# Patient Record
Sex: MALE | Race: WHITE | NOT HISPANIC OR LATINO | Employment: FULL TIME | URBAN - METROPOLITAN AREA
[De-identification: names, ages, dates, MRNs, and addresses within clinical notes are randomized per-mention and may not be internally consistent; named-entity substitution may affect disease eponyms.]

---

## 2017-05-05 ENCOUNTER — OFFICE VISIT (OUTPATIENT)
Dept: URGENT CARE | Facility: PHYSICIAN GROUP | Age: 57
End: 2017-05-05
Payer: COMMERCIAL

## 2017-05-05 VITALS
OXYGEN SATURATION: 97 % | DIASTOLIC BLOOD PRESSURE: 96 MMHG | BODY MASS INDEX: 25.05 KG/M2 | HEART RATE: 90 BPM | HEIGHT: 73 IN | WEIGHT: 189 LBS | SYSTOLIC BLOOD PRESSURE: 148 MMHG | TEMPERATURE: 98 F

## 2017-05-05 DIAGNOSIS — R21 RASH: ICD-10-CM

## 2017-05-05 PROCEDURE — 99204 OFFICE O/P NEW MOD 45 MIN: CPT | Performed by: FAMILY MEDICINE

## 2017-05-05 RX ORDER — TRIAMCINOLONE ACETONIDE 40 MG/ML
60 INJECTION, SUSPENSION INTRA-ARTICULAR; INTRAMUSCULAR ONCE
Status: COMPLETED | OUTPATIENT
Start: 2017-05-05 | End: 2017-05-05

## 2017-05-05 RX ORDER — PREDNISONE 20 MG/1
TABLET ORAL
Qty: 7 TAB | Refills: 1 | Status: SHIPPED | OUTPATIENT
Start: 2017-05-05 | End: 2017-06-01

## 2017-05-05 RX ORDER — PREDNISONE 20 MG/1
TABLET ORAL
Qty: 7 TAB | Refills: 0 | Status: SHIPPED | OUTPATIENT
Start: 2017-05-05 | End: 2017-05-05 | Stop reason: SDUPTHER

## 2017-05-05 RX ADMIN — TRIAMCINOLONE ACETONIDE 60 MG: 40 INJECTION, SUSPENSION INTRA-ARTICULAR; INTRAMUSCULAR at 10:54

## 2017-05-05 NOTE — MR AVS SNAPSHOT
"        Jose A Ponce   2017 10:10 AM   Office Visit   MRN: [id number]    Department:  [facility]   Dept Phone:  [phone]    Description:  Male : 1960   Provider:  Elvin Cohn M.D.           Reason for Visit     Rash Rash on back/itching/blotchy x6 weeks pt tried OTC nothing helps      Allergies as of 2017     No Known Allergies      You were diagnosed with     Rash   [[id number]]         Vital Signs     Blood Pressure Pulse Temperature Height Weight Body Mass Index    148/96 mmHg 90 36.7 °C (98 °F) 1.854 m (6' 1\") 85.73 kg (189 lb) 24.94 kg/m2    Oxygen Saturation                   97%           Basic Information     Date Of Birth Sex Race Ethnicity Preferred Language    1960 Male White Non- English      Health Maintenance     Patient has no pending health maintenance at this time      Current Immunizations     No immunizations on file.      Below and/or attached are the medications your provider expects you to take. Review all of your home medications and newly ordered medications with your provider and/or pharmacist. Follow medication instructions as directed by your provider and/or pharmacist. Please keep your medication list with you and share with your provider. Update the information when medications are discontinued, doses are changed, or new medications (including over-the-counter products) are added; and carry medication information at all times in the event of emergency situations     Allergies:  No Known Allergies          Medications  Valid as of: May 05, 2017 - 11:25 AM    Generic Name Brand Name Tablet Size Instructions for use    Fluocinonide (Cream) LIDEX 0.05 % APPLY THIN FILM TO RASH UP TO 3 TIMES A DAY ONLY IF NEEDED.        PredniSONE (Tab) DELTASONE 20 MG 1 TAB ONCE A DAY X 7 DAYS. TAKE WITH FOOD. PLEASE INCLUDE 1 REFILL.        .                 Medicines prescribed today were sent to:     [id number] - [place], [place] - [address]    [address] " [place] [id number]    Phone: [phone] Fax: [phone]    Open 24 Hours?: No      Medication refill instructions:       If your prescription bottle indicates you have medication refills left, it is not necessary to call your provider’s office. Please contact your pharmacy and they will refill your medication.    If your prescription bottle indicates you do not have any refills left, you may request refills at any time through one of the following ways: The online YouEye system (except Urgent Care), by calling your provider’s office, or by asking your pharmacy to contact your provider’s office with a refill request. Medication refills are processed only during regular business hours and may not be available until the next business day. Your provider may request additional information or to have a follow-up visit with you prior to refilling your medication.   *Please Note: Medication refills are assigned a new Rx number when refilled electronically. Your pharmacy may indicate that no refills were authorized even though a new prescription for the same medication is available at the pharmacy. Please request the medicine by name with the pharmacy before contacting your provider for a refill.           YouEye Access Code: [email or web address]  Expires: 6/4/2017 11:25 AM    Your email address is not on file at [facility].  Email Addresses are required for you to sign up for YouEye, please contact [phone] to verify your personal information and to provide your email address prior to attempting to register for YouEye.    Jose A Ponce  [address]  [place] [address]    YouEye  A secure, online tool to manage your health information     [facility]’s YouEye® is a secure, online tool that connects you to your personalized health information from the privacy of your home -- day or night - making it very easy for you to manage your healthcare. Once the activation process is completed, you can even access your  medical information using the SocialSign.in elyssa, which is available for free in the Apple Elyssa store or Google Play store.     To learn more about SocialSign.in, visit [email or web address]    There are two levels of access available (as shown below):   My Chart Features  [facility] Doctor [facility]  Specialists [facility]  Urgent  Care Non-[facility] Primary [facility] Doctor   Email your healthcare team securely and privately 24/7 X X X    Manage appointments: schedule your next appointment; view details of past/upcoming appointments X      Request prescription refills. X      View recent personal medical records, including lab and immunizations X X X X   View health record, including health history, allergies, medications X X X X   Read reports about your outpatient visits, procedures, consult and ER notes X X X X   See your discharge summary, which is a recap of your hospital and/or ER visit that includes your diagnosis, lab results, and care plan X X  X     How to register for [email or web address]:  Once your e-mail address has been verified, follow the following steps to sign up for [email or web address].     1. Go to  [email or web address]  2. Click on the Sign Up Now box, which takes you to the New Member Sign Up page. You will need to provide the following information:  a. Enter your SocialSign.in Access Code exactly as it appears at the top of this page. (You will not need to use this code after you’ve completed the sign-up process. If you do not sign up before the expiration date, you must request a new code.)   b. Enter your date of birth.   c. Enter your home email address.   d. Click Submit, and follow the next screen’s instructions.  3. Create a SocialSign.in ID. This will be your SocialSign.in login ID and cannot be changed, so think of one that is secure and easy to remember.  4. Create a SocialSign.in password. You can change your password at any time.  5. Enter your Password Reset Question and Answer. This can be used at a later time if you forget your password.    6. Enter your e-mail address. This allows you to receive e-mail notifications when new information is available in Element ID.  7. Click Sign Up. You can now view your health information.    For assistance activating your Element ID account, call [phone]

## 2017-05-05 NOTE — PROGRESS NOTES
Chief Complaint:    Chief Complaint   Patient presents with   • Rash     Rash on back/itching/blotchy x6 weeks pt tried OTC nothing helps       History of Present Illness:    This is a new problem. For 6 weeks, has itchy rash affecting back and few on legs. At least moderate severity. Tried topical cortisone with mild help. Tried topical antifungal and antibiotic without help. No similar prior episodes. Not getting better.      Review of Systems:    Constitutional: Negative for fever, chills, and diaphoresis.   Eyes: Negative for change in vision, photophobia, pain, redness, and discharge.  ENT: Negative for ear pain, ear discharge, hearing loss, tinnitus, nasal congestion, nosebleeds, and sore throat.    Respiratory: Negative for cough, hemoptysis, sputum production, shortness of breath, wheezing, and stridor.    Cardiovascular: Negative for chest pain, palpitations, orthopnea, claudication, leg swelling, and PND.   Gastrointestinal: Negative for abdominal pain, nausea, vomiting, diarrhea, constipation, blood in stool, and melena.   Genitourinary: Negative for dysuria, urinary urgency, urinary frequency, hematuria, and flank pain.   Musculoskeletal: Negative for myalgias, joint pain, neck pain, and back pain.   Skin: See HPI.   Neurological: Negative for dizziness, tingling, tremors, sensory change, speech change, focal weakness, seizures, loss of consciousness, and headaches.   Endo: Negative for polydipsia.   Heme: Does not bruise/bleed easily.   Psychiatric/Behavioral: Negative for depression, suicidal ideas, hallucinations, memory loss and substance abuse. The patient is not nervous/anxious and does not have insomnia.      Past Medical History:    History reviewed. No pertinent past medical history.    Past Surgical History:    History reviewed. No pertinent past surgical history.    Social History:    Social History     Social History   • Marital Status: Single     Spouse Name: N/A   • Number of Children: N/A  "  • Years of Education: N/A     Occupational History   • Not on file.     Social History Main Topics   • Smoking status: Not on file   • Smokeless tobacco: Not on file   • Alcohol Use: Not on file   • Drug Use: Not on file   • Sexual Activity: Not on file     Other Topics Concern   • Not on file     Social History Narrative   • No narrative on file       Family History:    History reviewed. No pertinent family history.    Medications:    No current outpatient prescriptions on file prior to visit.     No current facility-administered medications on file prior to visit.       Allergies:    No Known Allergies      Vitals:    Filed Vitals:    05/05/17 1030   BP: 148/96   Pulse: 90   Temp: 36.7 °C (98 °F)   Height: 1.854 m (6' 1\")   Weight: 85.73 kg (189 lb)   SpO2: 97%       Physical Exam:    Constitutional: Vital signs reviewed. Appears well-developed and well-nourished. No acute distress.   Eyes: Sclera white, conjunctivae clear.  ENT: External ears normal. Hearing normal.  Cardiovascular: Peripheral pulses 2+.   Pulmonary/Chest: Respirations non-labored.  Musculoskeletal: Normal gait. Normal range of motion. No tenderness to palpation. No muscular atrophy or weakness.  Neurological: Alert and oriented to person, place, and time. Muscle tone normal. Coordination normal. Light touch and sensation normal.   Skin: Moderate-large amount of erythematous patches on mid-upper back bilaterally, some are confluent. Few focal erythematous patches on shoulders and legs.  Psychiatric: Normal mood and affect. Behavior is normal. Judgment and thought content normal.       Assessment / Plan:    1. Rash  - triamcinolone acetonide (KENALOG-40) injection 60 mg; 1.5 mL by Intramuscular route Once.  - fluocinonide (LIDEX) 0.05 % Cream; APPLY THIN FILM TO RASH UP TO 3 TIMES A DAY ONLY IF NEEDED.  Dispense: 30 g; Refill: 3  - predniSONE (DELTASONE) 20 MG Tab; 1 TAB ONCE A DAY X 7 DAYS. TAKE WITH FOOD. PLEASE INCLUDE 1 REFILL.  Dispense: 7 " Tab; Refill: 1      Discussed with him DDX and management options.    This looks like some type of atopy or eczema.    Desires aggressive treatment due to duration and severity of symptoms.    Agreeable to medications given and prescribed.    Advised to see Dermatologist (has PPO, does not need referral) if above not helpful.    May return to urgent care if needed.

## 2017-06-01 ENCOUNTER — RESOLUTE PROFESSIONAL BILLING HOSPITAL PROF FEE (OUTPATIENT)
Dept: HOSPITALIST | Facility: MEDICAL CENTER | Age: 57
End: 2017-06-01
Payer: COMMERCIAL

## 2017-06-01 ENCOUNTER — HOSPITAL ENCOUNTER (OUTPATIENT)
Facility: MEDICAL CENTER | Age: 57
End: 2017-06-02
Attending: EMERGENCY MEDICINE | Admitting: INTERNAL MEDICINE
Payer: COMMERCIAL

## 2017-06-01 ENCOUNTER — APPOINTMENT (OUTPATIENT)
Dept: RADIOLOGY | Facility: MEDICAL CENTER | Age: 57
End: 2017-06-01
Attending: EMERGENCY MEDICINE
Payer: COMMERCIAL

## 2017-06-01 DIAGNOSIS — R07.9 CHEST PAIN, UNSPECIFIED TYPE: ICD-10-CM

## 2017-06-01 DIAGNOSIS — R06.09 DYSPNEA ON EXERTION: ICD-10-CM

## 2017-06-01 LAB
ALBUMIN SERPL BCP-MCNC: 4.6 G/DL (ref 3.2–4.9)
ALBUMIN/GLOB SERPL: 1.3 G/DL
ALP SERPL-CCNC: 62 U/L (ref 30–99)
ALT SERPL-CCNC: 19 U/L (ref 2–50)
ANION GAP SERPL CALC-SCNC: 10 MMOL/L (ref 0–11.9)
AST SERPL-CCNC: 22 U/L (ref 12–45)
BASOPHILS # BLD AUTO: 0.9 % (ref 0–1.8)
BASOPHILS # BLD: 0.06 K/UL (ref 0–0.12)
BILIRUB SERPL-MCNC: 1.1 MG/DL (ref 0.1–1.5)
BNP SERPL-MCNC: 24 PG/ML (ref 0–100)
BUN SERPL-MCNC: 20 MG/DL (ref 8–22)
CALCIUM SERPL-MCNC: 9.2 MG/DL (ref 8.4–10.2)
CHLORIDE SERPL-SCNC: 106 MMOL/L (ref 96–112)
CHOLEST SERPL-MCNC: 242 MG/DL (ref 100–199)
CO2 SERPL-SCNC: 23 MMOL/L (ref 20–33)
CREAT SERPL-MCNC: 0.93 MG/DL (ref 0.5–1.4)
EKG IMPRESSION: NORMAL
EKG IMPRESSION: NORMAL
EOSINOPHIL # BLD AUTO: 0.02 K/UL (ref 0–0.51)
EOSINOPHIL NFR BLD: 0.3 % (ref 0–6.9)
ERYTHROCYTE [DISTWIDTH] IN BLOOD BY AUTOMATED COUNT: 41.4 FL (ref 35.9–50)
GFR SERPL CREATININE-BSD FRML MDRD: >60 ML/MIN/1.73 M 2
GLOBULIN SER CALC-MCNC: 3.5 G/DL (ref 1.9–3.5)
GLUCOSE SERPL-MCNC: 119 MG/DL (ref 65–99)
HCT VFR BLD AUTO: 47 % (ref 42–52)
HDLC SERPL-MCNC: 54 MG/DL
HGB BLD-MCNC: 16.7 G/DL (ref 14–18)
IMM GRANULOCYTES # BLD AUTO: 0.03 K/UL (ref 0–0.11)
IMM GRANULOCYTES NFR BLD AUTO: 0.5 % (ref 0–0.9)
INR PPP: 0.94 (ref 0.87–1.13)
LDLC SERPL CALC-MCNC: 162 MG/DL
LIPASE SERPL-CCNC: 46 U/L (ref 7–58)
LYMPHOCYTES # BLD AUTO: 1.43 K/UL (ref 1–4.8)
LYMPHOCYTES NFR BLD: 21.8 % (ref 22–41)
MAGNESIUM SERPL-MCNC: 2.1 MG/DL (ref 1.5–2.5)
MCH RBC QN AUTO: 34.3 PG (ref 27–33)
MCHC RBC AUTO-ENTMCNC: 35.5 G/DL (ref 33.7–35.3)
MCV RBC AUTO: 96.5 FL (ref 81.4–97.8)
MONOCYTES # BLD AUTO: 0.58 K/UL (ref 0–0.85)
MONOCYTES NFR BLD AUTO: 8.8 % (ref 0–13.4)
NEUTROPHILS # BLD AUTO: 4.44 K/UL (ref 1.82–7.42)
NEUTROPHILS NFR BLD: 67.7 % (ref 44–72)
NRBC # BLD AUTO: 0 K/UL
NRBC BLD AUTO-RTO: 0 /100 WBC
PHOSPHATE SERPL-MCNC: 2.1 MG/DL (ref 2.5–4.5)
PLATELET # BLD AUTO: 197 K/UL (ref 164–446)
PMV BLD AUTO: 10.1 FL (ref 9–12.9)
POTASSIUM SERPL-SCNC: 3.7 MMOL/L (ref 3.6–5.5)
PROT SERPL-MCNC: 8.1 G/DL (ref 6–8.2)
PROTHROMBIN TIME: 12.4 SEC (ref 12–14.6)
RBC # BLD AUTO: 4.87 M/UL (ref 4.7–6.1)
SODIUM SERPL-SCNC: 139 MMOL/L (ref 135–145)
TRIGL SERPL-MCNC: 128 MG/DL (ref 0–149)
TROPONIN I SERPL-MCNC: <0.02 NG/ML (ref 0–0.04)
TROPONIN I SERPL-MCNC: <0.02 NG/ML (ref 0–0.04)
TSH SERPL DL<=0.005 MIU/L-ACNC: 1.32 UIU/ML (ref 0.35–5.5)
WBC # BLD AUTO: 6.6 K/UL (ref 4.8–10.8)

## 2017-06-01 PROCEDURE — 84484 ASSAY OF TROPONIN QUANT: CPT

## 2017-06-01 PROCEDURE — 700102 HCHG RX REV CODE 250 W/ 637 OVERRIDE(OP): Performed by: EMERGENCY MEDICINE

## 2017-06-01 PROCEDURE — 36415 COLL VENOUS BLD VENIPUNCTURE: CPT

## 2017-06-01 PROCEDURE — 84443 ASSAY THYROID STIM HORMONE: CPT

## 2017-06-01 PROCEDURE — 93005 ELECTROCARDIOGRAM TRACING: CPT | Performed by: EMERGENCY MEDICINE

## 2017-06-01 PROCEDURE — 99285 EMERGENCY DEPT VISIT HI MDM: CPT

## 2017-06-01 PROCEDURE — 99220 PR INITIAL OBSERVATION CARE,LEVL III: CPT | Performed by: INTERNAL MEDICINE

## 2017-06-01 PROCEDURE — A9270 NON-COVERED ITEM OR SERVICE: HCPCS | Performed by: EMERGENCY MEDICINE

## 2017-06-01 PROCEDURE — 83690 ASSAY OF LIPASE: CPT

## 2017-06-01 PROCEDURE — 85610 PROTHROMBIN TIME: CPT

## 2017-06-01 PROCEDURE — 700101 HCHG RX REV CODE 250: Performed by: EMERGENCY MEDICINE

## 2017-06-01 PROCEDURE — A9270 NON-COVERED ITEM OR SERVICE: HCPCS | Performed by: INTERNAL MEDICINE

## 2017-06-01 PROCEDURE — 85025 COMPLETE CBC W/AUTO DIFF WBC: CPT

## 2017-06-01 PROCEDURE — 84100 ASSAY OF PHOSPHORUS: CPT

## 2017-06-01 PROCEDURE — 71010 DX-CHEST-PORTABLE (1 VIEW): CPT

## 2017-06-01 PROCEDURE — 94760 N-INVAS EAR/PLS OXIMETRY 1: CPT

## 2017-06-01 PROCEDURE — G0378 HOSPITAL OBSERVATION PER HR: HCPCS

## 2017-06-01 PROCEDURE — 83735 ASSAY OF MAGNESIUM: CPT

## 2017-06-01 PROCEDURE — 83880 ASSAY OF NATRIURETIC PEPTIDE: CPT

## 2017-06-01 PROCEDURE — 700102 HCHG RX REV CODE 250 W/ 637 OVERRIDE(OP): Performed by: INTERNAL MEDICINE

## 2017-06-01 PROCEDURE — 96374 THER/PROPH/DIAG INJ IV PUSH: CPT

## 2017-06-01 PROCEDURE — 80061 LIPID PANEL: CPT

## 2017-06-01 PROCEDURE — 80053 COMPREHEN METABOLIC PANEL: CPT

## 2017-06-01 RX ORDER — MELATONIN 10 MG
1 CAPSULE ORAL
COMMUNITY
End: 2020-03-02

## 2017-06-01 RX ORDER — MELATONIN 10 MG
1 CAPSULE ORAL
Status: DISCONTINUED | OUTPATIENT
Start: 2017-06-01 | End: 2017-06-01

## 2017-06-01 RX ORDER — LABETALOL HYDROCHLORIDE 5 MG/ML
10 INJECTION, SOLUTION INTRAVENOUS EVERY 4 HOURS PRN
Status: DISCONTINUED | OUTPATIENT
Start: 2017-06-01 | End: 2017-06-02 | Stop reason: HOSPADM

## 2017-06-01 RX ORDER — ASPIRIN 600 MG/1
300 SUPPOSITORY RECTAL ONCE
Status: COMPLETED | OUTPATIENT
Start: 2017-06-01 | End: 2017-06-01

## 2017-06-01 RX ORDER — ASPIRIN 81 MG/1
324 TABLET, CHEWABLE ORAL ONCE
Status: COMPLETED | OUTPATIENT
Start: 2017-06-01 | End: 2017-06-01

## 2017-06-01 RX ORDER — BISACODYL 10 MG
10 SUPPOSITORY, RECTAL RECTAL
Status: DISCONTINUED | OUTPATIENT
Start: 2017-06-01 | End: 2017-06-02 | Stop reason: HOSPADM

## 2017-06-01 RX ORDER — NAPROXEN 250 MG/1
250 TABLET ORAL 2 TIMES DAILY WITH MEALS
Status: DISCONTINUED | OUTPATIENT
Start: 2017-06-01 | End: 2017-06-02 | Stop reason: HOSPADM

## 2017-06-01 RX ORDER — ATORVASTATIN CALCIUM 10 MG/1
10 TABLET, FILM COATED ORAL
Status: DISCONTINUED | OUTPATIENT
Start: 2017-06-01 | End: 2017-06-02 | Stop reason: HOSPADM

## 2017-06-01 RX ORDER — AMOXICILLIN 250 MG
2 CAPSULE ORAL 2 TIMES DAILY
Status: DISCONTINUED | OUTPATIENT
Start: 2017-06-01 | End: 2017-06-02 | Stop reason: HOSPADM

## 2017-06-01 RX ORDER — NAPROXEN SODIUM 220 MG
220 TABLET ORAL 2 TIMES DAILY WITH MEALS
Status: ON HOLD | COMMUNITY
End: 2017-06-19

## 2017-06-01 RX ORDER — ASPIRIN 325 MG
325 TABLET ORAL DAILY
Status: DISCONTINUED | OUTPATIENT
Start: 2017-06-02 | End: 2017-06-02 | Stop reason: HOSPADM

## 2017-06-01 RX ORDER — POLYETHYLENE GLYCOL 3350 17 G/17G
1 POWDER, FOR SOLUTION ORAL
Status: DISCONTINUED | OUTPATIENT
Start: 2017-06-01 | End: 2017-06-02 | Stop reason: HOSPADM

## 2017-06-01 RX ADMIN — METOPROLOL TARTRATE 25 MG: 25 TABLET ORAL at 14:31

## 2017-06-01 RX ADMIN — METOPROLOL TARTRATE 5 MG: 5 INJECTION INTRAVENOUS at 11:08

## 2017-06-01 RX ADMIN — ASPIRIN 81 MG 324 MG: 81 TABLET ORAL at 11:08

## 2017-06-01 RX ADMIN — ATORVASTATIN CALCIUM 10 MG: 10 TABLET, FILM COATED ORAL at 20:06

## 2017-06-01 ASSESSMENT — LIFESTYLE VARIABLES
HOW MANY TIMES IN THE PAST YEAR HAVE YOU HAD 5 OR MORE DRINKS IN A DAY: 0
EVER HAD A DRINK FIRST THING IN THE MORNING TO STEADY YOUR NERVES TO GET RID OF A HANGOVER: NO
HAVE PEOPLE ANNOYED YOU BY CRITICIZING YOUR DRINKING: NO
TOTAL SCORE: 0
TOTAL SCORE: 0
HAVE YOU EVER FELT YOU SHOULD CUT DOWN ON YOUR DRINKING: NO
EVER FELT BAD OR GUILTY ABOUT YOUR DRINKING: NO
AVERAGE NUMBER OF DAYS PER WEEK YOU HAVE A DRINK CONTAINING ALCOHOL: 7
ON A TYPICAL DAY WHEN YOU DRINK ALCOHOL HOW MANY DRINKS DO YOU HAVE: 3
TOTAL SCORE: 0
EVER_SMOKED: YES
CONSUMPTION TOTAL: POSITIVE
ALCOHOL_USE: YES

## 2017-06-01 ASSESSMENT — PAIN SCALES - GENERAL
PAINLEVEL_OUTOF10: 3
PAINLEVEL_OUTOF10: 0
PAINLEVEL_OUTOF10: 0

## 2017-06-01 NOTE — ED NOTES
Med Rec completed per patient  Allergies reviewed  No ORAL antibiotics in last 30 days    Patient was given a Prednisone taper and completed about 2 weeks ago

## 2017-06-01 NOTE — CARE PLAN
Problem: Safety  Goal: Will remain free from falls  Intervention: Implement fall precautions    06/01/17 1437   OTHER   Environmental Precautions Treaded Slipper Socks on Patient;Personal Belongings, Wastebasket, Call Bell etc. in Easy Reach;Transferred to Stronger Side;Report Given to Other Health Care Providers Regarding Fall Risk;Bed in Low Position;Communication Sign for Patients & Families;Mobility Assessed & Appropriate Sign Placed   Bedrails Bedrails Closest to Bathroom Down   Chair/Bed Strip Alarm Patient Educated Regarding Fall Risk and Need for Bed Alarm, Understands and Continues to Refuse     Pt up self, declines bed alarm. Pt aaox4, steady gait. Fall protocol in place.       Problem: Knowledge Deficit  Goal: Knowledge of disease process/condition, treatment plan, diagnostic tests, and medications will improve  Outcome: PROGRESSING AS EXPECTED  Pt educated on stress test. Verbalized understanding. Print out provided.

## 2017-06-01 NOTE — FLOWSHEET NOTE
06/01/17 1409   Events/Summary/Plan   Events/Summary/Plan O2 protovol completed   Chest Exam   Work Of Breathing / Effort Mild   Respiration 18   Pulse 86   Heart Rate (Monitored) 79   Breath Sounds   RUL Breath Sounds Clear   RML Breath Sounds Clear   RLL Breath Sounds Diminished   LIANNA Breath Sounds Clear   LLL Breath Sounds Diminished   Oximetry   #Pulse Oximetry (Single Determination) Yes   Oxygen   Home O2 Use Prior To Admission? No   Pulse Oximetry 97 %   O2 (LPM) 0   O2 (FiO2) 21   O2 Daily Delivery Respiratory  Room Air with O2 Available

## 2017-06-01 NOTE — ED PROVIDER NOTES
"ED Provider Note    CHIEF COMPLAINT  Chief Complaint   Patient presents with   • Blood Pressure Problem     reports high blood pressure at home, no HX   • Chest Pain     started three days ago, \"feels like a dull ache, like I strained a muscle\"        HPI  Jose A Ponce is a 56 y.o. male who presents complaining of chest pain.    Patient states he has had constant, dull, left-sided chest pain for the last 3 days. He suspected he may have strained a muscle while lifting boxes at work. He has also had shortness of breath on exertion and today admits to nausea and fatigue. He denies vomiting, diaphoresis, PND, leg swelling, calf pain, orthopnea, cough, fever, chills, abdominal pain.    Patient states he has felt significantly fatigued and depressed since his significant other past 9 days ago.    Patient does not have a history of hypertension, dyslipidemia, diabetes, or family history of coronary artery disease.    Patient does not take aspirin on a daily basis.      ALLERGIES  No Known Allergies    CURRENT MEDICATIONS  Denies    PAST MEDICAL HISTORY   has a past medical history of hernia repair.    SURGICAL HISTORY   has past surgical history that includes other orthopedic surgery.    SOCIAL HISTORY  Social History     Social History Main Topics   • Smoking status: Current Every Day Smoker   • Smokeless tobacco: Not on file   • Alcohol Use: Yes   • Drug Use: No   • Sexual Activity: Not on file       Family Hx:  No family history of coronary artery disease in first-degree relatives    REVIEW OF SYSTEMS  See HPI for further details.  All other systems are negative except as above in HPI.      PHYSICAL EXAM  VITAL SIGNS: /111 mmHg  Pulse 111  Temp(Src) 37.1 °C (98.8 °F)  Resp 20  Ht 1.854 m (6' 1\")  Wt 84.4 kg (186 lb 1.1 oz)  BMI 24.55 kg/m2  SpO2 98%    General:  WDWN, nontoxic appearing in NAD; A+Ox3; V/S as above; tachycardic, hypertensive  Skin: warm and dry; good color; no rash  HEENT: NCAT; EOMs " intact; PERRL; no scleral icterus   Neck: FROM; no meningismus, no LAD, no JVD  Cardiovascular: Regular heart rate and rhythm.  No murmurs, rubs, or gallops; pulses 2+ bilaterally radially and DP areas; no chest wall tenderness   Lungs: Clear to auscultation with good air movement bilaterally.  No wheezes, rhonchi, or rales.   Abdomen: BS present; soft; NTND; no rebound, guarding, or rigidity.  No organomegaly or pulsatile mass  Extremities: SUAREZ x 4; no e/o trauma; no pedal edema; negative Homans  Neurologic: CNs III-XII grossly intact; speech clear; distal sensation intact; strength 5/5 UE/LEs  Psychiatric: Appropriate affect, normal mood    LABS  Results for orders placed or performed during the hospital encounter of 06/01/17   CBC w/ Differential   Result Value Ref Range    WBC 6.6 4.8 - 10.8 K/uL    RBC 4.87 4.70 - 6.10 M/uL    Hemoglobin 16.7 14.0 - 18.0 g/dL    Hematocrit 47.0 42.0 - 52.0 %    MCV 96.5 81.4 - 97.8 fL    MCH 34.3 (H) 27.0 - 33.0 pg    MCHC 35.5 (H) 33.7 - 35.3 g/dL    RDW 41.4 35.9 - 50.0 fL    Platelet Count 197 164 - 446 K/uL    MPV 10.1 9.0 - 12.9 fL    Neutrophils-Polys 67.70 44.00 - 72.00 %    Lymphocytes 21.80 (L) 22.00 - 41.00 %    Monocytes 8.80 0.00 - 13.40 %    Eosinophils 0.30 0.00 - 6.90 %    Basophils 0.90 0.00 - 1.80 %    Immature Granulocytes 0.50 0.00 - 0.90 %    Nucleated RBC 0.00 /100 WBC    Neutrophils (Absolute) 4.44 1.82 - 7.42 K/uL    Lymphs (Absolute) 1.43 1.00 - 4.80 K/uL    Monos (Absolute) 0.58 0.00 - 0.85 K/uL    Eos (Absolute) 0.02 0.00 - 0.51 K/uL    Baso (Absolute) 0.06 0.00 - 0.12 K/uL    Immature Granulocytes (abs) 0.03 0.00 - 0.11 K/uL    NRBC (Absolute) 0.00 K/uL   Complete Metabolic Panel (CMP)   Result Value Ref Range    Sodium 139 135 - 145 mmol/L    Potassium 3.7 3.6 - 5.5 mmol/L    Chloride 106 96 - 112 mmol/L    Co2 23 20 - 33 mmol/L    Anion Gap 10.0 0.0 - 11.9    Glucose 119 (H) 65 - 99 mg/dL    Bun 20 8 - 22 mg/dL    Creatinine 0.93 0.50 - 1.40 mg/dL     Calcium 9.2 8.4 - 10.2 mg/dL    AST(SGOT) 22 12 - 45 U/L    ALT(SGPT) 19 2 - 50 U/L    Alkaline Phosphatase 62 30 - 99 U/L    Total Bilirubin 1.1 0.1 - 1.5 mg/dL    Albumin 4.6 3.2 - 4.9 g/dL    Total Protein 8.1 6.0 - 8.2 g/dL    Globulin 3.5 1.9 - 3.5 g/dL    A-G Ratio 1.3 g/dL   Btype Natriuretic Peptide   Result Value Ref Range    B Natriuretic Peptide 24 0 - 100 pg/mL   Troponin STAT   Result Value Ref Range    Troponin I <0.02 0.00 - 0.04 ng/mL   Lipase   Result Value Ref Range    Lipase 46 7 - 58 U/L   Magnesium   Result Value Ref Range    Magnesium 2.1 1.5 - 2.5 mg/dL   Phosphorus   Result Value Ref Range    Phosphorus 2.1 (L) 2.5 - 4.5 mg/dL   PT/INR   Result Value Ref Range    PT 12.4 12.0 - 14.6 sec    INR 0.94 0.87 - 1.13   TSH   Result Value Ref Range    TSH 1.320 0.350 - 5.500 uIU/mL   ESTIMATED GFR   Result Value Ref Range    GFR If African American >60 >60 mL/min/1.73 m 2    GFR If Non African American >60 >60 mL/min/1.73 m 2   EKG (ER)   Result Value Ref Range    Report       [facility]    Test Date:  2017  Pt Name:    CESAR MASTERS                Department: [facility]  MRN:        [id number]                      [facility]  Gender:     M                            Technician: jhon  :        1960                   Requested By:CARMEN HOPKINS  Order #:    [id number]                    Reading MD:    Measurements  Intervals                                Axis  Rate:       108                          P:          34  VT:         177                          QRS:        -9  QRSD:       93                           T:          9  QT:         334  QTc:        448    Interpretive Statements  Sinus tachycardia  Probable left atrial enlargement  Minimal ST depression, anterolateral leads  No previous ECG available for comparison           EKG  12 Lead EKG obtained and interpreted by me to show:  Rhythm: Sinus rhythm   Rate: 108  Intervals:   VT:  177   QRS: 93   QTC: 448   All intervals are within normal limits  No ectopy  Normal axis  Minimal ST depression in leads II, III, aVF and V5/V^  Clinical Impression: Sinus tachycardia with minimal ST depression in the inferolateral leads  No prior EKG  EKG has been confirmed in [facility]     Repeat EK Lead EKG obtained at 10:52 AM and interpreted by me to show:  Rhythm: Sinus rhythm   Rate: 99  Intervals:   HI: 152  QRS: 93  QTC: 444  All intervals are within normal limits  No ectopy  Slightly leftward axis  T wave inversions in lead III  1 mm of ST depression in aVF  Clinical Impression: Sinus rhythm with nonspecific T-wave changes and minimal ST depression in 1-lead inferiorly; no STEMI  Compared to previous EKG dated earlier today which shows no change        IMAGING  DX-CHEST-PORTABLE (1 VIEW)   Final Result      No evidence of acute cardiopulmonary process.          MEDICAL RECORD  I have reviewed patient's medical record and pertinent results are listed below.      COURSE & MEDICAL DECISION MAKING  I have reviewed any medical record information, laboratory studies and radiographic results as noted.    Jose A Ponce is a 56 y.o. male who presents complaining of chest pressure, nausea, fatigue, and dyspnea on exertion.  Patient is tachycardic and hypertensive but not hypoxic. Patient has no risk factors for PE. I do not suspect thoracic aortic dissection. ACS is possible as is Takotsubo's .    Triage EKG demonstrates sinus tachycardia with ST depression inferolaterally. No STEMI.    Repeat EKG demonstrated no significant change. No STEMI.    Patient was given aspirin and metoprolol 5mg IV    Patient's glucose is slightly elevated at 119. BNP and troponin are negative. Electrolytes are within normal limits except for phosphorus which is 2.1. TSH is normal. Chest x-ray demonstrates no widening mediastinum, pulmonary edema, focal consolidation, or significant cardiomegaly.    12:02 PM  Paging hospitalist for  admission    12:12 PM  I discussed the case with Dr. Russell from the hospital service who agrees to admit the patient.        FINAL IMPRESSION  1. Chest pain, unspecified type    2. Dyspnea on exertion      Electronically signed by: Rose Mary Mooney, 6/1/2017 10:34 AM

## 2017-06-01 NOTE — PROGRESS NOTES
Received pt via [facility] from ED with transport and RN. Pt walked self to bathroom and bed. Pt up self with steady gait. Denies pain and this time.

## 2017-06-01 NOTE — PROGRESS NOTES
Admit profile complete, medication discussed. Pt refused medication at this time. Assessment complete.

## 2017-06-01 NOTE — H&P
DATE OF ADMISSION:  06/01/2017.    REASON FOR ADMISSION:  Chest pain.    HISTORY OF PRESENT ILLNESS:  The patient is a 56-year-old healthy gentleman   who recently lost his significant other several weeks ago.  Since then,   especially in the last several days, he notes a certain degree of substernal   and left-sided chest pain.  Initially, he thought that he had strained a   muscle due to lifting boxes at work.  However, he is not able to touch the   area of soreness, which is deep to the chest.  He has been tired and weak and   feels as though it is more work to do exercise and he becomes more short of   breath.  Prior to the last week, he has been able to do strenuous physical   activity without any chest pain or shortness of breath.    There has been no fluid retention.  He denies any orthopnea.  There has been   no cough or upper respiratory infection-type symptoms.  The sensation of chest   heaviness can last for hours, but it is not associated with shortness of   breath, acid reflux or radiation towards the shoulder or neck.    This morning, out of concern, as he felt weak, he checked his blood pressure   at home.  This is quite elevated in the 170 systolic range.  Given the chest   heaviness and elevated blood pressure, he presents to the emergency room.    Here in the emergency room, blood pressure is indeed elevated at 185/106.    He denies nausea, vomiting, diarrhea, constipation, dysuria, black or bloody   stools, focal neurologic deficit, or skin rash.  Remainder of the review of   systems per CMS guidelines is negative.    PAST MEDICAL HISTORY:  Query COPD.  The patient is a heavy and ongoing smoker.    PAST SURGICAL HISTORY:  Herniorrhaphy.    SOCIAL HISTORY:  He is an ongoing smoker.  He does drink alcohol, but not to   excess.    FAMILY HISTORY:  There is no early heart disease.    CURRENT MEDICATIONS:  He is using over-the-counter Naprosyn, melatonin, and   multivitamin.    ALLERGIES:   None.    REVIEW OF SYSTEMS:  As in the HPI.    PHYSICAL EXAMINATION:  VITAL SIGNS:  Blood pressure 185/106, pulse 105.  He is afebrile, respiratory   rate 20.  GENERAL:  He is alert, oriented.  He is a good historian.  He does appear   somewhat older than stated age.  HEENT:  Pupils are equal, round and reactive.  Extraocular movements are   intact.  Mucous membranes are moist.  NECK:  Supple without jugular venous distention, lymphadenopathy or bruit.  CARDIOVASCULAR:  He is regular without murmur, rub or gallop.  LUNGS:  Clear to auscultation bilaterally.  Tones are somewhat distant.  ABDOMEN:  Soft, nontender, nondistended.  Bowel sounds are present.  There is   no palpable organomegaly.  BACK:  No CVA tenderness.  EXTREMITIES:  Warm.  There is no edema.  NEUROLOGIC:  Nonfocal.  SKIN:  No obvious rash.    LABORATORY DATA:  Sodium 139, potassium 3.7, chloride 106, bicarbonate 23,   glucose 119, BUN 20, creatinine 0.9, calcium 9.2, AST 22, ALT 19, alkaline   phosphatase 60, total bilirubin 1.1, albumin 4.6, total protein is 8.1,   phosphorus 2.1, magnesium 2.1, lipase 46.  Troponin is negative.  BNP 24, INR   0.9.  TSH is 1.3.  White count 7, hemoglobin 17, hematocrit 47, mean cell   volume is 97, platelets 197, neutrophils 68%, lymphocytes 22%, monocytes 9%.    STUDIES:  1. Chest x-ray is negative.  2. Electrocardiogram shows sinus rhythm.    IMPRESSION:  1. Chest pain, rule out ischemia.  2. Rule out broken heart syndrome.  3. Probable chronic obstructive pulmonary disease with heavy and ongoing   smoking history.  4. Borderline polycythemia, likely related to ongoing smoking.  5. Malignant hypertension.    PLAN:  1. Chest pain.  He will be admitted to a monitored bed.  We will follow serial   cardiac markers and electrocardiograms.  We will provide aspirin and statin   as well as beta blockade.  There is no evidence of injury.  We will do a   perfusion study tomorrow.  2. Malignant hypertension.  We will start  metoprolol and provide PRNs.  3. Prophylaxis.  Lovenox, laxatives.       ____________________________________     MD ARABELLA STEINER / ARIAS    DD:  06/01/2017 14:11:29  DT:  06/01/2017 16:53:53    D#:  [id number]  Job#:  [id number]

## 2017-06-01 NOTE — ED NOTES
Floor called and notified of pt transfer to floor. Kriss RN to call with any questions. Pt leaves this ER with no acute changes.

## 2017-06-01 NOTE — ED NOTES
"Chief Complaint   Patient presents with   • Blood Pressure Problem     reports high blood pressure at home, no HX   • Chest Pain     started three days ago, \"feels like a dull ache, like I strained a muscle\"      /111 mmHg  Pulse 111  Temp(Src) 37.1 °C (98.8 °F)  Resp 20  Ht 1.854 m (6' 1\")  Wt 84.4 kg (186 lb 1.1 oz)  BMI 24.55 kg/m2  SpO2 98%    "

## 2017-06-02 ENCOUNTER — APPOINTMENT (OUTPATIENT)
Dept: RADIOLOGY | Facility: MEDICAL CENTER | Age: 57
End: 2017-06-02
Attending: INTERNAL MEDICINE
Payer: COMMERCIAL

## 2017-06-02 ENCOUNTER — HOSPITAL ENCOUNTER (OUTPATIENT)
Facility: MEDICAL CENTER | Age: 57
End: 2017-06-04
Attending: INTERNAL MEDICINE | Admitting: INTERNAL MEDICINE
Payer: COMMERCIAL

## 2017-06-02 ENCOUNTER — RESOLUTE PROFESSIONAL BILLING HOSPITAL PROF FEE (OUTPATIENT)
Dept: HOSPITALIST | Facility: MEDICAL CENTER | Age: 57
End: 2017-06-02
Payer: COMMERCIAL

## 2017-06-02 VITALS
WEIGHT: 186.07 LBS | OXYGEN SATURATION: 94 % | HEART RATE: 77 BPM | DIASTOLIC BLOOD PRESSURE: 80 MMHG | BODY MASS INDEX: 24.66 KG/M2 | TEMPERATURE: 97.6 F | SYSTOLIC BLOOD PRESSURE: 125 MMHG | RESPIRATION RATE: 18 BRPM | HEIGHT: 73 IN

## 2017-06-02 PROBLEM — R94.39 ABNORMAL CARDIOVASCULAR STRESS TEST: Status: ACTIVE | Noted: 2017-06-02

## 2017-06-02 LAB
TROPONIN I SERPL-MCNC: <0.02 NG/ML (ref 0–0.04)
TROPONIN I SERPL-MCNC: <0.02 NG/ML (ref 0–0.04)

## 2017-06-02 PROCEDURE — A9270 NON-COVERED ITEM OR SERVICE: HCPCS | Performed by: INTERNAL MEDICINE

## 2017-06-02 PROCEDURE — A9502 TC99M TETROFOSMIN: HCPCS

## 2017-06-02 PROCEDURE — 99406 BEHAV CHNG SMOKING 3-10 MIN: CPT

## 2017-06-02 PROCEDURE — G0378 HOSPITAL OBSERVATION PER HR: HCPCS

## 2017-06-02 PROCEDURE — 700102 HCHG RX REV CODE 250 W/ 637 OVERRIDE(OP): Performed by: INTERNAL MEDICINE

## 2017-06-02 PROCEDURE — 700111 HCHG RX REV CODE 636 W/ 250 OVERRIDE (IP)

## 2017-06-02 PROCEDURE — 99217 PR OBSERVATION CARE DISCHARGE: CPT | Performed by: INTERNAL MEDICINE

## 2017-06-02 PROCEDURE — 84484 ASSAY OF TROPONIN QUANT: CPT | Mod: 91

## 2017-06-02 RX ORDER — AMOXICILLIN 250 MG
2 CAPSULE ORAL 2 TIMES DAILY
Status: CANCELLED | OUTPATIENT
Start: 2017-06-02

## 2017-06-02 RX ORDER — REGADENOSON 0.08 MG/ML
INJECTION, SOLUTION INTRAVENOUS
Status: COMPLETED
Start: 2017-06-02 | End: 2017-06-02

## 2017-06-02 RX ORDER — NAPROXEN 500 MG/1
250 TABLET ORAL 2 TIMES DAILY WITH MEALS
Status: DISCONTINUED | OUTPATIENT
Start: 2017-06-02 | End: 2017-06-04 | Stop reason: HOSPADM

## 2017-06-02 RX ORDER — POLYETHYLENE GLYCOL 3350 17 G/17G
1 POWDER, FOR SOLUTION ORAL
Status: CANCELLED | OUTPATIENT
Start: 2017-06-02

## 2017-06-02 RX ORDER — LABETALOL HYDROCHLORIDE 5 MG/ML
10 INJECTION, SOLUTION INTRAVENOUS EVERY 4 HOURS PRN
Status: DISCONTINUED | OUTPATIENT
Start: 2017-06-02 | End: 2017-06-04 | Stop reason: HOSPADM

## 2017-06-02 RX ORDER — ASPIRIN 325 MG
325 TABLET ORAL DAILY
Status: CANCELLED | OUTPATIENT
Start: 2017-06-03

## 2017-06-02 RX ORDER — BISACODYL 10 MG
10 SUPPOSITORY, RECTAL RECTAL
Status: DISCONTINUED | OUTPATIENT
Start: 2017-06-02 | End: 2017-06-04 | Stop reason: HOSPADM

## 2017-06-02 RX ORDER — LABETALOL HYDROCHLORIDE 5 MG/ML
10 INJECTION, SOLUTION INTRAVENOUS EVERY 4 HOURS PRN
Status: CANCELLED | OUTPATIENT
Start: 2017-06-02

## 2017-06-02 RX ORDER — ASPIRIN 325 MG
325 TABLET ORAL DAILY
Status: DISCONTINUED | OUTPATIENT
Start: 2017-06-03 | End: 2017-06-04 | Stop reason: HOSPADM

## 2017-06-02 RX ORDER — AMOXICILLIN 250 MG
2 CAPSULE ORAL 2 TIMES DAILY
Status: DISCONTINUED | OUTPATIENT
Start: 2017-06-02 | End: 2017-06-04 | Stop reason: HOSPADM

## 2017-06-02 RX ORDER — POLYETHYLENE GLYCOL 3350 17 G/17G
1 POWDER, FOR SOLUTION ORAL
Status: DISCONTINUED | OUTPATIENT
Start: 2017-06-02 | End: 2017-06-04 | Stop reason: HOSPADM

## 2017-06-02 RX ORDER — ATORVASTATIN CALCIUM 10 MG/1
10 TABLET, FILM COATED ORAL
Status: DISCONTINUED | OUTPATIENT
Start: 2017-06-02 | End: 2017-06-03

## 2017-06-02 RX ORDER — ATORVASTATIN CALCIUM 10 MG/1
10 TABLET, FILM COATED ORAL
Status: CANCELLED | OUTPATIENT
Start: 2017-06-02

## 2017-06-02 RX ORDER — BISACODYL 10 MG
10 SUPPOSITORY, RECTAL RECTAL
Status: CANCELLED | OUTPATIENT
Start: 2017-06-02

## 2017-06-02 RX ORDER — NAPROXEN 250 MG/1
250 TABLET ORAL 2 TIMES DAILY WITH MEALS
Status: CANCELLED | OUTPATIENT
Start: 2017-06-02

## 2017-06-02 RX ADMIN — METOPROLOL TARTRATE 25 MG: 25 TABLET, FILM COATED ORAL at 20:18

## 2017-06-02 RX ADMIN — ATORVASTATIN CALCIUM 10 MG: 10 TABLET, FILM COATED ORAL at 20:18

## 2017-06-02 RX ADMIN — REGADENOSON 0.4 MG: 0.08 INJECTION, SOLUTION INTRAVENOUS at 08:49

## 2017-06-02 ASSESSMENT — LIFESTYLE VARIABLES
AVERAGE NUMBER OF DAYS PER WEEK YOU HAVE A DRINK CONTAINING ALCOHOL: 7
ALCOHOL_USE: YES
HOW MANY TIMES IN THE PAST YEAR HAVE YOU HAD 5 OR MORE DRINKS IN A DAY: 0
HAVE PEOPLE ANNOYED YOU BY CRITICIZING YOUR DRINKING: NO
CONSUMPTION TOTAL: POSITIVE
TOTAL SCORE: 0
TOTAL SCORE: 0
EVER FELT BAD OR GUILTY ABOUT YOUR DRINKING: NO
ON A TYPICAL DAY WHEN YOU DRINK ALCOHOL HOW MANY DRINKS DO YOU HAVE: 3
HAVE YOU EVER FELT YOU SHOULD CUT DOWN ON YOUR DRINKING: NO
TOTAL SCORE: 0
EVER_SMOKED: YES
EVER HAD A DRINK FIRST THING IN THE MORNING TO STEADY YOUR NERVES TO GET RID OF A HANGOVER: NO

## 2017-06-02 ASSESSMENT — PAIN SCALES - GENERAL
PAINLEVEL_OUTOF10: 0

## 2017-06-02 NOTE — CONSULTS
Cardiology consultation dictated. Mr. Ponce is a 56-year-old gentleman being seen at the request of Dr. Cash because of history of new onset chest discomfort and a significantly abnormal myocardial perfusion scan. He is also under significant situational stress.  He does not wish to stay in the hospital but is willing to consider angiography if it can be done today.  I reviewed with him the procedure of cardiac catheterization and angiography and conscious sedation as well as the indications, alternatives, relative risks and complications and consent was obtained.  Thank you for the opportunity to see him and participate in his care.

## 2017-06-02 NOTE — PROGRESS NOTES
0700: Received report from MITCH Donis.  Pt is in bed resting, O2 @ room air, IVF saline locked.  Bed in lowest position, Bed alarm not set, call light within reach.  Patient is anxious and wants to leave, I discussed with him that while he can leave AMA, that I think he should stay at least for the stress test.  He agreed to stay to take the test.    0800:  Patient taken to stress test.    1100:  Just received a phone call from cardiology who read the stress test for patient, he states positive for ischemia, MITCH [place] for Dr. Cash notified.      1230: Dr. Cash in room to discuss transfer with patient.      1430: Anabel called and reports that the patient will be transferred at 1630.    1630: Called report to [facility].    Tele Note:  ariadna Mix, reports the patient's telemetry strip was interpreted to resemble sinus rhythm with a heart rate of 73.  Pt reportedly having rare PVC ectopy.      WV: .16  QRS: .08  QT: .36    Will continue to monitor telemetry and communicate with monitor techMaria Del Rosario.

## 2017-06-02 NOTE — DISCHARGE PLANNING
ONEL Little received a call from transfer center with [facility] and nurse's station for report is [phone].  ONEL prepared COBRA and left on chart.  Awaiting a  time from [facility].  ONEL will let bs MITCH Morales know.

## 2017-06-02 NOTE — RESPIRATORY CARE
COPD EDUCATION by COPD CLINICAL EDUCATOR  6/2/2017 at 10:30 AM by Joelle Santana     Patient reviewed by COPD education team. Patient does not qualify for COPD program. Patient denies COPD.    Patient states he quit smoking 1 1/2 years ago. Declined smoking cessation packet.

## 2017-06-02 NOTE — IP AVS SNAPSHOT
" <p align=\"LEFT\"><IMG SRC=\"//[email or web address]$/[email or web address]\" alt=\"Image\" WIDTH=\"50%\" HEIGHT=\"200\" BORDER=\"\"></p>                   Name:Jose A Ponce  Medical Record Number:[id number]  CSN: [id number]    YOB: 1960   Age: 56 y.o.  Sex: male  HT:1.854 m (6' 1\") WT: 80.4 kg (177 lb 4 oz)          Admit Date: 6/2/2017     Discharge Date:   Today's Date: 6/4/2017  Attending Doctor:  Sherry Mejia M.D.                  Allergies:  Review of patient's allergies indicates no known allergies.          Follow-up Information     1. Follow up with Gray Barboza M.D. In 2 days.    Specialty:  Cardiac Surgery    Contact information    [address]  [address]  [place] [address]  [phone]          2. Follow up with Pcp Pt States None.    Specialty:  Family Medicine        3. Follow up with Uri Camacho M.D. In 2 weeks.    Specialty:  Cardiology    Contact information    [address]  [address]  [place] [id number] [phone]           Medication List      Take these Medications        Instructions    ALEVE 220 MG tablet   Generic drug:  naproxen    Take 220 mg by mouth 2 times a day, with meals.   Dose:  220 mg       aspirin EC 81 MG Tbec   Commonly known as:  ECOTRIN    Take 1 Tab by mouth every day.   Dose:  81 mg       atorvastatin 80 MG tablet   Commonly known as:  LIPITOR    Take 1 Tab by mouth every bedtime.   Dose:  80 mg       Melatonin 10 MG Caps    Take 1 Cap by mouth every bedtime.   Dose:  1 Cap       metoprolol 25 MG Tabs   Commonly known as:  LOPRESSOR    Take 1 Tab by mouth 2 Times a Day.   Dose:  25 mg       multivitamin Tabs    Take 1 Tab by mouth every day.   Dose:  1 Tab       nitroglycerin 0.4 MG Subl   Commonly known as:  NITROSTAT    Place 1 Tab under tongue as needed for Chest Pain.   Dose:  0.4 mg         "

## 2017-06-02 NOTE — PROGRESS NOTES
Nursing care plan includes knowledge deficit, potential for discomfort, potential for compromised cardiac output.  POC includes teaching, comfort measures and reassurance, and access to code cart, cardiology stand by and availability of rapid response team.  Pt verbalizes good understanding of benefits and risks of pharmacological cardiac stress test.  Informed consent obtained.  Lexiscan given, pt developed the following signs/symptoms:sob.    VS stable, symptoms resolved.  To waiting room, fluids and/or snack given, awaiting second scan.  Nursing goals met.

## 2017-06-02 NOTE — PROGRESS NOTES
Report received. Pt sitting up in bed watching TV, no signs of distress. Pt denies any pain, SOB, nausea or dizziness. Plan of care discussed and medications administered. BP trending down, metoprolol held. Pt denies further needs at this time.

## 2017-06-02 NOTE — DISCHARGE SUMMARY
DATE OF ADMISSION:  06/01/2017    DATE OF TRANSFER:  06/02/2017    ATTENDING:  [facility] hospitalist.    CONSULTATION:  Cardiology, Dr. Daniels.    PRIMARY CARE PROVIDER:  None.    CODE STATUS:  Full code.    CHIEF COMPLAINT:  Chest pain.    TRANSFER DIAGNOSES:  Reversible ischemia noted on stress test causing his   chest pain, hypertension, and anxiety.    IMAGING:  There was a chest x-ray that showed no acute cardiopulmonary   process.  There was a stress test that was positive, significant reversible   ischemia.    PROCEDURES:  None.    REASON FOR HOSPITALIZATION AND HISTORY OF PRESENT ILLNESS:  This is a   56-year-old male who presented to [facility] due to chest pain.  Patient   has been under high stress of late, recently lost his significant other a   couple weeks ago, just not been in normal mental state of health since then.    Patient states he was moving some boxes a couple days ago had left-sided chest   pain to his left arm, had reoccurrence of this, so he presented to the   emergency department.  Upon arrival, troponins were trended, which remained   negative.  EKG showed no sign of ischemia; however, stress test showed   significant ischemia.  I did ask Dr. Daniels to see the patient.    Initially, he did not want to have anything done; however, now he is   agreeable, so we will transfer for cardiac catheterization today.  Dr. Daniels stated it was in LAD distribution, the patient is aware of the   significant risks involved, but again at this point in time, he has now agreed   to be transferred.    PRIMARY CARE PHYSICIAN:  None.    PAST SURGICAL HISTORY:  Herniorrhaphy.    HOME MEDICATIONS:  None.    HOSPITAL MEDICATIONS:  1.  Aspirin 325 mg daily.  2.  Lipitor 10 mg daily.  3.  Lovenox 40 mg daily.  4.  Metoprolol 25 mg b.i.d.  5.  Naproxen 250 mg b.i.d. with meals.  6.  Senna/docusate 2 tabs b.i.d.  7.  MiraLax p.r.n.  8.  Milk of magnesia p.r.n.  9.  Dulcolax p.r.n.  10.  Labetalol 10  mg q. 4 hours p.r.n.    ALLERGIES:  No known drug allergies.    SOCIAL HISTORY:  He is currently smoking, denies alcohol or illicit drug use.    FAMILY HISTORY:  Denies any knowledge of coronary artery disease in the   family.    REVIEW OF SYSTEMS:  A complete review of systems obtained with positives noted   in the HPI above, all others negative.    PHYSICAL EXAMINATION:  VITAL SIGNS:  Temperature 36.7, pulse 77, respirations 18, blood pressure   135/82, satting 94% on room air.  GENERAL:  No acute distress, alert and oriented x3.  HEENT:  Normocephalic, atraumatic.  Moist mucous membranes.  NECK:  No JVD, bruit, thyromegaly, cervical or supraclavicular adenopathy   noted.  HEART:  Regular rate and rhythm.  No murmurs, rubs, or gallops.  LUNGS:  Clear to auscultation bilaterally.  No crackles, rhonchi, or wheezes.  ABDOMEN:  Bowel sounds x4, soft, nontender, nondistended, no   hepatosplenomegaly.  EXTREMITIES:  No clubbing, cyanosis, or edema.  SKIN:  No rash or erythema.  NEUROLOGIC:  Cranial nerves II-XII intact.  Extraocular muscles intact.    LABORATORY DATA:  White count 6.6, hemoglobin 16.7, hematocrit 47, platelets   197.  Sodium 139, potassium 3.7, chloride 106, CO2 23, BUN 20, creatinine   0.93, glucose 119.  Troponin remained negative; however, there was a positive   stress test as mentioned above.    DISPOSITION:  Transfer to [facility].    CONDITION:  Stable for transfer.    ASSESSMENT AND PLAN:  1.  Chest pain -- with significantly positive stress test in the LAD   distribution.  I had discussed the case with Dr. Daniels, patient will be   transferred to [facility] with cardiac catheterization today.  Patient states   he does have to work on Sunday and was concerned about being unable to make   it to work.  At this point in time, troponins remained negative and he is   hemodynamically stable; however, he does need a cardiac catheterization and   likely stenting.  2.  Hypertension -- was started on  metoprolol and is now controlled.  Will   likely benefit from lisinopril that he started prior to discharge.    INSTRUCTIONS:  Patient is to establish and follow up with primary care   provider upon discharge.  He is also to follow up with cardiology as   scheduled.  The patient was told if emergency arises post-discharge, he is to   call 911 or visit the emergency department.  Patient agreed and understood.    All questions were answered and patient expressed the desire to leave the   hospital.    Time spent on what will be H and P at [facility] and a transfer summary from   [facility], 45 minutes.       ____________________________________     DO JO-ANN CARSON / ARIAS    DD:  06/02/2017 12:39:53  DT:  06/02/2017 13:05:56    D#:  [id number]  Job#:  [id number]

## 2017-06-02 NOTE — IP AVS SNAPSHOT
" Home Care Instructions                                                                                                                  Name:Jose A Ponce  Medical Record Number:[id number]  CSN: [id number]    YOB: 1960   Age: 56 y.o.  Sex: male  HT:1.854 m (6' 1\") WT: 80.4 kg (177 lb 4 oz)          Admit Date: 6/2/2017     Discharge Date:   Today's Date: 6/4/2017  Attending Doctor:  Sherry Mejia M.D.                  Allergies:  Review of patient's allergies indicates no known allergies.            Discharge Instructions       Discharge Instructions    Discharged to home by car with self. Discharged via walking, hospital escort: Yes.  Special equipment needed: Not Applicable    Be sure to schedule a follow-up appointment with your primary care doctor or any specialists as instructed.     Discharge Plan:   Smoking Cessation Offered: Patient Refused  Influenza Vaccine Indication: Not indicated: Previously immunized this influenza season and > 8 years of age    I understand that a diet low in cholesterol, fat, and sodium is recommended for good health. Unless I have been given specific instructions below for another diet, I accept this instruction as my diet prescription.   Other diet:     Special Instructions: None    · Is patient discharged on Warfarin / Coumadin?   No     · Is patient Post Blood Transfusion?  No    Depression / Suicide Risk    As you are discharged from this [facility] facility, it is important to learn how to keep safe from harming yourself.    Recognize the warning signs:  · Abrupt changes in personality, positive or negative- including increase in energy   · Giving away possessions  · Change in eating patterns- significant weight changes-  positive or negative  · Change in sleeping patterns- unable to sleep or sleeping all the time   · Unwillingness or inability to communicate  · Depression  · Unusual sadness, discouragement and loneliness  · Talk of wanting to die  · Neglect of " personal appearance   · Rebelliousness- reckless behavior  · Withdrawal from people/activities they love  · Confusion- inability to concentrate     If you or a loved one observes any of these behaviors or has concerns about self-harm, here's what you can do:  · Talk about it- your feelings and reasons for harming yourself  · Remove any means that you might use to hurt yourself (examples: pills, rope, extension cords, firearm)  · Get professional help from the community (Mental Health, Substance Abuse, psychological counseling)  · Do not be alone:Call your Safe Contact- someone whom you trust who will be there for you.  · Call your local CRISIS HOTLINE 140-4270 or 439-140-7669  · Call your local Children's Mobile Crisis Response Team [place] (950) 935-7406 or www.[employer]  · Call the toll free National Suicide Prevention Hotlines   · National Suicide Prevention Lifeline 224-397-KIEZ (6369)  · [place] Line Network 800-SUICIDE (266-9196)        Follow-up Information     1. Follow up with Gray Barboza M.D. In 2 days.    Specialty:  Cardiac Surgery    Contact information    [address]  [address]  [place] [address] [phone]          2. Follow up with Pcp Pt States None.    Specialty:  Family Medicine        3. Follow up with Uri Camacho M.D. In 2 weeks.    Specialty:  Cardiology    Contact information    [address]  [address]  [place] [id number] [phone]           Discharge Medication Instructions:    Below are the medications your physician expects you to take upon discharge:    Review all your home medications and newly ordered medications with your doctor and/or pharmacist. Follow medication instructions as directed by your doctor and/or pharmacist.    Please keep your medication list with you and share with your physician.               Medication List      START taking these medications        Instructions    Morning Afternoon Evening Bedtime    aspirin EC 81 MG Tbec      Commonly known as:  ECOTRIN        Take 1 Tab by mouth every day.   Dose:  81 mg                        atorvastatin 80 MG tablet   Last time this was given:  80 mg on 6/3/2017  9:14 PM   Commonly known as:  LIPITOR        Take 1 Tab by mouth every bedtime.   Dose:  80 mg                        metoprolol 25 MG Tabs   Last time this was given:  25 mg on 6/4/2017  8:15 AM   Commonly known as:  LOPRESSOR        Take 1 Tab by mouth 2 Times a Day.   Dose:  25 mg                        nitroglycerin 0.4 MG Subl   Commonly known as:  NITROSTAT        Place 1 Tab under tongue as needed for Chest Pain.   Dose:  0.4 mg                          CONTINUE taking these medications        Instructions    Morning Afternoon Evening Bedtime    ALEVE 220 MG tablet   Generic drug:  naproxen        Take 220 mg by mouth 2 times a day, with meals.   Dose:  220 mg                        Melatonin 10 MG Caps        Take 1 Cap by mouth every bedtime.   Dose:  1 Cap                        multivitamin Tabs        Take 1 Tab by mouth every day.   Dose:  1 Tab                             Where to Get Your Medications      These medications were sent to [facility] [id number] - [place] - [address]  [address] [place] [address]     Phone:  [phone]    - aspirin EC 81 MG Tbec  - atorvastatin 80 MG tablet  - metoprolol 25 MG Tabs  - nitroglycerin 0.4 MG Subl            Instructions           Diet / Nutrition:    Follow any diet instructions given to you by your doctor or the dietician, including how much salt (sodium) you are allowed each day.    If you are overweight, talk to your doctor about a weight reduction plan.    Activity:    Remain physically active following your doctor's instructions about exercise and activity.    Rest often.     Any time you become even a little tired or short of breath, SIT DOWN and rest.    Worsening Symptoms:    Report any of the following signs and symptoms to the doctor's office immediately:    *Pain of  jaw, arm, or neck  *Chest pain not relieved by medication                               *Dizziness or loss of consciousness  *Difficulty breathing even when at rest   *More tired than usual                                       *Bleeding drainage or swelling of surgical site  *Swelling of feet, ankles, legs or stomach                 *Fever (>100ºF)  *Pink or blood tinged sputum  *Weight gain (3lbs/day or 5lbs /week)           *Shock from internal defibrillator (if applicable)  *Palpitations or irregular heartbeats                *Cool and/or numb extremities    Stroke Awareness    Common Risk Factors for Stroke include:    Age  Atrial Fibrillation  Carotid Artery Stenosis  Diabetes Mellitus  Excessive alcohol consumption  High blood pressure  Overweight   Physical inactivity  Smoking    Warning signs and symptoms of a stroke include:    *Sudden numbness or weakness of the face, arm or leg (especially on one side of the body).  *Sudden confusion, trouble speaking or understanding.  *Sudden trouble seeing in one or both eyes.  *Sudden trouble walking, dizziness, loss of balance or coordination.Sudden severe headache with no known cause.    It is very important to get treatment quickly when a stroke occurs. If you experience any of the above warning signs, call 911 immediately.                   Disclaimer         Quit Smoking / Tobacco Use:    I understand the use of any tobacco products increases my chance of suffering from future heart disease or stroke and could cause other illnesses which may shorten my life. Quitting the use of tobacco products is the single most important thing I can do to improve my health. For further information on smoking / tobacco cessation call a Toll Free Quit Line at 1-900.107.8210 (*National [employer] [place]) or 1-626.721.5790 (American Lung Association) or you can access the web based program at www.lungusa.org.    [place] Tobacco Users Help Line:  (889) 211-6666       Toll Free:  3-252-277-8752  Quit Tobacco Program [facility] ([phone]    Crisis Hotline:    National Crisis Hotline:  3-997-BZKNBQY or 1-646.970.4090    [place] Crisis Hotline:    1-331.257.8739 or 747-480-0365    Discharge Survey:   Thank you for choosing [facility]. We hope we did everything we could to make your hospital stay a pleasant one. You may be receiving a phone survey and we would appreciate your time and participation in answering the questions. Your input is very valuable to us in our efforts to improve our service to our patients and their families.        My signature on this form indicates that:    1. I have reviewed and understand the above information.  2. My questions regarding this information have been answered to my satisfaction.  3. I have formulated a plan with my discharge nurse to obtain my prescribed medications for home.                  Disclaimer         __________________________________                     __________       ________                       Patient Signature                                                 Date                    Time

## 2017-06-02 NOTE — DISCHARGE PLANNING
CCS received a PCS form to to arrange transportation to transfer the patient to [facility]. CCS faxed the PCS form and Face sheet to [facility]. CCS called and spoke to Terrence at [facility]. Transportation has been arranged via [facility] at 1630. SW on floor Anabel has been notified via phone.

## 2017-06-02 NOTE — DISCHARGE PLANNING
ONEL Little received a call from [facility] that BRIGITTE will  pt at 1630.  ONEL informed jaime Morales.

## 2017-06-02 NOTE — IP AVS SNAPSHOT
6/4/2017    Jose A Ponce  [address]  [place] [address]    Dear Jose A:    [facility] wants to ensure your discharge home is safe and you or your loved ones have had all of your questions answered regarding your care after you leave the hospital.    Below is a list of resources and contact information should you have any questions regarding your hospital stay, follow-up instructions, or active medical symptoms.    Questions or Concerns Regarding… Contact   Medical Questions Related to Your Discharge  (7 days a week, 8am-5pm) Contact a Nurse Care Coordinator   [phone]   Medical Questions Not Related to Your Discharge  (24 hours a day / 7 days a week)  Contact the Nurse Health Line   [phone]    Medications or Discharge Instructions Refer to your discharge packet   or contact your [facility] Primary Care Provider   [phone]   Follow-up Appointment(s) Schedule your appointment via [employer]   or contact Scheduling [phone]   Billing Review your statement via [employer]  or contact Billing [phone]   Medical Records Review your records via [employer]   or contact Medical Records [phone]     You may receive a telephone call within two days of discharge. This call is to make certain you understand your discharge instructions and have the opportunity to have any questions answered. You can also easily access your medical information, test results and upcoming appointments via the [employer] free online health management tool. You can learn more and sign up at [employer]. For assistance setting up your [employer] account, please call [phone].    Once again, we want to ensure your discharge home is safe and that you have a clear understanding of any next steps in your care. If you have any questions or concerns, please do not hesitate to contact us, we are here for you. Thank you for choosing [facility] for your healthcare needs.    Sincerely,    Your [facility] Healthcare Team

## 2017-06-02 NOTE — CARE PLAN
Problem: Knowledge Deficit  Goal: Knowledge of disease process/condition, treatment plan, diagnostic tests, and medications will improve  Outcome: PROGRESSING AS EXPECTED  Pt verbalizes understanding that they will undergo a CST to evaluate cardiac functioning. They are to abstain from caffeine and will be NPO status after midnight as preparation. Pt understands that labs will be drawn to monitor for an increase in troponin.

## 2017-06-02 NOTE — PROGRESS NOTES
Direct admit from [facility], Dr. Cash for Chest pain.  Needs cardiac cath.  Discharge and readmit orders signed and held, need to be released upon pt arrival.  Pt coming by ground.

## 2017-06-02 NOTE — IP AVS SNAPSHOT
Lentigen Access Code: [email or web address]  Expires: 7/4/2017 11:34 AM    Lentigen  A secure, online tool to manage your health information     [employer]’s Lentigen® is a secure, online tool that connects you to your personalized health information from the privacy of your home -- day or night - making it very easy for you to manage your healthcare. Once the activation process is completed, you can even access your medical information using the Lentigen elyssa, which is available for free in the Apple Elyssa store or Google Play store.     Lentigen provides the following levels of access (as shown below):   My Chart Features   [facility] Primary Care Doctor [facility]  Specialists [facility]  Urgent  Care Non-[facility]  Primary Care  Doctor   Email your healthcare team securely and privately 24/7 X X X X   Manage appointments: schedule your next appointment; view details of past/upcoming appointments X      Request prescription refills. X      View recent personal medical records, including lab and immunizations X X X X   View health record, including health history, allergies, medications X X X X   Read reports about your outpatient visits, procedures, consult and ER notes X X X X   See your discharge summary, which is a recap of your hospital and/or ER visit that includes your diagnosis, lab results, and care plan. X X       How to register for Lentigen:  1. Go to  [email or web address].  2. Click on the Sign Up Now box, which takes you to the New Member Sign Up page. You will need to provide the following information:  a. Enter your Lentigen Access Code exactly as it appears at the top of this page. (You will not need to use this code after you’ve completed the sign-up process. If you do not sign up before the expiration date, you must request a new code.)   b. Enter your date of birth.   c. Enter your home email address.   d. Click Submit, and follow the next screen’s instructions.  3. Create a Lentigen ID. This will be your Lentigen  login ID and cannot be changed, so think of one that is secure and easy to remember.  4. Create a [employer] password. You can change your password at any time.  5. Enter your Password Reset Question and Answer. This can be used at a later time if you forget your password.   6. Enter your e-mail address. This allows you to receive e-mail notifications when new information is available in [employer].  7. Click Sign Up. You can now view your health information.    For assistance activating your [employer] account, call [phone]

## 2017-06-03 PROBLEM — E78.00 HYPERCHOLESTEROLEMIA: Status: ACTIVE | Noted: 2017-06-03

## 2017-06-03 PROBLEM — R07.9 CHEST PAIN AT REST: Status: ACTIVE | Noted: 2017-06-03

## 2017-06-03 PROBLEM — R94.30 ABNORMAL CARDIAC FUNCTION TEST: Status: ACTIVE | Noted: 2017-06-03

## 2017-06-03 PROBLEM — I25.10 3-VESSEL CORONARY ARTERY DISEASE: Status: ACTIVE | Noted: 2017-06-03

## 2017-06-03 PROBLEM — F43.21 GRIEF REACTION: Status: ACTIVE | Noted: 2017-06-03

## 2017-06-03 PROBLEM — F17.200 SMOKER: Status: ACTIVE | Noted: 2017-06-03

## 2017-06-03 PROBLEM — Z72.0 TOBACCO USE: Status: ACTIVE | Noted: 2017-06-03

## 2017-06-03 PROBLEM — F43.20 GRIEF REACTION: Status: ACTIVE | Noted: 2017-06-03

## 2017-06-03 LAB
EKG IMPRESSION: NORMAL
LV EJECT FRACT  99904: 55
LV EJECT FRACT MOD 2C 99903: 49.66
LV EJECT FRACT MOD 4C 99902: 50.77
LV EJECT FRACT MOD BP 99901: 49.34

## 2017-06-03 PROCEDURE — 93005 ELECTROCARDIOGRAM TRACING: CPT | Performed by: INTERNAL MEDICINE

## 2017-06-03 PROCEDURE — 307093 HCHG TR BAND RADIAL

## 2017-06-03 PROCEDURE — 700105 HCHG RX REV CODE 258

## 2017-06-03 PROCEDURE — 700102 HCHG RX REV CODE 250 W/ 637 OVERRIDE(OP): Performed by: HOSPITALIST

## 2017-06-03 PROCEDURE — 93010 ELECTROCARDIOGRAM REPORT: CPT | Performed by: INTERNAL MEDICINE

## 2017-06-03 PROCEDURE — 700117 HCHG RX CONTRAST REV CODE 255: Performed by: INTERNAL MEDICINE

## 2017-06-03 PROCEDURE — 360979 HCHG DIAGNOSTIC CATH

## 2017-06-03 PROCEDURE — C1894 INTRO/SHEATH, NON-LASER: HCPCS

## 2017-06-03 PROCEDURE — C1769 GUIDE WIRE: HCPCS

## 2017-06-03 PROCEDURE — 93306 TTE W/DOPPLER COMPLETE: CPT | Mod: 26 | Performed by: INTERNAL MEDICINE

## 2017-06-03 PROCEDURE — G0378 HOSPITAL OBSERVATION PER HR: HCPCS

## 2017-06-03 PROCEDURE — A9270 NON-COVERED ITEM OR SERVICE: HCPCS | Performed by: INTERNAL MEDICINE

## 2017-06-03 PROCEDURE — A9270 NON-COVERED ITEM OR SERVICE: HCPCS | Performed by: HOSPITALIST

## 2017-06-03 PROCEDURE — 700111 HCHG RX REV CODE 636 W/ 250 OVERRIDE (IP)

## 2017-06-03 PROCEDURE — 93458 L HRT ARTERY/VENTRICLE ANGIO: CPT

## 2017-06-03 PROCEDURE — 93306 TTE W/DOPPLER COMPLETE: CPT

## 2017-06-03 PROCEDURE — C1887 CATHETER, GUIDING: HCPCS

## 2017-06-03 PROCEDURE — 93880 EXTRACRANIAL BILAT STUDY: CPT | Mod: 26 | Performed by: SURGERY

## 2017-06-03 PROCEDURE — 93571 IV DOP VEL&/PRESS C FLO 1ST: CPT

## 2017-06-03 PROCEDURE — 99152 MOD SED SAME PHYS/QHP 5/>YRS: CPT

## 2017-06-03 PROCEDURE — 700101 HCHG RX REV CODE 250

## 2017-06-03 PROCEDURE — 700102 HCHG RX REV CODE 250 W/ 637 OVERRIDE(OP): Performed by: INTERNAL MEDICINE

## 2017-06-03 PROCEDURE — 93880 EXTRACRANIAL BILAT STUDY: CPT

## 2017-06-03 PROCEDURE — 99153 MOD SED SAME PHYS/QHP EA: CPT

## 2017-06-03 RX ORDER — ALPRAZOLAM 0.5 MG/1
0.5 TABLET ORAL 4 TIMES DAILY PRN
Status: DISCONTINUED | OUTPATIENT
Start: 2017-06-03 | End: 2017-06-04 | Stop reason: HOSPADM

## 2017-06-03 RX ORDER — SODIUM CHLORIDE 9 MG/ML
INJECTION, SOLUTION INTRAVENOUS
Status: COMPLETED
Start: 2017-06-03 | End: 2017-06-03

## 2017-06-03 RX ORDER — NITROGLYCERIN 0.4 MG/1
0.4 TABLET SUBLINGUAL
Status: DISCONTINUED | OUTPATIENT
Start: 2017-06-03 | End: 2017-06-04 | Stop reason: HOSPADM

## 2017-06-03 RX ORDER — HEPARIN SODIUM,PORCINE 1000/ML
VIAL (ML) INJECTION
Status: COMPLETED
Start: 2017-06-03 | End: 2017-06-03

## 2017-06-03 RX ORDER — MIDAZOLAM HYDROCHLORIDE 1 MG/ML
INJECTION INTRAMUSCULAR; INTRAVENOUS
Status: COMPLETED
Start: 2017-06-03 | End: 2017-06-03

## 2017-06-03 RX ORDER — LIDOCAINE HYDROCHLORIDE 20 MG/ML
INJECTION, SOLUTION INFILTRATION; PERINEURAL
Status: COMPLETED
Start: 2017-06-03 | End: 2017-06-03

## 2017-06-03 RX ORDER — ATORVASTATIN CALCIUM 80 MG/1
80 TABLET, FILM COATED ORAL
Status: DISCONTINUED | OUTPATIENT
Start: 2017-06-03 | End: 2017-06-04 | Stop reason: HOSPADM

## 2017-06-03 RX ORDER — VERAPAMIL HYDROCHLORIDE 2.5 MG/ML
INJECTION, SOLUTION INTRAVENOUS
Status: COMPLETED
Start: 2017-06-03 | End: 2017-06-03

## 2017-06-03 RX ADMIN — ASPIRIN 325 MG: 325 TABLET, COATED ORAL at 08:34

## 2017-06-03 RX ADMIN — MIDAZOLAM 2 MG: 1 INJECTION INTRAMUSCULAR; INTRAVENOUS at 11:54

## 2017-06-03 RX ADMIN — SODIUM CHLORIDE: 9 INJECTION, SOLUTION INTRAVENOUS at 10:30

## 2017-06-03 RX ADMIN — LIDOCAINE HYDROCHLORIDE: 20 INJECTION, SOLUTION INFILTRATION; PERINEURAL at 11:54

## 2017-06-03 RX ADMIN — HEPARIN SODIUM: 1000 INJECTION, SOLUTION INTRAVENOUS; SUBCUTANEOUS at 11:54

## 2017-06-03 RX ADMIN — FENTANYL CITRATE 100 MCG: 50 INJECTION, SOLUTION INTRAMUSCULAR; INTRAVENOUS at 11:54

## 2017-06-03 RX ADMIN — NITROGLYCERIN 10 ML: 20 INJECTION INTRAVENOUS at 11:54

## 2017-06-03 RX ADMIN — METOPROLOL TARTRATE 25 MG: 25 TABLET, FILM COATED ORAL at 21:14

## 2017-06-03 RX ADMIN — ATORVASTATIN CALCIUM 80 MG: 80 TABLET, FILM COATED ORAL at 21:14

## 2017-06-03 RX ADMIN — HEPARIN SODIUM 2000 UNITS: 200 INJECTION, SOLUTION INTRAVENOUS at 11:54

## 2017-06-03 RX ADMIN — METOPROLOL TARTRATE 25 MG: 25 TABLET, FILM COATED ORAL at 08:35

## 2017-06-03 RX ADMIN — IOHEXOL 120 ML: 350 INJECTION, SOLUTION INTRAVENOUS at 12:05

## 2017-06-03 RX ADMIN — VERAPAMIL HYDROCHLORIDE 2.5 MG: 2.5 INJECTION, SOLUTION INTRAVENOUS at 11:54

## 2017-06-03 ASSESSMENT — ENCOUNTER SYMPTOMS
FOCAL WEAKNESS: 0
NECK PAIN: 0
BACK PAIN: 0
DIARRHEA: 0
SORE THROAT: 0
DIZZINESS: 0
NERVOUS/ANXIOUS: 0
FEVER: 0
MEMORY LOSS: 0
CONSTIPATION: 0
ABDOMINAL PAIN: 0
HEMOPTYSIS: 0
BRUISES/BLEEDS EASILY: 0
WHEEZING: 0
PALPITATIONS: 0
WEIGHT LOSS: 0
HEADACHES: 0
EYE PAIN: 0
EYE DISCHARGE: 0
COUGH: 0
VOMITING: 0
SPEECH CHANGE: 0
WEAKNESS: 0
DEPRESSION: 1
SHORTNESS OF BREATH: 0
CLAUDICATION: 0
CHILLS: 0
DIAPHORESIS: 0
INSOMNIA: 0
NAUSEA: 0
DEPRESSION: 0
SPUTUM PRODUCTION: 0
EYES NEGATIVE: 1
MUSCULOSKELETAL NEGATIVE: 1
SENSORY CHANGE: 0
LOSS OF CONSCIOUSNESS: 0
HALLUCINATIONS: 0
MYALGIAS: 0
HEARTBURN: 0

## 2017-06-03 ASSESSMENT — LIFESTYLE VARIABLES: SUBSTANCE_ABUSE: 0

## 2017-06-03 ASSESSMENT — PAIN SCALES - GENERAL
PAINLEVEL_OUTOF10: 0

## 2017-06-03 NOTE — CARE PLAN
Problem: Communication  Goal: The ability to communicate needs accurately and effectively will improve  Outcome: PROGRESSING AS EXPECTED  Intervention: [facility] patient and significant other/support system to call light to alert staff of needs  Patient oriented to surroundings, unit routine, and policies.  Educated and understands the use of the call button for assistance with needs or mobility.      Problem: Infection  Goal: Will remain free from infection  Outcome: PROGRESSING AS EXPECTED  Patient VS and labs reviewed.  No signs/symptoms of infection at this time.

## 2017-06-03 NOTE — PROGRESS NOTES
2 RN's discuss fall risk and fall risk precautions with pt, pt is A+O, he verbalizes understanding, no questions. Continues to decline bed alarms. Bed alarms off at this time per pt request.

## 2017-06-03 NOTE — PROGRESS NOTES
Pt returns from cath lab, report received from cath lab RN.  in use, VS being monitored post - procedure. Pt fatigued and upset concerning diagnosis, emotional support given. Pt does not want to discuss any c/o chest pressure, or any other c/o. Pt does want to converse. Updated Hospitalist concerning pt's care. Discussed pt's care with Susana ROGERS for cardiology.

## 2017-06-03 NOTE — PROGRESS NOTES
Patient seen at [facility]  Discussed with bedside RN  All plans as per Dr. Tejeda's H&P

## 2017-06-03 NOTE — CATH LAB
Immediate Post-Operative Note      PreOp Diagnosis: Unstable angina. Abnormal MPI with anterior ischemia.    PostOp Diagnosis: See below.    Procedure(s) :  Coronary Angiography, Left Heart Catheterization, Left Ventriculography, FFR proximal RCA.    Surgeon(s):  Uri Camacho M.D.    Type of Anesthesia: Moderate Sedation    Specimen: None    Findings:  EF 65%. Normal wall motion.  LM: Patent.  LAD: Proximal 100%  at side branch with right to left collateral and partial left to left collateral circulation to the mid and distal LAD.  Circumflex: Mild diffuse noncritical disease.  RCA: Large vessel. Proximal estimated 60% stenosis; FFR index resting 0.96.    Complications: None.    Plan:  1.  Optimal medical therapy for CAD.  2. Post sedation discussion with patient regarding treatment options including  PCI attempt to the proximal LAD versus CABG versus medical therapy depending on patient preference, lifestyle however probable optimal long-term cardioprotection would be CABG.    Uri Camacho M.D.  6/3/2017 12:09 PM

## 2017-06-03 NOTE — CONSULTS
Author: Ross Daniels M.D. Service: (none) Author Type: Physician     Filed: 2017  5:55 PM Note Time: 2017  1:14 PM Status: Unsigned Transcription     : Ross Daniels M.D. (Physician) Trans ID: [id number]     Trans Status: Unavailable Dictation Time: 2017  1:14 PM Trans Time: 2017  5:54 PM     Trans Doc Type: Consult         Expand All Collapse All    DATE OF SERVICE:  2017     HISTORY OF PRESENT ILLNESS:  The patient is a 56-year-old gentleman being seen   in cardiology consultation at the request of Dr. Uri Cash because of a    history of chest discomfort and abnormal myocardial perfusion scan.  While    lifting boxes, he had an episode of left precordial chest and arm pain, which    resolved spontaneously.  This is well described in the history and physical    yesterday by Dr. Russell.  It has occurred intermittently subsequently and he    has also had significant hypertension recently.     He has been under a severe situational stress because of the illness of his    longtime girlfriend who  9 days ago.     Myocardial perfusion imaging was done here, which demonstrated abnormal stress   EKG and a large segment of ischemia in the left anterior descending    distribution as well as a possible small fixed inferior defect.     PAST SURGICAL HISTORY:  Remarkable for bilateral inguinal herniorrhaphy and    right leg distal open reduction and internal fixation.     PAST MEDICAL HISTORY:  Otherwise unremarkable.     FAMILY HISTORY:  Remarkable for heart disease in his grandfather and cancer in   both parents.     SOCIAL HISTORY:  He has just quit smoking and has no history of alcohol or    caffeinated beverage use.     REVIEW OF SYSTEMS:  Otherwise, all negative.     PHYSICAL EXAMINATION:  GENERAL:  He is alert, oriented and presently in no acute distress.  VITAL SIGNS:  Heart rate is 72 and regular, blood pressure 130/70,    respirations 16, temp 36.7 orally.  HEENT:   Conjunctivae clear.  Oropharynx clear.  NECK:  JVP less than 5.  Carotid amplitude and contour are normal.  Thyroid    not palpable.  CHEST:  Clear to P/A.  HEART:  Normal first and second sound without gallop or murmur audible.  ABDOMEN:  Soft without palpable organs, masses or abnormal pulses.  Bowel    sounds are normal and no bruits are heard.  EXTREMITIES:  Demonstrate intact pedal pulses with femoral pulse preceding    radial pulse and no cyanosis, clubbing or edema.  NEUROLOGIC:  Shows no motor or sensory deficit.     IMPRESSION:  1.  Chest discomfort compatible with myocardial ischemia.  2.  Abnormal stress EKG and stress perfusion scan as described above.     RECOMMENDATION:  My initial plan would be to obtain an echocardiogram to fully   evaluate cardiac valvular status and left ventricular function and continue    medical therapy with angiography following that, but unfortunately, it is    Friday afternoon and he is not willing to stay in the hospital more than one    more day unless a convincing reason to do so can be obtained and therefore    wants to proceed with angiography now rather than delay this at all, which is    reasonable since it is going to be required.  I have reviewed the procedure    with him as well as the indications, alternatives, relative risks and    complications of the procedure and conscious sedation and consents obtained.     His problem is that he has been tremendously stressed by circumstances over    the past month and has also had to miss a great deal of work and is very    worried about his occupational security.  This has significant social stress    to both this workup and its proper sequence and to his general health.  We    will get the angiogram done as soon as possible and then we will perform    further workup as appropriate.     Thank you very much for the opportunity to see him and to participate in his    care.        ____________________________________     Ross  MD LACHELLE Mclean / ARIAS     DD:  06/02/2017 13:14:51  DT:  06/02/2017 17:54:41     D#:  [id number]  Job#:  [id number]     cc: JUNG CASH DO                   Consults by Ross Daniels M.D. at 6/2/2017  1:15 PM      Author: Ross Daniels M.D. Service: Cardiology Author Type: Physician     Filed: 6/2/2017  1:17 PM Note Time: 6/2/2017  1:15 PM Status: Addendum     : Ross Daniels M.D. (Physician)       Expand All Collapse All    Cardiology consultation dictated. Mr. Ponce is a 56-year-old gentleman being seen at the request of Dr. Cash because of history of new onset chest discomfort and a significantly abnormal myocardial perfusion scan. He is also under significant situational stress.  He does not wish to stay in the hospital but is willing to consider angiography if it can be done today.  I reviewed with him the procedure of cardiac catheterization and angiography and conscious sedation as well as the indications, alternatives, relative risks and complications and consent was obtained.  Thank you for the opportunity to see him and participate in his care.            Revision History          Date/Time User Provider Type Action     > 6/2/2017  1:17 PM Ross Daniels M.D. Physician Addend      6/2/2017 1:16 PM Ross Daniels M.D. Physician Sign

## 2017-06-03 NOTE — PROGRESS NOTES
Tele: SR 79.    Pt w/o c/o. Discussed pt's care with Dr. aGndhi this evening. Will pass care to NOC RN @ [facility].

## 2017-06-03 NOTE — PROGRESS NOTES
"CTSP    Struggling with the news of need for intervention, will call CTS   Beta blocker  Asa  Statin    Recent death of girl friend left him very strapped financially  Has little health coverage and very worried \"may not be able to afford surgery\"      "

## 2017-06-03 NOTE — PROGRESS NOTES
Bedside report completed.  Assumed pt care. Patient sitting up in bed, watching TV, in no apparent distress.  Safety precautions in place. Call light & personal belongings within reach.  Plan of care discussed.  Patient aware of NPO at midnight for possible cath lab tomorrow.  No reports of pain at this time.  IV is saline locked, patient is on room air.  No needs expressed.  Will continue to monitor.

## 2017-06-03 NOTE — PROGRESS NOTES
Received care of pt from NOC RN this am. Pt is A+O. Denies c/o. Discussed pt's care w/ Cardiology today. Pt NPO, awaiting cath. Up self with no difficulties this am.

## 2017-06-03 NOTE — CARE PLAN
Problem: Bowel/Gastric:  Goal: Normal bowel function is maintained or improved  Outcome: PROGRESSING AS EXPECTED  Pt NPO in am for hearth cath. Pt states BM this am.     Problem: Knowledge Deficit  Goal: Knowledge of disease process/condition, treatment plan, diagnostic tests, and medications will improve  Outcome: PROGRESSING AS EXPECTED  Discussed plan of care for today w/ pt this am, he is A+O, he verbalizes understanding of his plan of care, no questions. Emotional support given. Will monitor for educational needs.

## 2017-06-03 NOTE — PROGRESS NOTES
"Patient is awake at this time for vitals.  He is upset with the lack of sleep and/or quiet.  Had to change neighbors bedding and assist him several times.  Patient states that \"as soon as my procedure is over tomorrow, I am checking out\".  Offered quiet pack (ear plugs, mask, etc.) to patient but he declined.  Apologized for the noise.  No reports of pain or other needs expressed.  Will continue to monitor.  "

## 2017-06-03 NOTE — DISCHARGE PLANNING
Medical Social Work    Referral: Pt has questions regarding hospital bill     Intervention: This writer emailed PFA to please see pt as requested.    Plan: Wait for finanical representative to meet with pt as requested.

## 2017-06-03 NOTE — PROGRESS NOTES
Cardiology Progress Note               Author: Augusta MATHIAS Alvin Date & Time created: 6/3/2017  8:46 AM     Interval History:    -transferred here last evening from [facility] after cp with high risk MPI  -no cp or sxs    Review of Systems   Constitutional: Positive for malaise/fatigue. Negative for weight loss.   Eyes: Negative.    Respiratory: Negative for cough and shortness of breath.    Cardiovascular: Negative for chest pain, palpitations and leg swelling.   Gastrointestinal: Negative for heartburn and nausea.   Musculoskeletal: Negative.    Neurological: Negative for dizziness.   Endo/Heme/Allergies: Does not bruise/bleed easily.   Psychiatric/Behavioral: Positive for depression. Negative for hallucinations and memory loss. The patient is not nervous/anxious and does not have insomnia.    All other systems reviewed and are negative.      Physical Exam   Constitutional: He is oriented to person, place, and time. He appears well-developed. No distress.   Thin, healthy - tattooed, good dentition   HENT:   Mouth/Throat: Mucous membranes are normal.   Eyes: Conjunctivae and EOM are normal.   Neck: No JVD present. No tracheal deviation present. No thyroid mass and no thyromegaly present.   Cardiovascular: Normal rate, regular rhythm and intact distal pulses.    No murmur heard.  Normal r radial and FA pulse.  Large ganglion cyst on L wrist   Pulmonary/Chest: Effort normal and breath sounds normal. No respiratory distress. He exhibits no tenderness.   Abdominal: Soft. There is no tenderness.   Musculoskeletal: Normal range of motion. He exhibits no edema.   Neurological: He is alert and oriented to person, place, and time. He has normal strength. He displays no tremor. He exhibits normal muscle tone. Coordination normal.   Skin: Skin is warm and dry. No rash noted. He is not diaphoretic.   Psychiatric: He has a normal mood and affect. His behavior is normal.   Vitals reviewed.      Hemodynamics:  Temp (24hrs),  Av °C (98.6 °F), Min:36.7 °C (98.1 °F), Max:37.2 °C (99 °F)  Temperature: 36.7 °C (98.1 °F)  Pulse  Av.6  Min: 76  Max: 87   Blood Pressure: 120/81 mmHg     Respiratory:    Respiration: 16, Pulse Oximetry: 96 %        RUL Breath Sounds: Clear, RML Breath Sounds: Diminished, RLL Breath Sounds: Diminished, LIANNA Breath Sounds: Clear, LLL Breath Sounds: Diminished  Fluids:     Weight: 80.9 kg (178 lb 5.6 oz)  GI/Nutrition:  Orders Placed This Encounter   Procedures   • DIET NPO     Standing Status: Standing      Number of Occurrences: 1      Standing Expiration Date:      Order Specific Question:  Restrict to:     Answer:  Sips with Medications [3]     Lab Results:  Recent Labs      17   1042   WBC  6.6   RBC  4.87   HEMOGLOBIN  16.7   HEMATOCRIT  47.0   MCV  96.5   MCH  34.3*   MCHC  35.5*   RDW  41.4   PLATELETCT  197   MPV  10.1     Recent Labs      17   1042   SODIUM  139   POTASSIUM  3.7   CHLORIDE  106   CO2  23   GLUCOSE  119*   BUN  20   CREATININE  0.93   CALCIUM  9.2     Recent Labs      17   1042   INR  0.94     Recent Labs      17   1042   BNPBTYPENAT  24     Recent Labs      17   1042  17   1729 17   0522   TROPONINI  <0.02  <0.02  <0.02  <0.02   BNPBTYPENAT  24   --    --    --      Recent Labs      17   1729   TRIGLYCERIDE  128   HDL  54   LDL  162*         Medical Decision Making, by Problem:    Cp, normal troponins  Smoker  Grief rxn (recent death of gfriend)  High risk nuclear study  - large anterior ischemia      Plan:    Nuclear reviewed - defect noted, to me does not appear to be large, but story still very convincing for ACS  Less likely, stress-induced CP  Asa  Statin  Discussed invasive approach which is planned, agrees and voices understanding    Core Measures

## 2017-06-03 NOTE — PROGRESS NOTES
Pt leaves floor via bed with cath lab RN. Discussed pt's care with Hospitalist this am. Pt c/o chest tightness to cath lab RN.

## 2017-06-03 NOTE — H&P
Filed: 6/1/2017  4:54 PM Note Time: 6/1/2017  2:11 PM Status: Unsigned Transcription     : Luis F Russell M.D. (Physician) Trans ID: [id number]     Trans Status: Unavailable Dictation Time: 6/1/2017  2:11 PM Trans Time: 6/1/2017  4:53 PM     Trans Doc Type: H&P         Expand All Collapse All    DATE OF ADMISSION:  06/01/2017.     REASON FOR ADMISSION:  Chest pain.     HISTORY OF PRESENT ILLNESS:  The patient is a 56-year-old healthy gentleman    who recently lost his significant other several weeks ago.  Since then,    especially in the last several days, he notes a certain degree of substernal    and left-sided chest pain.  Initially, he thought that he had strained a    muscle due to lifting boxes at work.  However, he is not able to touch the    area of soreness, which is deep to the chest.  He has been tired and weak and    feels as though it is more work to do exercise and he becomes more short of    breath.  Prior to the last week, he has been able to do strenuous physical    activity without any chest pain or shortness of breath.     There has been no fluid retention.  He denies any orthopnea.  There has been    no cough or upper respiratory infection-type symptoms.  The sensation of chest   heaviness can last for hours, but it is not associated with shortness of    breath, acid reflux or radiation towards the shoulder or neck.     This morning, out of concern, as he felt weak, he checked his blood pressure    at home.  This is quite elevated in the 170 systolic range.  Given the chest    heaviness and elevated blood pressure, he presents to the emergency room.     Here in the emergency room, blood pressure is indeed elevated at 185/106.     He denies nausea, vomiting, diarrhea, constipation, dysuria, black or bloody    stools, focal neurologic deficit, or skin rash.  Remainder of the review of    systems per CMS guidelines is negative.     PAST MEDICAL HISTORY:  Query COPD.  The patient is a heavy  and ongoing smoker.     PAST SURGICAL HISTORY:  Herniorrhaphy.     SOCIAL HISTORY:  He is an ongoing smoker.  He does drink alcohol, but not to    excess.     FAMILY HISTORY:  There is no early heart disease.     CURRENT MEDICATIONS:  He is using over-the-counter Naprosyn, melatonin, and    multivitamin.     ALLERGIES:  None.     REVIEW OF SYSTEMS:  As in the HPI.     PHYSICAL EXAMINATION:  VITAL SIGNS:  Blood pressure 185/106, pulse 105.  He is afebrile, respiratory    rate 20.  GENERAL:  He is alert, oriented.  He is a good historian.  He does appear    somewhat older than stated age.  HEENT:  Pupils are equal, round and reactive.  Extraocular movements are    intact.  Mucous membranes are moist.  NECK:  Supple without jugular venous distention, lymphadenopathy or bruit.  CARDIOVASCULAR:  He is regular without murmur, rub or gallop.  LUNGS:  Clear to auscultation bilaterally.  Tones are somewhat distant.  ABDOMEN:  Soft, nontender, nondistended.  Bowel sounds are present.  There is    no palpable organomegaly.  BACK:  No CVA tenderness.  EXTREMITIES:  Warm.  There is no edema.  NEUROLOGIC:  Nonfocal.  SKIN:  No obvious rash.     LABORATORY DATA:  Sodium 139, potassium 3.7, chloride 106, bicarbonate 23,    glucose 119, BUN 20, creatinine 0.9, calcium 9.2, AST 22, ALT 19, alkaline    phosphatase 60, total bilirubin 1.1, albumin 4.6, total protein is 8.1,    phosphorus 2.1, magnesium 2.1, lipase 46.  Troponin is negative.  BNP 24, INR    0.9.  TSH is 1.3.  White count 7, hemoglobin 17, hematocrit 47, mean cell    volume is 97, platelets 197, neutrophils 68%, lymphocytes 22%, monocytes 9%.     STUDIES:  1. Chest x-ray is negative.  2. Electrocardiogram shows sinus rhythm.     IMPRESSION:  1. Chest pain, rule out ischemia.  2. Rule out broken heart syndrome.  3. Probable chronic obstructive pulmonary disease with heavy and ongoing    smoking history.  4. Borderline polycythemia, likely related to ongoing smoking.  5.  Malignant hypertension.     PLAN:  1. Chest pain.  He will be admitted to a monitored bed.  We will follow serial   cardiac markers and electrocardiograms.  We will provide aspirin and statin    as well as beta blockade.  There is no evidence of injury.  We will do a    perfusion study tomorrow.  2. Malignant hypertension.  We will start metoprolol and provide PRNs.  3. Prophylaxis.  Lovenox, laxatives.        ____________________________________     MD ARABELLA STEINER / ARIAS     DD:  06/01/2017 14:11:29  DT:  06/01/2017 16:53:53     D#:  [id number]  Job#:  [id number]

## 2017-06-03 NOTE — PROCEDURES
DATE OF PROCEDURE:  06/03/2017    PROCEDURE:  Cardiac catheterization.  A.  Left heart catheterization.  B.  Left ventriculography.  C.  Selective coronary angiography.  D.  Fractional flow reserve assessment of the proximal right coronary artery.  E.  Right radial artery approach.    PREPROCEDURE DIAGNOSES:  1.  Unstable angina.  2.  Abnormal myocardial perfusion scan with reversible anterior wall perfusion   defect with a left ventricular ejection fraction of 61%.  3.  Uncontrolled hypertension.  4.  Hyperlipidemia, untreated.    POSTPROCEDURE DIAGNOSES:  1.  Proximal left anterior descending artery chronic total occlusion with partial incomplete  collateral circulation filling the distal LAD from the circumflex artery and right coronary artery  2.  Left ventricular ejection fraction 73%.  3.  Right coronary artery proximal fractional flow index at rest-0.96.    PHYSICIAN:  Uri Camacho MD    REFERRING PHYSICIAN:  Ross Daniels MD    COMPLICATIONS:  None.    MEDICATIONS:  1.  Versed 2 mg IV.  2.  Fentanyl 100 mcg IV.  3.  Lidocaine 2% subcutaneous.  4.  Heparin 2000 units IA.  5.  Nitroglycerin 100 mcg IA.  6.  Verapamil 2.5 mg IA.    INDICATIONS:  A 56-year-old male with no prior medical history who was  transferred from [facility] to [facility] for a cardiac catheterization after presenting with a chest pain   syndrome, uncontrolled hypertension and a subsequent abnormal myocardial   perfusion scan on 06/02/2017 demonstrating a large-sized moderate ischemia in   the anterior distribution with normal wall motion and an ejection fraction of 61%.    The patient was referred for diagnostic cardiac catheterization with   consideration of therapeutic coronary intervention.    DESCRIPTION OF PROCEDURE:  After informed consent was obtained, the patient   was brought to the cardiac catheterization laboratory.    The patient was prepped, draped and  anesthetized in the usual manner.    After having established adequate collateral circulation to the right hand   with a pressure and oximetry-guided Mane's test, the volar surface of the   right wrist was prepped, draped and anesthetized in the usual manner.    Using a modified Seldinger technique, a 6-C[place] x 10 cm introducer sheath was   inserted in the right radial artery.  Heparin, verapamil and nitroglycerin   were given via the side port.    Next, a 6-[place] JL 3.5 left coronary catheter was inserted in the ostium of   the left coronary artery and angiograms were obtained in various projections.    Next, a 6-[place] JR 4.0 right coronary catheter was inserted in the ostium of   the right coronary artery and right coronary angiograms were obtained in   various projections.    Next, a 4-[place] pigtail catheter was inserted in the left ventricle under   fluoroscopic guidance.  A single plane ESTEBAN left ventricular angiogram was   performed.  Pre and post angiogram LVEDP, LV and aortic pressures were   obtained.    Review of the initial coronary angiograms demonstrated an estimated proximal   right coronary artery 60% stenosis and a chronic total occlusion of the   proximal left anterior descending artery with left to left and right to left   collateral circulation.    It was elected to proceed with a resting fractional flow reserve index of the   proximal right coronary artery.    Next, a 6-[place] JR 3.5 guiding catheter was inserted into the ostium of the   right coronary artery.    Next, a 0.014 Doppler flow wire was inserted, calibrated, zeroed.  The guide  catheter was flushed.  The Doppler flow wire was advanced into the distal   right coronary artery. The guide catheter was withdrawn from the ostium.    The resting FFR was 0.96.  Pullback pressure demonstrated no drift returning  to an index of 1.00.    At the end of the procedure, catheters were removed.  Hemostasis was achieved   with a wrist band device.   The patient tolerated the procedure well.    FINDINGS:  HEMODYNAMICS:  LEFT HEART PRESSURES:    1.  LVEDP of 20 mmHg.    2.  Left ventricular systolic pressure 124 mmHg.  3.  Central aortic pressure systolic 115, diastolic 71 mmHg.    2.  LEFT VENTRICULOGRAPHY:  Left ventricular chamber size, wall motion and   systolic function are normal.  Calculated ejection fraction 73%.    CORONARY ARTERIOGRAPHY:  1.  LEFT MAIN ARTERY:  Left main vessel is angiographically widely patent and   bifurcates into a left anterior descending artery and circumflex artery.  2.  LEFT ANTERIOR DESCENDING ARTERY:  The proximal LAD is 100% occluded   immediately adjacent to a small caliber diagonal branch.  There is faint retrograde   collateral circulation partially filling the distal left anterior descending artery. The distal  LAD vessel does not appear to have any additional significant stenotic lesions  3.  CIRCUMFLEX ARTERY:  The circumflex artery consists of a single bifurcating   marginal branch.  Mid circumflex has diffuse smooth vessel narrowing with an  estimated 50-60% stenosis.  4.  RIGHT CORONARY ARTERY:  The RCA is a large caliber vessel and gives rise   to a large posterior descending artery and a large posterolateral branch.  Proximal   circumflex has an estimated 60% stenosis.  There is a substantial collateral   circulation from the distal right coronary artery to the distal left anterior   descending artery.      FRACTIONAL FLOW RESERVE INDEX resting measurement of the proximal right   coronary artery is 0.96.    PLAN:  Once the patient recovers from his postprocedure sedation, would   discuss treatment options with regards to his coronary artery disease, which   would include initiation of optimal medical therapy and clinical monitoring,   attempt at a coronary intervention of the proximal left anterior descending   artery chronic total occlusion or coronary bypass grafting with a LIMA to the   left anterior descending  artery.  I believe that the most optimal longterm   management option and outcome would result from revascularization with   coronary artery bypass surgery.       ____________________________________     MD MATT EVANS / ARIAS    DD:  06/03/2017 13:55:54  DT:  06/03/2017 14:41:19    D#:  [id number]  Job#:  [id number]

## 2017-06-03 NOTE — CONSULTS
DATE OF SERVICE:  2017    HISTORY OF PRESENT ILLNESS:  The patient is a 56-year-old gentleman being seen   in cardiology consultation at the request of Dr. Uri Cash because of a   history of chest discomfort and abnormal myocardial perfusion scan.  While   lifting boxes, he had an episode of left precordial chest and arm pain, which   resolved spontaneously.  This is well described in the history and physical   yesterday by Dr. Russell.  It has occurred intermittently subsequently and he   has also had significant hypertension recently.    He has been under a severe situational stress because of the illness of his   longtime girlfriend who  9 days ago.    Myocardial perfusion imaging was done here, which demonstrated abnormal stress   EKG and a large segment of ischemia in the left anterior descending   distribution as well as a possible small fixed inferior defect.    PAST SURGICAL HISTORY:  Remarkable for bilateral inguinal herniorrhaphy and   right leg distal open reduction and internal fixation.    PAST MEDICAL HISTORY:  Otherwise unremarkable.    FAMILY HISTORY:  Remarkable for heart disease in his grandfather and cancer in   both parents.    SOCIAL HISTORY:  He has just quit smoking and has no history of alcohol or   caffeinated beverage use.    REVIEW OF SYSTEMS:  Otherwise, all negative.    PHYSICAL EXAMINATION:  GENERAL:  He is alert, oriented and presently in no acute distress.  VITAL SIGNS:  Heart rate is 72 and regular, blood pressure 130/70,   respirations 16, temp 36.7 orally.  HEENT:  Conjunctivae clear.  Oropharynx clear.  NECK:  JVP less than 5.  Carotid amplitude and contour are normal.  Thyroid   not palpable.  CHEST:  Clear to P/A.  HEART:  Normal first and second sound without gallop or murmur audible.  ABDOMEN:  Soft without palpable organs, masses or abnormal pulses.  Bowel   sounds are normal and no bruits are heard.  EXTREMITIES:  Demonstrate intact pedal pulses with femoral  pulse preceding   radial pulse and no cyanosis, clubbing or edema.  NEUROLOGIC:  Shows no motor or sensory deficit.    IMPRESSION:  1.  Chest discomfort compatible with myocardial ischemia.  2.  Abnormal stress EKG and stress perfusion scan as described above.    RECOMMENDATION:  My initial plan would be to obtain an echocardiogram to fully   evaluate cardiac valvular status and left ventricular function and continue   medical therapy with angiography following that, but unfortunately, it is   Friday afternoon and he is not willing to stay in the hospital more than one   more day unless a convincing reason to do so can be obtained and therefore   wants to proceed with angiography now rather than delay this at all, which is   reasonable since it is going to be required.  I have reviewed the procedure   with him as well as the indications, alternatives, relative risks and   complications of the procedure and conscious sedation and consents obtained.    His problem is that he has been tremendously stressed by circumstances over   the past month and has also had to miss a great deal of work and is very   worried about his occupational security.  This has significant social stress   to both this workup and its proper sequence and to his general health.  We   will get the angiogram done as soon as possible and then we will perform   further workup as appropriate.    Thank you very much for the opportunity to see him and to participate in his   care.       ____________________________________     MD LACHELLE Vick / ARIAS    DD:  06/02/2017 13:14:51  DT:  06/02/2017 17:54:41    D#:  [id number]  Job#:  [id number]    cc: JUNG DUNCAN DO

## 2017-06-04 VITALS
BODY MASS INDEX: 23.49 KG/M2 | TEMPERATURE: 99.3 F | WEIGHT: 177.25 LBS | OXYGEN SATURATION: 95 % | HEART RATE: 81 BPM | RESPIRATION RATE: 17 BRPM | DIASTOLIC BLOOD PRESSURE: 81 MMHG | HEIGHT: 73 IN | SYSTOLIC BLOOD PRESSURE: 122 MMHG

## 2017-06-04 LAB — EKG IMPRESSION: NORMAL

## 2017-06-04 PROCEDURE — 93005 ELECTROCARDIOGRAM TRACING: CPT | Performed by: INTERNAL MEDICINE

## 2017-06-04 PROCEDURE — A9270 NON-COVERED ITEM OR SERVICE: HCPCS | Performed by: INTERNAL MEDICINE

## 2017-06-04 PROCEDURE — G0378 HOSPITAL OBSERVATION PER HR: HCPCS

## 2017-06-04 PROCEDURE — 99217 PR OBSERVATION CARE DISCHARGE: CPT | Performed by: HOSPITALIST

## 2017-06-04 PROCEDURE — 700111 HCHG RX REV CODE 636 W/ 250 OVERRIDE (IP): Performed by: INTERNAL MEDICINE

## 2017-06-04 PROCEDURE — 93010 ELECTROCARDIOGRAM REPORT: CPT | Performed by: INTERNAL MEDICINE

## 2017-06-04 PROCEDURE — 700102 HCHG RX REV CODE 250 W/ 637 OVERRIDE(OP): Performed by: INTERNAL MEDICINE

## 2017-06-04 RX ORDER — ATORVASTATIN CALCIUM 80 MG/1
80 TABLET, FILM COATED ORAL
Qty: 30 TAB | Refills: 11 | Status: SHIPPED | OUTPATIENT
Start: 2017-06-04 | End: 2018-07-09 | Stop reason: SDUPTHER

## 2017-06-04 RX ORDER — NITROGLYCERIN 0.4 MG/1
0.4 TABLET SUBLINGUAL PRN
Qty: 25 TAB | Refills: 0 | Status: SHIPPED | OUTPATIENT
Start: 2017-06-04 | End: 2018-09-07 | Stop reason: SDUPTHER

## 2017-06-04 RX ADMIN — NAPROXEN 250 MG: 500 TABLET ORAL at 08:15

## 2017-06-04 RX ADMIN — ASPIRIN 325 MG: 325 TABLET, COATED ORAL at 08:14

## 2017-06-04 RX ADMIN — METOPROLOL TARTRATE 25 MG: 25 TABLET, FILM COATED ORAL at 08:15

## 2017-06-04 RX ADMIN — ENOXAPARIN SODIUM 40 MG: 100 INJECTION SUBCUTANEOUS at 08:18

## 2017-06-04 ASSESSMENT — ENCOUNTER SYMPTOMS
ORTHOPNEA: 0
COUGH: 0
PND: 0
PALPITATIONS: 0
CLAUDICATION: 0
SHORTNESS OF BREATH: 0

## 2017-06-04 ASSESSMENT — PAIN SCALES - GENERAL: PAINLEVEL_OUTOF10: 0

## 2017-06-04 NOTE — CARE PLAN
Problem: Nutritional:  Goal: Patient to verbalize or demonstrate understanding of diet  Outcome: NOT MET  Teaching refused - Pt would accept handout only.

## 2017-06-04 NOTE — PROGRESS NOTES
Right wrist catherization sight cdi no bleeding bruising or ecchymosis noted-+2 pulse noted hand warm

## 2017-06-04 NOTE — CONSULTS
DATE OF SERVICE:  06/04/2017    CHIEF COMPLAINT:  Mild chest pain.    HISTORY OF PRESENT ILLNESS:  A very pleasant gentleman who came in with mild   chest pain.  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  He   had negative troponins.  An angiogram shows that he has a chronically   obstructed LAD and with reconstitution of flow from right collaterals.  His   right has a 50%-60% lesion.  His circumflex has a 50%-60% lesion.  His EF is   65%.  Mitral and aortic valves function normal.    Smoked cigarettes up to 1-2 packs a day up until a year ago, which he stopped,   but he does have a 50-pack-year history. He does continue to use vapor nicotine.   He denies alcohol abuse.  He does   have a strong hypertension.  He has been hypertensive here in the hospital, on   admission.  He has a family history of coronary artery disease.  He has   hypercholesterolemia and dyslipidemia.    He denies TIA, stroke, claudication.    He denies congestive heart symptomatology.    He is not in an active exercise program.    FAMILY HISTORY:  Two sisters, no one is present.  Two sisters live in [facility].  Distant coronary artery disease in the family.    SOCIAL HISTORY:  Former smoker, nondrinker.    ALLERGIES:  None.    MEDICATIONS:  Per medication list.    REVIEW OF SYSTEMS:  A 14-point review of systems negative except for that   mentioned above.    PHYSICAL EXAMINATION:  VITAL SIGNS:  Heart rate 90, blood pressure 145/90, and respirations 12.  HEENT:  PERRLA.  EOM normal.  NEUROLOGIC:  Cranial nerves II-XII intact.  Four extremities motor function   equal, reflex normal.  Motor equal.  CARDIAC:  S1, S2.  No S3, no S4.  No murmur, heave or thrill.  PULMONARY:  Clear.  No rales or rhonchi.  Moves air well.  ABDOMEN:  Flat, scaphoid.  GENITOURINARY AND RECTAL:  Deferred.  EXTREMITIES:  Normal range of motion.  Saphenous vein good  for harvesting   bilaterally.  SKIN:  Clear.  PSYCHIATRIC:  Affect appropriate.  STERNUM:  Clear.  VASCULAR:  Femoral, popliteal, dorsalis pedis and posterior tibial pulses,   1-2+ both sides.    IMPRESSION:  1.  Angina.  2.  Coronary artery disease.  3.  Hypertension.  4.  Former cigarette smoker.  5.  Girlfriend recently passed away 1 week ago.  6.  Dyslipidemia.  7.  Hypercholesterolemia.  8.  A stressful life.    Patient is completely medically stable and angina is stable at this time.  He   has a chronically occluded artery.  I have discussed this case with Dr. Camacho.  He feels that placing a left FRANKIE, possibly a vein graft to the   LAD system, would be the best long-term solution for this 56-year-old man.  He   does not think we should bypass the right or the circumflex.  These in the   future could be treated by stent therapy should this be needed.  His FFR on   the right does not show this to be significant.    I explained the risks of surgery to the patient.  His risk of mortality of 1%,   infection, bleeding, heart attack, stroke, blood transfusion risk, liver,   lung, kidney failure, diaphragmatic paralysis, paresis, ulnar neuropathy,   chest wall paresthesia, leg paresthesia, reoperation for bleeding, reoperation   for infection, tracheostomy, ventilation, AIDS, hepatitis, missed counts of   surgical items.  I have explained the alternatives, intent, and expectation of   procedure to the patient.  He understands and accept above, understands and   accept the risks and desires to proceed with surgery.  Planned surgery on June 13th, preop June 12th, [facility] at 8:00 a.m.    Bulgarian Classification III, New York Heart Classification II, STS mortality   1%, STS mortality and morbidity 8%.  I have spent 2 hours reviewing these   films, discussing case with Dr. Augusta Esquivel and Dr. Camacho, history   and physical and review of patient's films.    Planned surgery as scheduled,  orders were written, patient informed.  Office   will call patient on Monday for a followup and scheduling is being done.       ____________________________________     MD KALPANA WADDELL / ARIAS    DD:  06/04/2017 08:40:51  DT:  06/04/2017 12:03:44    D#:  [id number]  Job#:  [id number]

## 2017-06-04 NOTE — DISCHARGE INSTRUCTIONS
Discharge Instructions    Discharged to home by car with self. Discharged via walking, hospital escort: Yes.  Special equipment needed: Not Applicable    Be sure to schedule a follow-up appointment with your primary care doctor or any specialists as instructed.     Discharge Plan:   Smoking Cessation Offered: Patient Refused  Influenza Vaccine Indication: Not indicated: Previously immunized this influenza season and > 8 years of age    I understand that a diet low in cholesterol, fat, and sodium is recommended for good health. Unless I have been given specific instructions below for another diet, I accept this instruction as my diet prescription.   Other diet:     Special Instructions: None    · Is patient discharged on Warfarin / Coumadin?   No     · Is patient Post Blood Transfusion?  No    Depression / Suicide Risk    As you are discharged from this [facility] facility, it is important to learn how to keep safe from harming yourself.    Recognize the warning signs:  · Abrupt changes in personality, positive or negative- including increase in energy   · Giving away possessions  · Change in eating patterns- significant weight changes-  positive or negative  · Change in sleeping patterns- unable to sleep or sleeping all the time   · Unwillingness or inability to communicate  · Depression  · Unusual sadness, discouragement and loneliness  · Talk of wanting to die  · Neglect of personal appearance   · Rebelliousness- reckless behavior  · Withdrawal from people/activities they love  · Confusion- inability to concentrate     If you or a loved one observes any of these behaviors or has concerns about self-harm, here's what you can do:  · Talk about it- your feelings and reasons for harming yourself  · Remove any means that you might use to hurt yourself (examples: pills, rope, extension cords, firearm)  · Get professional help from the community (Mental Health, Substance Abuse, psychological counseling)  · Do not be  alone:Call your Safe Contact- someone whom you trust who will be there for you.  · Call your local CRISIS HOTLINE 559-3071 or 359-844-2409  · Call your local Children's Mobile Crisis Response Team [place] (239) 142-3646 or www.[employer]  · Call the toll free National Suicide Prevention Hotlines   · National Suicide Prevention Lifeline 833-966-GWLJ (3575)  · National [employer] Line Network 800-SUICIDE (014-8492)

## 2017-06-04 NOTE — DISCHARGE SUMMARY
CHIEF COMPLAINT ON ADMISSION  No chief complaint on file.      CODE STATUS  Full Code    HPI & HOSPITAL COURSE  This is a 56 y.o. male here with chest pain, otherwise healthy except for smoking history, using vaporizer nicotine for past 2 years and uncontrolled hypertension, +family history paternal grandfather had 6 MIs.  He was admitted overnight at [facility], had a positive NM stress test and was transferred to [facility] for cardiac catheterization.  Dr. Morejon found a chronically 100% occluded LAD with collateral circulation intact.  No further chest pain, troponins negative, no EKG changes significant for acute ischemia.  Dr. Barboza reviewed case and set patient up with surgery planned for 6/13/17. His BP was controlled on metoprolol, his cholesterol was elevated at 242 with .  A work note was given to be off until 6/18/17 from work.  His carotid u/s with b/l plaques <50%.  He was instructed to take nitroglycerin prn chest discomfort, stop all nicotine and return to ER for any onset of chest pain.  Of note, he recently lost his long-time girlfriend and he has been grieving extensively.  He is discharged home with his son.    Therefore, he is discharged in good and stable condition with close outpatient follow-up.    SPECIFIC OUTPATIENT FOLLOW-UP  Follow up with Dr. Barboza' office next week in preparation for open heart surgery scheduled for 6/13/17.    Follow up with Dr. Morejon in next 1 week for any chest pain or problems prior to CABG surgery.    DISCHARGE PROBLEM LIST  Active Problems:    Tobacco use POA: Yes    Grief reaction POA: Yes      Overview: Death of gfriend 2017    Abnormal cardiac function test POA: Yes      Overview: Large ischemia on MPI/anterior 2017    Chest pain at rest POA: Yes    3-vessel coronary artery disease POA: Yes    Hypercholesterolemia POA: Yes  Resolved Problems:    * No resolved hospital problems. *      FOLLOW UP  No future appointments.  Gray SHIRLEY  MARY Barboza  [address]  [address]  [place] [address]  [phone]    In 2 days      Pcp Pt States None          Uri Camacho M.D.  [address]  [address]  [place] [address] [phone]    In 2 weeks        MEDICATIONS ON DISCHARGE   Jose A Ponce   Home Medication Instructions [id number]    Printed on:06/04/17 1135   Medication Information                      aspirin EC (ECOTRIN) 81 MG Tablet Delayed Response  Take 1 Tab by mouth every day.             atorvastatin (LIPITOR) 80 MG tablet  Take 1 Tab by mouth every bedtime.             Melatonin 10 MG Cap  Take 1 Cap by mouth every bedtime.             metoprolol (LOPRESSOR) 25 MG Tab  Take 1 Tab by mouth 2 Times a Day.             multivitamin (THERAGRAN) Tab  Take 1 Tab by mouth every day.             naproxen (ALEVE) 220 MG tablet  Take 220 mg by mouth 2 times a day, with meals.             nitroglycerin (NITROSTAT) 0.4 MG SL Tab  Place 1 Tab under tongue as needed for Chest Pain.                 DIET  Orders Placed This Encounter   Procedures   • Diet Order     Standing Status: Standing      Number of Occurrences: 1      Standing Expiration Date:      Order Specific Question:  Diet:     Answer:  Cardiac [6]       ACTIVITY  As tolerated.  Weight bearing as tolerated      CONSULTATIONS  Dr. Daniels-cardiology  Dr. Barboza-cardiothoracic surgery    PROCEDURES  6/3/17-left heart catheterization findings of LAD chronic total occlusion with need for CABG surgery.    LABORATORY  Lab Results   Component Value Date/Time    SODIUM 139 06/01/2017 10:42 AM    POTASSIUM 3.7 06/01/2017 10:42 AM    CHLORIDE 106 06/01/2017 10:42 AM    CO2 23 06/01/2017 10:42 AM    GLUCOSE 119* 06/01/2017 10:42 AM    BUN 20 06/01/2017 10:42 AM    CREATININE 0.93 06/01/2017 10:42 AM        Lab Results   Component Value Date/Time    WBC 6.6 06/01/2017 10:42 AM    HEMOGLOBIN 16.7 06/01/2017 10:42 AM    HEMATOCRIT 47.0 06/01/2017 10:42 AM    PLATELET COUNT 197 06/01/2017 10:42 AM         Total time of the discharge process exceeds 38 minutes

## 2017-06-04 NOTE — ASSESSMENT & PLAN NOTE
NM stress test with large reversible area of ischemia.  Cardiology consulted  6/3 cath lab with 3 v disease, CTS consult

## 2017-06-04 NOTE — PROGRESS NOTES
Patient seen at bedside by CTS. Plan for surgery on Tuesday 6/13 as outpatient.  Our office will arrange.  Continue care per Cardiology at this time.    See consult note from Dr. Barboza for details.

## 2017-06-04 NOTE — PROGRESS NOTES
[facility] Hospitalist Progress Note    Date of Service: 6/3/2017    Chief Complaint  56 y.o. male admitted 2017 with chest pain substernal and left sided for several days.  He does strenuous box lifting at work and thought it was muscular in nature.  He was admitted overnight at [facility], had stress test + for large area of reversible ischemia and transferred to [facility] for cardiac catheterization.    Interval Problem Update  6/3:  Alerted cardiology of need for cath lab, but not on schedule.  Dr. Morejon took patient to cath lab finding 3 vessel extensive disease and need for CTS consultation.  Patient remains on Heparin drip, medical treatment of ischemic heart disease.    Consultants/Specialty  Dr. Daniels-cardiology    Disposition  Home, no needs.        Review of Systems   Constitutional: Negative for fever, chills, malaise/fatigue and diaphoresis.   HENT: Negative for congestion and sore throat.    Eyes: Negative for pain and discharge.   Respiratory: Negative for cough, hemoptysis, sputum production, shortness of breath and wheezing.    Cardiovascular: Positive for chest pain (no longer since admission, on heparin drip). Negative for palpitations, claudication and leg swelling.   Gastrointestinal: Negative for nausea, vomiting, abdominal pain, diarrhea, constipation and melena.   Genitourinary: Negative for dysuria, urgency and frequency.   Musculoskeletal: Negative for myalgias, back pain, joint pain and neck pain.   Skin: Negative for itching and rash.   Neurological: Negative for dizziness, sensory change, speech change, focal weakness, loss of consciousness, weakness and headaches.   Endo/Heme/Allergies: Does not bruise/bleed easily.   Psychiatric/Behavioral: Negative for depression, suicidal ideas and substance abuse.      Physical Exam  Laboratory/Imaging   Hemodynamics  Temp (24hrs), Av °C (98.6 °F), Min:36.7 °C (98.1 °F), Max:37.2 °C (99 °F)   Temperature: 37.2  °C (98.9 °F)  Pulse  Av.1  Min: 65  Max: 87    Blood Pressure: 134/84 mmHg      Respiratory      Respiration: 16, Pulse Oximetry: 94 %        RUL Breath Sounds: Clear, RML Breath Sounds: Diminished, RLL Breath Sounds: Diminished, LIANNA Breath Sounds: Clear, LLL Breath Sounds: Diminished    Fluids    Intake/Output Summary (Last 24 hours) at 17 1743  Last data filed at 17 1700   Gross per 24 hour   Intake    620 ml   Output    300 ml   Net    320 ml       Nutrition  Orders Placed This Encounter   Procedures   • Diet Order     Standing Status: Standing      Number of Occurrences: 1      Standing Expiration Date:      Order Specific Question:  Diet:     Answer:  Cardiac [6]     Physical Exam   Constitutional: He is oriented to person, place, and time. He appears well-developed and well-nourished. No distress.   HENT:   Head: Normocephalic and atraumatic.   Mouth/Throat: Oropharynx is clear and moist. No oropharyngeal exudate.   Eyes: Conjunctivae and EOM are normal. Pupils are equal, round, and reactive to light. Right eye exhibits no discharge. Left eye exhibits no discharge. No scleral icterus.   Neck: Normal range of motion. Neck supple. No JVD present. No tracheal deviation present. No thyromegaly present.   Cardiovascular: Normal rate, regular rhythm and normal heart sounds.  Exam reveals no gallop and no friction rub.    No murmur heard.  Pulmonary/Chest: Effort normal and breath sounds normal. No respiratory distress. He has no wheezes. He has no rales. He exhibits no tenderness.   Abdominal: Soft. Bowel sounds are normal. He exhibits no distension and no mass. There is no tenderness. There is no rebound and no guarding.   Musculoskeletal: Normal range of motion. He exhibits no edema or tenderness.   Lymphadenopathy:     He has no cervical adenopathy.   Neurological: He is alert and oriented to person, place, and time. No cranial nerve deficit. He exhibits normal muscle tone.   Skin: Skin is warm  and dry. No rash noted. He is not diaphoretic. No erythema.   Psychiatric: He has a normal mood and affect. His behavior is normal. Judgment and thought content normal.   Nursing note and vitals reviewed.      Recent Labs      06/01/17   1042   WBC  6.6   RBC  4.87   HEMOGLOBIN  16.7   HEMATOCRIT  47.0   MCV  96.5   MCH  34.3*   MCHC  35.5*   RDW  41.4   PLATELETCT  197   MPV  10.1     Recent Labs      06/01/17   1042   SODIUM  139   POTASSIUM  3.7   CHLORIDE  106   CO2  23   GLUCOSE  119*   BUN  20   CREATININE  0.93   CALCIUM  9.2     Recent Labs      06/01/17   1042   INR  0.94     Recent Labs      06/01/17   1042   BNPBTYPENAT  24     Recent Labs      06/01/17   1729   TRIGLYCERIDE  128   HDL  54   LDL  162*          Assessment/Plan     Tobacco use (present on admission)  Assessment & Plan  States quit 2 years ago, now uses vaporizer.    Grief reaction (present on admission)  Assessment & Plan  Recently lost his girlfriend 1 month ago, unclear cause of death    Abnormal cardiac function test (present on admission)  Assessment & Plan  NM stress test with large reversible area of ischemia.  Cardiology consulted  6/3 cath lab with 3 v disease, CTS consult    Chest pain at rest (present on admission)  Assessment & Plan  Unstable angina    3-vessel coronary artery disease  Assessment & Plan  lipitor  ASA  Heparin drip  Nitro prn  BB  Echo EF 65%    Hypercholesterolemia  Assessment & Plan  Increased lipitor from 10 to 80 qhs.  t chol and LDL elevated, but triglycerides normal.        Cason catheter: No Cason      DVT Prophylaxis: Heparin    Ulcer prophylaxis: Not indicated    Assessed for rehab: Patient returned to prior level of function, rehabilitation not indicated at this time

## 2017-06-04 NOTE — PROGRESS NOTES
Patient was seen by . See note. Up in room and hallway without complication. Await possible discharge later today.

## 2017-06-04 NOTE — PROGRESS NOTES
Pt observed, no change from original assessment, vss, no c/o pain at this time  Pt AAOx4, no other signs or symptoms of distress, fall precautions in place, call light within reach, all questions answered, will continue to monitor. In a sinus rhythm.

## 2017-06-04 NOTE — PROGRESS NOTES
Tele: SR 74 to 94   .18 / .08 / .40    Pt w/o c/o. Hemoband removed today. Some oozing after removal. Hemoband replaced and taken down again per protocol. Removed this evening. R radial pulse 2+, no complications noted. Will pass care to NOC RN @ [facility].

## 2017-06-04 NOTE — PROGRESS NOTES
"Cardiology Progress Note               Author: Susana Pickering Date & Time created: 2017  8:38 AM     Interval History:  Consultation for unstable angina, abnormal MPI with anterior ischemia    Admitted with chest discomfort, situational stress (square friend  90s\")    History of uncontrolled hypertension, untreated hyperlipidemia      Left heart catheter on 6/3/17, EF 65, left main patent, LAD proximal 100%  with right-to-left collaterals, circumflex, mild diffuse noncritical disease, RCA large vessel, proximal 60% stenosis, FFR 0.96    Blood pressure = 122/81  Heart rate = 81NSR      Labs reviewed  Troponin negative    Total cholesterol 242  Sodium, potassium, creatinine stable      Echocardiogram 6/3/17, LVEF 55%, normal regional wall motion  Review of Systems   Respiratory: Negative for cough and shortness of breath.    Cardiovascular: Negative for chest pain, palpitations, orthopnea, claudication, leg swelling and PND.       Physical Exam   Constitutional: He is oriented to person, place, and time. He appears well-developed.   Eyes: Conjunctivae are normal.   Neck: No JVD present. No thyromegaly present.   Cardiovascular: Normal rate and regular rhythm.    Pulses:       Carotid pulses are 2+ on the right side, and 2+ on the left side.       Radial pulses are 2+ on the right side.        Dorsalis pedis pulses are 2+ on the left side.        Posterior tibial pulses are 2+ on the right side, and 2+ on the left side.   Pulmonary/Chest: He has no wheezes.   Abdominal: Soft.   Musculoskeletal: He exhibits no edema.   Neurological: He is alert and oriented to person, place, and time.   Skin: Skin is warm and dry.   Right radial catheter site clean/dry/intact       Hemodynamics:  Temp (24hrs), Av.1 °C (98.8 °F), Min:36.7 °C (98.1 °F), Max:37.4 °C (99.3 °F)  Temperature: 37.4 °C (99.3 °F)  Pulse  Av.2  Min: 65  Max: 87   Blood Pressure: 122/81 mmHg     Respiratory:    Respiration: 17, Pulse " Oximetry: 95 %        RUL Breath Sounds: Clear, RML Breath Sounds: Clear, RLL Breath Sounds: Clear, LIANNA Breath Sounds: Clear, LLL Breath Sounds: Clear  Fluids:     Weight: 80.4 kg (177 lb 4 oz)  GI/Nutrition:  Orders Placed This Encounter   Procedures   • Diet Order     Standing Status: Standing      Number of Occurrences: 1      Standing Expiration Date:      Order Specific Question:  Diet:     Answer:  Cardiac [6]     Lab Results:  Recent Labs      06/01/17   1042   WBC  6.6   RBC  4.87   HEMOGLOBIN  16.7   HEMATOCRIT  47.0   MCV  96.5   MCH  34.3*   MCHC  35.5*   RDW  41.4   PLATELETCT  197   MPV  10.1     Recent Labs      06/01/17   1042   SODIUM  139   POTASSIUM  3.7   CHLORIDE  106   CO2  23   GLUCOSE  119*   BUN  20   CREATININE  0.93   CALCIUM  9.2     Recent Labs      06/01/17   1042   INR  0.94     Recent Labs      06/01/17   1042   BNPBTYPENAT  24     Recent Labs      06/01/17   1042  06/01/17   1729 06/02/17 06/02/17   0522   TROPONINI  <0.02  <0.02  <0.02  <0.02   BNPBTYPENAT  24   --    --    --      Recent Labs      06/01/17   1729   TRIGLYCERIDE  128   HDL  54   LDL  162*         Medical Decision Making, by Problem:  Active Hospital Problems    Diagnosis   • Tobacco use [Z72.0]   • Grief reaction [F43.20]   • Abnormal cardiac function test [R94.30]   • Chest pain at rest [R07.9]   • 3-vessel coronary artery disease [I25.10]   • Hypercholesterolemia [E78.00]       Plan:  Plan for CABG 6/13/17  No rhythm issues overnight, normal sinus rhythm  Continue with aspirin 81, Lipitor 80, metoprolol 25 BID  Tobacco abuse, quit 2 years ago, uses vaporizer  Okay for discharge from cardiac stand  Medications reviewed and Labs reviewed

## 2017-06-04 NOTE — CARE PLAN
Problem: Safety  Goal: Will remain free from injury  Outcome: PROGRESSING AS EXPECTED  Pt aware of need to use call light at night to prevent falls with monitor    Problem: Respiratory:  Goal: Respiratory status will improve  Outcome: PROGRESSING AS EXPECTED  Patient on room air no issues at this time, denies sob

## 2017-06-12 ENCOUNTER — HOSPITAL ENCOUNTER (OUTPATIENT)
Dept: RADIOLOGY | Facility: MEDICAL CENTER | Age: 57
End: 2017-06-12
Attending: THORACIC SURGERY (CARDIOTHORACIC VASCULAR SURGERY)
Payer: COMMERCIAL

## 2017-06-12 ENCOUNTER — HOSPITAL ENCOUNTER (OUTPATIENT)
Dept: CARDIOLOGY | Facility: MEDICAL CENTER | Age: 57
End: 2017-06-12
Attending: THORACIC SURGERY (CARDIOTHORACIC VASCULAR SURGERY)
Payer: COMMERCIAL

## 2017-06-12 DIAGNOSIS — I25.119 ATHEROSCLEROSIS OF NATIVE CORONARY ARTERY OF NATIVE HEART WITH ANGINA PECTORIS (HCC): ICD-10-CM

## 2017-06-12 LAB
ABO GROUP BLD: NORMAL
ALBUMIN SERPL BCP-MCNC: 3.7 G/DL (ref 3.2–4.9)
ALBUMIN/GLOB SERPL: 1.1 G/DL
ALP SERPL-CCNC: 89 U/L (ref 30–99)
ALT SERPL-CCNC: 16 U/L (ref 2–50)
ANION GAP SERPL CALC-SCNC: 12 MMOL/L (ref 0–11.9)
APPEARANCE UR: CLEAR
APTT PPP: 30.9 SEC (ref 24.7–36)
AST SERPL-CCNC: 15 U/L (ref 12–45)
BASOPHILS # BLD AUTO: 0.8 % (ref 0–1.8)
BASOPHILS # BLD: 0.08 K/UL (ref 0–0.12)
BILIRUB SERPL-MCNC: 1.7 MG/DL (ref 0.1–1.5)
BILIRUB UR QL STRIP.AUTO: NEGATIVE
BLD GP AB SCN SERPL QL: NORMAL
BUN SERPL-MCNC: 13 MG/DL (ref 8–22)
CALCIUM SERPL-MCNC: 9.3 MG/DL (ref 8.5–10.5)
CHLORIDE SERPL-SCNC: 101 MMOL/L (ref 96–112)
CO2 SERPL-SCNC: 25 MMOL/L (ref 20–33)
COLOR UR: YELLOW
CREAT SERPL-MCNC: 0.71 MG/DL (ref 0.5–1.4)
EKG IMPRESSION: NORMAL
EOSINOPHIL # BLD AUTO: 0 K/UL (ref 0–0.51)
EOSINOPHIL NFR BLD: 0 % (ref 0–6.9)
ERYTHROCYTE [DISTWIDTH] IN BLOOD BY AUTOMATED COUNT: 41.8 FL (ref 35.9–50)
EST. AVERAGE GLUCOSE BLD GHB EST-MCNC: 117 MG/DL
GFR SERPL CREATININE-BSD FRML MDRD: >60 ML/MIN/1.73 M 2
GLOBULIN SER CALC-MCNC: 3.3 G/DL (ref 1.9–3.5)
GLUCOSE SERPL-MCNC: 118 MG/DL (ref 65–99)
GLUCOSE UR STRIP.AUTO-MCNC: NEGATIVE MG/DL
HBA1C MFR BLD: 5.7 % (ref 0–5.6)
HCT VFR BLD AUTO: 41.3 % (ref 42–52)
HGB BLD-MCNC: 14.5 G/DL (ref 14–18)
IMM GRANULOCYTES # BLD AUTO: 0.08 K/UL (ref 0–0.11)
IMM GRANULOCYTES NFR BLD AUTO: 0.8 % (ref 0–0.9)
INR PPP: 0.99 (ref 0.87–1.13)
KETONES UR STRIP.AUTO-MCNC: NEGATIVE MG/DL
LEUKOCYTE ESTERASE UR QL STRIP.AUTO: NEGATIVE
LYMPHOCYTES # BLD AUTO: 1.89 K/UL (ref 1–4.8)
LYMPHOCYTES NFR BLD: 17.9 % (ref 22–41)
MCH RBC QN AUTO: 34 PG (ref 27–33)
MCHC RBC AUTO-ENTMCNC: 35.1 G/DL (ref 33.7–35.3)
MCV RBC AUTO: 96.9 FL (ref 81.4–97.8)
MICRO URNS: NORMAL
MONOCYTES # BLD AUTO: 0.79 K/UL (ref 0–0.85)
MONOCYTES NFR BLD AUTO: 7.5 % (ref 0–13.4)
NEUTROPHILS # BLD AUTO: 7.69 K/UL (ref 1.82–7.42)
NEUTROPHILS NFR BLD: 73 % (ref 44–72)
NITRITE UR QL STRIP.AUTO: NEGATIVE
NRBC # BLD AUTO: 0 K/UL
NRBC BLD AUTO-RTO: 0 /100 WBC
PH UR STRIP.AUTO: 6.5 [PH]
PLATELET # BLD AUTO: 246 K/UL (ref 164–446)
PMV BLD AUTO: 10.7 FL (ref 9–12.9)
POTASSIUM SERPL-SCNC: 3.7 MMOL/L (ref 3.6–5.5)
PROT SERPL-MCNC: 7 G/DL (ref 6–8.2)
PROT UR QL STRIP: NEGATIVE MG/DL
PROTHROMBIN TIME: 13.4 SEC (ref 12–14.6)
RBC # BLD AUTO: 4.26 M/UL (ref 4.7–6.1)
RBC UR QL AUTO: NEGATIVE
RH BLD: NORMAL
SODIUM SERPL-SCNC: 138 MMOL/L (ref 135–145)
SP GR UR STRIP.AUTO: 1.01
WBC # BLD AUTO: 10.5 K/UL (ref 4.8–10.8)

## 2017-06-12 PROCEDURE — 93005 ELECTROCARDIOGRAM TRACING: CPT | Performed by: THORACIC SURGERY (CARDIOTHORACIC VASCULAR SURGERY)

## 2017-06-12 PROCEDURE — 85610 PROTHROMBIN TIME: CPT

## 2017-06-12 PROCEDURE — 86901 BLOOD TYPING SEROLOGIC RH(D): CPT

## 2017-06-12 PROCEDURE — 700111 HCHG RX REV CODE 636 W/ 250 OVERRIDE (IP)

## 2017-06-12 PROCEDURE — 81003 URINALYSIS AUTO W/O SCOPE: CPT

## 2017-06-12 PROCEDURE — 83036 HEMOGLOBIN GLYCOSYLATED A1C: CPT

## 2017-06-12 PROCEDURE — P9047 ALBUMIN (HUMAN), 25%, 50ML: HCPCS

## 2017-06-12 PROCEDURE — 93970 EXTREMITY STUDY: CPT | Mod: 26 | Performed by: SURGERY

## 2017-06-12 PROCEDURE — 93010 ELECTROCARDIOGRAM REPORT: CPT | Performed by: INTERNAL MEDICINE

## 2017-06-12 PROCEDURE — 85025 COMPLETE CBC W/AUTO DIFF WBC: CPT

## 2017-06-12 PROCEDURE — 93970 EXTREMITY STUDY: CPT

## 2017-06-12 PROCEDURE — 80053 COMPREHEN METABOLIC PANEL: CPT

## 2017-06-12 PROCEDURE — 85730 THROMBOPLASTIN TIME PARTIAL: CPT

## 2017-06-12 PROCEDURE — 86850 RBC ANTIBODY SCREEN: CPT

## 2017-06-12 PROCEDURE — 71020 DX-CHEST-2 VIEWS: CPT

## 2017-06-12 PROCEDURE — 700101 HCHG RX REV CODE 250

## 2017-06-12 PROCEDURE — 86900 BLOOD TYPING SEROLOGIC ABO: CPT

## 2017-06-12 RX ORDER — NICOTINE 21 MG/24HR
21 PATCH, TRANSDERMAL 24 HOURS TRANSDERMAL
Status: DISCONTINUED | OUTPATIENT
Start: 2017-06-12 | End: 2017-06-14

## 2017-06-12 NOTE — CARE PLAN
Problem: Pre Op  Goal: Optimal preparation for CABG/Heart Valve surgery  Intervention: Pre Op education to patient/significant other. Provide patient [facility] Patient Guideline for Cardiac Surgery (See Pt. Ed.)  Discussed anatomy and physiology of cardiac surgery with patient and family to include pre-op regimen. Reviewed post-op expectations to include  the use of incentive spirometry with return demonstration, ventilator management, cardiac monitoring, tubes and drains, early ambulation, and expected length of stay. Also provided information on Cardiac Rehab and how to schedule an appointment. Patient and family state full understanding of all information given.  Intervention: Baseline assessment documented to include IS volume, weight, bilateral BP and peripheral pulses.  4000 mL  Intervention: NPO at midnight except cardiac medications. (No ASA, coumadin or Plavix)  Instructed patient nothing to eat or drink after midnight the night prior to scheduled surgery date.  Intervention: Shower with chlorhexidine x 2  Instructed patient to wash entire body with chlorhexedine wipes prior to bedtime the night before surgery.

## 2017-06-13 ENCOUNTER — HOSPITAL ENCOUNTER (INPATIENT)
Facility: MEDICAL CENTER | Age: 57
LOS: 6 days | DRG: 236 | End: 2017-06-19
Attending: THORACIC SURGERY (CARDIOTHORACIC VASCULAR SURGERY) | Admitting: THORACIC SURGERY (CARDIOTHORACIC VASCULAR SURGERY)
Payer: COMMERCIAL

## 2017-06-13 ENCOUNTER — RESOLUTE PROFESSIONAL BILLING HOSPITAL PROF FEE (OUTPATIENT)
Dept: OTHER | Facility: MEDICAL CENTER | Age: 57
End: 2017-06-13
Payer: COMMERCIAL

## 2017-06-13 ENCOUNTER — APPOINTMENT (OUTPATIENT)
Dept: RADIOLOGY | Facility: MEDICAL CENTER | Age: 57
DRG: 236 | End: 2017-06-13
Attending: THORACIC SURGERY (CARDIOTHORACIC VASCULAR SURGERY)
Payer: COMMERCIAL

## 2017-06-13 DIAGNOSIS — I25.118 CORONARY ARTERY DISEASE OF NATIVE ARTERY OF NATIVE HEART WITH STABLE ANGINA PECTORIS (HCC): ICD-10-CM

## 2017-06-13 PROBLEM — I25.10 CAD (CORONARY ARTERY DISEASE): Status: ACTIVE | Noted: 2017-06-13

## 2017-06-13 LAB
ABO GROUP BLD: NORMAL
ACT BLD: 131 SEC (ref 74–137)
ACT BLD: 147 SEC (ref 74–137)
ACT BLD: 494 SEC (ref 74–137)
ACT BLD: 521 SEC (ref 74–137)
APTT PPP: 32.4 SEC (ref 24.7–36)
BASE EXCESS BLDA CALC-SCNC: -1 MMOL/L (ref -4–3)
BASE EXCESS BLDA CALC-SCNC: -2 MMOL/L (ref -4–3)
BASE EXCESS BLDA CALC-SCNC: -3 MMOL/L (ref -4–3)
BASE EXCESS BLDA CALC-SCNC: -4 MMOL/L (ref -4–3)
BASE EXCESS BLDA CALC-SCNC: 1 MMOL/L (ref -4–3)
BASE EXCESS BLDA CALC-SCNC: 4 MMOL/L (ref -4–3)
BASE EXCESS BLDV CALC-SCNC: -1 MMOL/L (ref -4–3)
BODY TEMPERATURE: ABNORMAL DEGREES
CA-I BLD ISE-SCNC: 0.98 MMOL/L (ref 1.1–1.3)
CA-I BLD ISE-SCNC: 1.01 MMOL/L (ref 1.1–1.3)
CA-I BLD ISE-SCNC: 1.03 MMOL/L (ref 1.1–1.3)
CA-I BLD ISE-SCNC: 1.04 MMOL/L (ref 1.1–1.3)
CA-I BLD ISE-SCNC: 1.15 MMOL/L (ref 1.1–1.3)
CO2 BLDA-SCNC: 22 MMOL/L (ref 20–33)
CO2 BLDA-SCNC: 23 MMOL/L (ref 20–33)
CO2 BLDA-SCNC: 24 MMOL/L (ref 20–33)
CO2 BLDA-SCNC: 24 MMOL/L (ref 20–33)
CO2 BLDA-SCNC: 27 MMOL/L (ref 20–33)
CO2 BLDA-SCNC: 28 MMOL/L (ref 20–33)
CO2 BLDV-SCNC: 25 MMOL/L (ref 20–33)
EKG IMPRESSION: NORMAL
GLUCOSE BLD-MCNC: 108 MG/DL (ref 65–99)
GLUCOSE BLD-MCNC: 111 MG/DL (ref 65–99)
GLUCOSE BLD-MCNC: 128 MG/DL (ref 65–99)
GLUCOSE BLD-MCNC: 130 MG/DL (ref 65–99)
GLUCOSE BLD-MCNC: 141 MG/DL (ref 65–99)
GLUCOSE BLD-MCNC: 144 MG/DL (ref 65–99)
GLUCOSE BLD-MCNC: 154 MG/DL (ref 65–99)
GLUCOSE BLD-MCNC: 157 MG/DL (ref 65–99)
GLUCOSE BLD-MCNC: 66 MG/DL (ref 65–99)
GLUCOSE BLD-MCNC: 77 MG/DL (ref 65–99)
GLUCOSE BLD-MCNC: 90 MG/DL (ref 65–99)
GLUCOSE BLD-MCNC: 99 MG/DL (ref 65–99)
HCO3 BLDA-SCNC: 20.5 MMOL/L (ref 17–25)
HCO3 BLDA-SCNC: 21.6 MMOL/L (ref 17–25)
HCO3 BLDA-SCNC: 22.5 MMOL/L (ref 17–25)
HCO3 BLDA-SCNC: 22.7 MMOL/L (ref 17–25)
HCO3 BLDA-SCNC: 25.8 MMOL/L (ref 17–25)
HCO3 BLDA-SCNC: 27 MMOL/L (ref 17–25)
HCO3 BLDV-SCNC: 23.6 MMOL/L (ref 24–28)
HCT VFR BLD CALC: 24 % (ref 42–52)
HCT VFR BLD CALC: 25 % (ref 42–52)
HCT VFR BLD CALC: 29 % (ref 42–52)
HCT VFR BLD CALC: 33 % (ref 42–52)
HCT VFR BLD CALC: 37 % (ref 42–52)
HGB BLD-MCNC: 11.2 G/DL (ref 14–18)
HGB BLD-MCNC: 12.6 G/DL (ref 14–18)
HGB BLD-MCNC: 8.2 G/DL (ref 14–18)
HGB BLD-MCNC: 8.5 G/DL (ref 14–18)
HGB BLD-MCNC: 9.9 G/DL (ref 14–18)
INR PPP: 1.36 (ref 0.87–1.13)
LV EJECT FRACT  99904: 70
MAGNESIUM SERPL-MCNC: 2.2 MG/DL (ref 1.5–2.5)
O2/TOTAL GAS SETTING VFR VENT: 100 %
O2/TOTAL GAS SETTING VFR VENT: 40 %
PCO2 BLDA: 34.2 MMHG (ref 26–37)
PCO2 BLDA: 35.6 MMHG (ref 26–37)
PCO2 BLDA: 36.1 MMHG (ref 26–37)
PCO2 BLDA: 37 MMHG (ref 26–37)
PCO2 BLDA: 37.4 MMHG (ref 26–37)
PCO2 BLDA: 40.3 MMHG (ref 26–37)
PCO2 BLDV: 39.3 MMHG (ref 41–51)
PCO2 TEMP ADJ BLDA: 34.7 MMHG (ref 26–37)
PCO2 TEMP ADJ BLDA: 36.2 MMHG (ref 26–37)
PCO2 TEMP ADJ BLDA: 36.4 MMHG (ref 26–37)
PH BLDA: 7.36 [PH] (ref 7.4–7.5)
PH BLDA: 7.37 [PH] (ref 7.4–7.5)
PH BLDA: 7.39 [PH] (ref 7.4–7.5)
PH BLDA: 7.42 [PH] (ref 7.4–7.5)
PH BLDA: 7.43 [PH] (ref 7.4–7.5)
PH BLDA: 7.49 [PH] (ref 7.4–7.5)
PH BLDV: 7.39 [PH] (ref 7.31–7.45)
PH TEMP ADJ BLDA: 7.38 [PH] (ref 7.4–7.5)
PH TEMP ADJ BLDA: 7.38 [PH] (ref 7.4–7.5)
PH TEMP ADJ BLDA: 7.4 [PH] (ref 7.4–7.5)
PLATELET # BLD AUTO: 150 K/UL (ref 164–446)
PLATELET # BLD AUTO: 157 K/UL (ref 164–446)
PO2 BLDA: 134 MMHG (ref 64–87)
PO2 BLDA: 334 MMHG (ref 64–87)
PO2 BLDA: 458 MMHG (ref 64–87)
PO2 BLDA: 66 MMHG (ref 64–87)
PO2 BLDA: 82 MMHG (ref 64–87)
PO2 BLDA: 97 MMHG (ref 64–87)
PO2 BLDV: 52 MMHG (ref 25–40)
PO2 TEMP ADJ BLDA: 64 MMHG (ref 64–87)
PO2 TEMP ADJ BLDA: 77 MMHG (ref 64–87)
PO2 TEMP ADJ BLDA: 94 MMHG (ref 64–87)
POTASSIUM BLD-SCNC: 3.7 MMOL/L (ref 3.6–5.5)
POTASSIUM BLD-SCNC: 3.8 MMOL/L (ref 3.6–5.5)
POTASSIUM BLD-SCNC: 3.9 MMOL/L (ref 3.6–5.5)
POTASSIUM BLD-SCNC: 4.4 MMOL/L (ref 3.6–5.5)
POTASSIUM BLD-SCNC: 4.8 MMOL/L (ref 3.6–5.5)
POTASSIUM SERPL-SCNC: 3.8 MMOL/L (ref 3.6–5.5)
POTASSIUM SERPL-SCNC: 4 MMOL/L (ref 3.6–5.5)
POTASSIUM SERPL-SCNC: 4.5 MMOL/L (ref 3.6–5.5)
PROTHROMBIN TIME: 17.2 SEC (ref 12–14.6)
SAO2 % BLDA: 100 % (ref 93–99)
SAO2 % BLDA: 100 % (ref 93–99)
SAO2 % BLDA: 92 % (ref 93–99)
SAO2 % BLDA: 96 % (ref 93–99)
SAO2 % BLDA: 98 % (ref 93–99)
SAO2 % BLDA: 99 % (ref 93–99)
SAO2 % BLDV: 86 %
SODIUM BLD-SCNC: 135 MMOL/L (ref 135–145)
SODIUM BLD-SCNC: 137 MMOL/L (ref 135–145)
SODIUM BLD-SCNC: 137 MMOL/L (ref 135–145)
SPECIMEN DRAWN FROM PATIENT: ABNORMAL

## 2017-06-13 PROCEDURE — 94002 VENT MGMT INPAT INIT DAY: CPT

## 2017-06-13 PROCEDURE — 83735 ASSAY OF MAGNESIUM: CPT

## 2017-06-13 PROCEDURE — 85014 HEMATOCRIT: CPT | Mod: 91

## 2017-06-13 PROCEDURE — 501183 HCHG PUNCH, AORTIC #4: Performed by: THORACIC SURGERY (CARDIOTHORACIC VASCULAR SURGERY)

## 2017-06-13 PROCEDURE — 5A1221Z PERFORMANCE OF CARDIAC OUTPUT, CONTINUOUS: ICD-10-PCS | Performed by: THORACIC SURGERY (CARDIOTHORACIC VASCULAR SURGERY)

## 2017-06-13 PROCEDURE — 3E043XZ INTRODUCTION OF VASOPRESSOR INTO CENTRAL VEIN, PERCUTANEOUS APPROACH: ICD-10-PCS | Performed by: THORACIC SURGERY (CARDIOTHORACIC VASCULAR SURGERY)

## 2017-06-13 PROCEDURE — 160009 HCHG ANES TIME/MIN: Performed by: THORACIC SURGERY (CARDIOTHORACIC VASCULAR SURGERY)

## 2017-06-13 PROCEDURE — 99291 CRITICAL CARE FIRST HOUR: CPT | Performed by: INTERNAL MEDICINE

## 2017-06-13 PROCEDURE — 84295 ASSAY OF SERUM SODIUM: CPT | Mod: 91

## 2017-06-13 PROCEDURE — 71010 DX-CHEST-PORTABLE (1 VIEW): CPT

## 2017-06-13 PROCEDURE — 700105 HCHG RX REV CODE 258: Performed by: NURSE PRACTITIONER

## 2017-06-13 PROCEDURE — 501445 HCHG STAPLER, SKIN DISP: Performed by: THORACIC SURGERY (CARDIOTHORACIC VASCULAR SURGERY)

## 2017-06-13 PROCEDURE — 500359 HCHG DILATORS, PARSONNET (OHS): Performed by: THORACIC SURGERY (CARDIOTHORACIC VASCULAR SURGERY)

## 2017-06-13 PROCEDURE — 700101 HCHG RX REV CODE 250

## 2017-06-13 PROCEDURE — C1729 CATH, DRAINAGE: HCPCS | Performed by: THORACIC SURGERY (CARDIOTHORACIC VASCULAR SURGERY)

## 2017-06-13 PROCEDURE — 82330 ASSAY OF CALCIUM: CPT | Mod: 91

## 2017-06-13 PROCEDURE — 700102 HCHG RX REV CODE 250 W/ 637 OVERRIDE(OP): Performed by: THORACIC SURGERY (CARDIOTHORACIC VASCULAR SURGERY)

## 2017-06-13 PROCEDURE — 700111 HCHG RX REV CODE 636 W/ 250 OVERRIDE (IP)

## 2017-06-13 PROCEDURE — 06BP4ZZ EXCISION OF RIGHT SAPHENOUS VEIN, PERCUTANEOUS ENDOSCOPIC APPROACH: ICD-10-PCS | Performed by: THORACIC SURGERY (CARDIOTHORACIC VASCULAR SURGERY)

## 2017-06-13 PROCEDURE — 85610 PROTHROMBIN TIME: CPT

## 2017-06-13 PROCEDURE — 500896 HCHG PACK, VASCULAR (FOR ROOM 10): Performed by: THORACIC SURGERY (CARDIOTHORACIC VASCULAR SURGERY)

## 2017-06-13 PROCEDURE — 500074 HCHG BLADE, BEAVER (OHS): Performed by: THORACIC SURGERY (CARDIOTHORACIC VASCULAR SURGERY)

## 2017-06-13 PROCEDURE — A9270 NON-COVERED ITEM OR SERVICE: HCPCS

## 2017-06-13 PROCEDURE — C1898 LEAD, PMKR, OTHER THAN TRANS: HCPCS | Performed by: THORACIC SURGERY (CARDIOTHORACIC VASCULAR SURGERY)

## 2017-06-13 PROCEDURE — 500815 HCHG LOCATORS, AC VEIN GRAFT: Performed by: THORACIC SURGERY (CARDIOTHORACIC VASCULAR SURGERY)

## 2017-06-13 PROCEDURE — 160048 HCHG OR STATISTICAL LEVEL 1-5: Performed by: THORACIC SURGERY (CARDIOTHORACIC VASCULAR SURGERY)

## 2017-06-13 PROCEDURE — 700111 HCHG RX REV CODE 636 W/ 250 OVERRIDE (IP): Performed by: ANESTHESIOLOGY

## 2017-06-13 PROCEDURE — 85049 AUTOMATED PLATELET COUNT: CPT | Mod: 91

## 2017-06-13 PROCEDURE — 82962 GLUCOSE BLOOD TEST: CPT | Mod: 91

## 2017-06-13 PROCEDURE — C1751 CATH, INF, PER/CENT/MIDLINE: HCPCS | Performed by: THORACIC SURGERY (CARDIOTHORACIC VASCULAR SURGERY)

## 2017-06-13 PROCEDURE — A9270 NON-COVERED ITEM OR SERVICE: HCPCS | Performed by: NURSE PRACTITIONER

## 2017-06-13 PROCEDURE — 500294 HCHG CLAMP, BULLDOG 1/2 FORCE BLUE: Performed by: THORACIC SURGERY (CARDIOTHORACIC VASCULAR SURGERY)

## 2017-06-13 PROCEDURE — 82947 ASSAY GLUCOSE BLOOD QUANT: CPT

## 2017-06-13 PROCEDURE — 502240 HCHG MISC OR SUPPLY RC 0272: Performed by: THORACIC SURGERY (CARDIOTHORACIC VASCULAR SURGERY)

## 2017-06-13 PROCEDURE — 700102 HCHG RX REV CODE 250 W/ 637 OVERRIDE(OP)

## 2017-06-13 PROCEDURE — 02HQ32Z INSERTION OF MONITORING DEVICE INTO RIGHT PULMONARY ARTERY, PERCUTANEOUS APPROACH: ICD-10-PCS | Performed by: ANESTHESIOLOGY

## 2017-06-13 PROCEDURE — 503001 HCHG PERFUSION: Performed by: THORACIC SURGERY (CARDIOTHORACIC VASCULAR SURGERY)

## 2017-06-13 PROCEDURE — B24BZZ4 ULTRASONOGRAPHY OF HEART WITH AORTA, TRANSESOPHAGEAL: ICD-10-PCS | Performed by: THORACIC SURGERY (CARDIOTHORACIC VASCULAR SURGERY)

## 2017-06-13 PROCEDURE — 500043 HCHG BAG-A-JET: Performed by: THORACIC SURGERY (CARDIOTHORACIC VASCULAR SURGERY)

## 2017-06-13 PROCEDURE — A6402 STERILE GAUZE <= 16 SQ IN: HCPCS | Performed by: THORACIC SURGERY (CARDIOTHORACIC VASCULAR SURGERY)

## 2017-06-13 PROCEDURE — 02HV33Z INSERTION OF INFUSION DEVICE INTO SUPERIOR VENA CAVA, PERCUTANEOUS APPROACH: ICD-10-PCS | Performed by: ANESTHESIOLOGY

## 2017-06-13 PROCEDURE — 93010 ELECTROCARDIOGRAM REPORT: CPT | Performed by: INTERNAL MEDICINE

## 2017-06-13 PROCEDURE — C9248 INJ, CLEVIDIPINE BUTYRATE: HCPCS

## 2017-06-13 PROCEDURE — 700101 HCHG RX REV CODE 250: Performed by: THORACIC SURGERY (CARDIOTHORACIC VASCULAR SURGERY)

## 2017-06-13 PROCEDURE — 502654 HCHG INSERT, OFF-PUMP: Performed by: THORACIC SURGERY (CARDIOTHORACIC VASCULAR SURGERY)

## 2017-06-13 PROCEDURE — 84132 ASSAY OF SERUM POTASSIUM: CPT | Mod: 91

## 2017-06-13 PROCEDURE — 85730 THROMBOPLASTIN TIME PARTIAL: CPT

## 2017-06-13 PROCEDURE — 500890 HCHG PACK, OPEN HEART: Performed by: THORACIC SURGERY (CARDIOTHORACIC VASCULAR SURGERY)

## 2017-06-13 PROCEDURE — 160031 HCHG SURGERY MINUTES - 1ST 30 MINS LEVEL 5: Performed by: THORACIC SURGERY (CARDIOTHORACIC VASCULAR SURGERY)

## 2017-06-13 PROCEDURE — 93005 ELECTROCARDIOGRAM TRACING: CPT | Performed by: NURSE PRACTITIONER

## 2017-06-13 PROCEDURE — 700105 HCHG RX REV CODE 258: Performed by: ANESTHESIOLOGY

## 2017-06-13 PROCEDURE — 700111 HCHG RX REV CODE 636 W/ 250 OVERRIDE (IP): Performed by: NURSE PRACTITIONER

## 2017-06-13 PROCEDURE — 501745 HCHG WIRE, SURGICAL STEEL: Performed by: THORACIC SURGERY (CARDIOTHORACIC VASCULAR SURGERY)

## 2017-06-13 PROCEDURE — 93312 ECHO TRANSESOPHAGEAL: CPT

## 2017-06-13 PROCEDURE — 700111 HCHG RX REV CODE 636 W/ 250 OVERRIDE (IP): Performed by: THORACIC SURGERY (CARDIOTHORACIC VASCULAR SURGERY)

## 2017-06-13 PROCEDURE — 021009W BYPASS CORONARY ARTERY, ONE ARTERY FROM AORTA WITH AUTOLOGOUS VENOUS TISSUE, OPEN APPROACH: ICD-10-PCS | Performed by: THORACIC SURGERY (CARDIOTHORACIC VASCULAR SURGERY)

## 2017-06-13 PROCEDURE — 02100Z9 BYPASS CORONARY ARTERY, ONE ARTERY FROM LEFT INTERNAL MAMMARY, OPEN APPROACH: ICD-10-PCS | Performed by: THORACIC SURGERY (CARDIOTHORACIC VASCULAR SURGERY)

## 2017-06-13 PROCEDURE — 700105 HCHG RX REV CODE 258

## 2017-06-13 PROCEDURE — 700102 HCHG RX REV CODE 250 W/ 637 OVERRIDE(OP): Performed by: NURSE PRACTITIONER

## 2017-06-13 PROCEDURE — C1725 CATH, TRANSLUMIN NON-LASER: HCPCS | Performed by: THORACIC SURGERY (CARDIOTHORACIC VASCULAR SURGERY)

## 2017-06-13 PROCEDURE — 700101 HCHG RX REV CODE 250: Performed by: NURSE PRACTITIONER

## 2017-06-13 PROCEDURE — C1894 INTRO/SHEATH, NON-LASER: HCPCS | Performed by: THORACIC SURGERY (CARDIOTHORACIC VASCULAR SURGERY)

## 2017-06-13 PROCEDURE — 500385 HCHG DRAIN, PLEUROVAC ADUL: Performed by: THORACIC SURGERY (CARDIOTHORACIC VASCULAR SURGERY)

## 2017-06-13 PROCEDURE — 500053 HCHG BANDAGE, ELASTIC 4: Performed by: THORACIC SURGERY (CARDIOTHORACIC VASCULAR SURGERY)

## 2017-06-13 PROCEDURE — 503000 HCHG SUTURE, OHS: Performed by: THORACIC SURGERY (CARDIOTHORACIC VASCULAR SURGERY)

## 2017-06-13 PROCEDURE — 700105 HCHG RX REV CODE 258: Performed by: THORACIC SURGERY (CARDIOTHORACIC VASCULAR SURGERY)

## 2017-06-13 PROCEDURE — 4A133B1 MONITORING OF ARTERIAL PRESSURE, PERIPHERAL, PERCUTANEOUS APPROACH: ICD-10-PCS | Performed by: ANESTHESIOLOGY

## 2017-06-13 PROCEDURE — 85347 COAGULATION TIME ACTIVATED: CPT

## 2017-06-13 PROCEDURE — 500824 HCHG MISTER BLOWER, CTS: Performed by: THORACIC SURGERY (CARDIOTHORACIC VASCULAR SURGERY)

## 2017-06-13 PROCEDURE — 500767 HCHG KIT, ENDOSCOPIC VEIN HARVEST: Performed by: THORACIC SURGERY (CARDIOTHORACIC VASCULAR SURGERY)

## 2017-06-13 PROCEDURE — 93325 DOPPLER ECHO COLOR FLOW MAPG: CPT

## 2017-06-13 PROCEDURE — 502627 HCHG HEMOSTAT, SURGICEL 4X4: Performed by: THORACIC SURGERY (CARDIOTHORACIC VASCULAR SURGERY)

## 2017-06-13 PROCEDURE — 501519 HCHG SUTURE, E PACK: Performed by: THORACIC SURGERY (CARDIOTHORACIC VASCULAR SURGERY)

## 2017-06-13 PROCEDURE — 82803 BLOOD GASES ANY COMBINATION: CPT | Mod: 91

## 2017-06-13 PROCEDURE — 94150 VITAL CAPACITY TEST: CPT

## 2017-06-13 PROCEDURE — 500734 HCHG INSERT, STEALTH: Performed by: THORACIC SURGERY (CARDIOTHORACIC VASCULAR SURGERY)

## 2017-06-13 PROCEDURE — 160042 HCHG SURGERY MINUTES - EA ADDL 1 MIN LEVEL 5: Performed by: THORACIC SURGERY (CARDIOTHORACIC VASCULAR SURGERY)

## 2017-06-13 PROCEDURE — 770022 HCHG ROOM/CARE - ICU (200)

## 2017-06-13 DEVICE — LOCATORS AC VEIN GRAFT (OHS) - 10/BX  SCANLAN #250-1001-83: Type: IMPLANTABLE DEVICE | Status: FUNCTIONAL

## 2017-06-13 RX ORDER — PAPAVERINE HYDROCHLORIDE 30 MG/ML
INJECTION INTRAMUSCULAR; INTRAVENOUS
Status: DISCONTINUED | OUTPATIENT
Start: 2017-06-13 | End: 2017-06-13 | Stop reason: HOSPADM

## 2017-06-13 RX ORDER — ALUMINA, MAGNESIA, AND SIMETHICONE 2400; 2400; 240 MG/30ML; MG/30ML; MG/30ML
30 SUSPENSION ORAL EVERY 4 HOURS PRN
Status: DISCONTINUED | OUTPATIENT
Start: 2017-06-13 | End: 2017-06-19 | Stop reason: HOSPADM

## 2017-06-13 RX ORDER — DIPHENHYDRAMINE HCL 25 MG
25 TABLET ORAL
Status: DISCONTINUED | OUTPATIENT
Start: 2017-06-13 | End: 2017-06-19 | Stop reason: HOSPADM

## 2017-06-13 RX ORDER — MORPHINE SULFATE 4 MG/ML
0-4 INJECTION, SOLUTION INTRAMUSCULAR; INTRAVENOUS
Status: DISCONTINUED | OUTPATIENT
Start: 2017-06-13 | End: 2017-06-14

## 2017-06-13 RX ORDER — PROMETHAZINE HYDROCHLORIDE 25 MG/1
25 SUPPOSITORY RECTAL EVERY 6 HOURS PRN
Status: DISCONTINUED | OUTPATIENT
Start: 2017-06-13 | End: 2017-06-19 | Stop reason: HOSPADM

## 2017-06-13 RX ORDER — DEXTROSE MONOHYDRATE 25 G/50ML
25 INJECTION, SOLUTION INTRAVENOUS PRN
Status: DISCONTINUED | OUTPATIENT
Start: 2017-06-13 | End: 2017-06-13

## 2017-06-13 RX ORDER — MORPHINE SULFATE 4 MG/ML
4 INJECTION, SOLUTION INTRAMUSCULAR; INTRAVENOUS
Status: DISCONTINUED | OUTPATIENT
Start: 2017-06-13 | End: 2017-06-13

## 2017-06-13 RX ORDER — OXYCODONE HYDROCHLORIDE 10 MG/1
5 TABLET ORAL
Status: DISCONTINUED | OUTPATIENT
Start: 2017-06-13 | End: 2017-06-19 | Stop reason: HOSPADM

## 2017-06-13 RX ORDER — HYDROCODONE BITARTRATE AND ACETAMINOPHEN 5; 325 MG/1; MG/1
1-2 TABLET ORAL EVERY 6 HOURS PRN
Status: DISCONTINUED | OUTPATIENT
Start: 2017-06-13 | End: 2017-06-19 | Stop reason: HOSPADM

## 2017-06-13 RX ORDER — LIDOCAINE AND PRILOCAINE 25; 25 MG/G; MG/G
1 CREAM TOPICAL
Status: DISCONTINUED | OUTPATIENT
Start: 2017-06-13 | End: 2017-06-19 | Stop reason: HOSPADM

## 2017-06-13 RX ORDER — NITROGLYCERIN 20 MG/100ML
0-100 INJECTION INTRAVENOUS CONTINUOUS
Status: DISCONTINUED | OUTPATIENT
Start: 2017-06-13 | End: 2017-06-14

## 2017-06-13 RX ORDER — ATORVASTATIN CALCIUM 20 MG/1
80 TABLET, FILM COATED ORAL
Status: DISCONTINUED | OUTPATIENT
Start: 2017-06-13 | End: 2017-06-19 | Stop reason: HOSPADM

## 2017-06-13 RX ORDER — POTASSIUM CHLORIDE 7.45 MG/ML
10 INJECTION INTRAVENOUS ONCE
Status: COMPLETED | OUTPATIENT
Start: 2017-06-14 | End: 2017-06-14

## 2017-06-13 RX ORDER — TRAMADOL HYDROCHLORIDE 50 MG/1
50 TABLET ORAL EVERY 4 HOURS PRN
Status: DISCONTINUED | OUTPATIENT
Start: 2017-06-13 | End: 2017-06-19 | Stop reason: HOSPADM

## 2017-06-13 RX ORDER — ENEMA 19; 7 G/133ML; G/133ML
1 ENEMA RECTAL
Status: DISCONTINUED | OUTPATIENT
Start: 2017-06-13 | End: 2017-06-19 | Stop reason: HOSPADM

## 2017-06-13 RX ORDER — ONDANSETRON 2 MG/ML
4 INJECTION INTRAMUSCULAR; INTRAVENOUS EVERY 6 HOURS PRN
Status: DISCONTINUED | OUTPATIENT
Start: 2017-06-13 | End: 2017-06-19 | Stop reason: HOSPADM

## 2017-06-13 RX ORDER — SODIUM CHLORIDE, SODIUM GLUCONATE, SODIUM ACETATE, POTASSIUM CHLORIDE AND MAGNESIUM CHLORIDE 526; 502; 368; 37; 30 MG/100ML; MG/100ML; MG/100ML; MG/100ML; MG/100ML
INJECTION, SOLUTION INTRAVENOUS PRN
Status: DISCONTINUED | OUTPATIENT
Start: 2017-06-13 | End: 2017-06-17

## 2017-06-13 RX ORDER — AMOXICILLIN 250 MG
1 CAPSULE ORAL NIGHTLY
Status: DISCONTINUED | OUTPATIENT
Start: 2017-06-14 | End: 2017-06-19 | Stop reason: HOSPADM

## 2017-06-13 RX ORDER — DOCUSATE SODIUM 100 MG/1
100 CAPSULE, LIQUID FILLED ORAL EVERY MORNING
Status: DISCONTINUED | OUTPATIENT
Start: 2017-06-14 | End: 2017-06-19 | Stop reason: HOSPADM

## 2017-06-13 RX ORDER — ACETAMINOPHEN 325 MG/1
650 TABLET ORAL EVERY 4 HOURS PRN
Status: DISCONTINUED | OUTPATIENT
Start: 2017-06-13 | End: 2017-06-19 | Stop reason: HOSPADM

## 2017-06-13 RX ORDER — LIDOCAINE HYDROCHLORIDE 10 MG/ML
0.5 INJECTION, SOLUTION INFILTRATION; PERINEURAL
Status: DISCONTINUED | OUTPATIENT
Start: 2017-06-13 | End: 2017-06-19 | Stop reason: HOSPADM

## 2017-06-13 RX ORDER — ACETAMINOPHEN 650 MG/1
650 SUPPOSITORY RECTAL EVERY 4 HOURS PRN
Status: DISCONTINUED | OUTPATIENT
Start: 2017-06-13 | End: 2017-06-19 | Stop reason: HOSPADM

## 2017-06-13 RX ORDER — MIDAZOLAM HYDROCHLORIDE 1 MG/ML
.5-2 INJECTION INTRAMUSCULAR; INTRAVENOUS
Status: DISCONTINUED | OUTPATIENT
Start: 2017-06-13 | End: 2017-06-14

## 2017-06-13 RX ORDER — BISACODYL 10 MG
10 SUPPOSITORY, RECTAL RECTAL
Status: DISCONTINUED | OUTPATIENT
Start: 2017-06-13 | End: 2017-06-19 | Stop reason: HOSPADM

## 2017-06-13 RX ORDER — SODIUM CHLORIDE 9 MG/ML
INJECTION, SOLUTION INTRAVENOUS
Status: COMPLETED
Start: 2017-06-13 | End: 2017-06-13

## 2017-06-13 RX ORDER — LIDOCAINE HYDROCHLORIDE 10 MG/ML
INJECTION, SOLUTION INFILTRATION; PERINEURAL
Status: COMPLETED
Start: 2017-06-13 | End: 2017-06-13

## 2017-06-13 RX ORDER — SODIUM CHLORIDE 9 MG/ML
INJECTION, SOLUTION INTRAVENOUS CONTINUOUS
Status: DISCONTINUED | OUTPATIENT
Start: 2017-06-13 | End: 2017-06-16

## 2017-06-13 RX ORDER — LACTULOSE 20 G/30ML
30 SOLUTION ORAL
Status: DISCONTINUED | OUTPATIENT
Start: 2017-06-13 | End: 2017-06-19 | Stop reason: HOSPADM

## 2017-06-13 RX ORDER — OXYCODONE HYDROCHLORIDE 10 MG/1
10 TABLET ORAL
Status: DISCONTINUED | OUTPATIENT
Start: 2017-06-13 | End: 2017-06-19 | Stop reason: HOSPADM

## 2017-06-13 RX ORDER — CLOPIDOGREL BISULFATE 75 MG/1
75 TABLET ORAL DAILY
Status: DISCONTINUED | OUTPATIENT
Start: 2017-06-14 | End: 2017-06-19 | Stop reason: HOSPADM

## 2017-06-13 RX ORDER — POTASSIUM CHLORIDE 7.45 MG/ML
10 INJECTION INTRAVENOUS ONCE
Status: COMPLETED | OUTPATIENT
Start: 2017-06-13 | End: 2017-06-13

## 2017-06-13 RX ORDER — DEXTROSE MONOHYDRATE 25 G/50ML
25 INJECTION, SOLUTION INTRAVENOUS PRN
Status: DISCONTINUED | OUTPATIENT
Start: 2017-06-13 | End: 2017-06-15

## 2017-06-13 RX ORDER — AMOXICILLIN 250 MG
1 CAPSULE ORAL
Status: DISCONTINUED | OUTPATIENT
Start: 2017-06-13 | End: 2017-06-19 | Stop reason: HOSPADM

## 2017-06-13 RX ADMIN — DIPHENHYDRAMINE HCL 25 MG: 25 TABLET ORAL at 22:47

## 2017-06-13 RX ADMIN — INSULIN HUMAN 2 UNITS: 100 INJECTION, SOLUTION PARENTERAL at 12:33

## 2017-06-13 RX ADMIN — VANCOMYCIN HYDROCHLORIDE 1500 MG: 100 INJECTION, POWDER, LYOPHILIZED, FOR SOLUTION INTRAVENOUS at 20:26

## 2017-06-13 RX ADMIN — MORPHINE SULFATE 4 MG: 4 INJECTION INTRAVENOUS at 12:12

## 2017-06-13 RX ADMIN — MUPIROCIN 1 APPLICATION: 20 OINTMENT TOPICAL at 19:12

## 2017-06-13 RX ADMIN — DEXMEDETOMIDINE HYDROCHLORIDE 0.5 MCG/KG/HR: 100 INJECTION, SOLUTION INTRAVENOUS at 14:12

## 2017-06-13 RX ADMIN — POTASSIUM CHLORIDE 10 MEQ: 7.46 INJECTION, SOLUTION INTRAVENOUS at 23:54

## 2017-06-13 RX ADMIN — DEXMEDETOMIDINE HYDROCHLORIDE 0.3 MCG/KG/HR: 100 INJECTION, SOLUTION INTRAVENOUS at 21:20

## 2017-06-13 RX ADMIN — HYDROCODONE BITARTRATE AND ACETAMINOPHEN 2 TABLET: 5; 325 TABLET ORAL at 16:42

## 2017-06-13 RX ADMIN — MORPHINE SULFATE 4 MG: 4 INJECTION INTRAVENOUS at 20:19

## 2017-06-13 RX ADMIN — OXYCODONE HYDROCHLORIDE 10 MG: 10 TABLET ORAL at 22:47

## 2017-06-13 RX ADMIN — MAGNESIUM SULFATE HEPTAHYDRATE 1 G: 1 INJECTION, SOLUTION INTRAVENOUS at 12:39

## 2017-06-13 RX ADMIN — DIPHENHYDRAMINE HCL 25 MG: 25 TABLET ORAL at 21:46

## 2017-06-13 RX ADMIN — TRAMADOL HYDROCHLORIDE 50 MG: 50 TABLET, COATED ORAL at 21:46

## 2017-06-13 RX ADMIN — METOPROLOL TARTRATE 12.5 MG: 25 TABLET, FILM COATED ORAL at 05:47

## 2017-06-13 RX ADMIN — ATORVASTATIN CALCIUM 80 MG: 20 TABLET, FILM COATED ORAL at 19:10

## 2017-06-13 RX ADMIN — LIDOCAINE HYDROCHLORIDE: 10 INJECTION, SOLUTION INFILTRATION; PERINEURAL at 05:55

## 2017-06-13 RX ADMIN — POTASSIUM CHLORIDE 10 MEQ: 7.46 INJECTION, SOLUTION INTRAVENOUS at 18:35

## 2017-06-13 RX ADMIN — OXYCODONE HYDROCHLORIDE 10 MG: 10 TABLET ORAL at 19:10

## 2017-06-13 RX ADMIN — OXYCODONE HYDROCHLORIDE 10 MG: 10 TABLET ORAL at 14:34

## 2017-06-13 RX ADMIN — SODIUM CHLORIDE, SODIUM GLUCONATE, SODIUM ACETATE, POTASSIUM CHLORIDE AND MAGNESIUM CHLORIDE 1000 ML: 526; 502; 368; 37; 30 INJECTION, SOLUTION INTRAVENOUS at 14:32

## 2017-06-13 RX ADMIN — MUPIROCIN 1 APPLICATION: 20 OINTMENT TOPICAL at 13:31

## 2017-06-13 ASSESSMENT — LIFESTYLE VARIABLES
ALCOHOL_USE: NO
EVER_SMOKED: YES
PACK_YEARS: 50
EVER_SMOKED: YES

## 2017-06-13 ASSESSMENT — PAIN SCALES - GENERAL
PAINLEVEL_OUTOF10: ASSUMED PAIN PRESENT
PAINLEVEL_OUTOF10: 4
PAINLEVEL_OUTOF10: 0
PAINLEVEL_OUTOF10: 9
PAINLEVEL_OUTOF10: 8
PAINLEVEL_OUTOF10: 7
PAINLEVEL_OUTOF10: 6
PAINLEVEL_OUTOF10: 7
PAINLEVEL_OUTOF10: 9
PAINLEVEL_OUTOF10: 7
PAINLEVEL_OUTOF10: 9

## 2017-06-13 ASSESSMENT — COPD QUESTIONNAIRES
COPD SCREENING SCORE: 5
DO YOU EVER COUGH UP ANY MUCUS OR PHLEGM?: NO/ONLY WITH OCCASIONAL COLDS OR INFECTIONS
HAVE YOU SMOKED AT LEAST 100 CIGARETTES IN YOUR ENTIRE LIFE: YES
DURING THE PAST 4 WEEKS HOW MUCH DID YOU FEEL SHORT OF BREATH: SOME OF THE TIME

## 2017-06-13 ASSESSMENT — PULMONARY FUNCTION TESTS: FVC: .96

## 2017-06-13 NOTE — PROCEDURES
ARTERIAL LINE  Start time 0804  End time 0807      Site:    Radial    Laterality:   left    Complications: None    Additional notes:  Arrow 20g.         Gray Cochran M.D.  6/13/2017  11:24 AM

## 2017-06-13 NOTE — OP REPORT
DATE OF SERVICE:  06/13/2017    PREOPERATIVE DIAGNOSES:  Angina, coronary artery disease, hypertension,   dyslipidemia, hypercholesterolemia.     POSTOPERATIVE DIAGNOSES:  Angina, coronary artery disease, hypertension,   dyslipidemia, hypercholesterolemia.    OPERATION:  Coronary artery bypass grafting x2, left FRANKIE to the distal LAD,   reverse saphenous vein graft to the distal diagonal, endoscopic vein harvest.    ESTIMATED BLOOD LOSS:  50 mL Cell Saver protocol.    SURGEON:  Gray Barboza MD    ASSISTANT:  KEN Sweeney    COMPLICATIONS:  None.    ANESTHESIA:  General anesthetic.    ANESTHESIOLOGIST:  Gray Cochran MD    DESCRIPTION OF PROCEDURE:  The patient was brought to the operating room,   placed in a supine position upon the table.  After adequate general   anesthesia, patient was prepped and draped in the usual sterile manner.    Elbows were protected to avoid ulnar neuropathy, chest wall expansion to avoid   ulnar neuropathy, phrenic nerve protectors used to protect phrenic nerve,   removed at the end of case.    Abril Leong  harvested the vein from the leg using endoscopic vein   technique, side branch secured with clips.  Leg was closed with multilayer   Vicryl and Dexon technique.    Abril Leong was the first assistant in the case from start to finish.     Midline sternal skin incision was carried down to sternum, divided with saw,   left FRANKIE was harvested, prepared for anastomosis, heparin given, ACT checked.    Patient cannulated with a 6.5 arterial cannula, 2-stage venous return   catheter, antegrade cardioplegia, began cardiopulmonary bypass, cooled 34   degrees, cross clamp applied, cardioplegia delivered.  Heart topically cooled   with cold saline, ice flush.  Phrenic nerves were protected.  Good vessels   noted.    It should be noted that phrenic nerve was protected.  The anatomy was normal.    It was normal at the end of case.  It was not damaged.    Reverse saphenous vein graft  placed to the diagonal using 7-0 running Prolene   technique.  Cardioplegia run was delivered.    The left FRANKIE sewn to the distal LAD using 7-0 running Prolene technique.    Cross-clamp was removed, side clamp was placed.  Proximal anastomosis was   performed using 6-0 running Prolene technique.  De-airing procedure was   carried out.  Side clamp bulldogs were removed.  Patient regained normal sinus   rhythm, brought to normothermia, good ventilation, weaned from   cardiopulmonary bypass.  Arterial venous lines were removed and were doubly   secured.  Protamine was delivered, meticulous hemostasis was present.  AC   locators and chest tubes were placed.    Methodical wound examination was done for foreign objects, none were found.    Counts were correct.    Methodical wound examination was done for hemostasis.  Hemostasis was   inspected x5 rotations, good hemostasis was appreciated.    ST segments are normal.  Good cardiac function noted.  Good hemostasis was   noted.    Closure begun.    Sternum was closed with wire followed by linea alba and pectus fascia closure   with running 0 Vicryl sutures in double-layer technique.  Skin closed with   subcuticular 4-0 Dexon suture technique followed by Prevena dressing.    It should be noted that chest tubes and AC locators were placed prior to   closure.    At dictation, I am sitting in the operating room as Prevena dressing is being   placed.    Family conference will be held.  The patient will be transferred to CCU.       ____________________________________     MD KALPANA WADDELL / ARIAS    DD:  06/13/2017 11:22:08  DT:  06/13/2017 15:26:48    D#:  [id number]  Job#:  [id number]

## 2017-06-13 NOTE — IP AVS SNAPSHOT
" Home Care Instructions                                                                                                                  Name:Jose A Ponce  Medical Record Number:[id number]  CSN: [id number]    YOB: 1960   Age: 56 y.o.  Sex: male  HT:1.88 m (6' 2.02\") WT: 80.8 kg (178 lb 2.1 oz)          Admit Date: 6/13/2017     Discharge Date: 6/19/2017  Today's Date: 6/19/2017  Attending Doctor:  No att. providers found                  Allergies:  Review of patient's allergies indicates no known allergies.            Discharge Instructions       Discharge Instructions    Discharged to home by taxi with self. Discharged via wheelchair, hospital escort: Yes.  Special equipment needed: Not Applicable    Be sure to schedule a follow-up appointment with your primary care doctor or any specialists as instructed.     Discharge Plan:   Smoking Cessation Offered: Patient Counseled  Influenza Vaccine Indication: Not indicated: Previously immunized this influenza season and > 8 years of age    I understand that a diet low in cholesterol, fat, and sodium is recommended for good health. Unless I have been given specific instructions below for another diet, I accept this instruction as my diet prescription.   Other diet:none    Special Instructions:   [facility] Cardiac Surgery Discharge Instructions    Activity:  1. NO driving for 4 weeks after surgery. You may ride as a passenger.  2. NO lifting of any item over 10 lbs (e.g. gallon of milk) for 6 weeks after surgery.  3. Walk as much as possible! Walk a minimum of once a day. Depending on your fatigue and comfort level, you may walk as much as you wish. There is no maximum.  4. Other physical activities (sex, housework, gardening, etc.) are OK after 4 weeks or after 8 weeks if you want to golf (start by putting, then advance to chipping, then to driving).  5. Continue using incentive spirometer for 2 weeks, especially if going home on oxygen.  6. Weigh " daily and write it down starting  the next morning after you arrive home. Call your doctor for a weight gain of 2-4 lbs in 1-2 days.    Incision Care:  1. SHOWER ONLY - no baths. Clean incision(s) daily with plain soap or any other dye or perfume free soap. Then pat incision(s) dry with clean towel. Avoid creams or lotions on the incision(s).  2. If there is any increase in redness or swelling, or separation of the incision line, or thick drainage* from any of the incisions, call [facility] ([phone]). * Clear, thin drainage is not abnormal especially from the leg incision and/or chest tube sites.                    3. Continue to wear your KEILY Stockings for 4 weeks on the leg(s) with the incision(s) or swelling. You may take off the stocking(s) when in bed or when the leg(s) are elevated.    Prescription Refills/Questions: Call your usual Pharmacy for ALL refills   (Remember that refills may require additional physician approval and will need at least 24 hours' notice. Please call for refills on Mondays through Fridays from 9 am to 4 pm).    1. Pain medication questions only: Call [facility] ([phone]).  2. For all other medications (except pain medication): Call your Cardiology Group or Primary Care Doctor (not [facility]) for refills or questions.    General Instructions:  1. If you are on the blood thinner Coumadin (warfarin), you will need your coumadin levels checked periodically. For any questions about scheduling, ask your Cardiology Group or your Home Health Nurse whether this has been arranged for you.     2. Cardiac Rehab is highly recommended. If you do not have an appointment at the time of discharge call [facility] @ [phone] to schedule your initial interview.      3. Your Primary Care Doctor typically handles Home Oxygen. The Home Oxygen service that drops off your tanks should be checking your oxygen saturation levels. Oxygen may be stopped when you are > 90 %  saturated on room air. Check with your Primary Care Doctor if you are unsure.    [facility] IS ALWAYS AVAILABLE TO ANSWER YOUR QUESTIONS. DO NOT HESITATE TO CALL!      · Is patient discharged on Warfarin / Coumadin?   No     · Is patient Post Blood Transfusion?  No    Depression / Suicide Risk    As you are discharged from this [facility] facility, it is important to learn how to keep safe from harming yourself.    Recognize the warning signs:  · Abrupt changes in personality, positive or negative- including increase in energy   · Giving away possessions  · Change in eating patterns- significant weight changes-  positive or negative  · Change in sleeping patterns- unable to sleep or sleeping all the time   · Unwillingness or inability to communicate  · Depression  · Unusual sadness, discouragement and loneliness  · Talk of wanting to die  · Neglect of personal appearance   · Rebelliousness- reckless behavior  · Withdrawal from people/activities they love  · Confusion- inability to concentrate     If you or a loved one observes any of these behaviors or has concerns about self-harm, here's what you can do:  · Talk about it- your feelings and reasons for harming yourself  · Remove any means that you might use to hurt yourself (examples: pills, rope, extension cords, firearm)  · Get professional help from the community (Mental Health, Substance Abuse, psychological counseling)  · Do not be alone:Call your Safe Contact- someone whom you trust who will be there for you.  · Call your local CRISIS HOTLINE 098-9211 or 941-606-8956  · Call your local Children's Mobile Crisis Response Team [place] (176) 712-7345 or www.Wunderdata  · Call the toll free National Suicide Prevention Hotlines   · National Suicide Prevention Lifeline 979-026-IXKG (8115)  · National Hope Line Network 800-SUICIDE (384-3717)                Your appointments     Jun 27, 2017  7:40 AM   HOSPITAL FOLLOW UP with Cici Cruz,  KARAN   [facility] (--)    [address]  [address]  [place] [id number] [phone]              Follow-up Information     1. Follow up with Gray Barboza M.D. On 7/24/2017.    Specialty:  Cardiac Surgery    Why:  1:30 pm    Contact information    [id number]  [place] [address]  [phone]          2. Follow up with KARAN Haro On 6/27/2017.    Specialty:  Cardiology    Why:  At 7:40 AM    Contact information    [address]  [place] [id number] [phone]           Discharge Medication Instructions:    Below are the medications your physician expects you to take upon discharge:    Review all your home medications and newly ordered medications with your doctor and/or pharmacist. Follow medication instructions as directed by your doctor and/or pharmacist.    Please keep your medication list with you and share with your physician.               Medication List      START taking these medications        Instructions    Morning Afternoon Evening Bedtime    clopidogrel 75 MG Tabs   Last time this was given:  75 mg on 6/19/2017  8:03 AM   Commonly known as:  PLAVIX        Take 1 Tab by mouth every day.   Dose:  75 mg                         MG Caps   Last time this was given:  100 mg on 6/17/2017  8:24 AM        Take 100 mg by mouth every morning.   Dose:  100 mg                        furosemide 20 MG Tabs   Commonly known as:  LASIX        Take 1 Tab by mouth every day.   Dose:  20 mg                        hydrocodone-acetaminophen 5-325 MG Tabs per tablet   Last time this was given:  2 Tabs on 6/19/2017  5:09 AM   Commonly known as:  NORCO        Take 1-2 Tabs by mouth every 6 hours as needed.   Dose:  1-2 Tab                        morphine ER 15 MG Tbcr tablet   Last time this was given:  15 mg on 6/19/2017  8:03 AM   Commonly known as:  MS CONTIN        Take 1 Tab by mouth every 12 hours.   Dose:  15 mg                           potassium chloride SA 10 MEQ Tbcr   Last time this was given:  20 mEq on 6/19/2017  8:03 AM   Commonly known as:  K-DUR        Take 1 Tab by mouth every day.   Dose:  10 mEq                          CONTINUE taking these medications        Instructions    Morning Afternoon Evening Bedtime    aspirin EC 81 MG Tbec   Last time this was given:  81 mg on 6/19/2017  8:03 AM   Commonly known as:  ECOTRIN        Take 1 Tab by mouth every day.   Dose:  81 mg                        atorvastatin 80 MG tablet   Last time this was given:  80 mg on 6/18/2017  8:33 PM   Commonly known as:  LIPITOR        Take 1 Tab by mouth every bedtime.   Dose:  80 mg                        Melatonin 10 MG Caps        Take 1 Cap by mouth every bedtime.   Dose:  1 Cap                        metoprolol 25 MG Tabs   Last time this was given:  25 mg on 6/19/2017  8:03 AM   Commonly known as:  LOPRESSOR        Take 1 Tab by mouth 2 Times a Day.   Dose:  25 mg                        multivitamin Tabs        Take 1 Tab by mouth every day.   Dose:  1 Tab                        nitroglycerin 0.4 MG Subl   Commonly known as:  NITROSTAT        Place 1 Tab under tongue as needed for Chest Pain.   Dose:  0.4 mg                          STOP taking these medications     ALEVE 220 MG tablet   Generic drug:  naproxen                    Where to Get Your Medications      Information about where to get these medications is not yet available     ! Ask your nurse or doctor about these medications    - clopidogrel 75 MG Tabs  -  MG Caps  - furosemide 20 MG Tabs  - hydrocodone-acetaminophen 5-325 MG Tabs per tablet  - morphine ER 15 MG Tbcr tablet  - potassium chloride SA 10 MEQ Tbcr            Orders for after discharge     REFERRAL TO INTENSIVE CARDIAC REHAB/CARDIAC REHAB    Complete by:  As directed    Qualifying Diagnosis for Cardiac Rehab:    -Myocardial Infarction  -Old Myocardial Infarction  -Coronary Artery Bypass  Surgery  -Stable Angina Pectoris  -PTCA Status with or without Stents  -Heart Valve Replaced by other means  -Heart Transplant   -Aneurysm Repair    Provider Exercise Prescription for Treatment Plan:  -Outpatient Cardiac Rehab (CR)/Intensive Cardiac Rehab (ICR), duration based on patient progress 1-3 times per week up to a total of 36/72 sessions over a period of up to 18/36 weeks    -Progressive interval exercise training for 20-45 minutes, 1-3 times per week in Cardiac Rehab utilizing Treadmill, Elliptical, Stationary Cycling, Arm Ergometer, other conditioning activities and appropriate home program supplement    -Light resistance training 2-4 weeks post PTCA, 2-4 weeks post MI, or 4-6 weeks post CABG, 4-6 weeks post aneurysm repair (20 # max)    -% TARGET HEART RATE OR MET’s:        RPE of 11-16 on a scale of 6-20       GXT Data:  40% - 80% exercise capacity using %HR max       HR below ischemic threshold (if determined, HR restriction)    -Education to promote an active healthy lifestyle and reduction of personal health risk factors    -Follow [facility] Policies and Procedures for Phase II CR/ICR       REFERRAL TO PHYSIATRY (PMR)    Complete by:  As directed        REFERRAL TO SKILLED NURSING FACILITY    Complete by:  As directed              Instructions           Diet / Nutrition:    Follow any diet instructions given to you by your doctor or the dietician, including how much salt (sodium) you are allowed each day.    If you are overweight, talk to your doctor about a weight reduction plan.    Activity:    Remain physically active following your doctor's instructions about exercise and activity.    Rest often.     Any time you become even a little tired or short of breath, SIT DOWN and rest.    Worsening Symptoms:    Report any of the following signs and symptoms to the doctor's office immediately:    *Pain of jaw, arm, or neck  *Chest pain not relieved by medication                               *Dizziness or  loss of consciousness  *Difficulty breathing even when at rest   *More tired than usual                                       *Bleeding drainage or swelling of surgical site  *Swelling of feet, ankles, legs or stomach                 *Fever (>100ºF)  *Pink or blood tinged sputum  *Weight gain (3lbs/day or 5lbs /week)           *Shock from internal defibrillator (if applicable)  *Palpitations or irregular heartbeats                *Cool and/or numb extremities    Stroke Awareness    Common Risk Factors for Stroke include:    Age  Atrial Fibrillation  Carotid Artery Stenosis  Diabetes Mellitus  Excessive alcohol consumption  High blood pressure  Overweight   Physical inactivity  Smoking    Warning signs and symptoms of a stroke include:    *Sudden numbness or weakness of the face, arm or leg (especially on one side of the body).  *Sudden confusion, trouble speaking or understanding.  *Sudden trouble seeing in one or both eyes.  *Sudden trouble walking, dizziness, loss of balance or coordination.Sudden severe headache with no known cause.    It is very important to get treatment quickly when a stroke occurs. If you experience any of the above warning signs, call 911 immediately.                   Disclaimer         Quit Smoking / Tobacco Use:    I understand the use of any tobacco products increases my chance of suffering from future heart disease or stroke and could cause other illnesses which may shorten my life. Quitting the use of tobacco products is the single most important thing I can do to improve my health. For further information on smoking / tobacco cessation call a Toll Free Quit Line at 1-385.412.9420 (*National Cancer [place]) or 1-964.322.1852 (American Lung Association) or you can access the web based program at www.lungusa.org.    [place] Tobacco Users Help Line:  (632) 319-1972       Toll Free: 1-314.151.9216  Quit Tobacco Program [facility] (625)116-5150    Crisis  Hotline:    National Crisis Hotline:  3-415-FZGRLZI or 1-791.898.1323    [place] Crisis Hotline:    1-400.773.8555 or 863-871-0605    Discharge Survey:   Thank you for choosing [facility]. We hope we did everything we could to make your hospital stay a pleasant one. You may be receiving a phone survey and we would appreciate your time and participation in answering the questions. Your input is very valuable to us in our efforts to improve our service to our patients and their families.        My signature on this form indicates that:    1. I have reviewed and understand the above information.  2. My questions regarding this information have been answered to my satisfaction.  3. I have formulated a plan with my discharge nurse to obtain my prescribed medications for home.                  Disclaimer         __________________________________                     __________       ________                       Patient Signature                                                 Date                    Time

## 2017-06-13 NOTE — CARE PLAN
Problem: Day of surgery post CABG/Heart valve replacement  Goal: Stabilization in immediate post op period  Intervention: VS q 15 min x 4 hours, then q 1 hour. Include temperature immediately upon arrival. Check CO/CI q 2-4 hours and PRN  VS [id number] hours completed at 1600.  Q1h vs started at 1600.  CO/CI completed per protocol.    Intervention: If radial artery used, elevate arm, no BP checks or needle sticks from affected arm, monitor ulnar pulse and capillary refill  N/A  Intervention: First post op hour labs and diagnostics per MD order  Completed - please see EPIC for results  Intervention: Serum K q 6 hours x 24 hours. ABG and CBC prn.  Pt on Kscale.  Q6 hour K & CBC done at 1200 and due at 1800  Intervention: For FSBS greater than 130, start Post Cardiac Surgery Insulin Drip Protocol  Patient on insulin drip - see EPIC for results

## 2017-06-13 NOTE — PROGRESS NOTES
Pt received from OR staff accompanied by Dr. Cochran.  Bedside report received.  Pt in NSR in 80's.  VS stable on .01 Epi, .5 Dex.  [place] wedged and verified with Dr. Cochran.  No pacing wires. ABG obtained.  Chest tubes both patent and 20cm suction applied.  Cason draining by gravity.  [place] numbers obtained - please see epic.

## 2017-06-13 NOTE — PROCEDURES
CENTRAL VENOUS/PA CATHETER   Start time 0815  End time 0825    Site:    Internal Jugular    Laterality:   right    Catheter:   Cordis w/ PA catheter    Blood return all ports, all ports flushed:  Yes    All elements of maximal sterile barrier techniques met: Yes     Occlusive dressing, antimicrobial disc:  Yes     Ultrasound Guidance & Interpretation:  No     CXR for placement & absence of complications: Pending    Complications:  None    Additional Notes:         Gray Cochran M.D.  6/13/2017  11:25 AM

## 2017-06-13 NOTE — PROCEDURES
CENTRAL VENOUS/PA CATHETER   Start time 0825  End time 0835    Site:    Subclavian    Laterality:   right    Catheter:   Triple Lumen    Blood return all ports, all ports flushed:  Yes    All elements of maximal sterile barrier techniques met: Yes     Occlusive dressing, antimicrobial disc:  Yes     Ultrasound Guidance & Interpretation:  No     CXR for placement & absence of complications: Pending    Complications:  None    Additional Notes:         Gray Cochran M.D.  6/13/2017  11:26 AM

## 2017-06-13 NOTE — RESPIRATORY CARE
Extubation    Cuff leak noted: yes  Stridor present: not at this time     Patient toleration: well    Events/Summary/Plan: pt met all requirements for extubation, RN and RT in agreement with extubation, cuff leak noted and no stridor at this time, pt placed on 4LNC and tolerating well at this time (06/13/17 1406)

## 2017-06-13 NOTE — IP AVS SNAPSHOT
6/19/2017    Jose A Ponce  [address]  [place] [address]    Dear Jose A:    [facility] wants to ensure your discharge home is safe and you or your loved ones have had all of your questions answered regarding your care after you leave the hospital.    Below is a list of resources and contact information should you have any questions regarding your hospital stay, follow-up instructions, or active medical symptoms.    Questions or Concerns Regarding… Contact   Medical Questions Related to Your Discharge  (7 days a week, 8am-5pm) Contact a Nurse Care Coordinator   [phone]   Medical Questions Not Related to Your Discharge  (24 hours a day / 7 days a week)  Contact the Nurse Health Line   [phone]    Medications or Discharge Instructions Refer to your discharge packet   or contact your [facility] Primary Care Provider   [phone]   Follow-up Appointment(s) Schedule your appointment via Telogis   or contact Scheduling [phone]   Billing Review your statement via Telogis  or contact Billing [phone]   Medical Records Review your records via Telogis   or contact Medical Records [phone]     You may receive a telephone call within two days of discharge. This call is to make certain you understand your discharge instructions and have the opportunity to have any questions answered. You can also easily access your medical information, test results and upcoming appointments via the Telogis free online health management tool. You can learn more and sign up at [facility] [employer]/Telogis. For assistance setting up your Telogis account, please call [phone].    Once again, we want to ensure your discharge home is safe and that you have a clear understanding of any next steps in your care. If you have any questions or concerns, please do not hesitate to contact us, we are here for you. Thank you for choosing [facility] for your healthcare needs.    Sincerely,    Your [facility] Healthcare Team

## 2017-06-13 NOTE — IP AVS SNAPSHOT
Advanced [employer] Access Code: Activation code not generated  Current Advanced Sports Logic Status: Active    [email or web address]  A secure, online tool to manage your health information     [employer] Advanced [employer] Logic® is a secure, online tool that connects you to your personalized health information from the privacy of your home -- day or night - making it very easy for you to manage your healthcare. Once the activation process is completed, you can even access your medical information using the Advanced Sports Logic elyssa, which is available for free in the Apple Elyssa store or Google Play store.     Advanced [employer] Logic provides the following levels of access (as shown below):   My Chart Features   [facility] Primary Care Doctor [facility]  Specialists [facility]  Urgent  Care Non-[facility]  Primary Care  Doctor   Email your healthcare team securely and privately 24/7 X X X X   Manage appointments: schedule your next appointment; view details of past/upcoming appointments X      Request prescription refills. X      View recent personal medical records, including lab and immunizations X X X X   View health record, including health history, allergies, medications X X X X   Read reports about your outpatient visits, procedures, consult and ER notes X X X X   See your discharge summary, which is a recap of your hospital and/or ER visit that includes your diagnosis, lab results, and care plan. X X       How to register for Advanced Sports Logic:  1. Go to  [email or web address].  2. Click on the Sign Up Now box, which takes you to the New Member Sign Up page. You will need to provide the following information:  a. Enter your Advanced [employer] Access Code exactly as it appears at the top of this page. (You will not need to use this code after you’ve completed the sign-up process. If you do not sign up before the expiration date, you must request a new code.)   b. Enter your date of birth.   c. Enter your home email address.   d. Click Submit, and follow the next screen’s instructions.  3. Create a Advanced [employer] Logic ID. This will  be your Compendium login ID and cannot be changed, so think of one that is secure and easy to remember.  4. Create a Compendium password. You can change your password at any time.  5. Enter your Password Reset Question and Answer. This can be used at a later time if you forget your password.   6. Enter your e-mail address. This allows you to receive e-mail notifications when new information is available in Compendium.  7. Click Sign Up. You can now view your health information.    For assistance activating your Compendium account, call [phone]

## 2017-06-13 NOTE — IP AVS SNAPSHOT
" <p align=\"LEFT\"><IMG SRC=\"//[email or web address]$/[email or web address]\" alt=\"Image\" WIDTH=\"50%\" HEIGHT=\"200\" BORDER=\"\"></p>                   Name:Jose A Ponce  Medical Record Number:[id number]  CSN: [id number]    YOB: 1960   Age: 56 y.o.  Sex: male  HT:1.88 m (6' 2.02\") WT: 80.8 kg (178 lb 2.1 oz)          Admit Date: 6/13/2017     Discharge Date: 6/19/2017  Today's Date: 6/19/2017  Attending Doctor:  No att. providers found                  Allergies:  Review of patient's allergies indicates no known allergies.          Your appointments     Jun 27, 2017  7:40 AM   HOSPITAL FOLLOW UP with KARAN Haro   [facility] ([facility]    [address]  [address]  [facility] [id number] [phone]              Follow-up Information     1. Follow up with Gray Barboza M.D. On 7/24/2017.    Specialty:  Cardiac Surgery    Why:  1:30 pm    Contact information    [id number]  [facility] [address]  [phone]          2. Follow up with KARAN Haro On 6/27/2017.    Specialty:  Cardiology    Why:  At 7:40 AM    Contact information    [address]  [facility] [id number] [phone]           Medication List      Take these Medications        Instructions    aspirin EC 81 MG Tbec   Commonly known as:  ECOTRIN    Take 1 Tab by mouth every day.   Dose:  81 mg       atorvastatin 80 MG tablet   Commonly known as:  LIPITOR    Take 1 Tab by mouth every bedtime.   Dose:  80 mg       clopidogrel 75 MG Tabs   Commonly known as:  PLAVIX    Take 1 Tab by mouth every day.   Dose:  75 mg        MG Caps    Take 100 mg by mouth every morning.   Dose:  100 mg       furosemide 20 MG Tabs   Commonly known as:  LASIX    Take 1 Tab by mouth every day.   Dose:  20 mg       hydrocodone-acetaminophen 5-325 MG Tabs per tablet   Commonly known as:  NORCO    Take 1-2 Tabs by mouth every 6 hours as needed.   Dose:  1-2 Tab       Melatonin 10 MG Caps    Take 1 " Cap by mouth every bedtime.   Dose:  1 Cap       metoprolol 25 MG Tabs   Commonly known as:  LOPRESSOR    Take 1 Tab by mouth 2 Times a Day.   Dose:  25 mg       morphine ER 15 MG Tbcr tablet   Commonly known as:  MS CONTIN    Take 1 Tab by mouth every 12 hours.   Dose:  15 mg       multivitamin Tabs    Take 1 Tab by mouth every day.   Dose:  1 Tab       nitroglycerin 0.4 MG Subl   Commonly known as:  NITROSTAT    Place 1 Tab under tongue as needed for Chest Pain.   Dose:  0.4 mg       potassium chloride SA 10 MEQ Tbcr   Commonly known as:  K-DUR    Take 1 Tab by mouth every day.   Dose:  10 mEq

## 2017-06-14 ENCOUNTER — APPOINTMENT (OUTPATIENT)
Dept: RADIOLOGY | Facility: MEDICAL CENTER | Age: 57
DRG: 236 | End: 2017-06-14
Attending: NURSE PRACTITIONER
Payer: COMMERCIAL

## 2017-06-14 LAB
ANION GAP SERPL CALC-SCNC: 8 MMOL/L (ref 0–11.9)
BUN SERPL-MCNC: 14 MG/DL (ref 8–22)
CALCIUM SERPL-MCNC: 7.7 MG/DL (ref 8.5–10.5)
CHLORIDE SERPL-SCNC: 103 MMOL/L (ref 96–112)
CO2 SERPL-SCNC: 20 MMOL/L (ref 20–33)
CREAT SERPL-MCNC: 0.49 MG/DL (ref 0.5–1.4)
EKG IMPRESSION: NORMAL
ERYTHROCYTE [DISTWIDTH] IN BLOOD BY AUTOMATED COUNT: 41.2 FL (ref 35.9–50)
GFR SERPL CREATININE-BSD FRML MDRD: >60 ML/MIN/1.73 M 2
GLUCOSE BLD-MCNC: 100 MG/DL (ref 65–99)
GLUCOSE BLD-MCNC: 101 MG/DL (ref 65–99)
GLUCOSE BLD-MCNC: 104 MG/DL (ref 65–99)
GLUCOSE BLD-MCNC: 117 MG/DL (ref 65–99)
GLUCOSE BLD-MCNC: 118 MG/DL (ref 65–99)
GLUCOSE BLD-MCNC: 132 MG/DL (ref 65–99)
GLUCOSE BLD-MCNC: 183 MG/DL (ref 65–99)
GLUCOSE BLD-MCNC: 190 MG/DL (ref 65–99)
GLUCOSE BLD-MCNC: 86 MG/DL (ref 65–99)
GLUCOSE BLD-MCNC: 90 MG/DL (ref 65–99)
GLUCOSE BLD-MCNC: 94 MG/DL (ref 65–99)
GLUCOSE BLD-MCNC: 96 MG/DL (ref 65–99)
GLUCOSE BLD-MCNC: 97 MG/DL (ref 65–99)
GLUCOSE SERPL-MCNC: 101 MG/DL (ref 65–99)
HCT VFR BLD AUTO: 30.7 % (ref 42–52)
HGB BLD-MCNC: 10.8 G/DL (ref 14–18)
MCH RBC QN AUTO: 34.5 PG (ref 27–33)
MCHC RBC AUTO-ENTMCNC: 35.2 G/DL (ref 33.7–35.3)
MCV RBC AUTO: 98.1 FL (ref 81.4–97.8)
PLATELET # BLD AUTO: 167 K/UL (ref 164–446)
PMV BLD AUTO: 10.6 FL (ref 9–12.9)
POTASSIUM SERPL-SCNC: 4 MMOL/L (ref 3.6–5.5)
RBC # BLD AUTO: 3.13 M/UL (ref 4.7–6.1)
SODIUM SERPL-SCNC: 131 MMOL/L (ref 135–145)
WBC # BLD AUTO: 12.7 K/UL (ref 4.8–10.8)

## 2017-06-14 PROCEDURE — 770020 HCHG ROOM/CARE - TELE (206)

## 2017-06-14 PROCEDURE — 99291 CRITICAL CARE FIRST HOUR: CPT | Performed by: INTERNAL MEDICINE

## 2017-06-14 PROCEDURE — 700105 HCHG RX REV CODE 258: Performed by: NURSE PRACTITIONER

## 2017-06-14 PROCEDURE — A9270 NON-COVERED ITEM OR SERVICE: HCPCS | Performed by: NURSE PRACTITIONER

## 2017-06-14 PROCEDURE — 80048 BASIC METABOLIC PNL TOTAL CA: CPT

## 2017-06-14 PROCEDURE — 700105 HCHG RX REV CODE 258

## 2017-06-14 PROCEDURE — 93010 ELECTROCARDIOGRAM REPORT: CPT | Performed by: INTERNAL MEDICINE

## 2017-06-14 PROCEDURE — 93005 ELECTROCARDIOGRAM TRACING: CPT | Performed by: NURSE PRACTITIONER

## 2017-06-14 PROCEDURE — 85027 COMPLETE CBC AUTOMATED: CPT

## 2017-06-14 PROCEDURE — 700112 HCHG RX REV CODE 229: Performed by: NURSE PRACTITIONER

## 2017-06-14 PROCEDURE — 71010 DX-CHEST-PORTABLE (1 VIEW): CPT

## 2017-06-14 PROCEDURE — 700101 HCHG RX REV CODE 250: Performed by: NURSE PRACTITIONER

## 2017-06-14 PROCEDURE — 700102 HCHG RX REV CODE 250 W/ 637 OVERRIDE(OP)

## 2017-06-14 PROCEDURE — 700102 HCHG RX REV CODE 250 W/ 637 OVERRIDE(OP): Performed by: NURSE PRACTITIONER

## 2017-06-14 PROCEDURE — 82962 GLUCOSE BLOOD TEST: CPT | Mod: 91

## 2017-06-14 PROCEDURE — 700105 HCHG RX REV CODE 258: Performed by: CLINICAL NURSE SPECIALIST

## 2017-06-14 PROCEDURE — 700111 HCHG RX REV CODE 636 W/ 250 OVERRIDE (IP): Performed by: NURSE PRACTITIONER

## 2017-06-14 RX ORDER — POTASSIUM CHLORIDE 7.45 MG/ML
10 INJECTION INTRAVENOUS ONCE
Status: COMPLETED | OUTPATIENT
Start: 2017-06-14 | End: 2017-06-14

## 2017-06-14 RX ORDER — HYDROCODONE BITARTRATE AND ACETAMINOPHEN 5; 325 MG/1; MG/1
TABLET ORAL
Status: COMPLETED
Start: 2017-06-14 | End: 2017-06-14

## 2017-06-14 RX ORDER — SODIUM CHLORIDE, SODIUM GLUCONATE, SODIUM ACETATE, POTASSIUM CHLORIDE AND MAGNESIUM CHLORIDE 526; 502; 368; 37; 30 MG/100ML; MG/100ML; MG/100ML; MG/100ML; MG/100ML
500 INJECTION, SOLUTION INTRAVENOUS CONTINUOUS
Status: DISPENSED | OUTPATIENT
Start: 2017-06-14 | End: 2017-06-14

## 2017-06-14 RX ORDER — MORPHINE SULFATE 15 MG/1
15 TABLET, FILM COATED, EXTENDED RELEASE ORAL EVERY 12 HOURS
Status: DISCONTINUED | OUTPATIENT
Start: 2017-06-14 | End: 2017-06-19 | Stop reason: HOSPADM

## 2017-06-14 RX ADMIN — SODIUM CHLORIDE 3.75 UNITS/HR: 9 INJECTION, SOLUTION INTRAVENOUS at 10:20

## 2017-06-14 RX ADMIN — ENOXAPARIN SODIUM 40 MG: 100 INJECTION SUBCUTANEOUS at 20:02

## 2017-06-14 RX ADMIN — SODIUM CHLORIDE, SODIUM GLUCONATE, SODIUM ACETATE, POTASSIUM CHLORIDE AND MAGNESIUM CHLORIDE 500 ML: 526; 502; 368; 37; 30 INJECTION, SOLUTION INTRAVENOUS at 14:00

## 2017-06-14 RX ADMIN — INSULIN HUMAN 9 UNITS: 100 INJECTION, SUSPENSION SUBCUTANEOUS at 10:21

## 2017-06-14 RX ADMIN — OXYCODONE HYDROCHLORIDE 10 MG: 10 TABLET ORAL at 16:56

## 2017-06-14 RX ADMIN — METOPROLOL TARTRATE 12.5 MG: 25 TABLET, FILM COATED ORAL at 19:59

## 2017-06-14 RX ADMIN — MORPHINE SULFATE 2 MG: 4 INJECTION INTRAVENOUS at 15:13

## 2017-06-14 RX ADMIN — HYDROCODONE BITARTRATE AND ACETAMINOPHEN 2 TABLET: 5; 325 TABLET ORAL at 07:46

## 2017-06-14 RX ADMIN — MORPHINE SULFATE 15 MG: 15 TABLET, EXTENDED RELEASE ORAL at 17:40

## 2017-06-14 RX ADMIN — MUPIROCIN 1 APPLICATION: 20 OINTMENT TOPICAL at 07:48

## 2017-06-14 RX ADMIN — OXYCODONE HYDROCHLORIDE 10 MG: 10 TABLET ORAL at 22:38

## 2017-06-14 RX ADMIN — INSULIN HUMAN 9 UNITS: 100 INJECTION, SUSPENSION SUBCUTANEOUS at 20:09

## 2017-06-14 RX ADMIN — HYDROCODONE BITARTRATE AND ACETAMINOPHEN 2 TABLET: 5; 325 TABLET ORAL at 20:02

## 2017-06-14 RX ADMIN — DIPHENHYDRAMINE HCL 25 MG: 25 TABLET ORAL at 20:11

## 2017-06-14 RX ADMIN — MUPIROCIN 1 APPLICATION: 20 OINTMENT TOPICAL at 20:01

## 2017-06-14 RX ADMIN — MAGNESIUM SULFATE HEPTAHYDRATE 1 G: 1 INJECTION, SOLUTION INTRAVENOUS at 12:05

## 2017-06-14 RX ADMIN — DOCUSATE SODIUM 100 MG: 100 CAPSULE ORAL at 07:48

## 2017-06-14 RX ADMIN — TRAMADOL HYDROCHLORIDE 50 MG: 50 TABLET, COATED ORAL at 09:39

## 2017-06-14 RX ADMIN — TRAMADOL HYDROCHLORIDE 50 MG: 50 TABLET, COATED ORAL at 05:51

## 2017-06-14 RX ADMIN — MORPHINE SULFATE 2 MG: 4 INJECTION INTRAVENOUS at 15:47

## 2017-06-14 RX ADMIN — HYDROCODONE BITARTRATE AND ACETAMINOPHEN 2 TABLET: 5; 325 TABLET ORAL at 14:02

## 2017-06-14 RX ADMIN — HYDROCODONE BITARTRATE AND ACETAMINOPHEN 2 TABLET: 5; 325 TABLET ORAL at 01:26

## 2017-06-14 RX ADMIN — DEXMEDETOMIDINE HYDROCHLORIDE 0.4 MCG/KG/HR: 100 INJECTION, SOLUTION INTRAVENOUS at 03:00

## 2017-06-14 RX ADMIN — CLOPIDOGREL BISULFATE 75 MG: 75 TABLET, FILM COATED ORAL at 07:48

## 2017-06-14 RX ADMIN — ATORVASTATIN CALCIUM 80 MG: 20 TABLET, FILM COATED ORAL at 19:59

## 2017-06-14 RX ADMIN — OXYCODONE HYDROCHLORIDE 10 MG: 10 TABLET ORAL at 12:05

## 2017-06-14 RX ADMIN — ASPIRIN 81 MG: 81 TABLET ORAL at 07:48

## 2017-06-14 RX ADMIN — POTASSIUM CHLORIDE 10 MEQ: 7.46 INJECTION, SOLUTION INTRAVENOUS at 06:28

## 2017-06-14 ASSESSMENT — ENCOUNTER SYMPTOMS
NEUROLOGICAL NEGATIVE: 1
PSYCHIATRIC NEGATIVE: 1
CARDIOVASCULAR NEGATIVE: 1
GASTROINTESTINAL NEGATIVE: 1
RESPIRATORY NEGATIVE: 1
MUSCULOSKELETAL NEGATIVE: 1
CONSTITUTIONAL NEGATIVE: 1
EYES NEGATIVE: 1

## 2017-06-14 ASSESSMENT — PAIN SCALES - GENERAL
PAINLEVEL_OUTOF10: 6
PAINLEVEL_OUTOF10: 6
PAINLEVEL_OUTOF10: 8
PAINLEVEL_OUTOF10: 3
PAINLEVEL_OUTOF10: 5
PAINLEVEL_OUTOF10: 7
PAINLEVEL_OUTOF10: 7
PAINLEVEL_OUTOF10: 5
PAINLEVEL_OUTOF10: 7
PAINLEVEL_OUTOF10: 7
PAINLEVEL_OUTOF10: 8
PAINLEVEL_OUTOF10: 4
PAINLEVEL_OUTOF10: 6
PAINLEVEL_OUTOF10: 3
PAINLEVEL_OUTOF10: 8
PAINLEVEL_OUTOF10: 6
PAINLEVEL_OUTOF10: 4
PAINLEVEL_OUTOF10: 7
PAINLEVEL_OUTOF10: 5
PAINLEVEL_OUTOF10: 5
PAINLEVEL_OUTOF10: 4

## 2017-06-14 NOTE — CARE PLAN
Problem: Day of surgery post CABG/Heart valve replacement  Goal: Stabilization in immediate post op period  Intervention: Serum K q 6 hours x 24 hours. ABG and CBC prn.  Potassium replaced every 6 hours as needed per K scale  Intervention: FSBS frequency as per Cardiac Surgery Insulin Drip Protocol  Blood sugar checked hourly, insulin gtt titrated as needed per protocol 979  Intervention: For patients on Beta Blockers: verify dose given prior to surgery or within 6 hours after arrival to the unit  Taken pre-op  Intervention: Chest tube to 20 cm suction, record CT drainage with VS  Chest tubes maintained to 20 cm suction, drainage recorded hourly  Intervention: Titrate and wean off vasoactive drips per patient’s condition and per MD order while maintaining SBP  mmHg per MD order  Cleviprex used briefly for SBP greater than 120  Intervention: IS q 1 hour while awake post extubation  Pt has weak effort and reaches only 750-800 ml with encouragement  Intervention: Maintain all original surgical dressings for 24 hours  Prevena intact to sternotomy  Intervention: Clear liquids post extubation, advance as tolerated  Tolerating clears well  Intervention: A-Fib and DVT prophylaxis per MD order or contraindications documented (refer to DVT/VTE problem on Care Plan)  Bilateral SCD's in use

## 2017-06-14 NOTE — PROGRESS NOTES
MANDY Dominique updated regarding patient's low urine output ~15cc/hr.  Orders received for 500mL plasmalyte bolus.  Orders placed.

## 2017-06-14 NOTE — CONSULTS
DATE OF SERVICE:  06/13/2017    REQUESTING PHYSICIAN:  Gray Barboza MD    CONSULTING PHYSICIAN:  Luis F Alexander MD    TYPE OF CONSULTATION:  Pulmonary medicine and critical care medicine.    REASON FOR CONSULTATION:  Postoperative ventilator management.    HISTORY OF PRESENT ILLNESS:  The entire history is obtained from healthcare   providers and medical record as this gentleman cannot give me any history.  I   was kindly asked by Dr. Gray Barboza to see and evaluate this gentleman   regarding the above problems.  This is a 56-year-old gentleman who was   admitted today to [facility].  He was recently   hospitalized with chest pain.  He underwent cardiac catheterization and was   found to have significant coronary artery disease.  Today, Dr. Barboza took him   to the operating theater and performed 2-vessel coronary artery bypass   grafting.  He has a left internal mammary graft to the LAD and a reverse   saphenous vein graft to a diagonal coronary artery.  He is now in the cardiac   surgery unit.  He is intubated on full mechanical ventilatory support.  He is   on dexmedetomidine as well as epinephrine.    CURRENT MEDICATIONS:  He is on dexmedetomidine, epinephrine, aminocaproic acid   and insulin.  At home, he was taking aspirin 81 mg a day, atorvastatin 80 mg   a day, melatonin 10 mg at bedtime, metoprolol 25 mg twice a day, multivitamin   daily, Naprosyn as needed, nitroglycerin as needed.    ALLERGIES:  No known drug allergies.    PAST SURGICAL HISTORY:  He has had a prior hernia repair.    ILLNESSES:  Coronary artery disease, chronic diastolic heart failure with   grade I diastolic dysfunction, systemic arterial hypertension, dyslipidemia.    SOCIAL HISTORY:  He apparently quit smoking a year ago.  Prior to that, he   smoked 1-2 packs of cigarettes a day.  He has a 50-pack-year history of   smoking.  He has no history of alcohol or drug abuse.    FAMILY HISTORY:   Noncontributory.    REVIEW OF SYSTEMS:  Not obtainable.    PHYSICAL EXAMINATION:  VITAL SIGNS:  His temperature is 99.9, his blood pressure is 118/58, heart   rate 91, respiratory rate is 22.  GENERAL:  He is a sedated gentleman, on the ventilator.  HEENT:  Normocephalic, atraumatic.  Sinuses are nontender.  Nares patent.    Oropharynx with moist mucous membranes.  NECK:  Trachea midline, supple.  CHEST:  Symmetrical.  Dressings are in place.  Chest tubes are in place.  HEART:  Regular rhythm.  LUNGS:  Clear to auscultation bilaterally.  ABDOMEN:  Soft, nondistended, nontender.  EXTREMITIES:  No clubbing or cyanosis.  There is no edema.  NEUROLOGIC:  He is sedated, but he is beginning to awaken from the effects of   anesthesia.    DIAGNOSTIC DATA:  His white blood cell count is 10,500, his hemoglobin is 9.9,   hematocrit 29, platelet count 150,000.  His sodium is 138, potassium 4.5,   chloride 101, CO2 of 25, BUN 13, creatinine 0.71, glucose is 118, magnesium is   2.2.  Arterial blood gas reveals a pH of 7.40, pCO2 of 36, pO2 of 94.    DIAGNOSTIC DATA:  Chest x-ray shows subsegmental atelectasis in both lower   lobes, left greater than right.    IMPRESSION:  1.  Postoperative ventilator management.  2.  Status post 2-vessel coronary artery bypass grafting.  3.  Postoperative atelectasis.  4.  Chronic diastolic heart failure with grade I diastolic dysfunction.  5.  History of systemic arterial hypertension.  6.  Dyslipidemia.  7.  History of tobacco abuse.  There is no underlying history of lung disease.    PLAN AND MEDICAL DECISION MAKING:  This gentleman is critically ill.  He is   now in the cardiac surgery unit.  We will wean his epinephrine as tolerated.    We will wean his sedation as tolerated and wean him from the ventilator as   tolerated.  His blood pressure will be controlled.  His blood sugars will also   be strictly controlled.  At the current time, his prognosis is quite guarded   and he is critically  ill.    I have spent 40 minutes providing direct critical care services at the   bedside.  There has been no time overlap.  The time spent excludes the time   spent performing procedures (18580).    The case has been reviewed with Dr. Mcdonald, nursing and respiratory therapy.    Thank you Dr. Mcdonald for allowing us to participate in the care of this   gentleman.  We will continue to follow him with great interest.       ____________________________________     MD ANEL CHURCH / NTS    DD:  06/13/2017 14:50:19  DT:  06/13/2017 18:00:14    D#:  [id number]  Job#:  [id number]    cc: GISEL MCDONALD MD

## 2017-06-14 NOTE — DIETARY
Nutrition Services: Consult cardiac rehab diet education    Pt is a 56 y.o. Male with Dx: CABG x2    PMH: CAD, HTN, HF, dyslipidemia, hypercholesterolemia, former smoker  BMI= 25.1  Labs: HA1c 5.7%, cholesterol 242,     S/p CABG x2 6/13. Pt on a cardiac, consistent carbohydrate diet. Supplement order placed per RN, who noted pt is very hungry. Attempted to see pt this afternoon for diet education and meal/snack needs, but RN had just gone into pt's room and curtain was closed.     RD will F/u.   Nutrition Representative seeing pt daily for menu selections; snacks available.

## 2017-06-14 NOTE — PROGRESS NOTES
Shift summary:  Pt remained stable without pressors all night.  Upon completing morning care and getting OOB to chair, SBP 80's.  Epinephrine gtt restarted.

## 2017-06-14 NOTE — PROGRESS NOTES
Pt sleeping very short intervals - wakes about every 5 - 10 minutes.  Benadryl dose repeated, Precedex gtt titrated up.   Pt becoming very discouraged with inability to sleep.

## 2017-06-14 NOTE — PROGRESS NOTES
Pulmonary Critical Care Progress Note        Date of service:  6/14/17    Interval Events:  24 hour interval history reviewed  Reason for visit:  Atelectasis, hypotension      Complains of incisional chest pain  No angina, palp, syncope or increased edema  No increased SOB.  Mild cough.  No sputum production  No N/V/P/D  No hematuria or dysuria   Epi restarted due to BP 60-70. Now in 90's.   SR  On 3L NC, incentive spirometry 500 mL due to pain.   CXR with increased left pleural effusion.    The complete AMA/CMS system review is performed and there are no other additional positive findings.        PFSH:  No change.    Respiratory:     Pulse Oximetry: 95 %  CXR with increased LLL opacification and left effusion  Few crackles at the bases  No wheezing  3 L NC    Recent Labs      06/13/17   1202  06/13/17   1251  06/13/17   1345   ISTATAPH  7.387*  7.363*  7.374*   ISTATAPCO2  37.4*  36.1  37.0   ISTATAPO2  97*  82  66   ISTATATCO2  24  22  23   VADTQUL7BDZ  98  96  92*   ISTATARTHCO3  22.5  20.5  21.6   ISTATARTBE  -2  -4  -3   ISTATTEMP  97.5 F  36.1 C  36.5 C   ISTATFIO2  100  100  40   ISTATSPEC  Arterial  Arterial  Arterial   ISTATAPHTC  7.396*  7.376*  7.381*   NTHXRNID1YM  94*  77  64       HemoDynamics:  Pulse: 68, Heart Rate (Monitored): 68  Arterial BP: 105/52 mmHg, NIBP: (!) 94/55 mmHg  CVP (mm Hg): (!) 10 MM HG      SR  Epi gtt    Neuro:  Awake and alert  No focal weakness  Fully oriented    Fluids:  Intake/Output       06/12/17 0700 - 06/13/17 0659 (Not Admitted) 06/13/17 0700 - 06/14/17 0659 06/14/17 0700 - 06/15/17 0659      9649-9025 6403-3764 Total 2470-5366 6446-3126 Total 9862-7603 1645-5547 Total       Intake    P.O.  --  -- --  480  840 1320  --  -- --    P.O. -- -- --  -- -- --    I.V.  --  -- --  3606.9  620.5 4227.4  --  -- --    Clevidipine Volume -- -- -- -- 1.1 1.1 -- -- --    Precedex Volume -- -- -- 62.6 83.7 146.3 -- -- --    Aminocaproic Acid Volume -- -- -- 250 -- 250 -- --  --    Insulin Volume -- -- -- 47.5 65.7 113.1 -- -- --    Epinephrine Volume -- -- -- 6.9 -- 6.9 -- -- --    IV Volume (Bolus) -- -- -- 600 -- 600 -- -- --    IV Volume (NS TKO) -- -- -- 140 60 200 -- -- --    Surgery Volume (Surgery Intake) -- -- -- 2300 -- 2300 -- -- --    IV Piggyback Volume (IV Piggyback) -- -- -- 200 410 610 -- -- --    Total Intake -- -- -- 4086.9 1460.5 5547.4 -- -- --       Output    Urine  --  -- --  1664  570 2234  --  -- --    Indwelling Cathether -- -- -- 5182 481 0958 -- -- --    Drains  --  -- --  120  80 200  --  -- --    Mediastinal Chest Tube 1 -- -- -- 120 80 200 -- -- --    Stool  --  -- --  --  -- --  --  -- --    Number of Times Stooled -- -- -- 0 x -- 0 x -- -- --    Total Output -- -- -- 1779 586 8691 -- -- --       Net I/O     -- -- -- 2302.9 810.5 3113.4 -- -- --           Recent Labs      17   1405  17   1200  17   1740  17   2300   SODIUM  138   --    --    --    POTASSIUM  3.7  4.5  3.8  4.0   CHLORIDE  101   --    --    --    CO2  25   --    --    --    BUN  13   --    --    --    CREATININE  0.71   --    --    --    MAGNESIUM   --   2.2   --    --    CALCIUM  9.3   --    --    --        GI/Nutrition:  Abd soft ND/NT    Liver Function  Recent Labs      17   140   ALTSGPT  16   ASTSGOT  15   ALKPHOSPHAT  89   TBILIRUBIN  1.7*   GLUCOSE  118*       Heme:  Recent Labs      17   1405  17   1022  06/13/17   1200   RBC  4.26*   --    --    HEMOGLOBIN  14.5   --    --    HEMATOCRIT  41.3*   --    --    PLATELETCT  246  157*  150*   PROTHROMBTM  13.4   --   17.2*   APTT  30.9   --   32.4   INR  0.99   --   1.36*       Infectious Disease:  Temp  Av.9 °C (98.4 °F)  Min: 36.8 °C (98.2 °F)  Max: 37 °C (98.6 °F)  Monitored Temp  Av.4 °C (97.5 °F)  Min: 36 °C (96.8 °F)  Max: 36.6 °C (97.9 °F)    Recent Labs      17   1405   WBC  10.5   NEUTSPOLYS  73.00*   LYMPHOCYTES  17.90*   MONOCYTES  7.50   EOSINOPHILS  0.00   BASOPHILS   0.80   ASTSGOT  15   ALTSGPT  16   ALKPHOSPHAT  89   TBILIRUBIN  1.7*     Current Facility-Administered Medications   Medication Dose Frequency Provider Last Rate Last Dose   • lidocaine-prilocaine (EMLA) 2.5-2.5 % cream 1 Application  1 Application Once PRN Gray Barboza M.D.        Or   • lidocaine (XYLOCAINE) 1%  injection  0.5 mL Once PRN Gray Barboza M.D.       • atorvastatin (LIPITOR) tablet 80 mg  80 mg QHS Abril Jimenezt, A.P.N.   80 mg at 06/13/17 1910   • Respiratory Care per Protocol   Continuous RT Abril Jimenezt, A.P.N.       • NS infusion   Continuous Abril Jimenezt, A.P.N. 10 mL/hr at 06/13/17 1200     • K+ Scale: Goal of 4.5  1 Each Q6HRS Abril Jimenezt, A.P.N.   1 Each at 06/13/17 2346   • Pharmacy Consult Request ...Pain Management Review 1 Each  1 Each PRN Abril Jimenezt, A.P.N.       • docusate sodium (COLACE) capsule 100 mg  100 mg QAM Abril Motaakat, A.P.N.        And   • senna-docusate (PERICOLACE or SENOKOT S) 8.6-50 MG per tablet 1 Tab  1 Tab Nightly Abril Motaakat, A.P.N.        And   • senna-docusate (PERICOLACE or SENOKOT S) 8.6-50 MG per tablet 1 Tab  1 Tab Q24HRS PRN Abril Jimenezt, A.P.N.        And   • lactulose 20 GM/30ML solution 30 mL  30 mL Q24HRS PRN Abril Motaakat, A.P.N.        And   • bisacodyl (DULCOLAX) suppository 10 mg  10 mg Q24HRS PRN Abril Jimenezt, A.P.N.        And   • fleet enema 133 mL  1 Each Once PRN Abril Motaakat, A.P.N.       • enoxaparin (LOVENOX) inj 40 mg  40 mg DAILY Abril Jimenezt, A.P.N.       • mupirocin (BACTROBAN) 2 % ointment 1 Application  1 Application BID Abril Jimenezt, A.P.N.   1 Application at 06/13/17 1912   • magnesium sulfate ivpb premix 1 g  1 g QDAY Abril Jimenezt, A.P.N.   Stopped at 06/13/17 1339   • metoprolol (LOPRESSOR) tablet 12.5 mg  12.5 mg BID Abril Jimenezt, A.P.N.        Followed by   • [START ON 6/15/2017] metoprolol (LOPRESSOR) tablet 25 mg  25 mg BID Abril Jimenezt, A.P.N.       • aspirin EC (ECOTRIN)  tablet 81 mg  81 mg DAILY Abril L Dang, A.P.N.       • clopidogrel (PLAVIX) tablet 75 mg  75 mg DAILY Abril L Dang, A.P.N.       • electrolyte-A (PLASMALYTE-A) infusion   PRN Abril L Dang, A.P.N.   1,000 mL at 06/13/17 1432   • clevidipine (CLEVIPREX) IV emulsion  0-10 mg/hr Continuous Abril L Dang, A.P.N.   Stopped at 06/13/17 2010   • nitroglycerin 50 mg in D5W 250 ml infusion  0-100 mcg/min Continuous Abril L Dang, A.P.N.   Stopped at 06/13/17 1200   • oxycodone immediate release (ROXICODONE) tablet 5 mg  5 mg Q3HRS PRN Abril L Dang, A.P.N.       • oxycodone immediate release (ROXICODONE) tablet 10 mg  10 mg Q3HRS PRN Abril L Dang, A.P.N.   10 mg at 06/13/17 2247   • tramadol (ULTRAM) 50 MG tablet 50 mg  50 mg Q4HRS PRN Abril L Dang, A.P.N.   50 mg at 06/13/17 2146   • midazolam (VERSED) 2 MG/2ML injection 0.5-2 mg  0.5-2 mg Q HOUR PRN Abril L Dang, A.P.N.       • dexmedetomidine (PRECEDEX) 200 mcg in NS 50 mL infusion  0-1.5 mcg/kg/hr Continuous Abril L Dang, A.P.N.   Stopped at 06/14/17 0433   • sodium bicarbonate 8.4 % injection 50 mEq  50 mEq Q HOUR PRN Abril L Dang, A.P.N.       • ondansetron (ZOFRAN) syringe/vial injection 4 mg  4 mg Q6HRS PRN Abril L Dang, A.P.N.        Or   • prochlorperazine (COMPAZINE) injection 10 mg  10 mg Q6HRS PRN Abril L Dang, A.P.N.        Or   • promethazine (PHENERGAN) suppository 25 mg  25 mg Q6HRS PRN Abril Leong, A.P.N.       • acetaminophen (TYLENOL) tablet 650 mg  650 mg Q4HRS PRN Abril Leong, A.P.N.        Or   • acetaminophen (TYLENOL) suppository 650 mg  650 mg Q4HRS PRN Abril Leong, A.P.N.       • mag hydrox-al hydrox-simeth (MAALOX PLUS ES or MYLANTA DS) suspension 30 mL  30 mL Q4HRS PRN Abril Leong, A.P.N.       • diphenhydrAMINE (BENADRYL) tablet/capsule 25 mg  25 mg HS PRN - MR X 1 Abril Leong, A.P.N.   25 mg at 06/13/17 6702   • hydrocodone-acetaminophen (NORCO) 5-325 MG per tablet 1-2 Tab  1-2 Tab Q6HRS  PRN Abril Leong A.P.N.   2 Tab at 06/14/17 0126   • desmopressin (DDAVP) 25.472 mcg in NS 50 mL ivpb  0.3 mcg/kg Once Gray Cochran M.D.   25.472 mcg at 06/13/17 1130   • insulin regular (HUMULIN R) injection 0-14 Units  0-14 Units Once Abril Leong, A.P.N.   0 Units at 06/13/17 1200    And   • insulin regular human (HUMULIN/NOVOLIN R) 62.5 Units in  mL infusion per protocol  1-6 Units/hr Continuous Christi Perkins, PHARMD 7 mL/hr at 06/13/17 2004 1.75 Units/hr at 06/13/17 2004    And   • insulin regular (HUMULIN R) injection 0-10 Units  0-10 Units PRN Christi Perkins, PHARMD   1 Units at 06/13/17 1905    And   • dextrose 50% (D50W) injection 25 mL  25 mL PRN Christi Perkins, PHARMD       • insulin lispro (HUMALOG) injection 0-20 Units  0-20 Units PRN Abril Leong, A.P.N.       • morphine (pf) 4 mg/ml injection 0-4 mg  0-4 mg Q3HRS PRN Abril Leong A.P.N.   4 mg at 06/13/17 2019   • epinephrine 1 mg/mL(1:1000) 4 mg in  mL Infusion  0-0.2 mcg/kg/min Continuous Gray Barboza M.D.   Stopped at 06/13/17 1417   • nicotine (NICODERM) 21 MG/24HR 21 mg  21 mg Daily-0600 Gray Braboza M.D.   Stopped at 06/12/17 0800    And   • nicotine polacrilex (NICORETTE) 2 MG piece 2 mg  2 mg Q HOUR PRN Gray Barboza M.D.         Last reviewed on 6/13/2017  5:42 AM by Helen Rodriguez R.N.    Quality  Measures:  Labs reviewed, Radiology images reviewed and Medications reviewed  Cason catheter: Critically Ill - Requiring Accurate Measurement of Urinary Output  Central line in place: Need for access    DVT Prophylaxis: Contraindicated - High bleeding risk  DVT prophylaxis - mechanical: SCDs  Ulcer prophylaxis: Yes            Assessment and Plan:    Atelectasis   - cont IS, PEP   - pain control  Hypotension   - fluid bolus   - epi gtt - wean off as tolerated  Status post 2-vessel coronary artery bypass grafting   - ASA, statin, Plavix, BB  Chronic diastolic heart failure   - grade I diastolic  dysfunction   - compensated  History of systemic arterial hypertension  Dyslipidemia - statin  History of tobacco abuse   - no underlying history of lung disease    Active titration of epinephrine for hypotension.  Increase analgesia for incisional chest pain - needs aggressive pulmonary toilet for increased atelectasis.    Critical Care Time:  35 minutes  [id number]  No time overlap  Time excludes procedures  Discussed with RN, RT, Team    IBailee (Scribe), am scribing for, and in the presence of, Luis F Alexander M.D.    Electronically signed by: Bailee Millan (Deisi), 6/14/2017    ILuis F M.D. personally performed the services described in this documentation, as scribed by Bailee Millan in my presence, and it is both accurate and complete.

## 2017-06-14 NOTE — PROGRESS NOTES
KEN Sweeney updated on pt status:  extubation time, UOP, CT output and hemodynamic status.  PRN IV morphine order received for 4mg q3h prn.

## 2017-06-14 NOTE — PROGRESS NOTES
Cardiovascular Surgery Progress Note    Name: Jose A Ponce  MRN: [id number]  : 1960  Admit Date: 2017  5:08 AM  Procedure:  Procedure(s) and Anesthesia Type:     * MULTIPLE CORONARY ARTERY BYPASS x2 RIGHT LEG ENDO VEIN HARVEST - General     * HUBERT - INTRAOP - General  2 Day Post-Op    Vitals:  Patient Vitals for the past 8 hrs:   Temp SpO2 O2 Delivery O2 (LPM) Pulse Heart Rate (Monitored) Resp NIBP Weight   06/15/17 0737 - 94 % - 3 87 88 (!) 22 - -   06/15/17 0600 - 89 % Silicone Nasal Cannula 4 89 89 20 - -   06/15/17 0500 - 92 % - - 83 83 20 - -   06/15/17 0400 37.3 °C (99.1 °F) 94 % Silicone Nasal Cannula 3 89 88 18 125/78 mmHg 92.1 kg (203 lb 0.7 oz)   06/15/17 0300 - 92 % - - 86 86 15 - -   06/15/17 0200 - 94 % Silicone Nasal Cannula 3 89 89 20 - -   06/15/17 0100 - 93 % - - 86 85 13 - -     Temp (24hrs), Av.1 °C (98.8 °F), Min:36.7 °C (98 °F), Max:37.3 °C (99.1 °F)      Respiratory:    Respiration: (!) 22, Pulse Oximetry: 94 %, O2 Daily Delivery Respiratory : Silicone Nasal Cannula     Chest Tube Drains:     Mediastinal Chest Tube 1:  (CT Removed)    Fluids:    Intake/Output Summary (Last 24 hours) at 06/15/17 0808  Last data filed at 06/15/17 0600   Gross per 24 hour   Intake 1712.38 ml   Output   1955 ml   Net -242.62 ml     Admit weight: Weight: 80.4 kg (177 lb 4 oz)  Current weight: Weight: 92.1 kg (203 lb 0.7 oz) (06/15/17 0400)    Labs:  Recent Labs      17   1405   17   1200  17   0500  06/15/17   0521   WBC  10.5   --    --   12.7*  12.4*   RBC  4.26*   --    --   3.13*  3.25*   HEMOGLOBIN  14.5   --    --   10.8*  11.1*   HEMATOCRIT  41.3*   --    --   30.7*  31.7*   MCV  96.9   --    --   98.1*  97.5   MCH  34.0*   --    --   34.5*  34.2*   MCHC  35.1   --    --   35.2  35.0   RDW  41.8   --    --   41.2  42.5   PLATELETCT  246   < >  150*  167  183   MPV  10.7   --    --   10.6  10.0    < > = values in this interval not displayed.     Recent Labs      17   6928    NEUTSPOLYS  73.00*   LYMPHOCYTES  17.90*   MONOCYTES  7.50   EOSINOPHILS  0.00   BASOPHILS  0.80     Recent Labs      06/12/17   1405   06/13/17   2300  06/14/17   0500  06/15/17   0521   SODIUM  138   --    --   131*  124*   POTASSIUM  3.7   < >  4.0  4.0  4.0   CHLORIDE  101   --    --   103  94*   CO2  25   --    --   20  24   GLUCOSE  118*   --    --   101*  108*   BUN  13   --    --   14  12   CREATININE  0.71   --    --   0.49*  0.51   CALCIUM  9.3   --    --   7.7*  8.1*    < > = values in this interval not displayed.     Recent Labs      06/12/17   1405  06/13/17   1200   APTT  30.9  32.4   INR  0.99  1.36*       Medications:  • insulin NPH  9 Units     • insulin lispro  6 Units     • insulin lispro  0-15 Units     • morphine ER  15 mg     • atorvastatin  80 mg     • docusate sodium  100 mg      And   • senna-docusate  1 Tab     • enoxaparin  40 mg     • mupirocin  1 Application     • magnesium sulfate  1 g Stopped (06/14/17 1305)   • metoprolol  12.5 mg      Followed by   • metoprolol  25 mg     • aspirin EC  81 mg     • clopidogrel  75 mg         Exam:   Review of Systems   Constitutional: Negative.    HENT: Negative.    Eyes: Negative.    Respiratory: Negative.    Cardiovascular: Negative.    Gastrointestinal: Negative.    Genitourinary: Negative.    Musculoskeletal: Negative.    Skin: Negative.    Neurological: Negative.    Psychiatric/Behavioral: Negative.        Physical Exam   Constitutional: He is oriented to person, place, and time. He appears well-developed and well-nourished.   HENT:   Head: Normocephalic and atraumatic.   Eyes: Pupils are equal, round, and reactive to light.   Neck: Normal range of motion. Neck supple.   Cardiovascular: Normal rate and regular rhythm.    Pulmonary/Chest: Effort normal.   Decreased at bases.    Abdominal: Soft. Bowel sounds are normal.   Musculoskeletal: Normal range of motion.   Neurological: He is alert and oriented to person, place, and time.   Skin: Skin is  warm and dry.   Wounds CDI   Psychiatric: He has a normal mood and affect.       Quality Measures:   EKG reviewed, Medications reviewed, Radiology images reviewed and Labs reviewed  Shipman catheter: No Shipman  Central line in place: Need for access    DVT Prophylaxis: Enoxaparin (Lovenox)  DVT prophylaxis - mechanical: SCDs  Ulcer prophylaxis: Yes    Assessed for rehab: Patient returned to prior level of function, rehabilitation not indicated at this time      Assessment/Plan:  POD 1 Extubated, HDS, no drips, chest tube output minimal and negative for air leak.  Neuro grossly intact.  Plan:  DC chest tubes and shipman.  Transition to tele status.   POD 3 HDS, SR, neuro intact, wounds CDI, prevena not working.  Will try resealing it.  IS/Amb.  CPM.     Active Hospital Problems    Diagnosis   • CAD (coronary artery disease) [I25.10]

## 2017-06-14 NOTE — PROGRESS NOTES
Received report from Kaye MEYER; assumed patient care.  Assessment complete, A & O x 4, MD ROBINSON ordered pain medication given per request; repostioning provided for patient comfort.  Updated on POC, questions answered, verbalizes understanding.  Pt up to chair, call light within reach, frequent rounding in effect.

## 2017-06-15 LAB
ANION GAP SERPL CALC-SCNC: 6 MMOL/L (ref 0–11.9)
BUN SERPL-MCNC: 12 MG/DL (ref 8–22)
CALCIUM SERPL-MCNC: 8.1 MG/DL (ref 8.5–10.5)
CHLORIDE SERPL-SCNC: 94 MMOL/L (ref 96–112)
CO2 SERPL-SCNC: 24 MMOL/L (ref 20–33)
CREAT SERPL-MCNC: 0.51 MG/DL (ref 0.5–1.4)
ERYTHROCYTE [DISTWIDTH] IN BLOOD BY AUTOMATED COUNT: 42.5 FL (ref 35.9–50)
GFR SERPL CREATININE-BSD FRML MDRD: >60 ML/MIN/1.73 M 2
GLUCOSE BLD-MCNC: 112 MG/DL (ref 65–99)
GLUCOSE BLD-MCNC: 125 MG/DL (ref 65–99)
GLUCOSE BLD-MCNC: 135 MG/DL (ref 65–99)
GLUCOSE SERPL-MCNC: 108 MG/DL (ref 65–99)
HCT VFR BLD AUTO: 31.7 % (ref 42–52)
HGB BLD-MCNC: 11.1 G/DL (ref 14–18)
MCH RBC QN AUTO: 34.2 PG (ref 27–33)
MCHC RBC AUTO-ENTMCNC: 35 G/DL (ref 33.7–35.3)
MCV RBC AUTO: 97.5 FL (ref 81.4–97.8)
PLATELET # BLD AUTO: 183 K/UL (ref 164–446)
PMV BLD AUTO: 10 FL (ref 9–12.9)
POTASSIUM SERPL-SCNC: 4 MMOL/L (ref 3.6–5.5)
RBC # BLD AUTO: 3.25 M/UL (ref 4.7–6.1)
SODIUM SERPL-SCNC: 124 MMOL/L (ref 135–145)
WBC # BLD AUTO: 12.4 K/UL (ref 4.8–10.8)

## 2017-06-15 PROCEDURE — G8987 SELF CARE CURRENT STATUS: HCPCS | Mod: CK

## 2017-06-15 PROCEDURE — 82962 GLUCOSE BLOOD TEST: CPT

## 2017-06-15 PROCEDURE — G8978 MOBILITY CURRENT STATUS: HCPCS | Mod: CK

## 2017-06-15 PROCEDURE — A9270 NON-COVERED ITEM OR SERVICE: HCPCS | Performed by: NURSE PRACTITIONER

## 2017-06-15 PROCEDURE — 97165 OT EVAL LOW COMPLEX 30 MIN: CPT

## 2017-06-15 PROCEDURE — 700112 HCHG RX REV CODE 229: Performed by: NURSE PRACTITIONER

## 2017-06-15 PROCEDURE — 770020 HCHG ROOM/CARE - TELE (206)

## 2017-06-15 PROCEDURE — 80048 BASIC METABOLIC PNL TOTAL CA: CPT

## 2017-06-15 PROCEDURE — G8988 SELF CARE GOAL STATUS: HCPCS | Mod: CI

## 2017-06-15 PROCEDURE — 97162 PT EVAL MOD COMPLEX 30 MIN: CPT

## 2017-06-15 PROCEDURE — 99233 SBSQ HOSP IP/OBS HIGH 50: CPT | Performed by: INTERNAL MEDICINE

## 2017-06-15 PROCEDURE — G8979 MOBILITY GOAL STATUS: HCPCS | Mod: CI

## 2017-06-15 PROCEDURE — 85027 COMPLETE CBC AUTOMATED: CPT

## 2017-06-15 PROCEDURE — 700111 HCHG RX REV CODE 636 W/ 250 OVERRIDE (IP): Performed by: NURSE PRACTITIONER

## 2017-06-15 PROCEDURE — 700102 HCHG RX REV CODE 250 W/ 637 OVERRIDE(OP): Performed by: NURSE PRACTITIONER

## 2017-06-15 RX ADMIN — DOCUSATE SODIUM 100 MG: 100 CAPSULE ORAL at 08:02

## 2017-06-15 RX ADMIN — MAGNESIUM SULFATE HEPTAHYDRATE 1 G: 1 INJECTION, SOLUTION INTRAVENOUS at 12:31

## 2017-06-15 RX ADMIN — MUPIROCIN 1 APPLICATION: 20 OINTMENT TOPICAL at 08:05

## 2017-06-15 RX ADMIN — HYDROCODONE BITARTRATE AND ACETAMINOPHEN 1 TABLET: 5; 325 TABLET ORAL at 23:59

## 2017-06-15 RX ADMIN — HYDROCODONE BITARTRATE AND ACETAMINOPHEN 2 TABLET: 5; 325 TABLET ORAL at 11:16

## 2017-06-15 RX ADMIN — OXYCODONE HYDROCHLORIDE 10 MG: 10 TABLET ORAL at 05:12

## 2017-06-15 RX ADMIN — MUPIROCIN 1 APPLICATION: 20 OINTMENT TOPICAL at 20:01

## 2017-06-15 RX ADMIN — METOPROLOL TARTRATE 25 MG: 25 TABLET, FILM COATED ORAL at 08:03

## 2017-06-15 RX ADMIN — OXYCODONE HYDROCHLORIDE 10 MG: 10 TABLET ORAL at 15:12

## 2017-06-15 RX ADMIN — HYDROCODONE BITARTRATE AND ACETAMINOPHEN 2 TABLET: 5; 325 TABLET ORAL at 02:02

## 2017-06-15 RX ADMIN — HYDROCODONE BITARTRATE AND ACETAMINOPHEN 2 TABLET: 5; 325 TABLET ORAL at 17:13

## 2017-06-15 RX ADMIN — METOPROLOL TARTRATE 25 MG: 25 TABLET, FILM COATED ORAL at 20:00

## 2017-06-15 RX ADMIN — ATORVASTATIN CALCIUM 80 MG: 20 TABLET, FILM COATED ORAL at 19:59

## 2017-06-15 RX ADMIN — ASPIRIN 81 MG: 81 TABLET ORAL at 08:01

## 2017-06-15 RX ADMIN — INSULIN HUMAN 9 UNITS: 100 INJECTION, SUSPENSION SUBCUTANEOUS at 05:32

## 2017-06-15 RX ADMIN — MORPHINE SULFATE 15 MG: 15 TABLET, EXTENDED RELEASE ORAL at 08:02

## 2017-06-15 RX ADMIN — MORPHINE SULFATE 15 MG: 15 TABLET, EXTENDED RELEASE ORAL at 20:00

## 2017-06-15 RX ADMIN — CLOPIDOGREL BISULFATE 75 MG: 75 TABLET, FILM COATED ORAL at 08:02

## 2017-06-15 RX ADMIN — ENOXAPARIN SODIUM 40 MG: 100 INJECTION SUBCUTANEOUS at 08:01

## 2017-06-15 ASSESSMENT — COGNITIVE AND FUNCTIONAL STATUS - GENERAL
CLIMB 3 TO 5 STEPS WITH RAILING: A LITTLE
TOILETING: A LITTLE
MOVING FROM LYING ON BACK TO SITTING ON SIDE OF FLAT BED: A LITTLE
DAILY ACTIVITIY SCORE: 19
MOBILITY SCORE: 18
DRESSING REGULAR LOWER BODY CLOTHING: A LITTLE
STANDING UP FROM CHAIR USING ARMS: A LITTLE
DRESSING REGULAR UPPER BODY CLOTHING: A LITTLE
SUGGESTED CMS G CODE MODIFIER DAILY ACTIVITY: CK
WALKING IN HOSPITAL ROOM: A LITTLE
HELP NEEDED FOR BATHING: A LITTLE
TURNING FROM BACK TO SIDE WHILE IN FLAT BAD: A LITTLE
PERSONAL GROOMING: A LITTLE
SUGGESTED CMS G CODE MODIFIER MOBILITY: CK
MOVING TO AND FROM BED TO CHAIR: A LITTLE

## 2017-06-15 ASSESSMENT — PAIN SCALES - GENERAL
PAINLEVEL_OUTOF10: 4
PAINLEVEL_OUTOF10: 5
PAINLEVEL_OUTOF10: 4
PAINLEVEL_OUTOF10: 6
PAINLEVEL_OUTOF10: 4
PAINLEVEL_OUTOF10: 5
PAINLEVEL_OUTOF10: 0
PAINLEVEL_OUTOF10: 3
PAINLEVEL_OUTOF10: 5
PAINLEVEL_OUTOF10: 2
PAINLEVEL_OUTOF10: 6
PAINLEVEL_OUTOF10: 5
PAINLEVEL_OUTOF10: 2
PAINLEVEL_OUTOF10: 7
PAINLEVEL_OUTOF10: 2
PAINLEVEL_OUTOF10: 3
PAINLEVEL_OUTOF10: 5

## 2017-06-15 ASSESSMENT — GAIT ASSESSMENTS
DEVIATION: BRADYKINETIC;SHUFFLED GAIT
GAIT LEVEL OF ASSIST: STAND BY ASSIST
ASSISTIVE DEVICE: WHEELCHAIR PUSH
DISTANCE (FEET): 300

## 2017-06-15 ASSESSMENT — ACTIVITIES OF DAILY LIVING (ADL): TOILETING: INDEPENDENT

## 2017-06-15 ASSESSMENT — LIFESTYLE VARIABLES: DO YOU DRINK ALCOHOL: NO

## 2017-06-15 NOTE — PROGRESS NOTES
MANDY Leong updated regarding patient's continued pain despite administration of all available medication aides.  Order received for MS Contin; order placed.

## 2017-06-15 NOTE — PROGRESS NOTES
Pulmonary Critical Care Progress Note        Date of service:  6/15/2017    Interval Events:  24 hour interval history reviewed  Reason for visit:  Atelectasis      Alert and oriented x4  Improved incisional chest pain  Improved IS - 1000  Alternating 3hr Norco 10 PRN and Oxy 10 PRN for pain relief.   SR  Good urine output   3L NC  No angina, palp, syncope or increased edema  No increased SOB.  Mild cough.  No sputum production  No N/V/P/D  No hematuria or dysuria   Off Epi    The complete AMA/CMS system review is performed and there are no other additional positive findings.        PFSH:  No change.    Respiratory:     Pulse Oximetry: 92 %  CXR with increased LLL opacification and left effusion  Few crackles at the bases - improved  No wheezing  3 L NC    Recent Labs      06/13/17   1202  06/13/17   1251  06/13/17   1345   ISTATAPH  7.387*  7.363*  7.374*   ISTATAPCO2  37.4*  36.1  37.0   ISTATAPO2  97*  82  66   ISTATATCO2  24  22  23   [id number]  98  96  92*   ISTATARTHCO3  22.5  20.5  21.6   ISTATARTBE  -2  -4  -3   ISTATTEMP  97.5 F  36.1 C  36.5 C   ISTATFIO2  100  100  40   ISTATSPEC  Arterial  Arterial  Arterial   ISTATAPHTC  7.396*  7.376*  7.381*   [id number]  94*  77  64       HemoDynamics:  Pulse: 83, Heart Rate (Monitored): 83  NIBP: 125/78 mmHg        SR  Epi off  No increased edema    Neuro:  Awake and alert  No focal weakness  Fully oriented    Fluids:  Intake/Output       06/13/17 0700 - 06/14/17 0659 06/14/17 0700 - 06/15/17 0659 06/15/17 0700 - 06/16/17 0659      7401-5100 2770-7482 Total 0289-5459 5745-3751 Total 9646-6066 7109-6742 Total       Intake    P.O.  480  960 1440  640  300 940  --  -- --    P.O.  640 300 940 -- -- --    I.V.  3606.9  757.3 4364.2  786.7  -- 786.7  --  -- --    Clevidipine Volume -- 1.1 1.1 -- -- -- -- -- --    Precedex Volume 62.6 86.6 149.2 -- -- -- -- -- --    Aminocaproic Acid Volume 250 -- 250 -- -- -- -- -- --    Insulin Volume 47.5 79.7 127.1 64.3  -- 64.3 -- -- --    Epinephrine Volume 6.9 -- 6.9 32.4 -- 32.4 -- -- --    IV Volume (Bolus) 600 -- 600 500 -- 500 -- -- --    IV Volume (NS TKO) 140 70 210 90 -- 90 -- -- --    Surgery Volume (Surgery Intake) 2300 -- 2300 -- -- -- -- -- --    IV Piggyback Volume (IV Piggyback) 200 520 720 100 -- 100 -- -- --    Enteral  --  -- --  --  30 30  --  -- --    Free Water / Tube Flush -- -- -- -- 30 30 -- -- --    Total Intake 4086.9 1717.3 5804.2 1426.7 330 1756.7 -- -- --       Output    Urine  1664  685 2349  290  1650 1940  --  -- --    Number of Times Voided -- -- -- -- 4 x 4 x -- -- --    Indwelling Cathether 6206 440 7517 290 -- 290 -- -- --    Void (ml) -- -- -- 0 1650 1650 -- -- --    Drains  120  150 270  70  -- 70  --  -- --    Mediastinal Chest Tube 1 120 150 270 70 -- 70 -- -- --    Stool  --  -- --  --  -- --  --  -- --    Number of Times Stooled 0 x -- 0 x -- -- -- -- -- --    Total Output 9830 692 6868 360 1650 2010 -- -- --       Net I/O     2302.9 882.3 3185.2 1066.7 -1320 -253.3 -- -- --        Weight: 88.7 kg (195 lb 8.8 oz)  Recent Labs      06/12/17   1405  06/13/17   1200  06/13/17   1740  06/13/17   2300  06/14/17   0500   SODIUM  138   --    --    --   131*   POTASSIUM  3.7  4.5  3.8  4.0  4.0   CHLORIDE  101   --    --    --   103   CO2  25   --    --    --   20   BUN  13   --    --    --   14   CREATININE  0.71   --    --    --   0.49*   MAGNESIUM   --   2.2   --    --    --    CALCIUM  9.3   --    --    --   7.7*       GI/Nutrition:  Abd soft ND/NT    Liver Function  Recent Labs      06/12/17   1405  06/14/17   0500   ALTSGPT  16   --    ASTSGOT  15   --    ALKPHOSPHAT  89   --    TBILIRUBIN  1.7*   --    GLUCOSE  118*  101*       Heme:  Recent Labs      06/12/17   1405   06/13/17   1200  06/14/17   0500  06/15/17   0521   RBC  4.26*   --    --   3.13*  3.25*   HEMOGLOBIN  14.5   --    --   10.8*  11.1*   HEMATOCRIT  41.3*   --    --   30.7*  31.7*   PLATELETCT  246   < >  150*  167  183    PROTHROMBTM  13.4   --   17.2*   --    --    APTT  30.9   --   32.4   --    --    INR  0.99   --   1.36*   --    --     < > = values in this interval not displayed.       Infectious Disease:  Temp  Av.1 °C (98.7 °F)  Min: 36.7 °C (98 °F)  Max: 37.3 °C (99.1 °F)    Recent Labs      17   1405  17   0500  06/15/17   0521   WBC  10.5  12.7*  12.4*   NEUTSPOLYS  73.00*   --    --    LYMPHOCYTES  17.90*   --    --    MONOCYTES  7.50   --    --    EOSINOPHILS  0.00   --    --    BASOPHILS  0.80   --    --    ASTSGOT  15   --    --    ALTSGPT  16   --    --    ALKPHOSPHAT  89   --    --    TBILIRUBIN  1.7*   --    --      Current Facility-Administered Medications   Medication Dose Frequency Provider Last Rate Last Dose   • insulin NPH (HUMULIN,NOVOLIN) injection 9 Units  9 Units Q8HRS Abril Leong, A.P.N.   9 Units at 06/15/17 0532   • insulin lispro (HUMALOG) injection 6 Units  6 Units TID AC Abril Jimenezt, A.P.N.   Stopped at 17 1700   • insulin lispro (HUMALOG) injection 0-15 Units  0-15 Units ACHS & 0200 Abril Jimenezt, A.P.N.   2 Units at 06/15/17 0531   • morphine ER (MS CONTIN) tablet 15 mg  15 mg Q12HRS Abril Leong, A.P.N.   15 mg at 17 1740   • lidocaine-prilocaine (EMLA) 2.5-2.5 % cream 1 Application  1 Application Once PRN Gray Barboza M.D.        Or   • lidocaine (XYLOCAINE) 1%  injection  0.5 mL Once PRN Gray Barboza M.D.       • atorvastatin (LIPITOR) tablet 80 mg  80 mg QHS Abril Leong, A.P.N.   80 mg at 17 1959   • Respiratory Care per Protocol   Continuous RT Abril Leong, A.P.N.       • NS infusion   Continuous Abril Leong A.P.N.   Stopped at 17 1600   • Pharmacy Consult Request ...Pain Management Review 1 Each  1 Each PRN CASPER Kamara.P.N.       • docusate sodium (COLACE) capsule 100 mg  100 mg QAM Abril Leong A.P.N.   100 mg at 17 0748    And   • senna-docusate (PERICOLACE or SENOKOT S) 8.6-50 MG per tablet 1 Tab  1  Tab Nightly Abril Leong, A.P.N.   1 Tab at 06/14/17 2100    And   • senna-docusate (PERICOLACE or SENOKOT S) 8.6-50 MG per tablet 1 Tab  1 Tab Q24HRS PRN Abril Leong, A.P.N.        And   • lactulose 20 GM/30ML solution 30 mL  30 mL Q24HRS PRN Abril Leong, A.P.N.        And   • bisacodyl (DULCOLAX) suppository 10 mg  10 mg Q24HRS PRN Abril Leong, A.P.N.        And   • fleet enema 133 mL  1 Each Once PRN Abril Leong, A.P.N.       • enoxaparin (LOVENOX) inj 40 mg  40 mg DAILY Abril Leong, A.P.N.   40 mg at 06/14/17 2002   • mupirocin (BACTROBAN) 2 % ointment 1 Application  1 Application BID Abril Leong, A.P.N.   1 Application at 06/14/17 2001   • magnesium sulfate ivpb premix 1 g  1 g QDAY Abril Leong, A.P.N.   Stopped at 06/14/17 1305   • metoprolol (LOPRESSOR) tablet 12.5 mg  12.5 mg BID Abril Leong, A.P.N.   12.5 mg at 06/14/17 1959    Followed by   • metoprolol (LOPRESSOR) tablet 25 mg  25 mg BID Abril Leong, A.P.N.       • aspirin EC (ECOTRIN) tablet 81 mg  81 mg DAILY Abril Leong, A.P.N.   81 mg at 06/14/17 0748   • clopidogrel (PLAVIX) tablet 75 mg  75 mg DAILY Abril Leong, A.P.N.   75 mg at 06/14/17 0748   • electrolyte-A (PLASMALYTE-A) infusion   PRN Abril Leong, A.P.N.   1,000 mL at 06/13/17 1432   • oxycodone immediate release (ROXICODONE) tablet 5 mg  5 mg Q3HRS PRN Abril Leong, A.P.N.       • oxycodone immediate release (ROXICODONE) tablet 10 mg  10 mg Q3HRS PRN Abril L Dang, A.P.N.   10 mg at 06/15/17 0512   • tramadol (ULTRAM) 50 MG tablet 50 mg  50 mg Q4HRS PRN Abril L Dang, A.P.N.   50 mg at 06/14/17 0939   • ondansetron (ZOFRAN) syringe/vial injection 4 mg  4 mg Q6HRS PRN Abril L Dang, A.P.N.        Or   • prochlorperazine (COMPAZINE) injection 10 mg  10 mg Q6HRS PRN Abril L Dang, A.P.N.        Or   • promethazine (PHENERGAN) suppository 25 mg  25 mg Q6HRS PRN Abril L Dang, A.P.N.       • acetaminophen (TYLENOL) tablet 650 mg  650  mg Q4HRS PRN Abril L Dang, A.P.N.        Or   • acetaminophen (TYLENOL) suppository 650 mg  650 mg Q4HRS PRN Abril L Dang, A.P.N.       • mag hydrox-al hydrox-simeth (MAALOX PLUS ES or MYLANTA DS) suspension 30 mL  30 mL Q4HRS PRN Abril L Dang, A.P.N.       • diphenhydrAMINE (BENADRYL) tablet/capsule 25 mg  25 mg HS PRN - MR X 1 Abril L Dang, A.P.N.   25 mg at 06/14/17 2011   • hydrocodone-acetaminophen (NORCO) 5-325 MG per tablet 1-2 Tab  1-2 Tab Q6HRS PRN Abril L Dang, A.P.N.   2 Tab at 06/15/17 0202   • dextrose 50% (D50W) injection 25 mL  25 mL PRN Christi Perkins, PHARMD         Last reviewed on 6/13/2017  5:42 AM by Helen Rodirguez R.N.    Quality  Measures:  Labs reviewed, Radiology images reviewed and Medications reviewed  Cason catheter: Critically Ill - Requiring Accurate Measurement of Urinary Output  Central line in place: Need for access    DVT Prophylaxis: Contraindicated - High bleeding risk  DVT prophylaxis - mechanical: SCDs  Ulcer prophylaxis: Yes            Assessment and Plan:    Atelectasis   - cont IS, PEP   - pain control  Hypotension   - resolved  Status post 2-vessel coronary artery bypass grafting   - ASA, statin, Plavix, BB  Chronic diastolic heart failure   - grade I diastolic dysfunction   - compensated  History of systemic arterial hypertension  Dyslipidemia - statin  History of tobacco abuse   - no underlying history of lung disease    Improved pain control and respiratory status.  Increase activity.    Discussed with RN, RT, Team    I, Bailee Millan (Scribe), am scribing for, and in the presence of, Luis F Alexander M.D.    Electronically signed by: Bailee Millan (Deisi), 6/15/2017    ILuis F M.D. personally performed the services described in this documentation, as scribed by Bailee Millan in my presence, and it is both accurate and complete.

## 2017-06-15 NOTE — CARE PLAN
Problem: Hyperinflation:  Goal: Prevent or improve atelectasis  Outcome: PROGRESSING AS EXPECTED  Pt is achieving 900-1000 mL on IS. 60% predicted is 2070 mL. PEP is ordered QID.

## 2017-06-15 NOTE — THERAPY
"Occupational Therapy Evaluation completed.   Functional Status:  Pt seen today for OT evaluation. Pt currently at SBA level for functional mobility with no AD for short distances within the room. Pt able to void and groom in stance with SBA. Supv to don/doff socks in long sitting, however pt SOB after this activity and needed rest break. Educated pt on sternal precautions, compensatory strategies, appropriate AE/DME, and energy conservation techs. Pt with no social support as his girlfriend passed away 3 weeks ago, looking to go to rehab for a short term stay so he can be in best shape to be independent at home. Pt limited by fatigue which impacts independence in ADLs and functional mobility.   Plan of Care: Will benefit from Occupational Therapy 3 times per week  Discharge Recommendations:  Equipment: Will Continue to Assess for Equipment Needs. Post-acute therapy Discharge to a transitional care facility for continued skilled therapy services.    See \"Rehab Therapy-Acute\" Patient Summary Report for complete documentation.    "

## 2017-06-15 NOTE — CARE PLAN
Problem: Nutritional:  Goal: Patient to verbalize or demonstrate understanding of diet  Outcome: MET Date Met:  06/15/17  Pt declined cardiac diet education and handout. Pt states he has had it before. Pt had no questions regarding diet

## 2017-06-15 NOTE — DISCHARGE PLANNING
"57 y/o single male.   Resides alone.   GF passed 3 weeks ago.  Cancer.   Discussed grief support.  He will consider.  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  Many family members passed away from cancer.  \"It's a horrible disease.\"  Has 2 sisters [facility].  No close friends or neighbors.   Ground floor resident.  IPTA.       No DME at home.      [facility]      Will need PCP established prior to DC from Rehab or skilled.       "

## 2017-06-15 NOTE — PROGRESS NOTES
Monitor Summary:    Sinus rhythm at rates of 70-88    0.12 / 0.08 / 0.38      12h chart check and report given to oncoming RN.    Shift Summary:  Pt continues to c/o intolerable pain, additional pain medication orders received from APRN.

## 2017-06-15 NOTE — CARE PLAN
Problem: Post Op Day 1 CABG/Heart Valve Replacement  Goal: Optimal care of the post op CABG/heart valve replacement Post Op Day 1  Intervention: EKG and CXR completed  Previously completed      Intervention: Antibiotics are discontinued within 24 hours of anesthesia end time unless indication documented for continuation beyond 24 hours  Antibiotics d/c'd   Intervention: Daily Weights  Pt weighed via stand-up scale- 92.1kg   Intervention: Up in chair for all meals  Pt in chair for breakfast   Intervention: Ambulate in am if stable. First ambulation is 25 feet. Repeat x 3 as tolerated. Ambulate again before bed.  Pt ambulated 100ft wheelchair push, tolerated well   Intervention: Discontinue shipman catheter unless documented reason for continuation  Shipman previously d/c'd   Intervention: Remove original surgical dressing after 24 hrs, leave open to air unless otherwise specified by physician  Midline prevena in place, R EVH site open to air   Intervention: Consider chest tube removal by MD  CT previously removed   Intervention: IS q 1 hour while awake and record best IS volume  IS encouraged- best value 1000   Intervention: Graduated elastic stockings  Pietro Hose on pt   Intervention: Saline lock IV  IV SL   Intervention: Transfer to tele status, begin VS q 4 hours  Pt tele status   Intervention: After 24th hour post-anesthesia end time, transition patient to Cardiac Surgery SQ Insulin Protocol  Pt transitioned to subQ Insulin protocol, highest FSBS 135 throughout night   Intervention: If patient is CABG or on home beta-blocker, start Beta-Blocker on POD 1 or POD 2 or contraindication documented  Metoprolol administered per order

## 2017-06-15 NOTE — THERAPY
"Physical Therapy Evaluation completed.   Bed Mobility:  Supine to Sit:  (declined to practice at this time)  Transfers: Sit to Stand: Stand by Assist (w/o UEs)  Gait: Level Of Assist: Stand by Assist with Wheelchair       Plan of Care: Will benefit from Physical Therapy 3 times per week  Discharge Recommendations: Equipment: Front-Wheel Walker. Post-acute therapy Discharge to a transitional care facility for continued skilled therapy services.    See \"Rehab Therapy-Acute\" Patient Summary Report for complete documentation.   Pt presents w/ decreased activity to[place], decreased knowledge of precatuions and pain limiting functional mobility. pt will benefit from skilled acute PT services. pt provided and educated on [facility] packet. Discussed RPE, talk test, HR taking, cardiac rehab, ,obiltiy while in hosptial, mobility at home. pt feels he would like to go to facility prior to d/c home since he has no socail support in the community and is dealing with some depression from girlfriend passesing away recently. pt left in chair after session. Rn notified of sessino and mobility.   "

## 2017-06-16 ENCOUNTER — APPOINTMENT (OUTPATIENT)
Dept: RADIOLOGY | Facility: MEDICAL CENTER | Age: 57
DRG: 236 | End: 2017-06-16
Attending: NURSE PRACTITIONER
Payer: COMMERCIAL

## 2017-06-16 LAB
ANION GAP SERPL CALC-SCNC: 7 MMOL/L (ref 0–11.9)
BUN SERPL-MCNC: 11 MG/DL (ref 8–22)
CALCIUM SERPL-MCNC: 8.4 MG/DL (ref 8.5–10.5)
CHLORIDE SERPL-SCNC: 105 MMOL/L (ref 96–112)
CO2 SERPL-SCNC: 24 MMOL/L (ref 20–33)
CREAT SERPL-MCNC: 0.57 MG/DL (ref 0.5–1.4)
ERYTHROCYTE [DISTWIDTH] IN BLOOD BY AUTOMATED COUNT: 44.4 FL (ref 35.9–50)
GFR SERPL CREATININE-BSD FRML MDRD: >60 ML/MIN/1.73 M 2
GLUCOSE SERPL-MCNC: 127 MG/DL (ref 65–99)
HCT VFR BLD AUTO: 29.5 % (ref 42–52)
HGB BLD-MCNC: 10.1 G/DL (ref 14–18)
MCH RBC QN AUTO: 34.2 PG (ref 27–33)
MCHC RBC AUTO-ENTMCNC: 34.2 G/DL (ref 33.7–35.3)
MCV RBC AUTO: 100 FL (ref 81.4–97.8)
PLATELET # BLD AUTO: 151 K/UL (ref 164–446)
PMV BLD AUTO: 10.3 FL (ref 9–12.9)
POTASSIUM SERPL-SCNC: 4.2 MMOL/L (ref 3.6–5.5)
RBC # BLD AUTO: 2.95 M/UL (ref 4.7–6.1)
SODIUM SERPL-SCNC: 136 MMOL/L (ref 135–145)
WBC # BLD AUTO: 10.2 K/UL (ref 4.8–10.8)

## 2017-06-16 PROCEDURE — 700112 HCHG RX REV CODE 229: Performed by: NURSE PRACTITIONER

## 2017-06-16 PROCEDURE — 99233 SBSQ HOSP IP/OBS HIGH 50: CPT | Performed by: INTERNAL MEDICINE

## 2017-06-16 PROCEDURE — 700102 HCHG RX REV CODE 250 W/ 637 OVERRIDE(OP): Performed by: NURSE PRACTITIONER

## 2017-06-16 PROCEDURE — 80048 BASIC METABOLIC PNL TOTAL CA: CPT

## 2017-06-16 PROCEDURE — 700111 HCHG RX REV CODE 636 W/ 250 OVERRIDE (IP): Performed by: NURSE PRACTITIONER

## 2017-06-16 PROCEDURE — 71020 DX-CHEST-2 VIEWS: CPT

## 2017-06-16 PROCEDURE — 770020 HCHG ROOM/CARE - TELE (206)

## 2017-06-16 PROCEDURE — A9270 NON-COVERED ITEM OR SERVICE: HCPCS | Performed by: NURSE PRACTITIONER

## 2017-06-16 PROCEDURE — 85027 COMPLETE CBC AUTOMATED: CPT

## 2017-06-16 PROCEDURE — 94668 MNPJ CHEST WALL SBSQ: CPT

## 2017-06-16 RX ORDER — FUROSEMIDE 10 MG/ML
20 INJECTION INTRAMUSCULAR; INTRAVENOUS
Status: DISCONTINUED | OUTPATIENT
Start: 2017-06-16 | End: 2017-06-19 | Stop reason: HOSPADM

## 2017-06-16 RX ORDER — POTASSIUM CHLORIDE 750 MG/1
10 TABLET, FILM COATED, EXTENDED RELEASE ORAL 2 TIMES DAILY
Status: DISCONTINUED | OUTPATIENT
Start: 2017-06-16 | End: 2017-06-17

## 2017-06-16 RX ADMIN — MUPIROCIN 1 APPLICATION: 20 OINTMENT TOPICAL at 21:04

## 2017-06-16 RX ADMIN — DOCUSATE SODIUM 100 MG: 100 CAPSULE ORAL at 08:33

## 2017-06-16 RX ADMIN — METOPROLOL TARTRATE 25 MG: 25 TABLET, FILM COATED ORAL at 08:33

## 2017-06-16 RX ADMIN — HYDROCODONE BITARTRATE AND ACETAMINOPHEN 2 TABLET: 5; 325 TABLET ORAL at 20:04

## 2017-06-16 RX ADMIN — OXYCODONE HYDROCHLORIDE 10 MG: 10 TABLET ORAL at 10:58

## 2017-06-16 RX ADMIN — OXYCODONE HYDROCHLORIDE 10 MG: 10 TABLET ORAL at 05:55

## 2017-06-16 RX ADMIN — METOPROLOL TARTRATE 25 MG: 25 TABLET, FILM COATED ORAL at 21:04

## 2017-06-16 RX ADMIN — HYDROCODONE BITARTRATE AND ACETAMINOPHEN 2 TABLET: 5; 325 TABLET ORAL at 13:58

## 2017-06-16 RX ADMIN — MORPHINE SULFATE 15 MG: 15 TABLET, EXTENDED RELEASE ORAL at 08:33

## 2017-06-16 RX ADMIN — ENOXAPARIN SODIUM 40 MG: 100 INJECTION SUBCUTANEOUS at 08:33

## 2017-06-16 RX ADMIN — MORPHINE SULFATE 15 MG: 15 TABLET, EXTENDED RELEASE ORAL at 21:03

## 2017-06-16 RX ADMIN — FUROSEMIDE 20 MG: 10 INJECTION, SOLUTION INTRAVENOUS at 13:59

## 2017-06-16 RX ADMIN — MUPIROCIN 1 APPLICATION: 20 OINTMENT TOPICAL at 08:33

## 2017-06-16 RX ADMIN — ATORVASTATIN CALCIUM 80 MG: 20 TABLET, FILM COATED ORAL at 21:03

## 2017-06-16 RX ADMIN — POTASSIUM CHLORIDE 10 MEQ: 750 TABLET, FILM COATED, EXTENDED RELEASE ORAL at 13:59

## 2017-06-16 RX ADMIN — POTASSIUM CHLORIDE 10 MEQ: 750 TABLET, FILM COATED, EXTENDED RELEASE ORAL at 21:03

## 2017-06-16 RX ADMIN — HYDROCODONE BITARTRATE AND ACETAMINOPHEN 2 TABLET: 5; 325 TABLET ORAL at 07:27

## 2017-06-16 RX ADMIN — ASPIRIN 81 MG: 81 TABLET ORAL at 08:33

## 2017-06-16 RX ADMIN — CLOPIDOGREL BISULFATE 75 MG: 75 TABLET, FILM COATED ORAL at 08:33

## 2017-06-16 ASSESSMENT — ENCOUNTER SYMPTOMS
GASTROINTESTINAL NEGATIVE: 1
NEUROLOGICAL NEGATIVE: 1
CONSTITUTIONAL NEGATIVE: 1
RESPIRATORY NEGATIVE: 1
EYES NEGATIVE: 1
CARDIOVASCULAR NEGATIVE: 1
MUSCULOSKELETAL NEGATIVE: 1
PSYCHIATRIC NEGATIVE: 1

## 2017-06-16 ASSESSMENT — PAIN SCALES - GENERAL
PAINLEVEL_OUTOF10: 6
PAINLEVEL_OUTOF10: 6
PAINLEVEL_OUTOF10: 2
PAINLEVEL_OUTOF10: 4
PAINLEVEL_OUTOF10: 6
PAINLEVEL_OUTOF10: 3
PAINLEVEL_OUTOF10: 4
PAINLEVEL_OUTOF10: 6
PAINLEVEL_OUTOF10: 4
PAINLEVEL_OUTOF10: 2
PAINLEVEL_OUTOF10: 4
PAINLEVEL_OUTOF10: 6
PAINLEVEL_OUTOF10: 4

## 2017-06-16 NOTE — DISCHARGE PLANNING
PMR referral from Beto [facility]      Cardiac debility s/p Coronary artery bypass grafting x2, DOS 06/13/2017. Therapy notes reviewed SBA assist with mobility and ADL's , limited endurance.This indicates limited therapy need. Anticipate [facility] will not authorize an IRF level of care to facilitate a discharge home alone. Current documentation does not support CMS criteria for IRF level of care. Recommend skilled nursing if unable to return home with outpatient support. No physiatry consult forwarded per protocol. ONEL Don aware.   Thank you for the opportunioy to participate in this patients care as he prepares to transition to post acute services.

## 2017-06-16 NOTE — PROGRESS NOTES
Cardiovascular Surgery Progress Note    Name: Jose A Ponce  MRN: [id number]  : 1960  Admit Date: 2017  5:08 AM  Procedure:  Procedure(s) and Anesthesia Type:     * MULTIPLE CORONARY ARTERY BYPASS x2 RIGHT LEG ENDO VEIN HARVEST - General     * HUBERT - INTRAOP - General  3 Day Post-Op    Vitals:  Patient Vitals for the past 8 hrs:   Temp SpO2 O2 Delivery O2 (LPM) Pulse Heart Rate (Monitored) Resp NIBP Weight   17 0700 - 93 % - 3 - 99 (!) 21 - -   17 0600 - - - - (!) 101 (!) 106 (!) 23 126/70 mmHg -   17 0555 - 93 % - 3 - - - - -   17 0500 - 91 % - - 95 95 16 - -   17 0400 38 °C (100.4 °F) 93 % - - 92 93 16 116/73 mmHg 86.3 kg (190 lb 4.1 oz)   17 0300 - 93 % - - 91 91 14 - -   17 0200 - 91 % Silicone Nasal Cannula 3 93 93 17 107/69 mmHg -   17 0100 - 95 % - - 87 87 14 121/73 mmHg -     Temp (24hrs), Av.1 °C (100.5 °F), Min:37.6 °C (99.7 °F), Max:38.5 °C (101.3 °F)      Respiratory:    Respiration: (!) 21, Pulse Oximetry: 93 %, O2 Daily Delivery Respiratory : Silicone Nasal Cannula     Chest Tube Drains:          Fluids:    Intake/Output Summary (Last 24 hours) at 17 0824  Last data filed at 17 0400   Gross per 24 hour   Intake    905 ml   Output   3825 ml   Net  -2920 ml     Admit weight: Weight: 80.4 kg (177 lb 4 oz)  Current weight: Weight: 86.3 kg (190 lb 4.1 oz) (17 0400)    Labs:  Recent Labs      17   0500  06/15/17   0521  17   0415   WBC  12.7*  12.4*  10.2   RBC  3.13*  3.25*  2.95*   HEMOGLOBIN  10.8*  11.1*  10.1*   HEMATOCRIT  30.7*  31.7*  29.5*   MCV  98.1*  97.5  100.0*   MCH  34.5*  34.2*  34.2*   MCHC  35.2  35.0  34.2   RDW  41.2  42.5  44.4   PLATELETCT  167  183  151*   MPV  10.6  10.0  10.3         Recent Labs      17   0500  06/15/17   0521  17   0415   SODIUM  131*  124*  136   POTASSIUM  4.0  4.0  4.2   CHLORIDE  103  94*  105   CO2  20  24  24   GLUCOSE  101*  108*  127*   BUN  14  12  11    CREATININE  0.49*  0.51  0.57   CALCIUM  7.7*  8.1*  8.4*     Recent Labs      06/13/17   1200   APTT  32.4   INR  1.36*       Medications:  • morphine ER  15 mg     • atorvastatin  80 mg     • docusate sodium  100 mg      And   • senna-docusate  1 Tab     • enoxaparin  40 mg     • mupirocin  1 Application     • metoprolol  25 mg     • aspirin EC  81 mg     • clopidogrel  75 mg         Exam:   Review of Systems   Constitutional: Negative.    HENT: Negative.    Eyes: Negative.    Respiratory: Negative.    Cardiovascular: Negative.    Gastrointestinal: Negative.    Genitourinary: Negative.    Musculoskeletal: Negative.    Skin: Negative.    Neurological: Negative.    Psychiatric/Behavioral: Negative.        Physical Exam   Constitutional: He is oriented to person, place, and time. He appears well-developed and well-nourished.   HENT:   Head: Normocephalic and atraumatic.   Eyes: Pupils are equal, round, and reactive to light.   Neck: Normal range of motion. Neck supple.   Cardiovascular: Normal rate and regular rhythm.    Pulmonary/Chest: Effort normal.   Decreased at bases.    Abdominal: Soft. Bowel sounds are normal.   Musculoskeletal: Normal range of motion.   Neurological: He is alert and oriented to person, place, and time.   Skin: Skin is warm and dry.   Wounds CDI   Psychiatric: He has a normal mood and affect.       Quality Measures:   EKG reviewed, Medications reviewed, Radiology images reviewed and Labs reviewed  Shipman catheter: No Shipman  Central line in place: Need for access    DVT Prophylaxis: Enoxaparin (Lovenox)  DVT prophylaxis - mechanical: SCDs  Ulcer prophylaxis: Yes    Assessed for rehab: Patient returned to prior level of function, rehabilitation not indicated at this time      Assessment/Plan:  POD 1 Extubated, HDS, no drips, chest tube output minimal and negative for air leak.  Neuro grossly intact.  Plan:  DC chest tubes and shipman.  Transition to tele status.   POD 2 HDS, SR, neuro intact,  wounds CDI, prevena not working.  Will try resealing it.  IS/Amb.  CPM.  POD 3 HDS, NSR/ST.  Pain better controlled.  Prevena dressing with leak--DC, wash incisions soap/water, leave open to air.  3 L NC. WTs up, start diuresis. 2 view CXR today.  Plan for Rehab in AM.  CPM.      Active Hospital Problems    Diagnosis   • CAD (coronary artery disease) [I25.10]

## 2017-06-16 NOTE — DISCHARGE PLANNING
Rehab order received.  Will folow.  Therapy on board.  [place].  Live alone.  Recently .  3 weeks ago.  GF of 7 years.

## 2017-06-16 NOTE — PROGRESS NOTES
Received report from Tanvi MEYER; assumed patient care.  Assessment complete, A & O x 4, VSS, c/o sternal incision pain, MD ordered pain medication provided.  Updated on POC, verbalizes understanding.      Pt up self with reminders to avoid using arms for support in standing; demonstrates effective self standing without arm use.  Up to bedside chair, call light within reach, frequent rounding in effect.

## 2017-06-16 NOTE — CARE PLAN
Problem: Post op day 2 CABG/Heart Valve Replacement  Goal: Optimal care of the post op CABG/heart valve replacement post op day 2  Intervention: FSBS: when 2 consecutive BS < 130 after post op day 2, discontinue FSBS unless patient is insulin dependent diabetic  done  Intervention: Daily Weights  done  Intervention: Up in chair for all meals  Done- up for breakfast  Intervention: Ambulate, increasing the distance each time x 3 and before bed  Done- ambulates steady  Intervention: Stand at sink and wash up with assistance. Clean incisions twice daily with soap and water.  prevena in place  Intervention: IS q 1 hour while awake and record best IS volume  Done 1000  Intervention: Consider pacer wire removal by MD  done  Intervention: Consider removal of shipman and chest tube if not already done  done

## 2017-06-16 NOTE — CARE PLAN
Problem: Post Op Day 3 CABG/Heart Valve replacement  Goal: Optimal care of the post op CABG/Heart Valve replacement post op day 3  Outcome: PROGRESSING AS EXPECTED  Intervention: Daily Weights  Done by NOC RN at stand-up scale  Intervention: Shower daily and clean incisions twice daily with soap and water  Pt up self to shower, shaving materials also provided.  Prevena dressing removed per APRN due to leak; incision cleaned with soap and water  Intervention: Up in chair for all meals  Up to chair for all meals thus far.  Intervention: Ambulate, increasing the distance each time x 3 and before bed  Pt up self; ambulating in hallway around unit.  Intervention: IS q 1 hour while awake and record best IS volume  Frequent IS use encouraged; max IS 1250  Intervention: Consider removal of shipman, chest tube and pacer wire if not already done  Previously removed  Intervention: Case management and  for discharge needs  CM/SS working on possible SNF placement for discharge support needs  Intervention: Discharge planning (See discharge barriers/planning problem on care plan)  Possible d/c tomorrow if SNF placement can be obtained.

## 2017-06-16 NOTE — CARE PLAN
Problem: Post op day 2 CABG/Heart Valve Replacement  Goal: Optimal care of the post op CABG/heart valve replacement post op day 2  Intervention: FSBS: when 2 consecutive BS < 130 after post op day 2, discontinue FSBS unless patient is insulin dependent diabetic  Done  Intervention: Daily Weights  Done at night  Intervention: Up in chair for all meals  Done  Intervention: Ambulate, increasing the distance each time x 3 and before bed  Done  Intervention: Stand at sink and wash up with assistance. Clean incisions twice daily with soap and water.  Done      Intervention: IS q 1 hour while awake and record best IS volume  Best IS 1000      Intervention: Consider pacer wire removal by MD  Already done  Intervention: Consider removal of shipman and chest tube if not already done  Both of these d/c'd

## 2017-06-16 NOTE — PROGRESS NOTES
Pulmonary Critical Care Progress Note        Date of service:  6/16/2017    Interval Events:  24 hour interval history reviewed  Reason for visit:  Atelectasis, small right apical PTX      Alert and oriented x4  Incisional pain inproved  SR/ST, -130 systolic  Good urine output  Mobilized to chair and tolerated well.  On 2 L NC, incentive spirometry 1000 mL   No angina, palp, syncope or increased edema  No increased SOB.  Mild cough.  No sputum production  No N/V/P/D  No hematuria or dysuria     The complete AMA/CMS system review is performed and there are no other additional positive findings.        PFSH:  No change.    Respiratory:     Pulse Oximetry: 93 %  CXR with improved LLL opacification, tiny right apical PTX and bibasilar SSA  Few crackles at the bases - improved  No wheezing  2 L NC    Recent Labs      06/13/17   1202  06/13/17   1251  06/13/17   1345   ISTATAPH  7.387*  7.363*  7.374*   ISTATAPCO2  37.4*  36.1  37.0   ISTATAPO2  97*  82  66   ISTATATCO2  24  22  23   SYCTGYJ1ZAY  98  96  92*   ISTATARTHCO3  22.5  20.5  21.6   ISTATARTBE  -2  -4  -3   ISTATTEMP  97.5 F  36.1 C  36.5 C   ISTATFIO2  100  100  40   ISTATSPEC  Arterial  Arterial  Arterial   ISTATAPHTC  7.396*  7.376*  7.381*   [id number]  94*  77  64       HemoDynamics:  Pulse: 92, Heart Rate (Monitored): 93  NIBP: 116/73 mmHg        SR  No increased edema    Neuro:  Awake and alert  No focal weakness  Fully oriented    Fluids:  Intake/Output       06/14/17 0700 - 06/15/17 0659 06/15/17 0700 - 06/16/17 0659 06/16/17 0700 - 06/17/17 0659      7538-6680 1269-9805 Total 5706-0660 9973-8645 Total 7690-8225 3696-9804 Total       Intake    P.O.  640  400 1040  1045  60 1105  --  -- --    P.O.  1045 60 1105 -- -- --    I.V.  786.7  -- 786.7  --  -- --  --  -- --    Insulin Volume 64.3 -- 64.3 -- -- -- -- -- --    Epinephrine Volume 32.4 -- 32.4 -- -- -- -- -- --    IV Volume (Bolus) 500 -- 500 -- -- -- -- -- --    IV Volume (NS  TKO) 90 -- 90 -- -- -- -- -- --    IV Piggyback Volume (IV Piggyback) 100 -- 100 -- -- -- -- -- --    Enteral  --  30 30  --  -- --  --  -- --    Free Water / Tube Flush -- 30 30 -- -- -- -- -- --    Total Intake 1426.7 430 1856.7 1045 60 1105 -- -- --       Output    Urine  290  1650 1940  187  1300 3175  --  -- --    Number of Times Voided -- 4 x 4 x 6 x 1 x 7 x -- -- --    Indwelling Cathether 290 -- 290 -- -- -- -- -- --    Void (ml) 0 1650 1650 1875 1300 3175 -- -- --    Drains  70  -- 70  --  -- --  --  -- --    Mediastinal Chest Tube 1 70 -- 70 -- -- -- -- -- --    Stool  --  -- --  --  -- --  --  -- --    Number of Times Stooled -- -- -- 2 x -- 2 x -- -- --    Total Output 360 1650  1875 1300 3175 -- -- --       Net I/O     1066.7 -1220 -153.3 -830 -1240 -2070 -- -- --           Recent Labs      17   0500  06/15/17   0517   0415   SODIUM   --    --   131*  124*  136   POTASSIUM  4.5   < >  4.0  4.0  4.2   CHLORIDE   --    --   103  94*  105   CO2   --    --   20  24  24   BUN   --    --   14  12  11   CREATININE   --    --   0.49*  0.51  0.57   MAGNESIUM  2.2   --    --    --    --    CALCIUM   --    --   7.7*  8.1*  8.4*    < > = values in this interval not displayed.       GI/Nutrition:  Abd soft ND/NT    Liver Function  Recent Labs      17   0500  06/15/17   0521  17   0415   GLUCOSE  101*  108*  127*       Heme:  Recent Labs      17   1200  17   0500  06/15/17   0521  06/16/17   0415   RBC   --   3.13*  3.25*  2.95*   HEMOGLOBIN   --   10.8*  11.1*  10.1*   HEMATOCRIT   --   30.7*  31.7*  29.5*   PLATELETCT  150*  167  183  151*   PROTHROMBTM  17.2*   --    --    --    APTT  32.4   --    --    --    INR  1.36*   --    --    --        Infectious Disease:  Temp  Av.9 °C (100.2 °F)  Min: 37.2 °C (99 °F)  Max: 38.5 °C (101.3 °F)    Recent Labs      17   0500  06/15/17   0521  06/16/17   0415   WBC  12.7*  12.4*  10.2     Current  Facility-Administered Medications   Medication Dose Frequency Provider Last Rate Last Dose   • morphine ER (MS CONTIN) tablet 15 mg  15 mg Q12HRS Abril Jimenezt, A.P.N.   15 mg at 06/15/17 2000   • lidocaine-prilocaine (EMLA) 2.5-2.5 % cream 1 Application  1 Application Once PRN Gray Barboza M.D.        Or   • lidocaine (XYLOCAINE) 1%  injection  0.5 mL Once PRN Gray Barboza M.D.       • atorvastatin (LIPITOR) tablet 80 mg  80 mg QHS Abril Jimenezt, A.P.N.   80 mg at 06/15/17 1959   • Respiratory Care per Protocol   Continuous RT Abril Leong, A.P.N.       • NS infusion   Continuous Abril Jimenezt, A.P.N.   Stopped at 06/14/17 1600   • Pharmacy Consult Request ...Pain Management Review 1 Each  1 Each PRN Abril Jimenezt, A.P.N.       • docusate sodium (COLACE) capsule 100 mg  100 mg QAM Abril Leong, A.P.N.   100 mg at 06/15/17 0802    And   • senna-docusate (PERICOLACE or SENOKOT S) 8.6-50 MG per tablet 1 Tab  1 Tab Nightly Abril Jimenezt, A.P.N.   Stopped at 06/15/17 2100    And   • senna-docusate (PERICOLACE or SENOKOT S) 8.6-50 MG per tablet 1 Tab  1 Tab Q24HRS PRN Abril Jimenezt, A.P.N.        And   • lactulose 20 GM/30ML solution 30 mL  30 mL Q24HRS PRN Abril Jimenezt, A.P.N.        And   • bisacodyl (DULCOLAX) suppository 10 mg  10 mg Q24HRS PRN Abril Jimenezt, A.P.N.        And   • fleet enema 133 mL  1 Each Once PRN Abril Jimenezt, A.P.N.       • enoxaparin (LOVENOX) inj 40 mg  40 mg DAILY Abril Leong, A.P.N.   40 mg at 06/15/17 0801   • mupirocin (BACTROBAN) 2 % ointment 1 Application  1 Application BID Abril Jimenezt, A.P.N.   1 Application at 06/15/17 2001   • metoprolol (LOPRESSOR) tablet 25 mg  25 mg BID Abril Jimenezt, A.P.N.   25 mg at 06/15/17 2000   • aspirin EC (ECOTRIN) tablet 81 mg  81 mg DAILY Abril Leong, A.P.N.   81 mg at 06/15/17 0801   • clopidogrel (PLAVIX) tablet 75 mg  75 mg DAILY Abril Jimenezt, A.P.N.   75 mg at 06/15/17 0802   • electrolyte-A  (PLASMALYTE-A) infusion   PRN Abril L Dang, A.P.N.   1,000 mL at 06/13/17 1432   • oxycodone immediate release (ROXICODONE) tablet 5 mg  5 mg Q3HRS PRN Abril L Dang, A.P.N.       • oxycodone immediate release (ROXICODONE) tablet 10 mg  10 mg Q3HRS PRN Abril L Dang, A.P.N.   10 mg at 06/15/17 1512   • tramadol (ULTRAM) 50 MG tablet 50 mg  50 mg Q4HRS PRN Abril L Dang, A.P.N.   50 mg at 06/14/17 0939   • ondansetron (ZOFRAN) syringe/vial injection 4 mg  4 mg Q6HRS PRN Abril L Dang, A.P.N.        Or   • prochlorperazine (COMPAZINE) injection 10 mg  10 mg Q6HRS PRN Abril L Dang, A.P.N.        Or   • promethazine (PHENERGAN) suppository 25 mg  25 mg Q6HRS PRN Abril L Dang, A.P.N.       • acetaminophen (TYLENOL) tablet 650 mg  650 mg Q4HRS PRN Abril L Dang, A.P.N.        Or   • acetaminophen (TYLENOL) suppository 650 mg  650 mg Q4HRS PRN Abril L Dang, A.P.N.       • mag hydrox-al hydrox-simeth (MAALOX PLUS ES or MYLANTA DS) suspension 30 mL  30 mL Q4HRS PRN Abril L Dang, A.P.N.       • diphenhydrAMINE (BENADRYL) tablet/capsule 25 mg  25 mg HS PRN - MR X 1 Abril L Dang, A.P.N.   25 mg at 06/14/17 2011   • hydrocodone-acetaminophen (NORCO) 5-325 MG per tablet 1-2 Tab  1-2 Tab Q6HRS PRN Abril L Dang, A.P.N.   1 Tab at 06/15/17 1295     Last reviewed on 6/13/2017  5:42 AM by Helen E Easton, R.N.    Quality  Measures:  Labs reviewed, Radiology images reviewed and Medications reviewed  Cason catheter: Critically Ill - Requiring Accurate Measurement of Urinary Output  Central line in place: Need for access    DVT Prophylaxis: Contraindicated - High bleeding risk  DVT prophylaxis - mechanical: SCDs  Ulcer prophylaxis: Yes            Assessment and Plan:    Atelectasis   - cont IS, PEP   - pain control  Tiny right apical PTX - follow  Status post 2-vessel coronary artery bypass grafting   - ASA, statin, Plavix, BB  Chronic diastolic heart failure   - grade I diastolic dysfunction   - force  diuresis as tolerated  History of systemic arterial hypertension   - on metoprolol  Dyslipidemia - statin  History of tobacco abuse   - no underlying history of lung disease    Discussed with RN, RT, Team    I, Bailee Millan (Scribe), am scribing for, and in the presence of, Luis F Alexander M.D.    Electronically signed by: Bailee Millan (Scribe), 6/16/2017    ILuis F M.D. personally performed the services described in this documentation, as scribed by Bailee Millan in my presence, and it is both accurate and complete.

## 2017-06-16 NOTE — DISCHARGE PLANNING
Referral has been sent to [facility] (1st choice) and [facility] (2nd choice) per request.

## 2017-06-16 NOTE — DISCHARGE PLANNING
Received notice from [facility], Patient is accepted BUT pending bed availability, no male beds until next week.

## 2017-06-16 NOTE — DISCHARGE PLANNING
[facility] skilled not contracted with insurance.  Request to [facility] for [facility] (1) and [place] care (2).  PASRR completed.     Wait for skilled acceptance.

## 2017-06-17 ENCOUNTER — APPOINTMENT (OUTPATIENT)
Dept: RADIOLOGY | Facility: MEDICAL CENTER | Age: 57
DRG: 236 | End: 2017-06-17
Attending: INTERNAL MEDICINE
Payer: COMMERCIAL

## 2017-06-17 LAB
ANION GAP SERPL CALC-SCNC: 11 MMOL/L (ref 0–11.9)
BUN SERPL-MCNC: 10 MG/DL (ref 8–22)
CALCIUM SERPL-MCNC: 8.7 MG/DL (ref 8.5–10.5)
CHLORIDE SERPL-SCNC: 102 MMOL/L (ref 96–112)
CO2 SERPL-SCNC: 26 MMOL/L (ref 20–33)
CREAT SERPL-MCNC: 0.52 MG/DL (ref 0.5–1.4)
ERYTHROCYTE [DISTWIDTH] IN BLOOD BY AUTOMATED COUNT: 44.5 FL (ref 35.9–50)
GFR SERPL CREATININE-BSD FRML MDRD: >60 ML/MIN/1.73 M 2
GLUCOSE SERPL-MCNC: 118 MG/DL (ref 65–99)
HCT VFR BLD AUTO: 30.5 % (ref 42–52)
HGB BLD-MCNC: 10.6 G/DL (ref 14–18)
MCH RBC QN AUTO: 34.1 PG (ref 27–33)
MCHC RBC AUTO-ENTMCNC: 34.8 G/DL (ref 33.7–35.3)
MCV RBC AUTO: 98.1 FL (ref 81.4–97.8)
PLATELET # BLD AUTO: 231 K/UL (ref 164–446)
PMV BLD AUTO: 9.8 FL (ref 9–12.9)
POTASSIUM SERPL-SCNC: 3.8 MMOL/L (ref 3.6–5.5)
RBC # BLD AUTO: 3.11 M/UL (ref 4.7–6.1)
SODIUM SERPL-SCNC: 139 MMOL/L (ref 135–145)
WBC # BLD AUTO: 9.6 K/UL (ref 4.8–10.8)

## 2017-06-17 PROCEDURE — 700112 HCHG RX REV CODE 229: Performed by: NURSE PRACTITIONER

## 2017-06-17 PROCEDURE — 99233 SBSQ HOSP IP/OBS HIGH 50: CPT | Performed by: INTERNAL MEDICINE

## 2017-06-17 PROCEDURE — A9270 NON-COVERED ITEM OR SERVICE: HCPCS | Performed by: NURSE PRACTITIONER

## 2017-06-17 PROCEDURE — A9270 NON-COVERED ITEM OR SERVICE: HCPCS | Performed by: INTERNAL MEDICINE

## 2017-06-17 PROCEDURE — 80048 BASIC METABOLIC PNL TOTAL CA: CPT

## 2017-06-17 PROCEDURE — 770020 HCHG ROOM/CARE - TELE (206)

## 2017-06-17 PROCEDURE — 700111 HCHG RX REV CODE 636 W/ 250 OVERRIDE (IP): Performed by: NURSE PRACTITIONER

## 2017-06-17 PROCEDURE — 700102 HCHG RX REV CODE 250 W/ 637 OVERRIDE(OP): Performed by: INTERNAL MEDICINE

## 2017-06-17 PROCEDURE — 700102 HCHG RX REV CODE 250 W/ 637 OVERRIDE(OP): Performed by: NURSE PRACTITIONER

## 2017-06-17 PROCEDURE — 85027 COMPLETE CBC AUTOMATED: CPT

## 2017-06-17 PROCEDURE — 94668 MNPJ CHEST WALL SBSQ: CPT

## 2017-06-17 PROCEDURE — 71010 DX-CHEST-PORTABLE (1 VIEW): CPT

## 2017-06-17 RX ORDER — POTASSIUM CHLORIDE 20 MEQ/1
20 TABLET, EXTENDED RELEASE ORAL 2 TIMES DAILY
Status: DISCONTINUED | OUTPATIENT
Start: 2017-06-17 | End: 2017-06-19 | Stop reason: HOSPADM

## 2017-06-17 RX ADMIN — ATORVASTATIN CALCIUM 80 MG: 20 TABLET, FILM COATED ORAL at 20:50

## 2017-06-17 RX ADMIN — HYDROCODONE BITARTRATE AND ACETAMINOPHEN 2 TABLET: 5; 325 TABLET ORAL at 02:17

## 2017-06-17 RX ADMIN — MUPIROCIN 1 APPLICATION: 20 OINTMENT TOPICAL at 20:49

## 2017-06-17 RX ADMIN — DIPHENHYDRAMINE HCL 25 MG: 25 TABLET ORAL at 20:49

## 2017-06-17 RX ADMIN — MORPHINE SULFATE 15 MG: 15 TABLET, EXTENDED RELEASE ORAL at 08:24

## 2017-06-17 RX ADMIN — HYDROCODONE BITARTRATE AND ACETAMINOPHEN 2 TABLET: 5; 325 TABLET ORAL at 14:51

## 2017-06-17 RX ADMIN — FUROSEMIDE 20 MG: 10 INJECTION, SOLUTION INTRAVENOUS at 06:03

## 2017-06-17 RX ADMIN — HYDROCODONE BITARTRATE AND ACETAMINOPHEN 2 TABLET: 5; 325 TABLET ORAL at 20:49

## 2017-06-17 RX ADMIN — HYDROCODONE BITARTRATE AND ACETAMINOPHEN 2 TABLET: 5; 325 TABLET ORAL at 08:15

## 2017-06-17 RX ADMIN — POTASSIUM CHLORIDE 10 MEQ: 750 TABLET, FILM COATED, EXTENDED RELEASE ORAL at 08:24

## 2017-06-17 RX ADMIN — ENOXAPARIN SODIUM 40 MG: 100 INJECTION SUBCUTANEOUS at 08:24

## 2017-06-17 RX ADMIN — METOPROLOL TARTRATE 25 MG: 25 TABLET, FILM COATED ORAL at 20:50

## 2017-06-17 RX ADMIN — POTASSIUM CHLORIDE 20 MEQ: 1500 TABLET, EXTENDED RELEASE ORAL at 20:50

## 2017-06-17 RX ADMIN — DOCUSATE SODIUM 100 MG: 100 CAPSULE ORAL at 08:24

## 2017-06-17 RX ADMIN — MORPHINE SULFATE 15 MG: 15 TABLET, EXTENDED RELEASE ORAL at 20:49

## 2017-06-17 RX ADMIN — ASPIRIN 81 MG: 81 TABLET ORAL at 08:24

## 2017-06-17 RX ADMIN — MUPIROCIN 1 APPLICATION: 20 OINTMENT TOPICAL at 08:30

## 2017-06-17 RX ADMIN — CLOPIDOGREL BISULFATE 75 MG: 75 TABLET, FILM COATED ORAL at 08:24

## 2017-06-17 RX ADMIN — FUROSEMIDE 20 MG: 10 INJECTION, SOLUTION INTRAVENOUS at 16:43

## 2017-06-17 RX ADMIN — METOPROLOL TARTRATE 25 MG: 25 TABLET, FILM COATED ORAL at 08:27

## 2017-06-17 ASSESSMENT — PAIN SCALES - GENERAL
PAINLEVEL_OUTOF10: 2
PAINLEVEL_OUTOF10: 2
PAINLEVEL_OUTOF10: 1
PAINLEVEL_OUTOF10: 2
PAINLEVEL_OUTOF10: 5
PAINLEVEL_OUTOF10: 5
PAINLEVEL_OUTOF10: 2
PAINLEVEL_OUTOF10: 5
PAINLEVEL_OUTOF10: 1
PAINLEVEL_OUTOF10: 0
PAINLEVEL_OUTOF10: 3
PAINLEVEL_OUTOF10: 1
PAINLEVEL_OUTOF10: 5

## 2017-06-17 ASSESSMENT — ENCOUNTER SYMPTOMS
CARDIOVASCULAR NEGATIVE: 1
PSYCHIATRIC NEGATIVE: 1
EYES NEGATIVE: 1
NEUROLOGICAL NEGATIVE: 1
MUSCULOSKELETAL NEGATIVE: 1
RESPIRATORY NEGATIVE: 1
GASTROINTESTINAL NEGATIVE: 1
CONSTITUTIONAL NEGATIVE: 1

## 2017-06-17 NOTE — PROGRESS NOTES
Assumed care of patient from Jayleen at noon.  Patient has no concerns at this time; calling appropriately.

## 2017-06-17 NOTE — CARE PLAN
Problem: Post Op Day 4 CABG/Heart Valve Replacement  Goal: Optimal care of the Post Op CABG/Heart Valve replacement Post Op Day 4  Outcome: MET Date Met:  06/17/17  Only barrier to patient going home is home support and insurance for care needs.  Per APN note; SW to continue pursuing home care options based on health care approval as patient has no friends or family at this time to assist with care once patient is home.  Intervention: Daily Weights  done  Intervention: Shower daily and clean incisions twice daily with soap and water  Patient refused and wants to take shower in the am  Intervention: Up in chair for all meals  For breakfast lunch and dinner  Intervention: Ambulate, increasing the distance each time x 3 and before bed  Patient walked around unit 1/5 times this am and 3 times in a row this afternoon on 1L NC.  Intervention: IS q 1 hour while awake and record best IS volume  Best IS is 1500 slightly improved from yesterday of 1200.  Continuing to encourage use frequently.  Intervention: Consider removal of shipman, chest tube and pacer wire if not already done  Done previously  Intervention: Discharge Education  Continuous regarding meds, plan of care, incision care, walking etc.  Intervention: Add special instructions for cardiac surgery discharge instructions - [facility] to after visit summary and review with patient  Per RN navigator

## 2017-06-17 NOTE — PROGRESS NOTES
Pulmonary Critical Care Progress Note        Date of service:  6/17/2017    Interval Events:  24 hour interval history reviewed  Reason for visit:  Atelectasis, small right apical PTX      11 beats of PSVT this AM.   1250 mL on IS.   1L NC   Alert and oriented x4  Incisional pain inproved  SR  No angina, palp, syncope or increased edema  No increased SOB.  Mild cough.  No sputum production  No N/V/P/D  No hematuria or dysuria     The complete AMA/CMS system review is performed and there are no other additional positive findings.        PFSH:  No change.    Respiratory:     Pulse Oximetry: 95 %  CXR with unchanged bibasilar atelectasis.  PTX resolved.  Few crackles at the bases - improved  No wheezing  1 L NC    HemoDynamics:  Pulse: 92, Heart Rate (Monitored): 91  NIBP: 143/83 mmHg        SR  No increased edema    Neuro:  Awake and alert  No focal weakness  Fully oriented    Fluids:  Intake/Output       06/15/17 0700 - 06/16/17 0659 06/16/17 0700 - 06/17/17 0659 06/17/17 0700 - 06/18/17 0659      9835-3030 2782-8312 Total 0066-7177 9104-2829 Total 0948-0543 6568-5471 Total       Intake    P.O.  1045  60 1105  460  780 1240  --  -- --    P.O. 1045 60 1105  -- -- --    Total Intake 1045 60 1105  -- -- --       Output    Urine  1875  2300 4175  2150  1100 3250  --  -- --    Number of Times Voided 6 x 1 x 7 x 3 x -- 3 x -- -- --    Void (ml) 1875 2300 4175 2150 1100 3250 -- -- --    Stool  --  -- --  --  -- --  --  -- --    Number of Times Stooled 2 x -- 2 x 1 x -- 1 x -- -- --    Total Output 1875 2300 4175 2150 1100 3250 -- -- --       Net I/O     -830 -2240 -3070 -1690 -320 -2010 -- -- --           Recent Labs      06/15/17   0521  06/16/17   0415  06/17/17   0422   SODIUM  124*  136  139   POTASSIUM  4.0  4.2  3.8   CHLORIDE  94*  105  102   CO2  24  24  26   BUN  12  11  10   CREATININE  0.51  0.57  0.52   CALCIUM  8.1*  8.4*  8.7       GI/Nutrition:  Abd soft ND/NT    Liver Function  Recent  Labs      06/15/17   0521  17   0415  17   0422   GLUCOSE  108*  127*  118*       Heme:  Recent Labs      06/15/17   0517   RBC  3.25*  2.95*  3.11*   HEMOGLOBIN  11.1*  10.1*  10.6*   HEMATOCRIT  31.7*  29.5*  30.5*   PLATELETCT  183  151*  231       Infectious Disease:  Temp  Av.9 °C (98.4 °F)  Min: 36.8 °C (98.2 °F)  Max: 37.1 °C (98.8 °F)    Recent Labs      06/15/17   0517   WBC  12.4*  10.2  9.6     Current Facility-Administered Medications   Medication Dose Frequency Provider Last Rate Last Dose   • furosemide (LASIX) injection 20 mg  20 mg BID DIURETIC Abril Jimenezt, A.P.N.   20 mg at 17 1359   • potassium chloride ER (KLOR-CON) tablet 10 mEq  10 mEq BID Abril Jimenezt, A.P.N.   10 mEq at 17   • morphine ER (MS CONTIN) tablet 15 mg  15 mg Q12HRS Abril Jimenezt, A.P.N.   15 mg at 17   • lidocaine-prilocaine (EMLA) 2.5-2.5 % cream 1 Application  1 Application Once PRN Gray Barboza M.D.        Or   • lidocaine (XYLOCAINE) 1%  injection  0.5 mL Once PRN Gray Barboza M.D.       • atorvastatin (LIPITOR) tablet 80 mg  80 mg QHS Abril Jimenezt, A.P.N.   80 mg at 17   • Respiratory Care per Protocol   Continuous RT Abril Jimenezt, A.P.N.       • Pharmacy Consult Request ...Pain Management Review 1 Each  1 Each PRN Abril Jimenezt, A.P.N.       • docusate sodium (COLACE) capsule 100 mg  100 mg QAM Abril Jimenezt, A.P.N.   100 mg at 17 0833    And   • senna-docusate (PERICOLACE or SENOKOT S) 8.6-50 MG per tablet 1 Tab  1 Tab Nightly Abril L Dang, A.P.N.   Stopped at 06/15/17 2100    And   • senna-docusate (PERICOLACE or SENOKOT S) 8.6-50 MG per tablet 1 Tab  1 Tab Q24HRS PRN Abril L Dang, A.P.N.        And   • lactulose 20 GM/30ML solution 30 mL  30 mL Q24HRS PRN Abril L Dang, A.P.N.        And   • bisacodyl (DULCOLAX) suppository 10 mg  10 mg Q24HRS PRN Abril L  Dang, A.P.N.        And   • fleet enema 133 mL  1 Each Once PRN Abril Leong, A.P.N.       • enoxaparin (LOVENOX) inj 40 mg  40 mg DAILY Abril Leong, A.P.N.   40 mg at 06/16/17 0833   • mupirocin (BACTROBAN) 2 % ointment 1 Application  1 Application BID Abril Leong, A.P.N.   1 Application at 06/16/17 2104   • metoprolol (LOPRESSOR) tablet 25 mg  25 mg BID Abril Leong, A.P.N.   25 mg at 06/16/17 2104   • aspirin EC (ECOTRIN) tablet 81 mg  81 mg DAILY Abirl eLong, A.P.N.   81 mg at 06/16/17 0833   • clopidogrel (PLAVIX) tablet 75 mg  75 mg DAILY Abril Leong, A.P.N.   75 mg at 06/16/17 0833   • electrolyte-A (PLASMALYTE-A) infusion   PRN Abril Leong, A.P.N.   1,000 mL at 06/13/17 1432   • oxycodone immediate release (ROXICODONE) tablet 5 mg  5 mg Q3HRS PRN Abril Leong, A.P.N.       • oxycodone immediate release (ROXICODONE) tablet 10 mg  10 mg Q3HRS PRN Abril Leong, A.P.N.   10 mg at 06/16/17 1058   • tramadol (ULTRAM) 50 MG tablet 50 mg  50 mg Q4HRS PRN Abril Leong, A.P.N.   50 mg at 06/14/17 0939   • ondansetron (ZOFRAN) syringe/vial injection 4 mg  4 mg Q6HRS PRN Abril Leong, A.P.N.        Or   • prochlorperazine (COMPAZINE) injection 10 mg  10 mg Q6HRS PRN Abril Leong, A.P.N.        Or   • promethazine (PHENERGAN) suppository 25 mg  25 mg Q6HRS PRN Abril Leong, A.P.N.       • acetaminophen (TYLENOL) tablet 650 mg  650 mg Q4HRS PRN Abril Leong, A.P.N.        Or   • acetaminophen (TYLENOL) suppository 650 mg  650 mg Q4HRS PRN Abril Leong, A.P.N.       • mag hydrox-al hydrox-simeth (MAALOX PLUS ES or MYLANTA DS) suspension 30 mL  30 mL Q4HRS PRN Abril Leong, A.P.N.       • diphenhydrAMINE (BENADRYL) tablet/capsule 25 mg  25 mg HS PRN - MR X 1 Abril Leong, A.P.N.   25 mg at 06/14/17 2011   • hydrocodone-acetaminophen (NORCO) 5-325 MG per tablet 1-2 Tab  1-2 Tab Q6HRS PRN Abril Leong, A.P.N.   2 Tab at 06/17/17 0217     Last reviewed on 6/13/2017   5:42 AM by Helen Rodriguez R.N.    Quality  Measures:  Labs reviewed, Radiology images reviewed and Medications reviewed  Cason catheter: Critically Ill - Requiring Accurate Measurement of Urinary Output  Central line in place: Need for access    DVT Prophylaxis: Contraindicated - High bleeding risk  DVT prophylaxis - mechanical: SCDs  Ulcer prophylaxis: Yes            Assessment and Plan:    Atelectasis   - cont IS, PEP   - pain control  Tiny right apical PTX - resolved  Status post 2-vessel coronary artery bypass grafting   - ASA, statin, Plavix, BB  Chronic diastolic heart failure   - grade I diastolic dysfunction   - force diuresis as tolerated  History of systemic arterial hypertension   - on metoprolol  Dyslipidemia - statin  History of tobacco abuse   - no underlying history of lung disease    Discussed with RN, RT, Team    I, Rayna Burrell (Deisi), am scribing for, and in the presence of, Luis F Alexander M.D.   Electronically signed by: Rayna Olivares), 6/17/2017  ILuis F M.D. personally performed the services described in this documentation, as scribed by Rayna Burrell in my presence, and it is both accurate and complete.

## 2017-06-17 NOTE — DISCHARGE PLANNING
Received notice from [facility] they have declined patient as they are re- negotiating contract with insurance.

## 2017-06-17 NOTE — CARE PLAN
Problem: Hyperinflation:  Goal: Prevent or improve atelectasis  Outcome: PROGRESSING AS EXPECTED  Pt is achieving 1250 mL on IS. 60% predicted is 2070. PEP is ordered QID.

## 2017-06-17 NOTE — CARE PLAN
Problem: Post Op Day 4 CABG/Heart Valve Replacement  Goal: Optimal care of the Post Op CABG/Heart Valve replacement Post Op Day 4  Outcome: PROGRESSING AS EXPECTED  Intervention: Daily Weights  Completed.   Intervention: Shower daily and clean incisions twice daily with soap and water  Incision cleaned.   Intervention: Up in chair for all meals  Up to chair for breakfast.   Intervention: Ambulate, increasing the distance each time x 3 and before bed  1 lap around unit.   Intervention: IS q 1 hour while awake and record best IS volume  Completed.   Intervention: Consider removal of shipman, chest tube and pacer wire if not already done  Completed.

## 2017-06-17 NOTE — CARE PLAN
Problem: Hyperinflation:  Goal: Prevent or improve atelectasis  Pt is achieving 2163-1307 mL on IS. 60% predicted 2070 mL. PEP is ordered QID

## 2017-06-17 NOTE — PROGRESS NOTES
Cardiovascular Surgery Progress Note    Name: Jose A Ponce  MRN: [id number]  : 1960  Admit Date: 2017  5:08 AM  Procedure:  Procedure(s) and Anesthesia Type:     * MULTIPLE CORONARY ARTERY BYPASS x2 RIGHT LEG ENDO VEIN HARVEST - General     * HUBERT - INTRAOP - General  4 Day Post-Op    Vitals:  Patient Vitals for the past 8 hrs:   Temp SpO2 O2 Delivery O2 (LPM) Pulse Heart Rate (Monitored) Resp BP NIBP Weight   17 0827 - - - - 97 - - 142/82 mmHg - -   17 0800 37.3 °C (99.2 °F) 92 % None (Room Air) 0 99 99 18 - 142/82 mmHg -   17 0714 - 93 % - 1 98 - 18 - - -   17 0700 - - - - 98 - - - - -   17 0600 - 93 % Nasal Cannula 1 93 (!) 101 19 - - -   17 0500 - 88 % - - 89 88 19 - 137/90 mmHg 85.7 kg (188 lb 15 oz)   17 0400 36.9 °C (98.4 °F) 95 % Nasal Cannula 1 92 91 19 - - -   17 0300 - 93 % - - 90 90 13 - - -   17 0200 - - Nasal Cannula 1 95 95 18 - - -     Temp (24hrs), Av.9 °C (98.5 °F), Min:36.8 °C (98.2 °F), Max:37.3 °C (99.2 °F)      Respiratory:    Respiration: 18, Pulse Oximetry: 92 %, O2 Daily Delivery Respiratory : Silicone Nasal Cannula     Chest Tube Drains:          Fluids:    Intake/Output Summary (Last 24 hours) at 17 0911  Last data filed at 17 0800   Gross per 24 hour   Intake   1720 ml   Output   4075 ml   Net  -2355 ml     Admit weight: Weight: 80.4 kg (177 lb 4 oz)  Current weight: Weight: 85.7 kg (188 lb 15 oz) (17 0500)    Labs:  Recent Labs      06/15/17   0521  17   0415  17   0422   WBC  12.4*  10.2  9.6   RBC  3.25*  2.95*  3.11*   HEMOGLOBIN  11.1*  10.1*  10.6*   HEMATOCRIT  31.7*  29.5*  30.5*   MCV  97.5  100.0*  98.1*   MCH  34.2*  34.2*  34.1*   MCHC  35.0  34.2  34.8   RDW  42.5  44.4  44.5   PLATELETCT  183  151*  231   MPV  10.0  10.3  9.8         Recent Labs      06/15/17   0521  17   0415  17   0422   SODIUM  124*  136  139   POTASSIUM  4.0  4.2  3.8   CHLORIDE  94*  105  102    CO2  24  24  26   GLUCOSE  108*  127*  118*   BUN  12  11  10   CREATININE  0.51  0.57  0.52   CALCIUM  8.1*  8.4*  8.7           Medications:  • furosemide  20 mg     • potassium chloride (KCl)  10 mEq     • morphine ER  15 mg     • atorvastatin  80 mg     • docusate sodium  100 mg      And   • senna-docusate  1 Tab     • enoxaparin  40 mg     • mupirocin  1 Application     • metoprolol  25 mg     • aspirin EC  81 mg     • clopidogrel  75 mg         Exam:   Review of Systems   Constitutional: Negative.    HENT: Negative.    Eyes: Negative.    Respiratory: Negative.    Cardiovascular: Negative.    Gastrointestinal: Negative.    Genitourinary: Negative.    Musculoskeletal: Negative.    Skin: Negative.    Neurological: Negative.    Psychiatric/Behavioral: Negative.        Physical Exam   Constitutional: He is oriented to person, place, and time. He appears well-developed and well-nourished.   HENT:   Head: Normocephalic and atraumatic.   Eyes: Pupils are equal, round, and reactive to light.   Neck: Normal range of motion. Neck supple.   Cardiovascular: Normal rate and regular rhythm.    Pulmonary/Chest: Effort normal.   Decreased at bases.    Abdominal: Soft. Bowel sounds are normal.   Musculoskeletal: Normal range of motion.   Neurological: He is alert and oriented to person, place, and time.   Skin: Skin is warm and dry.   Wounds CDI   Psychiatric: He has a normal mood and affect.       Quality Measures:   EKG reviewed, Medications reviewed, Radiology images reviewed and Labs reviewed  Cason catheter: No Cason  Central line in place: Need for access    DVT Prophylaxis: Enoxaparin (Lovenox)  DVT prophylaxis - mechanical: SCDs  Ulcer prophylaxis: Yes    Assessed for rehab: Patient returned to prior level of function, rehabilitation not indicated at this time      Assessment/Plan:  POD 1 Extubated, HDS, no drips, chest tube output minimal and negative for air leak.  Neuro grossly intact.  Plan:  DC chest tubes and  umberto.  Transition to tele status.   POD 2 HDS, SR, neuro intact, wounds CDI, prevena not working.  Will try resealing it.  IS/Amb.  CPM.  POD 3 HDS, NSR/ST.  Pain better controlled.  Prevena dressing with leak--DC, wash incisions soap/water, leave open to air.  3 L NC. WTs up, start diuresis. 2 view CXR today.  Plan for Rehab in AM.  CPM.    POD 4 HDS, SR, neuro intact.  Prevena intact.  Wounds CDI.  Patient does not want to go to SNF.  He wants to go home.  There are some logistics issues such as he will need a ride home and help getting prescriptions.  Will discuss with SW.      Active Hospital Problems    Diagnosis   • CAD (coronary artery disease) [I25.10]

## 2017-06-18 LAB
ANION GAP SERPL CALC-SCNC: 9 MMOL/L (ref 0–11.9)
BUN SERPL-MCNC: 13 MG/DL (ref 8–22)
CALCIUM SERPL-MCNC: 8.8 MG/DL (ref 8.5–10.5)
CHLORIDE SERPL-SCNC: 98 MMOL/L (ref 96–112)
CO2 SERPL-SCNC: 27 MMOL/L (ref 20–33)
CREAT SERPL-MCNC: 0.56 MG/DL (ref 0.5–1.4)
ERYTHROCYTE [DISTWIDTH] IN BLOOD BY AUTOMATED COUNT: 44.2 FL (ref 35.9–50)
GFR SERPL CREATININE-BSD FRML MDRD: >60 ML/MIN/1.73 M 2
GLUCOSE SERPL-MCNC: 116 MG/DL (ref 65–99)
HCT VFR BLD AUTO: 30.5 % (ref 42–52)
HGB BLD-MCNC: 10.4 G/DL (ref 14–18)
MCH RBC QN AUTO: 33.5 PG (ref 27–33)
MCHC RBC AUTO-ENTMCNC: 34.1 G/DL (ref 33.7–35.3)
MCV RBC AUTO: 98.4 FL (ref 81.4–97.8)
PLATELET # BLD AUTO: 286 K/UL (ref 164–446)
PMV BLD AUTO: 9.4 FL (ref 9–12.9)
POTASSIUM SERPL-SCNC: 3.6 MMOL/L (ref 3.6–5.5)
RBC # BLD AUTO: 3.1 M/UL (ref 4.7–6.1)
SODIUM SERPL-SCNC: 134 MMOL/L (ref 135–145)
WBC # BLD AUTO: 7.5 K/UL (ref 4.8–10.8)

## 2017-06-18 PROCEDURE — 700102 HCHG RX REV CODE 250 W/ 637 OVERRIDE(OP): Performed by: NURSE PRACTITIONER

## 2017-06-18 PROCEDURE — 700102 HCHG RX REV CODE 250 W/ 637 OVERRIDE(OP): Performed by: INTERNAL MEDICINE

## 2017-06-18 PROCEDURE — 85027 COMPLETE CBC AUTOMATED: CPT

## 2017-06-18 PROCEDURE — 770020 HCHG ROOM/CARE - TELE (206)

## 2017-06-18 PROCEDURE — A9270 NON-COVERED ITEM OR SERVICE: HCPCS | Performed by: INTERNAL MEDICINE

## 2017-06-18 PROCEDURE — 94668 MNPJ CHEST WALL SBSQ: CPT

## 2017-06-18 PROCEDURE — 700111 HCHG RX REV CODE 636 W/ 250 OVERRIDE (IP): Performed by: NURSE PRACTITIONER

## 2017-06-18 PROCEDURE — 99233 SBSQ HOSP IP/OBS HIGH 50: CPT | Performed by: INTERNAL MEDICINE

## 2017-06-18 PROCEDURE — 80048 BASIC METABOLIC PNL TOTAL CA: CPT

## 2017-06-18 PROCEDURE — A9270 NON-COVERED ITEM OR SERVICE: HCPCS | Performed by: NURSE PRACTITIONER

## 2017-06-18 RX ORDER — POTASSIUM CHLORIDE 20 MEQ/1
40 TABLET, EXTENDED RELEASE ORAL ONCE
Status: COMPLETED | OUTPATIENT
Start: 2017-06-18 | End: 2017-06-18

## 2017-06-18 RX ADMIN — HYDROCODONE BITARTRATE AND ACETAMINOPHEN 2 TABLET: 5; 325 TABLET ORAL at 23:00

## 2017-06-18 RX ADMIN — HYDROCODONE BITARTRATE AND ACETAMINOPHEN 2 TABLET: 5; 325 TABLET ORAL at 10:05

## 2017-06-18 RX ADMIN — CLOPIDOGREL BISULFATE 75 MG: 75 TABLET, FILM COATED ORAL at 09:11

## 2017-06-18 RX ADMIN — FUROSEMIDE 20 MG: 10 INJECTION, SOLUTION INTRAVENOUS at 16:20

## 2017-06-18 RX ADMIN — FUROSEMIDE 20 MG: 10 INJECTION, SOLUTION INTRAVENOUS at 06:19

## 2017-06-18 RX ADMIN — POTASSIUM CHLORIDE 40 MEQ: 1500 TABLET, EXTENDED RELEASE ORAL at 10:07

## 2017-06-18 RX ADMIN — ASPIRIN 81 MG: 81 TABLET ORAL at 09:11

## 2017-06-18 RX ADMIN — HYDROCODONE BITARTRATE AND ACETAMINOPHEN 2 TABLET: 5; 325 TABLET ORAL at 03:25

## 2017-06-18 RX ADMIN — MORPHINE SULFATE 15 MG: 15 TABLET, EXTENDED RELEASE ORAL at 20:33

## 2017-06-18 RX ADMIN — METOPROLOL TARTRATE 25 MG: 25 TABLET, FILM COATED ORAL at 09:11

## 2017-06-18 RX ADMIN — METOPROLOL TARTRATE 25 MG: 25 TABLET, FILM COATED ORAL at 20:33

## 2017-06-18 RX ADMIN — HYDROCODONE BITARTRATE AND ACETAMINOPHEN 2 TABLET: 5; 325 TABLET ORAL at 16:20

## 2017-06-18 RX ADMIN — POTASSIUM CHLORIDE 20 MEQ: 1500 TABLET, EXTENDED RELEASE ORAL at 20:33

## 2017-06-18 RX ADMIN — MORPHINE SULFATE 15 MG: 15 TABLET, EXTENDED RELEASE ORAL at 09:11

## 2017-06-18 RX ADMIN — POTASSIUM CHLORIDE 20 MEQ: 1500 TABLET, EXTENDED RELEASE ORAL at 09:11

## 2017-06-18 RX ADMIN — ATORVASTATIN CALCIUM 80 MG: 20 TABLET, FILM COATED ORAL at 20:33

## 2017-06-18 RX ADMIN — ENOXAPARIN SODIUM 40 MG: 100 INJECTION SUBCUTANEOUS at 09:11

## 2017-06-18 ASSESSMENT — ENCOUNTER SYMPTOMS
MUSCULOSKELETAL NEGATIVE: 1
RESPIRATORY NEGATIVE: 1
NEUROLOGICAL NEGATIVE: 1
CARDIOVASCULAR NEGATIVE: 1
GASTROINTESTINAL NEGATIVE: 1
CONSTITUTIONAL NEGATIVE: 1
EYES NEGATIVE: 1
PSYCHIATRIC NEGATIVE: 1

## 2017-06-18 ASSESSMENT — PAIN SCALES - GENERAL
PAINLEVEL_OUTOF10: 5
PAINLEVEL_OUTOF10: 0
PAINLEVEL_OUTOF10: 3
PAINLEVEL_OUTOF10: 2
PAINLEVEL_OUTOF10: 2
PAINLEVEL_OUTOF10: 3
PAINLEVEL_OUTOF10: 4
PAINLEVEL_OUTOF10: 6
PAINLEVEL_OUTOF10: 3
PAINLEVEL_OUTOF10: 5
PAINLEVEL_OUTOF10: 0
PAINLEVEL_OUTOF10: 5

## 2017-06-18 NOTE — CARE PLAN
Problem: Infection  Goal: Will remain free from infection  Intervention: Assess signs and symptoms of infection  Pt lab values monitored, VS monitored. No new s/s infection.           Problem: Pain Management  Goal: Pain level will decrease to patient’s comfort goal  Intervention: Follow pain managment plan developed in collaboration with patient and Interdisciplinary Team  Passed norco for sternal pain prn

## 2017-06-18 NOTE — CARE PLAN
Problem: Hyperinflation:  Goal: Prevent or improve atelectasis  Outcome: PROGRESSING AS EXPECTED  Pt is achieving 1750 on IS. PEP is ordered QID

## 2017-06-18 NOTE — PROGRESS NOTES
Cardiovascular Surgery Progress Note    Name: Jose A Ponce  MRN: [id number]  : 1960  Admit Date: 2017  5:08 AM  Procedure:  Procedure(s) and Anesthesia Type:     * MULTIPLE CORONARY ARTERY BYPASS x2 RIGHT LEG ENDO VEIN HARVEST - General     * HUBERT - INTRAOP - General  5 Day Post-Op    Vitals:  Patient Vitals for the past 8 hrs:   Temp SpO2 O2 Delivery O2 (LPM) Pulse Heart Rate (Monitored) Resp NIBP Weight   17 0800 37.3 °C (99.1 °F) 95 % - 0.5 (!) 105 - 20 115/67 mmHg -   17 0715 - 95 % - 0.5 92 - - - -   17 0700 37.2 °C (99 °F) 94 % - 0.5 95 - 18 - -   17 0600 - 93 % Silicone Nasal Cannula 0.5 95 (!) 104 20 - -   17 0500 - 96 % - - 93 93 (!) 25 - 83.5 kg (184 lb 1.4 oz)   17 0400 37.2 °C (99 °F) 93 % Silicone Nasal Cannula 1 85 88 15 127/85 mmHg -   17 0300 - 92 % - - 90 90 (!) 34 - -   17 0200 - 93 % Silicone Nasal Cannula 1 95 98 (!) 24 - -   17 0100 - 94 % - - 95 95 19 - -     Temp (24hrs), Av.3 °C (99.1 °F), Min:37.1 °C (98.7 °F), Max:37.3 °C (99.2 °F)      Respiratory:    Respiration: 20, Pulse Oximetry: 95 %, O2 Daily Delivery Respiratory : Silicone Nasal Cannula     Chest Tube Drains:          Fluids:    Intake/Output Summary (Last 24 hours) at 17 0846  Last data filed at 17 0600   Gross per 24 hour   Intake    600 ml   Output   2520 ml   Net  -1920 ml     Admit weight: Weight: 80.4 kg (177 lb 4 oz)  Current weight: Weight: 83.5 kg (184 lb 1.4 oz) (17 0500)    Labs:  Recent Labs      17   0415  17   0422  17   0407   WBC  10.2  9.6  7.5   RBC  2.95*  3.11*  3.10*   HEMOGLOBIN  10.1*  10.6*  10.4*   HEMATOCRIT  29.5*  30.5*  30.5*   MCV  100.0*  98.1*  98.4*   MCH  34.2*  34.1*  33.5*   MCHC  34.2  34.8  34.1   RDW  44.4  44.5  44.2   PLATELETCT  151*  231  286   MPV  10.3  9.8  9.4         Recent Labs      17   0415  17   0422  17   0407   SODIUM  136  139  134*   POTASSIUM  4.2  3.8   3.6   CHLORIDE  105  102  98   CO2  24  26  27   GLUCOSE  127*  118*  116*   BUN  11  10  13   CREATININE  0.57  0.52  0.56   CALCIUM  8.4*  8.7  8.8           Medications:  • potassium chloride (KCl)  20 mEq     • furosemide  20 mg     • morphine ER  15 mg     • atorvastatin  80 mg     • docusate sodium  100 mg      And   • senna-docusate  1 Tab     • enoxaparin  40 mg     • metoprolol  25 mg     • aspirin EC  81 mg     • clopidogrel  75 mg         Exam:   Review of Systems   Constitutional: Negative.    HENT: Negative.    Eyes: Negative.    Respiratory: Negative.    Cardiovascular: Negative.    Gastrointestinal: Negative.    Genitourinary: Negative.    Musculoskeletal: Negative.    Skin: Negative.    Neurological: Negative.    Psychiatric/Behavioral: Negative.        Physical Exam   Constitutional: He is oriented to person, place, and time. He appears well-developed and well-nourished.   HENT:   Head: Normocephalic and atraumatic.   Eyes: Pupils are equal, round, and reactive to light.   Neck: Normal range of motion. Neck supple.   Cardiovascular: Normal rate and regular rhythm.    Pulmonary/Chest: Effort normal.   Decreased at bases.    Abdominal: Soft. Bowel sounds are normal.   Musculoskeletal: Normal range of motion.   Neurological: He is alert and oriented to person, place, and time.   Skin: Skin is warm and dry.   Wounds CDI   Psychiatric: He has a normal mood and affect.       Quality Measures:   EKG reviewed, Medications reviewed, Radiology images reviewed and Labs reviewed  Cason catheter: No Cason  Central line in place: Need for access    DVT Prophylaxis: Enoxaparin (Lovenox)  DVT prophylaxis - mechanical: SCDs  Ulcer prophylaxis: Yes    Assessed for rehab: Patient returned to prior level of function, rehabilitation not indicated at this time      Assessment/Plan:  POD 1 Extubated, HDS, no drips, chest tube output minimal and negative for air leak.  Neuro grossly intact.  Plan:  DC chest tubes and  umberto.  Transition to tele status.   POD 2 HDS, SR, neuro intact, wounds CDI, prevena not working.  Will try resealing it.  IS/Amb.  CPM.  POD 3 HDS, NSR/ST.  Pain better controlled.  Prevena dressing with leak--DC, wash incisions soap/water, leave open to air.  3 L NC. WTs up, start diuresis. 2 view CXR today.  Plan for Rehab in AM.  CPM.    POD 4 HDS, SR, neuro intact.  Prevena intact.  Wounds CDI.  Patient does not want to go to SNF.  He wants to go home.  There are some logistics issues such as he will need a ride home and help getting prescriptions.  Will discuss with SW.    POD 5 HDS, SR, neuro intact.  Prevena intact.  Home tomorrow.  Will [facility] rx [facility] [place] and have social work get him a taxi home.      Active Hospital Problems    Diagnosis   • CAD (coronary artery disease) [I25.10]

## 2017-06-18 NOTE — PROGRESS NOTES
Report received by NOC RN. Assumed care of pt. Assessment complete. Personal items at bedside. Pt A&O x 4. Pt c/o sternum pain-medicated per MAR, has midline incision-leaky and wheepy (scant drainage) no s/s infection, and is in a sinus rhythm. Pt in no apparent signs of distress. Plan of care discussed. Call light in reach,  bed in lowest position, and pt has no further questions at this time.

## 2017-06-18 NOTE — CARE PLAN
Problem: Safety  Goal: Will remain free from injury  Outcome: PROGRESSING AS EXPECTED  Bed in lowest position, call light and personal possessions within reach, treaded socks in place when mobilizing, patient calls appropriately, hourly rounding in place.     Problem: Psychosocial Needs:  Goal: Level of anxiety will decrease  Outcome: PROGRESSING AS EXPECTED  POC discussed with patient about discharge. Patient verbalizes wanting to go home. Relay of message onto day shift to get social work involved for home care health, and how to get medications from pharmacy.

## 2017-06-18 NOTE — PROGRESS NOTES
Pulmonary Critical Care Progress Note        Date of service:  6/18/2017    Interval Events:  24 hour interval history reviewed  Reason for visit:  Atelectasis, small right apical PTX      1750 on IS   RA  Alert and oriented x4  Incisional pain controlled  SR  No angina, palp, syncope or increased edema  No increased SOB.  Mild cough.  No sputum production  No N/V/P/D  No hematuria or dysuria     The complete AMA/CMS system review is performed and there are no other additional positive findings.        PFSH:  No change.    Respiratory:     Pulse Oximetry: 93 %  CXR with unchanged bibasilar atelectasis.  PTX resolved.  Clear lungs.  No dullness.  RA    HemoDynamics:  Pulse: 85, Heart Rate (Monitored): 88  Blood Pressure: 142/82 mmHg, NIBP: 127/85 mmHg        SR  No increased edema    Neuro:  Awake and alert  No focal weakness  Fully oriented    Fluids:  Intake/Output       06/16/17 0700 - 06/17/17 0659 06/17/17 0700 - 06/18/17 0659 06/18/17 0700 - 06/19/17 0659      9420-5160 6214-7228 Total 0609-1070 6722-4807 Total 1652-0113 3352-2845 Total       Intake    P.O.  460  1500 1960  --  360 360  --  -- --    P.O. 460 1500 1960 -- 360 360 -- -- --    Total Intake 460 1500 1960 -- 360 360 -- -- --       Output    Urine  2150  1450 3600  2125  1620 3745  --  -- --    Number of Times Voided 3 x -- 3 x 1 x -- 1 x -- -- --    Void (ml) 2150 1450 3600 2125 1620 3745 -- -- --    Stool  --  -- --  --  -- --  --  -- --    Number of Times Stooled 1 x -- 1 x -- -- -- -- -- --    Total Output 2150 1450 3600 2125 1620 3745 -- -- --       Net I/O     -1690 50 -1640 -2125 -1260 -3385 -- -- --           Recent Labs      06/16/17   0415  06/17/17   0422  06/18/17   0407   SODIUM  136  139  134*   POTASSIUM  4.2  3.8  3.6   CHLORIDE  105  102  98   CO2  24  26  27   BUN  11  10  13   CREATININE  0.57  0.52  0.56   CALCIUM  8.4*  8.7  8.8       GI/Nutrition:  Abd soft ND/NT    Liver Function  Recent Labs      06/16/17   0415  06/17/17    0422  17   040   GLUCOSE  127*  118*  116*       Heme:  Recent Labs      17   0415  17   0422  17   RBC  2.95*  3.11*  3.10*   HEMOGLOBIN  10.1*  10.6*  10.4*   HEMATOCRIT  29.5*  30.5*  30.5*   PLATELETCT  151*  231  286       Infectious Disease:  Temp  Av.3 °C (99.1 °F)  Min: 37.1 °C (98.7 °F)  Max: 37.3 °C (99.2 °F)    Recent Labs      17   0415  17   WBC  10.2  9.6  7.5     Current Facility-Administered Medications   Medication Dose Frequency Provider Last Rate Last Dose   • potassium chloride SA (Kdur) tablet 20 mEq  20 mEq BID Luis F Cano M.D.   20 mEq at 17   • furosemide (LASIX) injection 20 mg  20 mg BID DIURETIC Abril Leong, A.P.N.   20 mg at 17 1643   • morphine ER (MS CONTIN) tablet 15 mg  15 mg Q12HRS Abril Leong, A.P.N.   15 mg at 17   • lidocaine-prilocaine (EMLA) 2.5-2.5 % cream 1 Application  1 Application Once PRN Gray Barboza M.D.        Or   • lidocaine (XYLOCAINE) 1%  injection  0.5 mL Once PRN Gray Barboza M.D.       • atorvastatin (LIPITOR) tablet 80 mg  80 mg QHS Abril Leong, A.P.N.   80 mg at 17   • Respiratory Care per Protocol   Continuous RT Abril Leong, A.P.N.       • Pharmacy Consult Request ...Pain Management Review 1 Each  1 Each PRN Abril Leong, A.P.N.       • docusate sodium (COLACE) capsule 100 mg  100 mg QAM Abril Leong, A.P.N.   100 mg at 17 0824    And   • senna-docusate (PERICOLACE or SENOKOT S) 8.6-50 MG per tablet 1 Tab  1 Tab Nightly Abril STEW Jimenezt, A.P.N.   Stopped at 06/15/17 2100    And   • senna-docusate (PERICOLACE or SENOKOT S) 8.6-50 MG per tablet 1 Tab  1 Tab Q24HRS PRN Abril L Dang, A.P.N.        And   • lactulose 20 GM/30ML solution 30 mL  30 mL Q24HRS PRN Abril Jimenezt, A.P.N.        And   • bisacodyl (DULCOLAX) suppository 10 mg  10 mg Q24HRS PRN Abril Jimenezt, A.P.N.        And   • fleet enema 133  mL  1 Each Once PRN Abril Leong, A.P.N.       • enoxaparin (LOVENOX) inj 40 mg  40 mg DAILY Abril Leong, A.P.N.   40 mg at 06/17/17 0824   • metoprolol (LOPRESSOR) tablet 25 mg  25 mg BID Abril Leong, A.P.N.   25 mg at 06/17/17 2050   • aspirin EC (ECOTRIN) tablet 81 mg  81 mg DAILY Abril Leong, A.P.N.   81 mg at 06/17/17 0824   • clopidogrel (PLAVIX) tablet 75 mg  75 mg DAILY Abril Leong, A.P.N.   75 mg at 06/17/17 0824   • oxycodone immediate release (ROXICODONE) tablet 5 mg  5 mg Q3HRS PRN Abril Leong, A.P.N.       • oxycodone immediate release (ROXICODONE) tablet 10 mg  10 mg Q3HRS PRN Abril Leong, A.P.N.   10 mg at 06/16/17 1058   • tramadol (ULTRAM) 50 MG tablet 50 mg  50 mg Q4HRS PRN Abril Leong, A.P.N.   50 mg at 06/14/17 0939   • ondansetron (ZOFRAN) syringe/vial injection 4 mg  4 mg Q6HRS PRN Abril Leong, A.P.N.        Or   • prochlorperazine (COMPAZINE) injection 10 mg  10 mg Q6HRS PRN Abril Leong, A.P.N.        Or   • promethazine (PHENERGAN) suppository 25 mg  25 mg Q6HRS PRN Abril Leong, A.P.N.       • acetaminophen (TYLENOL) tablet 650 mg  650 mg Q4HRS PRN Abril Leong, A.P.N.        Or   • acetaminophen (TYLENOL) suppository 650 mg  650 mg Q4HRS PRN Abril Leong, A.P.N.       • mag hydrox-al hydrox-simeth (MAALOX PLUS ES or MYLANTA DS) suspension 30 mL  30 mL Q4HRS PRN Abril Leong, A.P.N.       • diphenhydrAMINE (BENADRYL) tablet/capsule 25 mg  25 mg HS PRN - MR X 1 Abril Leong, A.P.N.   25 mg at 06/17/17 2049   • hydrocodone-acetaminophen (NORCO) 5-325 MG per tablet 1-2 Tab  1-2 Tab Q6HRS PRN Abril Leong, A.P.N.   2 Tab at 06/18/17 0325     Last reviewed on 6/13/2017  5:42 AM by Helen Rodriguez R.N.    Quality  Measures:  Labs reviewed, Radiology images reviewed and Medications reviewed  Cason catheter: Critically Ill - Requiring Accurate Measurement of Urinary Output  Central line in place: Need for access    DVT Prophylaxis:  Contraindicated - High bleeding risk  DVT prophylaxis - mechanical: SCDs  Ulcer prophylaxis: Yes            Assessment and Plan:    Atelectasis   - cont IS, PEP   - pain control  Tiny right apical PTX - resolved  Status post 2-vessel coronary artery bypass grafting   - ASA, statin, Plavix, BB  Chronic diastolic heart failure   - grade I diastolic dysfunction   - force diuresis as tolerated  History of systemic arterial hypertension   - on metoprolol  Dyslipidemia - statin  History of tobacco abuse   - no underlying history of lung disease  Hypokalemia   - increase K supplement    Discussed with RN, RT, Team    I, Suyapa Stover (Scribe), am scribing for, and in the presence of, Luis F Alexander M.D.   Electronically signed by: Suyapa Stover (Chaunceyibe), 6/18/2017  ILuis F M.D. personally performed the services described in this documentation, as scribed by Suyapa Stover in my presence, and it is both accurate and complete.

## 2017-06-19 VITALS
HEART RATE: 93 BPM | OXYGEN SATURATION: 93 % | RESPIRATION RATE: 16 BRPM | SYSTOLIC BLOOD PRESSURE: 142 MMHG | DIASTOLIC BLOOD PRESSURE: 82 MMHG | TEMPERATURE: 98.3 F | WEIGHT: 178.13 LBS | BODY MASS INDEX: 22.86 KG/M2 | HEIGHT: 74 IN

## 2017-06-19 LAB
ANION GAP SERPL CALC-SCNC: 8 MMOL/L (ref 0–11.9)
BUN SERPL-MCNC: 11 MG/DL (ref 8–22)
CALCIUM SERPL-MCNC: 9.3 MG/DL (ref 8.5–10.5)
CHLORIDE SERPL-SCNC: 101 MMOL/L (ref 96–112)
CO2 SERPL-SCNC: 24 MMOL/L (ref 20–33)
CREAT SERPL-MCNC: 0.66 MG/DL (ref 0.5–1.4)
ERYTHROCYTE [DISTWIDTH] IN BLOOD BY AUTOMATED COUNT: 44.3 FL (ref 35.9–50)
GFR SERPL CREATININE-BSD FRML MDRD: >60 ML/MIN/1.73 M 2
GLUCOSE SERPL-MCNC: 117 MG/DL (ref 65–99)
HCT VFR BLD AUTO: 33.9 % (ref 42–52)
HGB BLD-MCNC: 11.6 G/DL (ref 14–18)
MCH RBC QN AUTO: 33.8 PG (ref 27–33)
MCHC RBC AUTO-ENTMCNC: 34.2 G/DL (ref 33.7–35.3)
MCV RBC AUTO: 98.8 FL (ref 81.4–97.8)
PLATELET # BLD AUTO: 338 K/UL (ref 164–446)
PMV BLD AUTO: 9.2 FL (ref 9–12.9)
POTASSIUM SERPL-SCNC: 4.4 MMOL/L (ref 3.6–5.5)
RBC # BLD AUTO: 3.43 M/UL (ref 4.7–6.1)
SODIUM SERPL-SCNC: 133 MMOL/L (ref 135–145)
WBC # BLD AUTO: 9.7 K/UL (ref 4.8–10.8)

## 2017-06-19 PROCEDURE — A9270 NON-COVERED ITEM OR SERVICE: HCPCS | Performed by: NURSE PRACTITIONER

## 2017-06-19 PROCEDURE — 94668 MNPJ CHEST WALL SBSQ: CPT

## 2017-06-19 PROCEDURE — 700112 HCHG RX REV CODE 229: Performed by: NURSE PRACTITIONER

## 2017-06-19 PROCEDURE — 80048 BASIC METABOLIC PNL TOTAL CA: CPT

## 2017-06-19 PROCEDURE — A9270 NON-COVERED ITEM OR SERVICE: HCPCS | Performed by: INTERNAL MEDICINE

## 2017-06-19 PROCEDURE — 700111 HCHG RX REV CODE 636 W/ 250 OVERRIDE (IP): Performed by: NURSE PRACTITIONER

## 2017-06-19 PROCEDURE — 700102 HCHG RX REV CODE 250 W/ 637 OVERRIDE(OP): Performed by: NURSE PRACTITIONER

## 2017-06-19 PROCEDURE — 700102 HCHG RX REV CODE 250 W/ 637 OVERRIDE(OP): Performed by: INTERNAL MEDICINE

## 2017-06-19 PROCEDURE — 99233 SBSQ HOSP IP/OBS HIGH 50: CPT | Performed by: INTERNAL MEDICINE

## 2017-06-19 PROCEDURE — 85027 COMPLETE CBC AUTOMATED: CPT

## 2017-06-19 RX ORDER — MORPHINE SULFATE 15 MG/1
15 TABLET, FILM COATED, EXTENDED RELEASE ORAL EVERY 12 HOURS
Qty: 60 TAB | Refills: 0 | Status: SHIPPED | OUTPATIENT
Start: 2017-06-19 | End: 2017-08-03

## 2017-06-19 RX ORDER — PSEUDOEPHEDRINE HCL 30 MG
100 TABLET ORAL EVERY MORNING
Qty: 60 CAP | Refills: 0 | Status: SHIPPED | OUTPATIENT
Start: 2017-06-19 | End: 2017-08-03

## 2017-06-19 RX ORDER — CLOPIDOGREL BISULFATE 75 MG/1
75 TABLET ORAL DAILY
Qty: 30 TAB | Refills: 3 | Status: SHIPPED | OUTPATIENT
Start: 2017-06-19 | End: 2017-08-03 | Stop reason: SDUPTHER

## 2017-06-19 RX ORDER — HYDROCODONE BITARTRATE AND ACETAMINOPHEN 5; 325 MG/1; MG/1
1-2 TABLET ORAL EVERY 6 HOURS PRN
Qty: 45 TAB | Refills: 0 | Status: SHIPPED | OUTPATIENT
Start: 2017-06-19 | End: 2017-09-01

## 2017-06-19 RX ORDER — POTASSIUM CHLORIDE 750 MG/1
10 TABLET, EXTENDED RELEASE ORAL DAILY
Qty: 30 TAB | Refills: 3 | Status: SHIPPED | OUTPATIENT
Start: 2017-06-19 | End: 2017-06-27

## 2017-06-19 RX ORDER — FUROSEMIDE 20 MG/1
20 TABLET ORAL DAILY
Qty: 30 TAB | Refills: 3 | Status: SHIPPED | OUTPATIENT
Start: 2017-06-19 | End: 2017-06-27

## 2017-06-19 RX ADMIN — DIPHENHYDRAMINE HCL 25 MG: 25 TABLET ORAL at 01:12

## 2017-06-19 RX ADMIN — FUROSEMIDE 20 MG: 10 INJECTION, SOLUTION INTRAVENOUS at 05:09

## 2017-06-19 RX ADMIN — MORPHINE SULFATE 15 MG: 15 TABLET, EXTENDED RELEASE ORAL at 08:03

## 2017-06-19 RX ADMIN — METOPROLOL TARTRATE 25 MG: 25 TABLET, FILM COATED ORAL at 08:03

## 2017-06-19 RX ADMIN — CLOPIDOGREL BISULFATE 75 MG: 75 TABLET, FILM COATED ORAL at 08:03

## 2017-06-19 RX ADMIN — POTASSIUM CHLORIDE 20 MEQ: 1500 TABLET, EXTENDED RELEASE ORAL at 08:03

## 2017-06-19 RX ADMIN — ASPIRIN 81 MG: 81 TABLET ORAL at 08:03

## 2017-06-19 RX ADMIN — HYDROCODONE BITARTRATE AND ACETAMINOPHEN 2 TABLET: 5; 325 TABLET ORAL at 05:09

## 2017-06-19 RX ADMIN — ENOXAPARIN SODIUM 40 MG: 100 INJECTION SUBCUTANEOUS at 08:03

## 2017-06-19 ASSESSMENT — ENCOUNTER SYMPTOMS
MUSCULOSKELETAL NEGATIVE: 1
EYES NEGATIVE: 1
GASTROINTESTINAL NEGATIVE: 1
CARDIOVASCULAR NEGATIVE: 1
CONSTITUTIONAL NEGATIVE: 1
RESPIRATORY NEGATIVE: 1
NEUROLOGICAL NEGATIVE: 1
PSYCHIATRIC NEGATIVE: 1

## 2017-06-19 ASSESSMENT — LIFESTYLE VARIABLES: DO YOU DRINK ALCOHOL: NO

## 2017-06-19 ASSESSMENT — PAIN SCALES - GENERAL
PAINLEVEL_OUTOF10: 4
PAINLEVEL_OUTOF10: 2
PAINLEVEL_OUTOF10: 2
PAINLEVEL_OUTOF10: 3
PAINLEVEL_OUTOF10: 6

## 2017-06-19 NOTE — DISCHARGE INSTRUCTIONS
Discharge Instructions    Discharged to home by taxi with self. Discharged via wheelchair, hospital escort: Yes.  Special equipment needed: Not Applicable    Be sure to schedule a follow-up appointment with your primary care doctor or any specialists as instructed.     Discharge Plan:   Smoking Cessation Offered: Patient Counseled  Influenza Vaccine Indication: Not indicated: Previously immunized this influenza season and > 8 years of age    I understand that a diet low in cholesterol, fat, and sodium is recommended for good health. Unless I have been given specific instructions below for another diet, I accept this instruction as my diet prescription.   Other diet:none    Special Instructions:   [facility] Cardiac Surgery Discharge Instructions    Activity:  1. NO driving for 4 weeks after surgery. You may ride as a passenger.  2. NO lifting of any item over 10 lbs (e.g. gallon of milk) for 6 weeks after surgery.  3. Walk as much as possible! Walk a minimum of once a day. Depending on your fatigue and comfort level, you may walk as much as you wish. There is no maximum.  4. Other physical activities (sex, housework, gardening, etc.) are OK after 4 weeks or after 8 weeks if you want to golf (start by putting, then advance to chipping, then to driving).  5. Continue using incentive spirometer for 2 weeks, especially if going home on oxygen.  6. Weigh daily and write it down starting  the next morning after you arrive home. Call your doctor for a weight gain of 2-4 lbs in 1-2 days.    Incision Care:  1. SHOWER ONLY - no baths. Clean incision(s) daily with plain soap or any other dye or perfume free soap. Then pat incision(s) dry with clean towel. Avoid creams or lotions on the incision(s).  2. If there is any increase in redness or swelling, or separation of the incision line, or thick drainage* from any of the incisions, call [facility] ([phone]). * Clear, thin drainage is not abnormal  especially from the leg incision and/or chest tube sites.                    3. Continue to wear your KEILY Stockings for 4 weeks on the leg(s) with the incision(s) or swelling. You may take off the stocking(s) when in bed or when the leg(s) are elevated.    Prescription Refills/Questions: Call your usual Pharmacy for ALL refills   (Remember that refills may require additional physician approval and will need at least 24 hours' notice. Please call for refills on Mondays through Fridays from 9 am to 4 pm).    1. Pain medication questions only: Call [facility] ([phone]).  2. For all other medications (except pain medication): Call your Cardiology Group or Primary Care Doctor (not [facility]) for refills or questions.    General Instructions:  1. If you are on the blood thinner Coumadin (warfarin), you will need your coumadin levels checked periodically. For any questions about scheduling, ask your Cardiology Group or your Home Health Nurse whether this has been arranged for you.     2. Cardiac Rehab is highly recommended. If you do not have an appointment at the time of discharge call [facility] @ [phone] to schedule your initial interview.      3. Your Primary Care Doctor typically handles Home Oxygen. The Home Oxygen service that drops off your tanks should be checking your oxygen saturation levels. Oxygen may be stopped when you are > 90 % saturated on room air. Check with your Primary Care Doctor if you are unsure.    [facility] IS ALWAYS AVAILABLE TO ANSWER YOUR QUESTIONS. DO NOT HESITATE TO CALL!      · Is patient discharged on Warfarin / Coumadin?   No     · Is patient Post Blood Transfusion?  No    Depression / Suicide Risk    As you are discharged from this [facility] facility, it is important to learn how to keep safe from harming yourself.    Recognize the warning signs:  · Abrupt changes in personality, positive or negative- including increase in energy   · Giving away  possessions  · Change in eating patterns- significant weight changes-  positive or negative  · Change in sleeping patterns- unable to sleep or sleeping all the time   · Unwillingness or inability to communicate  · Depression  · Unusual sadness, discouragement and loneliness  · Talk of wanting to die  · Neglect of personal appearance   · Rebelliousness- reckless behavior  · Withdrawal from people/activities they love  · Confusion- inability to concentrate     If you or a loved one observes any of these behaviors or has concerns about self-harm, here's what you can do:  · Talk about it- your feelings and reasons for harming yourself  · Remove any means that you might use to hurt yourself (examples: pills, rope, extension cords, firearm)  · Get professional help from the community (Mental Health, Substance Abuse, psychological counseling)  · Do not be alone:Call your Safe Contact- someone whom you trust who will be there for you.  · Call your local CRISIS HOTLINE 102-6634 or 022-294-4451  · Call your local Children's Mobile Crisis Response Team [place] (123) 707-3314 or www.[employer]  · Call the toll free National Suicide Prevention Hotlines   · National Suicide Prevention Lifeline 121-363-ILRX (9087)  · National Hope Line Network 800-SUICIDE (209-8950)

## 2017-06-19 NOTE — CARE PLAN
Problem: Post Op Day 4 CABG/Heart Valve Replacement  Goal: Optimal care of the Post Op CABG/Heart Valve replacement Post Op Day 4  Intervention: Daily Weights  completed  Intervention: Shower daily and clean incisions twice daily with soap and water  completed  Intervention: Up in chair for all meals  completed  Intervention: Ambulate, increasing the distance each time x 3 and before bed  Ambulate x2 prior to discharge  Intervention: IS q 1 hour while awake and record best IS volume  2500  Intervention: Consider removal of shipman, chest tube and pacer wire if not already done  completed  Intervention: Discharge Education  Completed. Questions answered.  Intervention: Add special instructions for cardiac surgery discharge instructions - [facility] to after visit summary and review with patient  Completed.

## 2017-06-19 NOTE — PROGRESS NOTES
Cardiovascular Surgery Progress Note    Name: Jose A Ponce  MRN: [id number]  : 1960  Admit Date: 2017  5:08 AM  Procedure:  Procedure(s) and Anesthesia Type:     * MULTIPLE CORONARY ARTERY BYPASS x2 RIGHT LEG ENDO VEIN HARVEST - General     * HUBERT - INTRAOP - General  6 Day Post-Op    Vitals:  Patient Vitals for the past 8 hrs:   Temp SpO2 O2 Delivery O2 (LPM) Pulse Heart Rate (Monitored) Resp NIBP Weight O2 (FiO2)   17 0800 36.8 °C (98.3 °F) 93 % None (Room Air) - 93 - 16 103/72 mmHg - -   17 0745 - 95 % - 0 89 - 16 - - 21   17 0500 - - - - - - - - 80.8 kg (178 lb 2.1 oz) -   17 0400 37.2 °C (98.9 °F) 92 % None (Room Air) - 86 86 13 111/75 mmHg - -     Temp (24hrs), Av.2 °C (99 °F), Min:36.7 °C (98.1 °F), Max:37.9 °C (100.3 °F)      Respiratory:    Respiration: 16, Pulse Oximetry: 93 %, O2 Daily Delivery Respiratory : Room Air with O2 Available     Chest Tube Drains:          Fluids:    Intake/Output Summary (Last 24 hours) at 17 0829  Last data filed at 17 0800   Gross per 24 hour   Intake   2560 ml   Output   1650 ml   Net    910 ml     Admit weight: Weight: 80.4 kg (177 lb 4 oz)  Current weight: Weight: 80.8 kg (178 lb 2.1 oz) (17 0500)    Labs:  Recent Labs      17   0422  17   0407  17   0432   WBC  9.6  7.5  9.7   RBC  3.11*  3.10*  3.43*   HEMOGLOBIN  10.6*  10.4*  11.6*   HEMATOCRIT  30.5*  30.5*  33.9*   MCV  98.1*  98.4*  98.8*   MCH  34.1*  33.5*  33.8*   MCHC  34.8  34.1  34.2   RDW  44.5  44.2  44.3   PLATELETCT  231  286  338   MPV  9.8  9.4  9.2         Recent Labs      17   0422  17   0407  17   0432   SODIUM  139  134*  133*   POTASSIUM  3.8  3.6  4.4   CHLORIDE  102  98  101   CO2  26  27  24   GLUCOSE  118*  116*  117*   BUN  10  13  11   CREATININE  0.52  0.56  0.66   CALCIUM  8.7  8.8  9.3           Medications:  • potassium chloride (KCl)  20 mEq     • furosemide  20 mg     • morphine ER  15 mg     •  atorvastatin  80 mg     • docusate sodium  100 mg      And   • senna-docusate  1 Tab     • enoxaparin  40 mg     • metoprolol  25 mg     • aspirin EC  81 mg     • clopidogrel  75 mg         Exam:   Review of Systems   Constitutional: Negative.    HENT: Negative.    Eyes: Negative.    Respiratory: Negative.    Cardiovascular: Negative.    Gastrointestinal: Negative.    Genitourinary: Negative.    Musculoskeletal: Negative.    Skin: Negative.    Neurological: Negative.    Psychiatric/Behavioral: Negative.        Physical Exam   Constitutional: He is oriented to person, place, and time. He appears well-developed and well-nourished.   HENT:   Head: Normocephalic and atraumatic.   Eyes: Pupils are equal, round, and reactive to light.   Neck: Normal range of motion. Neck supple.   Cardiovascular: Normal rate and regular rhythm.    Pulmonary/Chest: Effort normal.   Decreased at bases.    Abdominal: Soft. Bowel sounds are normal.   Musculoskeletal: Normal range of motion.   Neurological: He is alert and oriented to person, place, and time.   Skin: Skin is warm and dry.   Wounds CDI   Psychiatric: He has a normal mood and affect.       Quality Measures:   EKG reviewed, Medications reviewed, Radiology images reviewed and Labs reviewed  Shipman catheter: No Shipman  Central line in place: Need for access    DVT Prophylaxis: Enoxaparin (Lovenox)  DVT prophylaxis - mechanical: SCDs  Ulcer prophylaxis: Yes    Assessed for rehab: Patient returned to prior level of function, rehabilitation not indicated at this time      Assessment/Plan:  POD 1 Extubated, HDS, no drips, chest tube output minimal and negative for air leak.  Neuro grossly intact.  Plan:  DC chest tubes and shipman.  Transition to tele status.   POD 2 HDS, SR, neuro intact, wounds CDI, prevena not working.  Will try resealing it.  IS/Amb.  CPM.  POD 3 HDS, NSR/ST.  Pain better controlled.  Prevena dressing with leak--DC, wash incisions soap/water, leave open to air.  3 L  NC. WTs up, start diuresis. 2 view CXR today.  Plan for Rehab in AM.  CPM.    POD 4 HDS, SR, neuro intact.  Prevena intact.  Wounds CDI.  Patient does not want to go to SNF.  He wants to go home.  There are some logistics issues such as he will need a ride home and help getting prescriptions.  Will discuss with SW.    POD 5 HDS, SR, neuro intact.  Prevena intact.  Home tomorrow.  Will fill rx at [facility] in [place] and have social work get him a taxi home.    POD 6 HDS< NSR, surgical incisions CDI, on RA, had BM< ambulating well min assist.  OK for home today.  F/U apointments arranged    Active Hospital Problems    Diagnosis   • CAD (coronary artery disease) [I25.10]

## 2017-06-19 NOTE — DISCHARGE SUMMARY
CHIEF COMPLAINT ON ADMISSION  No chief complaint on file.  Chest Pain    PREOPERATIVE DIAGNOSES:  Angina, coronary artery disease, hypertension,    dyslipidemia, hypercholesterolemia.      POSTOPERATIVE DIAGNOSES:  Angina, coronary artery disease, hypertension,    dyslipidemia, hypercholesterolemia.    CODE STATUS  Full Code    HPI & HOSPITAL COURSE  This is a 56 y.o. male here who came in with mild    chest pain.  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  He   had negative troponins.  An angiogram shows that he has a chronically    obstructed LAD and with reconstitution of flow from right collaterals.  His    right has a 50%-60% lesion.  His circumflex has a 50%-60% lesion.  His EF is    65%.  Mitral and aortic valves function normal.  Dr. Gray Barboza was consulted, and decision made to move forward with CABG. See procedure note below.    POD 1 Extubated, HDS, no drips, chest tube output minimal and negative for air leak.  Neuro grossly intact.  Plan:  DC chest tubes and shipman.  Transition to tele status.    POD 2 HDS, SR, neuro intact, wounds CDI, prevena not working.  Will try resealing it.  IS/Amb.  CPM.  POD 3 HDS, NSR/ST.  Pain better controlled.  Prevena dressing with leak--DC, wash incisions soap/water, leave open to air.  3 L NC. WTs up, start diuresis. 2 view CXR today.  Plan for Rehab in AM.  CPM.     POD 4 HDS, SR, neuro intact.  Prevena intact.  Wounds CDI.  Patient does not want to go to SNF.  He wants to go home.  There are some logistics issues such as he will need a ride home and help getting prescriptions.  Will discuss with SW.     POD 5 HDS, SR, neuro intact.  Prevena intact.  Home tomorrow.  Will fill rx at [facility] in [place] and have social work get him a taxi home.     POD 6 HDS< NSR, surgical incisions CDI, on RA, had BM< ambulating well min assist.  OK for home today.  F/U  apointments arranged      Therefore, he is discharged in good and stable condition with close outpatient follow-up.    SPECIFIC OUTPATIENT FOLLOW-UP  Cardiology  [facility]  Cardiac Rehab    DISCHARGE PROBLEM LIST  Active Problems:    CAD (coronary artery disease) POA: Unknown  Resolved Problems:    * No resolved hospital problems. *      FOLLOW UP  Future Appointments  Date Time Provider Department Center   6/27/2017 7:40 AM Cici Cruz, A.P.N. [facility] None     Gray Barboza M.D.  [address]  [facility] [address]  [phone]    On 7/24/2017  1:30 pm    Cici Cruz, A.P.N.  [address]  [facility] [id number] [phone]    On 6/27/2017  At 7:40 AM      MEDICATIONS ON DISCHARGE   Jose A Ponce   Home Medication Instructions MIGUE:[id number]    Printed on:06/19/17 0830   Medication Information                      aspirin EC (ECOTRIN) 81 MG Tablet Delayed Response  Take 1 Tab by mouth every day.             atorvastatin (LIPITOR) 80 MG tablet  Take 1 Tab by mouth every bedtime.             clopidogrel (PLAVIX) 75 MG Tab  Take 1 Tab by mouth every day.             docusate sodium 100 MG Cap  Take 100 mg by mouth every morning.             furosemide (LASIX) 20 MG Tab  Take 1 Tab by mouth every day.             hydrocodone-acetaminophen (NORCO) 5-325 MG Tab per tablet  Take 1-2 Tabs by mouth every 6 hours as needed.             Melatonin 10 MG Cap  Take 1 Cap by mouth every bedtime.             metoprolol (LOPRESSOR) 25 MG Tab  Take 1 Tab by mouth 2 Times a Day.             morphine ER (MS CONTIN) 15 MG Tab CR tablet  Take 1 Tab by mouth every 12 hours.             multivitamin (THERAGRAN) Tab  Take 1 Tab by mouth every day.             nitroglycerin (NITROSTAT) 0.4 MG SL Tab  Place 1 Tab under tongue as needed for Chest Pain.             potassium chloride SA (K-DUR) 10 MEQ Tab CR  Take 1 Tab by mouth every day.                 DIET  Orders Placed This Encounter   Procedures   • DIET  ORDER     Standing Status: Standing      Number of Occurrences: 1      Standing Expiration Date:      Order Specific Question:  Diet:     Answer:  Cardiac [6]     Order Specific Question:  Diet:     Answer:  Consistent Carbohydrate [4]     Use of narcotics and risks associated reviewed with patient.  [place]  checked.   Discussed weaning.    Patient verbalizes understanding.    ACTIVITY  Light duty.  No Driving x 1 month   No heavy lifting/pushing/pulling >10# x 3 months    Prevena dressing to be removed on POD 7 or 8, or when battery dies  Wash incisions soap/water, pat dry, do not use ointments  Report s/s infection including warmth/redness/oozing or fever/chill    CONSULTATIONS  Pulmonary    PROCEDURES  6/13/17     OPERATION:  Coronary artery bypass grafting x2, left FRANKIE to the distal LAD,    reverse saphenous vein graft to the distal diagonal, endoscopic vein harvest.    LABORATORY  Lab Results   Component Value Date/Time    SODIUM 133* 06/19/2017 04:32 AM    POTASSIUM 4.4 06/19/2017 04:32 AM    CHLORIDE 101 06/19/2017 04:32 AM    CO2 24 06/19/2017 04:32 AM    GLUCOSE 117* 06/19/2017 04:32 AM    BUN 11 06/19/2017 04:32 AM    CREATININE 0.66 06/19/2017 04:32 AM        Lab Results   Component Value Date/Time    WBC 9.7 06/19/2017 04:32 AM    HEMOGLOBIN 11.6* 06/19/2017 04:32 AM    HEMATOCRIT 33.9* 06/19/2017 04:32 AM    PLATELET COUNT 338 06/19/2017 04:32 AM      Discussed when to seek emergency medical care  Questions and concerns addressed.  Patient verbalizes understanding and agrees to plan of care    Total time of the discharge process exceeds 40 minutes

## 2017-06-19 NOTE — PROGRESS NOTES
Reviewed discharge instructions, called prescriptions down to pharmacy, PIV removed.    Discharge Instructions    Special equipment needed: Not Applicable    Be sure to schedule a follow-up appointment with your primary care doctor or any specialists as instructed.     Discharge Plan:   Smoking Cessation Offered: Patient Counseled  Influenza Vaccine Indication: Not indicated: Previously immunized this influenza season and > 8 years of age  Written Prescription Given to Patient / Caregiver by Nurse.  Medications called to [facility] pharmacy.    I understand that a diet low in cholesterol, fat, and sodium is recommended for good health. Unless I have been given specific instructions below for another diet, I accept this instruction as my diet prescription.   Other diet: none    Special Instructions: [facility] instructions (see chart)    Is patient discharged on Warfarin / Coumadin?   No

## 2017-06-19 NOTE — PROGRESS NOTES
Received report and assumed care of patient. Assessment complete, VSS, no complaints at this time. Pt up to bathroom with standby assist. Discussed plan of care with patient and answered all questions. Fall precautions in place and call light in reach. Will continue to monitor.

## 2017-06-19 NOTE — PROGRESS NOTES
Pulmonary Critical Care Progress Note        Date of Service 6/19/17    Chief Complaint: CAD    History of Present Illness: 56 y.o. male admitted for CABG     ROS:  Respiratory: negative,   Cardiac: negative,   GI: negative.    All other systems negative.    Interval Events:  24 hour interval history reviewed   Discharge today    PFSH:  No change.    Physical Exam:  General:  Awake, Alert, No distress, Up in chair eating breakfast   HEENT:  PERRL neck supple, trachea midline  CVS:  RRR, midline sternotomy well approximated, CDI   Respiratory:  Clear anteriorly, no wheezing  Abdomen:  Soft, nontender, bowel sounds present  Extremities:  Without edema or cyanosis, Right lower extremity EVH site CDI, distal pulses symmetric  Skin:  Cool, dry, no rash  Neuro/Psych:  NFE following all commands    Respiratory:     Pulse Oximetry: 95 %  Chest Tube Drains:          ImagingAvailable data reviewed         Invalid input(s): [id number]    HemoDynamics:  Pulse: 89, Heart Rate (Monitored): 86  NIBP: 111/75 mmHg        Imaging: Available data reviewed        Neuro:  GCS Total Julian Coma Score: 15       Imaging: Available data reviewed    Fluids:  Intake/Output       06/17/17 0700 - 06/18/17 0659 06/18/17 0700 - 06/19/17 0659 06/19/17 0700 - 06/20/17 0659      2276-2219 2661-2108 Total 2436-9678 0414-8806 Total 1320-5738 9271-7855 Total       Intake    P.O.  --  600 600  1900  720 2620  --  -- --    P.O. --  720 2620 -- -- --    Total Intake --  720 2620 -- -- --       Output    Urine  2125  1620 3745  900  750 1650  --  -- --    Number of Times Voided 1 x -- 1 x -- -- -- -- -- --    Void (ml) 2125 1620 3745  -- -- --    Total Output 2125 1620 3745  -- -- --       Net I/O     -2125 -1026 -4235 1000 -30 970 -- -- --        Weight: 80.8 kg (178 lb 2.1 oz)  Recent Labs      06/17/17   0422  06/18/17   0407  06/19/17   0432   SODIUM  139  134*  133*   POTASSIUM  3.8  3.6  4.4    CHLORIDE  102  98  101   CO2  26  27  24   BUN  10  13  11   CREATININE  0.52  0.56  0.66   CALCIUM  8.7  8.8  9.3       GI/Nutrition:  Imaging: Available data reviewed  taking PO  Liver Function  Recent Labs      17   0422  17   043   GLUCOSE  118*  116*  117*       Heme:  Recent Labs      17   043   RBC  3.11*  3.10*  3.43*   HEMOGLOBIN  10.6*  10.4*  11.6*   HEMATOCRIT  30.5*  30.5*  33.9*   PLATELETCT  231  286  338       Infectious Disease:  Temp  Av.3 °C (99.1 °F)  Min: 36.7 °C (98.1 °F)  Max: 37.9 °C (100.3 °F)  Micro: reviewed  Recent Labs      17   WBC  9.6  7.5  9.7     Current Facility-Administered Medications   Medication Dose Frequency Provider Last Rate Last Dose   • potassium chloride SA (Kdur) tablet 20 mEq  20 mEq BID Luis F Cano M.D.   20 mEq at 17   • furosemide (LASIX) injection 20 mg  20 mg BID DIURETIC Abril Leong, A.P.N.   20 mg at 17 0509   • morphine ER (MS CONTIN) tablet 15 mg  15 mg Q12HRS Abril Leong, A.P.N.   15 mg at 17   • lidocaine-prilocaine (EMLA) 2.5-2.5 % cream 1 Application  1 Application Once PRN Gray Barboza M.D.        Or   • lidocaine (XYLOCAINE) 1%  injection  0.5 mL Once PRN Gray Barboza M.D.       • atorvastatin (LIPITOR) tablet 80 mg  80 mg QHS Abril Leong, A.P.N.   80 mg at 17   • Respiratory Care per Protocol   Continuous RT Abril Leong, A.P.N.       • Pharmacy Consult Request ...Pain Management Review 1 Each  1 Each PRN Abril Leong, A.P.N.       • docusate sodium (COLACE) capsule 100 mg  100 mg QAM Abril Leong, A.P.N.   100 mg at 17 0824    And   • senna-docusate (PERICOLACE or SENOKOT S) 8.6-50 MG per tablet 1 Tab  1 Tab Nightly Abril Leong, A.P.N.   Stopped at 06/15/17 2100    And   • senna-docusate (PERICOLACE or SENOKOT S) 8.6-50 MG per tablet 1 Tab  1  Tab Q24HRS PRN Abril Leong, A.P.N.        And   • lactulose 20 GM/30ML solution 30 mL  30 mL Q24HRS PRN Abril Leong, A.P.N.        And   • bisacodyl (DULCOLAX) suppository 10 mg  10 mg Q24HRS PRN Abril Leong, A.P.N.        And   • fleet enema 133 mL  1 Each Once PRN Abril Leong, A.P.N.       • enoxaparin (LOVENOX) inj 40 mg  40 mg DAILY Abril Leong, A.P.N.   40 mg at 06/18/17 0911   • metoprolol (LOPRESSOR) tablet 25 mg  25 mg BID Abril Leong, A.P.N.   25 mg at 06/18/17 2033   • aspirin EC (ECOTRIN) tablet 81 mg  81 mg DAILY Abril Leong, A.P.N.   81 mg at 06/18/17 0911   • clopidogrel (PLAVIX) tablet 75 mg  75 mg DAILY Abril Leong, A.P.N.   75 mg at 06/18/17 0911   • oxycodone immediate release (ROXICODONE) tablet 5 mg  5 mg Q3HRS PRN Abril Leong, A.P.N.       • oxycodone immediate release (ROXICODONE) tablet 10 mg  10 mg Q3HRS PRN Abril Leong, A.P.N.   10 mg at 06/16/17 1058   • tramadol (ULTRAM) 50 MG tablet 50 mg  50 mg Q4HRS PRN Abril Leong, A.P.N.   50 mg at 06/14/17 0939   • ondansetron (ZOFRAN) syringe/vial injection 4 mg  4 mg Q6HRS PRN Abril Leong, A.P.N.        Or   • prochlorperazine (COMPAZINE) injection 10 mg  10 mg Q6HRS PRN Abril Leong, A.P.N.        Or   • promethazine (PHENERGAN) suppository 25 mg  25 mg Q6HRS PRN Abril Leong, A.P.N.       • acetaminophen (TYLENOL) tablet 650 mg  650 mg Q4HRS PRN Abril Leong, A.P.N.        Or   • acetaminophen (TYLENOL) suppository 650 mg  650 mg Q4HRS PRN Abril Leong, A.P.N.       • mag hydrox-al hydrox-simeth (MAALOX PLUS ES or MYLANTA DS) suspension 30 mL  30 mL Q4HRS PRN Abril Leong, A.P.N.       • diphenhydrAMINE (BENADRYL) tablet/capsule 25 mg  25 mg HS PRN - MR X 1 Abril Leong, A.P.N.   25 mg at 06/19/17 0112   • hydrocodone-acetaminophen (NORCO) 5-325 MG per tablet 1-2 Tab  1-2 Tab Q6HRS PRN Abril Leong, A.P.N.   2 Tab at 06/19/17 0509     Last reviewed on 6/13/2017  5:42 AM by  Helen Rodriguez R.N.    Quality  Measures:  Labs reviewed, Medications reviewed and Radiology images reviewed                      Assessment and Plan:  Atelectasis              - improved   - continue IS at home  Tiny right apical PTX - resolved  Status post 2-vessel coronary artery bypass grafting              - ASA, statin, Plavix, BB  Chronic diastolic heart failure              - grade I diastolic dysfunction  History of systemic arterial hypertension              - BB  Dyslipidemia - statin  History of tobacco abuse              - no underlying history of lung disease  Hypokalemia              - improved  Discussed patient condition and risk of morbidity and/or mortality with RN, RT, Pharmacy, Charge nurse / hot rounds and Patient.    Plan for d/c home soon     Pao [place]), am scribing for, and in the presence of, Dennis Sagastume M.D.    Electronically signed by: Pao Childs (De[place]), 6/19/2017    IDennis M.D. personally performed the services described in this documentation, as scribed by Pao Childs in my presence, and it is both accurate and complete.

## 2017-06-19 NOTE — PROGRESS NOTES
Patient refused transport, wheelchair, cab voucher. RN escorted patient to [facility] for prescriptions, patient refused further escort from [facility] to hospital exit.

## 2017-06-19 NOTE — CARE PLAN
Problem: Post Op Day 4 CABG/Heart Valve Replacement  Goal: Optimal care of the Post Op CABG/Heart Valve replacement Post Op Day 4  Intervention: Daily Weights  Done      Intervention: Up in chair for all meals  Pt up for breakfast  Intervention: Ambulate, increasing the distance each time x 3 and before bed  Pt ambulated around unit before breakfast. Pt is up self, ambulating around room.   Intervention: IS q 1 hour while awake and record best IS volume  IS at bedside. Pt getting up to 2100 on IS

## 2017-06-27 ENCOUNTER — OFFICE VISIT (OUTPATIENT)
Dept: CARDIOLOGY | Facility: MEDICAL CENTER | Age: 57
End: 2017-06-27
Payer: COMMERCIAL

## 2017-06-27 VITALS
HEART RATE: 103 BPM | HEIGHT: 74 IN | BODY MASS INDEX: 24.13 KG/M2 | WEIGHT: 188 LBS | SYSTOLIC BLOOD PRESSURE: 122 MMHG | DIASTOLIC BLOOD PRESSURE: 70 MMHG | OXYGEN SATURATION: 95 %

## 2017-06-27 DIAGNOSIS — E78.00 HYPERCHOLESTEROLEMIA: ICD-10-CM

## 2017-06-27 DIAGNOSIS — I25.118 CORONARY ARTERY DISEASE OF NATIVE ARTERY OF NATIVE HEART WITH STABLE ANGINA PECTORIS (HCC): ICD-10-CM

## 2017-06-27 DIAGNOSIS — Z95.1 S/P CABG X 2: ICD-10-CM

## 2017-06-27 DIAGNOSIS — Z72.0 TOBACCO USE: ICD-10-CM

## 2017-06-27 PROBLEM — R07.9 CHEST PAIN AT REST: Status: RESOLVED | Noted: 2017-06-03 | Resolved: 2017-06-27

## 2017-06-27 PROBLEM — R07.9 CHEST PAIN: Status: RESOLVED | Noted: 2017-06-01 | Resolved: 2017-06-27

## 2017-06-27 PROBLEM — R94.39 ABNORMAL CARDIOVASCULAR STRESS TEST: Status: RESOLVED | Noted: 2017-06-02 | Resolved: 2017-06-27

## 2017-06-27 PROBLEM — R94.30 ABNORMAL CARDIAC FUNCTION TEST: Status: RESOLVED | Noted: 2017-06-03 | Resolved: 2017-06-27

## 2017-06-27 PROCEDURE — 93000 ELECTROCARDIOGRAM COMPLETE: CPT | Performed by: NURSE PRACTITIONER

## 2017-06-27 PROCEDURE — 99214 OFFICE O/P EST MOD 30 MIN: CPT | Performed by: NURSE PRACTITIONER

## 2017-06-27 RX ORDER — METOPROLOL TARTRATE 50 MG/1
50 TABLET, FILM COATED ORAL 2 TIMES DAILY
Qty: 60 TAB | Refills: 6 | Status: SHIPPED | OUTPATIENT
Start: 2017-06-27 | End: 2017-08-03 | Stop reason: SDUPTHER

## 2017-06-27 ASSESSMENT — ENCOUNTER SYMPTOMS
PALPITATIONS: 0
DIZZINESS: 0
SHORTNESS OF BREATH: 0
BRUISES/BLEEDS EASILY: 0
CHILLS: 0
PND: 0
MYALGIAS: 0
FEVER: 0
HEADACHES: 0
ABDOMINAL PAIN: 0
LOSS OF CONSCIOUSNESS: 0
ORTHOPNEA: 0

## 2017-06-27 NOTE — PROGRESS NOTES
Subjective:   Jose A Ponce is a 56 y.o. male who presents today for hospital FU of CABG x 2.    Jose A is a 56 year old male with no significant past medical or cardiac history, who presented to [facility] on 6/1/2017 with chest pain. He did have an abnormal stress test, and was transferred to [facility] for coronary angiogram. This revealed total occlusion of the LAD, and he was referred for CABG. CABG x 2 was done on 6/13/2017 by Dr. Barboza, and he clinical outcome was favorable. Medical therapy was adjusted.    He is here today for follow-up. Generally, he is doing well. No overt chest pain, pressure or discomfort; mild incisional pain, but getting better. No palpitations or fluttering of his heart; no shortness of breath, orthopnea or PND; no dizziness, lightheadedness, or syncope; no edema.  He states he has always had a higher resting HR. He is not smoking.    Past Medical History   Diagnosis Date   • Hx of hernia repair      Mesh   • CAD (coronary artery disease)    • S/P CABG x 2 June 2017     CABG x 2 (LIMA to distal LAD, rSVG to distal diagonal) by Dr. Barboza.   • Hyperlipidemia    • Tobacco use    • Grief reaction      Past Surgical History   Procedure Laterality Date   • Inguinal hernia repair Bilateral 2000   • Tibia orif Right 1980s   • Multiple coronary artery bypass endo vein harvest  6/13/2017     Procedure: MULTIPLE CORONARY ARTERY BYPASS x2 RIGHT LEG ENDO VEIN HARVEST;  Surgeon: Gray Barboza M.D.;  Location: [facility];  Service:    • Sal  6/13/2017     Procedure: SAL - INTRAOP;  Surgeon: Gray Barboza M.D.;  Location: SURGERY [facility];  Service:      Family History   Problem Relation Age of Onset   • Heart Disease Maternal Grandfather    • Cancer Mother    • Cancer Father      History   Smoking status   • Former Smoker   • Quit date: 06/01/2016   Smokeless tobacco   • Never Used     No Known Allergies  Outpatient Encounter Prescriptions as of 6/27/2017   Medication  "Sig Dispense Refill   • metoprolol (LOPRESSOR) 50 MG Tab Take 1 Tab by mouth 2 times a day. 60 Tab 6   • morphine ER (MS CONTIN) 15 MG Tab CR tablet Take 1 Tab by mouth every 12 hours. 60 Tab 0   • docusate sodium 100 MG Cap Take 100 mg by mouth every morning. 60 Cap 0   • clopidogrel (PLAVIX) 75 MG Tab Take 1 Tab by mouth every day. 30 Tab 3   • atorvastatin (LIPITOR) 80 MG tablet Take 1 Tab by mouth every bedtime. 30 Tab 11   • aspirin EC (ECOTRIN) 81 MG Tablet Delayed Response Take 1 Tab by mouth every day. 30 Tab 11   • multivitamin (THERAGRAN) Tab Take 1 Tab by mouth every day.     • Melatonin 10 MG Cap Take 1 Cap by mouth every bedtime.     • hydrocodone-acetaminophen (NORCO) 5-325 MG Tab per tablet Take 1-2 Tabs by mouth every 6 hours as needed. 45 Tab 0   • [DISCONTINUED] furosemide (LASIX) 20 MG Tab Take 1 Tab by mouth every day. 30 Tab 3   • [DISCONTINUED] potassium chloride SA (K-DUR) 10 MEQ Tab CR Take 1 Tab by mouth every day. 30 Tab 3   • nitroglycerin (NITROSTAT) 0.4 MG SL Tab Place 1 Tab under tongue as needed for Chest Pain. 25 Tab 0   • [DISCONTINUED] metoprolol (LOPRESSOR) 25 MG Tab Take 1 Tab by mouth 2 Times a Day. 60 Tab 3     No facility-administered encounter medications on file as of 6/27/2017.     Review of Systems   Constitutional: Negative for fever and chills.   Respiratory: Negative for shortness of breath.    Cardiovascular: Negative for chest pain, palpitations, orthopnea, leg swelling and PND.   Gastrointestinal: Negative for abdominal pain.   Musculoskeletal: Negative for myalgias.   Neurological: Negative for dizziness, loss of consciousness and headaches.   Endo/Heme/Allergies: Does not bruise/bleed easily.        Objective:   /70 mmHg  Pulse 103  Ht 1.88 m (6' 2\")  Wt 85.276 kg (188 lb)  BMI 24.13 kg/m2  SpO2 95%    Physical Exam   Constitutional: He is oriented to person, place, and time. He appears well-developed and well-nourished. No distress.   HENT:   Head: " Normocephalic.   Eyes: Conjunctivae and EOM are normal.   Neck: Normal range of motion. Neck supple. No JVD present.   Cardiovascular: Normal rate, regular rhythm, normal heart sounds and intact distal pulses.  Exam reveals no gallop and no friction rub.    No murmur heard.  Pulmonary/Chest: Effort normal and breath sounds normal.   Sternotomy incision is healing well; no redness, swelling or drainage noted.   Abdominal: Soft. Bowel sounds are normal.   Musculoskeletal: Normal range of motion. He exhibits no edema.   Neurological: He is alert and oriented to person, place, and time.   Skin: Skin is warm and dry.   Psychiatric: He has a normal mood and affect. His behavior is normal.     EKG today reveals sinus rhythm at 98 bpm.    DATE OF SERVICE:  06/13/2017  POSTOPERATIVE DIAGNOSES:  Angina, coronary artery disease, hypertension,    dyslipidemia, hypercholesterolemia.  OPERATION:  Coronary artery bypass grafting x2, left FRANKIE to the distal LAD,    reverse saphenous vein graft to the distal diagonal, endoscopic vein harvest.       Lab Results   Component Value Date/Time    CHOLESTEROL,* 06/01/2017 05:29 PM    * 06/01/2017 05:29 PM    HDL 54 06/01/2017 05:29 PM    TRIGLYCERIDES 128 06/01/2017 05:29 PM       Lab Results   Component Value Date/Time    SODIUM 133* 06/19/2017 04:32 AM    POTASSIUM 4.4 06/19/2017 04:32 AM    CHLORIDE 101 06/19/2017 04:32 AM    CO2 24 06/19/2017 04:32 AM    GLUCOSE 117* 06/19/2017 04:32 AM    BUN 11 06/19/2017 04:32 AM    CREATININE 0.66 06/19/2017 04:32 AM     Lab Results   Component Value Date/Time    ALKALINE PHOSPHATASE 89 06/12/2017 02:05 PM    AST(SGOT) 15 06/12/2017 02:05 PM    ALT(SGPT) 16 06/12/2017 02:05 PM    TOTAL BILIRUBIN 1.7* 06/12/2017 02:05 PM          Assessment:     1. Coronary artery disease of native artery of native heart with stable angina pectoris (CMS-HCC)  [facility] EKG (Clinic Performed)    metoprolol (LOPRESSOR) 50 MG Tab   2. S/P CABG x 2      3. Hypercholesterolemia     4. Tobacco use         Medical Decision Making:  Today's Assessment / Status / Plan:     1. CAD, status post CABG x 2, stable.  His resting HR is a little high, so will try to increase the Metoprolol to 50mg BID.  He can stop his Lasix and potassium. Cardiac rehab is offered, but he declines.  He does see Dr. Barboza in a month.  To FU with Dr. BHARATHI Esquivel in 2 months, as he prefers the [facility] office.    2. Hyperlipidemia, treated with Lipitor. To repeat lipid panel at next follow-up.    3. Tobacco use, currently not smoking.    Medication changes as above. Keep FU with other providers. FU with Dr. BHARATHI Esquivel in 2 months; FU sooner if clinical condition changes.    Collaborating MD: Frances

## 2017-06-27 NOTE — MR AVS SNAPSHOT
"        Jose A Ponce   2017 7:40 AM   Office Visit   MRN: [id number]    Department:  [facility]   Dept Phone:  [phone]    Description:  Male : 1960   Provider:  NADIYA HaroPDEREJE.           Reason for Visit     Hospital Follow-up           Allergies as of 2017     No Known Allergies      You were diagnosed with     Coronary artery disease of native artery of native heart with stable angina pectoris (CMS-[facility])   [6031098]       S/P CABG x 2   [516818]       Hypercholesterolemia   [962736]       Tobacco use   [486396]         Vital Signs     Blood Pressure Pulse Height Weight Body Mass Index Oxygen Saturation    122/70 mmHg 103 1.88 m (6' 2\") 85.276 kg (188 lb) 24.13 kg/m2 95%    Smoking Status                   Former Smoker           Basic Information     Date Of Birth Sex Race Ethnicity Preferred Language    1960 Male White Non- English      Your appointments     Sep 01, 2017  8:15 AM   PREVIOUS PATIENT with Brai Esquivel M.D.   [facility] (--)    [address], [address]  [place] [address] [phone]              Problem List              ICD-10-CM Priority Class Noted - Resolved    Tobacco use Z72.0 High  6/3/2017 - Present    Grief reaction F43.20   6/3/2017 - Present    Hypercholesterolemia E78.00 High  6/3/2017 - Present    CAD (coronary artery disease) I25.10 High  2017 - Present    S/P CABG x 2 Z95.1 High  2017 - Present      Health Maintenance        Date Due Completion Dates    IMM DTaP/Tdap/Td Vaccine (1 - Tdap) 1979 ---    IMM PNEUMOCOCCAL 19-64 (ADULT) MEDIUM RISK SERIES (1 of 1 - PPSV23) 1979 ---    COLONOSCOPY 2010 ---            Results       Current Immunizations     Influenza Vaccine Quad Inj (Pf) 10/1/2016      Below and/or attached are the medications your provider expects you to take. Review all of your home medications and newly ordered medications with your provider and/or " pharmacist. Follow medication instructions as directed by your provider and/or pharmacist. Please keep your medication list with you and share with your provider. Update the information when medications are discontinued, doses are changed, or new medications (including over-the-counter products) are added; and carry medication information at all times in the event of emergency situations     Allergies:  No Known Allergies          Medications  Valid as of: June 27, 2017 -  8:30 AM    Generic Name Brand Name Tablet Size Instructions for use    Aspirin (Tablet Delayed Response) ECOTRIN 81 MG Take 1 Tab by mouth every day.        Atorvastatin Calcium (Tab) LIPITOR 80 MG Take 1 Tab by mouth every bedtime.        Clopidogrel Bisulfate (Tab) PLAVIX 75 MG Take 1 Tab by mouth every day.        Docusate Sodium (Cap)  MG Take 100 mg by mouth every morning.        Hydrocodone-Acetaminophen (Tab) NORCO 5-325 MG Take 1-2 Tabs by mouth every 6 hours as needed.        Melatonin (Cap) Melatonin 10 MG Take 1 Cap by mouth every bedtime.        Metoprolol Tartrate (Tab) LOPRESSOR 50 MG Take 1 Tab by mouth 2 times a day.        Morphine Sulfate (Tab CR) MS CONTIN 15 MG Take 1 Tab by mouth every 12 hours.        Multiple Vitamin (Tab) THERAGRAN  Take 1 Tab by mouth every day.        Nitroglycerin (SL Tab) NITROSTAT 0.4 MG Place 1 Tab under tongue as needed for Chest Pain.        .                 Medicines prescribed today were sent to:     [id number] - [place], [place] - [address]    [address] [address]    Phone: [phone] Fax: [phone]    Open 24 Hours?: No      Medication refill instructions:       If your prescription bottle indicates you have medication refills left, it is not necessary to call your provider’s office. Please contact your pharmacy and they will refill your medication.    If your prescription bottle indicates you do not have any refills left, you may request refills at any time through one  of the following ways: The online [employer] system (except Urgent Care), by calling your provider’s office, or by asking your pharmacy to contact your provider’s office with a refill request. Medication refills are processed only during regular business hours and may not be available until the next business day. Your provider may request additional information or to have a follow-up visit with you prior to refilling your medication.   *Please Note: Medication refills are assigned a new Rx number when refilled electronically. Your pharmacy may indicate that no refills were authorized even though a new prescription for the same medication is available at the pharmacy. Please request the medicine by name with the pharmacy before contacting your provider for a refill.           [employer] Access Code: Activation code not generated  Current [employer] Status: Active

## 2017-06-28 LAB — EKG IMPRESSION: NORMAL

## 2017-07-18 ENCOUNTER — APPOINTMENT (OUTPATIENT)
Dept: RADIOLOGY | Facility: MEDICAL CENTER | Age: 57
End: 2017-07-18
Attending: EMERGENCY MEDICINE
Payer: COMMERCIAL

## 2017-07-18 ENCOUNTER — HOSPITAL ENCOUNTER (EMERGENCY)
Facility: MEDICAL CENTER | Age: 57
End: 2017-07-18
Attending: EMERGENCY MEDICINE
Payer: COMMERCIAL

## 2017-07-18 VITALS
TEMPERATURE: 98.8 F | OXYGEN SATURATION: 97 % | HEART RATE: 81 BPM | WEIGHT: 182.1 LBS | DIASTOLIC BLOOD PRESSURE: 92 MMHG | SYSTOLIC BLOOD PRESSURE: 147 MMHG | HEIGHT: 74 IN | RESPIRATION RATE: 16 BRPM | BODY MASS INDEX: 23.37 KG/M2

## 2017-07-18 DIAGNOSIS — L03.313 CELLULITIS OF CHEST WALL: ICD-10-CM

## 2017-07-18 LAB
ALBUMIN SERPL BCP-MCNC: 4 G/DL (ref 3.2–4.9)
ALBUMIN/GLOB SERPL: 1.1 G/DL
ALP SERPL-CCNC: 88 U/L (ref 30–99)
ALT SERPL-CCNC: 13 U/L (ref 2–50)
ANION GAP SERPL CALC-SCNC: 9 MMOL/L (ref 0–11.9)
APTT PPP: 36 SEC (ref 24.7–36)
AST SERPL-CCNC: 14 U/L (ref 12–45)
BASOPHILS # BLD AUTO: 1.3 % (ref 0–1.8)
BASOPHILS # BLD: 0.09 K/UL (ref 0–0.12)
BILIRUB SERPL-MCNC: 0.6 MG/DL (ref 0.1–1.5)
BLOOD CULTURE HOLD CXBCH: NORMAL
BNP SERPL-MCNC: 143 PG/ML (ref 0–100)
BUN SERPL-MCNC: 12 MG/DL (ref 8–22)
CALCIUM SERPL-MCNC: 9.5 MG/DL (ref 8.5–10.5)
CHLORIDE SERPL-SCNC: 106 MMOL/L (ref 96–112)
CO2 SERPL-SCNC: 22 MMOL/L (ref 20–33)
CREAT SERPL-MCNC: 0.64 MG/DL (ref 0.5–1.4)
EKG IMPRESSION: NORMAL
EOSINOPHIL # BLD AUTO: 0.15 K/UL (ref 0–0.51)
EOSINOPHIL NFR BLD: 2.2 % (ref 0–6.9)
ERYTHROCYTE [DISTWIDTH] IN BLOOD BY AUTOMATED COUNT: 50.9 FL (ref 35.9–50)
GFR SERPL CREATININE-BSD FRML MDRD: >60 ML/MIN/1.73 M 2
GLOBULIN SER CALC-MCNC: 3.8 G/DL (ref 1.9–3.5)
GLUCOSE SERPL-MCNC: 106 MG/DL (ref 65–99)
HCT VFR BLD AUTO: 37.3 % (ref 42–52)
HGB BLD-MCNC: 12.3 G/DL (ref 14–18)
IMM GRANULOCYTES # BLD AUTO: 0.01 K/UL (ref 0–0.11)
IMM GRANULOCYTES NFR BLD AUTO: 0.1 % (ref 0–0.9)
INR PPP: 1.05 (ref 0.87–1.13)
LYMPHOCYTES # BLD AUTO: 1.63 K/UL (ref 1–4.8)
LYMPHOCYTES NFR BLD: 24.3 % (ref 22–41)
MCH RBC QN AUTO: 33.3 PG (ref 27–33)
MCHC RBC AUTO-ENTMCNC: 33 G/DL (ref 33.7–35.3)
MCV RBC AUTO: 101.1 FL (ref 81.4–97.8)
MONOCYTES # BLD AUTO: 0.63 K/UL (ref 0–0.85)
MONOCYTES NFR BLD AUTO: 9.4 % (ref 0–13.4)
NEUTROPHILS # BLD AUTO: 4.19 K/UL (ref 1.82–7.42)
NEUTROPHILS NFR BLD: 62.7 % (ref 44–72)
NRBC # BLD AUTO: 0 K/UL
NRBC BLD AUTO-RTO: 0 /100 WBC
PLATELET # BLD AUTO: 244 K/UL (ref 164–446)
PMV BLD AUTO: 9.7 FL (ref 9–12.9)
POTASSIUM SERPL-SCNC: 4.2 MMOL/L (ref 3.6–5.5)
PROT SERPL-MCNC: 7.8 G/DL (ref 6–8.2)
PROTHROMBIN TIME: 14 SEC (ref 12–14.6)
RBC # BLD AUTO: 3.69 M/UL (ref 4.7–6.1)
SODIUM SERPL-SCNC: 137 MMOL/L (ref 135–145)
TROPONIN I SERPL-MCNC: <0.01 NG/ML (ref 0–0.04)
WBC # BLD AUTO: 6.7 K/UL (ref 4.8–10.8)

## 2017-07-18 PROCEDURE — 84484 ASSAY OF TROPONIN QUANT: CPT

## 2017-07-18 PROCEDURE — 93005 ELECTROCARDIOGRAM TRACING: CPT

## 2017-07-18 PROCEDURE — 85610 PROTHROMBIN TIME: CPT

## 2017-07-18 PROCEDURE — 80053 COMPREHEN METABOLIC PANEL: CPT

## 2017-07-18 PROCEDURE — 36415 COLL VENOUS BLD VENIPUNCTURE: CPT

## 2017-07-18 PROCEDURE — 85730 THROMBOPLASTIN TIME PARTIAL: CPT

## 2017-07-18 PROCEDURE — 71020 DX-CHEST-2 VIEWS: CPT

## 2017-07-18 PROCEDURE — 83880 ASSAY OF NATRIURETIC PEPTIDE: CPT

## 2017-07-18 PROCEDURE — 99284 EMERGENCY DEPT VISIT MOD MDM: CPT

## 2017-07-18 PROCEDURE — 700105 HCHG RX REV CODE 258: Performed by: EMERGENCY MEDICINE

## 2017-07-18 PROCEDURE — 85025 COMPLETE CBC W/AUTO DIFF WBC: CPT

## 2017-07-18 RX ORDER — SODIUM CHLORIDE 9 MG/ML
INJECTION, SOLUTION INTRAVENOUS CONTINUOUS
Status: DISCONTINUED | OUTPATIENT
Start: 2017-07-18 | End: 2017-07-18 | Stop reason: HOSPADM

## 2017-07-18 RX ORDER — SULFAMETHOXAZOLE AND TRIMETHOPRIM 800; 160 MG/1; MG/1
1 TABLET ORAL 2 TIMES DAILY
Qty: 14 TAB | Refills: 0 | Status: SHIPPED | OUTPATIENT
Start: 2017-07-18 | End: 2017-07-25

## 2017-07-18 RX ORDER — AMOXICILLIN AND CLAVULANATE POTASSIUM 875; 125 MG/1; MG/1
1 TABLET, FILM COATED ORAL 2 TIMES DAILY
Qty: 14 TAB | Refills: 0 | Status: SHIPPED | OUTPATIENT
Start: 2017-07-18 | End: 2017-07-25

## 2017-07-18 RX ADMIN — SODIUM CHLORIDE: 9 INJECTION, SOLUTION INTRAVENOUS at 06:25

## 2017-07-18 ASSESSMENT — PAIN SCALES - GENERAL
PAINLEVEL_OUTOF10: 5
PAINLEVEL_OUTOF10: 4

## 2017-07-18 ASSESSMENT — LIFESTYLE VARIABLES: DO YOU DRINK ALCOHOL: NO

## 2017-07-18 NOTE — ED PROVIDER NOTES
"ED Provider Note    Scribed for Ross Quintanilla M.D. by Mark Ferguson. 7/18/2017  6:03 AM    Primary care provider: Pcp Pt States None  Means of arrival: Walk in  History obtained from: Patient  History limited by: None    CHIEF COMPLAINT  Chief Complaint   Patient presents with   • Wound Check     Open heart surgery on 6/13/17   • Incisional Pain     redness and pain       HPI  Jose A Ponce is a 56 y.o. male who presents to the Emergency Department complaining of incisional pain onset 3-4 days ago. Patient had open heart surgery on 6/13/17. He currently reports noticing increased redness and discharge to the incisional wound on his chest which started 3-4 days ago. He reports experiencing associated right sided chest pain exacerbated with deep breaths. He describes the chest pain as \"deep.\" Patient denies any associated sputum production.  He does not report any recent fevers. Patient mentions one week prior to his surgery, he woke up one morning not feeling well. His blood pressure was 209/119 when he arrived at [facility] ER at that time. Patient had a stress test and angiogram back then in which he was informed there was a blockage. He also mentions having shortness of breath at that time. Since having the surgery, his shortness of breath has improved. Patient denies having recent angina but is not sure if he has had a history of angina.    REVIEW OF SYSTEMS  Pertinent positives include pain, discharge, and redness to incisional site on chest, right-sided chest pain. Pertinent negatives include no sputum production, recent fevers.  All other systems reviewed and negative.  C    PAST MEDICAL HISTORY   has a past medical history of hernia repair; CAD (coronary artery disease); S/P CABG x 2 (June 2017); Hyperlipidemia; Tobacco use; and Grief reaction.    SURGICAL HISTORY   has past surgical history that includes inguinal hernia repair (Bilateral, 2000); tibia orif (Right, 1980s); multiple coronary artery " "bypass endo vein harvest (6/13/2017); and yarely (6/13/2017).    SOCIAL HISTORY  Social History   Substance Use Topics   • Smoking status: Former Smoker     Quit date: 06/01/2016   • Smokeless tobacco: Never Used   • Alcohol Use: 0.0 oz/week     0 Standard drinks or equivalent per week      Comment: occaisonal      History   Drug Use No       FAMILY HISTORY  Family History   Problem Relation Age of Onset   • Heart Disease Maternal Grandfather    • Cancer Mother    • Cancer Father        CURRENT MEDICATIONS  No current facility-administered medications on file prior to encounter.     Current Outpatient Prescriptions on File Prior to Encounter   Medication Sig Dispense Refill   • metoprolol (LOPRESSOR) 50 MG Tab Take 1 Tab by mouth 2 times a day. 60 Tab 6   • hydrocodone-acetaminophen (NORCO) 5-325 MG Tab per tablet Take 1-2 Tabs by mouth every 6 hours as needed. 45 Tab 0   • morphine ER (MS CONTIN) 15 MG Tab CR tablet Take 1 Tab by mouth every 12 hours. 60 Tab 0   • docusate sodium 100 MG Cap Take 100 mg by mouth every morning. 60 Cap 0   • clopidogrel (PLAVIX) 75 MG Tab Take 1 Tab by mouth every day. 30 Tab 3   • nitroglycerin (NITROSTAT) 0.4 MG SL Tab Place 1 Tab under tongue as needed for Chest Pain. 25 Tab 0   • atorvastatin (LIPITOR) 80 MG tablet Take 1 Tab by mouth every bedtime. 30 Tab 11   • aspirin EC (ECOTRIN) 81 MG Tablet Delayed Response Take 1 Tab by mouth every day. 30 Tab 11   • multivitamin (THERAGRAN) Tab Take 1 Tab by mouth every day.     • Melatonin 10 MG Cap Take 1 Cap by mouth every bedtime.        ALLERGIES  No Known Allergies    PHYSICAL EXAM  VITAL SIGNS: /92 mmHg  Pulse 90  Temp(Src) 37.1 °C (98.8 °F)  Resp 16  Ht 1.88 m (6' 2\")  Wt 82.6 kg (182 lb 1.6 oz)  BMI 23.37 kg/m2  SpO2 99%  Vital signs reviewed.  Constitutional: Pleasant middle-aged male  Head: Normocephalic atraumatic  Eyes: PERRL  Mouth/Throat: Gray spot over central area of uvula  Neck: Supple  Cardiovascular: Regular " rate and rhythm  Pulmonary/Chest: Clear to auscultation bilaterally, No respiratory distress, No wheezing, No crackles  Abdominal: Reproducible periumbilical hernia  Musculoskeletal: No edema. Pulses intact. Ganglion cyst to left wrist  Lymphadenopathy: No lymphadenopathy noted  Neurological: Normal strength, sensation, and relexes  Skin: Pale but dry. Mild erythema over top half of incisional wound on chest  Psychiatric: Awake alert oriented ×3. Pleasant smiling and conversant.    LABS  Results for orders placed or performed during the hospital encounter of 07/18/17   CBC with Differential   Result Value Ref Range    WBC 6.7 4.8 - 10.8 K/uL    RBC 3.69 (L) 4.70 - 6.10 M/uL    Hemoglobin 12.3 (L) 14.0 - 18.0 g/dL    Hematocrit 37.3 (L) 42.0 - 52.0 %    .1 (H) 81.4 - 97.8 fL    MCH 33.3 (H) 27.0 - 33.0 pg    MCHC 33.0 (L) 33.7 - 35.3 g/dL    RDW 50.9 (H) 35.9 - 50.0 fL    Platelet Count 244 164 - 446 K/uL    MPV 9.7 9.0 - 12.9 fL    Neutrophils-Polys 62.70 44.00 - 72.00 %    Lymphocytes 24.30 22.00 - 41.00 %    Monocytes 9.40 0.00 - 13.40 %    Eosinophils 2.20 0.00 - 6.90 %    Basophils 1.30 0.00 - 1.80 %    Immature Granulocytes 0.10 0.00 - 0.90 %    Nucleated RBC 0.00 /100 WBC    Neutrophils (Absolute) 4.19 1.82 - 7.42 K/uL    Lymphs (Absolute) 1.63 1.00 - 4.80 K/uL    Monos (Absolute) 0.63 0.00 - 0.85 K/uL    Eos (Absolute) 0.15 0.00 - 0.51 K/uL    Baso (Absolute) 0.09 0.00 - 0.12 K/uL    Immature Granulocytes (abs) 0.01 0.00 - 0.11 K/uL    NRBC (Absolute) 0.00 K/uL   Complete Metabolic Panel (CMP)   Result Value Ref Range    Sodium 137 135 - 145 mmol/L    Potassium 4.2 3.6 - 5.5 mmol/L    Chloride 106 96 - 112 mmol/L    Co2 22 20 - 33 mmol/L    Anion Gap 9.0 0.0 - 11.9    Glucose 106 (H) 65 - 99 mg/dL    Bun 12 8 - 22 mg/dL    Creatinine 0.64 0.50 - 1.40 mg/dL    Calcium 9.5 8.5 - 10.5 mg/dL    AST(SGOT) 14 12 - 45 U/L    ALT(SGPT) 13 2 - 50 U/L    Alkaline Phosphatase 88 30 - 99 U/L    Total Bilirubin 0.6  0.1 - 1.5 mg/dL    Albumin 4.0 3.2 - 4.9 g/dL    Total Protein 7.8 6.0 - 8.2 g/dL    Globulin 3.8 (H) 1.9 - 3.5 g/dL    A-G Ratio 1.1 g/dL   Btype Natriuretic Peptide (BNP)   Result Value Ref Range    B Natriuretic Peptide 143 (H) 0 - 100 pg/mL   Prothrombin Time (PT/INR)   Result Value Ref Range    PT 14.0 12.0 - 14.6 sec    INR 1.05 0.87 - 1.13   APTT   Result Value Ref Range    APTT 36.0 24.7 - 36.0 sec   Troponin STAT   Result Value Ref Range    Troponin I <0.01 0.00 - 0.04 ng/mL   ESTIMATED GFR   Result Value Ref Range    GFR If African American >60 >60 mL/min/1.73 m 2    GFR If Non African American >60 >60 mL/min/1.73 m 2   BLOOD CULTURE,HOLD   Result Value Ref Range    Blood Culture Hold Collected    EKG (NOW)   Result Value Ref Range    Report       [facility] Emergency Dept.    Test Date:  2017  Pt Name:    CESAR MASTERS                Department: ER  MRN:        [id number]                      Room:  Gender:                                 Technician: [id number]  :        1960                   Requested By:ER TRIAGE PROTOCOL  Order #:    [id number]                    Reading MD:    Measurements  Intervals                                Axis  Rate:       88                           P:          28  AK:         144                          QRS:        15  QRSD:       90                           T:          -45  QT:         356  QTc:        431    Interpretive Statements  SINUS RHYTHM  PROBABLE LEFT ATRIAL ABNORMALITY  NONSPECIFIC T ABNORMALITIES, INFERIOR LEADS  Compared to ECG 2017 08:13:22  No significant changes        All labs reviewed by me.      EKG: Normal sinus rhythm. Normal P waves. Normal QRS. Nonspecific inferior T-wave changes. No acute change. Borderline EKG    RADIOLOGY  DX-CHEST-2 VIEWS   Final Result      1.  Minimal LEFT lung base atelectasis or scar.   2.  No pneumonia or overt pulmonary edema.   3.  Stable mild cardiomegaly.        The radiologist's  interpretation of all radiological studies have been reviewed by me.    COURSE & MEDICAL DECISION MAKING  Pertinent Labs & Imaging studies reviewed. (See chart for details) The patient's [facility] and past medical  records were reviewed which shows the patient was admitted on 6/1/17 with chest pain and EKG changes.    6:03 AM - Patient seen and examined at bedside. The patient will be resuscitated with NS IV due to patient being NPO. Ordered DX chest, CBC, CMP, BNP, PT/INR, APTT, troponin stat, EKG to evaluate his symptoms. The differential diagnoses include but are not limited to: wound infection vs pneumonia vs pneumothorax    7:15 Paged cardiothoracic     7:23 AM - I discussed the patient's case and the above findings with cardiothoracic who will follow patient for cellulitis. Patient will be started on antibiotics. They will also look at his uvula lesion. If they did not feel it's related to endotracheal intubation they'll refer him to oral surgery or ENT. I also discussed the importance of this lesion with the patient and that he needed a follow-up.    7:34 AM Patient rechecked at bedside. The patient was updated on diagnostic test results as seen above. The patient will be discharged, status improved, with instructions regarding supportive care and medications including bactrim DS and augmentin. Instructions were given for appropriate follow-up. Discussed indications for seeking immediate medical attention. Patient was given the opportunity for questions. The patient understands and agrees.       The patient will return for new or worsening symptoms and is stable at the time of discharge.    The patient is referred to a primary physician for blood pressure management, diabetic screening, and for all other preventative health concerns.    DISPOSITION:  Patient will be discharged home in stable condition.    FOLLOW UP:  Gray Barboza M.D.  [address] [place] [address] [address]  [address]  [place] [address]  [phone]    In 2  days  This week      OUTPATIENT MEDICATIONS:  New Prescriptions    AMOXICILLIN-CLAVULANATE (AUGMENTIN) 875-125 MG TAB    Take 1 Tab by mouth 2 times a day for 7 days.    SULFAMETHOXAZOLE-TRIMETHOPRIM (BACTRIM DS) 800-160 MG TABLET    Take 1 Tab by mouth 2 times a day for 7 days.          FINAL IMPRESSION  1. Cellulitis of chest wall          Mark CANADA (Scribe), am scribing for, and in the presence of, Ross Quintanilla M.D..    Electronically signed by: Mark Ferguson (Scribe), 7/18/2017    IRoss M.D. personally performed the services described in this documentation, as scribed by Mark Ferguson in my presence, and it is both accurate and complete.    The note accurately reflects work and decisions made by me.  Ross Quintanilla  7/18/2017  1:45 PM

## 2017-07-18 NOTE — ED AVS SNAPSHOT
Home Care Instructions                                                                                                                Jose A Ponce   MRN: [id number]    Department:  [facility], Emergency Dept   Date of Visit:  7/18/2017            [facility], Emergency Dept    [address]    [place] [address]    Phone:  [phone]      You were seen by     Ross Quintanilla M.D.      Your Diagnosis Was     Cellulitis of chest wall     L03.313       These are the medications you received during your hospitalization from 07/18/2017 0541 to 07/18/2017 0738     Date/Time Order Dose Route Action    07/18/2017 0625 NS infusion   Intravenous New Bag      Follow-up Information     1. Follow up with Gray Barboza M.D. In 2 days.    Specialty:  Cardiac Surgery    Why:  This week    Contact information    [address]  [address]  [facility] [address] [phone]        Medication Information     Review all of your home medications and newly ordered medications with your primary doctor and/or pharmacist as soon as possible. Follow medication instructions as directed by your doctor and/or pharmacist.     Please keep your complete medication list with you and share with your physician. Update the information when medications are discontinued, doses are changed, or new medications (including over-the-counter products) are added; and carry medication information at all times in the event of emergency situations.               Medication List      START taking these medications        Instructions    Morning Afternoon Evening Bedtime    amoxicillin-clavulanate 875-125 MG Tabs   Commonly known as:  AUGMENTIN        Take 1 Tab by mouth 2 times a day for 7 days.   Dose:  1 Tab                        sulfamethoxazole-trimethoprim 800-160 MG tablet   Commonly known as:  BACTRIM DS        Take 1 Tab by mouth 2 times a day for 7 days.   Dose:  1 Tab                          ASK your doctor about  these medications        Instructions    Morning Afternoon Evening Bedtime    aspirin EC 81 MG Tbec   Commonly known as:  ECOTRIN        Take 1 Tab by mouth every day.   Dose:  81 mg                        atorvastatin 80 MG tablet   Commonly known as:  LIPITOR        Take 1 Tab by mouth every bedtime.   Dose:  80 mg                        clopidogrel 75 MG Tabs   Commonly known as:  PLAVIX        Take 1 Tab by mouth every day.   Dose:  75 mg                         MG Caps        Take 100 mg by mouth every morning.   Dose:  100 mg                        hydrocodone-acetaminophen 5-325 MG Tabs per tablet   Commonly known as:  NORCO        Take 1-2 Tabs by mouth every 6 hours as needed.   Dose:  1-2 Tab                        Melatonin 10 MG Caps        Take 1 Cap by mouth every bedtime.   Dose:  1 Cap                        metoprolol 50 MG Tabs   Commonly known as:  LOPRESSOR        Take 1 Tab by mouth 2 times a day.   Dose:  50 mg                        morphine ER 15 MG Tbcr tablet   Commonly known as:  MS CONTIN        Take 1 Tab by mouth every 12 hours.   Dose:  15 mg                        multivitamin Tabs        Take 1 Tab by mouth every day.   Dose:  1 Tab                        nitroglycerin 0.4 MG Subl   Commonly known as:  NITROSTAT        Place 1 Tab under tongue as needed for Chest Pain.   Dose:  0.4 mg                             Where to Get Your Medications      These medications were sent to [id number] - [address]  [address] [place] [address]     Phone:  [phone]    - amoxicillin-clavulanate 875-125 MG Tabs  - sulfamethoxazole-trimethoprim 800-160 MG tablet            Procedures and tests performed during your visit     APTT    BLOOD CULTURE,HOLD    Btype Natriuretic Peptide (BNP)    CBC with Differential    Complete Metabolic Panel (CMP)    DX-CHEST-2 VIEWS    EKG (NOW)    ESTIMATED GFR    IV Saline Lock    OLD EKG    Prothrombin Time (PT/INR)    Troponin STAT           Discharge Instructions       Cellulitis  Cellulitis is an infection of the skin and the tissue under the skin. The infected area is usually red and tender. This happens most often in the arms and lower legs.  HOME CARE   · Take your antibiotic medicine as told. Finish the medicine even if you start to feel better.  · Keep the infected arm or leg raised (elevated).  · Put a warm cloth on the area up to 4 times per day.  · Only take medicines as told by your doctor.  · Keep all doctor visits as told.  GET HELP IF:  · You see red streaks on the skin coming from the infected area.  · Your red area gets bigger or turns a dark color.  · Your bone or joint under the infected area is painful after the skin heals.  · Your infection comes back in the same area or different area.  · You have a puffy (swollen) bump in the infected area.  · You have new symptoms.  · You have a fever.  GET HELP RIGHT AWAY IF:   · You feel very sleepy.  · You throw up (vomit) or have watery poop (diarrhea).  · You feel sick and have muscle aches and pains.  MAKE SURE YOU:   · Understand these instructions.  · Will watch your condition.  · Will get help right away if you are not doing well or get worse.     This information is not intended to replace advice given to you by your health care provider. Make sure you discuss any questions you have with your health care provider.     Document Released: 06/05/2009 Document Revised: 01/08/2016 Document Reviewed: 03/04/2013  Elsevier Interactive Patient Education ©2016 [employer]            Patient Information     Patient Information    Following emergency treatment: all patient requiring follow-up care must return either to a private physician or a clinic if your condition worsens before you are able to obtain further medical attention, please return to the emergency room.     Billing Information    At [facility], we work to make the billing process streamlined for our patients.  Our  Representatives are here to answer any questions you may have regarding your hospital bill.  If you have insurance coverage and have supplied your insurance information to us, we will submit a claim to your insurer on your behalf.  Should you have any questions regarding your bill, we can be reached online or by phone as follows:  Online: You are able pay your bills online or live chat with our representatives about any billing questions you may have. We are here to help Monday - Friday from 8:00am to 7:30pm and 9:00am - 12:00pm on Saturdays.  Please visit [email or web address]  for more information.   Phone:  [phone] or [phone]    Please note that your emergency physician, surgeon, pathologist, radiologist, anesthesiologist, and other specialists are not employed by [facility] and will therefore bill separately for their services.  Please contact them directly for any questions concerning their bills at the numbers below:     Emergency Physician Services:  [phone]  [facility]:  [phone]  [facility]:  [phone]  [facility]:  [phone]    1. Your final bill may vary from the amount quoted upon discharge if all procedures are not complete at that time, or if your doctor has additional procedures of which we are not aware. You will receive an additional bill if you return to the Emergency Department at [facility] for suture removal regardless of the facility of which the sutures were placed.     2. Please arrange for settlement of this account at the emergency registration.    3. All self-pay accounts are due in full at the time of treatment.  If you are unable to meet this obligation then payment is expected within 4-5 days.     4. If you have had radiology studies (CT, X-ray, Ultrasound, MRI), you have received a preliminary result during your emergency department visit. Please contact the radiology  department [phone] to receive a copy of your final result. Please discuss the Final result with your primary physician or with the follow up physician provided.     Crisis Hotline:  [place] Crisis Hotline:  3-000-KAEVGLL or 1-528.641.2809  [place] Crisis Hotline:    1-143.329.6442 or 363-041-4479         ED Discharge Follow Up Questions    1. In order to provide you with very good care, we would like to follow up with a phone call in the next few days.  May we have your permission to contact you?     YES /  NO    2. What is the best phone number to call you? (       )_____-__________    3. What is the best time to call you?      Morning  /  Afternoon  /  Evening                   Patient Signature:  ____________________________________________________________    Date:  ____________________________________________________________      Your appointments     Sep 01, 2017  8:15 AM   PREVIOUS PATIENT with Bari Esquivel M.D.   [facility] (--)    [address], [facility] [address]  [place] [address]   [phone]

## 2017-07-18 NOTE — DISCHARGE INSTRUCTIONS
Cellulitis  Cellulitis is an infection of the skin and the tissue under the skin. The infected area is usually red and tender. This happens most often in the arms and lower legs.  HOME CARE   · Take your antibiotic medicine as told. Finish the medicine even if you start to feel better.  · Keep the infected arm or leg raised (elevated).  · Put a warm cloth on the area up to 4 times per day.  · Only take medicines as told by your doctor.  · Keep all doctor visits as told.  GET HELP IF:  · You see red streaks on the skin coming from the infected area.  · Your red area gets bigger or turns a dark color.  · Your bone or joint under the infected area is painful after the skin heals.  · Your infection comes back in the same area or different area.  · You have a puffy (swollen) bump in the infected area.  · You have new symptoms.  · You have a fever.  GET HELP RIGHT AWAY IF:   · You feel very sleepy.  · You throw up (vomit) or have watery poop (diarrhea).  · You feel sick and have muscle aches and pains.  MAKE SURE YOU:   · Understand these instructions.  · Will watch your condition.  · Will get help right away if you are not doing well or get worse.     This information is not intended to replace advice given to you by your health care provider. Make sure you discuss any questions you have with your health care provider.     Document Released: 06/05/2009 Document Revised: 01/08/2016 Document Reviewed: 03/04/2013  [employer] Interactive Patient Education ©2016 [employer]

## 2017-07-18 NOTE — ED AVS SNAPSHOT
7/18/2017    Jose A Ponce  [address]  [place] [address]    Dear Jose A:    [facility] wants to ensure your discharge home is safe and you or your loved ones have had all of your questions answered regarding your care after you leave the hospital.    Below is a list of resources and contact information should you have any questions regarding your hospital stay, follow-up instructions, or active medical symptoms.    Questions or Concerns Regarding… Contact   Medical Questions Related to Your Discharge  (7 days a week, 8am-5pm) Contact a Nurse Care Coordinator   [phone]   Medical Questions Not Related to Your Discharge  (24 hours a day / 7 days a week)  Contact the Nurse Health Line   [phone]    Medications or Discharge Instructions Refer to your discharge packet   or contact your [facility] Primary Care Provider   [phone]   Follow-up Appointment(s) Schedule your appointment via [employer]   or contact Scheduling [phone]   Billing Review your statement via [employer]see  or contact Billing [phone]   Medical Records Review your records via Friendsee   or contact Medical Records [phone]     You may receive a telephone call within two days of discharge. This call is to make certain you understand your discharge instructions and have the opportunity to have any questions answered. You can also easily access your medical information, test results and upcoming appointments via the Friendsee free online health management tool. You can learn more and sign up at [employer]/Friendsee. For assistance setting up your Friendsee account, please call [phone].    Once again, we want to ensure your discharge home is safe and that you have a clear understanding of any next steps in your care. If you have any questions or concerns, please do not hesitate to contact us, we are here for you. Thank you for choosing [facility] for your healthcare needs.    Sincerely,    Your [facility] Healthcare Team

## 2017-07-18 NOTE — ED AVS SNAPSHOT
Vivint Access Code: Activation code not generated  Current Vivint Status: Active    [employer]  A secure, online tool to manage your health information     [employer] Vivint® is a secure, online tool that connects you to your personalized health information from the privacy of your home -- day or night - making it very easy for you to manage your healthcare. Once the activation process is completed, you can even access your medical information using the Vivint elyssa, which is available for free in the Apple Elyssa store or Google Play store.     Vivint provides the following levels of access (as shown below):   My Chart Features   [facility] Primary Care Doctor [facility]  Specialists [facility]  Urgent  Care Non-[facility]  Primary Care  Doctor   Email your healthcare team securely and privately 24/7 X X X X   Manage appointments: schedule your next appointment; view details of past/upcoming appointments X      Request prescription refills. X      View recent personal medical records, including lab and immunizations X X X X   View health record, including health history, allergies, medications X X X X   Read reports about your outpatient visits, procedures, consult and ER notes X X X X   See your discharge summary, which is a recap of your hospital and/or ER visit that includes your diagnosis, lab results, and care plan. X X       How to register for Vivint:  1. Go to  [email or web address].  2. Click on the Sign Up Now box, which takes you to the New Member Sign Up page. You will need to provide the following information:  a. Enter your Vivint Access Code exactly as it appears at the top of this page. (You will not need to use this code after you’ve completed the sign-up process. If you do not sign up before the expiration date, you must request a new code.)   b. Enter your date of birth.   c. Enter your home email address.   d. Click Submit, and follow the next screen’s instructions.  3. Create a Vivint ID. This will  be your Lore login ID and cannot be changed, so think of one that is secure and easy to remember.  4. Create a Lore password. You can change your password at any time.  5. Enter your Password Reset Question and Answer. This can be used at a later time if you forget your password.   6. Enter your e-mail address. This allows you to receive e-mail notifications when new information is available in Lore.  7. Click Sign Up. You can now view your health information.    For assistance activating your Lore account, call [phone]

## 2017-07-18 NOTE — ED NOTES
Pt verbalized understanding of D/C instructions, pt PWD, VSS, NAD, pt left walking with steady gait

## 2017-07-18 NOTE — ED NOTES
"Chief Complaint   Patient presents with   • Wound Check     Open heart surgery on 6/13/17   • Incisional Pain     redness and pain     PT ambulatory to triage for above complaints. States last 3 days redness started and has been worsening daily. Pt AOx4, denies SOB/CP. States on right side of incision there is minor pain. EKG preformed in triage. PT states wound occasionally oozes in certain areas-despite being 5 weeks post op. /92 mmHg  Pulse 90  Temp(Src) 37.1 °C (98.8 °F)  Resp 16  Ht 1.88 m (6' 2\")  Wt 82.6 kg (182 lb 1.6 oz)  BMI 23.37 kg/m2  SpO2 99%  Pt informed and educated on triage process and wait times. Pt verbalizes understanding to inform either triage tech or RN to alert staff for any changes in condition. Pt placed in lobby.    "

## 2017-07-27 ENCOUNTER — HOSPITAL ENCOUNTER (OUTPATIENT)
Dept: LAB | Facility: MEDICAL CENTER | Age: 57
End: 2017-07-27
Attending: INTERNAL MEDICINE
Payer: COMMERCIAL

## 2017-07-27 LAB
ALBUMIN SERPL BCP-MCNC: 3.9 G/DL (ref 3.2–4.9)
ALBUMIN/GLOB SERPL: 1.1 G/DL
ALP SERPL-CCNC: 100 U/L (ref 30–99)
ALT SERPL-CCNC: 28 U/L (ref 2–50)
ANION GAP SERPL CALC-SCNC: 11 MMOL/L (ref 0–11.9)
AST SERPL-CCNC: 33 U/L (ref 12–45)
BASOPHILS # BLD AUTO: 1.8 % (ref 0–1.8)
BASOPHILS # BLD: 0.14 K/UL (ref 0–0.12)
BILIRUB SERPL-MCNC: 0.7 MG/DL (ref 0.1–1.5)
BUN SERPL-MCNC: 15 MG/DL (ref 8–22)
CALCIUM SERPL-MCNC: 9.2 MG/DL (ref 8.5–10.5)
CHLORIDE SERPL-SCNC: 106 MMOL/L (ref 96–112)
CO2 SERPL-SCNC: 22 MMOL/L (ref 20–33)
CREAT SERPL-MCNC: 0.69 MG/DL (ref 0.5–1.4)
CRP SERPL HS-MCNC: 0.53 MG/DL (ref 0–0.75)
EOSINOPHIL # BLD AUTO: 0.12 K/UL (ref 0–0.51)
EOSINOPHIL NFR BLD: 1.5 % (ref 0–6.9)
ERYTHROCYTE [DISTWIDTH] IN BLOOD BY AUTOMATED COUNT: 47.7 FL (ref 35.9–50)
ERYTHROCYTE [SEDIMENTATION RATE] IN BLOOD BY WESTERGREN METHOD: 51 MM/HOUR (ref 0–20)
GFR SERPL CREATININE-BSD FRML MDRD: >60 ML/MIN/1.73 M 2
GLOBULIN SER CALC-MCNC: 3.6 G/DL (ref 1.9–3.5)
GLUCOSE SERPL-MCNC: 110 MG/DL (ref 65–99)
HCT VFR BLD AUTO: 38.5 % (ref 42–52)
HGB BLD-MCNC: 12.9 G/DL (ref 14–18)
IMM GRANULOCYTES # BLD AUTO: 0.04 K/UL (ref 0–0.11)
IMM GRANULOCYTES NFR BLD AUTO: 0.5 % (ref 0–0.9)
LYMPHOCYTES # BLD AUTO: 2.1 K/UL (ref 1–4.8)
LYMPHOCYTES NFR BLD: 27 % (ref 22–41)
MCH RBC QN AUTO: 33.1 PG (ref 27–33)
MCHC RBC AUTO-ENTMCNC: 33.5 G/DL (ref 33.7–35.3)
MCV RBC AUTO: 98.7 FL (ref 81.4–97.8)
MONOCYTES # BLD AUTO: 0.71 K/UL (ref 0–0.85)
MONOCYTES NFR BLD AUTO: 9.1 % (ref 0–13.4)
NEUTROPHILS # BLD AUTO: 4.66 K/UL (ref 1.82–7.42)
NEUTROPHILS NFR BLD: 60.1 % (ref 44–72)
NRBC # BLD AUTO: 0 K/UL
NRBC BLD AUTO-RTO: 0 /100 WBC
PLATELET # BLD AUTO: 349 K/UL (ref 164–446)
PMV BLD AUTO: 10.2 FL (ref 9–12.9)
POTASSIUM SERPL-SCNC: 3.9 MMOL/L (ref 3.6–5.5)
PROT SERPL-MCNC: 7.5 G/DL (ref 6–8.2)
RBC # BLD AUTO: 3.9 M/UL (ref 4.7–6.1)
SODIUM SERPL-SCNC: 139 MMOL/L (ref 135–145)
WBC # BLD AUTO: 7.8 K/UL (ref 4.8–10.8)

## 2017-07-27 PROCEDURE — 85652 RBC SED RATE AUTOMATED: CPT

## 2017-07-27 PROCEDURE — 85025 COMPLETE CBC W/AUTO DIFF WBC: CPT

## 2017-07-27 PROCEDURE — 86140 C-REACTIVE PROTEIN: CPT

## 2017-07-27 PROCEDURE — 36415 COLL VENOUS BLD VENIPUNCTURE: CPT

## 2017-07-27 PROCEDURE — 80053 COMPREHEN METABOLIC PANEL: CPT

## 2017-08-02 ENCOUNTER — TELEPHONE (OUTPATIENT)
Dept: CARDIOLOGY | Facility: MEDICAL CENTER | Age: 57
End: 2017-08-02

## 2017-08-03 ENCOUNTER — OFFICE VISIT (OUTPATIENT)
Dept: CARDIOLOGY | Facility: MEDICAL CENTER | Age: 57
End: 2017-08-03
Payer: COMMERCIAL

## 2017-08-03 ENCOUNTER — HOSPITAL ENCOUNTER (OUTPATIENT)
Dept: RADIOLOGY | Facility: MEDICAL CENTER | Age: 57
End: 2017-08-03
Attending: INTERNAL MEDICINE
Payer: COMMERCIAL

## 2017-08-03 VITALS
OXYGEN SATURATION: 100 % | HEIGHT: 74 IN | RESPIRATION RATE: 16 BRPM | HEART RATE: 98 BPM | DIASTOLIC BLOOD PRESSURE: 95 MMHG | SYSTOLIC BLOOD PRESSURE: 145 MMHG | BODY MASS INDEX: 23.87 KG/M2 | WEIGHT: 186 LBS

## 2017-08-03 DIAGNOSIS — Z95.1 S/P CABG X 2: ICD-10-CM

## 2017-08-03 DIAGNOSIS — I25.118 CORONARY ARTERY DISEASE OF NATIVE ARTERY OF NATIVE HEART WITH STABLE ANGINA PECTORIS (HCC): ICD-10-CM

## 2017-08-03 DIAGNOSIS — L03.313 CELLULITIS OF CHEST WALL: ICD-10-CM

## 2017-08-03 DIAGNOSIS — I10 ESSENTIAL HYPERTENSION: ICD-10-CM

## 2017-08-03 PROCEDURE — 71020 DX-CHEST-2 VIEWS: CPT

## 2017-08-03 PROCEDURE — 99214 OFFICE O/P EST MOD 30 MIN: CPT | Performed by: NURSE PRACTITIONER

## 2017-08-03 RX ORDER — METOPROLOL TARTRATE 75 MG/1
75 TABLET, FILM COATED ORAL 2 TIMES DAILY
Qty: 60 TAB | Refills: 11 | Status: SHIPPED | OUTPATIENT
Start: 2017-08-03 | End: 2017-08-14

## 2017-08-03 RX ORDER — CLOPIDOGREL BISULFATE 75 MG/1
75 TABLET ORAL DAILY
Qty: 30 TAB | Refills: 11 | Status: SHIPPED | OUTPATIENT
Start: 2017-08-03 | End: 2017-12-08

## 2017-08-03 ASSESSMENT — ENCOUNTER SYMPTOMS
ORTHOPNEA: 0
ABDOMINAL PAIN: 0
PALPITATIONS: 0
PND: 0
MYALGIAS: 0
FEVER: 0
SHORTNESS OF BREATH: 0
CLAUDICATION: 0
COUGH: 0
DIZZINESS: 0

## 2017-08-03 NOTE — TELEPHONE ENCOUNTER
Did not know that patient had an appointment with KEN Weldon and she is seeing patient now.  Please review her note for details and plan.  Liam Garcia

## 2017-08-03 NOTE — TELEPHONE ENCOUNTER
Increase Metoprolol to 75mg BID, and add some Losartan 25mg once daily.  Monitor BP at home, and call us in 1-2 weeks with BP report.  Thanks, AB

## 2017-08-03 NOTE — NON-PROVIDER
BP recheck after resting 5 minutes  Left Arm: 120/80 (checked twice)  Right Arm: 140/80 (checked twice)  Patient was sitting in chair when BP's were checked.

## 2017-08-03 NOTE — TELEPHONE ENCOUNTER
"Received call from Dr. Ralph at Infectious disease. States that pt's BP has been elevated \"systolic in 200's\" says that cardiology needs to evaluate BP meds and adjust.     S/w pt about BP, states that today at infectious disease office /106. Reports that BP has gotten elevated all week in the 160's/100's. Denies any headaches but states Dr. Ralph is going to order a chest x-ray for some chest tightness he is having with deep breaths. Educated pt that at this time he should go and be evaluated at ER for Hypertension and CP. Pt states that he feels OK with waiting until message gets to [facility] tomorrow. He reports if BP becomes more elevated >180 systolic that he will go to ER.     To [facility]ici GALVAN, please advise ASAP. Thank you  "

## 2017-08-03 NOTE — MR AVS SNAPSHOT
"        Jose A Ponce   8/3/2017 12:40 PM   Office Visit   MRN: [id number]    Department:  [facility]   Dept Phone:  [phone]    Description:  Male : 1960   Provider:  JAY Del Rosario           Reason for Visit     Follow-Up PP of AB.      Allergies as of 8/3/2017     No Known Allergies      You were diagnosed with     Coronary artery disease of native artery of native heart with stable angina pectoris (CMS-[facility])   [8699729]       S/P CABG x 2   [216717]         Vital Signs     Blood Pressure Pulse Respirations Height Weight Body Mass Index    140/90 mmHg 98 16 1.88 m (6' 2.02\") 84.369 kg (186 lb) 23.87 kg/m2    Oxygen Saturation Smoking Status                100% Former Smoker          Basic Information     Date Of Birth Sex Race Ethnicity Preferred Language    1960 Male White Non- English      Your appointments     Sep 01, 2017  8:15 AM   PREVIOUS PATIENT with Bari Esquivel M.D.   [facility] (--)    [address], [address]  [facility] [address]   [phone]              Problem List              ICD-10-CM Priority Class Noted - Resolved    Tobacco use Z72.0 High  6/3/2017 - Present    Grief reaction F43.20   6/3/2017 - Present    Hypercholesterolemia E78.00 High  6/3/2017 - Present    CAD (coronary artery disease) I25.10 High  2017 - Present    S/P CABG x 2 Z95.1 High  2017 - Present      Health Maintenance        Date Due Completion Dates    IMM DTaP/Tdap/Td Vaccine (1 - Tdap) 1979 ---    IMM PNEUMOCOCCAL 19-64 (ADULT) MEDIUM RISK SERIES (1 of 1 - PPSV23) 1979 ---    COLONOSCOPY 2010 ---    IMM INFLUENZA (1) 2017 10/1/2016            Current Immunizations     Influenza Vaccine Quad Inj (Pf) 10/1/2016      Below and/or attached are the medications your provider expects you to take. Review all of your home medications and newly ordered medications with your provider and/or pharmacist. Follow " medication instructions as directed by your provider and/or pharmacist. Please keep your medication list with you and share with your provider. Update the information when medications are discontinued, doses are changed, or new medications (including over-the-counter products) are added; and carry medication information at all times in the event of emergency situations     Allergies:  No Known Allergies          Medications  Valid as of: August 03, 2017 -  1:35 PM    Generic Name Brand Name Tablet Size Instructions for use    Aspirin (Tablet Delayed Response) ECOTRIN 81 MG Take 1 Tab by mouth every day.        Atorvastatin Calcium (Tab) LIPITOR 80 MG Take 1 Tab by mouth every bedtime.        Clopidogrel Bisulfate (Tab) PLAVIX 75 MG Take 1 Tab by mouth every day.        Hydrocodone-Acetaminophen (Tab) NORCO 5-325 MG Take 1-2 Tabs by mouth every 6 hours as needed.        Melatonin (Cap) Melatonin 10 MG Take 1 Cap by mouth every bedtime.        Metoprolol Tartrate (Tab) Metoprolol Tartrate 75 MG Take 75 mg by mouth 2 times a day.        Multiple Vitamin (Tab) THERAGRAN  Take 1 Tab by mouth every day.        Nitroglycerin (SL Tab) NITROSTAT 0.4 MG Place 1 Tab under tongue as needed for Chest Pain.        .                 Medicines prescribed today were sent to:     [id number] [place] - [address]    [address] [address]    Phone: [phone] Fax: [phone]    Open 24 Hours?: No      Medication refill instructions:       If your prescription bottle indicates you have medication refills left, it is not necessary to call your provider’s office. Please contact your pharmacy and they will refill your medication.    If your prescription bottle indicates you do not have any refills left, you may request refills at any time through one of the following ways: The online VIAP system (except Urgent Care), by calling your provider’s office, or by asking your pharmacy to contact your provider’s office  with a refill request. Medication refills are processed only during regular business hours and may not be available until the next business day. Your provider may request additional information or to have a follow-up visit with you prior to refilling your medication.   *Please Note: Medication refills are assigned a new Rx number when refilled electronically. Your pharmacy may indicate that no refills were authorized even though a new prescription for the same medication is available at the pharmacy. Please request the medicine by name with the pharmacy before contacting your provider for a refill.        Instructions    Increase Metoprolol to 75 mg Twice a day  Restart Plavix 75 mg Daily          [employer] Access Code: Activation code not generated  Current [employer] Status: Active

## 2017-08-03 NOTE — PROGRESS NOTES
Subjective:   Jose A Ponce is a 56 y.o. male who presents today for follow up on high blood pressure.    Patient is new to our practice. Patient originally presented to [facility] for chest pain on 6/1/17. Patient had an abnormal stress test and then was sent for coronary angiogram in which they found a total occlusion of his LAD. Patient had a CABG ×2 on 6/13/17 by Dr. Barboza (CHUNG to the LAD and reverse saphenous vein graft to the distal diagonal).     Patient is currently followed by infectious disease and surgery for possible infection versus reaction to sutures of his sternal incision. Patient had been taking antibiotics.    The patient reports that he's been taking his blood pressure home all week and has noticed his blood pressures systolic in the 160s and then he had a follow-up with infectious disease yesterday in which his blood pressure was systolic 160s as well. He is currently taking metoprolol 50 mg twice a day.    Patient has a medical history of smoking which he quit last year.    Patient saw Dr. Barboza today. Patient states he's been off Plavix for a month because he states there wer no refills on his medication available.    Past Medical History   Diagnosis Date   • Hx of hernia repair      Mesh   • CAD (coronary artery disease)    • S/P CABG x 2 June 2017     CABG x 2 (LIMA to distal LAD, rSVG to distal diagonal) by Dr. Barboza.   • Hyperlipidemia    • Tobacco use    • Grief reaction      Past Surgical History   Procedure Laterality Date   • Inguinal hernia repair Bilateral 2000   • Tibia orif Right 1980s   • Multiple coronary artery bypass endo vein harvest  6/13/2017     Procedure: MULTIPLE CORONARY ARTERY BYPASS x2 RIGHT LEG ENDO VEIN HARVEST;  Surgeon: Gray Barboza M.D.;  Location: SURGERY [facility];  Service:    • Sal  6/13/2017     Procedure: SAL - INTRAOP;  Surgeon: Gray Barboza M.D.;  Location: SURGERY [facility];  Service:      Family History   Problem  Relation Age of Onset   • Heart Disease Maternal Grandfather    • Cancer Mother    • Cancer Father    • No Known Problems Sister    • No Known Problems Sister      History   Smoking status   • Former Smoker   • Quit date: 06/01/2016   Smokeless tobacco   • Never Used     No Known Allergies  Outpatient Encounter Prescriptions as of 8/3/2017   Medication Sig Dispense Refill   • clopidogrel (PLAVIX) 75 MG Tab Take 1 Tab by mouth every day. 30 Tab 11   • Metoprolol Tartrate 75 MG Tab Take 75 mg by mouth 2 times a day. 60 Tab 11   • hydrocodone-acetaminophen (NORCO) 5-325 MG Tab per tablet Take 1-2 Tabs by mouth every 6 hours as needed. 45 Tab 0   • nitroglycerin (NITROSTAT) 0.4 MG SL Tab Place 1 Tab under tongue as needed for Chest Pain. 25 Tab 0   • atorvastatin (LIPITOR) 80 MG tablet Take 1 Tab by mouth every bedtime. 30 Tab 11   • aspirin EC (ECOTRIN) 81 MG Tablet Delayed Response Take 1 Tab by mouth every day. 30 Tab 11   • multivitamin (THERAGRAN) Tab Take 1 Tab by mouth every day.     • Melatonin 10 MG Cap Take 1 Cap by mouth every bedtime.     • [DISCONTINUED] metoprolol (LOPRESSOR) 50 MG Tab Take 1 Tab by mouth 2 times a day. 60 Tab 6   • [DISCONTINUED] morphine ER (MS CONTIN) 15 MG Tab CR tablet Take 1 Tab by mouth every 12 hours. (Patient not taking: Reported on 8/3/2017) 60 Tab 0   • [DISCONTINUED] docusate sodium 100 MG Cap Take 100 mg by mouth every morning. (Patient not taking: Reported on 8/3/2017) 60 Cap 0   • [DISCONTINUED] clopidogrel (PLAVIX) 75 MG Tab Take 1 Tab by mouth every day. (Patient not taking: Reported on 8/3/2017) 30 Tab 3     No facility-administered encounter medications on file as of 8/3/2017.     Review of Systems   Constitutional: Negative for fever and malaise/fatigue.   Respiratory: Negative for cough and shortness of breath.    Cardiovascular: Positive for chest pain (tenderness and dull ache). Negative for palpitations, orthopnea, claudication, leg swelling and PND.  "  Gastrointestinal: Negative for abdominal pain.   Musculoskeletal: Negative for myalgias.   Neurological: Negative for dizziness.   All other systems reviewed and are negative.       Objective:   /95 mmHg  Pulse 98  Resp 16  Ht 1.88 m (6' 2.02\")  Wt 84.369 kg (186 lb)  BMI 23.87 kg/m2  SpO2 100%    Physical Exam   Constitutional: He is oriented to person, place, and time. He appears well-developed and well-nourished.   HENT:   Head: Normocephalic and atraumatic.   Eyes: EOM are normal.   Neck: Normal range of motion. Neck supple. No JVD present.   Cardiovascular: Normal rate, regular rhythm, normal heart sounds and intact distal pulses.    No murmur heard.  Pulmonary/Chest: Effort normal and breath sounds normal. No respiratory distress. He has no wheezes. He has no rales.   Abdominal: Soft. Bowel sounds are normal.   Musculoskeletal: Normal range of motion. He exhibits no edema.   Neurological: He is alert and oriented to person, place, and time.   Skin: Skin is warm and dry.   Sternal incision and stab sites approximated. Sternal incision with slight erythema and redness.   Psychiatric: He has a normal mood and affect.   Nursing note and vitals reviewed.    Lab Results   Component Value Date/Time    CHOLESTEROL,* 06/01/2017 05:29 PM    * 06/01/2017 05:29 PM    HDL 54 06/01/2017 05:29 PM    TRIGLYCERIDES 128 06/01/2017 05:29 PM       Lab Results   Component Value Date/Time    SODIUM 139 07/27/2017 02:04 PM    POTASSIUM 3.9 07/27/2017 02:04 PM    CHLORIDE 106 07/27/2017 02:04 PM    CO2 22 07/27/2017 02:04 PM    GLUCOSE 110* 07/27/2017 02:04 PM    BUN 15 07/27/2017 02:04 PM    CREATININE 0.69 07/27/2017 02:04 PM     Lab Results   Component Value Date/Time    ALKALINE PHOSPHATASE 100* 07/27/2017 02:04 PM    AST(SGOT) 33 07/27/2017 02:04 PM    ALT(SGPT) 28 07/27/2017 02:04 PM    TOTAL BILIRUBIN 0.7 07/27/2017 02:04 PM      Myocardial Perfusion Report 6/2/17   NUCLEAR IMAGING INTERPRETATION   " Positive stress ECG for ischemia.   Normal left ventricular size, ejection fraction, and wall motion.   LAD ischemia large size and moderate ischemia   Normal left ventricular wall motion.  LV ejection fraction = 61%.     ECG INTERPRETATION   Positive stress ECG for ischemia.       Carotid Duplex Report 6/3/17     Vascular Laboratory   CONCLUSIONS   BILATERAL:   Mild smooth plaque of the bifurcation extending into the internal carotid.    Velocities are consistent with < 50% stenosis of the internal carotid    artery.   Bilateral subclavian and vertebral artery waveforms are antegrade and    waveforms are normal in character and velocity.       Transesophageal Echo Report 6/13/17  Normal LV function post bypass good concentric contractility.      Transthoracic Echo Report 6/3/17  No prior study is available for comparison.   Left ventricular ejection fraction is visually estimated to be 55%.  Grade I diastolic dysfunction.  Normal inferior vena cava size without inspiratory collapse.    CABG x 2: 6/13/2017     PREOPERATIVE DIAGNOSES:  Angina, coronary artery disease, hypertension,    dyslipidemia, hypercholesterolemia.      POSTOPERATIVE DIAGNOSES:  Angina, coronary artery disease, hypertension,    dyslipidemia, hypercholesterolemia.     OPERATION:  Coronary artery bypass grafting x2, left FRANKIE to the distal LAD,    reverse saphenous vein graft to the distal diagonal, endoscopic vein harvest.      Assessment:     1. Essential hypertension     2. Coronary artery disease of native artery of native heart with stable angina pectoris (CMS-[facility])  clopidogrel (PLAVIX) 75 MG Tab    Metoprolol Tartrate 75 MG Tab   3. S/P CABG x 2  clopidogrel (PLAVIX) 75 MG Tab       Medical Decision Making:  Today's Assessment / Status / Plan:   1. Hypertension: Blood pressures are slightly elevated in the office today upon recheck  -Increase his metoprolol to 75 mg twice a day  -Encouraged patient to Monitor and log Blood pressures at home.  Call office or RTC if BP increasing or >180/100 or if symptoms of elevated blood pressure present. Reviewed s/sx of stroke and heart attack. Patient to go to ER or call 911 if present.   -If blood pressure not controlled within 2 weeks patient to return to see myself.    2. CAD/status post CABG ×2:   -Patient to restart Plavix at 75 mg daily  -Continue aspirin 81 mg daily  -Continue atorvastatin 80 mg daily  -Continue follow-up with CT surgery and ID as directed    FU in clinic in one month with Dr. Esquivel. Sooner if needed.    Patient verbalizes understanding and agrees with the plan of care.     Collaborating MD: Jesus Ruiz MD

## 2017-08-07 ENCOUNTER — HOSPITAL ENCOUNTER (OUTPATIENT)
Dept: LAB | Facility: MEDICAL CENTER | Age: 57
End: 2017-08-07
Attending: INTERNAL MEDICINE
Payer: COMMERCIAL

## 2017-08-07 LAB
ALBUMIN SERPL BCP-MCNC: 3.8 G/DL (ref 3.2–4.9)
ALBUMIN/GLOB SERPL: 1.1 G/DL
ALP SERPL-CCNC: 88 U/L (ref 30–99)
ALT SERPL-CCNC: 30 U/L (ref 2–50)
ANION GAP SERPL CALC-SCNC: 7 MMOL/L (ref 0–11.9)
AST SERPL-CCNC: 32 U/L (ref 12–45)
BASOPHILS # BLD AUTO: 0.6 % (ref 0–1.8)
BASOPHILS # BLD: 0.03 K/UL (ref 0–0.12)
BILIRUB SERPL-MCNC: 0.7 MG/DL (ref 0.1–1.5)
BUN SERPL-MCNC: 10 MG/DL (ref 8–22)
CALCIUM SERPL-MCNC: 9.5 MG/DL (ref 8.5–10.5)
CHLORIDE SERPL-SCNC: 102 MMOL/L (ref 96–112)
CO2 SERPL-SCNC: 27 MMOL/L (ref 20–33)
CREAT SERPL-MCNC: 0.7 MG/DL (ref 0.5–1.4)
CRP SERPL HS-MCNC: 0.28 MG/DL (ref 0–0.75)
EOSINOPHIL # BLD AUTO: 0.3 K/UL (ref 0–0.51)
EOSINOPHIL NFR BLD: 5.6 % (ref 0–6.9)
ERYTHROCYTE [DISTWIDTH] IN BLOOD BY AUTOMATED COUNT: 49 FL (ref 35.9–50)
ERYTHROCYTE [SEDIMENTATION RATE] IN BLOOD BY WESTERGREN METHOD: 42 MM/HOUR (ref 0–20)
GFR SERPL CREATININE-BSD FRML MDRD: >60 ML/MIN/1.73 M 2
GLOBULIN SER CALC-MCNC: 3.4 G/DL (ref 1.9–3.5)
GLUCOSE SERPL-MCNC: 103 MG/DL (ref 65–99)
HCT VFR BLD AUTO: 39.8 % (ref 42–52)
HGB BLD-MCNC: 13.1 G/DL (ref 14–18)
IMM GRANULOCYTES # BLD AUTO: 0.03 K/UL (ref 0–0.11)
IMM GRANULOCYTES NFR BLD AUTO: 0.6 % (ref 0–0.9)
LYMPHOCYTES # BLD AUTO: 2.08 K/UL (ref 1–4.8)
LYMPHOCYTES NFR BLD: 39 % (ref 22–41)
MCH RBC QN AUTO: 33.2 PG (ref 27–33)
MCHC RBC AUTO-ENTMCNC: 32.9 G/DL (ref 33.7–35.3)
MCV RBC AUTO: 101 FL (ref 81.4–97.8)
MONOCYTES # BLD AUTO: 0.5 K/UL (ref 0–0.85)
MONOCYTES NFR BLD AUTO: 9.4 % (ref 0–13.4)
NEUTROPHILS # BLD AUTO: 2.39 K/UL (ref 1.82–7.42)
NEUTROPHILS NFR BLD: 44.8 % (ref 44–72)
NRBC # BLD AUTO: 0 K/UL
NRBC BLD AUTO-RTO: 0 /100 WBC
PLATELET # BLD AUTO: 222 K/UL (ref 164–446)
PMV BLD AUTO: 10.9 FL (ref 9–12.9)
POTASSIUM SERPL-SCNC: 4.1 MMOL/L (ref 3.6–5.5)
PROT SERPL-MCNC: 7.2 G/DL (ref 6–8.2)
RBC # BLD AUTO: 3.94 M/UL (ref 4.7–6.1)
SODIUM SERPL-SCNC: 136 MMOL/L (ref 135–145)
WBC # BLD AUTO: 5.3 K/UL (ref 4.8–10.8)

## 2017-08-07 PROCEDURE — 80053 COMPREHEN METABOLIC PANEL: CPT

## 2017-08-07 PROCEDURE — 85025 COMPLETE CBC W/AUTO DIFF WBC: CPT

## 2017-08-07 PROCEDURE — 85652 RBC SED RATE AUTOMATED: CPT

## 2017-08-07 PROCEDURE — 86140 C-REACTIVE PROTEIN: CPT

## 2017-08-07 PROCEDURE — 36415 COLL VENOUS BLD VENIPUNCTURE: CPT

## 2017-08-14 ENCOUNTER — TELEPHONE (OUTPATIENT)
Dept: CARDIOLOGY | Facility: MEDICAL CENTER | Age: 57
End: 2017-08-14

## 2017-08-14 DIAGNOSIS — I10 ESSENTIAL HYPERTENSION: ICD-10-CM

## 2017-08-14 DIAGNOSIS — I25.118 CORONARY ARTERY DISEASE OF NATIVE ARTERY OF NATIVE HEART WITH STABLE ANGINA PECTORIS (HCC): ICD-10-CM

## 2017-08-14 RX ORDER — LOSARTAN POTASSIUM 25 MG/1
25 TABLET ORAL DAILY
Qty: 30 TAB | Refills: 11 | Status: SHIPPED | OUTPATIENT
Start: 2017-08-14 | End: 2017-08-21

## 2017-08-14 RX ORDER — METOPROLOL TARTRATE 100 MG/1
100 TABLET ORAL 2 TIMES DAILY
Qty: 60 TAB | Refills: 11 | Status: SHIPPED | OUTPATIENT
Start: 2017-08-14 | End: 2018-06-07

## 2017-08-14 NOTE — TELEPHONE ENCOUNTER
JAY Del Rosario at 8/14/2017 10:06 AM        Status: Signed         Expand All Collapse All    Increase Metoprolol to 100 mg BID and start Losartan 25 mg daily. Continue to monitor BP.             Pt called and informed of above. Pt informed Rx sent to his pharmacy of record and location confirmed. Pt states his girlfriend has taken losartan before so he is familiar. Pt advised to continue to monitor BP and advise us of any symptoms or changes. Pt states understanding of above.

## 2017-08-14 NOTE — TELEPHONE ENCOUNTER
"blood pressure is too high  Received: Today       Keshia Lan R.N.                     JENNIFER/Treva       Patient said he was told by Fatemeh Luna to call the office if his blood pressure \"got out of hand\".  He said this morning his blood pressure is 171-112, pulse 108. He can be reached at [phone].       Returned patient call. Gradually going up again x 1 week. 171/112, after taking 164/96 one hour after medications. Will make JENNIFER aware and let patient know I would call him back with further directions.  "

## 2017-08-16 ENCOUNTER — PATIENT MESSAGE (OUTPATIENT)
Dept: CARDIOLOGY | Facility: MEDICAL CENTER | Age: 57
End: 2017-08-16

## 2017-08-16 NOTE — TELEPHONE ENCOUNTER
" Problem with high blood pressure  Received: Today       Shama Lan R.N.                     JENNIFER/Treva     Patient said that Fatemeh adjusted his medication, but his blood pressure is still high. This morning it was 166/100 and pulse was 105. At 12:50pm today, it was 165/104 and pulse was 81. He wants a call back at [phone].              Returned patient call. Pt states he has been \"concerned\" and thus checking his BP frequently. Pt states he takes him AM meds 7-8 am and evening dose between 4-5 pm. His BP today 1 hr after meds was 151/100 VA 81. 2 hrs after was 152/96 HR 82. Pt advised per JENNIFER recommendation to give new medication time to bring BP down gradually. Pt states he has taken 3 doses of Losartan thus far. Advised to stay hydrated. Pt also advised regarding time/day of taking BPs. Pt states understanding of above. Pt will call back on or before Friday to advise of progress or changes.  "

## 2017-08-16 NOTE — PROGRESS NOTES
Subjective:   Jose A Ponce is a 56 y.o. male who presents today     Past Medical History   Diagnosis Date   • Hx of hernia repair      Mesh   • CAD (coronary artery disease)    • S/P CABG x 2 June 2017     CABG x 2 (LIMA to distal LAD, rSVG to distal diagonal) by Dr. Barboza.   • Hyperlipidemia    • Tobacco use    • Grief reaction      Past Surgical History   Procedure Laterality Date   • Inguinal hernia repair Bilateral 2000   • Tibia orif Right 1980s   • Multiple coronary artery bypass endo vein harvest  6/13/2017     Procedure: MULTIPLE CORONARY ARTERY BYPASS x2 RIGHT LEG ENDO VEIN HARVEST;  Surgeon: Gray Barboza M.D.;  Location: SURGERY [facility];  Service:    • Sal  6/13/2017     Procedure: SAL - INTRAOP;  Surgeon: Gray Barboza M.D.;  Location: SURGERY [facility];  Service:      Family History   Problem Relation Age of Onset   • Heart Disease Maternal Grandfather    • Cancer Mother    • Cancer Father    • No Known Problems Sister    • No Known Problems Sister      History   Smoking status   • Former Smoker   • Quit date: 06/01/2016   Smokeless tobacco   • Never Used     No Known Allergies  Outpatient Encounter Prescriptions as of 8/16/2017   Medication Sig Dispense Refill   • metoprolol (LOPRESSOR) 100 MG Tab Take 1 Tab by mouth 2 times a day. 60 Tab 11   • losartan (COZAAR) 25 MG Tab Take 1 Tab by mouth every day. 30 Tab 11   • clopidogrel (PLAVIX) 75 MG Tab Take 1 Tab by mouth every day. 30 Tab 11   • hydrocodone-acetaminophen (NORCO) 5-325 MG Tab per tablet Take 1-2 Tabs by mouth every 6 hours as needed. 45 Tab 0   • nitroglycerin (NITROSTAT) 0.4 MG SL Tab Place 1 Tab under tongue as needed for Chest Pain. 25 Tab 0   • atorvastatin (LIPITOR) 80 MG tablet Take 1 Tab by mouth every bedtime. 30 Tab 11   • aspirin EC (ECOTRIN) 81 MG Tablet Delayed Response Take 1 Tab by mouth every day. 30 Tab 11   • multivitamin (THERAGRAN) Tab Take 1 Tab by mouth every day.     • Melatonin 10 MG Cap  Take 1 Cap by mouth every bedtime.       No facility-administered encounter medications on file as of 8/16/2017.     ROS     Objective:   There were no vitals taken for this visit.    Physical Exam    Assessment:   No diagnosis found.    Medical Decision Making:  Today's Assessment / Status / Plan:

## 2017-08-21 ENCOUNTER — OFFICE VISIT (OUTPATIENT)
Dept: MEDICAL GROUP | Facility: PHYSICIAN GROUP | Age: 57
End: 2017-08-21
Payer: COMMERCIAL

## 2017-08-21 VITALS
HEIGHT: 74 IN | HEART RATE: 93 BPM | TEMPERATURE: 98.1 F | BODY MASS INDEX: 24.51 KG/M2 | WEIGHT: 191 LBS | DIASTOLIC BLOOD PRESSURE: 88 MMHG | SYSTOLIC BLOOD PRESSURE: 172 MMHG | RESPIRATION RATE: 16 BRPM | OXYGEN SATURATION: 97 %

## 2017-08-21 DIAGNOSIS — M67.40 GANGLION CYST: ICD-10-CM

## 2017-08-21 DIAGNOSIS — I25.118 CORONARY ARTERY DISEASE OF NATIVE ARTERY OF NATIVE HEART WITH STABLE ANGINA PECTORIS (HCC): ICD-10-CM

## 2017-08-21 DIAGNOSIS — I10 ESSENTIAL HYPERTENSION: ICD-10-CM

## 2017-08-21 DIAGNOSIS — F43.21 GRIEF REACTION: ICD-10-CM

## 2017-08-21 DIAGNOSIS — Z12.11 SCREENING FOR COLON CANCER: ICD-10-CM

## 2017-08-21 PROCEDURE — 99203 OFFICE O/P NEW LOW 30 MIN: CPT | Performed by: INTERNAL MEDICINE

## 2017-08-21 RX ORDER — LOSARTAN POTASSIUM 50 MG/1
50 TABLET ORAL DAILY
Qty: 90 TAB | Refills: 1 | Status: SHIPPED | OUTPATIENT
Start: 2017-08-21 | End: 2017-09-01 | Stop reason: SDUPTHER

## 2017-08-21 ASSESSMENT — PATIENT HEALTH QUESTIONNAIRE - PHQ9
5. POOR APPETITE OR OVEREATING: 3 - NEARLY EVERY DAY
SUM OF ALL RESPONSES TO PHQ QUESTIONS 1-9: 19
CLINICAL INTERPRETATION OF PHQ2 SCORE: 6

## 2017-08-21 NOTE — ASSESSMENT & PLAN NOTE
Pt has a ganglion cyst on his left wrist. He has had it for 2-3 years. It causes occasional numbness in his hand if he puts pressure on it.

## 2017-08-21 NOTE — MR AVS SNAPSHOT
"        Jose A Ponce   2017 3:20 PM   Office Visit   MRN: [id number]    Department:  [facility]   Dept Phone:  [phone]    Description:  Male : 1960   Provider:  Ugo Dale M.D.           Reason for Visit     Establish Care           Allergies as of 2017     No Known Allergies      You were diagnosed with     Coronary artery disease of native artery of native heart with stable angina pectoris (CMS-HCC)   [4707504]       Essential hypertension   [9449317]       Grief reaction   [908735]       Ganglion cyst   [463507]       Screening for colon cancer   [600526]         Vital Signs     Blood Pressure Pulse Temperature Respirations Height Weight    172/88 mmHg 93 36.7 °C (98.1 °F) 16 1.88 m (6' 2\") 86.637 kg (191 lb)    Body Mass Index Oxygen Saturation Smoking Status             24.51 kg/m2 97% Former Smoker         Basic Information     Date Of Birth Sex Race Ethnicity Preferred Language    1960 Male White Non- English      Your appointments     Sep 01, 2017  8:15 AM   [facility]    [address], [facility] [address]  [facility] [id number] [phone]            Oct 02, 2017  7:00 AM   [facility]    [address] [place] [id number] [phone]           You will be receiving a confirmation call a few days before your appointment from our automated call confirmation system.              Problem List              ICD-10-CM Priority Class Noted - Resolved    Tobacco use Z72.0 High  6/3/2017 - Present    Grief reaction F43.20   6/3/2017 - Present    Hypercholesterolemia E78.00 High  6/3/2017 - Present    CAD (coronary artery disease) I25.10 High  2017 - Present    S/P CABG x 2 Z95.1 High  2017 - Present    Essential hypertension I10   8/3/2017 - Present    Ganglion cyst M67.40   2017 - Present      "   Health Maintenance        Date Due Completion Dates    IMM DTaP/Tdap/Td Vaccine (1 - Tdap) 11/19/1979 ---    IMM PNEUMOCOCCAL 19-64 (ADULT) MEDIUM RISK SERIES (1 of 1 - PPSV23) 11/19/1979 ---    COLON CANCER SCREENING ANNUAL FIT 11/19/2010 ---    IMM INFLUENZA (1) 9/1/2017 10/1/2016            Current Immunizations     Influenza Vaccine Quad Inj (Pf) 10/1/2016      Below and/or attached are the medications your provider expects you to take. Review all of your home medications and newly ordered medications with your provider and/or pharmacist. Follow medication instructions as directed by your provider and/or pharmacist. Please keep your medication list with you and share with your provider. Update the information when medications are discontinued, doses are changed, or new medications (including over-the-counter products) are added; and carry medication information at all times in the event of emergency situations     Allergies:  No Known Allergies          Medications  Valid as of: August 21, 2017 -  3:51 PM    Generic Name Brand Name Tablet Size Instructions for use    Aspirin (Tablet Delayed Response) ECOTRIN 81 MG Take 1 Tab by mouth every day.        Atorvastatin Calcium (Tab) LIPITOR 80 MG Take 1 Tab by mouth every bedtime.        Clopidogrel Bisulfate (Tab) PLAVIX 75 MG Take 1 Tab by mouth every day.        Hydrocodone-Acetaminophen (Tab) NORCO 5-325 MG Take 1-2 Tabs by mouth every 6 hours as needed.        Losartan Potassium (Tab) COZAAR 50 MG Take 1 Tab by mouth every day.        Melatonin (Cap) Melatonin 10 MG Take 1 Cap by mouth every bedtime.        Metoprolol Tartrate (Tab) LOPRESSOR 100 MG Take 1 Tab by mouth 2 times a day.        Multiple Vitamin (Tab) THERAGRAN  Take 1 Tab by mouth every day.        Nitroglycerin (SL Tab) NITROSTAT 0.4 MG Place 1 Tab under tongue as needed for Chest Pain.        Sertraline HCl (Tab) ZOLOFT 50 MG Take 1 Tab by mouth every day.        .                 Medicines  prescribed today were sent to:     [id number] - [place], [place] - [address]    [address] [place] [address]    Phone: [phone] Fax: [phone]    Open 24 Hours?: No      Medication refill instructions:       If your prescription bottle indicates you have medication refills left, it is not necessary to call your provider’s office. Please contact your pharmacy and they will refill your medication.    If your prescription bottle indicates you do not have any refills left, you may request refills at any time through one of the following ways: The online [employer] system (except Urgent Care), by calling your provider’s office, or by asking your pharmacy to contact your provider’s office with a refill request. Medication refills are processed only during regular business hours and may not be available until the next business day. Your provider may request additional information or to have a follow-up visit with you prior to refilling your medication.   *Please Note: Medication refills are assigned a new Rx number when refilled electronically. Your pharmacy may indicate that no refills were authorized even though a new prescription for the same medication is available at the pharmacy. Please request the medicine by name with the pharmacy before contacting your provider for a refill.        Your To Do List     Future Labs/Procedures Complete By Expires    OCCULT BLOOD FECES IMMUNOASSAY (FIT)  As directed 8/21/2018      Referral     A referral request has been sent to our patient care coordination department. Please allow 3-5 business days for us to process this request and contact you either by phone or mail. If you do not hear from us by the 5th business day, please call us at [phone].           [employer] Access Code: Activation code not generated  Current [employer] Status: Active

## 2017-08-21 NOTE — PROGRESS NOTES
Chief Complaint   Patient presents with   • Establish Care         HISTORY OF THE PRESENT ILLNESS: Patient is a 56 y.o. male. This pleasant patient is here today CAD, HTN, grief reaction, ganglion cyst    CAD (coronary artery disease)  Pt follows with cardiology for this. Per chart review he was admitted to the hospital for atypical chest pain and found to have complete occlusion of the LAD and underwent a 2V CABG per Dr. Barboza. He is now following with cardiology and is treated with aspirin, clopidogrel, atorvastatin, losartan, and metoprolol.    He denies chest pain, SOB currently, LE edema, PND.     Essential hypertension  Patient has known hypertension and is on losartan and metoprolol. He is compliant with medications. At last visit with cardiology has metoprolol was increased in dose due to elevated blood pressure readings at home in the 160s.     Today patient brings in his BP log and the numbers are consistently above 150/90.     Denies chest pain, shortness of breath, headache, blurry vision      Grief reaction  Patient's girlfriend Naya passed away about 3 months ago. He has been having a depressed mood since then. He has affected problems with sleep, energy, appetite, concentration. He denies suicidal ideation.     Ganglion cyst  Pt has a ganglion cyst on his left wrist. He has had it for 2-3 years. It causes occasional numbness in his hand if he puts pressure on it.       Allergies: Review of patient's allergies indicates no known allergies.    Current Outpatient Prescriptions Ordered in [facility]   Medication Sig Dispense Refill   • losartan (COZAAR) 50 MG Tab Take 1 Tab by mouth every day. 90 Tab 1   • sertraline (ZOLOFT) 50 MG Tab Take 1 Tab by mouth every day. 30 Tab 5   • metoprolol (LOPRESSOR) 100 MG Tab Take 1 Tab by mouth 2 times a day. 60 Tab 11   • clopidogrel (PLAVIX) 75 MG Tab Take 1 Tab by mouth every day. 30 Tab 11   • hydrocodone-acetaminophen (NORCO) 5-325 MG Tab per tablet Take 1-2 Tabs  "by mouth every 6 hours as needed. 45 Tab 0   • nitroglycerin (NITROSTAT) 0.4 MG SL Tab Place 1 Tab under tongue as needed for Chest Pain. 25 Tab 0   • atorvastatin (LIPITOR) 80 MG tablet Take 1 Tab by mouth every bedtime. 30 Tab 11   • aspirin EC (ECOTRIN) 81 MG Tablet Delayed Response Take 1 Tab by mouth every day. 30 Tab 11   • multivitamin (THERAGRAN) Tab Take 1 Tab by mouth every day.     • Melatonin 10 MG Cap Take 1 Cap by mouth every bedtime.       No current Epic-ordered facility-administered medications on file.       Past Medical History   Diagnosis Date   • Hx of hernia repair      Mesh   • CAD (coronary artery disease)    • S/P CABG x 2 June 2017     CABG x 2 (LIMA to distal LAD, rSVG to distal diagonal) by Dr. Barboza.   • Hyperlipidemia    • Tobacco use    • Grief reaction        Past Surgical History   Procedure Laterality Date   • Inguinal hernia repair Bilateral 2000   • Tibia orif Right 1980s   • Multiple coronary artery bypass endo vein harvest  6/13/2017     Procedure: MULTIPLE CORONARY ARTERY BYPASS x2 RIGHT LEG ENDO VEIN HARVEST;  Surgeon: Gray Barboza M.D.;  Location: SURGERY [facility];  Service:    • Sla  6/13/2017     Procedure: SAL - INTRAOP;  Surgeon: Gray Barboza M.D.;  Location: SURGERY [facility];  Service:        Social History   Substance Use Topics   • Smoking status: Former Smoker     Quit date: 06/01/2016   • Smokeless tobacco: Never Used   • Alcohol Use: 0.0 oz/week     0 Standard drinks or equivalent per week      Comment: occasional       Family History   Problem Relation Age of Onset   • Heart Disease Maternal Grandfather    • Cancer Mother    • Cancer Father    • No Known Problems Sister    • No Known Problems Sister        Review of Systems :    Denies chest pain, SOB      Exam: Blood pressure 172/88, pulse 93, temperature 36.7 °C (98.1 °F), resp. rate 16, height 1.88 m (6' 2\"), weight 86.637 kg (191 lb), SpO2 97 %.    Blood pressure 172/88, pulse 93, " "temperature 36.7 °C (98.1 °F), resp. rate 16, height 1.88 m (6' 2\"), weight 86.637 kg (191 lb), SpO2 97 %. Body mass index is 24.51 kg/(m^2).   Physical Exam:  Constitutional: Alert & oriented, no acute distress  Eye: Equal, round and reactive, conjunctiva clear, lids normal  ENMT: Lips without lesions, good dentition, oropharynx clear  Neck: Trachea midline, no masses, no thyromegaly. No cervical or supraclavicular lymphadenopathy  Respiratory: Unlabored respiratory effort, lungs clear to auscultation, no wheezes, no ronchi  Cardiovascular: Normal S1, S2, no murmur  Skin: Warm, dry, good turgor, no rashes in visible areas  Neuro: No overt focal neurologic deficits  Psych: Normal mood and affect, judgement normal  Musculoskeletal: Ganglion cyst seen on patient's left wrist    Assessment/Plan  1. Coronary artery disease of native artery of native heart with stable angina pectoris (CMS-formerly [facility])  Continue follow up and treatment per cardiology    2. Essential hypertension  Poorly controlled. Will increase losartan to 50 mg daily. Pt has appointment with cardiology next week at which point medication change can be assessed.  Will also be seeing him next month in our clinic  - losartan (COZAAR) 50 MG Tab; Take 1 Tab by mouth every day.  Dispense: 90 Tab; Refill: 1    3. Grief reaction  Will start sertraline in follow-up in 6 weeks to assess response. Patient denies suicidal ideation   - sertraline (ZOLOFT) 50 MG Tab; Take 1 Tab by mouth every day.  Dispense: 30 Tab; Refill: 5    4. Ganglion cyst  Seen on exam. We'll refer to orthopedic surgery  - REFERRAL TO ORTHOPEDICS      Return in about 6 weeks (around 10/2/2017).    Please note that this dictation was created using voice recognition software. I have made every reasonable attempt to correct obvious errors, but I expect that there are errors of grammar and possibly content that I did not discover before finalizing the note.     "

## 2017-08-21 NOTE — ASSESSMENT & PLAN NOTE
Pt follows with cardiology for this. Per chart review he was admitted to the hospital for atypical chest pain and found to have complete occlusion of the LAD and underwent a 2V CABG per Dr. Barboza. He is now following with cardiology and is treated with aspirin, clopidogrel, atorvastatin, losartan, and metoprolol.    He denies chest pain, SOB currently, LE edema, PND.

## 2017-08-21 NOTE — ASSESSMENT & PLAN NOTE
Patient has known hypertension and is on losartan and metoprolol. He is compliant with medications. At last visit with cardiology has metoprolol was increased in dose due to elevated blood pressure readings at home in the 160s.     Today patient brings in his BP log and the numbers are consistently above 150/90.     Denies chest pain, shortness of breath, headache, blurry vision

## 2017-08-21 NOTE — ASSESSMENT & PLAN NOTE
Patient's girlfriend Naya passed away about 3 months ago. He has been having a depressed mood since then. He has affected problems with sleep, energy, appetite, concentration. He denies suicidal ideation.

## 2017-09-01 ENCOUNTER — OFFICE VISIT (OUTPATIENT)
Dept: CARDIOLOGY | Facility: MEDICAL CENTER | Age: 57
End: 2017-09-01
Payer: COMMERCIAL

## 2017-09-01 VITALS
SYSTOLIC BLOOD PRESSURE: 150 MMHG | WEIGHT: 192 LBS | HEART RATE: 76 BPM | OXYGEN SATURATION: 94 % | BODY MASS INDEX: 24.64 KG/M2 | HEIGHT: 74 IN | DIASTOLIC BLOOD PRESSURE: 90 MMHG

## 2017-09-01 DIAGNOSIS — I10 ESSENTIAL HYPERTENSION: ICD-10-CM

## 2017-09-01 DIAGNOSIS — E78.5 DYSLIPIDEMIA: ICD-10-CM

## 2017-09-01 DIAGNOSIS — Z87.891 FORMER SMOKER: ICD-10-CM

## 2017-09-01 DIAGNOSIS — Z95.1 S/P CABG X 2: ICD-10-CM

## 2017-09-01 DIAGNOSIS — I25.810 CORONARY ARTERY DISEASE INVOLVING CORONARY BYPASS GRAFT OF NATIVE HEART WITHOUT ANGINA PECTORIS: ICD-10-CM

## 2017-09-01 PROCEDURE — 99214 OFFICE O/P EST MOD 30 MIN: CPT | Performed by: INTERNAL MEDICINE

## 2017-09-01 RX ORDER — LOSARTAN POTASSIUM 50 MG/1
50 TABLET ORAL
Qty: 90 TAB | Refills: 3 | Status: SHIPPED | OUTPATIENT
Start: 2017-09-01 | End: 2017-09-11 | Stop reason: SDUPTHER

## 2017-09-01 ASSESSMENT — ENCOUNTER SYMPTOMS
DEPRESSION: 1
NERVOUS/ANXIOUS: 1

## 2017-09-01 NOTE — PROGRESS NOTES
Subjective:   Jose A Ponce is a 56 -year-old man with a history of coronary artery disease status post two-vessel bypass surgery (LIMA-LAD, SVG-diagonal done on 6/13/2017 by Gray Barboza MD), hypertension and dyslipidemia.    He is overall doing relatively well today with no chest discomfort apart from some minor sternotomy site pain. He has no other cardiovascular complaints. He brings in his blood pressure log with documented systolic blood pressures ranging between the one teens and 160s mmHg having had his losartan recently increased to 50 mg by mouth every morning by his primary care physician. His blood pressures are frequently higher in the morning than in the evening, which has been his pattern for several years.    He is tolerating his medications well and denies any side effects with those.    Past Medical History:   Diagnosis Date   • S/P CABG x 2 June 2017    CABG x 2 (LIMA to distal LAD, rSVG to distal diagonal) by Dr. Barboza.   • CAD (coronary artery disease)    • Grief reaction    • Hx of hernia repair     Mesh   • Hyperlipidemia    • Tobacco use      Past Surgical History:   Procedure Laterality Date   • MULTIPLE CORONARY ARTERY BYPASS ENDO VEIN HARVEST  6/13/2017    Procedure: MULTIPLE CORONARY ARTERY BYPASS x2 RIGHT LEG ENDO VEIN HARVEST;  Surgeon: Gray Barboza M.D.;  Location: SURGERY [facility];  Service:    • HUBERT  6/13/2017    Procedure: HUBERT - INTRAOP;  Surgeon: Gray Barboza M.D.;  Location: SURGERY [facility];  Service:    • INGUINAL HERNIA REPAIR Bilateral 2000   • TIBIA ORIF Right 1980s     Family History   Problem Relation Age of Onset   • Cancer Mother    • Cancer Father    • No Known Problems Sister    • No Known Problems Sister    • Heart Disease Maternal Grandfather 89     probable MI and sudden death     History   Smoking Status   • Former Smoker   • Quit date: 6/1/2016   Smokeless Tobacco   • Never Used     No Known Allergies  Outpatient Encounter  "Prescriptions as of 9/1/2017   Medication Sig Dispense Refill   • losartan (COZAAR) 50 MG Tab Take 1 Tab by mouth every bedtime. 90 Tab 3   • sertraline (ZOLOFT) 50 MG Tab Take 1 Tab by mouth every day. 30 Tab 5   • metoprolol (LOPRESSOR) 100 MG Tab Take 1 Tab by mouth 2 times a day. 60 Tab 11   • clopidogrel (PLAVIX) 75 MG Tab Take 1 Tab by mouth every day. 30 Tab 11   • nitroglycerin (NITROSTAT) 0.4 MG SL Tab Place 1 Tab under tongue as needed for Chest Pain. 25 Tab 0   • atorvastatin (LIPITOR) 80 MG tablet Take 1 Tab by mouth every bedtime. 30 Tab 11   • aspirin EC (ECOTRIN) 81 MG Tablet Delayed Response Take 1 Tab by mouth every day. 30 Tab 11   • multivitamin (THERAGRAN) Tab Take 1 Tab by mouth every day.     • Melatonin 10 MG Cap Take 1 Cap by mouth every bedtime.     • [DISCONTINUED] losartan (COZAAR) 50 MG Tab Take 1 Tab by mouth every day. 90 Tab 1   • [DISCONTINUED] hydrocodone-acetaminophen (NORCO) 5-325 MG Tab per tablet Take 1-2 Tabs by mouth every 6 hours as needed. (Patient not taking: Reported on 9/1/2017) 45 Tab 0     No facility-administered encounter medications on file as of 9/1/2017.      Review of Systems   Cardiovascular: Positive for chest pain (sternotomy site pain, no angina).   Genitourinary: Positive for frequency.   Psychiatric/Behavioral: Positive for depression. Negative for suicidal ideas. The patient is nervous/anxious.         Grieving the loss of his significant other that occurred just before bypass surgery   All other systems reviewed and are negative.       Objective:   /90   Pulse 76   Ht 1.88 m (6' 2\")   Wt 87.1 kg (192 lb)   SpO2 94%   BMI 24.65 kg/m²     Physical Exam   Constitutional: He is oriented to person, place, and time. He appears well-developed and well-nourished. No distress.   Pleasant, middle-aged man in no distress   HENT:   Head: Normocephalic and atraumatic.   Eyes: Conjunctivae and EOM are normal. Pupils are equal, round, and reactive to light. No " scleral icterus.   Neck: Neck supple. No JVD present. No tracheal deviation present.   Cardiovascular: Normal rate, regular rhythm, normal heart sounds and intact distal pulses.  Exam reveals no gallop and no friction rub.    No murmur heard.  Pulses:       Dorsalis pedis pulses are 2+ on the right side, and 2+ on the left side.   Well-healing sternotomy scar noted, No carotid bruits   Pulmonary/Chest: Effort normal and breath sounds normal. No stridor. No respiratory distress. He has no wheezes. He has no rales.   Abdominal: Soft. Bowel sounds are normal. He exhibits no distension.   Musculoskeletal: He exhibits no edema.   Neurological: He is alert and oriented to person, place, and time.   Skin: Skin is warm and dry. No rash noted. He is not diaphoretic. No erythema. No pallor.   Psychiatric: Judgment and thought content normal.   Mildly blunted affect   Vitals reviewed.    Lab Results   Component Value Date/Time    WBC 5.3 08/07/2017 11:31 AM    RBC 3.94 (L) 08/07/2017 11:31 AM    HEMOGLOBIN 13.1 (L) 08/07/2017 11:31 AM    HEMATOCRIT 39.8 (L) 08/07/2017 11:31 AM    .0 (H) 08/07/2017 11:31 AM    MCH 33.2 (H) 08/07/2017 11:31 AM    MCHC 32.9 (L) 08/07/2017 11:31 AM    MPV 10.9 08/07/2017 11:31 AM        Lab Results   Component Value Date/Time    SODIUM 136 08/07/2017 11:31 AM    POTASSIUM 4.1 08/07/2017 11:31 AM    CHLORIDE 102 08/07/2017 11:31 AM    CO2 27 08/07/2017 11:31 AM    GLUCOSE 103 (H) 08/07/2017 11:31 AM    BUN 10 08/07/2017 11:31 AM    CREATININE 0.70 08/07/2017 11:31 AM        Lab Results   Component Value Date/Time    ASTSGOT 32 08/07/2017 11:31 AM    ALTSGPT 30 08/07/2017 11:31 AM        Lab Results   Component Value Date/Time    CHOLSTRLTOT 242 (H) 06/01/2017 05:29 PM     (H) 06/01/2017 05:29 PM    HDL 54 06/01/2017 05:29 PM    TRIGLYCERIDE 128 06/01/2017 05:29 PM       Myocardial Perfusion Report 6/2/17   NUCLEAR IMAGING INTERPRETATION   Positive stress ECG for ischemia.   Normal  "left ventricular size, ejection fraction, and wall motion.   LAD ischemia large size and moderate ischemia   Normal left ventricular wall motion.  LV ejection fraction = 61%.     ECG INTERPRETATION   Positive stress ECG for ischemia.       Carotid Duplex Report 6/3/17     Vascular Laboratory   CONCLUSIONS   BILATERAL:   Mild smooth plaque of the bifurcation extending into the internal carotid.    Velocities are consistent with < 50% stenosis of the internal carotid    artery.   Bilateral subclavian and vertebral artery waveforms are antegrade and    waveforms are normal in character and velocity.       Transesophageal Echo Report 6/13/17  Normal LV function post bypass good concentric contractility.     Transthoracic Echo Report 6/3/17  No prior study is available for comparison.   Left ventricular ejection fraction is visually estimated to be 55%.  Grade I diastolic dysfunction.  Normal inferior vena cava size without inspiratory collapse.    Cardiac catheterization, 6/3/2017:  \"FINDINGS:  HEMODYNAMICS:  LEFT HEART PRESSURES:    1.  LVEDP of 20 mmHg.    2.  Left ventricular systolic pressure 124 mmHg.  3.  Central aortic pressure systolic 115, diastolic 71 mmHg.     2.  LEFT VENTRICULOGRAPHY:  Left ventricular chamber size, wall motion and   systolic function are normal.  Calculated ejection fraction 73%.     CORONARY ARTERIOGRAPHY:  1.  LEFT MAIN ARTERY:  Left main vessel is angiographically widely patent and   bifurcates into a left anterior descending artery and circumflex artery.  2.  LEFT ANTERIOR DESCENDING ARTERY:  The proximal LAD is 100% occluded   immediately adjacent to a small caliber diagonal branch.  There is faint retrograde   collateral circulation partially filling the distal left anterior descending artery. The distal  LAD vessel does not appear to have any additional significant stenotic lesions  3.  CIRCUMFLEX ARTERY:  The circumflex artery consists of a single bifurcating   marginal branch.  " "Mid circumflex has diffuse smooth vessel narrowing with an  estimated 50-60% stenosis.  4.  RIGHT CORONARY ARTERY:  The RCA is a large caliber vessel and gives rise   to a large posterior descending artery and a large posterolateral branch.  Proximal   circumflex has an estimated 60% stenosis.  There is a substantial collateral   circulation from the distal right coronary artery to the distal left anterior   descending artery.       FRACTIONAL FLOW RESERVE INDEX resting measurement of the proximal right   coronary artery is 0.96.     PLAN:  Once the patient recovers from his postprocedure sedation, would   discuss treatment options with regards to his coronary artery disease, which   would include initiation of optimal medical therapy and clinical monitoring,   attempt at a coronary intervention of the proximal left anterior descending   artery chronic total occlusion or coronary bypass grafting with a LIMA to the   left anterior descending artery.  I believe that the most optimal longterm   management option and outcome would result from revascularization with   coronary artery bypass surgery\"    CABG x 2: 6/13/2017     PREOPERATIVE DIAGNOSES:  Angina, coronary artery disease, hypertension,    dyslipidemia, hypercholesterolemia.     POSTOPERATIVE DIAGNOSES:  Angina, coronary artery disease, hypertension,    dyslipidemia, hypercholesterolemia.     OPERATION:  Coronary artery bypass grafting x2, left FRANKIE to the distal LAD,    reverse saphenous vein graft to the distal diagonal, endoscopic vein harvest.    Assessment:     1. Essential hypertension  losartan (COZAAR) 50 MG Tab    BASIC METABOLIC PANEL   2. S/P CABG x 2  losartan (COZAAR) 50 MG Tab    REFERRAL TO INTENSIVE CARDIAC REHAB/CARDIAC REHAB    BASIC METABOLIC PANEL   3. Coronary artery disease involving coronary bypass graft of native heart without angina pectoris     4. Dyslipidemia     5. Former smoker         Medical Decision Making:  Today's Assessment / " Status / Plan:     His blood pressure remains fairly high and variable. I do think that would improve quite a bit with increased physical activity related which I referred him to cardiac rehabilitation. Notably, his blood pressure seems to be a bit higher in the morning, and remains elevated despite recently increasing losartan 50 mg by mouth every morning. I moved his losartan to daily at bedtime dosing, and continue his metoprolol tartrate at 100 mg by mouth twice a day. If his blood pressure remains elevated, my plan will be to increase his losartan to 50 mg by mouth twice a day.    Bari Esquivel MD  Cardiologist, [facility] [place]

## 2017-09-01 NOTE — LETTER
Name:          Jose A Ponce   YOB: 1960  Date:     9/1/2017      Ugo Dale M.D.  [address] [facility] [address]  [place] [address]     Bari Esqiuvel MD  [address], [address]  [place] [address]  Phone: [phone]  Back Line: [phone]  Fax: [phone]  E-mail: [email or web address]   Dear Dr. Dale,    We had the pleasure of seeing your patient, Jose A Ponce, in Cardiology Clinic at [facility] and [facility].    As you know, he is a 56-year-old man with a history of coronary artery disease status post two-vessel bypass surgery (LIMA-LAD, SVG-diagonal done on 6/13/2017 by Gray Barboza MD), hypertension and dyslipidemia.    His blood pressure remains fairly high and variable. I do think that would improve quite a bit with increased physical activity related which I referred him to cardiac rehabilitation. Notably, his blood pressure seems to be a bit higher in the morning, and remains elevated despite recently increasing losartan 50 mg by mouth every morning. I moved his losartan to daily at bedtime dosing, and continue his metoprolol tartrate at 100 mg by mouth twice a day. If his blood pressure remains elevated, my plan will be to increase his losartan to 50 mg by mouth twice a day.    We will have the patient follow-up in 3 months with our nurse practitioner, 6 months with me.    Thank you for the referral and please do not hesitate to contact me at any time. My contact information is listed above.    This note was dictated using Dragon speech recognition software.     A full note including my physical examination and a full list of rectified medications is available in our medical record, and can be faxed as well.    Bari Esquivel MD  Cardiologist  [facility]

## 2017-09-11 ENCOUNTER — TELEPHONE (OUTPATIENT)
Dept: CARDIOLOGY | Facility: MEDICAL CENTER | Age: 57
End: 2017-09-11

## 2017-09-11 ENCOUNTER — PATIENT MESSAGE (OUTPATIENT)
Dept: CARDIOLOGY | Facility: MEDICAL CENTER | Age: 57
End: 2017-09-11

## 2017-09-11 DIAGNOSIS — Z95.1 HX OF CABG: ICD-10-CM

## 2017-09-11 DIAGNOSIS — I10 ESSENTIAL HYPERTENSION: ICD-10-CM

## 2017-09-11 RX ORDER — HYDROCHLOROTHIAZIDE 25 MG/1
25 TABLET ORAL DAILY
Qty: 90 TAB | Refills: 3 | OUTPATIENT
Start: 2017-09-11 | End: 2018-09-07

## 2017-09-11 RX ORDER — LOSARTAN POTASSIUM 50 MG/1
50 TABLET ORAL 2 TIMES DAILY
Qty: 180 TAB | Refills: 3 | OUTPATIENT
Start: 2017-09-11 | End: 2018-06-07

## 2017-09-11 NOTE — TELEPHONE ENCOUNTER
Jose A Esquivel M.D. 6 hours ago (6:13 AM)         Blood pressure medication not effective. Here are readings from Sunday 10th. 7:30a 187/107 P87, 8:40a 171/85 P66, 7:05p 170/92 P80, 8:30p 171/93 P65.   This morning at 6:00a 178/103 p71   Please advise.        Per Dr. Bari Esquivel, patient is to increase Losartan to 50 mg BID, add HCTZ 25 mg daily, and have a follow up appointment with an APRN in six weeks.    Prescriptions called in to [facility]. Notified patient via Shuame.    VITO MEYER

## 2017-09-25 ENCOUNTER — PATIENT MESSAGE (OUTPATIENT)
Dept: CARDIOLOGY | Facility: MEDICAL CENTER | Age: 57
End: 2017-09-25

## 2017-09-25 DIAGNOSIS — I10 ESSENTIAL HYPERTENSION: ICD-10-CM

## 2017-10-02 ENCOUNTER — TELEPHONE (OUTPATIENT)
Dept: CARDIOLOGY | Facility: MEDICAL CENTER | Age: 57
End: 2017-10-02

## 2017-10-03 RX ORDER — AMLODIPINE BESYLATE 5 MG/1
5 TABLET ORAL DAILY
Qty: 30 TAB | Refills: 11 | Status: SHIPPED | OUTPATIENT
Start: 2017-10-03 | End: 2017-12-08 | Stop reason: SDUPTHER

## 2017-10-09 ENCOUNTER — HOSPITAL ENCOUNTER (OUTPATIENT)
Facility: MEDICAL CENTER | Age: 57
End: 2017-10-09
Attending: ORTHOPAEDIC SURGERY | Admitting: ORTHOPAEDIC SURGERY
Payer: COMMERCIAL

## 2017-10-09 NOTE — TELEPHONE ENCOUNTER
Please have him increase Amlodipine to 5mg BID.  Other meds remain the same for now.  Monitor BP at home, and call us in 1-2 weeks with report.  Thanks, AB

## 2017-10-12 VITALS — HEIGHT: 74 IN | WEIGHT: 197.09 LBS | BODY MASS INDEX: 25.29 KG/M2

## 2017-10-12 DIAGNOSIS — Z01.812 PRE-OPERATIVE LABORATORY EXAMINATION: ICD-10-CM

## 2017-10-12 LAB
ANION GAP SERPL CALC-SCNC: 10 MMOL/L (ref 0–11.9)
BUN SERPL-MCNC: 20 MG/DL (ref 8–22)
CALCIUM SERPL-MCNC: 9.5 MG/DL (ref 8.5–10.5)
CHLORIDE SERPL-SCNC: 104 MMOL/L (ref 96–112)
CO2 SERPL-SCNC: 26 MMOL/L (ref 20–33)
CREAT SERPL-MCNC: 0.94 MG/DL (ref 0.5–1.4)
ERYTHROCYTE [DISTWIDTH] IN BLOOD BY AUTOMATED COUNT: 45.3 FL (ref 35.9–50)
GFR SERPL CREATININE-BSD FRML MDRD: >60 ML/MIN/1.73 M 2
GLUCOSE SERPL-MCNC: 94 MG/DL (ref 65–99)
HCT VFR BLD AUTO: 43.2 % (ref 42–52)
HGB BLD-MCNC: 14.6 G/DL (ref 14–18)
MCH RBC QN AUTO: 32.4 PG (ref 27–33)
MCHC RBC AUTO-ENTMCNC: 33.8 G/DL (ref 33.7–35.3)
MCV RBC AUTO: 95.8 FL (ref 81.4–97.8)
PLATELET # BLD AUTO: 238 K/UL (ref 164–446)
PMV BLD AUTO: 10.9 FL (ref 9–12.9)
POTASSIUM SERPL-SCNC: 3.9 MMOL/L (ref 3.6–5.5)
RBC # BLD AUTO: 4.51 M/UL (ref 4.7–6.1)
SODIUM SERPL-SCNC: 140 MMOL/L (ref 135–145)
WBC # BLD AUTO: 7.5 K/UL (ref 4.8–10.8)

## 2017-10-12 PROCEDURE — 85027 COMPLETE CBC AUTOMATED: CPT

## 2017-10-12 PROCEDURE — 80048 BASIC METABOLIC PNL TOTAL CA: CPT

## 2017-10-12 PROCEDURE — 36415 COLL VENOUS BLD VENIPUNCTURE: CPT

## 2017-10-12 NOTE — OR NURSING
Pre admit apt: Pt. Instructed to continue regularly prescribed medications through day before surgery.  Instructed to take the following medications, the day of surgery, with a sip of water per anesthesia protocol: amlodipine, metoprolol

## 2017-10-17 ENCOUNTER — TELEPHONE (OUTPATIENT)
Dept: CARDIOLOGY | Facility: MEDICAL CENTER | Age: 57
End: 2017-10-17

## 2017-10-17 NOTE — TELEPHONE ENCOUNTER
Pt called with message. No answer, left VM for call back.  To AB MITCH Graham to follow.  Elenita BLULARD RN     ----- Message from KARAN Hoover sent at 10/17/2017  8:07 AM PDT -----  Regarding: FW: Non-Urgent Medical Question  Contact: [phone]  Elenita,  Please have pt stop Amlodipine, and wait 3 days to see if diarrhea stops.  If it stops, then try addition of Diltiazem 180mg once daily to help with BP. Continue other meds too.  Monitor BP and contact us in 7-10 days with BP report and symptoms.  Thanks, AB  ----- Message -----  From: Elenita Nieto R.N.  Sent: 10/13/2017   3:37 PM  To: KARAN Hoover  Subject: FW: Non-Urgent Medical Question                  Cici please review the following MyChart message from Pt about monitoring BP. Dr CUMMINGS is out of the office.    Please advise,  Elenita BULLARD RN   ----- Message -----  From: Yusra Masters, Med Ass't  Sent: 10/13/2017   8:16 AM  To: Elenita Nieto R.N.  Subject: FW: Non-Urgent Medical Question                      ----- Message -----  From: Jose A Ponce  Sent: 10/12/2017   7:22 PM  To: Danette Cowan/Meliton  Subject: Non-Urgent Medical Question                      Blood pressure 10/11/17 @7:05a 154/88 P 119,  @7:05p 155/95 p81,  @8:50pm 125/67 P77. 10/12/17 @5:00a 157/94 P88,  @6:35a 116/69 P77,  @7:05p 156/92 P81.  Have had constant diarrhea with addition of Amlodipine 5mg.  Concerned about all these meds disrupting system.

## 2017-10-25 ENCOUNTER — TELEPHONE (OUTPATIENT)
Dept: CARDIOLOGY | Facility: MEDICAL CENTER | Age: 57
End: 2017-10-25

## 2017-10-25 NOTE — TELEPHONE ENCOUNTER
Stopped Amlodipine for 3 days. Did not help with diarrhea. Started Amlodipine again. Blood pressure since as follows: 10/22/17 @ 7:00am 159/87 P80, @7:05pm 171/94 P80, @ 8:19ap299/80 P76. !0/23/17 @5:30am 148/89 P 99, @6:00pm 159/100 P80, @6:30pm 151/94 P76, @7:30pm 129/73 P82. !0/24/17 @ 7:15am 204/188 P111, @8:25am 147/88 P72, @ 6:55pm 158/96 P80. Please advise.

## 2017-10-25 NOTE — TELEPHONE ENCOUNTER
Keshia Grimes R.N.            IA/Kathia     Patient is returning your call from today. He said he can't take calls once he is at work. He ask if you could please call and leave him a detailed voicemail at [phone].      S/w pt, advised managing bp's through MyChart is becoming a little too complicated, therefore recommended pt be seen in office.  Scheduled pt to see KEN ABERNATHY on Tuesday, 10/31/17 at 8am.

## 2017-10-25 NOTE — TELEPHONE ENCOUNTER
Pt sent [employer] message, which is listed below.    LM for pt to call back to come in for appt to see KEN ABERNATHY.

## 2017-10-31 ENCOUNTER — OFFICE VISIT (OUTPATIENT)
Dept: CARDIOLOGY | Facility: MEDICAL CENTER | Age: 57
End: 2017-10-31
Payer: COMMERCIAL

## 2017-10-31 VITALS
HEART RATE: 80 BPM | WEIGHT: 192 LBS | OXYGEN SATURATION: 98 % | BODY MASS INDEX: 24.64 KG/M2 | DIASTOLIC BLOOD PRESSURE: 70 MMHG | SYSTOLIC BLOOD PRESSURE: 118 MMHG | HEIGHT: 74 IN

## 2017-10-31 DIAGNOSIS — I25.810 CORONARY ARTERY DISEASE INVOLVING CORONARY BYPASS GRAFT OF NATIVE HEART WITHOUT ANGINA PECTORIS: ICD-10-CM

## 2017-10-31 DIAGNOSIS — E78.5 DYSLIPIDEMIA: ICD-10-CM

## 2017-10-31 DIAGNOSIS — Z95.1 S/P CABG X 2: ICD-10-CM

## 2017-10-31 DIAGNOSIS — I10 ESSENTIAL HYPERTENSION: ICD-10-CM

## 2017-10-31 DIAGNOSIS — Z87.891 FORMER SMOKER: ICD-10-CM

## 2017-10-31 PROCEDURE — 99214 OFFICE O/P EST MOD 30 MIN: CPT | Performed by: NURSE PRACTITIONER

## 2017-10-31 ASSESSMENT — ENCOUNTER SYMPTOMS
LOSS OF CONSCIOUSNESS: 0
BRUISES/BLEEDS EASILY: 0
CHILLS: 0
DIZZINESS: 0
ABDOMINAL PAIN: 0
FEVER: 0
MYALGIAS: 0
ORTHOPNEA: 0
HEADACHES: 0
PND: 0
SHORTNESS OF BREATH: 0
PALPITATIONS: 0

## 2017-10-31 NOTE — LETTER
"     [facility]   [address]   [address]  [place] [address]  Phone: [phone]  Fax: [phone]              Jose A Ponce  1960    Encounter Date: 10/31/2017    KARAN Hoover          PROGRESS NOTE:  Subjective:   Jose A Ponce is a 56 y.o. male who presents today for follow-up of elevated BP and medication change evaluatoin.    Jose A is a 56 year old male with no history of CABG x 2 done on 2017 by Dr. Barboza. Medical therapy was adjusted to treat hypertension and hyperlipidemia.  More recently, BP has been elevated, and Dr. BHARATHI Esquivel increased Losartan to 50mg BID, and added Amlodipine 5mg once daily.     He is here today for follow-up. BP is doing better, but he does still occasionally has spikes of 190+ systolic; this is before he takes his morning medication.  No chest pain, pressure or discomfort. No palpitations or fluttering of his heart; no shortness of breath, orthopnea or PND; no dizziness, lightheadedness, or syncope; no edema.   He is not smoking. He is still grieving the loss of his girlfriend, who  earlier this year of cancer.    Past Medical History:   Diagnosis Date   • Breath shortness 10/2017    \"In the past, not an issue now\" sob before heart surgery   • CAD (coronary artery disease)    • Dental disorder 10/2017    Upper dentures   • Grief reaction    • High cholesterol    • Hx of hernia repair     Mesh   • Hyperlipidemia    • Hypertension    • Psychiatric problem 10/2017    depression   • S/P CABG x 2017    CABG x 2 (LIMA to distal LAD, rSVG to distal diagonal) by Dr. Barboza.   • Tobacco use      Past Surgical History:   Procedure Laterality Date   • MULTIPLE CORONARY ARTERY BYPASS ENDO VEIN HARVEST  2017    Procedure: MULTIPLE CORONARY ARTERY BYPASS x2 RIGHT LEG ENDO VEIN HARVEST;  Surgeon: Gray Barboza M.D.;  Location: SURGERY [facility];  Service:    • HUBERT  " 6/13/2017    Procedure: HUBERT - INTRAOP;  Surgeon: Gray Barboza M.D.;  Location: SURGERY [facility];  Service:    • INGUINAL HERNIA REPAIR Bilateral 2000   • TIBIA ORIF Right 1980s     Family History   Problem Relation Age of Onset   • Cancer Mother    • Cancer Father    • No Known Problems Sister    • No Known Problems Sister    • Heart Disease Maternal Grandfather 89     probable MI and sudden death     History   Smoking Status   • Former Smoker   • Packs/day: 1.00   • Years: 42.00   • Types: Cigarettes   • Quit date: 6/1/2016   Smokeless Tobacco   • Current User     No Known Allergies  Outpatient Encounter Prescriptions as of 10/31/2017   Medication Sig Dispense Refill   • amlodipine (NORVASC) 5 MG Tab Take 1 Tab by mouth every day. 30 Tab 11   • losartan (COZAAR) 50 MG Tab Take 1 Tab by mouth 2 Times a Day. 180 Tab 3   • hydrochlorothiazide (HYDRODIURIL) 25 MG Tab Take 1 Tab by mouth every day. 90 Tab 3   • sertraline (ZOLOFT) 50 MG Tab Take 1 Tab by mouth every day. 30 Tab 5   • metoprolol (LOPRESSOR) 100 MG Tab Take 1 Tab by mouth 2 times a day. 60 Tab 11   • clopidogrel (PLAVIX) 75 MG Tab Take 1 Tab by mouth every day. 30 Tab 11   • nitroglycerin (NITROSTAT) 0.4 MG SL Tab Place 1 Tab under tongue as needed for Chest Pain. 25 Tab 0   • atorvastatin (LIPITOR) 80 MG tablet Take 1 Tab by mouth every bedtime. 30 Tab 11   • aspirin EC (ECOTRIN) 81 MG Tablet Delayed Response Take 1 Tab by mouth every day. 30 Tab 11   • multivitamin (THERAGRAN) Tab Take 1 Tab by mouth every day.     • Melatonin 10 MG Cap Take 1 Cap by mouth every bedtime.       No facility-administered encounter medications on file as of 10/31/2017.      Review of Systems   Constitutional: Negative for chills and fever.   Respiratory: Negative for shortness of breath.    Cardiovascular: Negative for chest pain, palpitations, orthopnea, leg swelling and PND.   Gastrointestinal: Negative for abdominal pain.   Musculoskeletal: Negative for  "myalgias.   Neurological: Negative for dizziness, loss of consciousness and headaches.   Endo/Heme/Allergies: Does not bruise/bleed easily.        Objective:   /70   Pulse 80   Ht 1.88 m (6' 2\")   Wt 87.1 kg (192 lb)   SpO2 98%   BMI 24.65 kg/m²      Physical Exam   Constitutional: He is oriented to person, place, and time. He appears well-developed and well-nourished. No distress.   HENT:   Head: Normocephalic.   Eyes: Conjunctivae and EOM are normal.   Neck: Normal range of motion. Neck supple. No JVD present.   Cardiovascular: Normal rate, regular rhythm, normal heart sounds and intact distal pulses.  Exam reveals no gallop and no friction rub.    No murmur heard.  Pulmonary/Chest: Effort normal and breath sounds normal.   Sternotomy scar.   Abdominal: Soft. Bowel sounds are normal.   Musculoskeletal: Normal range of motion. He exhibits no edema.   Neurological: He is alert and oriented to person, place, and time.   Skin: Skin is warm and dry.   Psychiatric: He has a normal mood and affect. His behavior is normal.       Assessment:     1. Essential hypertension     2. Coronary artery disease involving coronary bypass graft of native heart without angina pectoris     3. S/P CABG x 2     4. Dyslipidemia     5. Former smoker         Medical Decision Making:  Today's Assessment / Status / Plan:     1. Hypertension, treated with Losartan, Metoprolol and Amlodipine. BP is very good today. To continue with same medications and monitor BP at home. He can take addition Amlodipine 5mg if needed for severely elevated BP.    2. CAD, status post CABG x 2, stable. He has not had any angina. He can stop his Plavix, given it's been three months following CABG.    3. Hyperlipidemia, treated.    4. Former smoker, not smoking.    Plan as above. Monitor BP at home. Keep 12/1/2017 FU appt with Alice. FU sooner if clinical condition changes.    Collaborating MD: Frances Hu Recipients              "

## 2017-10-31 NOTE — PROGRESS NOTES
"Subjective:   Jose A Ponce is a 56 y.o. male who presents today for follow-up of elevated BP and medication change evaluatoin.    Jose A is a 56 year old male with no history of CABG x 2 done on 2017 by Dr. Barboza. Medical therapy was adjusted to treat hypertension and hyperlipidemia.  More recently, BP has been elevated, and Dr. BHARATHI Esquivel increased Losartan to 50mg BID, and added Amlodipine 5mg once daily.     He is here today for follow-up. BP is doing better, but he does still occasionally has spikes of 190+ systolic; this is before he takes his morning medication.  No chest pain, pressure or discomfort. No palpitations or fluttering of his heart; no shortness of breath, orthopnea or PND; no dizziness, lightheadedness, or syncope; no edema.  He is not smoking. He is still grieving the loss of his girlfriend, who  earlier this year of cancer.    Past Medical History:   Diagnosis Date   • Breath shortness 10/2017    \"In the past, not an issue now\" sob before heart surgery   • CAD (coronary artery disease)    • Dental disorder 10/2017    Upper dentures   • Grief reaction    • High cholesterol    • Hx of hernia repair     Mesh   • Hyperlipidemia    • Hypertension    • Psychiatric problem 10/2017    depression   • S/P CABG x 2017    CABG x 2 (LIMA to distal LAD, rSVG to distal diagonal) by Dr. Barboza.   • Tobacco use      Past Surgical History:   Procedure Laterality Date   • MULTIPLE CORONARY ARTERY BYPASS ENDO VEIN HARVEST  2017    Procedure: MULTIPLE CORONARY ARTERY BYPASS x2 RIGHT LEG ENDO VEIN HARVEST;  Surgeon: Gray Barboza M.D.;  Location: SURGERY [facility];  Service:    • HUBERT  2017    Procedure: HUBERT - INTRAOP;  Surgeon: Gray Barboza M.D.;  Location: SURGERY [facility];  Service:    • INGUINAL HERNIA REPAIR Bilateral    • TIBIA ORIF Right      Family History   Problem Relation Age of Onset   • Cancer Mother    • Cancer Father    • No Known Problems " "Sister    • No Known Problems Sister    • Heart Disease Maternal Grandfather 89     probable MI and sudden death     History   Smoking Status   • Former Smoker   • Packs/day: 1.00   • Years: 42.00   • Types: Cigarettes   • Quit date: 6/1/2016   Smokeless Tobacco   • Current User     No Known Allergies  Outpatient Encounter Prescriptions as of 10/31/2017   Medication Sig Dispense Refill   • amlodipine (NORVASC) 5 MG Tab Take 1 Tab by mouth every day. 30 Tab 11   • losartan (COZAAR) 50 MG Tab Take 1 Tab by mouth 2 Times a Day. 180 Tab 3   • hydrochlorothiazide (HYDRODIURIL) 25 MG Tab Take 1 Tab by mouth every day. 90 Tab 3   • sertraline (ZOLOFT) 50 MG Tab Take 1 Tab by mouth every day. 30 Tab 5   • metoprolol (LOPRESSOR) 100 MG Tab Take 1 Tab by mouth 2 times a day. 60 Tab 11   • clopidogrel (PLAVIX) 75 MG Tab Take 1 Tab by mouth every day. 30 Tab 11   • nitroglycerin (NITROSTAT) 0.4 MG SL Tab Place 1 Tab under tongue as needed for Chest Pain. 25 Tab 0   • atorvastatin (LIPITOR) 80 MG tablet Take 1 Tab by mouth every bedtime. 30 Tab 11   • aspirin EC (ECOTRIN) 81 MG Tablet Delayed Response Take 1 Tab by mouth every day. 30 Tab 11   • multivitamin (THERAGRAN) Tab Take 1 Tab by mouth every day.     • Melatonin 10 MG Cap Take 1 Cap by mouth every bedtime.       No facility-administered encounter medications on file as of 10/31/2017.      Review of Systems   Constitutional: Negative for chills and fever.   Respiratory: Negative for shortness of breath.    Cardiovascular: Negative for chest pain, palpitations, orthopnea, leg swelling and PND.   Gastrointestinal: Negative for abdominal pain.   Musculoskeletal: Negative for myalgias.   Neurological: Negative for dizziness, loss of consciousness and headaches.   Endo/Heme/Allergies: Does not bruise/bleed easily.        Objective:   /70   Pulse 80   Ht 1.88 m (6' 2\")   Wt 87.1 kg (192 lb)   SpO2 98%   BMI 24.65 kg/m²     Physical Exam   Constitutional: He is " oriented to person, place, and time. He appears well-developed and well-nourished. No distress.   HENT:   Head: Normocephalic.   Eyes: Conjunctivae and EOM are normal.   Neck: Normal range of motion. Neck supple. No JVD present.   Cardiovascular: Normal rate, regular rhythm, normal heart sounds and intact distal pulses.  Exam reveals no gallop and no friction rub.    No murmur heard.  Pulmonary/Chest: Effort normal and breath sounds normal.   Sternotomy scar.   Abdominal: Soft. Bowel sounds are normal.   Musculoskeletal: Normal range of motion. He exhibits no edema.   Neurological: He is alert and oriented to person, place, and time.   Skin: Skin is warm and dry.   Psychiatric: He has a normal mood and affect. His behavior is normal.       Assessment:     1. Essential hypertension     2. Coronary artery disease involving coronary bypass graft of native heart without angina pectoris     3. S/P CABG x 2     4. Dyslipidemia     5. Former smoker         Medical Decision Making:  Today's Assessment / Status / Plan:     1. Hypertension, treated with Losartan, Metoprolol and Amlodipine. BP is very good today. To continue with same medications and monitor BP at home. He can take addition Amlodipine 5mg if needed for severely elevated BP.    2. CAD, status post CABG x 2, stable. He has not had any angina. He can stop his Plavix, given it's been three months following CABG.    3. Hyperlipidemia, treated.    4. Former smoker, not smoking.    Plan as above. Monitor BP at home. Keep 12/1/2017 FU appt with Alice. FU sooner if clinical condition changes.    Collaborating MD: Frances

## 2017-12-08 ENCOUNTER — TELEPHONE (OUTPATIENT)
Dept: CARDIOLOGY | Facility: MEDICAL CENTER | Age: 57
End: 2017-12-08

## 2017-12-08 ENCOUNTER — OFFICE VISIT (OUTPATIENT)
Dept: CARDIOLOGY | Facility: MEDICAL CENTER | Age: 57
End: 2017-12-08
Payer: COMMERCIAL

## 2017-12-08 VITALS
SYSTOLIC BLOOD PRESSURE: 130 MMHG | OXYGEN SATURATION: 96 % | HEIGHT: 74 IN | DIASTOLIC BLOOD PRESSURE: 80 MMHG | WEIGHT: 210 LBS | BODY MASS INDEX: 26.95 KG/M2 | HEART RATE: 72 BPM

## 2017-12-08 DIAGNOSIS — I10 ESSENTIAL HYPERTENSION: ICD-10-CM

## 2017-12-08 DIAGNOSIS — E78.5 DYSLIPIDEMIA: ICD-10-CM

## 2017-12-08 DIAGNOSIS — Z87.891 FORMER SMOKER: ICD-10-CM

## 2017-12-08 DIAGNOSIS — Z95.1 S/P CABG X 2: ICD-10-CM

## 2017-12-08 DIAGNOSIS — I25.810 CORONARY ARTERY DISEASE INVOLVING CORONARY BYPASS GRAFT OF NATIVE HEART WITHOUT ANGINA PECTORIS: ICD-10-CM

## 2017-12-08 PROCEDURE — 99214 OFFICE O/P EST MOD 30 MIN: CPT | Performed by: NURSE PRACTITIONER

## 2017-12-08 RX ORDER — AMLODIPINE BESYLATE 5 MG/1
5 TABLET ORAL 2 TIMES DAILY
Qty: 60 TAB | Refills: 11 | Status: SHIPPED | OUTPATIENT
Start: 2017-12-08 | End: 2018-12-11

## 2017-12-08 ASSESSMENT — ENCOUNTER SYMPTOMS
ORTHOPNEA: 0
SHORTNESS OF BREATH: 0
MYALGIAS: 0
COUGH: 0
PALPITATIONS: 0
CLAUDICATION: 0
FEVER: 0
DIZZINESS: 0
ABDOMINAL PAIN: 0
PND: 0

## 2017-12-08 NOTE — LETTER
"     [facility]   [address]  [place] [address]  Phone: [phone]  Fax: [phone]              Jose A Ponce  1960    Encounter Date: 2017    JAY Hassan          PROGRESS NOTE:  Subjective:   Jose A Ponce is a 57 y.o. male who presents today for follow up on BP and HR.    He is a patient of Dr. Bari Esquivel in our office. Hx of CAD S/P CABG X2, HLD, HTN, and previous smoker.    He is overall doing well. He continues to have some labile BP readings up to 170 systolic. He has only complaints of generalized fatigue due to the medications. He still works 6 times a week and tries to use his exercise bike due to not being able to do cardiac rehab.    He also has noticed that his HR is elevated in the 120's when he wakes up. He doesn't feel irregularities in his pulse, just fast. No other symptoms.    Past Medical History:   Diagnosis Date   • Breath shortness 10/2017    \"In the past, not an issue now\" sob before heart surgery   • CAD (coronary artery disease)     S/P CABG X2   • Dental disorder 10/2017    Upper dentures   • Grief reaction 2017    Girlfriend  this year.   • Hx of hernia repair     Mesh   • Hyperlipidemia    • Hypertension    • Psychiatric problem 10/2017    Depression   • Tobacco use      Past Surgical History:   Procedure Laterality Date   • MULTIPLE CORONARY ARTERY BYPASS ENDO VEIN HARVEST  2017    Procedure: MULTIPLE CORONARY ARTERY BYPASS x2 RIGHT LEG ENDO VEIN HARVEST;  Surgeon: Gray Barboza M.D.;  Location: SURGERY [facility];  Service:    • HUBERT  2017    Procedure: HUBERT - INTRAOP;  Surgeon: Gray Barboza M.D.;  Location: SURGERY [facility];  Service:    • INGUINAL HERNIA REPAIR Bilateral    • CARDIAC CATH     • TIBIA ORIF Right      Family History   Problem Relation Age of Onset   • Cancer Mother    • Cancer Father    • No Known Problems Sister    • No " Known Problems Sister    • Heart Disease Maternal Grandfather 89     probable MI and sudden death     History   Smoking Status   • Former Smoker   • Packs/day: 1.00   • Years: 42.00   • Types: Cigarettes   • Quit date: 6/1/2016   Smokeless Tobacco   • Current User     No Known Allergies  Outpatient Encounter Prescriptions as of 12/8/2017   Medication Sig Dispense Refill   • amlodipine (NORVASC) 5 MG Tab Take 1 Tab by mouth 2 Times a Day. 60 Tab 11   • losartan (COZAAR) 50 MG Tab Take 1 Tab by mouth 2 Times a Day. 180 Tab 3   • hydrochlorothiazide (HYDRODIURIL) 25 MG Tab Take 1 Tab by mouth every day. 90 Tab 3   • sertraline (ZOLOFT) 50 MG Tab Take 1 Tab by mouth every day. 30 Tab 5   • metoprolol (LOPRESSOR) 100 MG Tab Take 1 Tab by mouth 2 times a day. 60 Tab 11   • nitroglycerin (NITROSTAT) 0.4 MG SL Tab Place 1 Tab under tongue as needed for Chest Pain. 25 Tab 0   • atorvastatin (LIPITOR) 80 MG tablet Take 1 Tab by mouth every bedtime. 30 Tab 11   • aspirin EC (ECOTRIN) 81 MG Tablet Delayed Response Take 1 Tab by mouth every day. 30 Tab 11   • multivitamin (THERAGRAN) Tab Take 1 Tab by mouth every day.     • Melatonin 10 MG Cap Take 1 Cap by mouth every bedtime.     • [DISCONTINUED] amlodipine (NORVASC) 5 MG Tab Take 1 Tab by mouth every day. 30 Tab 11   • [DISCONTINUED] clopidogrel (PLAVIX) 75 MG Tab Take 1 Tab by mouth every day. 30 Tab 11     No facility-administered encounter medications on file as of 12/8/2017.      Review of Systems   Constitutional: Positive for malaise/fatigue. Negative for fever.   Respiratory: Negative for cough and shortness of breath.    Cardiovascular: Negative for chest pain, palpitations, orthopnea, claudication, leg swelling and PND.        HR elevated in the AM   Gastrointestinal: Negative for abdominal pain.   Musculoskeletal: Negative for myalgias.   Neurological: Negative for dizziness.   All other systems reviewed and are negative.       Objective:   /80   Pulse 72    "Ht 1.88 m (6' 2\")   Wt 95.3 kg (210 lb)   SpO2 96%   BMI 26.96 kg/m²      Physical Exam   Constitutional: He is oriented to person, place, and time. He appears well-developed and well-nourished. No distress.   HENT:   Head: Normocephalic and atraumatic.   Eyes: EOM are normal.   Neck: Normal range of motion. No JVD present.   Cardiovascular: Normal rate, regular rhythm, normal heart sounds and intact distal pulses.    No murmur heard.  Pulmonary/Chest: Breath sounds normal. No respiratory distress. He has no wheezes. He has no rales.   Abdominal: Soft. Bowel sounds are normal.   Musculoskeletal: Normal range of motion. He exhibits no edema.   Neurological: He is alert and oriented to person, place, and time.   Skin: Skin is warm and dry.   Psychiatric: He has a normal mood and affect.   Nursing note and vitals reviewed.      Assessment:     1. Coronary artery disease involving coronary bypass graft of native heart without angina pectoris     2. Dyslipidemia     3. Former smoker     4. S/P CABG x 2     5. Essential hypertension  amlodipine (NORVASC) 5 MG Tab     Medical Decision Making:  Today's Assessment / Status / Plan:     1. CAD with CABG X2, no angina or ESPINOZA. Continue ASA, statin, BB. Follow clinically.    2. HLD, statin. LDL goal <70 with CAD. Check lipid and CMP next year.    3. Former smoker, cessation completed. Congratulated him on this.    4. HTN, moderate control, remains labile in AM and evening. Increase amlodipine to 5 mg BID, continue losartan 50 mg BID, continue metoprolol 100 mg BID, and HCTZ 25 mg QD. He will let us know on MyChart how his BP and HR are doing in 1-2 weeks with this change. Consider holter if Hr remains elevated.    FU in clinic in 6 months with IA; Sanchez with update on vitals and symptoms in 1-2 weeks    Patient verbalizes understanding and agrees with the plan of care.     Collaborating MD: Bari Esquivel MD        No Recipients              "

## 2017-12-08 NOTE — PROGRESS NOTES
"Subjective:   Jose A Ponce is a 57 y.o. male who presents today for follow up on BP and HR.    He is a patient of Dr. Bari Esquivel in our office. Hx of CAD S/P CABG X2, HLD, HTN, and previous smoker.    He is overall doing well. He continues to have some labile BP readings up to 170 systolic. He has only complaints of generalized fatigue due to the medications. He still works 6 times a week and tries to use his exercise bike due to not being able to do cardiac rehab.    He also has noticed that his HR is elevated in the 120's when he wakes up. He doesn't feel irregularities in his pulse, just fast. No other symptoms.    Past Medical History:   Diagnosis Date   • Breath shortness 10/2017    \"In the past, not an issue now\" sob before heart surgery   • CAD (coronary artery disease)     S/P CABG X2   • Dental disorder 10/2017    Upper dentures   • Grief reaction 2017    Girlfriend  this year.   • Hx of hernia repair     Mesh   • Hyperlipidemia    • Hypertension    • Psychiatric problem 10/2017    Depression   • Tobacco use      Past Surgical History:   Procedure Laterality Date   • MULTIPLE CORONARY ARTERY BYPASS ENDO VEIN HARVEST  2017    Procedure: MULTIPLE CORONARY ARTERY BYPASS x2 RIGHT LEG ENDO VEIN HARVEST;  Surgeon: Gray Barboza M.D.;  Location: SURGERY [facility];  Service:    • HUBERT  2017    Procedure: HUBERT - INTRAOP;  Surgeon: Gray Barboza M.D.;  Location: SURGERY [facility];  Service:    • INGUINAL HERNIA REPAIR Bilateral    • CARDIAC CATH     • TIBIA ORIF Right      Family History   Problem Relation Age of Onset   • Cancer Mother    • Cancer Father    • No Known Problems Sister    • No Known Problems Sister    • Heart Disease Maternal Grandfather 89     probable MI and sudden death     History   Smoking Status   • Former Smoker   • Packs/day: 1.00   • Years: 42.00   • Types: Cigarettes   • Quit date: 2016   Smokeless Tobacco   • Current User     No Known " "Allergies  Outpatient Encounter Prescriptions as of 12/8/2017   Medication Sig Dispense Refill   • amlodipine (NORVASC) 5 MG Tab Take 1 Tab by mouth 2 Times a Day. 60 Tab 11   • losartan (COZAAR) 50 MG Tab Take 1 Tab by mouth 2 Times a Day. 180 Tab 3   • hydrochlorothiazide (HYDRODIURIL) 25 MG Tab Take 1 Tab by mouth every day. 90 Tab 3   • sertraline (ZOLOFT) 50 MG Tab Take 1 Tab by mouth every day. 30 Tab 5   • metoprolol (LOPRESSOR) 100 MG Tab Take 1 Tab by mouth 2 times a day. 60 Tab 11   • nitroglycerin (NITROSTAT) 0.4 MG SL Tab Place 1 Tab under tongue as needed for Chest Pain. 25 Tab 0   • atorvastatin (LIPITOR) 80 MG tablet Take 1 Tab by mouth every bedtime. 30 Tab 11   • aspirin EC (ECOTRIN) 81 MG Tablet Delayed Response Take 1 Tab by mouth every day. 30 Tab 11   • multivitamin (THERAGRAN) Tab Take 1 Tab by mouth every day.     • Melatonin 10 MG Cap Take 1 Cap by mouth every bedtime.     • [DISCONTINUED] amlodipine (NORVASC) 5 MG Tab Take 1 Tab by mouth every day. 30 Tab 11   • [DISCONTINUED] clopidogrel (PLAVIX) 75 MG Tab Take 1 Tab by mouth every day. 30 Tab 11     No facility-administered encounter medications on file as of 12/8/2017.      Review of Systems   Constitutional: Positive for malaise/fatigue. Negative for fever.   Respiratory: Negative for cough and shortness of breath.    Cardiovascular: Negative for chest pain, palpitations, orthopnea, claudication, leg swelling and PND.        HR elevated in the AM   Gastrointestinal: Negative for abdominal pain.   Musculoskeletal: Negative for myalgias.   Neurological: Negative for dizziness.   All other systems reviewed and are negative.       Objective:   /80   Pulse 72   Ht 1.88 m (6' 2\")   Wt 95.3 kg (210 lb)   SpO2 96%   BMI 26.96 kg/m²     Physical Exam   Constitutional: He is oriented to person, place, and time. He appears well-developed and well-nourished. No distress.   HENT:   Head: Normocephalic and atraumatic.   Eyes: EOM are " normal.   Neck: Normal range of motion. No JVD present.   Cardiovascular: Normal rate, regular rhythm, normal heart sounds and intact distal pulses.    No murmur heard.  Pulmonary/Chest: Breath sounds normal. No respiratory distress. He has no wheezes. He has no rales.   Abdominal: Soft. Bowel sounds are normal.   Musculoskeletal: Normal range of motion. He exhibits no edema.   Neurological: He is alert and oriented to person, place, and time.   Skin: Skin is warm and dry.   Psychiatric: He has a normal mood and affect.   Nursing note and vitals reviewed.      Assessment:     1. Coronary artery disease involving coronary bypass graft of native heart without angina pectoris     2. Dyslipidemia     3. Former smoker     4. S/P CABG x 2     5. Essential hypertension  amlodipine (NORVASC) 5 MG Tab     Medical Decision Making:  Today's Assessment / Status / Plan:     1. CAD with CABG X2, no angina or ESPINOZA. Continue ASA, statin, BB. Follow clinically.    2. HLD, statin. LDL goal <70 with CAD. Check lipid and CMP next year.    3. Former smoker, cessation completed. Congratulated him on this.    4. HTN, moderate control, remains labile in AM and evening. Increase amlodipine to 5 mg BID, continue losartan 50 mg BID, continue metoprolol 100 mg BID, and HCTZ 25 mg QD. He will let us know on MyChart how his BP and HR are doing in 1-2 weeks with this change. Consider holter if Hr remains elevated.    FU in clinic in 6 months with IA; Sanchez with update on vitals and symptoms in 1-2 weeks    Patient verbalizes understanding and agrees with the plan of care.     Collaborating MD: Bari Esquivel MD

## 2017-12-08 NOTE — TELEPHONE ENCOUNTER
PAR sent to plan:  Jose A Ponce Ortega: [id number] - PA Case ID: [id number] - Rx #: [id number]   Status  Sent to [facility]  DrugAmLODIPine Besylate 5MG tablets  [employer] Electronic PA Form  Original Claim Info76 CALL HELP DESK

## 2018-02-11 DIAGNOSIS — F43.21 GRIEF REACTION: ICD-10-CM

## 2018-06-07 ENCOUNTER — OFFICE VISIT (OUTPATIENT)
Dept: CARDIOLOGY | Facility: MEDICAL CENTER | Age: 58
End: 2018-06-07
Payer: COMMERCIAL

## 2018-06-07 VITALS
OXYGEN SATURATION: 96 % | DIASTOLIC BLOOD PRESSURE: 78 MMHG | HEIGHT: 74 IN | WEIGHT: 212 LBS | BODY MASS INDEX: 27.21 KG/M2 | HEART RATE: 86 BPM | SYSTOLIC BLOOD PRESSURE: 114 MMHG

## 2018-06-07 DIAGNOSIS — E78.5 DYSLIPIDEMIA: ICD-10-CM

## 2018-06-07 DIAGNOSIS — I10 ESSENTIAL HYPERTENSION: ICD-10-CM

## 2018-06-07 DIAGNOSIS — Z95.1 S/P CABG X 2: ICD-10-CM

## 2018-06-07 DIAGNOSIS — I25.810 CORONARY ARTERY DISEASE INVOLVING CORONARY BYPASS GRAFT OF NATIVE HEART WITHOUT ANGINA PECTORIS: Primary | ICD-10-CM

## 2018-06-07 DIAGNOSIS — Z87.891 FORMER SMOKER: ICD-10-CM

## 2018-06-07 PROCEDURE — 99214 OFFICE O/P EST MOD 30 MIN: CPT | Performed by: INTERNAL MEDICINE

## 2018-06-07 RX ORDER — METOPROLOL TARTRATE 100 MG/1
100 TABLET ORAL
Qty: 90 TAB | Refills: 3 | Status: SHIPPED | OUTPATIENT
Start: 2018-06-07 | End: 2018-12-11

## 2018-06-07 RX ORDER — SPIRONOLACTONE 50 MG/1
50 TABLET, FILM COATED ORAL
Qty: 30 TAB | Refills: 11 | Status: SHIPPED | OUTPATIENT
Start: 2018-06-07 | End: 2018-07-24

## 2018-06-07 RX ORDER — LOSARTAN POTASSIUM 100 MG/1
100 TABLET ORAL DAILY
Qty: 90 TAB | Refills: 3 | Status: SHIPPED | OUTPATIENT
Start: 2018-06-07 | End: 2018-08-31 | Stop reason: SDUPTHER

## 2018-06-07 RX ORDER — METOPROLOL TARTRATE 100 MG/1
100 TABLET ORAL
Qty: 90 TAB | Refills: 3 | Status: SHIPPED | OUTPATIENT
Start: 2018-06-07 | End: 2018-06-07 | Stop reason: SDUPTHER

## 2018-06-07 NOTE — PATIENT INSTRUCTIONS
We made the following changes to your medicines today:    1. Stop morning dose of metoprolol, just take 100 mg at night     2. Start Spironolactone 50 mg by mouth at night    3. Take losartan 100 mg by mouth at night    4. Get blood work (non-fasting) in 2 weeks

## 2018-06-07 NOTE — LETTER
Name:          Jose A Ponce   YOB: 1960  Date:     06/07/2018      gUo Dale M.D.  [address]  [place] [address]     Bari Esquivel MD  [address], [address]  [place] [address]  Phone: [phone]  Back Line: [phone]  Fax: [phone]  E-mail: [email or web address]   Dear Dr. Dale,    We had the pleasure of seeing your patient, Jose A Ponce, in Cardiology Clinic at [facility] today.    As you know, he is a 57-year-old man with a history of coronary artery disease status post two-vessel bypass surgery (LIMA-LAD, SVG-diagonal done on 6/13/2017 by Gray Barboza MD), hypertension and dyslipidemia.    He has no cardiovascular complaints today, and good control of his blood pressure in our office measured at 114/78 mmHg on his current antihypertensive regimen though he does have quite a bit of daytime fatigue and depression.  Additionally, he tells me that his blood pressure often is in the 180 mmHg range first thing in the morning.  In light of that, I stopped his daytime dose of metoprolol tartrate making that nightly dosing only, and added spironolactone 50 mg p.o. nightly also having him take his losartan previously milligrams p.o. twice daily now 100 mg p.o. nightly.  He will continue amlodipine 5 mg p.o. twice daily.  After making those changes to his medications he will have a chemistry panel checked in a couple of weeks.  His potassium previously measured at 3.9 I expect will be rate in the middle of the normal range.  I have not seen a recent lipid panel, and he does continue on high-dose atorvastatin.  If that has not been done by the time of my next appointment I will plan to check it.    Return in about 6 months (around 12/7/2018) for NP in 3 months, me in 6 months.    Thank you for the referral and please do not hesitate to contact me at any time. My contact information is listed above.    This note was dictated using Dragon speech recognition  software.     A full note including my physical examination and a full list of rectified medications is available in our medical record, and can be faxed as well.    Bari Esquivel MD  Cardiologist  [facility]

## 2018-06-08 ASSESSMENT — ENCOUNTER SYMPTOMS
DEPRESSION: 1
NERVOUS/ANXIOUS: 1

## 2018-06-08 NOTE — PROGRESS NOTES
"Subjective:   Jose A Ponce is a 56 -year-old man with a history of coronary artery disease status post two-vessel bypass surgery (LIMA-LAD, SVG-diagonal done on 2017 by Gray Barboza MD), hypertension and dyslipidemia.    He again has no cardiovascular complaints today though he does tell me that his blood pressure first thing in the morning is often very high in the 160-180 mmHg range despite his current medical regimen.    He does continue to be a bit depressed and fatigued wondering if any of his medications could be causing the side effects.    Past Medical History:   Diagnosis Date   • Breath shortness 10/2017    \"In the past, not an issue now\" sob before heart surgery   • CAD (coronary artery disease)     S/P CABG X2   • Dental disorder 10/2017    Upper dentures   • Grief reaction 2017    Girlfriend  this year.   • Hx of hernia repair     Mesh   • Hyperlipidemia    • Hypertension    • Psychiatric problem 10/2017    Depression   • Tobacco use      Past Surgical History:   Procedure Laterality Date   • MULTIPLE CORONARY ARTERY BYPASS ENDO VEIN HARVEST  2017    Procedure: MULTIPLE CORONARY ARTERY BYPASS x2 RIGHT LEG ENDO VEIN HARVEST;  Surgeon: Gray Barboza M.D.;  Location: SURGERY [facility];  Service:    • HUBERT  2017    Procedure: HUBERT - INTRAOP;  Surgeon: Gray Barboza M.D.;  Location: SURGERY [facility];  Service:    • INGUINAL HERNIA REPAIR Bilateral    • CARDIAC CATH     • TIBIA ORIF Right 1980s     Family History   Problem Relation Age of Onset   • Cancer Mother    • Cancer Father    • No Known Problems Sister    • No Known Problems Sister    • Heart Disease Maternal Grandfather 89     probable MI and sudden death     History   Smoking Status   • Former Smoker   • Packs/day: 1.00   • Years: 42.00   • Types: Cigarettes   • Quit date: 2016   Smokeless Tobacco   • Never Used     No Known Allergies  Outpatient Encounter Prescriptions as of 2018   Medication " "Sig Dispense Refill   • losartan (COZAAR) 100 MG Tab Take 1 Tab by mouth every day. 90 Tab 3   • spironolactone (ALDACTONE) 50 MG Tab Take 1 Tab by mouth every bedtime. 30 Tab 11   • metoprolol (LOPRESSOR) 100 MG Tab Take 1 Tab by mouth every bedtime. 90 Tab 3   • amlodipine (NORVASC) 5 MG Tab Take 1 Tab by mouth 2 Times a Day. 60 Tab 11   • hydrochlorothiazide (HYDRODIURIL) 25 MG Tab Take 1 Tab by mouth every day. 90 Tab 3   • atorvastatin (LIPITOR) 80 MG tablet Take 1 Tab by mouth every bedtime. 30 Tab 11   • aspirin EC (ECOTRIN) 81 MG Tablet Delayed Response Take 1 Tab by mouth every day. 30 Tab 11   • multivitamin (THERAGRAN) Tab Take 1 Tab by mouth every day.     • Melatonin 10 MG Cap Take 1 Cap by mouth every bedtime.     • [DISCONTINUED] metoprolol (LOPRESSOR) 100 MG Tab Take 1 Tab by mouth every bedtime. 90 Tab 3   • sertraline (ZOLOFT) 50 MG Tab TAKE ONE [1] TABLET BY MOUTH EVERY DAY 60 Tab 0   • [DISCONTINUED] losartan (COZAAR) 50 MG Tab Take 1 Tab by mouth 2 Times a Day. 180 Tab 3   • [DISCONTINUED] metoprolol (LOPRESSOR) 100 MG Tab Take 1 Tab by mouth 2 times a day. 60 Tab 11   • nitroglycerin (NITROSTAT) 0.4 MG SL Tab Place 1 Tab under tongue as needed for Chest Pain. 25 Tab 0     No facility-administered encounter medications on file as of 6/7/2018.      Review of Systems   Cardiovascular: Positive for chest pain (sternotomy site pain, no angina).   Genitourinary: Positive for frequency.   Psychiatric/Behavioral: Positive for depression. Negative for suicidal ideas. The patient is nervous/anxious.         Grieving the loss of his significant other that occurred just before bypass surgery   All other systems reviewed and are negative.       Objective:   /78   Pulse 86   Ht 1.88 m (6' 2\")   Wt 96.2 kg (212 lb)   SpO2 96%   BMI 27.22 kg/m²     Physical Exam   Constitutional: He is oriented to person, place, and time. He appears well-developed and well-nourished. No distress.   Pleasant, " middle-aged man in no distress   HENT:   Head: Normocephalic and atraumatic.   Eyes: Conjunctivae and EOM are normal. Pupils are equal, round, and reactive to light. No scleral icterus.   Neck: Neck supple. No JVD present. No tracheal deviation present.   Cardiovascular: Normal rate, regular rhythm, normal heart sounds and intact distal pulses.  Exam reveals no gallop and no friction rub.    No murmur heard.  Pulses:       Dorsalis pedis pulses are 2+ on the right side, and 2+ on the left side.   Well-healing sternotomy scar noted, No carotid bruits   Pulmonary/Chest: Effort normal and breath sounds normal. No stridor. No respiratory distress. He has no wheezes. He has no rales.   Abdominal: Soft. Bowel sounds are normal. He exhibits no distension.   Musculoskeletal: He exhibits no edema.   Neurological: He is alert and oriented to person, place, and time.   Skin: Skin is warm and dry. No rash noted. He is not diaphoretic. No erythema. No pallor.   Psychiatric: Judgment and thought content normal.   Mildly blunted affect   Vitals reviewed.    Lab Results   Component Value Date/Time    WBC 7.5 10/12/2017 08:27 AM    RBC 4.51 (L) 10/12/2017 08:27 AM    HEMOGLOBIN 14.6 10/12/2017 08:27 AM    HEMATOCRIT 43.2 10/12/2017 08:27 AM    MCV 95.8 10/12/2017 08:27 AM    MCH 32.4 10/12/2017 08:27 AM    MCHC 33.8 10/12/2017 08:27 AM    MPV 10.9 10/12/2017 08:27 AM        Lab Results   Component Value Date/Time    SODIUM 140 10/12/2017 08:27 AM    POTASSIUM 3.9 10/12/2017 08:27 AM    CHLORIDE 104 10/12/2017 08:27 AM    CO2 26 10/12/2017 08:27 AM    GLUCOSE 94 10/12/2017 08:27 AM    BUN 20 10/12/2017 08:27 AM    CREATININE 0.94 10/12/2017 08:27 AM        Lab Results   Component Value Date/Time    ASTSGOT 32 08/07/2017 11:31 AM    ALTSGPT 30 08/07/2017 11:31 AM        Lab Results   Component Value Date/Time    CHOLSTRLTOT 242 (H) 06/01/2017 05:29 PM     (H) 06/01/2017 05:29 PM    HDL 54 06/01/2017 05:29 PM    TRIGLYCERIDE 128  "06/01/2017 05:29 PM       Myocardial Perfusion Report 6/2/17   NUCLEAR IMAGING INTERPRETATION   Positive stress ECG for ischemia.   Normal left ventricular size, ejection fraction, and wall motion.   LAD ischemia large size and moderate ischemia   Normal left ventricular wall motion.  LV ejection fraction = 61%.     ECG INTERPRETATION   Positive stress ECG for ischemia.       Carotid Duplex Report 6/3/17     Vascular Laboratory   CONCLUSIONS   BILATERAL:   Mild smooth plaque of the bifurcation extending into the internal carotid.    Velocities are consistent with < 50% stenosis of the internal carotid    artery.   Bilateral subclavian and vertebral artery waveforms are antegrade and    waveforms are normal in character and velocity.       Transesophageal Echo Report 6/13/17  Normal LV function post bypass good concentric contractility.     Transthoracic Echo Report 6/3/17  No prior study is available for comparison.   Left ventricular ejection fraction is visually estimated to be 55%.  Grade I diastolic dysfunction.  Normal inferior vena cava size without inspiratory collapse.    Cardiac catheterization, 6/3/2017:  \"CORONARY ARTERIOGRAPHY:  1.  LEFT MAIN ARTERY:  Left main vessel is angiographically widely patent and bifurcates into a left anterior descending artery and circumflex artery.  2.  LEFT ANTERIOR DESCENDING ARTERY:  The proximal LAD is 100% occluded immediately adjacent to a small caliber diagonal branch.  There is faint retrograde collateral circulation partially filling the distal left anterior descending artery. The distal LAD vessel does not appear to have any additional significant stenotic lesions  3.  CIRCUMFLEX ARTERY:  The circumflex artery consists of a single bifurcating marginal branch.  Mid circumflex has diffuse smooth vessel narrowing with an estimated 50-60% stenosis.  4.  RIGHT CORONARY ARTERY:  The RCA is a large caliber vessel and gives rise to a large posterior descending artery and a " "large posterolateral branch.  Proximal circumflex has an estimated 60% stenosis.  There is a substantial collateral circulation from the distal right coronary artery to the distal left anterior descending artery.    FRACTIONAL FLOW RESERVE INDEX resting measurement of the proximal right coronary artery is 0.96.\"    CABG x 2: 6/13/2017  \"OPERATION:  Coronary artery bypass grafting x2, left FRANKIE to the distal LAD,   reverse saphenous vein graft to the distal diagonal, endoscopic vein harvest.\"    Assessment:     1. Coronary artery disease involving coronary bypass graft of native heart without angina pectoris  losartan (COZAAR) 100 MG Tab    spironolactone (ALDACTONE) 50 MG Tab    metoprolol (LOPRESSOR) 100 MG Tab    BASIC METABOLIC PANEL   2. Essential hypertension  losartan (COZAAR) 100 MG Tab    spironolactone (ALDACTONE) 50 MG Tab    metoprolol (LOPRESSOR) 100 MG Tab    BASIC METABOLIC PANEL    DISCONTINUED: metoprolol (LOPRESSOR) 100 MG Tab   3. S/P CABG x 2     4. Former smoker     5. Dyslipidemia         Medical Decision Making:  Today's Assessment / Status / Plan:     He has no cardiovascular complaints today, and good control of his blood pressure in our office measured at 114/78 mmHg on his current antihypertensive regimen though he does have quite a bit of daytime fatigue and depression.  Additionally, he tells me that his blood pressure often is in the 180 mmHg range first thing in the morning.  In light of that, I stopped his daytime dose of metoprolol tartrate making that nightly dosing only, and added spironolactone 50 mg p.o. nightly also having him take his losartan previously milligrams p.o. twice daily now 100 mg p.o. nightly.  He will continue amlodipine 5 mg p.o. twice daily.  After making those changes to his medications he will have a chemistry panel checked in a couple of weeks.  His potassium previously measured at 3.9 I expect will be rate in the middle of the normal range.  I have not seen a " recent lipid panel, and he does continue on high-dose atorvastatin.  If that has not been done by the time of my next appointment I will plan to check it.    Bari Esquivel MD  Cardiologist, [facility] [place]     Return in about 6 months (around 12/7/2018) for NP in 3 months, me in 6 months.    Physical Exam   Constitutional: He is oriented to person, place, and time. He appears well-developed and well-nourished. No distress.   Pleasant, middle-aged man in no distress   HENT:   Head: Normocephalic and atraumatic.   Eyes: Conjunctivae and EOM are normal. Pupils are equal, round, and reactive to light. No scleral icterus.   Neck: Neck supple. No JVD present. No tracheal deviation present.   Cardiovascular: Normal rate, regular rhythm, normal heart sounds and intact distal pulses.  Exam reveals no gallop and no friction rub.    No murmur heard.  Pulses:       Dorsalis pedis pulses are 2+ on the right side, and 2+ on the left side.   Well-healing sternotomy scar noted, No carotid bruits   Pulmonary/Chest: Effort normal and breath sounds normal. No stridor. No respiratory distress. He has no wheezes. He has no rales.   Abdominal: Soft. Bowel sounds are normal. He exhibits no distension.   Musculoskeletal: He exhibits no edema.   Neurological: He is alert and oriented to person, place, and time.   Skin: Skin is warm and dry. No rash noted. He is not diaphoretic. No erythema. No pallor.   Psychiatric: Judgment and thought content normal.   Mildly blunted affect   Vitals reviewed.

## 2018-07-09 DIAGNOSIS — E78.5 DYSLIPIDEMIA: ICD-10-CM

## 2018-07-11 RX ORDER — ATORVASTATIN CALCIUM 80 MG/1
80 TABLET, FILM COATED ORAL
Qty: 90 TAB | Refills: 3 | Status: SHIPPED | OUTPATIENT
Start: 2018-07-11 | End: 2019-07-15 | Stop reason: SDUPTHER

## 2018-07-20 ENCOUNTER — HOSPITAL ENCOUNTER (OUTPATIENT)
Dept: LAB | Facility: MEDICAL CENTER | Age: 58
End: 2018-07-20
Attending: INTERNAL MEDICINE
Payer: COMMERCIAL

## 2018-07-20 DIAGNOSIS — I10 ESSENTIAL HYPERTENSION: ICD-10-CM

## 2018-07-20 DIAGNOSIS — I25.810 CORONARY ARTERY DISEASE INVOLVING CORONARY BYPASS GRAFT OF NATIVE HEART WITHOUT ANGINA PECTORIS: ICD-10-CM

## 2018-07-20 LAB
ANION GAP SERPL CALC-SCNC: 11 MMOL/L (ref 0–11.9)
BUN SERPL-MCNC: 26 MG/DL (ref 8–22)
CALCIUM SERPL-MCNC: 10 MG/DL (ref 8.5–10.5)
CHLORIDE SERPL-SCNC: 106 MMOL/L (ref 96–112)
CO2 SERPL-SCNC: 23 MMOL/L (ref 20–33)
CREAT SERPL-MCNC: 1.72 MG/DL (ref 0.5–1.4)
GLUCOSE SERPL-MCNC: 118 MG/DL (ref 65–99)
POTASSIUM SERPL-SCNC: 4.6 MMOL/L (ref 3.6–5.5)
SODIUM SERPL-SCNC: 140 MMOL/L (ref 135–145)

## 2018-07-20 PROCEDURE — 36415 COLL VENOUS BLD VENIPUNCTURE: CPT

## 2018-07-20 PROCEDURE — 80048 BASIC METABOLIC PNL TOTAL CA: CPT

## 2018-07-24 ENCOUNTER — TELEPHONE (OUTPATIENT)
Dept: CARDIOLOGY | Facility: MEDICAL CENTER | Age: 58
End: 2018-07-24

## 2018-07-24 DIAGNOSIS — I10 ESSENTIAL HYPERTENSION: ICD-10-CM

## 2018-07-24 RX ORDER — HYDRALAZINE HYDROCHLORIDE 50 MG/1
50 TABLET, FILM COATED ORAL 2 TIMES DAILY
Qty: 60 TAB | Refills: 6 | Status: SHIPPED | OUTPATIENT
Start: 2018-07-24 | End: 2018-12-18 | Stop reason: SDUPTHER

## 2018-07-24 NOTE — TELEPHONE ENCOUNTER
Pt called back, discussed lab results per Dr Esquivel and his recommendations, pt agreed and verbalizes understanding    New Rx for Hydralazine 50mg PO BID sent to [facility] updated    CMP ordered, lab slip mailed to pt

## 2018-07-24 NOTE — TELEPHONE ENCOUNTER
Bari Esquivel M.D.  Shantal Gonzalez R.N.             Worsened renal function, stop spironolactone.  Start hydralazine 50 mg p.o. twice daily.  Repeat chemistry panel and 2 weeks.  Please have the patient keep a log of his blood pressures and bring it in to be scanned at his next follow-up appointment.     IBA MD      ========================================================    Attempted to call pt, no answer, left vm to call back

## 2018-08-03 ENCOUNTER — HOSPITAL ENCOUNTER (OUTPATIENT)
Dept: LAB | Facility: MEDICAL CENTER | Age: 58
End: 2018-08-03
Attending: INTERNAL MEDICINE
Payer: COMMERCIAL

## 2018-08-03 DIAGNOSIS — I10 ESSENTIAL HYPERTENSION: ICD-10-CM

## 2018-08-03 LAB
ALBUMIN SERPL BCP-MCNC: 4.7 G/DL (ref 3.2–4.9)
ALBUMIN/GLOB SERPL: 1.5 G/DL
ALP SERPL-CCNC: 72 U/L (ref 30–99)
ALT SERPL-CCNC: 18 U/L (ref 2–50)
ANION GAP SERPL CALC-SCNC: 13 MMOL/L (ref 0–11.9)
AST SERPL-CCNC: 23 U/L (ref 12–45)
BILIRUB SERPL-MCNC: 1 MG/DL (ref 0.1–1.5)
BUN SERPL-MCNC: 18 MG/DL (ref 8–22)
CALCIUM SERPL-MCNC: 9.6 MG/DL (ref 8.5–10.5)
CHLORIDE SERPL-SCNC: 105 MMOL/L (ref 96–112)
CO2 SERPL-SCNC: 23 MMOL/L (ref 20–33)
CREAT SERPL-MCNC: 1.48 MG/DL (ref 0.5–1.4)
GLOBULIN SER CALC-MCNC: 3.1 G/DL (ref 1.9–3.5)
GLUCOSE SERPL-MCNC: 95 MG/DL (ref 65–99)
POTASSIUM SERPL-SCNC: 4.3 MMOL/L (ref 3.6–5.5)
PROT SERPL-MCNC: 7.8 G/DL (ref 6–8.2)
SODIUM SERPL-SCNC: 141 MMOL/L (ref 135–145)

## 2018-08-03 PROCEDURE — 80053 COMPREHEN METABOLIC PANEL: CPT

## 2018-08-03 PROCEDURE — 36415 COLL VENOUS BLD VENIPUNCTURE: CPT

## 2018-08-31 DIAGNOSIS — I10 ESSENTIAL HYPERTENSION: ICD-10-CM

## 2018-08-31 DIAGNOSIS — I25.810 CORONARY ARTERY DISEASE INVOLVING CORONARY BYPASS GRAFT OF NATIVE HEART WITHOUT ANGINA PECTORIS: ICD-10-CM

## 2018-09-04 RX ORDER — LOSARTAN POTASSIUM 100 MG/1
100 TABLET ORAL DAILY
Qty: 90 TAB | Refills: 3 | Status: SHIPPED | OUTPATIENT
Start: 2018-09-04 | End: 2018-10-05

## 2018-09-07 ENCOUNTER — OFFICE VISIT (OUTPATIENT)
Dept: CARDIOLOGY | Facility: MEDICAL CENTER | Age: 58
End: 2018-09-07
Payer: COMMERCIAL

## 2018-09-07 VITALS
HEIGHT: 74 IN | WEIGHT: 216 LBS | SYSTOLIC BLOOD PRESSURE: 152 MMHG | OXYGEN SATURATION: 97 % | BODY MASS INDEX: 27.72 KG/M2 | DIASTOLIC BLOOD PRESSURE: 80 MMHG | HEART RATE: 94 BPM

## 2018-09-07 DIAGNOSIS — I25.810 CORONARY ARTERY DISEASE INVOLVING CORONARY BYPASS GRAFT OF NATIVE HEART WITHOUT ANGINA PECTORIS: ICD-10-CM

## 2018-09-07 DIAGNOSIS — I10 ESSENTIAL HYPERTENSION: ICD-10-CM

## 2018-09-07 DIAGNOSIS — E78.5 DYSLIPIDEMIA: ICD-10-CM

## 2018-09-07 DIAGNOSIS — Z95.1 S/P CABG X 2: ICD-10-CM

## 2018-09-07 PROCEDURE — 99214 OFFICE O/P EST MOD 30 MIN: CPT | Performed by: NURSE PRACTITIONER

## 2018-09-07 RX ORDER — NITROGLYCERIN 0.4 MG/1
0.4 TABLET SUBLINGUAL PRN
Qty: 25 TAB | Refills: 11 | Status: SHIPPED | OUTPATIENT
Start: 2018-09-07 | End: 2022-01-01

## 2018-09-07 ASSESSMENT — ENCOUNTER SYMPTOMS
COUGH: 0
SHORTNESS OF BREATH: 0
PND: 0
ORTHOPNEA: 0
FEVER: 0
ABDOMINAL PAIN: 0
DIZZINESS: 0
PALPITATIONS: 0
MYALGIAS: 0
CLAUDICATION: 0

## 2018-09-07 NOTE — PROGRESS NOTES
"Subjective:   Jose A Ponce is a 57 y.o. male who presents today for follow up on BP.    He is a patient of Dr. Bari Esquivel in our office.     Hx of CAD S/P CABG X2, HLD, HTN, and previous smoker.    He continues to have poor control of his AM BP reading.    He also has noticed that his HR is elevated around 's at work, possibly due to stress.    He had no other cardiac complaints except slight fatigue in the morning, but this has improved with switching his metoprolol regimen.    Past Medical History:   Diagnosis Date   • Breath shortness 10/2017    \"In the past, not an issue now\" sob before heart surgery   • CAD (coronary artery disease)     S/P CABG X2   • Dental disorder 10/2017    Upper dentures   • Grief reaction 2017    Girlfriend  this year.   • Hx of hernia repair     Mesh   • Hyperlipidemia    • Hypertension    • Psychiatric problem 10/2017    Depression   • Tobacco use      Past Surgical History:   Procedure Laterality Date   • MULTIPLE CORONARY ARTERY BYPASS ENDO VEIN HARVEST  2017    Procedure: MULTIPLE CORONARY ARTERY BYPASS x2 RIGHT LEG ENDO VEIN HARVEST;  Surgeon: Gray Barboza M.D.;  Location: SURGERY [facility];  Service:    • HUBERT  2017    Procedure: HUBERT - INTRAOP;  Surgeon: Gray Barboza M.D.;  Location: SURGERY [facility];  Service:    • INGUINAL HERNIA REPAIR Bilateral    • CARDIAC CATH     • TIBIA ORIF Right 1980s     Family History   Problem Relation Age of Onset   • Cancer Mother    • Cancer Father    • No Known Problems Sister    • No Known Problems Sister    • Heart Disease Maternal Grandfather 89        probable MI and sudden death     History   Smoking Status   • Former Smoker   • Packs/day: 1.00   • Years: 42.00   • Types: Cigarettes   • Quit date: 2016   Smokeless Tobacco   • Never Used     Comment: vape occ     No Known Allergies  Outpatient Encounter Prescriptions as of 2018   Medication Sig Dispense Refill   • nitroglycerin " "(NITROSTAT) 0.4 MG SL Tab Place 1 Tab under tongue as needed for Chest Pain. 25 Tab 11   • losartan (COZAAR) 100 MG Tab Take 1 Tab by mouth every day. 90 Tab 3   • hydrALAZINE (APRESOLINE) 50 MG Tab Take 1 Tab by mouth 2 Times a Day. 60 Tab 6   • atorvastatin (LIPITOR) 80 MG tablet Take 1 Tab by mouth every bedtime. 90 Tab 3   • metoprolol (LOPRESSOR) 100 MG Tab Take 1 Tab by mouth every bedtime. 90 Tab 3   • amlodipine (NORVASC) 5 MG Tab Take 1 Tab by mouth 2 Times a Day. 60 Tab 11   • aspirin EC (ECOTRIN) 81 MG Tablet Delayed Response Take 1 Tab by mouth every day. 30 Tab 11   • multivitamin (THERAGRAN) Tab Take 1 Tab by mouth every day.     • Melatonin 10 MG Cap Take 1 Cap by mouth every bedtime.     • [DISCONTINUED] sertraline (ZOLOFT) 50 MG Tab TAKE ONE [1] TABLET BY MOUTH EVERY DAY 60 Tab 0   • [DISCONTINUED] hydrochlorothiazide (HYDRODIURIL) 25 MG Tab Take 1 Tab by mouth every day. 90 Tab 3   • [DISCONTINUED] nitroglycerin (NITROSTAT) 0.4 MG SL Tab Place 1 Tab under tongue as needed for Chest Pain. 25 Tab 0     No facility-administered encounter medications on file as of 9/7/2018.      Review of Systems   Constitutional: Negative for fever and malaise/fatigue.   Respiratory: Negative for cough and shortness of breath.    Cardiovascular: Negative for chest pain, palpitations, orthopnea, claudication, leg swelling and PND.   Gastrointestinal: Negative for abdominal pain.   Musculoskeletal: Negative for myalgias.   Neurological: Negative for dizziness.   All other systems reviewed and are negative.       Objective:   /80   Pulse 94   Ht 1.88 m (6' 2\")   Wt 98 kg (216 lb)   SpO2 97%   BMI 27.73 kg/m²     Physical Exam   Constitutional: He is oriented to person, place, and time. He appears well-developed and well-nourished. No distress.   HENT:   Head: Normocephalic and atraumatic.   Eyes: EOM are normal.   Neck: Normal range of motion. No JVD present.   Cardiovascular: Normal rate, regular rhythm, " normal heart sounds and intact distal pulses.    No murmur heard.  Pulmonary/Chest: Breath sounds normal. No respiratory distress. He has no wheezes. He has no rales.   Abdominal: Soft. Bowel sounds are normal.   Musculoskeletal: Normal range of motion. He exhibits no edema.   Neurological: He is alert and oriented to person, place, and time.   Skin: Skin is warm and dry.   Psychiatric: He has a normal mood and affect.   Nursing note and vitals reviewed.      Assessment:     1. Coronary artery disease involving coronary bypass graft of native heart without angina pectoris     2. Dyslipidemia  BASIC METABOLIC PANEL    LIPID PANEL   3. Essential hypertension     4. S/P CABG x 2       Medical Decision Making:  Today's Assessment / Status / Plan:     1. CAD with CABG X2  -no angina or ESPINOZA  -continue ASA, statin, BB.  -follow clinically  -refilled nitro    2. HLD  -statin  -LDL goal <70 with CAD  -check lipid and CMP in 1 month for review    3. Former smoker  -cessation     4. HTN  -moderate control, remains higher in morning only  -could be stress induced while taking his BP readings at work  -cont amlodipine at 5 mg AM and lunchtime, losartan 100 mg QPM, metoprolol 100 mg QPM, and hydralazine increase to 50 mg BID (he was only taking in the evening).  -he will call if his BP remains uncontrolled in 2 weeks  -repeat BMP in 1 month for persistent kidney impairment    FU in clinic in 3 months with IA; repeat blood work in 1 month    Patient verbalizes understanding and agrees with the plan of care.     Collaborating MD: Deanna JACOBSON    Physical Exam   Constitutional: He is oriented to person, place, and time. He appears well-developed and well-nourished. No distress.   HENT:   Head: Normocephalic and atraumatic.   Eyes: EOM are normal.   Neck: Normal range of motion. No JVD present.   Cardiovascular: Normal rate, regular rhythm, normal heart sounds and intact distal pulses.    No murmur heard.  Pulmonary/Chest: Breath sounds  normal. No respiratory distress. He has no wheezes. He has no rales.   Abdominal: Soft. Bowel sounds are normal.   Musculoskeletal: Normal range of motion. He exhibits no edema.   Neurological: He is alert and oriented to person, place, and time.   Skin: Skin is warm and dry.   Psychiatric: He has a normal mood and affect.   Nursing note and vitals reviewed.

## 2018-09-07 NOTE — LETTER
September 7, 2018        Jose A Ponce      To whom it may concern:    Please allow Jose A Ponce to utilize a standing work desk while performing his daily work duties.    This may potentially improve his overall circulation and increase his mobility during the work hours.          Alice Cohen DNP, APRN, AGACNP-BC

## 2018-09-15 ENCOUNTER — HOSPITAL ENCOUNTER (OUTPATIENT)
Dept: LAB | Facility: MEDICAL CENTER | Age: 58
End: 2018-09-15
Attending: NURSE PRACTITIONER
Payer: COMMERCIAL

## 2018-09-15 DIAGNOSIS — E78.5 DYSLIPIDEMIA: ICD-10-CM

## 2018-09-15 LAB
ANION GAP SERPL CALC-SCNC: 11 MMOL/L (ref 0–11.9)
BUN SERPL-MCNC: 21 MG/DL (ref 8–22)
CALCIUM SERPL-MCNC: 9.8 MG/DL (ref 8.5–10.5)
CHLORIDE SERPL-SCNC: 108 MMOL/L (ref 96–112)
CHOLEST SERPL-MCNC: 160 MG/DL (ref 100–199)
CO2 SERPL-SCNC: 23 MMOL/L (ref 20–33)
CREAT SERPL-MCNC: 1.1 MG/DL (ref 0.5–1.4)
FASTING STATUS PATIENT QL REPORTED: NORMAL
GLUCOSE SERPL-MCNC: 118 MG/DL (ref 65–99)
HDLC SERPL-MCNC: 43 MG/DL
LDLC SERPL CALC-MCNC: 76 MG/DL
POTASSIUM SERPL-SCNC: 4.4 MMOL/L (ref 3.6–5.5)
SODIUM SERPL-SCNC: 142 MMOL/L (ref 135–145)
TRIGL SERPL-MCNC: 207 MG/DL (ref 0–149)

## 2018-09-15 PROCEDURE — 80048 BASIC METABOLIC PNL TOTAL CA: CPT

## 2018-09-15 PROCEDURE — 36415 COLL VENOUS BLD VENIPUNCTURE: CPT

## 2018-09-15 PROCEDURE — 80061 LIPID PANEL: CPT

## 2018-09-17 ENCOUNTER — TELEPHONE (OUTPATIENT)
Dept: CARDIOLOGY | Facility: MEDICAL CENTER | Age: 58
End: 2018-09-17

## 2018-09-17 DIAGNOSIS — E78.5 DYSLIPIDEMIA: ICD-10-CM

## 2018-09-17 NOTE — TELEPHONE ENCOUNTER
----- Message from Sherry Kaur R.N. sent at 9/17/2018 10:19 AM PDT -----      ----- Message -----  From: JAY Hassan  Sent: 9/17/2018   7:55 AM  To: Susana Gonzales R.N.    Lipid shows improved control on LDL but triglycerides are elevated and BMP shows elevated glucose. FU with PCP on management. Recommend repeat lipid/cmp in 6 months to track cholesterol and consider addition of zetia. SC

## 2018-09-17 NOTE — TELEPHONE ENCOUNTER
"Left detailed message w/ results & advice from APN on \"Moises's cell phone\". Also mailed lab order for repeat LP & CMP  "

## 2018-10-03 DIAGNOSIS — I25.810 CORONARY ARTERY DISEASE INVOLVING CORONARY BYPASS GRAFT OF NATIVE HEART WITHOUT ANGINA PECTORIS: ICD-10-CM

## 2018-10-03 DIAGNOSIS — I10 ESSENTIAL HYPERTENSION: ICD-10-CM

## 2018-10-03 RX ORDER — LOSARTAN POTASSIUM 100 MG/1
100 TABLET ORAL DAILY
Qty: 90 TAB | Refills: 3 | OUTPATIENT
Start: 2018-10-03

## 2018-10-05 DIAGNOSIS — I25.810 CORONARY ARTERY DISEASE INVOLVING CORONARY BYPASS GRAFT OF NATIVE HEART WITHOUT ANGINA PECTORIS: ICD-10-CM

## 2018-10-05 DIAGNOSIS — I10 ESSENTIAL HYPERTENSION: ICD-10-CM

## 2018-10-05 DIAGNOSIS — Z95.1 HX OF CABG: ICD-10-CM

## 2018-10-05 RX ORDER — LOSARTAN POTASSIUM 50 MG/1
100 TABLET ORAL EVERY EVENING
Qty: 180 TAB | Refills: 3 | Status: SHIPPED | OUTPATIENT
Start: 2018-10-05 | End: 2019-06-03 | Stop reason: SDUPTHER

## 2018-10-05 NOTE — TELEPHONE ENCOUNTER
Roberto Sargent Ass't   You 3 minutes ago (8:34 AM)      Patient is requesting a 50mg tabs not 100mg. (Routing comment)      Upon chart review, Dr Esquivel would like pt to take Losartan 100mg PO nightly for better control of BP.     Attempted to call pt, no answer, phone just keeps on ringing.     Will send Rx for Losartan 50mg tab per pt's request w/ instructions to please have pt take 100mg PO nightly. (not 50mg BID)

## 2018-11-30 ENCOUNTER — TELEPHONE (OUTPATIENT)
Dept: CARDIOLOGY | Facility: MEDICAL CENTER | Age: 58
End: 2018-11-30

## 2018-11-30 NOTE — TELEPHONE ENCOUNTER
Tried calling patient to confirm appointment and remind him to get labs done but no answer and no voicemail.

## 2018-12-05 ENCOUNTER — HOSPITAL ENCOUNTER (OUTPATIENT)
Dept: LAB | Facility: MEDICAL CENTER | Age: 58
End: 2018-12-05
Attending: NURSE PRACTITIONER
Payer: COMMERCIAL

## 2018-12-05 DIAGNOSIS — E78.5 DYSLIPIDEMIA: ICD-10-CM

## 2018-12-05 LAB
ALBUMIN SERPL BCP-MCNC: 4.4 G/DL (ref 3.2–4.9)
ALBUMIN/GLOB SERPL: 1.4 G/DL
ALP SERPL-CCNC: 66 U/L (ref 30–99)
ALT SERPL-CCNC: 19 U/L (ref 2–50)
ANION GAP SERPL CALC-SCNC: 10 MMOL/L (ref 0–11.9)
AST SERPL-CCNC: 24 U/L (ref 12–45)
BILIRUB SERPL-MCNC: 0.9 MG/DL (ref 0.1–1.5)
BUN SERPL-MCNC: 20 MG/DL (ref 8–22)
CALCIUM SERPL-MCNC: 9.8 MG/DL (ref 8.5–10.5)
CHLORIDE SERPL-SCNC: 107 MMOL/L (ref 96–112)
CHOLEST SERPL-MCNC: 148 MG/DL (ref 100–199)
CO2 SERPL-SCNC: 23 MMOL/L (ref 20–33)
CREAT SERPL-MCNC: 1.21 MG/DL (ref 0.5–1.4)
FASTING STATUS PATIENT QL REPORTED: NORMAL
GLOBULIN SER CALC-MCNC: 3.1 G/DL (ref 1.9–3.5)
GLUCOSE SERPL-MCNC: 106 MG/DL (ref 65–99)
HDLC SERPL-MCNC: 42 MG/DL
LDLC SERPL CALC-MCNC: 56 MG/DL
POTASSIUM SERPL-SCNC: 4.2 MMOL/L (ref 3.6–5.5)
PROT SERPL-MCNC: 7.5 G/DL (ref 6–8.2)
SODIUM SERPL-SCNC: 140 MMOL/L (ref 135–145)
TRIGL SERPL-MCNC: 248 MG/DL (ref 0–149)

## 2018-12-05 PROCEDURE — 36415 COLL VENOUS BLD VENIPUNCTURE: CPT

## 2018-12-05 PROCEDURE — 80061 LIPID PANEL: CPT

## 2018-12-05 PROCEDURE — 80053 COMPREHEN METABOLIC PANEL: CPT

## 2018-12-11 ENCOUNTER — OFFICE VISIT (OUTPATIENT)
Dept: CARDIOLOGY | Facility: MEDICAL CENTER | Age: 58
End: 2018-12-11
Payer: COMMERCIAL

## 2018-12-11 VITALS
OXYGEN SATURATION: 94 % | SYSTOLIC BLOOD PRESSURE: 150 MMHG | BODY MASS INDEX: 27.53 KG/M2 | HEIGHT: 74 IN | WEIGHT: 214.51 LBS | DIASTOLIC BLOOD PRESSURE: 68 MMHG | HEART RATE: 94 BPM

## 2018-12-11 DIAGNOSIS — R73.01 ELEVATED FASTING BLOOD SUGAR: ICD-10-CM

## 2018-12-11 DIAGNOSIS — Z95.1 S/P CABG X 2: ICD-10-CM

## 2018-12-11 DIAGNOSIS — I10 ESSENTIAL HYPERTENSION: ICD-10-CM

## 2018-12-11 DIAGNOSIS — I25.810 CORONARY ARTERY DISEASE INVOLVING CORONARY BYPASS GRAFT OF NATIVE HEART WITHOUT ANGINA PECTORIS: Primary | ICD-10-CM

## 2018-12-11 PROCEDURE — 99214 OFFICE O/P EST MOD 30 MIN: CPT | Performed by: INTERNAL MEDICINE

## 2018-12-11 RX ORDER — AMLODIPINE BESYLATE 10 MG/1
5 TABLET ORAL 2 TIMES DAILY
Qty: 90 TAB | Refills: 3 | Status: SHIPPED | OUTPATIENT
Start: 2018-12-11 | End: 2019-12-18 | Stop reason: SDUPTHER

## 2018-12-11 RX ORDER — CLONIDINE HYDROCHLORIDE 0.1 MG/1
0.1 TABLET ORAL
Qty: 90 TAB | Refills: 3 | Status: SHIPPED | OUTPATIENT
Start: 2018-12-11 | End: 2018-12-18 | Stop reason: SINTOL

## 2018-12-11 RX ORDER — METOPROLOL SUCCINATE 100 MG/1
100 TABLET, EXTENDED RELEASE ORAL
Qty: 90 TAB | Refills: 3 | Status: SHIPPED | OUTPATIENT
Start: 2018-12-11 | End: 2019-12-18 | Stop reason: SDUPTHER

## 2018-12-11 RX ORDER — AMLODIPINE BESYLATE 10 MG/1
10 TABLET ORAL DAILY
COMMUNITY
Start: 2018-11-24 | End: 2018-12-11 | Stop reason: SDUPTHER

## 2018-12-11 ASSESSMENT — ENCOUNTER SYMPTOMS
CARDIOVASCULAR NEGATIVE: 1
DEPRESSION: 1
NERVOUS/ANXIOUS: 1

## 2018-12-11 NOTE — PROGRESS NOTES
"Subjective:   Jose A Ponce is a 58 -year-old man with a history of coronary artery disease status post two-vessel bypass surgery (LIMA-LAD, SVG-diagonal done on 2017 by Gray Barboza MD), hypertension and dyslipidemia.    He continues to do well with no cardiovascular complaints nor side effects with his medical regimen with the exception of spironolactone that had caused him kidney injury.  That was discontinued and his dose of losartan was increased.    Hydralazine he continues at 50 mg twice daily.  He perhaps out of confusion went to 10 mg p.o. daily of amlodipine instead of 5 mg p.o. twice daily.  Despite all of those changes in his antihypertensive regimen his blood pressures at home continue to be in the 140s and 150s mmHg during the daytime and 160 mmHg in the morning prior to medications.    He does continue to vape occasionally and denies smoking.  When he vapes uses the 3 mg of nicotine dosage.    Past Medical History:   Diagnosis Date   • Breath shortness 10/2017    \"In the past, not an issue now\" sob before heart surgery   • CAD (coronary artery disease)     S/P CABG X2   • Dental disorder 10/2017    Upper dentures   • Grief reaction 2017    Girlfriend  this year.   • Hx of hernia repair     Mesh   • Hyperlipidemia    • Hypertension    • Psychiatric problem 10/2017    Depression   • Tobacco use      Past Surgical History:   Procedure Laterality Date   • MULTIPLE CORONARY ARTERY BYPASS ENDO VEIN HARVEST  2017    Procedure: MULTIPLE CORONARY ARTERY BYPASS x2 RIGHT LEG ENDO VEIN HARVEST;  Surgeon: Gray Barboza M.D.;  Location: SURGERY [facility];  Service:    • HUBERT  2017    Procedure: HUBERT - INTRAOP;  Surgeon: Gray Barboza M.D.;  Location: SURGERY [facility];  Service:    • INGUINAL HERNIA REPAIR Bilateral    • CARDIAC CATH     • TIBIA ORIF Right      Family History   Problem Relation Age of Onset   • Cancer Mother    • Cancer Father    • No Known " Problems Sister    • No Known Problems Sister    • Heart Disease Maternal Grandfather 89        probable MI and sudden death     History   Smoking Status   • Former Smoker   • Packs/day: 1.00   • Years: 42.00   • Types: Cigarettes   • Quit date: 6/1/2016   Smokeless Tobacco   • Never Used     Comment: vape occ     No Known Allergies  Outpatient Encounter Prescriptions as of 12/11/2018   Medication Sig Dispense Refill   • amLODIPine (NORVASC) 10 MG Tab Take 0.5 Tabs by mouth 2 Times a Day. 90 Tab 3   • metoprolol SR (TOPROL XL) 100 MG TABLET SR 24 HR Take 1 Tab by mouth every bedtime. 90 Tab 3   • cloNIDine (CATAPRES) 0.1 MG Tab Take 1 Tab by mouth every bedtime. 90 Tab 3   • losartan (COZAAR) 50 MG Tab Take 2 Tabs by mouth every evening. Please instruct patient to take this medication at night. 180 Tab 3   • hydrALAZINE (APRESOLINE) 50 MG Tab Take 1 Tab by mouth 2 Times a Day. 60 Tab 6   • atorvastatin (LIPITOR) 80 MG tablet Take 1 Tab by mouth every bedtime. 90 Tab 3   • aspirin EC (ECOTRIN) 81 MG Tablet Delayed Response Take 1 Tab by mouth every day. 30 Tab 11   • multivitamin (THERAGRAN) Tab Take 1 Tab by mouth every day.     • Melatonin 10 MG Cap Take 1 Cap by mouth every bedtime.     • [DISCONTINUED] amLODIPine (NORVASC) 10 MG Tab Take 10 mg by mouth every day.     • nitroglycerin (NITROSTAT) 0.4 MG SL Tab Place 1 Tab under tongue as needed for Chest Pain. 25 Tab 11   • [DISCONTINUED] metoprolol (LOPRESSOR) 100 MG Tab Take 1 Tab by mouth every bedtime. 90 Tab 3   • [DISCONTINUED] amlodipine (NORVASC) 5 MG Tab Take 1 Tab by mouth 2 Times a Day. 60 Tab 11     No facility-administered encounter medications on file as of 12/11/2018.      Review of Systems   Cardiovascular: Negative.    Genitourinary: Positive for frequency (Frequent nocturia).   Psychiatric/Behavioral: Positive for depression. Negative for suicidal ideas. The patient is nervous/anxious.         Grieving the loss of his significant other that  "occurred just before bypass surgery   All other systems reviewed and are negative.       Objective:   /68 (BP Location: Right arm, Patient Position: Sitting, BP Cuff Size: Adult)   Pulse 94   Ht 1.88 m (6' 2\")   Wt 97.3 kg (214 lb 8.1 oz)   SpO2 94%   BMI 27.54 kg/m²     Physical Exam   Constitutional: He is oriented to person, place, and time. He appears well-developed and well-nourished. No distress.   Pleasant, middle-aged man in no distress.  Physical examination is unchanged compared to my previous on 6/7/2018.   HENT:   Head: Normocephalic and atraumatic.   Eyes: Pupils are equal, round, and reactive to light. Conjunctivae and EOM are normal. No scleral icterus.   Neck: Neck supple. No JVD present. No tracheal deviation present.   Cardiovascular: Normal rate, regular rhythm, normal heart sounds and intact distal pulses.  Exam reveals no gallop and no friction rub.    No murmur heard.  Pulses:       Dorsalis pedis pulses are 2+ on the right side, and 2+ on the left side.   Well-healing sternotomy scar noted, No carotid bruits   Pulmonary/Chest: Effort normal and breath sounds normal. No stridor. No respiratory distress. He has no wheezes. He has no rales.   Abdominal: Soft. Bowel sounds are normal. He exhibits no distension.   Musculoskeletal: He exhibits no edema.   Neurological: He is alert and oriented to person, place, and time.   Skin: Skin is warm and dry. No rash noted. He is not diaphoretic. No erythema. No pallor.   Psychiatric: Judgment and thought content normal.   Mildly blunted affect   Vitals reviewed.    Lab Results   Component Value Date/Time    WBC 7.5 10/12/2017 08:27 AM    RBC 4.51 (L) 10/12/2017 08:27 AM    HEMOGLOBIN 14.6 10/12/2017 08:27 AM    HEMATOCRIT 43.2 10/12/2017 08:27 AM    MCV 95.8 10/12/2017 08:27 AM    MCH 32.4 10/12/2017 08:27 AM    MCHC 33.8 10/12/2017 08:27 AM    MPV 10.9 10/12/2017 08:27 AM        Lab Results   Component Value Date/Time    SODIUM 140 12/05/2018 " "08:48 AM    POTASSIUM 4.2 12/05/2018 08:48 AM    CHLORIDE 107 12/05/2018 08:48 AM    CO2 23 12/05/2018 08:48 AM    GLUCOSE 106 (H) 12/05/2018 08:48 AM    BUN 20 12/05/2018 08:48 AM    CREATININE 1.21 12/05/2018 08:48 AM        Lab Results   Component Value Date/Time    ASTSGOT 24 12/05/2018 08:48 AM    ALTSGPT 19 12/05/2018 08:48 AM        Lab Results   Component Value Date/Time    CHOLSTRLTOT 148 12/05/2018 08:48 AM    LDL 56 12/05/2018 08:48 AM    HDL 42 12/05/2018 08:48 AM    TRIGLYCERIDE 248 (H) 12/05/2018 08:48 AM       Myocardial Perfusion Report 6/2/17   NUCLEAR IMAGING INTERPRETATION   Positive stress ECG for ischemia.   Normal left ventricular size, ejection fraction, and wall motion.   LAD ischemia large size and moderate ischemia   Normal left ventricular wall motion.  LV ejection fraction = 61%.     ECG INTERPRETATION   Positive stress ECG for ischemia.       Carotid Duplex Report 6/3/17     Vascular Laboratory   CONCLUSIONS   BILATERAL:   Mild smooth plaque of the bifurcation extending into the internal carotid.    Velocities are consistent with < 50% stenosis of the internal carotid    artery.   Bilateral subclavian and vertebral artery waveforms are antegrade and    waveforms are normal in character and velocity.       Transesophageal Echo Report 6/13/17  Normal LV function post bypass good concentric contractility.     Transthoracic Echo Report 6/3/17  No prior study is available for comparison.   Left ventricular ejection fraction is visually estimated to be 55%.  Grade I diastolic dysfunction.  Normal inferior vena cava size without inspiratory collapse.    Cardiac catheterization, 6/3/2017:  \"CORONARY ARTERIOGRAPHY:  1.  LEFT MAIN ARTERY:  Left main vessel is angiographically widely patent and bifurcates into a left anterior descending artery and circumflex artery.  2.  LEFT ANTERIOR DESCENDING ARTERY:  The proximal LAD is 100% occluded immediately adjacent to a small caliber diagonal branch.  " "There is faint retrograde collateral circulation partially filling the distal left anterior descending artery. The distal LAD vessel does not appear to have any additional significant stenotic lesions  3.  CIRCUMFLEX ARTERY:  The circumflex artery consists of a single bifurcating marginal branch.  Mid circumflex has diffuse smooth vessel narrowing with an estimated 50-60% stenosis.  4.  RIGHT CORONARY ARTERY:  The RCA is a large caliber vessel and gives rise to a large posterior descending artery and a large posterolateral branch.  Proximal circumflex has an estimated 60% stenosis.  There is a substantial collateral circulation from the distal right coronary artery to the distal left anterior descending artery.    FRACTIONAL FLOW RESERVE INDEX resting measurement of the proximal right coronary artery is 0.96.\"    CABG x 2: 6/13/2017  \"OPERATION:  Coronary artery bypass grafting x2, left FRANKIE to the distal LAD,   reverse saphenous vein graft to the distal diagonal, endoscopic vein harvest.\"    Assessment:     1. Coronary artery disease involving coronary bypass graft of native heart without angina pectoris     2. Essential hypertension  amLODIPine (NORVASC) 10 MG Tab    metoprolol SR (TOPROL XL) 100 MG TABLET SR 24 HR    cloNIDine (CATAPRES) 0.1 MG Tab   3. Elevated fasting blood sugar  HEMOGLOBIN A1C (Glycohemoglobin GHB Total/A1C with MBG Estimate)   4. S/P CABG x 2         Medical Decision Making:  Today's Assessment / Status / Plan:     He has no cardiovascular complaints, but overall blood pressure is not well controlled.    He has been on short acting metoprolol at night and I changed that to Toprol at the same dosage, 100 mg p.o. nightly.  He continues on losartan, and hydralazine twice daily.  His amlodipine is at 10 mg with some lower extremity edema during the daytime when he is standing at work related to which I change that back to 5 mg p.o. twice daily.  I prescribed him clonidine 0.1 mg p.o. nightly to " improve his morning blood pressure control.  I am hopeful that with those changes his blood pressure overall will be adequately controlled.    I reviewed with him his labs showing a slightly elevated blood sugar, and ordered a hemoglobin A1c as he is presently in between primary care physicians.  I did let him know that his abdominal obesity, and atorvastatin probably both contribute to that and recommended decrease carbohydrate intake and increased exercise.    Finally, I counseled him on vaping with nicotine asking him to cut down on his nicotine intake as that will both potentially progresses coronary disease and worsen his blood pressure control.    Bari Esquivel MD  Cardiologist, [facility] [place]     Return in about 6 months (around 6/11/2019) for 3 months with NP, 6 months with me.    Physical Exam   Constitutional: He is oriented to person, place, and time. He appears well-developed and well-nourished. No distress.   Pleasant, middle-aged man in no distress.  Physical examination is unchanged compared to my previous on 6/7/2018.   HENT:   Head: Normocephalic and atraumatic.   Eyes: Pupils are equal, round, and reactive to light. Conjunctivae and EOM are normal. No scleral icterus.   Neck: Neck supple. No JVD present. No tracheal deviation present.   Cardiovascular: Normal rate, regular rhythm, normal heart sounds and intact distal pulses.  Exam reveals no gallop and no friction rub.    No murmur heard.  Pulses:       Dorsalis pedis pulses are 2+ on the right side, and 2+ on the left side.   Well-healing sternotomy scar noted, No carotid bruits   Pulmonary/Chest: Effort normal and breath sounds normal. No stridor. No respiratory distress. He has no wheezes. He has no rales.   Abdominal: Soft. Bowel sounds are normal. He exhibits no distension.   Musculoskeletal: He exhibits no edema.   Neurological: He is alert and oriented to person, place, and time.   Skin: Skin is warm and dry. No rash  noted. He is not diaphoretic. No erythema. No pallor.   Psychiatric: Judgment and thought content normal.   Mildly blunted affect   Vitals reviewed.

## 2018-12-18 ENCOUNTER — TELEPHONE (OUTPATIENT)
Dept: CARDIOLOGY | Facility: MEDICAL CENTER | Age: 58
End: 2018-12-18

## 2018-12-18 DIAGNOSIS — I10 ESSENTIAL HYPERTENSION: ICD-10-CM

## 2018-12-18 RX ORDER — HYDRALAZINE HYDROCHLORIDE 50 MG/1
50 TABLET, FILM COATED ORAL SEE ADMIN INSTRUCTIONS
Qty: 270 TAB | Refills: 3 | Status: SHIPPED | OUTPATIENT
Start: 2018-12-18 | End: 2020-03-02

## 2018-12-18 NOTE — TELEPHONE ENCOUNTER
Jose A Esquivel M.D. 2 days ago         Woke up Saturday after 3 days of Clonidine added to medications. Stood up to get ready for work. Blacked out. took blood pressure after I came too. 84/49. way to low. Too high of dose of Clonidine I feel. Stopped this med. On Sunday was  149/96 P96 @ 4:35am without morning meds, 140/90 P92 45minutes after taking 5mg of amlodipine, baby aspirin, and hydroziline dose. @5:22pm was 138/81 P80. with no meds in between. Please advise.        Discussed w/ Dr Esquivel, per Dr Esquivel, stopped Clonidine, BP remains high in morning, please have pt increased Hydralazine to 50mg in AM and 75mg at night.     Pt notified through MyChart    MAR updated.

## 2019-03-06 ENCOUNTER — HOSPITAL ENCOUNTER (OUTPATIENT)
Dept: LAB | Facility: MEDICAL CENTER | Age: 59
End: 2019-03-06
Attending: INTERNAL MEDICINE
Payer: COMMERCIAL

## 2019-03-06 ENCOUNTER — TELEPHONE (OUTPATIENT)
Dept: CARDIOLOGY | Facility: MEDICAL CENTER | Age: 59
End: 2019-03-06

## 2019-03-06 DIAGNOSIS — R73.01 ELEVATED FASTING BLOOD SUGAR: ICD-10-CM

## 2019-03-06 LAB
EST. AVERAGE GLUCOSE BLD GHB EST-MCNC: 120 MG/DL
HBA1C MFR BLD: 5.8 % (ref 0–5.6)

## 2019-03-06 PROCEDURE — 83036 HEMOGLOBIN GLYCOSYLATED A1C: CPT

## 2019-03-06 PROCEDURE — 36415 COLL VENOUS BLD VENIPUNCTURE: CPT

## 2019-03-06 NOTE — TELEPHONE ENCOUNTER
Received a call from [facility], warm transfer from , they just want to clarify if the A1C lab that Dr Esquivel ordered is just the regular A1C, clarification provided.

## 2019-03-15 ENCOUNTER — TELEPHONE (OUTPATIENT)
Dept: CARDIOLOGY | Facility: MEDICAL CENTER | Age: 59
End: 2019-03-15

## 2019-03-15 NOTE — TELEPHONE ENCOUNTER
Bari Esquivel M.D.  Shantal Gonzalez R.N.             Please like to referral to primary care to address prediabetes        Noted that pt is scheduled to see Edwina Chin DO on 4/24/19.     Pt notified through [facility] to make sure he follow up and discussed A1C level with her during appt.

## 2019-04-24 ENCOUNTER — OFFICE VISIT (OUTPATIENT)
Dept: MEDICAL GROUP | Facility: PHYSICIAN GROUP | Age: 59
End: 2019-04-24
Payer: COMMERCIAL

## 2019-04-24 VITALS
TEMPERATURE: 98.3 F | HEART RATE: 91 BPM | SYSTOLIC BLOOD PRESSURE: 144 MMHG | BODY MASS INDEX: 26.18 KG/M2 | OXYGEN SATURATION: 97 % | WEIGHT: 204 LBS | HEIGHT: 74 IN | DIASTOLIC BLOOD PRESSURE: 88 MMHG

## 2019-04-24 DIAGNOSIS — F39 MOOD DISORDER (HCC): ICD-10-CM

## 2019-04-24 DIAGNOSIS — Z87.891 FORMER SMOKER: ICD-10-CM

## 2019-04-24 DIAGNOSIS — I10 ESSENTIAL HYPERTENSION: ICD-10-CM

## 2019-04-24 DIAGNOSIS — K42.9 UMBILICAL HERNIA WITHOUT OBSTRUCTION AND WITHOUT GANGRENE: ICD-10-CM

## 2019-04-24 DIAGNOSIS — F43.21 GRIEF REACTION: ICD-10-CM

## 2019-04-24 PROBLEM — R73.03 PREDIABETES: Status: ACTIVE | Noted: 2019-04-24

## 2019-04-24 PROCEDURE — 99214 OFFICE O/P EST MOD 30 MIN: CPT | Performed by: FAMILY MEDICINE

## 2019-04-24 RX ORDER — SERTRALINE HYDROCHLORIDE 100 MG/1
100 TABLET, FILM COATED ORAL DAILY
Qty: 30 TAB | Refills: 11 | Status: SHIPPED | OUTPATIENT
Start: 2019-04-24 | End: 2020-03-02

## 2019-04-24 NOTE — PROGRESS NOTES
CC: Depression    HISTORY OF THE PRESENT ILLNESS: Patient is a 58 y.o. male. This pleasant patient is here today to establish care and discuss health problems below.    Depressed mood: This is a chronic issue for the patient.  He states that he is gone through hospice situations for 4 of his relatives as well as had to deal with his girlfriend dying of cancer couple years ago.  Immediately after, he ended up having open heart surgery for coronary artery disease.  Since that time he has been quite depressed.  He has lost interest in things, notes a very short fuse at work, has trouble sleeping, has a lot of self blame.  He denies any suicidal thoughts but does have passive thoughts that he would be better off not waking up the following morning.  In the past he was on Zoloft with his previous primary care doctor.  This was only 50 mg and they were going to bump it up to 100 mg.  However, he ended up getting off the medication and his doctor moved.  He is not been on an antidepressant for about 8 months now.  He would like to get back on an antidepressant.    Hypertension: Chronic issue for the patient.  He does see cardiology.  He takes amlodipine, losartan, hydralazine.  He brings in a blood pressure log today with the majority of the blood pressures being taken at home well within the normal range even though he is mildly elevated in office today.  He has no red flag symptoms such as chest pain, headache, blurry vision.    Umbilical hernia: Chronic issue for the patient.  He notes that he strained while moving something a while back and the umbilical hernia did get bigger.  He does not have any pain or discomfort with the umbilical hernia, but he would like to get evaluated by general surgeon to see if it needs repair.    Allergies: Patient has no known allergies.    Current Outpatient Prescriptions Ordered in [facility]   Medication Sig Dispense Refill   • sertraline (ZOLOFT) 50 MG Tab Take 1 Tab by mouth every day.  "Take during week 1. 7 Tab 0   • sertraline (ZOLOFT) 100 MG Tab Take 1 Tab by mouth every day. Take during week 2. 30 Tab 11   • hydrALAZINE (APRESOLINE) 50 MG Tab Take 1 Tab by mouth See Admin Instructions. Take 1 tab in AM and Take 2 tabs at night 270 Tab 3   • amLODIPine (NORVASC) 10 MG Tab Take 0.5 Tabs by mouth 2 Times a Day. 90 Tab 3   • metoprolol SR (TOPROL XL) 100 MG TABLET SR 24 HR Take 1 Tab by mouth every bedtime. 90 Tab 3   • losartan (COZAAR) 50 MG Tab Take 2 Tabs by mouth every evening. Please instruct patient to take this medication at night. 180 Tab 3   • atorvastatin (LIPITOR) 80 MG tablet Take 1 Tab by mouth every bedtime. 90 Tab 3   • aspirin EC (ECOTRIN) 81 MG Tablet Delayed Response Take 1 Tab by mouth every day. 30 Tab 11   • multivitamin (THERAGRAN) Tab Take 1 Tab by mouth every day.     • Melatonin 10 MG Cap Take 1 Cap by mouth every bedtime.     • nitroglycerin (NITROSTAT) 0.4 MG SL Tab Place 1 Tab under tongue as needed for Chest Pain. 25 Tab 11     No current [facility]-ordered facility-administered medications on file.        Past Medical History:   Diagnosis Date   • Breath shortness 10/2017    \"In the past, not an issue now\" sob before heart surgery   • CAD (coronary artery disease)     S/P CABG X2   • Dental disorder 10/2017    Upper dentures   • Grief reaction 2017    Girlfriend  this year.   • Hx of hernia repair     Mesh   • Hyperlipidemia    • Hypertension    • Psychiatric problem 10/2017    Depression   • Tobacco use        Past Surgical History:   Procedure Laterality Date   • MULTIPLE CORONARY ARTERY BYPASS ENDO VEIN HARVEST  2017    Procedure: MULTIPLE CORONARY ARTERY BYPASS x2 RIGHT LEG ENDO VEIN HARVEST;  Surgeon: Gray Barboza M.D.;  Location: SURGERY [facility];  Service:    • HUBERT  2017    Procedure: HUBERT - INTRAOP;  Surgeon: Gray Barboza M.D.;  Location: SURGERY [facility];  Service:    • INGUINAL HERNIA REPAIR Bilateral    • TIBIA ORIF Right " "1980s   • ZZZ CARDIAC CATH         Social History   Substance Use Topics   • Smoking status: Former Smoker     Packs/day: 1.00     Years: 42.00     Types: Cigarettes     Quit date: 6/1/2016   • Smokeless tobacco: Never Used      Comment: vape occ   • Alcohol use 0.0 oz/week      Comment: 2 per day       Social History     Social History Narrative   • No narrative on file       Family History   Problem Relation Age of Onset   • Cancer Mother    • Cancer Father    • No Known Problems Sister    • No Known Problems Sister    • Heart Disease Maternal Grandfather 89        probable MI and sudden death       ROS:     - Constitutional: Negative for fever, chills, unexpected weight change, and fatigue/generalized weakness.     - Respiratory: Negative for cough, sputum production, chest congestion, dyspnea, wheezing, and crackles.      - Cardiovascular: Negative for chest pain, palpitations, orthopnea, PND, and bilateral lower extremity edema.     - Psychiatric/Behavioral: See HPI.      Exam: /88 (BP Location: Left arm, Patient Position: Sitting, BP Cuff Size: Adult)   Pulse 91   Temp 36.8 °C (98.3 °F)   Ht 1.88 m (6' 2\")   Wt 92.5 kg (204 lb)   SpO2 97%  Body mass index is 26.19 kg/m².    General: Well appearing, NAD  HEENT: Normocephalic. Conjunctiva clear, lids without ptosis, pupils equal and reactive to light accommodation, nasal mucosa normal, oropharynx is without erythema, edema or exudates.   Neck: Supple without JVD. No thyromegaly or nodules  Pulmonary: Clear to ausculation.  Normal effort. No rales, ronchi, or wheezing.  Cardiovascular: Regular rate and rhythm without murmur, rubs or gallop.  No lower extremity edema.  Abdomen: Soft, nontender, nondistended. Normal bowel sounds. Liver and spleen are not palpable. No rebound or guarding.  Umbilical hernia approximately 2 inches in diameter noted.  Easily reducible and nontender.  Skin: Warm and dry.  No obvious lesions.  Musculoskeletal:  No extremity " cyanosis, clubbing, or edema.  Psych: Depressed mood and affect.  Good insight and judgment.    Please note that this dictation was created using voice recognition software. I have made every reasonable attempt to correct obvious errors, but I expect that there are errors of grammar and possibly content that I did not discover before finalizing the note.      Assessment/Plan  Jose A was seen today for depression.    Diagnoses and all orders for this visit:    Umbilical hernia without obstruction and without gangrene  Chronic uncontrolled problem for the patient but new problem to me.  I have referred him to general surgery for further evaluation today.  -     REFERRAL TO GENERAL SURGERY    Grief reaction  Mood disorder (HCC)  Chronic uncontrolled problems for the patient.  New problem to me.  We will go ahead and start Zoloft today.  Patient will let me know via my chart if his mood gets worse and not better on the medication or if he has any other concerns.  Close follow-up in 4 weeks.  -     sertraline (ZOLOFT) 50 MG Tab; Take 1 Tab by mouth every day. Take during week 1.  -     sertraline (ZOLOFT) 100 MG Tab; Take 1 Tab by mouth every day. Take during week 2.    Former smoker  Patient agreeable to referral for lung cancer screening program today.  -     REFERRAL TO LUNG CANCER SCREENING PROGRAM    Essential hypertension  Chronic issue for the patient.  He is elevated today, though mildly.  His home blood pressure log is quite within the normal range.  For now, continue to monitor blood pressure at home and continue current medications.    Follow-up in about 4 weeks for depression.    Edwina Chin DO  [facility]

## 2019-04-26 ENCOUNTER — TELEPHONE (OUTPATIENT)
Dept: HEMATOLOGY ONCOLOGY | Facility: MEDICAL CENTER | Age: 59
End: 2019-04-26

## 2019-04-26 NOTE — TELEPHONE ENCOUNTER
Received referral to lung cancer screening program.  Chart review to assess for lung cancer screening program eligibility.   1. Age 55-77 yrs of age? Yes 58 y.o.  2. 30 pack year hx of smoking, or greater? Yes 1 [id number]= [id number] hx  3. Current smoker or if quit, has pt quit within last 15 yrs?Yes  Quit 2016  4. Any signs or symptoms of lung cancer? None noted  5. Previous history of lung cancer? None noted  6. Chest CT within past 12 mos.? None noted  Patient does meet eligibility criteria. [facility] scheduling notified to schedule the shared decision making visit.

## 2019-05-01 ENCOUNTER — TELEPHONE (OUTPATIENT)
Dept: HEMATOLOGY ONCOLOGY | Facility: MEDICAL CENTER | Age: 59
End: 2019-05-01

## 2019-05-01 NOTE — TELEPHONE ENCOUNTER
1st attempt to contact the patient,- Unable to leave voicemail( no answer ) for patient requesting a return call to schedule their shared decision making appointment.   [facility]/ [employer]/ Nicotine Dependence/Edwina Chin

## 2019-05-06 NOTE — TELEPHONE ENCOUNTER
2nd attempt to contact the patient,- Unable to leave voicemail( no answer ) for patient requesting a return call to schedule their shared decision making appointment.   [facility]/ [employer]/ Nicotine Dependence/Edwina Chin

## 2019-05-10 NOTE — TELEPHONE ENCOUNTER
3rd attempt to contact the patient,- Unable to leave voicemail( no answer ) mailed a letter to the patient requesting a return call to schedule their shared decision making appointment.   [facility]/ [employer]/ Nicotine Dependence/Edwina Chin

## 2019-05-12 NOTE — PROGRESS NOTES
1400: Pt met all extubation parameters per ABG and post-cardiac surgery protocol.  Joint decision made between RN and RT to extubate.  Pt extubated to 5L n/c without complication.      TOTAL INTUBATION TIME: 2 HOURS & 5 MINUTES   presents to ed with shortness of breath, left leg swelling onset this morning.denies  chest pain

## 2019-06-03 DIAGNOSIS — I10 ESSENTIAL HYPERTENSION: ICD-10-CM

## 2019-06-03 DIAGNOSIS — Z95.1 HX OF CABG: ICD-10-CM

## 2019-06-03 RX ORDER — LOSARTAN POTASSIUM 100 MG/1
100 TABLET ORAL EVERY EVENING
Qty: 90 TAB | Refills: 0 | Status: SHIPPED | OUTPATIENT
Start: 2019-06-03 | End: 2020-03-12 | Stop reason: SDUPTHER

## 2019-07-15 DIAGNOSIS — E78.5 DYSLIPIDEMIA: ICD-10-CM

## 2019-07-15 RX ORDER — ATORVASTATIN CALCIUM 80 MG/1
80 TABLET, FILM COATED ORAL
Qty: 90 TAB | Refills: 0 | Status: SHIPPED | OUTPATIENT
Start: 2019-07-15 | End: 2019-12-18

## 2019-07-16 NOTE — DISCHARGE PLANNING
Lida Liu MD,    Coco Hansen  MRN: [id number]      Your patient is scheduled for low dose lung screening on 7/22/19. However, the currently documented pack/yr history is only 20. If accurate, this patient would not qualify for LDCT lung screening..    Please confirm that their smoking history meets the 30 pack year (Packs smoked per day x years smoked = pk yrs) criteria set forth by Medicare (and is documented in the history tab of Epic)     Verification of her smoking history will ensure eligibility and insurance coverage for LDCT lung screening.    If you find she doesn't meet the criteria for LDCT lung screening, please contact your patient to discuss. They are still able to have a lung screening, however it would not be covered by their insurance, it is only a covered benefit if they meet screening criteria. If they do not wish to pursue screening please contact the department to cancel the order.  If you feel she requires surveillance for a reason separate from her smoking history, please consider changing the order to a standard CT.       Thank you for your assistance.    Little Anrold RN, BSN  Lung Screening      Received notice from [facility], patient has been denied for reason of: Not in contract with [employer].  ONEL Phelps has been notified via phone.

## 2019-12-17 DIAGNOSIS — E78.5 DYSLIPIDEMIA: ICD-10-CM

## 2019-12-17 DIAGNOSIS — I10 ESSENTIAL HYPERTENSION: ICD-10-CM

## 2019-12-18 DIAGNOSIS — I10 ESSENTIAL HYPERTENSION: ICD-10-CM

## 2019-12-18 RX ORDER — ATORVASTATIN CALCIUM 80 MG/1
TABLET, FILM COATED ORAL
Qty: 90 TAB | Refills: 0 | Status: SHIPPED | OUTPATIENT
Start: 2019-12-18 | End: 2020-04-28

## 2019-12-18 RX ORDER — AMLODIPINE BESYLATE 10 MG/1
5 TABLET ORAL 2 TIMES DAILY
Qty: 90 TAB | Refills: 0 | Status: SHIPPED | OUTPATIENT
Start: 2019-12-18 | End: 2020-04-28

## 2019-12-18 RX ORDER — METOPROLOL SUCCINATE 100 MG/1
100 TABLET, EXTENDED RELEASE ORAL
Qty: 90 TAB | Refills: 0 | Status: SHIPPED | OUTPATIENT
Start: 2019-12-18 | End: 2020-03-31

## 2019-12-18 NOTE — TELEPHONE ENCOUNTER
Was the patient seen in the last year in this department? Yes    Does patient have an active prescription for medications requested? No     Received Request Via: Pharmacy    Pt met protocol?: No     Last OV 04/24/19    BP Readings from Last 1 Encounters:   04/24/19 144/88

## 2019-12-18 NOTE — TELEPHONE ENCOUNTER
Was the patient seen in the last year in this department? Yes    Does patient have an active prescription for medications requested? No     Received Request Via: Pharmacy    Pt met protocol?: Yes      Last OV 04/24/19    Lab Results   Component Value Date/Time    CHOLSTRLTOT 148 12/05/2018 0848    TRIGLYCERIDE 248 (H) 12/05/2018 0848    HDL 42 12/05/2018 0848    LDL 56 12/05/2018 0848

## 2020-03-02 ENCOUNTER — APPOINTMENT (OUTPATIENT)
Dept: RADIOLOGY | Facility: MEDICAL CENTER | Age: 60
DRG: 300 | End: 2020-03-02
Attending: EMERGENCY MEDICINE
Payer: COMMERCIAL

## 2020-03-02 ENCOUNTER — HOSPITAL ENCOUNTER (INPATIENT)
Facility: MEDICAL CENTER | Age: 60
LOS: 4 days | DRG: 300 | End: 2020-03-06
Attending: EMERGENCY MEDICINE | Admitting: HOSPITALIST
Payer: COMMERCIAL

## 2020-03-02 DIAGNOSIS — I82.422 ACUTE DEEP VEIN THROMBOSIS (DVT) OF ILIAC VEIN OF LEFT LOWER EXTREMITY (HCC): ICD-10-CM

## 2020-03-02 PROBLEM — N17.9 AKI (ACUTE KIDNEY INJURY) (HCC): Status: ACTIVE | Noted: 2020-03-02

## 2020-03-02 PROBLEM — Z72.0 TOBACCO ABUSE: Status: ACTIVE | Noted: 2020-03-02

## 2020-03-02 LAB
ALBUMIN SERPL BCP-MCNC: 3.8 G/DL (ref 3.2–4.9)
ALBUMIN/GLOB SERPL: 1.2 G/DL
ALP SERPL-CCNC: 99 U/L (ref 30–99)
ALT SERPL-CCNC: 8 U/L (ref 2–50)
ANION GAP SERPL CALC-SCNC: 13 MMOL/L (ref 0–11.9)
APTT PPP: 31.1 SEC (ref 24.7–36)
APTT PPP: 68.9 SEC (ref 24.7–36)
AST SERPL-CCNC: 11 U/L (ref 12–45)
BASOPHILS # BLD AUTO: 0.8 % (ref 0–1.8)
BASOPHILS # BLD: 0.07 K/UL (ref 0–0.12)
BILIRUB SERPL-MCNC: 1.5 MG/DL (ref 0.1–1.5)
BUN SERPL-MCNC: 17 MG/DL (ref 8–22)
CALCIUM SERPL-MCNC: 8.7 MG/DL (ref 8.5–10.5)
CHLORIDE SERPL-SCNC: 105 MMOL/L (ref 96–112)
CO2 SERPL-SCNC: 20 MMOL/L (ref 20–33)
CREAT SERPL-MCNC: 1.42 MG/DL (ref 0.5–1.4)
EKG IMPRESSION: NORMAL
EOSINOPHIL # BLD AUTO: 0.05 K/UL (ref 0–0.51)
EOSINOPHIL NFR BLD: 0.6 % (ref 0–6.9)
ERYTHROCYTE [DISTWIDTH] IN BLOOD BY AUTOMATED COUNT: 53.8 FL (ref 35.9–50)
GLOBULIN SER CALC-MCNC: 3.3 G/DL (ref 1.9–3.5)
GLUCOSE SERPL-MCNC: 116 MG/DL (ref 65–99)
HCT VFR BLD AUTO: 50.7 % (ref 42–52)
HGB BLD-MCNC: 17 G/DL (ref 14–18)
IMM GRANULOCYTES # BLD AUTO: 0.04 K/UL (ref 0–0.11)
IMM GRANULOCYTES NFR BLD AUTO: 0.5 % (ref 0–0.9)
INR PPP: 0.99 (ref 0.87–1.13)
LYMPHOCYTES # BLD AUTO: 1.49 K/UL (ref 1–4.8)
LYMPHOCYTES NFR BLD: 17.1 % (ref 22–41)
MCH RBC QN AUTO: 32.9 PG (ref 27–33)
MCHC RBC AUTO-ENTMCNC: 33.5 G/DL (ref 33.7–35.3)
MCV RBC AUTO: 98.3 FL (ref 81.4–97.8)
MONOCYTES # BLD AUTO: 0.79 K/UL (ref 0–0.85)
MONOCYTES NFR BLD AUTO: 9.1 % (ref 0–13.4)
NEUTROPHILS # BLD AUTO: 6.25 K/UL (ref 1.82–7.42)
NEUTROPHILS NFR BLD: 71.9 % (ref 44–72)
NRBC # BLD AUTO: 0 K/UL
NRBC BLD-RTO: 0 /100 WBC
NT-PROBNP SERPL IA-MCNC: 199 PG/ML (ref 0–125)
PLATELET # BLD AUTO: 186 K/UL (ref 164–446)
PMV BLD AUTO: 10.5 FL (ref 9–12.9)
POTASSIUM SERPL-SCNC: 3.7 MMOL/L (ref 3.6–5.5)
PROT SERPL-MCNC: 7.1 G/DL (ref 6–8.2)
PROTHROMBIN TIME: 13.3 SEC (ref 12–14.6)
RBC # BLD AUTO: 5.16 M/UL (ref 4.7–6.1)
SODIUM SERPL-SCNC: 138 MMOL/L (ref 135–145)
TROPONIN T SERPL-MCNC: 10 NG/L (ref 6–19)
WBC # BLD AUTO: 8.7 K/UL (ref 4.8–10.8)

## 2020-03-02 PROCEDURE — 85610 PROTHROMBIN TIME: CPT

## 2020-03-02 PROCEDURE — 86038 ANTINUCLEAR ANTIBODIES: CPT

## 2020-03-02 PROCEDURE — 85025 COMPLETE CBC W/AUTO DIFF WBC: CPT

## 2020-03-02 PROCEDURE — 81241 F5 GENE: CPT

## 2020-03-02 PROCEDURE — 84484 ASSAY OF TROPONIN QUANT: CPT

## 2020-03-02 PROCEDURE — 85730 THROMBOPLASTIN TIME PARTIAL: CPT

## 2020-03-02 PROCEDURE — 700105 HCHG RX REV CODE 258: Performed by: HOSPITALIST

## 2020-03-02 PROCEDURE — 770020 HCHG ROOM/CARE - TELE (206)

## 2020-03-02 PROCEDURE — 93005 ELECTROCARDIOGRAM TRACING: CPT

## 2020-03-02 PROCEDURE — 93971 EXTREMITY STUDY: CPT | Mod: LT

## 2020-03-02 PROCEDURE — 700111 HCHG RX REV CODE 636 W/ 250 OVERRIDE (IP): Performed by: EMERGENCY MEDICINE

## 2020-03-02 PROCEDURE — 83880 ASSAY OF NATRIURETIC PEPTIDE: CPT

## 2020-03-02 PROCEDURE — 96365 THER/PROPH/DIAG IV INF INIT: CPT

## 2020-03-02 PROCEDURE — 700117 HCHG RX CONTRAST REV CODE 255: Performed by: EMERGENCY MEDICINE

## 2020-03-02 PROCEDURE — 71275 CT ANGIOGRAPHY CHEST: CPT

## 2020-03-02 PROCEDURE — 36415 COLL VENOUS BLD VENIPUNCTURE: CPT

## 2020-03-02 PROCEDURE — 93005 ELECTROCARDIOGRAM TRACING: CPT | Performed by: EMERGENCY MEDICINE

## 2020-03-02 PROCEDURE — 99406 BEHAV CHNG SMOKING 3-10 MIN: CPT | Performed by: HOSPITALIST

## 2020-03-02 PROCEDURE — 99223 1ST HOSP IP/OBS HIGH 75: CPT | Mod: 25 | Performed by: HOSPITALIST

## 2020-03-02 PROCEDURE — 700111 HCHG RX REV CODE 636 W/ 250 OVERRIDE (IP): Performed by: HOSPITALIST

## 2020-03-02 PROCEDURE — 99285 EMERGENCY DEPT VISIT HI MDM: CPT

## 2020-03-02 PROCEDURE — 96366 THER/PROPH/DIAG IV INF ADDON: CPT

## 2020-03-02 PROCEDURE — 96375 TX/PRO/DX INJ NEW DRUG ADDON: CPT

## 2020-03-02 PROCEDURE — 700102 HCHG RX REV CODE 250 W/ 637 OVERRIDE(OP): Performed by: HOSPITALIST

## 2020-03-02 PROCEDURE — 80053 COMPREHEN METABOLIC PANEL: CPT

## 2020-03-02 PROCEDURE — A9270 NON-COVERED ITEM OR SERVICE: HCPCS | Performed by: HOSPITALIST

## 2020-03-02 PROCEDURE — 700105 HCHG RX REV CODE 258: Performed by: EMERGENCY MEDICINE

## 2020-03-02 RX ORDER — PROCHLORPERAZINE EDISYLATE 5 MG/ML
5-10 INJECTION INTRAMUSCULAR; INTRAVENOUS EVERY 4 HOURS PRN
Status: DISCONTINUED | OUTPATIENT
Start: 2020-03-02 | End: 2020-03-06 | Stop reason: HOSPADM

## 2020-03-02 RX ORDER — OXYCODONE HYDROCHLORIDE 10 MG/1
10 TABLET ORAL
Status: DISCONTINUED | OUTPATIENT
Start: 2020-03-02 | End: 2020-03-06 | Stop reason: HOSPADM

## 2020-03-02 RX ORDER — HYDRALAZINE HYDROCHLORIDE 50 MG/1
50 TABLET, FILM COATED ORAL EVERY MORNING
Status: DISCONTINUED | OUTPATIENT
Start: 2020-03-03 | End: 2020-03-06 | Stop reason: HOSPADM

## 2020-03-02 RX ORDER — PROMETHAZINE HYDROCHLORIDE 12.5 MG/1
12.5-25 SUPPOSITORY RECTAL EVERY 4 HOURS PRN
Status: DISCONTINUED | OUTPATIENT
Start: 2020-03-02 | End: 2020-03-06 | Stop reason: HOSPADM

## 2020-03-02 RX ORDER — LOSARTAN POTASSIUM 50 MG/1
100 TABLET ORAL EVERY EVENING
Status: DISCONTINUED | OUTPATIENT
Start: 2020-03-02 | End: 2020-03-06 | Stop reason: HOSPADM

## 2020-03-02 RX ORDER — HYDROMORPHONE HYDROCHLORIDE 1 MG/ML
0.5 INJECTION, SOLUTION INTRAMUSCULAR; INTRAVENOUS; SUBCUTANEOUS
Status: DISCONTINUED | OUTPATIENT
Start: 2020-03-02 | End: 2020-03-06 | Stop reason: HOSPADM

## 2020-03-02 RX ORDER — ATORVASTATIN CALCIUM 80 MG/1
80 TABLET, FILM COATED ORAL EVERY EVENING
Status: DISCONTINUED | OUTPATIENT
Start: 2020-03-02 | End: 2020-03-06 | Stop reason: HOSPADM

## 2020-03-02 RX ORDER — AMOXICILLIN 250 MG
2 CAPSULE ORAL 2 TIMES DAILY
Status: DISCONTINUED | OUTPATIENT
Start: 2020-03-02 | End: 2020-03-06 | Stop reason: HOSPADM

## 2020-03-02 RX ORDER — HEPARIN SODIUM 1000 [USP'U]/ML
6000 INJECTION, SOLUTION INTRAVENOUS; SUBCUTANEOUS ONCE
Status: DISCONTINUED | OUTPATIENT
Start: 2020-03-02 | End: 2020-03-02

## 2020-03-02 RX ORDER — ACETAMINOPHEN 325 MG/1
650 TABLET ORAL EVERY 6 HOURS PRN
Status: DISCONTINUED | OUTPATIENT
Start: 2020-03-02 | End: 2020-03-06 | Stop reason: HOSPADM

## 2020-03-02 RX ORDER — PROMETHAZINE HYDROCHLORIDE 25 MG/1
12.5-25 TABLET ORAL EVERY 4 HOURS PRN
Status: DISCONTINUED | OUTPATIENT
Start: 2020-03-02 | End: 2020-03-06 | Stop reason: HOSPADM

## 2020-03-02 RX ORDER — HYDRALAZINE HYDROCHLORIDE 50 MG/1
50-100 TABLET, FILM COATED ORAL 2 TIMES DAILY
COMMUNITY
End: 2020-03-12 | Stop reason: SDUPTHER

## 2020-03-02 RX ORDER — NICOTINE 21 MG/24HR
14 PATCH, TRANSDERMAL 24 HOURS TRANSDERMAL
Status: DISCONTINUED | OUTPATIENT
Start: 2020-03-02 | End: 2020-03-06 | Stop reason: HOSPADM

## 2020-03-02 RX ORDER — HEPARIN SODIUM 1000 [USP'U]/ML
3200 INJECTION, SOLUTION INTRAVENOUS; SUBCUTANEOUS PRN
Status: DISCONTINUED | OUTPATIENT
Start: 2020-03-02 | End: 2020-03-03

## 2020-03-02 RX ORDER — AMLODIPINE BESYLATE 10 MG/1
5 TABLET ORAL 2 TIMES DAILY
Status: DISCONTINUED | OUTPATIENT
Start: 2020-03-02 | End: 2020-03-06 | Stop reason: HOSPADM

## 2020-03-02 RX ORDER — BISACODYL 10 MG
10 SUPPOSITORY, RECTAL RECTAL
Status: DISCONTINUED | OUTPATIENT
Start: 2020-03-02 | End: 2020-03-06 | Stop reason: HOSPADM

## 2020-03-02 RX ORDER — OXYCODONE HYDROCHLORIDE 5 MG/1
5 TABLET ORAL
Status: DISCONTINUED | OUTPATIENT
Start: 2020-03-02 | End: 2020-03-06 | Stop reason: HOSPADM

## 2020-03-02 RX ORDER — SODIUM CHLORIDE 9 MG/ML
1000 INJECTION, SOLUTION INTRAVENOUS ONCE
Status: COMPLETED | OUTPATIENT
Start: 2020-03-02 | End: 2020-03-02

## 2020-03-02 RX ORDER — SODIUM CHLORIDE 9 MG/ML
INJECTION, SOLUTION INTRAVENOUS CONTINUOUS
Status: DISCONTINUED | OUTPATIENT
Start: 2020-03-02 | End: 2020-03-03

## 2020-03-02 RX ORDER — POLYETHYLENE GLYCOL 3350 17 G/17G
1 POWDER, FOR SOLUTION ORAL
Status: DISCONTINUED | OUTPATIENT
Start: 2020-03-02 | End: 2020-03-06 | Stop reason: HOSPADM

## 2020-03-02 RX ORDER — HEPARIN SODIUM 1000 [USP'U]/ML
6000 INJECTION, SOLUTION INTRAVENOUS; SUBCUTANEOUS ONCE
Status: COMPLETED | OUTPATIENT
Start: 2020-03-02 | End: 2020-03-02

## 2020-03-02 RX ORDER — HEPARIN SODIUM 1000 [USP'U]/ML
3200 INJECTION, SOLUTION INTRAVENOUS; SUBCUTANEOUS PRN
Status: DISCONTINUED | OUTPATIENT
Start: 2020-03-02 | End: 2020-03-02

## 2020-03-02 RX ORDER — HEPARIN SODIUM 5000 [USP'U]/100ML
INJECTION, SOLUTION INTRAVENOUS CONTINUOUS
Status: DISCONTINUED | OUTPATIENT
Start: 2020-03-02 | End: 2020-03-02

## 2020-03-02 RX ORDER — ONDANSETRON 4 MG/1
4 TABLET, ORALLY DISINTEGRATING ORAL EVERY 4 HOURS PRN
Status: DISCONTINUED | OUTPATIENT
Start: 2020-03-02 | End: 2020-03-06 | Stop reason: HOSPADM

## 2020-03-02 RX ORDER — SODIUM CHLORIDE, SODIUM LACTATE, POTASSIUM CHLORIDE, CALCIUM CHLORIDE 600; 310; 30; 20 MG/100ML; MG/100ML; MG/100ML; MG/100ML
INJECTION, SOLUTION INTRAVENOUS CONTINUOUS
Status: DISPENSED | OUTPATIENT
Start: 2020-03-02 | End: 2020-03-03

## 2020-03-02 RX ORDER — HEPARIN SODIUM 5000 [USP'U]/100ML
INJECTION, SOLUTION INTRAVENOUS CONTINUOUS
Status: DISCONTINUED | OUTPATIENT
Start: 2020-03-02 | End: 2020-03-03

## 2020-03-02 RX ORDER — HYDRALAZINE HYDROCHLORIDE 50 MG/1
100 TABLET, FILM COATED ORAL
Status: DISCONTINUED | OUTPATIENT
Start: 2020-03-02 | End: 2020-03-06 | Stop reason: HOSPADM

## 2020-03-02 RX ORDER — ONDANSETRON 2 MG/ML
4 INJECTION INTRAMUSCULAR; INTRAVENOUS EVERY 4 HOURS PRN
Status: DISCONTINUED | OUTPATIENT
Start: 2020-03-02 | End: 2020-03-06 | Stop reason: HOSPADM

## 2020-03-02 RX ORDER — METOPROLOL SUCCINATE 25 MG/1
100 TABLET, EXTENDED RELEASE ORAL
Status: DISCONTINUED | OUTPATIENT
Start: 2020-03-02 | End: 2020-03-06 | Stop reason: HOSPADM

## 2020-03-02 RX ADMIN — AMLODIPINE BESYLATE 5 MG: 10 TABLET ORAL at 18:19

## 2020-03-02 RX ADMIN — SODIUM CHLORIDE, POTASSIUM CHLORIDE, SODIUM LACTATE AND CALCIUM CHLORIDE: 600; 310; 30; 20 INJECTION, SOLUTION INTRAVENOUS at 22:45

## 2020-03-02 RX ADMIN — HYDRALAZINE HYDROCHLORIDE 100 MG: 50 TABLET, FILM COATED ORAL at 20:51

## 2020-03-02 RX ADMIN — HEPARIN SODIUM 1200 UNITS/HR: 5000 INJECTION, SOLUTION INTRAVENOUS at 13:21

## 2020-03-02 RX ADMIN — LOSARTAN POTASSIUM 100 MG: 50 TABLET, FILM COATED ORAL at 18:19

## 2020-03-02 RX ADMIN — METOPROLOL SUCCINATE 100 MG: 50 TABLET, EXTENDED RELEASE ORAL at 20:50

## 2020-03-02 RX ADMIN — HEPARIN SODIUM 6000 UNITS: 1000 INJECTION, SOLUTION INTRAVENOUS; SUBCUTANEOUS at 13:20

## 2020-03-02 RX ADMIN — ATORVASTATIN CALCIUM 80 MG: 80 TABLET, FILM COATED ORAL at 18:19

## 2020-03-02 RX ADMIN — SODIUM CHLORIDE: 9 INJECTION, SOLUTION INTRAVENOUS at 13:21

## 2020-03-02 RX ADMIN — SODIUM CHLORIDE 1000 ML: 9 INJECTION, SOLUTION INTRAVENOUS at 14:13

## 2020-03-02 RX ADMIN — IOHEXOL 75 ML: 350 INJECTION, SOLUTION INTRAVENOUS at 13:51

## 2020-03-02 ASSESSMENT — LIFESTYLE VARIABLES
ALCOHOL_USE: YES
ON A TYPICAL DAY WHEN YOU DRINK ALCOHOL HOW MANY DRINKS DO YOU HAVE: 3
CONSUMPTION TOTAL: POSITIVE
AVERAGE NUMBER OF DAYS PER WEEK YOU HAVE A DRINK CONTAINING ALCOHOL: 3
TOTAL SCORE: 0
EVER HAD A DRINK FIRST THING IN THE MORNING TO STEADY YOUR NERVES TO GET RID OF A HANGOVER: NO
HAVE YOU EVER FELT YOU SHOULD CUT DOWN ON YOUR DRINKING: NO
EVER FELT BAD OR GUILTY ABOUT YOUR DRINKING: NO
TOTAL SCORE: 0
DO YOU DRINK ALCOHOL: YES
HAVE PEOPLE ANNOYED YOU BY CRITICIZING YOUR DRINKING: NO
TOTAL SCORE: 0
HOW MANY TIMES IN THE PAST YEAR HAVE YOU HAD 5 OR MORE DRINKS IN A DAY: 10
EVER_SMOKED: YES

## 2020-03-02 ASSESSMENT — ENCOUNTER SYMPTOMS
DOUBLE VISION: 0
SHORTNESS OF BREATH: 0
SORE THROAT: 0
NAUSEA: 0
VOMITING: 0
ABDOMINAL PAIN: 0
DIARRHEA: 0
PALPITATIONS: 0
DIZZINESS: 0
COUGH: 0
LOSS OF CONSCIOUSNESS: 0
BLURRED VISION: 0
CHILLS: 0
BACK PAIN: 0
HEADACHES: 0
FEVER: 0

## 2020-03-02 ASSESSMENT — COGNITIVE AND FUNCTIONAL STATUS - GENERAL
SUGGESTED CMS G CODE MODIFIER MOBILITY: CH
DAILY ACTIVITIY SCORE: 24
SUGGESTED CMS G CODE MODIFIER DAILY ACTIVITY: CH
MOBILITY SCORE: 24

## 2020-03-02 ASSESSMENT — COPD QUESTIONNAIRES
COPD SCREENING SCORE: 3
DURING THE PAST 4 WEEKS HOW MUCH DID YOU FEEL SHORT OF BREATH: NONE/LITTLE OF THE TIME
IN THE PAST 12 MONTHS DO YOU DO LESS THAN YOU USED TO BECAUSE OF YOUR BREATHING PROBLEMS: DISAGREE/UNSURE
DO YOU EVER COUGH UP ANY MUCUS OR PHLEGM?: NO/ONLY WITH OCCASIONAL COLDS OR INFECTIONS
HAVE YOU SMOKED AT LEAST 100 CIGARETTES IN YOUR ENTIRE LIFE: YES

## 2020-03-02 ASSESSMENT — FIBROSIS 4 INDEX: FIB4 SCORE: 1.23

## 2020-03-02 ASSESSMENT — PATIENT HEALTH QUESTIONNAIRE - PHQ9
2. FEELING DOWN, DEPRESSED, IRRITABLE, OR HOPELESS: NOT AT ALL
1. LITTLE INTEREST OR PLEASURE IN DOING THINGS: NOT AT ALL
SUM OF ALL RESPONSES TO PHQ9 QUESTIONS 1 AND 2: 0

## 2020-03-02 NOTE — ED TRIAGE NOTES
.  Chief Complaint   Patient presents with   • Leg Swelling   • Leg Pain   Agree with triage assessment.  Onset last Friday evening.  No chest pain.  No LLE distal numbness/tingling.  Pt also reports increased LLE pain when weight bearing.

## 2020-03-02 NOTE — H&P
Hospital Medicine History & Physical Note    Date of Service  3/2/2020    Primary Care Physician  Edwina Chin D.O.    Consultants      Code Status  Full    Chief Complaint  Left leg pain and swelling    History of Presenting Illness  59 y.o. male who has a previous medical history that includes coronary artery disease status post CABG, dyslipidemia and hypertension.  He continues to smoke although he states only rarely.  He presents today with pain and swelling in his left leg.  It started on Friday which was 3 days ago.  There is no trauma that he is aware of, he has not traveled anywhere recently.  He just noticed that his calf was sore particularly when he started to walk.  Since that time the pain is spread and now he has some pain all the way up into his groin, and its impact most tender there.  It is worse when he walks, better if he lays down to keep his leg up.  His leg is also now slightly swollen which it had not been previously.    Patient does not have a previous history of venous thromboembolism, nor does he have a family history.  There is no autoimmune disease in the family that he is aware of either.    Review of Systems  Review of Systems   Constitutional: Negative for chills and fever.   HENT: Negative for nosebleeds and sore throat.    Eyes: Negative for blurred vision and double vision.   Respiratory: Negative for cough and shortness of breath.    Cardiovascular: Positive for leg swelling. Negative for chest pain and palpitations.   Gastrointestinal: Negative for abdominal pain, diarrhea, nausea and vomiting.   Genitourinary: Negative for dysuria and urgency.   Musculoskeletal: Negative for back pain.   Skin: Negative for rash.   Neurological: Negative for dizziness, loss of consciousness and headaches.       Past Medical History   has a past medical history of Breath shortness (10/2017), CAD (coronary artery disease), Dental disorder (10/2017), Grief reaction (2017), hernia repair,  Hyperlipidemia, Hypertension, Psychiatric problem (10/2017), and Tobacco use.    Surgical History   has a past surgical history that includes inguinal hernia repair (Bilateral, 2000); tibia orif (Right, 1980s); multiple coronary artery bypass endo vein harvest (6/13/2017); yarely (6/13/2017); and [facility] cardiac cath.     Family History  family history includes Cancer in his father and mother; Heart Disease (age of onset: 89) in his maternal grandfather; No Known Problems in his sister and sister.     Social History   reports that he quit smoking about 3 years ago. His smoking use included cigarettes. He has a 42.00 pack-year smoking history. He has never used smokeless tobacco. He reports current alcohol use. He reports that he does not use drugs.    Allergies  No Known Allergies    Medications  Prior to Admission Medications   Prescriptions Last Dose Informant Patient Reported? Taking?   amLODIPine (NORVASC) 10 MG Tab 3/2/2020 at 0800 Patient No No   Sig: Take 0.5 Tabs by mouth 2 Times a Day.   aspirin EC (ECOTRIN) 81 MG Tablet Delayed Response 3/2/2020 at 0800 Patient No No   Sig: Take 1 Tab by mouth every day.   atorvastatin (LIPITOR) 80 MG tablet 3/1/2020 at 2200 Patient No No   Sig: TAKE ONE TABLET BY MOUTH AT BEDTIME   hydrALAZINE (APRESOLINE) 50 MG Tab 2/29/2020 at 2200 Patient Yes Yes   Sig: Take  mg by mouth 2 Times a Day. 50 mg in AM  100 mg at BEDTIME   losartan (COZAAR) 100 MG Tab 3/1/2020 at 2230 Patient No No   Sig: Take 1 Tab by mouth every evening. For further refills please contact new cardiologist. Thank you   metoprolol SR (TOPROL XL) 100 MG TABLET SR 24 HR 3/1/2020 at 2230 Patient No No   Sig: Take 1 Tab by mouth every bedtime.   multivitamin (THERAGRAN) Tab 3/2/2020 at 0800 Patient Yes No   Sig: Take 1 Tab by mouth every day.   nitroglycerin (NITROSTAT) 0.4 MG SL Tab > 1 month at 0800 Patient No No   Sig: Place 1 Tab under tongue as needed for Chest Pain.      Facility-Administered Medications:  None       Physical Exam  Temp:  [36.1 °C (97 °F)] 36.1 °C (97 °F)  Pulse:  [] 88  Resp:  [14] 14  BP: (143-157)/(91-98) 143/91  SpO2:  [95 %-97 %] 95 %    Physical Exam  Vitals signs reviewed.   Constitutional:       General: He is not in acute distress.     Appearance: He is well-developed. He is not diaphoretic.   HENT:      Head: Normocephalic and atraumatic.   Eyes:      Conjunctiva/sclera: Conjunctivae normal.   Neck:      Vascular: No JVD.   Cardiovascular:      Rate and Rhythm: Normal rate.      Heart sounds: No murmur. No gallop.    Pulmonary:      Effort: Pulmonary effort is normal. No respiratory distress.      Breath sounds: No stridor. No wheezing or rales.   Abdominal:      Palpations: Abdomen is soft.      Tenderness: There is no abdominal tenderness. There is no guarding or rebound.   Musculoskeletal:         General: Tenderness present.      Right lower leg: No edema.      Left lower leg: Edema present.      Comments: Left leg has trace edema which extends from the ankle all the way up into the groin.  There is tenderness palpation per the proximal medial thigh.   Skin:     General: Skin is warm and dry.      Findings: No rash.   Neurological:      Mental Status: He is oriented to person, place, and time.   Psychiatric:         Thought Content: Thought content normal.         Laboratory:  Recent Labs     03/02/20  1215   WBC 8.7   RBC 5.16   HEMOGLOBIN 17.0   HEMATOCRIT 50.7   MCV 98.3*   MCH 32.9   MCHC 33.5*   RDW 53.8*   PLATELETCT 186   MPV 10.5     Recent Labs     03/02/20  1215   SODIUM 138   POTASSIUM 3.7   CHLORIDE 105   CO2 20   GLUCOSE 116*   BUN 17   CREATININE 1.42*   CALCIUM 8.7     Recent Labs     03/02/20  1215   ALTSGPT 8   ASTSGOT 11*   ALKPHOSPHAT 99   TBILIRUBIN 1.5   GLUCOSE 116*     Recent Labs     03/02/20  1215   APTT 31.1   INR 0.99     Recent Labs     03/02/20  1215   NTPROBNP 199*         Recent Labs     03/02/20  1215   TROPONINT 10       Urinalysis:    No results  found     Imaging:  CT-CTA CHEST PULMONARY ARTERY W/ RECONS   Final Result      1.  No evidence of pulmonary embolism.      2.  Clear lung parenchyma.            US-EXTREMITY VENOUS LOWER UNILAT LEFT   Final Result      IR-CONSULT ONLY-INPATIENT    (Results Pending)         Assessment/Plan:  I anticipate this patient will require at least two midnights for appropriate medical management, necessitating inpatient admission.    Essential hypertension- (present on admission)  Assessment & Plan  Continue metoprolol and losartan  Monitor and titrate    Coronary artery disease involving coronary bypass graft of native heart without angina pectoris- (present on admission)  Assessment & Plan  Distant history of bypass  Continue aspirin, metoprolol, losartan, and statin.    Dyslipidemia- (present on admission)  Assessment & Plan  Continue statin    Tobacco abuse  Assessment & Plan  Counseled x 5 mins in particular with regards to VTE risk    Acute deep vein thrombosis (DVT) of iliac vein of left lower extremity (HCC)  Assessment & Plan  The patient has what would appear to be an unprovoked DVT of the left leg.  It extends all the way into the external iliac femoral veins as well as the popliteal vein.  Dr. Sandy Murray of vascular surgery has been consulted tonight, she is recommended IR thrombectomy.  I spoken tonight with interventional radiology who will take the patient to the IR suite tomorrow.  In the meanwhile we will will admit to a telemetry bed and started on IV heparin.  CT of the chest is negative for PE.  The ultrasound was unable to visualize the common iliac however the inferior vena cava is patent.  I counseled the patient on smoking cessation.  No personal or family history of VTE.  We will need a hypercoagulable work-up once off thrombolytics    MAXIMO (acute kidney injury) (HCC)  Assessment & Plan  The patient appears to be mildly dehydrated.  Review of previous labs indicates normal renal  function.  Continue IV fluids  Repeat BMP in a.m.  Renally dose medications as appropriate    Prediabetes- (present on admission)  Assessment & Plan  Follow blood sugars      VTE prophylaxis: Heparin

## 2020-03-02 NOTE — ED NOTES
Med rec updated and complete. Allergies reviewed. Met with pt at bedside. Dicussed current medications. Pt denies antibiotic use in last 14 days.      Home pharmacy  [facility]

## 2020-03-02 NOTE — ED TRIAGE NOTES
Pt c/o left leg swelling starting and thigh/groin area going down left leg since Friday. Pt c/o pain down left leg. Denies hx of blood clot, does smoke cigarettes occassionally.

## 2020-03-02 NOTE — ED PROVIDER NOTES
"ED Provider Note    CHIEF COMPLAINT  Chief Complaint   Patient presents with   • Leg Swelling   • Leg Pain       HPI  Jose A Ponce is a 59 y.o. male who presents with left leg pain and swelling.  Pain located medial left thigh.  Throbbing nonradiating constant.  Gradually getting worse since Friday.  No trauma.  Denies chest pain.  Possibly some slight shortness of breath.  No pleuritic pain.  No hemoptysis.  No trauma immobilization surgeries.  No known cancers.    REVIEW OF SYSTEMS  As per HPI, otherwise a 10 point review of systems is negative    PAST MEDICAL HISTORY  Past Medical History:   Diagnosis Date   • Breath shortness 10/2017    \"In the past, not an issue now\" sob before heart surgery   • CAD (coronary artery disease)     S/P CABG X2   • Dental disorder 10/2017    Upper dentures   • Grief reaction 2017    Girlfriend  this year.   • Hx of hernia repair     Mesh   • Hyperlipidemia    • Hypertension    • Psychiatric problem 10/2017    Depression   • Tobacco use        SOCIAL HISTORY  Social History     Tobacco Use   • Smoking status: Former Smoker     Packs/day: 1.00     Years: 42.00     Pack years: 42.00     Types: Cigarettes     Last attempt to quit: 2016     Years since quitting: 3.7   • Smokeless tobacco: Never Used   • Tobacco comment: vape occ   Substance Use Topics   • Alcohol use: Yes     Alcohol/week: 0.0 oz     Comment: 2 per day   • Drug use: No       SURGICAL HISTORY  Past Surgical History:   Procedure Laterality Date   • MULTIPLE CORONARY ARTERY BYPASS ENDO VEIN HARVEST  2017    Procedure: MULTIPLE CORONARY ARTERY BYPASS x2 RIGHT LEG ENDO VEIN HARVEST;  Surgeon: Gray Barboza M.D.;  Location: SURGERY [facility];  Service:    • HUBERT  2017    Procedure: HUBERT - INTRAOP;  Surgeon: Gary Barboza M.D.;  Location: SURGERY [facility];  Service:    • INGUINAL HERNIA REPAIR Bilateral    • TIBIA ORIF Right    • ZZZ CARDIAC CATH         CURRENT " "MEDICATIONS  Home Medications    **Home medications have not yet been reviewed for this encounter**         ALLERGIES  No Known Allergies    PHYSICAL EXAM  VITAL SIGNS: /91   Pulse 88   Temp 36.1 °C (97 °F) (Temporal)   Resp 14   Ht 1.88 m (6' 2\")   Wt 85.3 kg (188 lb 0.8 oz)   SpO2 95%   BMI 24.14 kg/m²    Constitutional: Awake and alert.  Mildly anxious appearing  HENT:  Atraumatic, Normocephalic  Eyes: Normal inspection  Neck: Supple  Cardiovascular: Mildly elevated heart rate, Normal rhythm.  Symmetric peripheral pulses.   Thorax & Lungs: No respiratory distress, No wheezing, No rales, No rhonchi, No chest tenderness.   Abdomen: Bowel sounds normal, soft, non-distended, nontender, no mass  Skin: Warm, Dry, No rash.   Back: No tenderness, No CVA tenderness.   Extremities: There is slight cyanosis of the left lower extremity.  There is significant to the left lower extremity swelling.  There is tenderness in the right proximal medial thigh and posterior thigh.  Neurologic: Grossly normal   Psychiatric: Anxious appearing    RADIOLOGY/PROCEDURES  US-EXTREMITY VENOUS LOWER UNILAT LEFT   Final Result      CT-CTA CHEST PULMONARY ARTERY W/ RECONS    (Results Pending)        Imaging is interpreted by radiologist    Labs:  Results for orders placed or performed during the hospital encounter of 03/02/20   CBC WITH DIFFERENTIAL   Result Value Ref Range    WBC 8.7 4.8 - 10.8 K/uL    RBC 5.16 4.70 - 6.10 M/uL    Hemoglobin 17.0 14.0 - 18.0 g/dL    Hematocrit 50.7 42.0 - 52.0 %    MCV 98.3 (H) 81.4 - 97.8 fL    MCH 32.9 27.0 - 33.0 pg    MCHC 33.5 (L) 33.7 - 35.3 g/dL    RDW 53.8 (H) 35.9 - 50.0 fL    Platelet Count 186 164 - 446 K/uL    MPV 10.5 9.0 - 12.9 fL    Neutrophils-Polys 71.90 44.00 - 72.00 %    Lymphocytes 17.10 (L) 22.00 - 41.00 %    Monocytes 9.10 0.00 - 13.40 %    Eosinophils 0.60 0.00 - 6.90 %    Basophils 0.80 0.00 - 1.80 %    Immature Granulocytes 0.50 0.00 - 0.90 %    Nucleated RBC 0.00 /100 WBC "    Neutrophils (Absolute) 6.25 1.82 - 7.42 K/uL    Lymphs (Absolute) 1.49 1.00 - 4.80 K/uL    Monos (Absolute) 0.79 0.00 - 0.85 K/uL    Eos (Absolute) 0.05 0.00 - 0.51 K/uL    Baso (Absolute) 0.07 0.00 - 0.12 K/uL    Immature Granulocytes (abs) 0.04 0.00 - 0.11 K/uL    NRBC (Absolute) 0.00 K/uL   COMP METABOLIC PANEL   Result Value Ref Range    Sodium 138 135 - 145 mmol/L    Potassium 3.7 3.6 - 5.5 mmol/L    Chloride 105 96 - 112 mmol/L    Co2 20 20 - 33 mmol/L    Anion Gap 13.0 (H) 0.0 - 11.9    Glucose 116 (H) 65 - 99 mg/dL    Bun 17 8 - 22 mg/dL    Creatinine 1.42 (H) 0.50 - 1.40 mg/dL    Calcium 8.7 8.5 - 10.5 mg/dL    AST(SGOT) 11 (L) 12 - 45 U/L    ALT(SGPT) 8 2 - 50 U/L    Alkaline Phosphatase 99 30 - 99 U/L    Total Bilirubin 1.5 0.1 - 1.5 mg/dL    Albumin 3.8 3.2 - 4.9 g/dL    Total Protein 7.1 6.0 - 8.2 g/dL    Globulin 3.3 1.9 - 3.5 g/dL    A-G Ratio 1.2 g/dL   TROPONIN   Result Value Ref Range    Troponin T 10 6 - 19 ng/L   proBrain Natriuretic Peptide, NT   Result Value Ref Range    NT-proBNP 199 (H) 0 - 125 pg/mL   ESTIMATED GFR   Result Value Ref Range    GFR If African American >60 >60 mL/min/1.73 m 2    GFR If Non  51 (A) >60 mL/min/1.73 m 2   Prothrombin Time   Result Value Ref Range    PT 13.3 12.0 - 14.6 sec    INR 0.99 0.87 - 1.13   PTT   Result Value Ref Range    APTT 31.1 24.7 - 36.0 sec   EKG   Result Value Ref Range    Report       [facility] Emergency Dept.    Test Date:  2020  Pt Name:    CESAR MSATERS                Department: ER  MRN:        [id number]                      Room:       [facility]  Gender:     Male                         Technician: [id number]  :        1960                   Requested By:ER TRIAGE PROTOCOL  Order #:    [id number]                    Reading MD: Smith Araiza    Measurements  Intervals                                Axis  Rate:       98                           P:          20  HI:         132                           QRS:        -13  QRSD:       100                          T:          28  QT:         372  QTc:        476    Interpretive Statements  SINUS RHYTHM  PROBABLE LEFT ATRIAL ABNORMALITY  RSR' IN V1 OR V2, RIGHT VCD OR RVH  NONSPECIFIC REPOL ABNORMALITY, LATERAL LEADS  Compared to ECG 07/18/2017 05:50:21  Right ventricular hypertrophy now present  RSR' in V1 or V2 now present  Early repolarization now present  T-wave abnormality no longer present         Medications   NS infusion ( Intravenous New Bag 3/2/20 1321)   heparin injection 6,000 Units (6,000 Units Intravenous Given 3/2/20 1320)     And   heparin injection 3,200 Units (has no administration in time range)     And   heparin infusion 25,000 units in 500 mL 0.45% NACL (1,200 Units/hr Intravenous New Bag.(Insulin or Heparin) 3/2/20 1321)         COURSE & MEDICAL DECISION MAKING  Patient presents to the ER with significant left lower extremity swelling.  He has some mild cyanosis, but strong peripheral pulses.  Highly strong suspicion for left lower extremity DVT.  Vascular ultrasound ordered.  He was mildly tachycardic and an EKG was obtained showing T wave inversions in the anterior precordial leads as well as an RSR prime with tachycardia.  A CTA of the pulmonary arteries was ordered to rule out PE.  Laboratory data was collected.    Patient was identified to have a large left lower extremity DVT.  I ordered heparin.    CTA chest was negative.  Patient does have some mild renal sufficiency and was given IV fluids for renal protection.  Oral fluids were not rapid enough.  Stable on recheck.    Consulted Dr. Balderrama for admission.    Consulted with Dr. Murray.  She advised IR evaluation.      FINAL IMPRESSION  1.  Deep vein thrombosis with phlegmasia, left lower extremity      This dictation was created using voice recognition software. The accuracy of the dictation is limited to the abilities of the software.  The nursing notes were reviewed and certain aspects  of this information were incorporated into this note.      Electronically signed by: Smith Araiza M.D., 3/2/2020 1:50 PM

## 2020-03-02 NOTE — ASSESSMENT & PLAN NOTE
Has extensive DVT  Appreciate vascular and IR consultation  EKOS I had to be stopped due to bleeding, catheter and sheath now removed  I discussed with Dr. Stockton of IR, the patient has narrowing of his femoral vein that is likely the cause of the clot, lifelong anticoagulation recommended  Continue heparin drip  Rate of heparin drip will be adjusted based on serial pTT measurements to ensure adequate anticoagulation and to avoid potential complications of bleeding  Likely to start DOAC tomorrow if no further signs of bleeding

## 2020-03-03 ENCOUNTER — APPOINTMENT (OUTPATIENT)
Dept: RADIOLOGY | Facility: MEDICAL CENTER | Age: 60
DRG: 300 | End: 2020-03-03
Attending: HOSPITALIST
Payer: COMMERCIAL

## 2020-03-03 LAB
ABO GROUP BLD: NORMAL
ANION GAP SERPL CALC-SCNC: 13 MMOL/L (ref 0–11.9)
APTT PPP: 41.3 SEC (ref 24.7–36)
APTT PPP: 70.6 SEC (ref 24.7–36)
BLD GP AB SCN SERPL QL: NORMAL
BUN SERPL-MCNC: 18 MG/DL (ref 8–22)
CALCIUM SERPL-MCNC: 8.7 MG/DL (ref 8.5–10.5)
CHLORIDE SERPL-SCNC: 106 MMOL/L (ref 96–112)
CO2 SERPL-SCNC: 19 MMOL/L (ref 20–33)
CREAT SERPL-MCNC: 2.18 MG/DL (ref 0.5–1.4)
ERYTHROCYTE [DISTWIDTH] IN BLOOD BY AUTOMATED COUNT: 54.4 FL (ref 35.9–50)
ERYTHROCYTE [DISTWIDTH] IN BLOOD BY AUTOMATED COUNT: 54.8 FL (ref 35.9–50)
FIBRINOGEN PPP-MCNC: 347 MG/DL (ref 215–460)
GLUCOSE SERPL-MCNC: 94 MG/DL (ref 65–99)
HCT VFR BLD AUTO: 44 % (ref 42–52)
HCT VFR BLD AUTO: 49.7 % (ref 42–52)
HGB BLD-MCNC: 15.2 G/DL (ref 14–18)
HGB BLD-MCNC: 16.8 G/DL (ref 14–18)
MCH RBC QN AUTO: 33.5 PG (ref 27–33)
MCH RBC QN AUTO: 34 PG (ref 27–33)
MCHC RBC AUTO-ENTMCNC: 33.8 G/DL (ref 33.7–35.3)
MCHC RBC AUTO-ENTMCNC: 34.5 G/DL (ref 33.7–35.3)
MCV RBC AUTO: 98.4 FL (ref 81.4–97.8)
MCV RBC AUTO: 99 FL (ref 81.4–97.8)
PLATELET # BLD AUTO: 140 K/UL (ref 164–446)
PLATELET # BLD AUTO: 157 K/UL (ref 164–446)
PMV BLD AUTO: 10.9 FL (ref 9–12.9)
PMV BLD AUTO: 11.1 FL (ref 9–12.9)
POTASSIUM SERPL-SCNC: 3.8 MMOL/L (ref 3.6–5.5)
RBC # BLD AUTO: 4.47 M/UL (ref 4.7–6.1)
RBC # BLD AUTO: 5.02 M/UL (ref 4.7–6.1)
RH BLD: NORMAL
SODIUM SERPL-SCNC: 138 MMOL/L (ref 135–145)
WBC # BLD AUTO: 6.7 K/UL (ref 4.8–10.8)
WBC # BLD AUTO: 7.5 K/UL (ref 4.8–10.8)

## 2020-03-03 PROCEDURE — 700102 HCHG RX REV CODE 250 W/ 637 OVERRIDE(OP): Performed by: HOSPITALIST

## 2020-03-03 PROCEDURE — 700117 HCHG RX CONTRAST REV CODE 255: Performed by: HOSPITALIST

## 2020-03-03 PROCEDURE — 86900 BLOOD TYPING SEROLOGIC ABO: CPT

## 2020-03-03 PROCEDURE — 85245 CLOT FACTOR VIII VW RISTOCTN: CPT

## 2020-03-03 PROCEDURE — 85384 FIBRINOGEN ACTIVITY: CPT

## 2020-03-03 PROCEDURE — 86901 BLOOD TYPING SEROLOGIC RH(D): CPT

## 2020-03-03 PROCEDURE — 3E03317 INTRODUCTION OF OTHER THROMBOLYTIC INTO PERIPHERAL VEIN, PERCUTANEOUS APPROACH: ICD-10-PCS | Performed by: RADIOLOGY

## 2020-03-03 PROCEDURE — 99153 MOD SED SAME PHYS/QHP EA: CPT

## 2020-03-03 PROCEDURE — 86850 RBC ANTIBODY SCREEN: CPT

## 2020-03-03 PROCEDURE — 700111 HCHG RX REV CODE 636 W/ 250 OVERRIDE (IP): Performed by: RADIOLOGY

## 2020-03-03 PROCEDURE — 770022 HCHG ROOM/CARE - ICU (200)

## 2020-03-03 PROCEDURE — 700105 HCHG RX REV CODE 258: Performed by: RADIOLOGY

## 2020-03-03 PROCEDURE — A9270 NON-COVERED ITEM OR SERVICE: HCPCS | Performed by: HOSPITALIST

## 2020-03-03 PROCEDURE — 85730 THROMBOPLASTIN TIME PARTIAL: CPT

## 2020-03-03 PROCEDURE — 85246 CLOT FACTOR VIII VW ANTIGEN: CPT

## 2020-03-03 PROCEDURE — 700111 HCHG RX REV CODE 636 W/ 250 OVERRIDE (IP): Performed by: HOSPITALIST

## 2020-03-03 PROCEDURE — 80048 BASIC METABOLIC PNL TOTAL CA: CPT

## 2020-03-03 PROCEDURE — 700105 HCHG RX REV CODE 258: Performed by: HOSPITALIST

## 2020-03-03 PROCEDURE — 700111 HCHG RX REV CODE 636 W/ 250 OVERRIDE (IP)

## 2020-03-03 PROCEDURE — B5191ZZ FLUOROSCOPY OF INFERIOR VENA CAVA USING LOW OSMOLAR CONTRAST: ICD-10-PCS | Performed by: RADIOLOGY

## 2020-03-03 PROCEDURE — 99233 SBSQ HOSP IP/OBS HIGH 50: CPT | Performed by: HOSPITALIST

## 2020-03-03 PROCEDURE — 85240 CLOT FACTOR VIII AHG 1 STAGE: CPT

## 2020-03-03 PROCEDURE — 85027 COMPLETE CBC AUTOMATED: CPT

## 2020-03-03 RX ORDER — MIDAZOLAM HYDROCHLORIDE 1 MG/ML
.5-2 INJECTION INTRAMUSCULAR; INTRAVENOUS PRN
Status: DISCONTINUED | OUTPATIENT
Start: 2020-03-03 | End: 2020-03-03 | Stop reason: HOSPADM

## 2020-03-03 RX ORDER — HEPARIN SODIUM 5000 [USP'U]/100ML
INJECTION, SOLUTION INTRAVENOUS CONTINUOUS
Status: DISCONTINUED | OUTPATIENT
Start: 2020-03-04 | End: 2020-03-03

## 2020-03-03 RX ORDER — SODIUM CHLORIDE 9 MG/ML
500 INJECTION, SOLUTION INTRAVENOUS
Status: DISCONTINUED | OUTPATIENT
Start: 2020-03-03 | End: 2020-03-03 | Stop reason: HOSPADM

## 2020-03-03 RX ORDER — ONDANSETRON 2 MG/ML
4 INJECTION INTRAMUSCULAR; INTRAVENOUS PRN
Status: DISCONTINUED | OUTPATIENT
Start: 2020-03-03 | End: 2020-03-03 | Stop reason: HOSPADM

## 2020-03-03 RX ORDER — SODIUM CHLORIDE 9 MG/ML
INJECTION, SOLUTION INTRAVENOUS CONTINUOUS
Status: DISCONTINUED | OUTPATIENT
Start: 2020-03-03 | End: 2020-03-05

## 2020-03-03 RX ORDER — HEPARIN SODIUM 1000 [USP'U]/ML
3200 INJECTION, SOLUTION INTRAVENOUS; SUBCUTANEOUS PRN
Status: DISCONTINUED | OUTPATIENT
Start: 2020-03-04 | End: 2020-03-03

## 2020-03-03 RX ORDER — TRAZODONE HYDROCHLORIDE 50 MG/1
50 TABLET ORAL NIGHTLY PRN
Status: DISCONTINUED | OUTPATIENT
Start: 2020-03-03 | End: 2020-03-06 | Stop reason: HOSPADM

## 2020-03-03 RX ORDER — SODIUM CHLORIDE, SODIUM LACTATE, POTASSIUM CHLORIDE, CALCIUM CHLORIDE 600; 310; 30; 20 MG/100ML; MG/100ML; MG/100ML; MG/100ML
INJECTION, SOLUTION INTRAVENOUS CONTINUOUS
Status: DISCONTINUED | OUTPATIENT
Start: 2020-03-03 | End: 2020-03-05

## 2020-03-03 RX ORDER — HEPARIN SODIUM,PORCINE 1000/ML
VIAL (ML) INJECTION
Status: COMPLETED
Start: 2020-03-03 | End: 2020-03-03

## 2020-03-03 RX ORDER — MIDAZOLAM HYDROCHLORIDE 1 MG/ML
INJECTION INTRAMUSCULAR; INTRAVENOUS
Status: COMPLETED
Start: 2020-03-03 | End: 2020-03-03

## 2020-03-03 RX ORDER — HEPARIN SODIUM 5000 [USP'U]/100ML
500 INJECTION, SOLUTION INTRAVENOUS CONTINUOUS
Status: DISCONTINUED | OUTPATIENT
Start: 2020-03-03 | End: 2020-03-05

## 2020-03-03 RX ADMIN — IOHEXOL 25 ML: 300 INJECTION, SOLUTION INTRAVENOUS at 15:00

## 2020-03-03 RX ADMIN — MIDAZOLAM HYDROCHLORIDE 1 MG: 1 INJECTION, SOLUTION INTRAMUSCULAR; INTRAVENOUS at 14:25

## 2020-03-03 RX ADMIN — TRAZODONE HYDROCHLORIDE 50 MG: 50 TABLET ORAL at 20:58

## 2020-03-03 RX ADMIN — ATORVASTATIN CALCIUM 80 MG: 80 TABLET, FILM COATED ORAL at 17:43

## 2020-03-03 RX ADMIN — MIDAZOLAM HYDROCHLORIDE 1 MG: 1 INJECTION, SOLUTION INTRAMUSCULAR; INTRAVENOUS at 14:33

## 2020-03-03 RX ADMIN — SODIUM CHLORIDE 35 ML: 9 INJECTION, SOLUTION INTRAVENOUS at 15:12

## 2020-03-03 RX ADMIN — HYDRALAZINE HYDROCHLORIDE 50 MG: 50 TABLET, FILM COATED ORAL at 05:39

## 2020-03-03 RX ADMIN — ASPIRIN 81 MG: 81 TABLET, COATED ORAL at 05:39

## 2020-03-03 RX ADMIN — HEPARIN SODIUM 1200 UNITS/HR: 5000 INJECTION, SOLUTION INTRAVENOUS at 09:52

## 2020-03-03 RX ADMIN — LOSARTAN POTASSIUM 100 MG: 50 TABLET, FILM COATED ORAL at 17:43

## 2020-03-03 RX ADMIN — METOPROLOL SUCCINATE 100 MG: 50 TABLET, EXTENDED RELEASE ORAL at 20:50

## 2020-03-03 RX ADMIN — HEPARIN SODIUM: 1000 INJECTION, SOLUTION INTRAVENOUS; SUBCUTANEOUS at 14:45

## 2020-03-03 RX ADMIN — ALTEPLASE 0.5 MG/HR: KIT at 15:08

## 2020-03-03 RX ADMIN — HYDRALAZINE HYDROCHLORIDE 100 MG: 50 TABLET, FILM COATED ORAL at 20:50

## 2020-03-03 RX ADMIN — AMLODIPINE BESYLATE 5 MG: 10 TABLET ORAL at 17:44

## 2020-03-03 RX ADMIN — SODIUM CHLORIDE, POTASSIUM CHLORIDE, SODIUM LACTATE AND CALCIUM CHLORIDE: 600; 310; 30; 20 INJECTION, SOLUTION INTRAVENOUS at 15:57

## 2020-03-03 RX ADMIN — SODIUM CHLORIDE, POTASSIUM CHLORIDE, SODIUM LACTATE AND CALCIUM CHLORIDE: 600; 310; 30; 20 INJECTION, SOLUTION INTRAVENOUS at 11:35

## 2020-03-03 RX ADMIN — HEPARIN SODIUM 500 UNITS/HR: 5000 INJECTION, SOLUTION INTRAVENOUS at 15:12

## 2020-03-03 RX ADMIN — AMLODIPINE BESYLATE 5 MG: 10 TABLET ORAL at 05:39

## 2020-03-03 RX ADMIN — FENTANYL CITRATE 50 MCG: 0.05 INJECTION, SOLUTION INTRAMUSCULAR; INTRAVENOUS at 14:25

## 2020-03-03 ASSESSMENT — ENCOUNTER SYMPTOMS
NEUROLOGICAL NEGATIVE: 1
MYALGIAS: 1
EYES NEGATIVE: 1
GASTROINTESTINAL NEGATIVE: 1
CONSTITUTIONAL NEGATIVE: 1
PALPITATIONS: 0
RESPIRATORY NEGATIVE: 1
PSYCHIATRIC NEGATIVE: 1

## 2020-03-03 ASSESSMENT — FIBROSIS 4 INDEX: FIB4 SCORE: 1.46

## 2020-03-03 NOTE — OR SURGEON
Immediate Post- Operative Note        PostOp Diagnosis: Left DVT    Procedure(s): EKOS thrombolysis      Estimated Blood Loss: Less than 5 ml        Complications: None            3/3/2020     2:52 PM     Mason Stockton M.D.

## 2020-03-03 NOTE — PROGRESS NOTES
Assumed care at 0700. Bedside report received from Jose. Patient's chart and MAR reviewed. Pt denies pain at this time. Pt is A & O 4. Patient was updated on plan of care for the day. Questions answered and concerns addressed.  Pt denies any additional needs at this time. White board updated. Call light, phone and personal belongings within reach.

## 2020-03-03 NOTE — PROGRESS NOTES
IR Nursing Note:    Patient underwent a Left Lower Extremity Venogram with Possible Intervention by Dr. Stockton. Consent obtained by MD prior to procedure starting and verified by patient and procedure team. Pedal pulses prior to case Right 2+ and Left 2+. Patient was placed in a prone position with all bony prominences padded and draped in sterile fashion. Vitals were taken every 5 minutes and remained stable during procedure (see doc flow sheet for results). CO2 waveform capnography was monitored and remained 22-29 throughout procedure. Patient awake, alert and oriented post procedure.    Left posterior calf (popiteal) site sutured in place with steri strips, gauze and a tegaderm dressing was placed over surgical site. Report called to Alexa MEYER. Pt transported via ICU  RN and monitor to T608 in a stable condition.       Post Procedure Bilateral Pedal Pulses 2+

## 2020-03-03 NOTE — PROGRESS NOTES
[facility] Medicine Daily Progress Note    Date of Service  3/3/2020    Chief Complaint  59 y.o. male admitted 3/2/2020 with left lower extremity pain, had a possible trauma possibly about a month ago where the patient slipped on ice, found with extensive left lower extremity DVT with proximal extension, occlusive.  CTA negative for pulmonary embolism, no other obvious cause for DVT    Hospital Course    Patient admitted with extensive left lower extremity DVT      Interval Problem Update  Patient seen and examined today.    Patient tolerating treatment and therapies.  All Data, Medication data reviewed.  Case discussed with nursing as available.  Plan of Care reviewed with patient and notified of changes.  3/3 the patient feels better, his pain has improved, swelling has improved in the left lower extremity, the patient is n.p.o. for IR thrombectomy, informed from the IR suite that the patient will undergo EK OS and to be moved to ICU for treatment.  Continues on heparin drip, the patient denies prior history of VTE or related difficulties.  Renal function has worsened since admission possibly secondary to contrast, IV fluids continuing    Consultants/Specialty  IR    Code Status  Full code    Disposition  TBD    Review of Systems  Review of Systems   Constitutional: Negative.    HENT: Negative.    Eyes: Negative.    Respiratory: Negative.    Cardiovascular: Positive for leg swelling. Negative for chest pain and palpitations.   Gastrointestinal: Negative.    Genitourinary: Negative.    Musculoskeletal: Positive for myalgias.   Skin: Negative.    Neurological: Negative.    Endo/Heme/Allergies: Negative.    Psychiatric/Behavioral: Negative.    All other systems reviewed and are negative.       Physical exam    Physical Exam  Vitals signs and nursing note reviewed.   Constitutional:       Appearance: He is well-developed.   HENT:      Head: Normocephalic.   Eyes:      Pupils: Pupils are equal, round, and reactive to  light.   Neck:      Musculoskeletal: Normal range of motion.   Cardiovascular:      Rate and Rhythm: Normal rate and regular rhythm.      Heart sounds: Normal heart sounds.   Pulmonary:      Effort: Pulmonary effort is normal.   Abdominal:      General: Bowel sounds are normal.      Palpations: Abdomen is soft.   Genitourinary:     Penis: Normal.       Rectum: Normal.   Musculoskeletal: Normal range of motion.         General: Swelling and tenderness present.      Left lower leg: Edema present.   Skin:     General: Skin is warm.   Neurological:      Mental Status: He is alert and oriented to person, place, and time.         Fluids    Intake/Output Summary (Last 24 hours) at 3/3/2020 1531  Last data filed at 3/3/2020 1135  Gross per 24 hour   Intake --   Output 1950 ml   Net -1950 ml       Laboratory  Recent Labs     03/02/20  1215 03/03/20  0212   WBC 8.7 7.5   RBC 5.16 5.02   HEMOGLOBIN 17.0 16.8   HEMATOCRIT 50.7 49.7   MCV 98.3* 99.0*   MCH 32.9 33.5*   MCHC 33.5* 33.8   RDW 53.8* 54.8*   PLATELETCT 186 157*   MPV 10.5 10.9     Recent Labs     03/02/20  1215 03/03/20  0212   SODIUM 138 138   POTASSIUM 3.7 3.8   CHLORIDE 105 106   CO2 20 19*   GLUCOSE 116* 94   BUN 17 18   CREATININE 1.42* 2.18*   CALCIUM 8.7 8.7     Recent Labs     03/02/20  1215 03/02/20  1944 03/03/20  0212   APTT 31.1 68.9* 70.6*   INR 0.99  --   --                Imaging  CT-CTA CHEST PULMONARY ARTERY W/ RECONS   Final Result      1.  No evidence of pulmonary embolism.      2.  Clear lung parenchyma.            US-EXTREMITY VENOUS LOWER UNILAT LEFT   Final Result      IR-THROMBO VEIN MECHANICAL,INIT    (Results Pending)        Assessment/Plan  Essential hypertension- (present on admission)  Assessment & Plan  Continue metoprolol and losartan  Monitor and titrate    Coronary artery disease involving coronary bypass graft of native heart without angina pectoris- (present on admission)  Assessment & Plan  Distant history of bypass  Continue  aspirin, metoprolol, losartan, and statin.    Dyslipidemia- (present on admission)  Assessment & Plan  Continue statin    Tobacco abuse  Assessment & Plan  Counseled x 5 mins in particular with regards to VTE risk    Acute deep vein thrombosis (DVT) of iliac vein of left lower extremity (HCC)  Assessment & Plan  The patient with somewhat minor injury approximately a month ago, now diagnosed with extensive left lower extremity DVT it extends all the way into the external iliac femoral veins as well as the popliteal vein.  Dr. Sandy Murray of vascular surgery has been consulted tonight, she is recommended IR thrombectomy.       MAXIMO (acute kidney injury) (HCC)  Assessment & Plan  The patient appears to be mildly dehydrated.  Review of previous labs indicates normal renal function.  Continue IV fluids  Repeat BMP in a.m.  Renally dose medications as appropriate    Prediabetes- (present on admission)  Assessment & Plan  Follow blood sugars     Plan  Continue heparin drip  IR suite recommended ICU transfer because of EK OS treatment  Close monitoring of intervention site in terms of bleeding  No evidence of further proximal clot/PE  See orders  Medically complex high risk  Avoid nephrotoxins, IV fluids, follow-up renal function  VTE prophylaxis: Heparin drip    I have performed a physical exam and reviewed and updated ROS and Plan today . In review of yesterday's note , there are no changes except as documented above.

## 2020-03-03 NOTE — PROGRESS NOTES
Notified of patient's arrival in [facility] post EKOS therapy for lower extremity DVT without PE.  Patient is hemodynamically stable on room air and in no distress.  Being monitored in ICU per protocol.  Critical care team will be on standby if needed.  I discussed the case with the hospitalist who will remain the patient's attending.

## 2020-03-04 ENCOUNTER — APPOINTMENT (OUTPATIENT)
Dept: RADIOLOGY | Facility: MEDICAL CENTER | Age: 60
DRG: 300 | End: 2020-03-04
Attending: RADIOLOGY
Payer: COMMERCIAL

## 2020-03-04 LAB
ALBUMIN SERPL BCP-MCNC: 3.2 G/DL (ref 3.2–4.9)
ALBUMIN/GLOB SERPL: 1.1 G/DL
ALP SERPL-CCNC: 64 U/L (ref 30–99)
ALT SERPL-CCNC: 5 U/L (ref 2–50)
ANION GAP SERPL CALC-SCNC: 11 MMOL/L (ref 0–11.9)
ANION GAP SERPL CALC-SCNC: 15 MMOL/L (ref 0–11.9)
APPEARANCE UR: CLEAR
APTT PPP: 42.1 SEC (ref 24.7–36)
APTT PPP: 44.1 SEC (ref 24.7–36)
APTT PPP: 55.2 SEC (ref 24.7–36)
AST SERPL-CCNC: 41 U/L (ref 12–45)
BACTERIA #/AREA URNS HPF: NEGATIVE /HPF
BASOPHILS # BLD AUTO: 0.5 % (ref 0–1.8)
BASOPHILS # BLD AUTO: 0.7 % (ref 0–1.8)
BASOPHILS # BLD AUTO: 0.7 % (ref 0–1.8)
BASOPHILS # BLD: 0.04 K/UL (ref 0–0.12)
BASOPHILS # BLD: 0.05 K/UL (ref 0–0.12)
BASOPHILS # BLD: 0.06 K/UL (ref 0–0.12)
BILIRUB SERPL-MCNC: 3.2 MG/DL (ref 0.1–1.5)
BILIRUB SERPL-MCNC: 3.2 MG/DL (ref 0.1–1.5)
BILIRUB UR QL STRIP.AUTO: ABNORMAL
BUN SERPL-MCNC: 15 MG/DL (ref 8–22)
BUN SERPL-MCNC: 16 MG/DL (ref 8–22)
CALCIUM SERPL-MCNC: 7.7 MG/DL (ref 8.5–10.5)
CALCIUM SERPL-MCNC: 8.3 MG/DL (ref 8.5–10.5)
CHLORIDE SERPL-SCNC: 104 MMOL/L (ref 96–112)
CHLORIDE SERPL-SCNC: 108 MMOL/L (ref 96–112)
CK SERPL-CCNC: 91 U/L (ref 0–154)
CO2 SERPL-SCNC: 17 MMOL/L (ref 20–33)
CO2 SERPL-SCNC: 19 MMOL/L (ref 20–33)
COLOR UR: ABNORMAL
CREAT SERPL-MCNC: 1.28 MG/DL (ref 0.5–1.4)
CREAT SERPL-MCNC: 1.58 MG/DL (ref 0.5–1.4)
EOSINOPHIL # BLD AUTO: 0.05 K/UL (ref 0–0.51)
EOSINOPHIL # BLD AUTO: 0.06 K/UL (ref 0–0.51)
EOSINOPHIL # BLD AUTO: 0.08 K/UL (ref 0–0.51)
EOSINOPHIL NFR BLD: 0.7 % (ref 0–6.9)
EOSINOPHIL NFR BLD: 0.8 % (ref 0–6.9)
EOSINOPHIL NFR BLD: 1 % (ref 0–6.9)
EPI CELLS #/AREA URNS HPF: NEGATIVE /HPF
ERYTHROCYTE [DISTWIDTH] IN BLOOD BY AUTOMATED COUNT: 54 FL (ref 35.9–50)
ERYTHROCYTE [DISTWIDTH] IN BLOOD BY AUTOMATED COUNT: 54.4 FL (ref 35.9–50)
ERYTHROCYTE [DISTWIDTH] IN BLOOD BY AUTOMATED COUNT: 54.4 FL (ref 35.9–50)
ERYTHROCYTE [DISTWIDTH] IN BLOOD BY AUTOMATED COUNT: 55 FL (ref 35.9–50)
ERYTHROCYTE [DISTWIDTH] IN BLOOD BY AUTOMATED COUNT: 55.1 FL (ref 35.9–50)
FIBRINOGEN PPP-MCNC: 133 MG/DL (ref 215–460)
FIBRINOGEN PPP-MCNC: 171 MG/DL (ref 215–460)
FIBRINOGEN PPP-MCNC: 196 MG/DL (ref 215–460)
GLOBULIN SER CALC-MCNC: 2.8 G/DL (ref 1.9–3.5)
GLUCOSE SERPL-MCNC: 81 MG/DL (ref 65–99)
GLUCOSE SERPL-MCNC: 93 MG/DL (ref 65–99)
GLUCOSE UR STRIP.AUTO-MCNC: NEGATIVE MG/DL
HCT VFR BLD AUTO: 42.4 % (ref 42–52)
HCT VFR BLD AUTO: 42.8 % (ref 42–52)
HCT VFR BLD AUTO: 43.3 % (ref 42–52)
HCT VFR BLD AUTO: 43.6 % (ref 42–52)
HCT VFR BLD AUTO: 44.8 % (ref 42–52)
HGB BLD-MCNC: 14.5 G/DL (ref 14–18)
HGB BLD-MCNC: 14.8 G/DL (ref 14–18)
HGB BLD-MCNC: 15 G/DL (ref 14–18)
HGB BLD-MCNC: 15.1 G/DL (ref 14–18)
HGB BLD-MCNC: 15.1 G/DL (ref 14–18)
HYALINE CASTS #/AREA URNS LPF: ABNORMAL /LPF
IMM GRANULOCYTES # BLD AUTO: 0.03 K/UL (ref 0–0.11)
IMM GRANULOCYTES # BLD AUTO: 0.03 K/UL (ref 0–0.11)
IMM GRANULOCYTES # BLD AUTO: 0.04 K/UL (ref 0–0.11)
IMM GRANULOCYTES NFR BLD AUTO: 0.4 % (ref 0–0.9)
IMM GRANULOCYTES NFR BLD AUTO: 0.4 % (ref 0–0.9)
IMM GRANULOCYTES NFR BLD AUTO: 0.5 % (ref 0–0.9)
INR PPP: 1.33 (ref 0.87–1.13)
KETONES UR STRIP.AUTO-MCNC: NEGATIVE MG/DL
LEUKOCYTE ESTERASE UR QL STRIP.AUTO: ABNORMAL
LYMPHOCYTES # BLD AUTO: 0.96 K/UL (ref 1–4.8)
LYMPHOCYTES # BLD AUTO: 1.09 K/UL (ref 1–4.8)
LYMPHOCYTES # BLD AUTO: 1.15 K/UL (ref 1–4.8)
LYMPHOCYTES NFR BLD: 12.7 % (ref 22–41)
LYMPHOCYTES NFR BLD: 14.2 % (ref 22–41)
LYMPHOCYTES NFR BLD: 14.7 % (ref 22–41)
MAGNESIUM SERPL-MCNC: 1.4 MG/DL (ref 1.5–2.5)
MCH RBC QN AUTO: 33.3 PG (ref 27–33)
MCH RBC QN AUTO: 33.6 PG (ref 27–33)
MCH RBC QN AUTO: 33.9 PG (ref 27–33)
MCH RBC QN AUTO: 34 PG (ref 27–33)
MCH RBC QN AUTO: 34 PG (ref 27–33)
MCHC RBC AUTO-ENTMCNC: 33.7 G/DL (ref 33.7–35.3)
MCHC RBC AUTO-ENTMCNC: 34.2 G/DL (ref 33.7–35.3)
MCHC RBC AUTO-ENTMCNC: 34.6 G/DL (ref 33.7–35.3)
MCV RBC AUTO: 97.9 FL (ref 81.4–97.8)
MCV RBC AUTO: 98.1 FL (ref 81.4–97.8)
MCV RBC AUTO: 98.2 FL (ref 81.4–97.8)
MCV RBC AUTO: 98.2 FL (ref 81.4–97.8)
MCV RBC AUTO: 98.7 FL (ref 81.4–97.8)
MICRO URNS: ABNORMAL
MONOCYTES # BLD AUTO: 0.64 K/UL (ref 0–0.85)
MONOCYTES # BLD AUTO: 0.66 K/UL (ref 0–0.85)
MONOCYTES # BLD AUTO: 0.85 K/UL (ref 0–0.85)
MONOCYTES NFR BLD AUTO: 10.5 % (ref 0–13.4)
MONOCYTES NFR BLD AUTO: 8.5 % (ref 0–13.4)
MONOCYTES NFR BLD AUTO: 8.9 % (ref 0–13.4)
NEUTROPHILS # BLD AUTO: 5.55 K/UL (ref 1.82–7.42)
NEUTROPHILS # BLD AUTO: 5.82 K/UL (ref 1.82–7.42)
NEUTROPHILS # BLD AUTO: 5.95 K/UL (ref 1.82–7.42)
NEUTROPHILS NFR BLD: 73.2 % (ref 44–72)
NEUTROPHILS NFR BLD: 74.7 % (ref 44–72)
NEUTROPHILS NFR BLD: 76.9 % (ref 44–72)
NITRITE UR QL STRIP.AUTO: POSITIVE
NRBC # BLD AUTO: 0 K/UL
NRBC BLD-RTO: 0 /100 WBC
NUCLEAR IGG SER QL IA: NORMAL
PH UR STRIP.AUTO: 7 [PH] (ref 5–8)
PHOSPHATE SERPL-MCNC: 3.8 MG/DL (ref 2.5–4.5)
PLATELET # BLD AUTO: 101 K/UL (ref 164–446)
PLATELET # BLD AUTO: 108 K/UL (ref 164–446)
PMV BLD AUTO: 10.3 FL (ref 9–12.9)
PMV BLD AUTO: 11 FL (ref 9–12.9)
POTASSIUM SERPL-SCNC: 3.3 MMOL/L (ref 3.6–5.5)
POTASSIUM SERPL-SCNC: 4.7 MMOL/L (ref 3.6–5.5)
PROT SERPL-MCNC: 6 G/DL (ref 6–8.2)
PROT UR QL STRIP: 100 MG/DL
PROTHROMBIN TIME: 16.8 SEC (ref 12–14.6)
RBC # BLD AUTO: 4.32 M/UL (ref 4.7–6.1)
RBC # BLD AUTO: 4.37 M/UL (ref 4.7–6.1)
RBC # BLD AUTO: 4.41 M/UL (ref 4.7–6.1)
RBC # BLD AUTO: 4.44 M/UL (ref 4.7–6.1)
RBC # BLD AUTO: 4.54 M/UL (ref 4.7–6.1)
RBC # URNS HPF: ABNORMAL /HPF
RBC UR QL AUTO: ABNORMAL
SODIUM SERPL-SCNC: 136 MMOL/L (ref 135–145)
SODIUM SERPL-SCNC: 138 MMOL/L (ref 135–145)
SP GR UR STRIP.AUTO: 1.01
UROBILINOGEN UR STRIP.AUTO-MCNC: 0.2 MG/DL
WBC # BLD AUTO: 7.4 K/UL (ref 4.8–10.8)
WBC # BLD AUTO: 7.6 K/UL (ref 4.8–10.8)
WBC # BLD AUTO: 7.7 K/UL (ref 4.8–10.8)
WBC # BLD AUTO: 8.1 K/UL (ref 4.8–10.8)
WBC # BLD AUTO: 8.2 K/UL (ref 4.8–10.8)
WBC #/AREA URNS HPF: ABNORMAL /HPF

## 2020-03-04 PROCEDURE — 85014 HEMATOCRIT: CPT

## 2020-03-04 PROCEDURE — 85384 FIBRINOGEN ACTIVITY: CPT | Mod: 91

## 2020-03-04 PROCEDURE — 700102 HCHG RX REV CODE 250 W/ 637 OVERRIDE(OP): Performed by: HOSPITALIST

## 2020-03-04 PROCEDURE — A9270 NON-COVERED ITEM OR SERVICE: HCPCS | Performed by: HOSPITALIST

## 2020-03-04 PROCEDURE — B51C1ZZ FLUOROSCOPY OF LEFT LOWER EXTREMITY VEINS USING LOW OSMOLAR CONTRAST: ICD-10-PCS | Performed by: RADIOLOGY

## 2020-03-04 PROCEDURE — 85041 AUTOMATED RBC COUNT: CPT

## 2020-03-04 PROCEDURE — 770022 HCHG ROOM/CARE - ICU (200)

## 2020-03-04 PROCEDURE — 700111 HCHG RX REV CODE 636 W/ 250 OVERRIDE (IP): Performed by: HOSPITALIST

## 2020-03-04 PROCEDURE — 700105 HCHG RX REV CODE 258: Performed by: HOSPITALIST

## 2020-03-04 PROCEDURE — 700111 HCHG RX REV CODE 636 W/ 250 OVERRIDE (IP)

## 2020-03-04 PROCEDURE — 85610 PROTHROMBIN TIME: CPT

## 2020-03-04 PROCEDURE — 85730 THROMBOPLASTIN TIME PARTIAL: CPT | Mod: 91

## 2020-03-04 PROCEDURE — 85018 HEMOGLOBIN: CPT

## 2020-03-04 PROCEDURE — 80048 BASIC METABOLIC PNL TOTAL CA: CPT

## 2020-03-04 PROCEDURE — 80053 COMPREHEN METABOLIC PANEL: CPT

## 2020-03-04 PROCEDURE — 82550 ASSAY OF CK (CPK): CPT

## 2020-03-04 PROCEDURE — 82247 BILIRUBIN TOTAL: CPT

## 2020-03-04 PROCEDURE — 700105 HCHG RX REV CODE 258: Performed by: RADIOLOGY

## 2020-03-04 PROCEDURE — 84100 ASSAY OF PHOSPHORUS: CPT

## 2020-03-04 PROCEDURE — 85027 COMPLETE CBC AUTOMATED: CPT | Mod: 91

## 2020-03-04 PROCEDURE — 700117 HCHG RX CONTRAST REV CODE 255: Performed by: RADIOLOGY

## 2020-03-04 PROCEDURE — 83735 ASSAY OF MAGNESIUM: CPT

## 2020-03-04 PROCEDURE — 3E03317 INTRODUCTION OF OTHER THROMBOLYTIC INTO PERIPHERAL VEIN, PERCUTANEOUS APPROACH: ICD-10-PCS | Performed by: RADIOLOGY

## 2020-03-04 PROCEDURE — 85048 AUTOMATED LEUKOCYTE COUNT: CPT

## 2020-03-04 PROCEDURE — 99232 SBSQ HOSP IP/OBS MODERATE 35: CPT | Performed by: HOSPITALIST

## 2020-03-04 PROCEDURE — 99153 MOD SED SAME PHYS/QHP EA: CPT

## 2020-03-04 PROCEDURE — 700111 HCHG RX REV CODE 636 W/ 250 OVERRIDE (IP): Performed by: RADIOLOGY

## 2020-03-04 PROCEDURE — 81001 URINALYSIS AUTO W/SCOPE: CPT

## 2020-03-04 RX ORDER — SODIUM CHLORIDE 9 MG/ML
500 INJECTION, SOLUTION INTRAVENOUS
Status: ACTIVE | OUTPATIENT
Start: 2020-03-04 | End: 2020-03-04

## 2020-03-04 RX ORDER — HEPARIN SODIUM,PORCINE 1000/ML
VIAL (ML) INJECTION
Status: COMPLETED
Start: 2020-03-04 | End: 2020-03-04

## 2020-03-04 RX ORDER — MIDAZOLAM HYDROCHLORIDE 1 MG/ML
.5-2 INJECTION INTRAMUSCULAR; INTRAVENOUS PRN
Status: ACTIVE | OUTPATIENT
Start: 2020-03-04 | End: 2020-03-04

## 2020-03-04 RX ORDER — MIDAZOLAM HYDROCHLORIDE 1 MG/ML
INJECTION INTRAMUSCULAR; INTRAVENOUS
Status: COMPLETED
Start: 2020-03-04 | End: 2020-03-04

## 2020-03-04 RX ORDER — MAGNESIUM SULFATE HEPTAHYDRATE 40 MG/ML
4 INJECTION, SOLUTION INTRAVENOUS ONCE
Status: COMPLETED | OUTPATIENT
Start: 2020-03-04 | End: 2020-03-04

## 2020-03-04 RX ORDER — ONDANSETRON 2 MG/ML
4 INJECTION INTRAMUSCULAR; INTRAVENOUS PRN
Status: ACTIVE | OUTPATIENT
Start: 2020-03-04 | End: 2020-03-04

## 2020-03-04 RX ADMIN — HEPARIN SODIUM 6000 UNITS: 1000 INJECTION, SOLUTION INTRAVENOUS; SUBCUTANEOUS at 12:07

## 2020-03-04 RX ADMIN — ATORVASTATIN CALCIUM 80 MG: 80 TABLET, FILM COATED ORAL at 17:43

## 2020-03-04 RX ADMIN — METOPROLOL SUCCINATE 100 MG: 50 TABLET, EXTENDED RELEASE ORAL at 20:12

## 2020-03-04 RX ADMIN — MIDAZOLAM HYDROCHLORIDE 1 MG: 1 INJECTION INTRAMUSCULAR; INTRAVENOUS at 11:48

## 2020-03-04 RX ADMIN — AMLODIPINE BESYLATE 5 MG: 10 TABLET ORAL at 17:43

## 2020-03-04 RX ADMIN — MAGNESIUM SULFATE HEPTAHYDRATE 4 G: 40 INJECTION, SOLUTION INTRAVENOUS at 13:35

## 2020-03-04 RX ADMIN — SODIUM CHLORIDE, POTASSIUM CHLORIDE, SODIUM LACTATE AND CALCIUM CHLORIDE: 600; 310; 30; 20 INJECTION, SOLUTION INTRAVENOUS at 01:35

## 2020-03-04 RX ADMIN — SODIUM CHLORIDE: 9 INJECTION, SOLUTION INTRAVENOUS at 05:44

## 2020-03-04 RX ADMIN — HEPARIN SODIUM 500 UNITS/HR: 5000 INJECTION, SOLUTION INTRAVENOUS at 12:31

## 2020-03-04 RX ADMIN — ALTEPLASE 0.5 MG/HR: KIT at 12:29

## 2020-03-04 RX ADMIN — MIDAZOLAM HYDROCHLORIDE 1 MG: 1 INJECTION, SOLUTION INTRAMUSCULAR; INTRAVENOUS at 11:48

## 2020-03-04 RX ADMIN — IOHEXOL 60 ML: 300 INJECTION, SOLUTION INTRAVENOUS at 13:40

## 2020-03-04 RX ADMIN — SODIUM CHLORIDE, POTASSIUM CHLORIDE, SODIUM LACTATE AND CALCIUM CHLORIDE: 600; 310; 30; 20 INJECTION, SOLUTION INTRAVENOUS at 12:53

## 2020-03-04 RX ADMIN — HYDRALAZINE HYDROCHLORIDE 50 MG: 50 TABLET, FILM COATED ORAL at 05:00

## 2020-03-04 RX ADMIN — AMLODIPINE BESYLATE 5 MG: 10 TABLET ORAL at 05:00

## 2020-03-04 RX ADMIN — HYDRALAZINE HYDROCHLORIDE 100 MG: 50 TABLET, FILM COATED ORAL at 20:12

## 2020-03-04 RX ADMIN — TRAZODONE HYDROCHLORIDE 50 MG: 50 TABLET ORAL at 20:12

## 2020-03-04 RX ADMIN — SODIUM CHLORIDE: 9 INJECTION, SOLUTION INTRAVENOUS at 12:33

## 2020-03-04 RX ADMIN — SODIUM CHLORIDE, POTASSIUM CHLORIDE, SODIUM LACTATE AND CALCIUM CHLORIDE: 600; 310; 30; 20 INJECTION, SOLUTION INTRAVENOUS at 17:46

## 2020-03-04 RX ADMIN — FENTANYL CITRATE 50 MCG: 0.05 INJECTION, SOLUTION INTRAMUSCULAR; INTRAVENOUS at 12:01

## 2020-03-04 RX ADMIN — ASPIRIN 81 MG: 81 TABLET, COATED ORAL at 05:00

## 2020-03-04 RX ADMIN — POTASSIUM CHLORIDE: 149 INJECTION, SOLUTION, CONCENTRATE INTRAVENOUS at 19:17

## 2020-03-04 RX ADMIN — LOSARTAN POTASSIUM 100 MG: 50 TABLET, FILM COATED ORAL at 17:43

## 2020-03-04 ASSESSMENT — ENCOUNTER SYMPTOMS
SPUTUM PRODUCTION: 0
PALPITATIONS: 0
DIZZINESS: 0
CHILLS: 0
ORTHOPNEA: 0
HEMOPTYSIS: 0
COUGH: 0
NAUSEA: 0
VOMITING: 0

## 2020-03-04 ASSESSMENT — FIBROSIS 4 INDEX: FIB4 SCORE: 2.12

## 2020-03-04 ASSESSMENT — PATIENT HEALTH QUESTIONNAIRE - PHQ9
SUM OF ALL RESPONSES TO PHQ9 QUESTIONS 1 AND 2: 0
2. FEELING DOWN, DEPRESSED, IRRITABLE, OR HOPELESS: NOT AT ALL
1. LITTLE INTEREST OR PLEASURE IN DOING THINGS: NOT AT ALL

## 2020-03-04 NOTE — PROGRESS NOTES
[facility] Medicine Daily Progress Note    Date of Service  3/4/2020    Chief Complaint  59 y.o. male admitted 3/2/2020 with left leg pain.    Hospital Course    Mr Ponce has a history of coronary artery disease, dyslipidemia, and hypertension.  Patient presented with painful swollen left lower extremity and was found to have extensive DVT.  Vascular surgery was consulted and recommend IR thrombolysis.  The patient had angio jet thrombolysis and EKOS catheter placed on 3/3/2020.  Thrombolytic treatment continued but was stopped on 3/5/2020 due to bleeding         Interval Problem Update  On heparin and alteplase, no signs of epistaxis or GI bleed  Left lower extremity still swollen and painful, a little better than yesterday  No chest pain, not short of breath    Consultants/Specialty  Vascular surgery  Interventional radiology    Code Status  Full code    Disposition  Patient can likely be discharged home pending definitive treatment for his extensive DVT    Review of Systems  Review of Systems   Constitutional: Negative for chills and malaise/fatigue.   Respiratory: Negative for cough, hemoptysis and sputum production.    Cardiovascular: Positive for leg swelling. Negative for chest pain, palpitations and orthopnea.   Gastrointestinal: Negative for nausea and vomiting.   Skin: Negative for itching and rash.   Neurological: Negative for dizziness.   All other systems reviewed and are negative.       Physical Exam  Temp:  [36.5 °C (97.7 °F)-37 °C (98.6 °F)] 37 °C (98.6 °F)  Pulse:  [64-99] 73  Resp:  [17-32] 21  BP: (126-176)/() 160/81  SpO2:  [90 %-98 %] 95 %    Physical Exam  Constitutional:       General: He is not in acute distress.     Appearance: Normal appearance. He is normal weight.   HENT:      Head: Normocephalic and atraumatic.      Right Ear: External ear normal.      Left Ear: External ear normal.      Nose: Nose normal.      Mouth/Throat:      Mouth: Mucous membranes are moist.      Pharynx:  Oropharynx is clear.   Eyes:      Extraocular Movements: Extraocular movements intact.      Conjunctiva/sclera: Conjunctivae normal.      Pupils: Pupils are equal, round, and reactive to light.   Neck:      Musculoskeletal: Normal range of motion and neck supple.   Cardiovascular:      Rate and Rhythm: Normal rate and regular rhythm.      Pulses: Normal pulses.   Pulmonary:      Effort: Pulmonary effort is normal.      Breath sounds: Normal breath sounds.   Abdominal:      General: Abdomen is flat. Bowel sounds are normal.      Palpations: Abdomen is soft.   Musculoskeletal: Normal range of motion.         General: Swelling present.   Skin:     General: Skin is warm and dry.      Capillary Refill: Capillary refill takes less than 2 seconds.      Coloration: Skin is not jaundiced.   Neurological:      General: No focal deficit present.      Mental Status: He is alert and oriented to person, place, and time.      Cranial Nerves: No cranial nerve deficit.      Gait: Gait normal.   Psychiatric:         Mood and Affect: Mood normal.         Behavior: Behavior normal.         Fluids    Intake/Output Summary (Last 24 hours) at 3/4/2020 1517  Last data filed at 3/4/2020 1400  Gross per 24 hour   Intake 5696.18 ml   Output 2400 ml   Net 3296.18 ml       Laboratory  Recent Labs     03/03/20  0212 03/03/20  2055 03/04/20  0255 03/04/20  0800 03/04/20  1350   WBC 7.5 6.7 7.7 7.6 7.4   RBC 5.02 4.47* 4.37* 4.54* 4.32*   HEMOGLOBIN 16.8 15.2 14.8 15.1 14.5   HEMATOCRIT 49.7 44.0 42.8 44.8 42.4   MCV 99.0* 98.4* 97.9* 98.7* 98.1*   MCH 33.5* 34.0* 33.9* 33.3* 33.6*   MCHC 33.8 34.5 34.6 33.7 34.2   RDW 54.8* 54.4* 54.4* 55.0* 54.4*   PLATELETCT 157* 140* 108*  --   --    MPV 10.9 11.1 10.3  --   --      Recent Labs     03/02/20  1215 03/03/20  0212 03/04/20  0255   SODIUM 138 138 136   POTASSIUM 3.7 3.8 4.7   CHLORIDE 105 106 108   CO2 20 19* 17*   GLUCOSE 116* 94 81   BUN 17 18 15   CREATININE 1.42* 2.18* 1.58*   CALCIUM 8.7 8.7  7.7*     Recent Labs     03/02/20  1215  03/03/20  2055 03/04/20  0457 03/04/20  1350   APTT 31.1   < > 41.3* 55.2* 42.1*   INR 0.99  --   --  1.33*  --     < > = values in this interval not displayed.               Imaging  IR-THROMBO VEIN MECHANICAL,INIT   Final Result      1.  Left femoral and iliac venous thrombosis.   2.  EKOS TPA thrombolysis was started at the rate of 0.5 mg per hour. 5 mg of heparin infusion was started through the left popliteal sheath.   3.  Plan: Venogram of every 18-24 hours of continuous TPA infusion through the EKOS catheter.      CT-CTA CHEST PULMONARY ARTERY W/ RECONS   Final Result      1.  No evidence of pulmonary embolism.      2.  Clear lung parenchyma.            US-EXTREMITY VENOUS LOWER UNILAT LEFT   Final Result      IR-EXTREMITY VENOGRAM-UNILATERAL LEFT    (Results Pending)        Assessment/Plan  * Acute deep vein thrombosis (DVT) of iliac vein of left lower extremity (HCC)- (present on admission)  Assessment & Plan  Has extensive DVT  Appreciate vascular and IR consultation  EKOS I had to be stopped due to bleeding, catheter and sheath now removed  I discussed with Dr. Stockton of IR, the patient has narrowing of his femoral vein that is likely the cause of the clot, lifelong anticoagulation recommended  Continue heparin drip  Rate of heparin drip will be adjusted based on serial pTT measurements to ensure adequate anticoagulation and to avoid potential complications of bleeding  Likely to start DOAC tomorrow if no further signs of bleeding        Essential hypertension- (present on admission)  Assessment & Plan  Metoprolol and losartan    Coronary artery disease involving coronary bypass graft of native heart without angina pectoris- (present on admission)  Assessment & Plan  Remote history of CABG  Continue aspirin, metoprolol, losartan, and statin.    Dyslipidemia- (present on admission)  Assessment & Plan  Atorvastatin    Tobacco abuse- (present on  admission)  Assessment & Plan  Cessation recommended    MAXIMO (acute kidney injury) (HCC)- (present on admission)  Assessment & Plan  IV fluids       VTE prophylaxis: heparin

## 2020-03-04 NOTE — CARE PLAN
Problem: Safety  Goal: Will remain free from injury  Note: Bed locked and in lowest position. Alarm activated. Pt calls appropriately. Non-skid socks. Bed rest until femoral sheath removed.     Problem: Venous Thromboembolism (VTW)/Deep Vein Thrombosis (DVT) Prevention:  Goal: Patient will participate in Venous Thrombosis (VTE)/Deep Vein Thrombosis (DVT)Prevention Measures  Flowsheets (Taken 3/4/2020 0000)  Mechanical Prophylaxis: SCDs, Sequential Compression Device  SCDs, Sequential Compression Device: (right leg only) On  Note: EKOS in place. SCD applied to right leg.

## 2020-03-04 NOTE — PROGRESS NOTES
Pt to IR via on transport monitor via bed, accompanied by IR personnel. VSS, pt states no pain at this time. Pt's glasses removed and returned to his room.

## 2020-03-04 NOTE — PROGRESS NOTES
Pt returned from IR via bed on transport monitor, accompanied by IR personnel. VSS. Pt states no pain at this time. EKOS in place and running.

## 2020-03-04 NOTE — CARE PLAN
Problem: Safety  Goal: Will remain free from injury  Outcome: PROGRESSING AS EXPECTED  Intervention: Provide assistance with mobility  Note: Pt provided assistance with toileting. Pt provided education on safety, fall prevention, and use of call light. Treaded socks in place, call light within reach, bed in low and locked position hourly rounding in effect.        Problem: Fluid Volume:  Goal: Will maintain balanced intake and output  Note: Pt fluid status monitored with strict I/O documentation, assessment of skin integrity, edema and lung sounds.

## 2020-03-04 NOTE — OR SURGEON
Immediate Post- Operative Note        PostOp Diagnosis: Left DVT      Procedure(s): Angio jet thrombolysis and EKOS thrombolysis    Estimated Blood Loss: Less than 5 ml        Complications: None            3/4/2020     12:14 PM     Mason Stockton M.D.

## 2020-03-04 NOTE — PROGRESS NOTES
IR RN note    Patient underwent a Thrombolytic therapy check with possible interventions by Dr. Stockton.  Procedure site was verified by MD using imaging guidance. Consent was signed.  IR tech Chuck, Carter and Niranjan assisted. Patient was placed in a prone position.  Vitals were taken every 5 minutes and remained stable during procedure (see doc flow sheet for results).  CO2 waveform capnography was monitored throughout procedure, see chart.  Left leg popliteal      access site. 8 Fr sheath placed.   A suture, Tegaderm, steristrips  dressing was placed over surgical site. Report called to Cheryle MEYER.  Cheryle came down to IR, Pt transported with [facility] [place] and Richie to T708, with monitor .     Angiojet used, another order of tPA verbal order 10mg tPA/100ml NS at 0.5ml/hr for 24hrs. Ordered called to pharmacy.  Continue another 24 hrs with thrombolytic therapy, continue with same orders, per MD Stockton.     Ekos started with Ana. All meds verified with Chuck and Barrett and Cheryle MEYER

## 2020-03-04 NOTE — PROGRESS NOTES
Brought pt up to room from IR. Bedside report received from Iona MEYER. Pt has EKOS for DVT, LLE. VSS. Pt is neurologically intact.

## 2020-03-04 NOTE — PROGRESS NOTES
2 RN Skin Assessment Completed by Cheryle OKEEFE RN and Loyda RN.    Head: WDL  Ears: WDL  Nose: WDL  Mouth: WDL  Neck: scab  Breasts/Chest: WDL  (R) Arm/Elbow/hand: WDL  (L) Arm/Elbow/hand: WDL  Abdomen: WDL  Groin: WDL  (R) Leg: WDL  (L) Leg: swelling  (R) Heel/Foot/Toe: WDL   (L) Heel/Foot/Toe: WDL  Shoulder Blades: WDL  Spine: WDL  Sacrum/Coccyx/Buttocks: WDL    Interventions in place: Heel Mepilex, Sacral Mepilex, Waffle overlay and Pillows    Possible skin injury found: No    Pictures uploaded into Epic: N/A  Wound Consult Placed: N/A

## 2020-03-04 NOTE — PROGRESS NOTES
Discussed plan of care with Dr. Mora. Orders to D/C weight based heparin while EKOS heparin is infusion. Pt to go back down to IR tomorrow. Weight based heparin to be restarted once EKOS D/Vaibhav

## 2020-03-05 ENCOUNTER — APPOINTMENT (OUTPATIENT)
Dept: RADIOLOGY | Facility: MEDICAL CENTER | Age: 60
DRG: 300 | End: 2020-03-05
Attending: RADIOLOGY
Payer: COMMERCIAL

## 2020-03-05 LAB
ALBUMIN SERPL BCP-MCNC: 2.7 G/DL (ref 3.2–4.9)
ALBUMIN/GLOB SERPL: 1.2 G/DL
ALP SERPL-CCNC: 53 U/L (ref 30–99)
ALT SERPL-CCNC: 5 U/L (ref 2–50)
ANION GAP SERPL CALC-SCNC: 10 MMOL/L (ref 0–11.9)
APTT PPP: 44.7 SEC (ref 24.7–36)
APTT PPP: 45.7 SEC (ref 24.7–36)
AST SERPL-CCNC: 23 U/L (ref 12–45)
BASOPHILS # BLD AUTO: 1 % (ref 0–1.8)
BASOPHILS # BLD: 0.07 K/UL (ref 0–0.12)
BILIRUB CONJ SERPL-MCNC: 0.6 MG/DL (ref 0.1–0.5)
BILIRUB INDIRECT SERPL-MCNC: 1.7 MG/DL (ref 0–1)
BILIRUB SERPL-MCNC: 2.3 MG/DL (ref 0.1–1.5)
BUN SERPL-MCNC: 14 MG/DL (ref 8–22)
CALCIUM SERPL-MCNC: 7.7 MG/DL (ref 8.5–10.5)
CHLORIDE SERPL-SCNC: 107 MMOL/L (ref 96–112)
CO2 SERPL-SCNC: 19 MMOL/L (ref 20–33)
CREAT SERPL-MCNC: 0.98 MG/DL (ref 0.5–1.4)
EOSINOPHIL # BLD AUTO: 0.14 K/UL (ref 0–0.51)
EOSINOPHIL NFR BLD: 1.9 % (ref 0–6.9)
ERYTHROCYTE [DISTWIDTH] IN BLOOD BY AUTOMATED COUNT: 54.1 FL (ref 35.9–50)
ERYTHROCYTE [DISTWIDTH] IN BLOOD BY AUTOMATED COUNT: 56 FL (ref 35.9–50)
FIBRINOGEN PPP-MCNC: 201 MG/DL (ref 215–460)
GLOBULIN SER CALC-MCNC: 2.3 G/DL (ref 1.9–3.5)
GLUCOSE SERPL-MCNC: 80 MG/DL (ref 65–99)
HCT VFR BLD AUTO: 38.8 % (ref 42–52)
HCT VFR BLD AUTO: 39.5 % (ref 42–52)
HGB BLD-MCNC: 13.3 G/DL (ref 14–18)
HGB BLD-MCNC: 13.6 G/DL (ref 14–18)
IMM GRANULOCYTES # BLD AUTO: 0.05 K/UL (ref 0–0.11)
IMM GRANULOCYTES NFR BLD AUTO: 0.7 % (ref 0–0.9)
LYMPHOCYTES # BLD AUTO: 1.15 K/UL (ref 1–4.8)
LYMPHOCYTES NFR BLD: 15.7 % (ref 22–41)
MAGNESIUM SERPL-MCNC: 1.4 MG/DL (ref 1.5–2.5)
MCH RBC QN AUTO: 33.7 PG (ref 27–33)
MCH RBC QN AUTO: 33.9 PG (ref 27–33)
MCHC RBC AUTO-ENTMCNC: 34.3 G/DL (ref 33.7–35.3)
MCHC RBC AUTO-ENTMCNC: 34.4 G/DL (ref 33.7–35.3)
MCV RBC AUTO: 97.8 FL (ref 81.4–97.8)
MCV RBC AUTO: 99 FL (ref 81.4–97.8)
MONOCYTES # BLD AUTO: 0.77 K/UL (ref 0–0.85)
MONOCYTES NFR BLD AUTO: 10.5 % (ref 0–13.4)
NEUTROPHILS # BLD AUTO: 5.13 K/UL (ref 1.82–7.42)
NEUTROPHILS NFR BLD: 70.2 % (ref 44–72)
NRBC # BLD AUTO: 0 K/UL
NRBC BLD-RTO: 0 /100 WBC
PLATELET # BLD AUTO: 94 K/UL (ref 164–446)
PMV BLD AUTO: 10.5 FL (ref 9–12.9)
POTASSIUM SERPL-SCNC: 5.3 MMOL/L (ref 3.6–5.5)
PROT SERPL-MCNC: 5 G/DL (ref 6–8.2)
RBC # BLD AUTO: 3.92 M/UL (ref 4.7–6.1)
RBC # BLD AUTO: 4.04 M/UL (ref 4.7–6.1)
SODIUM SERPL-SCNC: 136 MMOL/L (ref 135–145)
WBC # BLD AUTO: 7.3 K/UL (ref 4.8–10.8)
WBC # BLD AUTO: 7.9 K/UL (ref 4.8–10.8)

## 2020-03-05 PROCEDURE — 82248 BILIRUBIN DIRECT: CPT

## 2020-03-05 PROCEDURE — 85018 HEMOGLOBIN: CPT

## 2020-03-05 PROCEDURE — 99233 SBSQ HOSP IP/OBS HIGH 50: CPT | Performed by: HOSPITALIST

## 2020-03-05 PROCEDURE — 700102 HCHG RX REV CODE 250 W/ 637 OVERRIDE(OP): Performed by: HOSPITALIST

## 2020-03-05 PROCEDURE — 700105 HCHG RX REV CODE 258: Performed by: HOSPITALIST

## 2020-03-05 PROCEDURE — 36010 PLACE CATHETER IN VEIN: CPT | Mod: LT

## 2020-03-05 PROCEDURE — 700111 HCHG RX REV CODE 636 W/ 250 OVERRIDE (IP)

## 2020-03-05 PROCEDURE — 85384 FIBRINOGEN ACTIVITY: CPT

## 2020-03-05 PROCEDURE — 83735 ASSAY OF MAGNESIUM: CPT

## 2020-03-05 PROCEDURE — A9270 NON-COVERED ITEM OR SERVICE: HCPCS | Performed by: HOSPITALIST

## 2020-03-05 PROCEDURE — 85014 HEMATOCRIT: CPT

## 2020-03-05 PROCEDURE — 85041 AUTOMATED RBC COUNT: CPT

## 2020-03-05 PROCEDURE — 770022 HCHG ROOM/CARE - ICU (200)

## 2020-03-05 PROCEDURE — 85730 THROMBOPLASTIN TIME PARTIAL: CPT | Mod: 91

## 2020-03-05 PROCEDURE — 85048 AUTOMATED LEUKOCYTE COUNT: CPT

## 2020-03-05 PROCEDURE — 700111 HCHG RX REV CODE 636 W/ 250 OVERRIDE (IP): Performed by: RADIOLOGY

## 2020-03-05 PROCEDURE — 700117 HCHG RX CONTRAST REV CODE 255: Performed by: RADIOLOGY

## 2020-03-05 PROCEDURE — 80053 COMPREHEN METABOLIC PANEL: CPT

## 2020-03-05 PROCEDURE — B51C1ZZ FLUOROSCOPY OF LEFT LOWER EXTREMITY VEINS USING LOW OSMOLAR CONTRAST: ICD-10-PCS | Performed by: RADIOLOGY

## 2020-03-05 PROCEDURE — 700111 HCHG RX REV CODE 636 W/ 250 OVERRIDE (IP): Performed by: HOSPITALIST

## 2020-03-05 PROCEDURE — 85027 COMPLETE CBC AUTOMATED: CPT

## 2020-03-05 RX ORDER — TRAZODONE HYDROCHLORIDE 50 MG/1
50 TABLET ORAL ONCE
Status: COMPLETED | OUTPATIENT
Start: 2020-03-05 | End: 2020-03-05

## 2020-03-05 RX ORDER — MIDAZOLAM HYDROCHLORIDE 1 MG/ML
.5-2 INJECTION INTRAMUSCULAR; INTRAVENOUS PRN
Status: DISCONTINUED | OUTPATIENT
Start: 2020-03-05 | End: 2020-03-05

## 2020-03-05 RX ORDER — ONDANSETRON 2 MG/ML
4 INJECTION INTRAMUSCULAR; INTRAVENOUS PRN
Status: ACTIVE | OUTPATIENT
Start: 2020-03-05 | End: 2020-03-05

## 2020-03-05 RX ORDER — HEPARIN SODIUM 1000 [USP'U]/ML
3800 INJECTION, SOLUTION INTRAVENOUS; SUBCUTANEOUS PRN
Status: DISCONTINUED | OUTPATIENT
Start: 2020-03-05 | End: 2020-03-06

## 2020-03-05 RX ORDER — HEPARIN SODIUM 1000 [USP'U]/ML
1000 INJECTION, SOLUTION INTRAVENOUS; SUBCUTANEOUS ONCE
Status: COMPLETED | OUTPATIENT
Start: 2020-03-05 | End: 2020-03-05

## 2020-03-05 RX ORDER — HEPARIN SODIUM 5000 [USP'U]/100ML
INJECTION, SOLUTION INTRAVENOUS CONTINUOUS
Status: DISCONTINUED | OUTPATIENT
Start: 2020-03-05 | End: 2020-03-06

## 2020-03-05 RX ORDER — SODIUM CHLORIDE, SODIUM LACTATE, POTASSIUM CHLORIDE, CALCIUM CHLORIDE 600; 310; 30; 20 MG/100ML; MG/100ML; MG/100ML; MG/100ML
INJECTION, SOLUTION INTRAVENOUS CONTINUOUS
Status: DISCONTINUED | OUTPATIENT
Start: 2020-03-05 | End: 2020-03-06

## 2020-03-05 RX ORDER — SODIUM CHLORIDE 9 MG/ML
500 INJECTION, SOLUTION INTRAVENOUS
Status: DISCONTINUED | OUTPATIENT
Start: 2020-03-05 | End: 2020-03-05

## 2020-03-05 RX ORDER — HEPARIN SODIUM,PORCINE 1000/ML
VIAL (ML) INJECTION
Status: COMPLETED
Start: 2020-03-05 | End: 2020-03-05

## 2020-03-05 RX ORDER — MAGNESIUM SULFATE HEPTAHYDRATE 40 MG/ML
2 INJECTION, SOLUTION INTRAVENOUS ONCE
Status: COMPLETED | OUTPATIENT
Start: 2020-03-05 | End: 2020-03-05

## 2020-03-05 RX ORDER — MIDAZOLAM HYDROCHLORIDE 1 MG/ML
INJECTION INTRAMUSCULAR; INTRAVENOUS
Status: COMPLETED
Start: 2020-03-05 | End: 2020-03-05

## 2020-03-05 RX ADMIN — HEPARIN SODIUM 3800 UNITS: 1000 INJECTION, SOLUTION INTRAVENOUS; SUBCUTANEOUS at 21:00

## 2020-03-05 RX ADMIN — IOHEXOL 16 ML: 300 INJECTION, SOLUTION INTRAVENOUS at 09:35

## 2020-03-05 RX ADMIN — HYDRALAZINE HYDROCHLORIDE 100 MG: 50 TABLET, FILM COATED ORAL at 20:38

## 2020-03-05 RX ADMIN — SODIUM CHLORIDE, POTASSIUM CHLORIDE, SODIUM LACTATE AND CALCIUM CHLORIDE 1000 ML: 600; 310; 30; 20 INJECTION, SOLUTION INTRAVENOUS at 10:49

## 2020-03-05 RX ADMIN — MIDAZOLAM HYDROCHLORIDE 1 MG: 1 INJECTION, SOLUTION INTRAMUSCULAR; INTRAVENOUS at 09:28

## 2020-03-05 RX ADMIN — METOPROLOL SUCCINATE 100 MG: 50 TABLET, EXTENDED RELEASE ORAL at 20:38

## 2020-03-05 RX ADMIN — MAGNESIUM SULFATE 2 G: 2 INJECTION INTRAVENOUS at 17:33

## 2020-03-05 RX ADMIN — HEPARIN SODIUM 500 UNITS/HR: 5000 INJECTION, SOLUTION INTRAVENOUS at 07:10

## 2020-03-05 RX ADMIN — HEPARIN SODIUM 1000 UNITS: 1000 INJECTION, SOLUTION INTRAVENOUS; SUBCUTANEOUS at 09:37

## 2020-03-05 RX ADMIN — TRAZODONE HYDROCHLORIDE 50 MG: 50 TABLET ORAL at 21:12

## 2020-03-05 RX ADMIN — HEPARIN SODIUM 1450 UNITS: 5000 INJECTION, SOLUTION INTRAVENOUS at 11:41

## 2020-03-05 RX ADMIN — TRAZODONE HYDROCHLORIDE 50 MG: 50 TABLET ORAL at 00:57

## 2020-03-05 RX ADMIN — LOSARTAN POTASSIUM 100 MG: 50 TABLET, FILM COATED ORAL at 17:34

## 2020-03-05 RX ADMIN — SODIUM CHLORIDE, POTASSIUM CHLORIDE, SODIUM LACTATE AND CALCIUM CHLORIDE 1000 ML: 600; 310; 30; 20 INJECTION, SOLUTION INTRAVENOUS at 21:06

## 2020-03-05 RX ADMIN — AMLODIPINE BESYLATE 5 MG: 10 TABLET ORAL at 17:33

## 2020-03-05 RX ADMIN — MIDAZOLAM HYDROCHLORIDE 1 MG: 1 INJECTION INTRAMUSCULAR; INTRAVENOUS at 09:28

## 2020-03-05 RX ADMIN — FENTANYL CITRATE 25 MCG: 0.05 INJECTION, SOLUTION INTRAMUSCULAR; INTRAVENOUS at 09:28

## 2020-03-05 RX ADMIN — ATORVASTATIN CALCIUM 80 MG: 80 TABLET, FILM COATED ORAL at 17:34

## 2020-03-05 RX ADMIN — POTASSIUM CHLORIDE: 149 INJECTION, SOLUTION, CONCENTRATE INTRAVENOUS at 03:00

## 2020-03-05 ASSESSMENT — ENCOUNTER SYMPTOMS
DIZZINESS: 0
VOMITING: 0
PALPITATIONS: 0
ORTHOPNEA: 0
SPUTUM PRODUCTION: 0
HEMOPTYSIS: 0
NAUSEA: 0
COUGH: 0
CHILLS: 0

## 2020-03-05 ASSESSMENT — FIBROSIS 4 INDEX: FIB4 SCORE: 6.46

## 2020-03-05 NOTE — PROGRESS NOTES
Pt's drsg leaking since last reinforcement. Reinforced w/ 4x4s again. Hgb dropped from 15.1 to 13.6.  Notified Dr. Stockton (interventional radiologist) of bleeding from EKOS catheter site and change in Hgb level. No orders received. Continue to reinforce drsg as needed for now.

## 2020-03-05 NOTE — PROGRESS NOTES
Dr. Mittal (IR) called to update this nurse, that if TPA is shut off the catheter must be removed. Telephone order received to remove the catheter but leave the sheath in place with heparin infusing. Per Dr. Mittal, if pt continues to bleed after, stop the heparin and pull the sheath. May apply sandbag to help achieve hemostasis.     Dr. Mittal made aware that RNs per protocol are not to pull catheters for DVTs. Dr. Mittal unable to come to bedside at this time.

## 2020-03-05 NOTE — PROGRESS NOTES
OVERNIGHT HOSPITALIST:  Came to the room to evaluate the patient after multiple pagers about ongoing bleeding in his left leg. Patient has Left leg DVT and had Angio jet and EKOS thrombolysis yesterday and EKOS thrombolysis on 3/3. Hemoglobin dropped from 15.1 to 13.6.  Around 12 am    Patient has significant bleeding through dressing. I paged Dr. Mittal (IR on call) and discussed with him and he recommended to hold tPA now. I will change dressing and apply surgicel. If still bleeding in 2 hours, per IR, will need to have catheter removed. Patient will continue with heparin for now, and in case catheter is off will also add heparin based protocol. IR aware and will follow in am          Bleeding better controlled now with tPA off, and surgicel, but Dr. Mittal called back and requested that the EKOS catheter to be pulled. Per Nursing staff protocol, DVT related EKOS catheter cannot be removed, reason why I personally called Dr. Mittal, who requested me to remove it. Catheter was removed at 4:15 am, no signs of bleeding. IR aware

## 2020-03-05 NOTE — PROGRESS NOTES
Dr. Agarwal made aware that Dr. Mittal wants the catheter pulled now. MD to come to bedside for removal.

## 2020-03-05 NOTE — PROGRESS NOTES
Noted an increase of bloody drainage every hour since change of shift to gauze drsg on LLE EKOS catheter site. Reinforced with 4x4s and tegaderm. CMS remains intact, with 2+ pulses. Pt continues to deny pain. Dr. Agarwal updated with no new orders received except to continue to monitor and escalate if needed.

## 2020-03-05 NOTE — PROGRESS NOTES
Dr. Agarwal updated on continued bleeding at EKOS cath site and changes in CBC. New order placed to check H/H q6h. If bleeding continues to increase, RN to call back for further orders. Order also received for repeat dose of trazodone 50mg x1 as pt still unable to sleep after PRN dose given prior. RBV.

## 2020-03-05 NOTE — OR SURGEON
Immediate Post- Operative Note        PostOp Diagnosis: DVT      Procedure(s): Venogram.    Partial recanalization of left femoral vein.The illiac veins are patent.      Estimated Blood Loss: Less than 5 ml        Complications: None            3/5/2020     9:40 AM     Mason Stockton M.D.

## 2020-03-05 NOTE — PROGRESS NOTES
Bedside report from Ayala NICHOLE RN. Lines and drips verified. LLE w/ EKOS catheter intact, scant bloody drainage noted on gauze drsg, pulses 2+, generalized edema noted, pt denies any pain/numbness/tingling. Yellow light flashing on EKOS machine indicating normal function.

## 2020-03-05 NOTE — PROGRESS NOTES
Dr. Agarwal pulled EKOS catheter at 0415, total therapy time 35h 45m. Sheath remains in place w/ surgicel to help slow bleeding, dressed with gauze and tegaderm. Heparin continues at 500 units/hr.

## 2020-03-05 NOTE — PROGRESS NOTES
IR Nursing Note:    Patient underwent a Left Lower Extremity Venogram with Possible Intervention by Dr. Stockton. Procedure verified by MD prior to procedure starting and verified by patient and procedure team. Pedal pulses prior to case Right 2+ and Left 2+. Patient was placed in a prone position with all bony prominences padded and draped in sterile fashion. Vitals were taken every 5 minutes and remained stable during procedure (see doc flow sheet for results). CO2 waveform capnography was monitored and remained 26-28 throughout procedure. A gauze and tegaderm dressing was placed over surgical site CDI with a small hematoma. Report called to Eric MEYER. Pt transported via ICU bed with IR RN and a monitor to T608 in a stable condition.    Post Procedure Bilateral Pedal Pulses 2+

## 2020-03-06 VITALS
HEIGHT: 74 IN | TEMPERATURE: 98.1 F | RESPIRATION RATE: 21 BRPM | HEART RATE: 87 BPM | DIASTOLIC BLOOD PRESSURE: 69 MMHG | WEIGHT: 190.48 LBS | BODY MASS INDEX: 24.45 KG/M2 | SYSTOLIC BLOOD PRESSURE: 123 MMHG | OXYGEN SATURATION: 98 %

## 2020-03-06 PROBLEM — N17.9 AKI (ACUTE KIDNEY INJURY) (HCC): Status: RESOLVED | Noted: 2020-03-02 | Resolved: 2020-03-06

## 2020-03-06 LAB
ALBUMIN SERPL BCP-MCNC: 3.1 G/DL (ref 3.2–4.9)
ALBUMIN/GLOB SERPL: 1.1 G/DL
ALP SERPL-CCNC: 70 U/L (ref 30–99)
ALT SERPL-CCNC: 5 U/L (ref 2–50)
ANION GAP SERPL CALC-SCNC: 12 MMOL/L (ref 0–11.9)
APTT PPP: 178.7 SEC (ref 24.7–36)
AST SERPL-CCNC: 21 U/L (ref 12–45)
BASOPHILS # BLD AUTO: 0.9 % (ref 0–1.8)
BASOPHILS # BLD: 0.08 K/UL (ref 0–0.12)
BILIRUB SERPL-MCNC: 1.4 MG/DL (ref 0.1–1.5)
BUN SERPL-MCNC: 13 MG/DL (ref 8–22)
CALCIUM SERPL-MCNC: 8.1 MG/DL (ref 8.5–10.5)
CHLORIDE SERPL-SCNC: 105 MMOL/L (ref 96–112)
CO2 SERPL-SCNC: 21 MMOL/L (ref 20–33)
CREAT SERPL-MCNC: 1.14 MG/DL (ref 0.5–1.4)
EOSINOPHIL # BLD AUTO: 0.23 K/UL (ref 0–0.51)
EOSINOPHIL NFR BLD: 2.6 % (ref 0–6.9)
ERYTHROCYTE [DISTWIDTH] IN BLOOD BY AUTOMATED COUNT: 54.9 FL (ref 35.9–50)
F5 P.R506Q BLD/T QL: NEGATIVE
FACT VIII ACT/NOR PPP: NORMAL % (ref 56–191)
GLOBULIN SER CALC-MCNC: 2.7 G/DL (ref 1.9–3.5)
GLUCOSE SERPL-MCNC: 105 MG/DL (ref 65–99)
HCT VFR BLD AUTO: 41 % (ref 42–52)
HGB BLD-MCNC: 14.1 G/DL (ref 14–18)
IMM GRANULOCYTES # BLD AUTO: 0.06 K/UL (ref 0–0.11)
IMM GRANULOCYTES NFR BLD AUTO: 0.7 % (ref 0–0.9)
LYMPHOCYTES # BLD AUTO: 1.9 K/UL (ref 1–4.8)
LYMPHOCYTES NFR BLD: 21.4 % (ref 22–41)
MCH RBC QN AUTO: 33.6 PG (ref 27–33)
MCHC RBC AUTO-ENTMCNC: 34.4 G/DL (ref 33.7–35.3)
MCV RBC AUTO: 97.6 FL (ref 81.4–97.8)
MONOCYTES # BLD AUTO: 0.93 K/UL (ref 0–0.85)
MONOCYTES NFR BLD AUTO: 10.5 % (ref 0–13.4)
NEUTROPHILS # BLD AUTO: 5.67 K/UL (ref 1.82–7.42)
NEUTROPHILS NFR BLD: 63.9 % (ref 44–72)
NRBC # BLD AUTO: 0 K/UL
NRBC BLD-RTO: 0 /100 WBC
PLATELET # BLD AUTO: 101 K/UL (ref 164–446)
PMV BLD AUTO: 11.1 FL (ref 9–12.9)
POTASSIUM SERPL-SCNC: 3.1 MMOL/L (ref 3.6–5.5)
PROT SERPL-MCNC: 5.8 G/DL (ref 6–8.2)
RBC # BLD AUTO: 4.2 M/UL (ref 4.7–6.1)
SODIUM SERPL-SCNC: 138 MMOL/L (ref 135–145)
VWF AG ACT/NOR PPP IA: 207 % (ref 52–214)
VWF:RCO ACT/NOR PPP PL AGG: 176 % (ref 51–215)
WBC # BLD AUTO: 8.9 K/UL (ref 4.8–10.8)

## 2020-03-06 PROCEDURE — 85025 COMPLETE CBC W/AUTO DIFF WBC: CPT

## 2020-03-06 PROCEDURE — 700102 HCHG RX REV CODE 250 W/ 637 OVERRIDE(OP): Performed by: HOSPITALIST

## 2020-03-06 PROCEDURE — A9270 NON-COVERED ITEM OR SERVICE: HCPCS | Performed by: HOSPITALIST

## 2020-03-06 PROCEDURE — 700111 HCHG RX REV CODE 636 W/ 250 OVERRIDE (IP): Performed by: HOSPITALIST

## 2020-03-06 PROCEDURE — 700105 HCHG RX REV CODE 258: Performed by: HOSPITALIST

## 2020-03-06 PROCEDURE — 85730 THROMBOPLASTIN TIME PARTIAL: CPT

## 2020-03-06 PROCEDURE — 80053 COMPREHEN METABOLIC PANEL: CPT

## 2020-03-06 PROCEDURE — 99239 HOSP IP/OBS DSCHRG MGMT >30: CPT | Performed by: HOSPITALIST

## 2020-03-06 RX ORDER — POTASSIUM CHLORIDE 20 MEQ/1
40 TABLET, EXTENDED RELEASE ORAL ONCE
Status: COMPLETED | OUTPATIENT
Start: 2020-03-06 | End: 2020-03-06

## 2020-03-06 RX ORDER — RIVAROXABAN 15 MG-20MG
KIT ORAL
Qty: 1 EACH | Refills: 0 | Status: SHIPPED | OUTPATIENT
Start: 2020-03-06 | End: 2020-03-18

## 2020-03-06 RX ADMIN — POTASSIUM CHLORIDE 40 MEQ: 1500 TABLET, EXTENDED RELEASE ORAL at 10:30

## 2020-03-06 RX ADMIN — HYDRALAZINE HYDROCHLORIDE 50 MG: 50 TABLET, FILM COATED ORAL at 05:18

## 2020-03-06 RX ADMIN — RIVAROXABAN 15 MG: 15 TABLET, FILM COATED ORAL at 09:47

## 2020-03-06 RX ADMIN — ASPIRIN 81 MG: 81 TABLET, COATED ORAL at 05:18

## 2020-03-06 RX ADMIN — AMLODIPINE BESYLATE 5 MG: 10 TABLET ORAL at 05:18

## 2020-03-06 RX ADMIN — SODIUM CHLORIDE, POTASSIUM CHLORIDE, SODIUM LACTATE AND CALCIUM CHLORIDE 1000 ML: 600; 310; 30; 20 INJECTION, SOLUTION INTRAVENOUS at 05:21

## 2020-03-06 RX ADMIN — HEPARIN SODIUM 1450 UNITS/HR: 5000 INJECTION, SOLUTION INTRAVENOUS at 07:04

## 2020-03-06 ASSESSMENT — FIBROSIS 4 INDEX: FIB4 SCORE: 5.49

## 2020-03-06 NOTE — PROGRESS NOTES
Patient discharged home with all belongings.     Patient walked off of unit with RN and student RN

## 2020-03-06 NOTE — PROGRESS NOTES
Nadira from lab called w/ a critical PTT lab result of 178.7 at 0348. This value is within the defined limits of the Heparin Weight Based Protocol ordered by the physician for this patient. Heparin Weight Based Protocol will be followed for any adjustments, physician was not notified per protocol instructions.    Drip paused for an hour and then will reduce drip by large dose per protocol instructions.

## 2020-03-06 NOTE — DISCHARGE INSTRUCTIONS
·   Discharge Instructions per Lalit Gandhi M.D.    You were diagnosed with a large blood clot in your leg  You should remain on anticoagulation (xarelto or other blood thinner) indefinitely, don't stop this medication unless advised by a physician to do so, seek medical attention immediately if you run out.  Smoking increasing the risk of blood clots, you should stop smoking for your general health and to prevent future events    DIET: Cardiac    ACTIVITY: As tolerated, avoid any activity that would put you at risk for trauma    DIAGNOSIS: deep venous thrombosis    Return to ER if develop worsening swelling of your leg, worsening pain, shortness of breath or chest pain, bloody nose or signs of blood in your stool      Deep Vein Thrombosis Discharge Instructions    A deep vein thrombosis (DVT) is a blood clot (thrombus) that develops in a deep vein. A DVT is a clot in the deep, larger veins of the leg, arm, or pelvis. These are more dangerous than clots that might form in veins on the surface of the body. Deep vein thrombosis can lead to complications if the clot breaks off and travels in the bloodstream to the lungs.     CAUSES  Blood clots form in a vein for different reasons. Usually several things cause blood clots. They include:   · The flow of blood slows down.   · The inside of the vein is damaged in some way.   · The person has a condition that makes blood clot more easily. These conditions may include:  · Older age (especially over 75 years old).  · Having a history of blood clots.  · Having major or lengthy surgery. Hip surgery is particularly high-risk.   · Breaking a hip or leg.  · Sitting or lying still for a long time.  · Cancer or cancer treatment.  · Having a long, thin tube (catheter) placed inside a vein during a medical procedure.   · Being overweight (obese).  · Pregnancy and childbirth.  · Medicines with estrogen.  · Smoking.  · Other circulation or heart problems.     SYMPTOMS  When a clot  forms, it can either partially or totally block the blood flow in that vein. Symptoms of a DVT can include:  · Swelling of the leg or arm, especially if one side is much worse.  · Warmth and redness of the leg or arm, especially if one side is much worse.   · Pain in an arm or leg. If the clot is in the leg, symptoms may be more noticeable or worse when standing or walking.  If the blood clot travels to the lung, it may cause:  · Shortness of breath.  · Chest pain. The pain may be worsened by deep breaths.   · Coughing up thick mucus (phlegm), possibly flecked with blood.   Anyone with these symptoms should get emergency medical treatment right away. Call your local emergency  Services (911 in [place] if you have these symptoms.     DIAGNOSIS  If a DVT is suspected, your caregiver will take a full medical history. He or she will also perform a physical exam. Tests that also may be required include:   · Studies of the clotting properties of the blood.   · An ultrasound scan.   · X-rays to show the flow of blood when special dye is injected into the veins (venography).   · Studies of your lungs if you have any chest symptoms.     PREVENTION  · Exercise the legs regularly. Take a brisk 30 minute walk every day.   · Maintain a weight that is appropriate for your height.  · Avoid sitting or lying in bed for long periods of time without moving your legs.   · Women, particularly those over the age of 35, should consider the risks and benefits of taking estrogen medicine, including birth control pills.   · Do not smoke, especially if you take estrogen medicines.   · Long-distance travel can increase your risk. You should exercise your legs by walking or pumping the muscles every hour.   · In hospital prevention: Prevention may include medical and non medical measures.     TREATMENT  · The most common treatment for DVT is blood thinning (anticoagulant) medicine, which reduces the blood's tendency to clot. Anticoagulants can  stop new blood clots from forming and old ones from growing. They cannot dissolve existing clots. Your body does this by itself over time. Anticoagulants can be given by mouth, by intravenous (IV) access, or by injection. Your caregiver will determine the best program for you.   · Less commonly, clot-dissolving drugs (thrombolytics) are used to dissolve a DVT. They carry a high risk of bleeding, so they are used mainly in severe cases.   · Very rarely, a blood clot in the leg needs to be removed surgically.   · If you are unable to take anticoagulants, your caregiver may arrange for you to have a filter placed in a main vein in your belly (abdomen). This filter prevents clots from traveling to your lungs.     HOME CARE INSTRUCTIONS  Take all medicines prescribed by your caregiver. Follow the directions carefully.   · You will most likely continue taking anticoagulants after you leave the hospital. Your caregiver will advise you on the length of treatment (usually 3 to 5 months, sometimes for life).   · Taking too much or too little of an anticoagulant is dangerous. While taking this type of medicine, you will need to have regular blood tests to be sure the dose is correct. The dose can change for many reasons. It is critically important that you take this medicine exactly as prescribed, and that you have blood tests exactly as directed.   · Many foods can interfere with anticoagulants. These include foods high in vitamin K, such as spinach, kale, broccoli, cabbage, shannon and turnip greens, [place] sprouts, peas, cauliflower, seaweed, parsley, beef and pork liver, green tea, and soybean oil. Your caregiver should discuss limits on these foods with you or you should arrange a visit with a dietician to answer your questions.   · Many medicines can interfere with anticoagulants. You must tell your caregiver about any and all medicines you take. This includes all vitamins and supplements. Be especially cautious with  aspirin and anti-inflammatory medicines. Ask your caregiver before taking these.   · Anticoagulants can have side effects, mostly excessive bruising or bleeding. You will need to hold pressure over cuts for longer than usual. Avoid alcoholic drinks or consume only very small amounts while taking this medicine.    If you are taking an anticoagulant:  · Wear a medical alert bracelet.  · Notify your dentist or other caregivers before procedures.  · Avoid contact sports.    · Ask your caregiver how soon you can go back to normal activities. Not being active can lead to new clots. Ask for a list of what you should and should not do.   · Exercise your lower leg muscles. This is important while traveling.   · You may need to wear compression stockings. These are tight elastic stockings that apply pressure to the lower legs. This can help keep the blood in the legs from clotting.   · If you are a smoker, you should quit.   · Learn as much as you can about DVT.     SEEK MEDICAL CARE IF:  · You have unusual bruising or any bleeding problems.  · The swelling or pain in your affected arm or leg is not gradually improving.   · You anticipate surgery or long-distance travel. You should get specific advice on DVT prevention.   · You discover other family members with blood clots. This may require further testing for inherited diseases or conditions.     SEEK IMMEDIATE MEDICAL CARE IF:  · You develop chest pain.  · You develop severe shortness of breath.  · You begin to cough up bloody mucus or phlegm (sputum).  · You feel dizzy or faint.   · You develop swelling or pain in the leg.  · You have breathing problems after traveling.    MAKE SURE YOU:  · Understand these instructions.  · Will watch your condition.  · Will get help right away if you are not doing well or get worse.     Rivaroxaban oral tablets  What is this medicine?  RIVAROXABAN (ri va GRACE a ban) is an anticoagulant (blood thinner). It is used to treat blood clots in  the lungs or in the veins. It is also used after knee or hip surgeries to prevent blood clots. It is also used to lower the chance of stroke in people with a medical condition called atrial fibrillation.  This medicine may be used for other purposes; ask your health care provider or pharmacist if you have questions.  COMMON BRAND NAME(S): Xarelto, Xarelto Starter Pack  What should I tell my health care provider before I take this medicine?  They need to know if you have any of these conditions:  -bleeding disorders  -bleeding in the brain  -blood in your stools (black or tarry stools) or if you have blood in your vomit  -history of stomach bleeding  -kidney disease  -liver disease  -low blood counts, like low white cell, platelet, or red cell counts  -recent or planned spinal or epidural procedure  -take medicines that treat or prevent blood clots  -an unusual or allergic reaction to rivaroxaban, other medicines, foods, dyes, or preservatives  -pregnant or trying to get pregnant  -breast-feeding  How should I use this medicine?  Take this medicine by mouth with a glass of water. Follow the directions on the prescription label. Take your medicine at regular intervals. Do not take it more often than directed. Do not stop taking except on your doctor's advice. Stopping this medicine may increase your risk of a blood clot. Be sure to refill your prescription before you run out of medicine.  If you are taking this medicine after hip or knee replacement surgery, take it with or without food. If you are taking this medicine for atrial fibrillation, take it with your evening meal. If you are taking this medicine to treat blood clots, take it with food at the same time each day. If you are unable to swallow your tablet, you may crush the tablet and mix it in applesauce. Then, immediately eat the applesauce. You should eat more food right after you eat the applesauce containing the crushed tablet.  Talk to your pediatrician  regarding the use of this medicine in children. Special care may be needed.  Overdosage: If you think you have taken too much of this medicine contact a poison control center or emergency room at once.  NOTE: This medicine is only for you. Do not share this medicine with others.  What if I miss a dose?  If you take your medicine once a day and miss a dose, take the missed dose as soon as you remember. If you take your medicine twice a day and miss a dose, take the missed dose immediately. In this instance, 2 tablets may be taken at the same time. The next day you should take 1 tablet twice a day as directed.  What may interact with this medicine?  Do not take this medicine with any of the following medications:  -defibrotide  This medicine may also interact with the following medications:  -aspirin and aspirin-like medicines  -certain antibiotics like erythromycin, azithromycin, and clarithromycin  -certain medicines for fungal infections like ketoconazole and itraconazole  -certain medicines for irregular heart beat like amiodarone, quinidine, dronedarone  -certain medicines for seizures like carbamazepine, phenytoin  -certain medicines that treat or prevent blood clots like warfarin, enoxaparin, and dalteparin  -conivaptan  -diltiazem  -felodipine  -indinavir  -lopinavir; ritonavir  -NSAIDS, medicines for pain and inflammation, like ibuprofen or naproxen  -ranolazine  -rifampin  -ritonavir  -SNRIs, medicines for depression, like desvenlafaxine, duloxetine, levomilnacipran, venlafaxine  -SSRIs, medicines for depression, like citalopram, escitalopram, fluoxetine, fluvoxamine, paroxetine, sertraline  -[place]'s wort  -verapamil  This list may not describe all possible interactions. Give your health care provider a list of all the medicines, herbs, non-prescription drugs, or dietary supplements you use. Also tell them if you smoke, drink alcohol, or use illegal drugs. Some items may interact with your medicine.  What  should I watch for while using this medicine?  Visit your doctor or health care professional for regular checks on your progress.  Notify your doctor or health care professional and seek emergency treatment if you develop breathing problems; changes in vision; chest pain; severe, sudden headache; pain, swelling, warmth in the leg; trouble speaking; sudden numbness or weakness of the face, arm or leg. These can be signs that your condition has gotten worse.  If you are going to have surgery or other procedure, tell your doctor that you are taking this medicine.  What side effects may I notice from receiving this medicine?  Side effects that you should report to your doctor or health care professional as soon as possible:  -allergic reactions like skin rash, itching or hives, swelling of the face, lips, or tongue  -back pain  -redness, blistering, peeling or loosening of the skin, including inside the mouth  -signs and symptoms of bleeding such as bloody or black, tarry stools; red or dark-brown urine; spitting up blood or brown material that looks like coffee grounds; red spots on the skin; unusual bruising or bleeding from the eye, gums, or nose  Side effects that usually do not require medical attention (report to your doctor or health care professional if they continue or are bothersome):  -dizziness  -muscle pain  This list may not describe all possible side effects. Call your doctor for medical advice about side effects. You may report side effects to FDA at 7-971-FDA-4644.  Where should I keep my medicine?  Keep out of the reach of children.  Store at room temperature between 15 and 30 degrees C (59 and 86 degrees F). Throw away any unused medicine after the expiration date.  NOTE: This sheet is a summary. It may not cover all possible information. If you have questions about this medicine, talk to your doctor, pharmacist, or health care provider.  © 2018 [employer] (2017-09-06  16:29:33)    Discharge Instructions    Discharged to home by car with self. Discharged via wheelchair, hospital escort: Yes.  Special equipment needed: Not Applicable    Be sure to schedule a follow-up appointment with your primary care doctor or any specialists as instructed.     Discharge Plan:   Diet Plan: Discussed  Activity Level: Discussed  Smoking Cessation Offered: Patient Refused  Confirmed Follow up Appointment: No (Comments)(pt transferred to LT[facility])  Confirmed Symptoms Management: Discussed  Medication Reconciliation Updated: Yes  Influenza Vaccine Indication: Patient Refuses    I understand that a diet low in cholesterol, fat, and sodium is recommended for good health. Unless I have been given specific instructions below for another diet, I accept this instruction as my diet prescription.   Other diet: Cardiac    Special Instructions:   Deep Vein Thrombosis Discharge Instructions    A deep vein thrombosis (DVT) is a blood clot (thrombus) that develops in a deep vein. A DVT is a clot in the deep, larger veins of the leg, arm, or pelvis. These are more dangerous than clots that might form in veins on the surface of the body. Deep vein thrombosis can lead to complications if the clot breaks off and travels in the bloodstream to the lungs.     CAUSES  Blood clots form in a vein for different reasons. Usually several things cause blood clots. They include:   · The flow of blood slows down.   · The inside of the vein is damaged in some way.   · The person has a condition that makes blood clot more easily. These conditions may include:  · Older age (especially over 75 years old).  · Having a history of blood clots.  · Having major or lengthy surgery. Hip surgery is particularly high-risk.   · Breaking a hip or leg.  · Sitting or lying still for a long time.  · Cancer or cancer treatment.  · Having a long, thin tube (catheter) placed inside a vein during a medical procedure.   · Being overweight  (obese).  · Pregnancy and childbirth.  · Medicines with estrogen.  · Smoking.  · Other circulation or heart problems.     SYMPTOMS  When a clot forms, it can either partially or totally block the blood flow in that vein. Symptoms of a DVT can include:  · Swelling of the leg or arm, especially if one side is much worse.  · Warmth and redness of the leg or arm, especially if one side is much worse.   · Pain in an arm or leg. If the clot is in the leg, symptoms may be more noticeable or worse when standing or walking.  If the blood clot travels to the lung, it may cause:  · Shortness of breath.  · Chest pain. The pain may be worsened by deep breaths.   · Coughing up thick mucus (phlegm), possibly flecked with blood.   Anyone with these symptoms should get emergency medical treatment right away. Call your local emergency  Services (911 in [place] if you have these symptoms.     DIAGNOSIS  If a DVT is suspected, your caregiver will take a full medical history. He or she will also perform a physical exam. Tests that also may be required include:   · Studies of the clotting properties of the blood.   · An ultrasound scan.   · X-rays to show the flow of blood when special dye is injected into the veins (venography).   · Studies of your lungs if you have any chest symptoms.     PREVENTION  · Exercise the legs regularly. Take a brisk 30 minute walk every day.   · Maintain a weight that is appropriate for your height.  · Avoid sitting or lying in bed for long periods of time without moving your legs.   · Women, particularly those over the age of 35, should consider the risks and benefits of taking estrogen medicine, including birth control pills.   · Do not smoke, especially if you take estrogen medicines.   · Long-distance travel can increase your risk. You should exercise your legs by walking or pumping the muscles every hour.   · In hospital prevention: Prevention may include medical and non medical measures.      TREATMENT  · The most common treatment for DVT is blood thinning (anticoagulant) medicine, which reduces the blood's tendency to clot. Anticoagulants can stop new blood clots from forming and old ones from growing. They cannot dissolve existing clots. Your body does this by itself over time. Anticoagulants can be given by mouth, by intravenous (IV) access, or by injection. Your caregiver will determine the best program for you.   · Less commonly, clot-dissolving drugs (thrombolytics) are used to dissolve a DVT. They carry a high risk of bleeding, so they are used mainly in severe cases.   · Very rarely, a blood clot in the leg needs to be removed surgically.   · If you are unable to take anticoagulants, your caregiver may arrange for you to have a filter placed in a main vein in your belly (abdomen). This filter prevents clots from traveling to your lungs.     HOME CARE INSTRUCTIONS  Take all medicines prescribed by your caregiver. Follow the directions carefully.   · You will most likely continue taking anticoagulants after you leave the hospital. Your caregiver will advise you on the length of treatment (usually 3 to 5 months, sometimes for life).   · Taking too much or too little of an anticoagulant is dangerous. While taking this type of medicine, you will need to have regular blood tests to be sure the dose is correct. The dose can change for many reasons. It is critically important that you take this medicine exactly as prescribed, and that you have blood tests exactly as directed.   · Many foods can interfere with anticoagulants. These include foods high in vitamin K, such as spinach, kale, broccoli, cabbage, shannon and turnip greens, [place] sprouts, peas, cauliflower, seaweed, parsley, beef and pork liver, green tea, and soybean oil. Your caregiver should discuss limits on these foods with you or you should arrange a visit with a dietician to answer your questions.   · Many medicines can interfere  with anticoagulants. You must tell your caregiver about any and all medicines you take. This includes all vitamins and supplements. Be especially cautious with aspirin and anti-inflammatory medicines. Ask your caregiver before taking these.   · Anticoagulants can have side effects, mostly excessive bruising or bleeding. You will need to hold pressure over cuts for longer than usual. Avoid alcoholic drinks or consume only very small amounts while taking this medicine.    If you are taking an anticoagulant:  · Wear a medical alert bracelet.  · Notify your dentist or other caregivers before procedures.  · Avoid contact sports.    · Ask your caregiver how soon you can go back to normal activities. Not being active can lead to new clots. Ask for a list of what you should and should not do.   · Exercise your lower leg muscles. This is important while traveling.   · You may need to wear compression stockings. These are tight elastic stockings that apply pressure to the lower legs. This can help keep the blood in the legs from clotting.   · If you are a smoker, you should quit.   · Learn as much as you can about DVT.     SEEK MEDICAL CARE IF:  · You have unusual bruising or any bleeding problems.  · The swelling or pain in your affected arm or leg is not gradually improving.   · You anticipate surgery or long-distance travel. You should get specific advice on DVT prevention.   · You discover other family members with blood clots. This may require further testing for inherited diseases or conditions.     SEEK IMMEDIATE MEDICAL CARE IF:  · You develop chest pain.  · You develop severe shortness of breath.  · You begin to cough up bloody mucus or phlegm (sputum).  · You feel dizzy or faint.   · You develop swelling or pain in the leg.  · You have breathing problems after traveling.    MAKE SURE YOU:  · Understand these instructions.  · Will watch your condition.  · Will get help right away if you are not doing well or get  worse.       · Is patient discharged on Warfarin / Coumadin?   No     Depression / Suicide Risk    As you are discharged from this [facility] facility, it is important to learn how to keep safe from harming yourself.    Recognize the warning signs:  · Abrupt changes in personality, positive or negative- including increase in energy   · Giving away possessions  · Change in eating patterns- significant weight changes-  positive or negative  · Change in sleeping patterns- unable to sleep or sleeping all the time   · Unwillingness or inability to communicate  · Depression  · Unusual sadness, discouragement and loneliness  · Talk of wanting to die  · Neglect of personal appearance   · Rebelliousness- reckless behavior  · Withdrawal from people/activities they love  · Confusion- inability to concentrate     If you or a loved one observes any of these behaviors or has concerns about self-harm, here's what you can do:  · Talk about it- your feelings and reasons for harming yourself  · Remove any means that you might use to hurt yourself (examples: pills, rope, extension cords, firearm)  · Get professional help from the community (Mental Health, Substance Abuse, psychological counseling)  · Do not be alone:Call your Safe Contact- someone whom you trust who will be there for you.  · Call your local CRISIS HOTLINE 122-0893 or 498-379-3261  · Call your local Children's Mobile Crisis Response Team [place] (968) 152-0785 or www.iBuyitBetter  · Call the toll free National Suicide Prevention Hotlines   · National Suicide Prevention Lifeline 871-694-KVHO (0669)  · National Hope Line Network 800-SUICIDE (801-3795)

## 2020-03-06 NOTE — PROGRESS NOTES
Hospital Medicine Daily Progress Note    Date of Service  3/5/2020    Chief Complaint  59 y.o. male admitted 3/2/2020 with left leg pain.    Hospital Course    Mr Ponce has a history of coronary artery disease, dyslipidemia, and hypertension.  Patient presented with painful swollen left lower extremity and was found to have extensive DVT.  Vascular surgery was consulted and recommend IR thrombolysis.  The patient had angio jet thrombolysis and EKOS catheter placed on 3/3/2020.  Thrombolytic treatment continued but was stopped on 3/5/2020 due to bleeding         Interval Problem Update  Overnight the patient had bleeding from EKOS catheter sheath, alteplase was stopped, EKOS  This morning he went to IR to have the sheath removed and venogram perfomed  Patient's leg feels significantly better than on admission, it is uncomfortable to lie flat  No chest pain, no shortness of    Consultants/Specialty  Vascular surgery  Interventional radiology    Code Status  Full code    Disposition  Patient can likely be discharged home pending definitive treatment for his extensive DVT    Review of Systems  Review of Systems   Constitutional: Negative for chills and malaise/fatigue.   Respiratory: Negative for cough, hemoptysis and sputum production.    Cardiovascular: Positive for leg swelling. Negative for chest pain, palpitations and orthopnea.   Gastrointestinal: Negative for nausea and vomiting.   Skin: Negative for itching and rash.   Neurological: Negative for dizziness.   All other systems reviewed and are negative.       Physical Exam  Temp:  [36.7 °C (98.1 °F)-37 °C (98.6 °F)] 36.7 °C (98.1 °F)  Pulse:  [67-93] 87  Resp:  [11-26] 22  BP: (109-156)/(58-92) 111/71  SpO2:  [88 %-99 %] 91 %    Physical Exam  Constitutional:       General: He is not in acute distress.     Appearance: Normal appearance. He is normal weight.   HENT:      Head: Normocephalic and atraumatic.      Right Ear: External ear normal.      Left Ear:  External ear normal.      Nose: Nose normal.      Mouth/Throat:      Mouth: Mucous membranes are moist.      Pharynx: Oropharynx is clear.   Eyes:      Extraocular Movements: Extraocular movements intact.      Conjunctiva/sclera: Conjunctivae normal.      Pupils: Pupils are equal, round, and reactive to light.   Neck:      Musculoskeletal: Normal range of motion and neck supple.   Cardiovascular:      Rate and Rhythm: Normal rate and regular rhythm.      Pulses: Normal pulses.   Pulmonary:      Effort: Pulmonary effort is normal.      Breath sounds: Normal breath sounds.   Abdominal:      General: Abdomen is flat. Bowel sounds are normal.      Palpations: Abdomen is soft.   Musculoskeletal: Normal range of motion.         General: Swelling present.   Skin:     General: Skin is warm and dry.      Capillary Refill: Capillary refill takes less than 2 seconds.      Coloration: Skin is not jaundiced.   Neurological:      General: No focal deficit present.      Mental Status: He is alert and oriented to person, place, and time.      Cranial Nerves: No cranial nerve deficit.      Gait: Gait normal.   Psychiatric:         Mood and Affect: Mood normal.         Behavior: Behavior normal.         Fluids    Intake/Output Summary (Last 24 hours) at 3/5/2020 1759  Last data filed at 3/5/2020 1600  Gross per 24 hour   Intake 3081.58 ml   Output 5090 ml   Net -2008.42 ml       Laboratory  Recent Labs     03/04/20  0255  03/04/20  1810 03/04/20 2023 03/05/20  0000 03/05/20  0612   WBC 7.7   < > 8.2 8.1 7.9 7.3   RBC 4.37*   < > 4.41* 4.44* 4.04* 3.92*   HEMOGLOBIN 14.8   < > 15.0 15.1 13.6* 13.3*   HEMATOCRIT 42.8   < > 43.3 43.6 39.5* 38.8*   MCV 97.9*   < > 98.2* 98.2* 97.8 99.0*   MCH 33.9*   < > 34.0* 34.0* 33.7* 33.9*   MCHC 34.6   < > 34.6 34.6 34.4 34.3   RDW 54.4*   < > 55.1* 54.0* 54.1* 56.0*   PLATELETCT 108*  --  101*  --  94*  --    MPV 10.3  --  11.0  --  10.5  --     < > = values in this interval not displayed.      Recent Labs     03/04/20  0255 03/04/20  1630 03/05/20  0612   SODIUM 136 138 136   POTASSIUM 4.7 3.3* 5.3   CHLORIDE 108 104 107   CO2 17* 19* 19*   GLUCOSE 81 93 80   BUN 15 16 14   CREATININE 1.58* 1.28 0.98   CALCIUM 7.7* 8.3* 7.7*     Recent Labs     03/04/20  0457 03/04/20  1350 03/04/20 2023 03/05/20  0612   APTT 55.2* 42.1* 44.1* 45.7*   INR 1.33*  --   --   --                Imaging  IR-EXTREMITY VENOGRAM-UNILATERAL LEFT   Final Result      1.  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 The iliac veins are patent. There is antegrade flow into the IVC.   2.  Long-term anticoagulation therapy is recommended.      IR-EXTREMITY VENOGRAM-UNILATERAL LEFT   Final Result      1.  Partial recanalization of the left femoral vein.   2.  AngioJet thrombolysis.   3.  Postprocedural venogram demonstrates some improvement in the left femoral venous stenosis indicating partial recanalization.   4.  TPA infusion was started at the rate 0.5 mg per hour. The catheter and the sheath was secured in place. The patient was transferred to the ICU in stable condition.      IR-THROMBO VEIN MECHANICAL,INIT   Final Result      1.  Left femoral and iliac venous thrombosis.   2.  EKOS TPA thrombolysis was started at the rate of 0.5 mg per hour. 5 mg of heparin infusion was started through the left popliteal sheath.   3.  Plan: Venogram of every 18-24 hours of continuous TPA infusion through the EKOS catheter.      CT-CTA CHEST PULMONARY ARTERY W/ RECONS   Final Result      1.  No evidence of pulmonary embolism.      2.  Clear lung parenchyma.            US-EXTREMITY VENOUS LOWER UNILAT LEFT   Final Result           Assessment/Plan  * Acute deep vein thrombosis (DVT) of iliac vein of left lower extremity (HCC)- (present on admission)  Assessment & Plan  Has extensive DVT  Appreciate vascular and IR consultation  EKOS I had to be stopped due to bleeding, catheter and  sheath now removed  I discussed with Dr. Stockton of IR, the patient has narrowing of his femoral vein that is likely the cause of the clot, lifelong anticoagulation recommended  Continue heparin drip  Rate of heparin drip will be adjusted based on serial pTT measurements to ensure adequate anticoagulation and to avoid potential complications of bleeding  Likely to start DOAC tomorrow if no further signs of bleeding        Essential hypertension- (present on admission)  Assessment & Plan  Metoprolol and losartan    Coronary artery disease involving coronary bypass graft of native heart without angina pectoris- (present on admission)  Assessment & Plan  Remote history of CABG  Continue aspirin, metoprolol, losartan, and statin.    Dyslipidemia- (present on admission)  Assessment & Plan  Atorvastatin    Tobacco abuse- (present on admission)  Assessment & Plan  Cessation recommended    MAXIMO (acute kidney injury) (HCC)- (present on admission)  Assessment & Plan  IV fluids       VTE prophylaxis: heparin

## 2020-03-06 NOTE — CARE PLAN
Problem: Safety  Goal: Will remain free from falls  Outcome: PROGRESSING AS EXPECTED  Note: Patient ambulated to the chair safely after his 6 hour bedrest was complete. He was a standby assist      Problem: Skin Integrity  Goal: Risk for impaired skin integrity will decrease  Outcome: PROGRESSING AS EXPECTED  Note: Patient is now able to ambulate and move around. Skin integrity remained intact while on bedrest.

## 2020-03-06 NOTE — DISCHARGE SUMMARY
Discharge Summary    CHIEF COMPLAINT ON ADMISSION  Chief Complaint   Patient presents with   • Leg Swelling   • Leg Pain       Reason for Admission  Leg Swelling     Admission Date  3/2/2020    CODE STATUS  Full Code    HPI & HOSPITAL COURSE  This is a 59 y.o. male here with left leg pain and swelling.     Mr Ponce has a history of coronary artery disease, dyslipidemia, and hypertension.  Patient presented with painful swollen left lower extremity and was found to have extensive DVT.  Vascular surgery was consulted and recommend IR thrombolysis.  The patient had angio jet thrombolysis and EKOS catheter placed on 3/3/2020.  Thrombolytic treatment continued but was stopped on 3/5/2020 due to bleeding.  The patient's symptoms of swelling and pain have improved significantly, he is really not having any discomfort at this time.  He has been transitioned to PO xarelto and we have discussed the risks and benefits of DOAC therapy.  Results of venogram are described below, I discussed the case with interventional radiologist who performed the procedure Dr. Stockton on 3/5/2020.  The patient's femoral vein is narrowed and lifelong anticoagulation is recommended at this time.    Therefore, he is discharged in fair and stable condition to home with close outpatient follow-up.    The patient met 2-midnight criteria for an inpatient stay at the time of discharge.    Discharge Date  3/6/2020    FOLLOW UP ITEMS POST DISCHARGE  Follow up with PCP and anticoagulation clinic    DISCHARGE DIAGNOSES  Principal Problem:    Acute deep vein thrombosis (DVT) of iliac vein of left lower extremity (HCC) POA: Yes  Active Problems:    Dyslipidemia POA: Yes    Coronary artery disease involving coronary bypass graft of native heart without angina pectoris POA: Yes    Essential hypertension POA: Yes    Prediabetes POA: Yes    Tobacco abuse POA: Yes  Resolved Problems:    MAXIMO (acute kidney injury) (HCC) POA: Yes      FOLLOW UP  No future  appointments.  No follow-up provider specified.    MEDICATIONS ON DISCHARGE     Medication List      START taking these medications      Instructions   Xarelto Starter Pack 15 & 20 MG Tbpk  Start taking on:  March 6, 2020  Generic drug:  Rivaroxaban   Take 15 mg by mouth 2 Times a Day for 21 days, THEN 20 mg every day for 7 days.        CONTINUE taking these medications      Instructions   amLODIPine 10 MG Tabs  Commonly known as:  NORVASC   Take 0.5 Tabs by mouth 2 Times a Day.  Dose:  5 mg     aspirin EC 81 MG Tbec  Commonly known as:  ECOTRIN   Take 1 Tab by mouth every day.  Dose:  81 mg     atorvastatin 80 MG tablet  Commonly known as:  LIPITOR   Doctor's comments:  Please remind pt to get labs done  TAKE ONE TABLET BY MOUTH AT BEDTIME     hydrALAZINE 50 MG Tabs  Commonly known as:  APRESOLINE   Take  mg by mouth 2 Times a Day. 50 mg in AM  100 mg at BEDTIME  Dose:   mg     losartan 100 MG Tabs  Commonly known as:  COZAAR   Take 1 Tab by mouth every evening. For further refills please contact new cardiologist. Thank you  Dose:  100 mg     metoprolol  MG Tb24  Commonly known as:  TOPROL XL   Take 1 Tab by mouth every bedtime.  Dose:  100 mg     multivitamin Tabs   Take 1 Tab by mouth every day.  Dose:  1 Tab     nitroglycerin 0.4 MG Subl  Commonly known as:  NITROSTAT   Place 1 Tab under tongue as needed for Chest Pain.  Dose:  0.4 mg            Allergies  No Known Allergies    DIET  Orders Placed This Encounter   Procedures   • Diet Order Consistent Carbohydrate, Cardiac     Standing Status:   Standing     Number of Occurrences:   1     Order Specific Question:   Diet:     Answer:   Consistent Carbohydrate [4]     Order Specific Question:   Diet:     Answer:   Cardiac [6]       ACTIVITY  As tolerated., avoid activities that could result in trauma now that he is on a blood thinner  Weight bearing as tolerated    CONSULTATIONS  Vascular surgery  Interventional Radiology    PROCEDURES    LE  ultrasound 3/2/2020:   Occluded acute or sub-actue thrombus indicated in the external iliac,    common femoral, profunda and  popliteal veins.    Flow indicated in the calf veins.    The inferior veno cava vein is patent.    Common iliac vein was not visualized due to bowel gas.    Flow was evaluated in the contralateral common femoral vein and normal    venous flow dynamics including spontaneous flow, respiratory phasic    variation and augmentation were demonstrated.      Venogram 3/3/2020:  1.  Left femoral and iliac venous thrombosis.  2.  EKOS TPA thrombolysis was started at the rate of 0.5 mg per hour. 5 mg of heparin infusion was started through the left popliteal sheath.  3.  Plan: Venogram of every 18-24 hours of continuous TPA infusion through the EKOS catheter.    Venogram 3/4/2020:  1.  Partial recanalization of the left femoral vein.  2.  AngioJet thrombolysis.  3.  Postprocedural venogram demonstrates some improvement in the left femoral venous stenosis indicating partial recanalization.  4.  TPA infusion was started at the rate 0.5 mg per hour. The catheter and the sheath was secured in place. The patient was transferred to the ICU in stable condition.    Venogram 3/5/2020:  1.  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 The iliac veins are patent. There is antegrade flow into the IVC.  2.  Long-term anticoagulation therapy is recommended.    LABORATORY  Lab Results   Component Value Date    SODIUM 138 03/06/2020    POTASSIUM 3.1 (L) 03/06/2020    CHLORIDE 105 03/06/2020    CO2 21 03/06/2020    GLUCOSE 105 (H) 03/06/2020    BUN 13 03/06/2020    CREATININE 1.14 03/06/2020        Lab Results   Component Value Date    WBC 8.9 03/06/2020    HEMOGLOBIN 14.1 03/06/2020    HEMATOCRIT 41.0 (L) 03/06/2020    PLATELETCT 101 (L) 03/06/2020        Total time of the discharge process exceeds 45 minutes.

## 2020-03-09 ENCOUNTER — TELEPHONE (OUTPATIENT)
Dept: VASCULAR LAB | Facility: MEDICAL CENTER | Age: 60
End: 2020-03-09

## 2020-03-09 NOTE — TELEPHONE ENCOUNTER
Received anticoagulation referral for Xarelto due to DVT from Dr. Lalit Gandhi on 03/06/20.    Please call and schedule new anticoagulation visit for the next 2 weeks.    Insurance: [employer]  PCP: [facility]  Locations to be seen: [facility] at [phone], fax [phone]    Corazon Garnica, NavinD

## 2020-03-11 ENCOUNTER — TELEPHONE (OUTPATIENT)
Dept: VASCULAR LAB | Facility: MEDICAL CENTER | Age: 60
End: 2020-03-11

## 2020-03-11 NOTE — TELEPHONE ENCOUNTER
1X- LFT     Received anticoagulation referral for Xarelto due to DVT from Dr. Lalit Gandhi on 03/06/20.     Please call and schedule new anticoagulation visit for the next 2 weeks.     Insurance: [employer]  PCP: [facility]  Locations to be seen: [facility] at [phone], fax [phone]     Corazon Garnica, NavinD

## 2020-03-12 ENCOUNTER — ANTICOAGULATION MONITORING (OUTPATIENT)
Dept: VASCULAR LAB | Facility: MEDICAL CENTER | Age: 60
End: 2020-03-12

## 2020-03-12 ENCOUNTER — OFFICE VISIT (OUTPATIENT)
Dept: MEDICAL GROUP | Facility: PHYSICIAN GROUP | Age: 60
End: 2020-03-12
Payer: COMMERCIAL

## 2020-03-12 VITALS
HEIGHT: 74 IN | BODY MASS INDEX: 24.18 KG/M2 | WEIGHT: 188.4 LBS | OXYGEN SATURATION: 94 % | TEMPERATURE: 98.8 F | SYSTOLIC BLOOD PRESSURE: 158 MMHG | HEART RATE: 95 BPM | DIASTOLIC BLOOD PRESSURE: 86 MMHG

## 2020-03-12 DIAGNOSIS — Z11.59 NEED FOR HEPATITIS C SCREENING TEST: ICD-10-CM

## 2020-03-12 DIAGNOSIS — I10 ESSENTIAL HYPERTENSION: ICD-10-CM

## 2020-03-12 DIAGNOSIS — R73.03 PREDIABETES: ICD-10-CM

## 2020-03-12 DIAGNOSIS — I82.422 ACUTE DEEP VEIN THROMBOSIS (DVT) OF ILIAC VEIN OF LEFT LOWER EXTREMITY (HCC): ICD-10-CM

## 2020-03-12 DIAGNOSIS — Z95.1 HX OF CABG: ICD-10-CM

## 2020-03-12 DIAGNOSIS — Z12.5 SCREENING FOR PROSTATE CANCER: ICD-10-CM

## 2020-03-12 DIAGNOSIS — I25.810 CORONARY ARTERY DISEASE INVOLVING CORONARY BYPASS GRAFT OF NATIVE HEART WITHOUT ANGINA PECTORIS: ICD-10-CM

## 2020-03-12 PROCEDURE — 99214 OFFICE O/P EST MOD 30 MIN: CPT | Performed by: FAMILY MEDICINE

## 2020-03-12 RX ORDER — LOSARTAN POTASSIUM 100 MG/1
100 TABLET ORAL EVERY EVENING
Qty: 90 TAB | Refills: 3 | Status: SHIPPED | OUTPATIENT
Start: 2020-03-12 | End: 2022-01-01

## 2020-03-12 RX ORDER — HYDRALAZINE HYDROCHLORIDE 50 MG/1
TABLET, FILM COATED ORAL
Qty: 270 TAB | Refills: 3 | Status: SHIPPED | OUTPATIENT
Start: 2020-03-12 | End: 2021-12-05

## 2020-03-12 ASSESSMENT — FIBROSIS 4 INDEX: FIB4 SCORE: 5.49

## 2020-03-12 ASSESSMENT — PATIENT HEALTH QUESTIONNAIRE - PHQ9: CLINICAL INTERPRETATION OF PHQ2 SCORE: 0

## 2020-03-12 NOTE — PROGRESS NOTES
[facility]    Pt was seen by our Clinical Pharmacist as a quick referral by his PCP.  Pt has not started Xarelto 15 mg, but will be able to be seen in our clinic on 3/18/20 for an initial appointment. Pt provided with samples Xarelto 15 mg #14 until he can be evaluated at his initial appointment.     Mark Eddy, PharmD

## 2020-03-12 NOTE — PROGRESS NOTES
cc: recent DVT      Subjective:     Jose A Ponce is a 59 y.o. male presenting for the following:     HTN: Patient was discharged from hospital with hydralazine, losartan, metoprolol, amlodipine for blood pressure.  He admits that he does not always remember to take all of them but overall has been doing well. Patient denies any chest pain, shortness of breath, palpitations, swelling, or lightheadedness.    DVT: Patient with diagnosis of left leg DVT 10 days ago.  This was extensive.  And patient's femoral vein was narrowed and lifelong anticoagulation was recommended.    He did have some problems getting the Xarelto.  He was prescribed a Xarelto starter pack and his pharmacy did not have this.  So he has been taking an aspirin daily but no other anticoagulation since discharge.  He was unaware that he was referred to the anticoagulation clinic.    He has been feeling well.  The leg swelling has improved since his hospitalization.  He does not have any new shortness of breath, cough, chest pain.    Review of systems:  All others reviewed and are negative.       Current Outpatient Medications:   •  losartan (COZAAR) 100 MG Tab, Take 1 Tab by mouth every evening. For further refills please contact new cardiologist. Thank you, Disp: 90 Tab, Rfl: 3  •  hydrALAZINE (APRESOLINE) 50 MG Tab, 50 mg in AM and 100 mg at BEDTIME, Disp: 270 Tab, Rfl: 3  •  atorvastatin (LIPITOR) 80 MG tablet, TAKE ONE TABLET BY MOUTH AT BEDTIME, Disp: 90 Tab, Rfl: 0  •  amLODIPine (NORVASC) 10 MG Tab, Take 0.5 Tabs by mouth 2 Times a Day., Disp: 90 Tab, Rfl: 0  •  metoprolol SR (TOPROL XL) 100 MG TABLET SR 24 HR, Take 1 Tab by mouth every bedtime., Disp: 90 Tab, Rfl: 0  •  nitroglycerin (NITROSTAT) 0.4 MG SL Tab, Place 1 Tab under tongue as needed for Chest Pain., Disp: 25 Tab, Rfl: 11  •  aspirin EC (ECOTRIN) 81 MG Tablet Delayed Response, Take 1 Tab by mouth every day., Disp: 30 Tab, Rfl: 11  •  multivitamin (THERAGRAN) Tab, Take 1 Tab  "by mouth every day., Disp: , Rfl:   •  rivaroxaban (XARELTO) 20 MG Tab tablet, Take 1 Tab by mouth with dinner., Disp: 30 Tab, Rfl: 2    Allergies, past medical history, past surgical history, family history, social history reviewed and updated    Objective:     Vitals: /86 (BP Location: Right arm, Patient Position: Sitting, BP Cuff Size: Adult)   Pulse 95   Temp 37.1 °C (98.8 °F) (Temporal)   Ht 1.88 m (6' 2\")   Wt 85.5 kg (188 lb 6.4 oz)   SpO2 94%   BMI 24.19 kg/m²   General: Alert, pleasant, NAD  HEENT: Normocephalic.   EOMI, no icterus or pallor.   Heart: Regular rate and rhythm.  S1 and S2 normal.   Respiratory: Normal respiratory effort.  Clear to auscultation bilaterally.  Abdomen: Non-distended, soft  Skin: Warm, dry, no rashes in exposed areas.  Extremities: Left leg with swelling greater than right.  Mildly tender to palpation.    Assessment/Plan:     Jose A was seen today for follow-up.    Diagnoses and all orders for this visit:    Acute deep vein thrombosis (DVT) of iliac vein of left lower extremity (HCC): Spoke to pharmacist today so we can get patient back on anticoagulation ASAP.  Patient given phone number for anticoagulation clinic and to go to pharmacy today to get this medication.  He does understand the importance of this.    Blood pressure not well controlled today but patient is not 100% sure that he is taking all of his medications.  Encouraged him to take medications regularly again followed by cardiology for his history of coronary artery disease and poorly controlled blood pressure.  Essential hypertension  -     losartan (COZAAR) 100 MG Tab; Take 1 Tab by mouth every evening. For further refills please contact new cardiologist. Thank you  -     hydrALAZINE (APRESOLINE) 50 MG Tab; 50 mg in AM and 100 mg at BEDTIME  Hx of CABG  -     losartan (COZAAR) 100 MG Tab; Take 1 Tab by mouth every evening. For further refills please contact new cardiologist. Thank you  Coronary artery " disease involving coronary bypass graft of native heart without angina pectoris  -     REFERRAL TO CARDIOLOGY    Prediabetes: We will recheck hemoglobin A1c to ensure this has not worsened.  -     HEMOGLOBIN A1C; Future    Need for hepatitis C screening test  -     HCV Scrn ( [id number] 1xLife); Future    Screening for prostate cancer  Discussed PSA screening today with patient and he is not interested in this. Discussed colon cancer screening and he also refuses this. Will let us know if he changes his mind or develops symptoms. He also is not interested in lung cancer screening curently but he did have CT chest in the hospital recently.     Return in about 4 weeks (around 2020), or if symptoms worsen or fail to improve, for Blood pressure.

## 2020-03-18 ENCOUNTER — TELEPHONE (OUTPATIENT)
Dept: VASCULAR LAB | Facility: MEDICAL CENTER | Age: 60
End: 2020-03-18

## 2020-03-18 ENCOUNTER — ANTICOAGULATION VISIT (OUTPATIENT)
Dept: VASCULAR LAB | Facility: MEDICAL CENTER | Age: 60
End: 2020-03-18
Attending: INTERNAL MEDICINE
Payer: COMMERCIAL

## 2020-03-18 DIAGNOSIS — E78.5 DYSLIPIDEMIA: ICD-10-CM

## 2020-03-18 DIAGNOSIS — I25.810 CORONARY ARTERY DISEASE INVOLVING CORONARY BYPASS GRAFT OF NATIVE HEART WITHOUT ANGINA PECTORIS: ICD-10-CM

## 2020-03-18 DIAGNOSIS — I82.422 ACUTE DEEP VEIN THROMBOSIS (DVT) OF ILIAC VEIN OF LEFT LOWER EXTREMITY (HCC): ICD-10-CM

## 2020-03-18 DIAGNOSIS — I10 ESSENTIAL HYPERTENSION: ICD-10-CM

## 2020-03-18 LAB
INR BLD: 2 (ref 0.9–1.2)
INR PPP: 2 (ref 2–3.5)

## 2020-03-18 PROCEDURE — 99213 OFFICE O/P EST LOW 20 MIN: CPT

## 2020-03-18 PROCEDURE — 85610 PROTHROMBIN TIME: CPT

## 2020-03-18 NOTE — PROGRESS NOTES
NEW DOAC   .  Anticoagulation Summary  As of 3/18/2020    INR goal:      TTR:   --   INR used for dosin.00 (3/18/2020)   Warfarin maintenance plan:   No maintenance plan   Next INR check:   2020   Target end date:   2020    Indications    Acute deep vein thrombosis (DVT) of iliac vein of left lower extremity (HCC) [I82.422]             Anticoagulation Episode Summary     INR check location:       Preferred lab:       Send INR reminders to:       Comments:         Anticoagulation Care Providers     Provider Role Specialty Phone number    Lalit Gandhi M.D. Referring Internal Medicine [phone]    [facility] Responsible  [phone]        Anticoagulation Patient Findings      PCP: Edwina Chin D.O.  Cardiologist: None  Dx:First DVT diagnosis of the LLE.  Denies any pervious family or personal hx of DVT.   Fell on the ice January, pulling his hamstring and hurt his calf.  Denies recent traveling.  Denies HRT or IV drug use.     Pt contacted the clinic on 3/12 stating he had not started his Xarelto (even though he was discharged from the hospital on 3/6).  He reported no new or worsening s/s of DVT.  Pt didn't  Xarelto because he forgot and didn't feel comfortable.  Pt does not report cost is a barrier for him.     Today he reports no new or worsening s/s of DVT, SOB, or CP.    CHADSVASC = n/a  HAS-BLED = n/a     Pt Hx: (from Discharge note):    Mr Ponce has a history of coronary artery disease, dyslipidemia, and hypertension.  Patient presented with painful swollen left lower extremity and was found to have extensive DVT.  Vascular surgery was consulted and recommend IR thrombolysis.  The patient had angio jet thrombolysis and EKOS catheter placed on 3/3/2020.  Thrombolytic treatment continued but was stopped on 3/5/2020 due to bleeding.  The patient's symptoms of swelling and pain have improved significantly, he is really not having any discomfort at this  time.  He has been transitioned to PO xarelto and we have discussed the risks and benefits of DOAC therapy.  Results of venogram are described below, I discussed the case with interventional radiologist who performed the procedure Dr. Stockton on 3/5/2020.  The patient's femoral vein is narrowed and lifelong anticoagulation is recommended at this time.    Labs:  Lab Results   Component Value Date/Time    WBC 8.9 03/06/2020 03:05 AM    RBC 4.20 (L) 03/06/2020 03:05 AM    HEMOGLOBIN 14.1 03/06/2020 03:05 AM    HEMATOCRIT 41.0 (L) 03/06/2020 03:05 AM    MCV 97.6 03/06/2020 03:05 AM    MCH 33.6 (H) 03/06/2020 03:05 AM    MCHC 34.4 03/06/2020 03:05 AM    MPV 11.1 03/06/2020 03:05 AM    NEUTSPOLYS 63.90 03/06/2020 03:05 AM    LYMPHOCYTES 21.40 (L) 03/06/2020 03:05 AM    MONOCYTES 10.50 03/06/2020 03:05 AM    EOSINOPHILS 2.60 03/06/2020 03:05 AM    BASOPHILS 0.90 03/06/2020 03:05 AM      Lab Results   Component Value Date/Time    SODIUM 138 03/06/2020 03:05 AM    POTASSIUM 3.1 (L) 03/06/2020 03:05 AM    CHLORIDE 105 03/06/2020 03:05 AM    CO2 21 03/06/2020 03:05 AM    GLUCOSE 105 (H) 03/06/2020 03:05 AM    BUN 13 03/06/2020 03:05 AM    CREATININE 1.14 03/06/2020 03:05 AM        Pt is new to Xarelto and new to [facility]. Discussed:   · Indication for DOAC therapy.  · Importance of monitoring and compliance.   · Monitoring parameters, signs and symptoms of bleeding or clotting.  · DOAC therapy, side effects, potential DDIs, OTC medications  · ASA, continue given CABG in 2017 per pt. Pt may d/c ASA in the future based on PCP or cardiologist input.   · DDI, none of clinical concern at this time.   · Lifestyle safety, ie smoking, ETOH, hobby safety, fall safety/prevention  · Procedures for missed doses or suspected missed doses, surgeries/procedures, travel, dental work, any medication changes      Continue with Xarelto 15mg taken 2 times a day for 21 days (today is day 6 of 21) and then change to 20mg taken once daily. Pt will  transition to 20mg dose on 4/3/20.    Instructed pt to  Rx for 20 mg Xarelto prior to next visit or return with information concerning the cost if it is too expensive.     DOAC affordable = under investigation    Samples provided: 15 mg xarelto.       F/U - 2 weeks.    Mark Eddy, PharmD

## 2020-03-19 ENCOUNTER — TELEPHONE (OUTPATIENT)
Dept: VASCULAR LAB | Facility: MEDICAL CENTER | Age: 60
End: 2020-03-19

## 2020-03-19 NOTE — TELEPHONE ENCOUNTER
Initial anticoagulation clinic note and DC summary reviewed    Patient started anticoagulation due to extensive DVT.  Appears unprovoked.  He underwent attempt at thrombolysis while in the hospital, but it was stopped due to bleeding    He also has a history of CAD status post two-vessel CABG and difficult to control hypertension    At discharge it was recommended he continue with low-dose aspirin and indefinite anticoagulation with Xarelto    It does not appear the patient is currently seeing cardiology.  Unless PCP objects, I am recommending a vascular medicine follow-up visit to determine whether or not to continue aspirin and to make sure his blood pressure and lipids are under good control    Michael Bloch, MD  Vascular Medicine    CC:   LIZZY Chin

## 2020-03-24 ENCOUNTER — TELEPHONE (OUTPATIENT)
Dept: VASCULAR LAB | Facility: MEDICAL CENTER | Age: 60
End: 2020-03-24

## 2020-03-24 NOTE — TELEPHONE ENCOUNTER
Left 2nd [facility] pt requesting an initial appt with Dr Vegas.    Mark Eddy, PharmD

## 2020-03-26 ENCOUNTER — TELEPHONE (OUTPATIENT)
Dept: VASCULAR LAB | Facility: MEDICAL CENTER | Age: 60
End: 2020-03-26

## 2020-03-30 DIAGNOSIS — I10 ESSENTIAL HYPERTENSION: ICD-10-CM

## 2020-03-31 RX ORDER — METOPROLOL SUCCINATE 100 MG/1
TABLET, EXTENDED RELEASE ORAL
Qty: 90 TAB | Refills: 0 | Status: SHIPPED | OUTPATIENT
Start: 2020-03-31 | End: 2020-07-06

## 2020-03-31 NOTE — TELEPHONE ENCOUNTER
Was the patient seen in the last year in this department? Yes    Does patient have an active prescription for medications requested? No     Received Request Via: Pharmacy      Pt met protocol?: Yes    LAST OV 03/12/2020    BP Readings from Last 1 Encounters:   03/12/20 158/86     Lab Results   Component Value Date/Time    CHOLSTRLTOT 148 12/05/2018 08:48 AM    LDL 56 12/05/2018 08:48 AM    HDL 42 12/05/2018 08:48 AM    TRIGLYCERIDE 248 (H) 12/05/2018 08:48 AM       Lab Results   Component Value Date/Time    SODIUM 138 03/06/2020 03:05 AM    POTASSIUM 3.1 (L) 03/06/2020 03:05 AM    CHLORIDE 105 03/06/2020 03:05 AM    CO2 21 03/06/2020 03:05 AM    GLUCOSE 105 (H) 03/06/2020 03:05 AM    BUN 13 03/06/2020 03:05 AM    CREATININE 1.14 03/06/2020 03:05 AM     Lab Results   Component Value Date/Time    ALKPHOSPHAT 70 03/06/2020 03:05 AM    ASTSGOT 21 03/06/2020 03:05 AM    ALTSGPT 5 03/06/2020 03:05 AM    TBILIRUBIN 1.4 03/06/2020 03:05 AM

## 2020-04-01 ENCOUNTER — ANTICOAGULATION VISIT (OUTPATIENT)
Dept: VASCULAR LAB | Facility: MEDICAL CENTER | Age: 60
End: 2020-04-01
Attending: INTERNAL MEDICINE
Payer: COMMERCIAL

## 2020-04-01 VITALS — SYSTOLIC BLOOD PRESSURE: 146 MMHG | DIASTOLIC BLOOD PRESSURE: 90 MMHG | HEART RATE: 100 BPM

## 2020-04-01 DIAGNOSIS — I82.422 ACUTE DEEP VEIN THROMBOSIS (DVT) OF ILIAC VEIN OF LEFT LOWER EXTREMITY (HCC): ICD-10-CM

## 2020-04-01 LAB — INR PPP: 2.1 (ref 2–3.5)

## 2020-04-01 PROCEDURE — 99212 OFFICE O/P EST SF 10 MIN: CPT | Performed by: NURSE PRACTITIONER

## 2020-04-01 PROCEDURE — 85610 PROTHROMBIN TIME: CPT

## 2020-04-01 NOTE — Clinical Note
I scheduled this pt for initial vascular visit with you on 6/3/ cause he can only come to appts on wednesdays.

## 2020-04-01 NOTE — PROGRESS NOTES
Diagnosis: DVT  Drug: Xarelto 15 mg BID x 21 days then 20 mg daily  LOT: 3 months then re-eval but possibly indefinite  CHADSVASC: n/a  HAS-BLED: 1 (asa 81 mg)    Health Status Since Last Assessment  Pt doing well since his last visit. Taking Xarelto as instructed. No problems with bleeding. No leg swelling or pain or SOB.    He hasn't picked up his Xarelto 20 mg rx yet so not sure what copay will be. He has a 30 days coupon card but encouraged him to find out cost there after.    Adherence with DOAC Therapy  0 missed dose(s)  BLEEDING RISK ASSESSMENT NB:    Bleeding Risk Assessment  Severe epistaxis - no   Hemoptysis - no  Excessive or unusual bruising / hematomas - no  GIB/melena/BRBPR/hematemesis - no  Hematuria - no   Abnormal vaginal bleeding - n/a  Concerning daily headache or subdural hematoma symptoms - no  Decreasing hemoglobin or new anemia - no  Falls, presyncope, syncope, or seizures - no  Platelets: 101  Latest hemoglobin:     Lab Results   Component Value Date/Time    WBC 8.9 03/06/2020 03:05 AM    RBC 4.20 (L) 03/06/2020 03:05 AM    HEMOGLOBIN 14.1 03/06/2020 03:05 AM    HEMATOCRIT 41.0 (L) 03/06/2020 03:05 AM    MCV 97.6 03/06/2020 03:05 AM    MCH 33.6 (H) 03/06/2020 03:05 AM    MCHC 34.4 03/06/2020 03:05 AM    MPV 11.1 03/06/2020 03:05 AM    NEUTSPOLYS 63.90 03/06/2020 03:05 AM    LYMPHOCYTES 21.40 (L) 03/06/2020 03:05 AM    MONOCYTES 10.50 03/06/2020 03:05 AM    EOSINOPHILS 2.60 03/06/2020 03:05 AM    BASOPHILS 0.90 03/06/2020 03:05 AM        HAS-BLED:  Hypertension (uncontrolled, >160 mmHg systolic) - no  Renal disease (dialysis, transplant, Cr >2.26 mg/dL or >200 µmol/L) - no  Liver disease (cirrhosis or bilirubin >2x normal with AST/ALT/AP >3x normal) - no  Stroke history - no  Prior major bleeding or predisposition to bleeding - no  Labile INR Unstable/high INRs, time in therapeutic range <60% - no  Age >65 - no  Medication usage predisposing to bleeding (aspirin, clopidogrel, NSAIDs) -  yes  Alcohol use  ?8 drinks/week - no      Creatinine Clearance/Renal Function  Latest eGFR / creatinine:  Lab Results   Component Value Date/Time    SODIUM 138 03/06/2020 03:05 AM    POTASSIUM 3.1 (L) 03/06/2020 03:05 AM    CHLORIDE 105 03/06/2020 03:05 AM    CO2 21 03/06/2020 03:05 AM    GLUCOSE 105 (H) 03/06/2020 03:05 AM    BUN 13 03/06/2020 03:05 AM    CREATININE 1.14 03/06/2020 03:05 AM      • Is eGFR less than 50ml/min  - no  Cr cl 84 ml/min    If YES, calculate CrCl (see back)  Any recent dehydrating illness or medications added/changed? i.e. Diuretics      Drug Interactions  ASA/antiplatelets - yes - hx of CABG  NSAID - avoids  Other drug interactions -  (Review med list / OTCs;)  Current Outpatient Medications on File Prior to Visit   Medication Sig Dispense Refill   • metoprolol SR (TOPROL XL) 100 MG TABLET SR 24 HR TAKE 1 TABLET BY MOUTH EVERYDAY AT BEDTIME 90 Tab 0   • rivaroxaban (XARELTO) 20 MG Tab tablet Take 1 Tab by mouth with dinner. 30 Tab 2   • losartan (COZAAR) 100 MG Tab Take 1 Tab by mouth every evening. For further refills please contact new cardiologist. Thank you 90 Tab 3   • hydrALAZINE (APRESOLINE) 50 MG Tab 50 mg in AM and 100 mg at BEDTIME 270 Tab 3   • atorvastatin (LIPITOR) 80 MG tablet TAKE ONE TABLET BY MOUTH AT BEDTIME 90 Tab 0   • amLODIPine (NORVASC) 10 MG Tab Take 0.5 Tabs by mouth 2 Times a Day. 90 Tab 0   • nitroglycerin (NITROSTAT) 0.4 MG SL Tab Place 1 Tab under tongue as needed for Chest Pain. 25 Tab 11   • aspirin EC (ECOTRIN) 81 MG Tablet Delayed Response Take 1 Tab by mouth every day. 30 Tab 11   • multivitamin (THERAGRAN) Tab Take 1 Tab by mouth every day.       No current facility-administered medications on file prior to visit.      Verified no anticonvulsant or azole therapy, education provided for future use.      Examination  Blood pressure:  146/90  • Elevated BP - no (sBP greater than 160 mmHg)  • Symptomatic hypotension - no  Significant gait impairment /  imbalance / high risk for falls - no obvious risks    Final Assessment and Recommendations:  Patient appears stable from the anticoagulation standpoint  Benefits of continued DOAC therapy outweigh risks for this patient  Recommend continue current DOAC at same dose    - finish your current supply of Xarelto 15 mg by mouth twice daily with food THEN start Xarelto 20 mg once daily with food  - please find out your copay for this medication before you run out of your current tablets  - go to the ER for shortness of breath, chest pain, pain with deep inhalation, worsening leg swelling and/or pain in calf or leg   - avoid sedentary periods  - let all your providers know you take this medication  - don't stop this medication without permission from your doctor or our clinic  -  refills before you run out  - continue complete avoidance of tobacco products  - to avoid any additional Aspirin and anti-inflammatories (eg. Advil, ibuprofen, Aleve, naproxen, etc) while anticoagulated   - Avoid skiing or other dangerous activities to reduce risk of head injury and brain bleeds  - elevate legs as much as possible, use compression stockings/socks if directed by your provider  - if any bleeding lasting 30min without stopping, please seek care with your PCP, urgent care, or ED  - if having any invasive procedure, please make sure the doctor knows of your history of blood clots and current anticoagulation status    Other Actions:   The rationale for continued DOAC therapy  The potential for minor, major or life-threatening bleeding  Dosing instructions, adherence, risks of non-adherence, handling missed doses  Avoiding OTC ASA & NSAIDs & minimizing EtOH to reduce bleeding risks  Dosing around upcoming procedure / surgery if applicable (see back)    Follow up:  Will follow up with patient in 8 weeks for vascular visit with Dr Bloch    Next appointment: Wednesday, Lyn 3, 2020 at 1:40 pm.       Sherry GALVAN

## 2020-04-01 NOTE — PATIENT INSTRUCTIONS
- finish your current supply of Xarelto 15 mg by mouth twice daily with food THEN start Xarelto 20 mg once daily with food  - please find out your copay for this medication before you run out of your current tablets  - go to the ER for shortness of breath, chest pain, pain with deep inhalation, worsening leg swelling and/or pain in calf or leg   - avoid sedentary periods  - let all your providers know you take this medication  - don't stop this medication without permission from your doctor or our clinic  -  refills before you run out  - continue complete avoidance of tobacco products  - to avoid any additional Aspirin and anti-inflammatories (eg. Advil, ibuprofen, Aleve, naproxen, etc) while anticoagulated   - Avoid skiing or other dangerous activities to reduce risk of head injury and brain bleeds  - elevate legs as much as possible, use compression stockings/socks if directed by your provider  - if any bleeding lasting 30min without stopping, please seek care with your PCP, urgent care, or ED  - if having any invasive procedure, please make sure the doctor knows of your history of blood clots and current anticoagulation status

## 2020-04-02 LAB — INR BLD: 2.1 (ref 0.9–1.2)

## 2020-04-26 DIAGNOSIS — E78.5 DYSLIPIDEMIA: ICD-10-CM

## 2020-04-26 DIAGNOSIS — I10 ESSENTIAL HYPERTENSION: ICD-10-CM

## 2020-04-28 RX ORDER — ATORVASTATIN CALCIUM 80 MG/1
TABLET, FILM COATED ORAL
Qty: 90 TAB | Refills: 0 | Status: SHIPPED | OUTPATIENT
Start: 2020-04-28 | End: 2020-07-28

## 2020-04-28 RX ORDER — AMLODIPINE BESYLATE 10 MG/1
TABLET ORAL
Qty: 90 TAB | Refills: 0 | Status: SHIPPED | OUTPATIENT
Start: 2020-04-28 | End: 2020-07-28

## 2020-04-30 RX ORDER — SERTRALINE HYDROCHLORIDE 100 MG/1
100 TABLET, FILM COATED ORAL DAILY
Qty: 90 TAB | Refills: 1 | Status: SHIPPED | OUTPATIENT
Start: 2020-04-30 | End: 2020-10-27

## 2020-04-30 NOTE — TELEPHONE ENCOUNTER
*Medication is currently D/C'd on med list*  Was the patient seen in the last year in this department? Yes    Does patient have an active prescription for medications requested? No     Received Request Via: Pharmacy    Pt met protocol?: Yes     Last OV 03/12/2020

## 2020-06-03 ENCOUNTER — TELEPHONE (OUTPATIENT)
Dept: VASCULAR LAB | Facility: MEDICAL CENTER | Age: 60
End: 2020-06-03

## 2020-06-03 ENCOUNTER — APPOINTMENT (OUTPATIENT)
Dept: VASCULAR LAB | Facility: MEDICAL CENTER | Age: 60
End: 2020-06-03
Payer: COMMERCIAL

## 2020-06-04 ENCOUNTER — TELEPHONE (OUTPATIENT)
Dept: VASCULAR LAB | Facility: MEDICAL CENTER | Age: 60
End: 2020-06-04

## 2020-06-04 NOTE — TELEPHONE ENCOUNTER
Patient referred for evaluation management in vascular medicine clinic.    He canceled recent initial visit and did not reschedule.  We will ask our medical assistant to give him a call and try to get him rescheduled.  Until unless he establishes with our clinic will be unable to take part in his care    Michael Bloch, MD  Vascular Medicine

## 2020-07-02 DIAGNOSIS — I10 ESSENTIAL HYPERTENSION: ICD-10-CM

## 2020-07-06 RX ORDER — METOPROLOL SUCCINATE 100 MG/1
TABLET, EXTENDED RELEASE ORAL
Qty: 90 TAB | Refills: 0 | Status: SHIPPED | OUTPATIENT
Start: 2020-07-06 | End: 2020-10-06

## 2020-07-06 NOTE — TELEPHONE ENCOUNTER
Last seen by a PCP 03/12/2020 (Dr. Gunter). Not seen by current PCP for over a year though. Last Blood Pressure reading was 146/90 on 4/1/2020  BP not controlled.  Will send 3 month(s) to the pharmacy.  Pt to make appt with Dr. Chin prior to more refills.

## 2020-07-10 DIAGNOSIS — I82.422 ACUTE DEEP VEIN THROMBOSIS (DVT) OF ILIAC VEIN OF LEFT LOWER EXTREMITY (HCC): ICD-10-CM

## 2020-07-13 DIAGNOSIS — I82.422 ACUTE DEEP VEIN THROMBOSIS (DVT) OF ILIAC VEIN OF LEFT LOWER EXTREMITY (HCC): ICD-10-CM

## 2020-07-13 RX ORDER — RIVAROXABAN 20 MG/1
TABLET, FILM COATED ORAL
Qty: 30 TAB | Refills: 2 | OUTPATIENT
Start: 2020-07-13

## 2020-07-15 DIAGNOSIS — I82.422 ACUTE DEEP VEIN THROMBOSIS (DVT) OF ILIAC VEIN OF LEFT LOWER EXTREMITY (HCC): ICD-10-CM

## 2020-07-15 RX ORDER — RIVAROXABAN 20 MG/1
TABLET, FILM COATED ORAL
Qty: 30 TAB | Refills: 2 | OUTPATIENT
Start: 2020-07-15

## 2020-07-23 ENCOUNTER — TELEPHONE (OUTPATIENT)
Dept: VASCULAR LAB | Facility: MEDICAL CENTER | Age: 60
End: 2020-07-23

## 2020-07-23 NOTE — TELEPHONE ENCOUNTER
Left vm     ----- Message from Michael J Bloch, M.D. sent at 7/22/2020  5:42 PM PDT -----  Regarding: needs craw LOT  Pls call one more time to try and reschedule LOT with kervin

## 2020-08-06 ENCOUNTER — TELEPHONE (OUTPATIENT)
Dept: VASCULAR LAB | Facility: MEDICAL CENTER | Age: 60
End: 2020-08-06

## 2020-08-07 NOTE — TELEPHONE ENCOUNTER
Patient overdue for follow-up appt with vascular care.  Medical assistant has been unable to reach and reschedule  Multiple messages have been left  Await further patient contact.  Pending further contact, will defer all vascular care, including management of cardiac vascular risk factors, to PCP    Michael J. Bloch, MD  Vascular Care    Cc:  LIZZY Chin

## 2020-08-26 ENCOUNTER — APPOINTMENT (OUTPATIENT)
Dept: RADIOLOGY | Facility: MEDICAL CENTER | Age: 60
End: 2020-08-26
Attending: EMERGENCY MEDICINE
Payer: COMMERCIAL

## 2020-08-26 ENCOUNTER — HOSPITAL ENCOUNTER (EMERGENCY)
Facility: MEDICAL CENTER | Age: 60
End: 2020-08-26
Attending: EMERGENCY MEDICINE
Payer: COMMERCIAL

## 2020-08-26 VITALS
TEMPERATURE: 98.4 F | HEIGHT: 73 IN | OXYGEN SATURATION: 98 % | DIASTOLIC BLOOD PRESSURE: 83 MMHG | HEART RATE: 79 BPM | RESPIRATION RATE: 18 BRPM | BODY MASS INDEX: 23.78 KG/M2 | SYSTOLIC BLOOD PRESSURE: 148 MMHG | WEIGHT: 179.45 LBS

## 2020-08-26 DIAGNOSIS — S82.65XA CLOSED NONDISPLACED FRACTURE OF LATERAL MALLEOLUS OF LEFT FIBULA, INITIAL ENCOUNTER: ICD-10-CM

## 2020-08-26 DIAGNOSIS — B02.9 HERPES ZOSTER WITHOUT COMPLICATION: ICD-10-CM

## 2020-08-26 PROCEDURE — 700102 HCHG RX REV CODE 250 W/ 637 OVERRIDE(OP): Performed by: EMERGENCY MEDICINE

## 2020-08-26 PROCEDURE — 99284 EMERGENCY DEPT VISIT MOD MDM: CPT

## 2020-08-26 PROCEDURE — 73610 X-RAY EXAM OF ANKLE: CPT | Mod: LT

## 2020-08-26 PROCEDURE — A9270 NON-COVERED ITEM OR SERVICE: HCPCS | Performed by: EMERGENCY MEDICINE

## 2020-08-26 RX ORDER — ACYCLOVIR 400 MG/1
800 TABLET ORAL
Qty: 70 TAB | Refills: 0 | Status: SHIPPED | OUTPATIENT
Start: 2020-08-26 | End: 2020-09-02

## 2020-08-26 RX ORDER — OXYCODONE HYDROCHLORIDE AND ACETAMINOPHEN 5; 325 MG/1; MG/1
1 TABLET ORAL EVERY 4 HOURS PRN
Qty: 25 TAB | Refills: 0 | Status: SHIPPED | OUTPATIENT
Start: 2020-08-26 | End: 2020-09-02

## 2020-08-26 RX ORDER — IBUPROFEN 600 MG/1
600 TABLET ORAL ONCE
Status: COMPLETED | OUTPATIENT
Start: 2020-08-26 | End: 2020-08-26

## 2020-08-26 RX ORDER — GABAPENTIN 300 MG/1
300 CAPSULE ORAL 3 TIMES DAILY
Qty: 90 CAP | Refills: 0 | Status: SHIPPED | OUTPATIENT
Start: 2020-08-26 | End: 2020-09-25

## 2020-08-26 RX ORDER — ACYCLOVIR 800 MG/1
800 TABLET ORAL ONCE
Status: COMPLETED | OUTPATIENT
Start: 2020-08-26 | End: 2020-08-26

## 2020-08-26 RX ORDER — GABAPENTIN 300 MG/1
300 CAPSULE ORAL ONCE
Status: COMPLETED | OUTPATIENT
Start: 2020-08-26 | End: 2020-08-26

## 2020-08-26 RX ADMIN — IBUPROFEN 600 MG: 600 TABLET, FILM COATED ORAL at 18:02

## 2020-08-26 RX ADMIN — ACYCLOVIR 800 MG: 800 TABLET ORAL at 18:19

## 2020-08-26 RX ADMIN — GABAPENTIN 300 MG: 300 CAPSULE ORAL at 18:02

## 2020-08-26 ASSESSMENT — LIFESTYLE VARIABLES
EVER FELT BAD OR GUILTY ABOUT YOUR DRINKING: NO
HAVE YOU EVER FELT YOU SHOULD CUT DOWN ON YOUR DRINKING: NO
HAVE PEOPLE ANNOYED YOU BY CRITICIZING YOUR DRINKING: NO
TOTAL SCORE: 0
CONSUMPTION TOTAL: INCOMPLETE
EVER HAD A DRINK FIRST THING IN THE MORNING TO STEADY YOUR NERVES TO GET RID OF A HANGOVER: NO
TOTAL SCORE: 0
TOTAL SCORE: 0

## 2020-08-26 ASSESSMENT — FIBROSIS 4 INDEX: FIB4 SCORE: 5.49

## 2020-08-26 NOTE — ED TRIAGE NOTES
"Chief Complaint   Patient presents with   • Leg Pain   • Leg Swelling     Patient wheeled into triage with above complaint. Able to stand on scale with one person assist. Patient has a history of dvt's and has been out of xarelto for the last 2 months. Patient has redness and swelling in the left leg for the last week. Patient states pain started in left kidney and moved down left leg. Patient also states that \"red dots\" are on his left leg. CMS intact in left leg.     Denies chest pain or shortness of breath in triage.     Blood Pressure: 150/100, Pulse: 73, Respiration: 16, Temperature: 36.9 °C (98.4 °F), Height: 185.4 cm (6' 1\"), Weight: 81.4 kg (179 lb 7.3 oz), BMI (Calculated): 23.68, BSA (Calculated): 2, Pulse Oximetry: 95 %      "

## 2020-08-27 NOTE — ED PROVIDER NOTES
"ED Provider Note    Scribed for Keaton Lan M.D. by Manny Boyle. 8/26/2020  5:15 PM    Primary care provider: Edwina Chin D.O.  Means of arrival: Walk-in  History obtained from: Patient  History limited by: None    CHIEF COMPLAINT  Chief Complaint   Patient presents with   • Leg Pain   • Leg Swelling       HPI  Jose A Ponce is a 59 y.o. male with a history of a DVT who presents to the Emergency Department for left leg pain onset last night. Patient notes that he had pain beginning in his back which then traveled down to his left leg. Patient also notes that he was experiencing \"kidney soreness\" approximately 4 days ago. Patient adds that last night he tripped over a computer cord and injured his ankle causing swelling. Patient notes that he is currently experiencing a rash on his left leg as well. Patient notes no exacerbating or alleviating factors. Patient is currently afebrile. On March 2nd, patient had a DVT in external iliac and common femoral extending down to popliteal veins on left side.     PPE Note: I personally donned full PPE for all patient encounters during this visit, including being clean-shaven with an N95 respirator mask, gloves and eyewear.     Scribe remained outside the patient's room and did not have any contact with the patient for the duration of patient encounter.     REVIEW OF SYSTEMS  Pertinent negatives include no fever. As above, all other systems reviewed and are negative.   See HPI for further details.     PAST MEDICAL HISTORY   has a past medical history of Breath shortness (10/2017), CAD (coronary artery disease), Dental disorder (10/2017), Grief reaction (2017), hernia repair, Hyperlipidemia, Hypertension, Psychiatric problem (10/2017), and Tobacco use.    SURGICAL HISTORY   has a past surgical history that includes inguinal hernia repair (Bilateral, 2000); tibia orif (Right, 1980s); multiple coronary artery bypass endo vein harvest (6/13/2017); yarely " "(2017); and zzz cardiac cath.    SOCIAL HISTORY  Social History     Tobacco Use   • Smoking status: Former Smoker     Packs/day: 1.00     Years: 42.00     Pack years: 42.00     Types: Cigarettes     Quit date: 2016     Years since quittin.2   • Smokeless tobacco: Never Used   • Tobacco comment: vape occ   Substance Use Topics   • Alcohol use: Yes     Alcohol/week: 0.0 oz     Comment: 2 per day   • Drug use: No      Social History     Substance and Sexual Activity   Drug Use No       FAMILY HISTORY  Family History   Problem Relation Age of Onset   • Cancer Mother    • Cancer Father    • No Known Problems Sister    • No Known Problems Sister    • Heart Disease Maternal Grandfather 89        probable MI and sudden death       CURRENT MEDICATIONS  Home Medications     Reviewed by Sharmin Ayala R.N. (Registered Nurse) on 20 at 1446  Med List Status: Complete   Medication Last Dose Status   amLODIPine (NORVASC) 10 MG Tab 2020 Active   aspirin EC (ECOTRIN) 81 MG Tablet Delayed Response not taking Active   atorvastatin (LIPITOR) 80 MG tablet 2020 Active   hydrALAZINE (APRESOLINE) 50 MG Tab  Active   losartan (COZAAR) 100 MG Tab 2020 Active   metoprolol SR (TOPROL XL) 100 MG TABLET SR 24 HR 2020 Active   multivitamin (THERAGRAN) Tab 2020 Active   nitroglycerin (NITROSTAT) 0.4 MG SL Tab  Active   rivaroxaban (XARELTO) 20 MG Tab tablet  Active   sertraline (ZOLOFT) 100 MG Tab  Active                ALLERGIES  No Known Allergies    PHYSICAL EXAM  VITAL SIGNS: BP (!) 177/88   Pulse 84   Temp 36.9 °C (98.4 °F) (Temporal)   Resp 18   Ht 1.854 m (6' 1\")   Wt 81.4 kg (179 lb 7.3 oz)   SpO2 99%   BMI 23.68 kg/m²   Constitutional: Well developed, Well nourished, No acute distress, Non-toxic appearance.   HENT: Normocephalic, Atraumatic, Bilateral external ears normal, Oropharynx is clear mucous membranes are moist. No oral exudates or nasal discharge.   Eyes: Pupils are equal " round and reactive, EOMI, Conjunctiva normal, No discharge.   Neck: Normal range of motion, No tenderness, Supple, No stridor. No meningismus.  Lymphatic: No lymphadenopathy noted.   Cardiovascular: Regular rate and rhythm without murmur rub or gallop.  Thorax & Lungs: Clear breath sounds bilaterally without wheezes, rhonchi or rales. There is no chest wall tenderness.   Abdomen: Soft non-tender non-distended. There is no rebound or guarding. No organomegaly is appreciated. Bowel sounds are normal.  Skin: Bolus skin changes with erythematous base non-confluent medial thigh and groin on left side consistent with Herpes zoster.  Back: No CVA or spinal tenderness.   Extremities: See skin section above. Anterior lateral left ankle tenderness with swelling, Intact distal pulses,  No cyanosis, No clubbing. Capillary refill is less than 2 seconds.  Musculoskeletal: See extremity section above. Good range of motion in all major joints.   Neurologic: Alert & oriented x 3, Normal motor function, Normal sensory function, No focal deficits noted. Reflexes are normal.  Psychiatric: Affect normal, Judgment normal, Mood normal. There is no suicidal ideation or patient reported hallucinations.       DIAGNOSTIC STUDIES / PROCEDURES    RADIOLOGY  DX-ANKLE 3+ VIEWS LEFT   Final Result      Oblique mildly displaced lateral malleolus fracture.           The radiologist's interpretation of all radiological studies have been reviewed by me.    COURSE & MEDICAL DECISION MAKING  Nursing notes, VS, PMSFHx reviewed in chart.    5:15 PM Patient seen and examined at bedside. Ordered for imaging to evaluate. Patient was treated with Motrin 600 mg, Neurontin 300 mg, and Zovirax 800 mg for his symptoms.  I think the patient has herpes zoster and may have a fibular fracture secondary to his traumatic mechanism    X-ray of the ankle shows he does have a distal fibular fracture that appears as a Johnson type B as nondisplaced.  I have ordered a boot so  that he can bear weight with stability and crutches for additional gait stability.      In addition he has evidence diagnostically of herpes zoster and I gave him initial acyclovir and Neurontin and will place him on this combination over the next week and he is instructed to follow-up with his doctor or return here if considerable worsening symptoms.    6:31 PM Patient was reevaluated at bedside. Discussed radiology results with the patient and informed them that they are to be discharged with a prescription for Zovirax 400 mg, Neurotonin 300 mg, Percocet 5-325 mg and a splint. Patient was informed that he was to follow up with Dr. Sánchez (Ortho).     Patient has had high blood pressure while in the emergency department, felt likely secondary to medical condition. Counseled patient to monitor blood pressure at home and follow up with primary care physician.   In prescribing controlled substances to this patient, I certify that I have obtained and reviewed the medical history of Jose A Ponce. I have also made a good petros effort to obtain applicable records from other providers who have treated the patient and records did not demonstrate any increased risk of substance abuse that would prevent me from prescribing controlled substances.     I have conducted a physical exam and documented it. I have reviewed Mr. Ponce’s prescription history as maintained by the [place] Prescription Monitoring Program.     I have assessed the patient’s risk for abuse, dependency, and addiction using the validated Opioid Risk Tool available at https://www.mdcalc.com/paqzbl-ehse-hxww-ort-narcotic-abuse.     Given the above, I believe the benefits of controlled substance therapy outweigh the risks. The reasons for prescribing controlled substances include non-narcotic, oral analgesic alternatives have been inadequate for pain control. Accordingly, I have discussed the risk and benefits, treatment plan, and alternative  therapies with the patient.     I reviewed the hospital policy regarding refills on narcotic pain prescriptions when the patient is being managed by another physician. The patient understands that we can administer medication here for her pain however refill of her narcotic prescription will not be done today, which adheres to hospital policy. The patient understands that it is not appropriate to come to the emergency department for acute exacerbations of chronic pain and she understands hospital policy and her need to obtain further prescriptions for narcotics through her primary care physician.    I have signed into and reviewed the patient's prescription monitoring program data prior to prescribing a scheduled drug. The patient will not drink alcohol nor drive with prescribed medications. The patient will return for new or worsening symptoms and is stable at the time of discharge.    DISPOSITION:  Patient will be discharged home in stable condition.    FINAL IMPRESSION  1. Closed nondisplaced fracture of lateral malleolus of left fibula, initial encounter    2. Herpes zoster without complication          Manny CANADA (Deisi), am scribing for, and in the presence of, Keaton Lan M.D..    Electronically signed by: Manny Olivares), 8/26/2020    Keaton CANADA M.D. personally performed the services described in this documentation, as scribed by Manny Boyle in my presence, and it is both accurate and complete. C    The note accurately reflects work and decisions made by me.  Keaton Lan M.D.  8/26/2020  6:37 PM

## 2020-10-23 DIAGNOSIS — E78.5 DYSLIPIDEMIA: ICD-10-CM

## 2020-10-23 DIAGNOSIS — I10 ESSENTIAL HYPERTENSION: ICD-10-CM

## 2020-10-26 RX ORDER — ATORVASTATIN CALCIUM 80 MG/1
TABLET, FILM COATED ORAL
Qty: 30 TAB | Refills: 0 | Status: SHIPPED | OUTPATIENT
Start: 2020-10-26 | End: 2021-01-25

## 2020-10-26 RX ORDER — AMLODIPINE BESYLATE 10 MG/1
TABLET ORAL
Qty: 30 TAB | Refills: 0 | Status: SHIPPED | OUTPATIENT
Start: 2020-10-26 | End: 2021-01-26

## 2020-10-26 NOTE — TELEPHONE ENCOUNTER
Lab Results   Component Value Date/Time    CHOLSTRLTOT 148 12/05/2018 08:48 AM    LDL 56 12/05/2018 08:48 AM    HDL 42 12/05/2018 08:48 AM    TRIGLYCERIDE 248 (H) 12/05/2018 08:48 AM       Lab Results   Component Value Date/Time    SODIUM 138 03/06/2020 03:05 AM    POTASSIUM 3.1 (L) 03/06/2020 03:05 AM    CHLORIDE 105 03/06/2020 03:05 AM    CO2 21 03/06/2020 03:05 AM    GLUCOSE 105 (H) 03/06/2020 03:05 AM    BUN 13 03/06/2020 03:05 AM    CREATININE 1.14 03/06/2020 03:05 AM     Lab Results   Component Value Date/Time    ALKPHOSPHAT 70 03/06/2020 03:05 AM    ASTSGOT 21 03/06/2020 03:05 AM    ALTSGPT 5 03/06/2020 03:05 AM    TBILIRUBIN 1.4 03/06/2020 03:05 AM    .Last seen by PCP 03/12/2020. Will send 1 month(s) to the pharmacy.  Last Blood Pressure reading was 148/83 on 8/26/2020    Pt to make appt and get labs prior to more refills.

## 2020-11-09 DIAGNOSIS — I10 ESSENTIAL HYPERTENSION: ICD-10-CM

## 2020-11-09 DIAGNOSIS — E78.5 DYSLIPIDEMIA: ICD-10-CM

## 2020-11-11 RX ORDER — AMLODIPINE BESYLATE 10 MG/1
TABLET ORAL
Qty: 90 TAB | OUTPATIENT
Start: 2020-11-11

## 2020-11-11 RX ORDER — ATORVASTATIN CALCIUM 80 MG/1
TABLET, FILM COATED ORAL
Qty: 90 TAB | OUTPATIENT
Start: 2020-11-11

## 2021-01-21 DIAGNOSIS — I10 ESSENTIAL HYPERTENSION: ICD-10-CM

## 2021-01-26 RX ORDER — AMLODIPINE BESYLATE 10 MG/1
TABLET ORAL
Qty: 90 TAB | Refills: 1 | Status: SHIPPED | OUTPATIENT
Start: 2021-01-26 | End: 2021-12-05

## 2021-01-26 NOTE — TELEPHONE ENCOUNTER
Refill X 6 months, sent to pharmacy.Pt. Seen in the last 6 months per protocol.   Lab Results   Component Value Date/Time    SODIUM 138 03/06/2020 03:05 AM    POTASSIUM 3.1 (L) 03/06/2020 03:05 AM    CHLORIDE 105 03/06/2020 03:05 AM    CO2 21 03/06/2020 03:05 AM    GLUCOSE 105 (H) 03/06/2020 03:05 AM    BUN 13 03/06/2020 03:05 AM    CREATININE 1.14 03/06/2020 03:05 AM

## 2021-02-19 ENCOUNTER — ANTICOAGULATION MONITORING (OUTPATIENT)
Dept: VASCULAR LAB | Facility: MEDICAL CENTER | Age: 61
End: 2021-02-19

## 2021-02-19 DIAGNOSIS — I82.422 ACUTE DEEP VEIN THROMBOSIS (DVT) OF ILIAC VEIN OF LEFT LOWER EXTREMITY (HCC): ICD-10-CM

## 2021-02-19 NOTE — PROGRESS NOTES
Discharged from [facility].  Pt managed by PCP.  Will defer all medical management to PCP.  Denita Glass, Clinical Pharmacist, CDE, CACP

## 2021-03-15 DIAGNOSIS — Z23 NEED FOR VACCINATION: ICD-10-CM

## 2021-04-21 NOTE — PROGRESS NOTES
Pt upset after replacement of EKOS and is concerned that he will have another sleepless night d/t having to lie flat on his back. I discussed turning/repositioning options with pt.  Pt continues flat affect, refusing turn at this time.   no

## 2021-12-05 ENCOUNTER — OFFICE VISIT (OUTPATIENT)
Dept: URGENT CARE | Facility: PHYSICIAN GROUP | Age: 61
End: 2021-12-05
Payer: COMMERCIAL

## 2021-12-05 VITALS
DIASTOLIC BLOOD PRESSURE: 104 MMHG | TEMPERATURE: 99.1 F | RESPIRATION RATE: 14 BRPM | OXYGEN SATURATION: 98 % | SYSTOLIC BLOOD PRESSURE: 170 MMHG | HEART RATE: 108 BPM | HEIGHT: 73 IN | WEIGHT: 175 LBS | BODY MASS INDEX: 23.19 KG/M2

## 2021-12-05 DIAGNOSIS — H65.02 ACUTE SEROUS OTITIS MEDIA OF LEFT EAR, RECURRENCE NOT SPECIFIED: ICD-10-CM

## 2021-12-05 DIAGNOSIS — H65.93 FLUID LEVEL BEHIND TYMPANIC MEMBRANE OF BOTH EARS: ICD-10-CM

## 2021-12-05 PROCEDURE — 99213 OFFICE O/P EST LOW 20 MIN: CPT | Performed by: PHYSICIAN ASSISTANT

## 2021-12-05 RX ORDER — FLUTICASONE PROPIONATE 50 MCG
1 SPRAY, SUSPENSION (ML) NASAL DAILY
Qty: 16 G | Refills: 0 | Status: SHIPPED | OUTPATIENT
Start: 2021-12-05 | End: 2022-01-01

## 2021-12-05 RX ORDER — AMOXICILLIN 875 MG/1
875 TABLET, COATED ORAL 2 TIMES DAILY
Qty: 14 TABLET | Refills: 0 | Status: SHIPPED | OUTPATIENT
Start: 2021-12-05 | End: 2021-12-12

## 2021-12-05 ASSESSMENT — ENCOUNTER SYMPTOMS
DIARRHEA: 0
CHILLS: 0
COUGH: 0
SHORTNESS OF BREATH: 0
SORE THROAT: 0
VOMITING: 0
NAUSEA: 0
FEVER: 0
WHEEZING: 0
RHINORRHEA: 0
MYALGIAS: 0
HEADACHES: 0

## 2021-12-05 ASSESSMENT — FIBROSIS 4 INDEX: FIB4 SCORE: 5.67

## 2021-12-05 NOTE — PROGRESS NOTES
"Subjective     Jose A Ponce is a 61 y.o. male who presents with Otalgia (bilateral )            Otalgia   There is pain in the left ear. This is a new problem. The problem occurs constantly. The problem has been unchanged. There has been no fever. The pain is moderate. Pertinent negatives include no coughing, diarrhea, ear discharge, headaches, hearing loss, rash, rhinorrhea, sore throat or vomiting. Associated symptoms comments: Ear congestion . He has tried nothing for the symptoms.     The patient has elevated BP today. He states he has been out of medication x 3 days. He plans on picking up his medication today.      Past Medical History:   Diagnosis Date   • Breath shortness 10/2017    \"In the past, not an issue now\" sob before heart surgery   • CAD (coronary artery disease)     S/P CABG X2   • Dental disorder 10/2017    Upper dentures   • Grief reaction 2017    Girlfriend  this year.   • Hx of hernia repair     Mesh   • Hyperlipidemia    • Hypertension    • Psychiatric problem 10/2017    Depression   • Tobacco use        Past Surgical History:   Procedure Laterality Date   • MULTIPLE CORONARY ARTERY BYPASS ENDO VEIN HARVEST  2017    Procedure: MULTIPLE CORONARY ARTERY BYPASS x2 RIGHT LEG ENDO VEIN HARVEST;  Surgeon: Gray Barboza M.D.;  Location: SURGERY [facility];  Service:    • HUBERT  2017    Procedure: HUBERT - INTRAOP;  Surgeon: Gray Barboza M.D.;  Location: SURGERY [facility];  Service:    • INGUINAL HERNIA REPAIR Bilateral    • TIBIA ORIF Right    • ZZZ CARDIAC CATH         Family History   Problem Relation Age of Onset   • Cancer Mother    • Cancer Father    • No Known Problems Sister    • No Known Problems Sister    • Heart Disease Maternal Grandfather 89        probable MI and sudden death       No Known Allergies    Medications, Allergies, and current problem list reviewed today in Epic    Review of Systems   Constitutional: Negative for chills, fever and " "malaise/fatigue.   HENT: Positive for ear pain. Negative for ear discharge, hearing loss, rhinorrhea and sore throat.         Ear congestion   Respiratory: Negative for cough, shortness of breath and wheezing.    Cardiovascular: Negative for chest pain and leg swelling.   Gastrointestinal: Negative for diarrhea, nausea and vomiting.   Musculoskeletal: Negative for myalgias.   Skin: Negative for rash.   Neurological: Negative for headaches.     All other systems reviewed and are negative.            Objective     BP (!) 170/104   Pulse (!) 108   Temp 37.3 °C (99.1 °F)   Resp 14   Ht 1.854 m (6' 1\")   Wt 79.4 kg (175 lb)   SpO2 98%   BMI 23.09 kg/m²      Physical Exam  Constitutional:       General: He is not in acute distress.     Appearance: He is not ill-appearing.   HENT:      Head: Normocephalic and atraumatic.      Right Ear: Ear canal and external ear normal. A middle ear effusion is present.      Left Ear: Ear canal and external ear normal. A middle ear effusion is present. Tympanic membrane is injected.   Eyes:      Conjunctiva/sclera: Conjunctivae normal.   Cardiovascular:      Rate and Rhythm: Normal rate and regular rhythm.   Pulmonary:      Effort: Pulmonary effort is normal. No respiratory distress.   Lymphadenopathy:      Cervical: No cervical adenopathy.   Skin:     General: Skin is warm and dry.   Neurological:      General: No focal deficit present.      Mental Status: He is alert and oriented to person, place, and time.   Psychiatric:         Mood and Affect: Mood normal.         Behavior: Behavior normal.         Thought Content: Thought content normal.         Judgment: Judgment normal.                             Assessment & Plan        1. Acute serous otitis media of left ear, recurrence not specified    2. Fluid level behind tympanic membrane of both ears    - amoxicillin (AMOXIL) 875 MG tablet; Take 1 Tablet by mouth 2 times a day for 7 days.  Dispense: 14 Tablet; Refill: 0  - " fluticasone (FLONASE) 50 MCG/ACT nasal spray; Administer 1 Spray into affected nostril(S) every day.  Dispense: 16 g; Refill: 0       Differential diagnoses, Supportive care, and indications for immediate follow-up discussed with patient.   Pathogenesis of diagnosis discussed including typical length and natural progression.   Instructed to return to clinic or nearest emergency department for any change in condition, further concerns, or worsening of symptoms.    The patient demonstrated a good understanding and agreed with the treatment plan.    Emily Judge P.A.-C.

## 2022-01-01 ENCOUNTER — APPOINTMENT (OUTPATIENT)
Dept: CARDIOLOGY | Facility: MEDICAL CENTER | Age: 62
End: 2022-01-01
Attending: PSYCHIATRY & NEUROLOGY
Payer: COMMERCIAL

## 2022-01-01 ENCOUNTER — APPOINTMENT (OUTPATIENT)
Dept: RADIOLOGY | Facility: MEDICAL CENTER | Age: 62
End: 2022-01-01
Attending: EMERGENCY MEDICINE
Payer: COMMERCIAL

## 2022-01-01 ENCOUNTER — HOSPITAL ENCOUNTER (OUTPATIENT)
Facility: MEDICAL CENTER | Age: 62
End: 2022-01-02
Attending: EMERGENCY MEDICINE | Admitting: STUDENT IN AN ORGANIZED HEALTH CARE EDUCATION/TRAINING PROGRAM
Payer: COMMERCIAL

## 2022-01-01 DIAGNOSIS — I26.92 ACUTE SADDLE PULMONARY EMBOLISM WITHOUT ACUTE COR PULMONALE (HCC): ICD-10-CM

## 2022-01-01 DIAGNOSIS — I26.99 PULMONARY INFARCT (HCC): ICD-10-CM

## 2022-01-01 DIAGNOSIS — I82.4Z1 ACUTE DEEP VEIN THROMBOSIS (DVT) OF DISTAL VEIN OF RIGHT LOWER EXTREMITY (HCC): ICD-10-CM

## 2022-01-01 DIAGNOSIS — I10 ESSENTIAL HYPERTENSION: ICD-10-CM

## 2022-01-01 PROBLEM — I82.411 ACUTE DEEP VEIN THROMBOSIS (DVT) OF FEMORAL VEIN OF RIGHT LOWER EXTREMITY (HCC): Status: ACTIVE | Noted: 2022-01-01

## 2022-01-01 LAB
ALBUMIN SERPL BCP-MCNC: 3.9 G/DL (ref 3.2–4.9)
ALBUMIN/GLOB SERPL: 1.1 G/DL
ALP SERPL-CCNC: 111 U/L (ref 30–99)
ALT SERPL-CCNC: 19 U/L (ref 2–50)
ANION GAP SERPL CALC-SCNC: 16 MMOL/L (ref 7–16)
AST SERPL-CCNC: 23 U/L (ref 12–45)
BASOPHILS # BLD AUTO: 0.6 % (ref 0–1.8)
BASOPHILS # BLD: 0.05 K/UL (ref 0–0.12)
BILIRUB SERPL-MCNC: 1.5 MG/DL (ref 0.1–1.5)
BUN SERPL-MCNC: 19 MG/DL (ref 8–22)
CALCIUM SERPL-MCNC: 9.3 MG/DL (ref 8.5–10.5)
CHLORIDE SERPL-SCNC: 101 MMOL/L (ref 96–112)
CO2 SERPL-SCNC: 20 MMOL/L (ref 20–33)
CREAT SERPL-MCNC: 0.79 MG/DL (ref 0.5–1.4)
EKG IMPRESSION: NORMAL
EOSINOPHIL # BLD AUTO: 0.04 K/UL (ref 0–0.51)
EOSINOPHIL NFR BLD: 0.5 % (ref 0–6.9)
ERYTHROCYTE [DISTWIDTH] IN BLOOD BY AUTOMATED COUNT: 49.2 FL (ref 35.9–50)
FLUAV RNA SPEC QL NAA+PROBE: NEGATIVE
FLUBV RNA SPEC QL NAA+PROBE: NEGATIVE
GLOBULIN SER CALC-MCNC: 3.7 G/DL (ref 1.9–3.5)
GLUCOSE SERPL-MCNC: 171 MG/DL (ref 65–99)
HCT VFR BLD AUTO: 52.6 % (ref 42–52)
HGB BLD-MCNC: 18.5 G/DL (ref 14–18)
IMM GRANULOCYTES # BLD AUTO: 0.04 K/UL (ref 0–0.11)
IMM GRANULOCYTES NFR BLD AUTO: 0.5 % (ref 0–0.9)
LV EJECT FRACT  99904: 65
LV EJECT FRACT MOD 2C 99903: 64.74
LV EJECT FRACT MOD 4C 99902: 60.26
LV EJECT FRACT MOD BP 99901: 62.35
LYMPHOCYTES # BLD AUTO: 1.45 K/UL (ref 1–4.8)
LYMPHOCYTES NFR BLD: 17.7 % (ref 22–41)
MAGNESIUM SERPL-MCNC: 1.9 MG/DL (ref 1.5–2.5)
MCH RBC QN AUTO: 34.6 PG (ref 27–33)
MCHC RBC AUTO-ENTMCNC: 35.2 G/DL (ref 33.7–35.3)
MCV RBC AUTO: 98.5 FL (ref 81.4–97.8)
MONOCYTES # BLD AUTO: 0.73 K/UL (ref 0–0.85)
MONOCYTES NFR BLD AUTO: 8.9 % (ref 0–13.4)
NEUTROPHILS # BLD AUTO: 5.9 K/UL (ref 1.82–7.42)
NEUTROPHILS NFR BLD: 71.8 % (ref 44–72)
NRBC # BLD AUTO: 0 K/UL
NRBC BLD-RTO: 0 /100 WBC
PHOSPHATE SERPL-MCNC: 2.7 MG/DL (ref 2.5–4.5)
PLATELET # BLD AUTO: 170 K/UL (ref 164–446)
PMV BLD AUTO: 10.1 FL (ref 9–12.9)
POTASSIUM SERPL-SCNC: 4.1 MMOL/L (ref 3.6–5.5)
PROCALCITONIN SERPL-MCNC: <0.05 NG/ML
PROT SERPL-MCNC: 7.6 G/DL (ref 6–8.2)
RBC # BLD AUTO: 5.34 M/UL (ref 4.7–6.1)
RSV RNA SPEC QL NAA+PROBE: NEGATIVE
SARS-COV-2 RNA RESP QL NAA+PROBE: NOTDETECTED
SODIUM SERPL-SCNC: 137 MMOL/L (ref 135–145)
SPECIMEN SOURCE: NORMAL
TROPONIN T SERPL-MCNC: 14 NG/L (ref 6–19)
WBC # BLD AUTO: 8.2 K/UL (ref 4.8–10.8)

## 2022-01-01 PROCEDURE — 84145 PROCALCITONIN (PCT): CPT

## 2022-01-01 PROCEDURE — 93005 ELECTROCARDIOGRAM TRACING: CPT

## 2022-01-01 PROCEDURE — 85025 COMPLETE CBC W/AUTO DIFF WBC: CPT

## 2022-01-01 PROCEDURE — 99291 CRITICAL CARE FIRST HOUR: CPT | Performed by: PSYCHIATRY & NEUROLOGY

## 2022-01-01 PROCEDURE — 96375 TX/PRO/DX INJ NEW DRUG ADDON: CPT

## 2022-01-01 PROCEDURE — 99220 PR INITIAL OBSERVATION CARE,LEVL III: CPT | Performed by: STUDENT IN AN ORGANIZED HEALTH CARE EDUCATION/TRAINING PROGRAM

## 2022-01-01 PROCEDURE — 93971 EXTREMITY STUDY: CPT | Mod: RT

## 2022-01-01 PROCEDURE — 71275 CT ANGIOGRAPHY CHEST: CPT

## 2022-01-01 PROCEDURE — 96374 THER/PROPH/DIAG INJ IV PUSH: CPT

## 2022-01-01 PROCEDURE — 83735 ASSAY OF MAGNESIUM: CPT

## 2022-01-01 PROCEDURE — 80053 COMPREHEN METABOLIC PANEL: CPT

## 2022-01-01 PROCEDURE — 93306 TTE W/DOPPLER COMPLETE: CPT

## 2022-01-01 PROCEDURE — 84484 ASSAY OF TROPONIN QUANT: CPT

## 2022-01-01 PROCEDURE — 700101 HCHG RX REV CODE 250: Performed by: EMERGENCY MEDICINE

## 2022-01-01 PROCEDURE — C9803 HOPD COVID-19 SPEC COLLECT: HCPCS | Performed by: STUDENT IN AN ORGANIZED HEALTH CARE EDUCATION/TRAINING PROGRAM

## 2022-01-01 PROCEDURE — 96372 THER/PROPH/DIAG INJ SC/IM: CPT

## 2022-01-01 PROCEDURE — 84100 ASSAY OF PHOSPHORUS: CPT

## 2022-01-01 PROCEDURE — 71045 X-RAY EXAM CHEST 1 VIEW: CPT

## 2022-01-01 PROCEDURE — 93306 TTE W/DOPPLER COMPLETE: CPT | Mod: 26 | Performed by: INTERNAL MEDICINE

## 2022-01-01 PROCEDURE — 99285 EMERGENCY DEPT VISIT HI MDM: CPT

## 2022-01-01 PROCEDURE — 700111 HCHG RX REV CODE 636 W/ 250 OVERRIDE (IP): Performed by: EMERGENCY MEDICINE

## 2022-01-01 PROCEDURE — 0241U HCHG SARS-COV-2 COVID-19 NFCT DS RESP RNA 4 TRGT MIC: CPT

## 2022-01-01 PROCEDURE — 700117 HCHG RX CONTRAST REV CODE 255: Performed by: EMERGENCY MEDICINE

## 2022-01-01 PROCEDURE — 93005 ELECTROCARDIOGRAM TRACING: CPT | Performed by: EMERGENCY MEDICINE

## 2022-01-01 PROCEDURE — G0378 HOSPITAL OBSERVATION PER HR: HCPCS

## 2022-01-01 RX ORDER — AMOXICILLIN 250 MG
2 CAPSULE ORAL 2 TIMES DAILY
Status: DISCONTINUED | OUTPATIENT
Start: 2022-01-01 | End: 2022-01-02 | Stop reason: HOSPADM

## 2022-01-01 RX ORDER — BISACODYL 10 MG
10 SUPPOSITORY, RECTAL RECTAL
Status: DISCONTINUED | OUTPATIENT
Start: 2022-01-01 | End: 2022-01-02 | Stop reason: HOSPADM

## 2022-01-01 RX ORDER — ONDANSETRON 4 MG/1
4 TABLET, ORALLY DISINTEGRATING ORAL EVERY 4 HOURS PRN
Status: DISCONTINUED | OUTPATIENT
Start: 2022-01-01 | End: 2022-01-02 | Stop reason: HOSPADM

## 2022-01-01 RX ORDER — ENALAPRILAT 1.25 MG/ML
1.25 INJECTION INTRAVENOUS ONCE
Status: COMPLETED | OUTPATIENT
Start: 2022-01-01 | End: 2022-01-01

## 2022-01-01 RX ORDER — PROCHLORPERAZINE EDISYLATE 5 MG/ML
5-10 INJECTION INTRAMUSCULAR; INTRAVENOUS EVERY 4 HOURS PRN
Status: DISCONTINUED | OUTPATIENT
Start: 2022-01-01 | End: 2022-01-02 | Stop reason: HOSPADM

## 2022-01-01 RX ORDER — PROMETHAZINE HYDROCHLORIDE 25 MG/1
12.5-25 SUPPOSITORY RECTAL EVERY 4 HOURS PRN
Status: DISCONTINUED | OUTPATIENT
Start: 2022-01-01 | End: 2022-01-02 | Stop reason: HOSPADM

## 2022-01-01 RX ORDER — PROMETHAZINE HYDROCHLORIDE 25 MG/1
12.5-25 TABLET ORAL EVERY 4 HOURS PRN
Status: DISCONTINUED | OUTPATIENT
Start: 2022-01-01 | End: 2022-01-02 | Stop reason: HOSPADM

## 2022-01-01 RX ORDER — LABETALOL HYDROCHLORIDE 5 MG/ML
10 INJECTION, SOLUTION INTRAVENOUS ONCE
Status: COMPLETED | OUTPATIENT
Start: 2022-01-01 | End: 2022-01-01

## 2022-01-01 RX ORDER — POLYETHYLENE GLYCOL 3350 17 G/17G
1 POWDER, FOR SOLUTION ORAL
Status: DISCONTINUED | OUTPATIENT
Start: 2022-01-01 | End: 2022-01-02 | Stop reason: HOSPADM

## 2022-01-01 RX ORDER — ENALAPRILAT 1.25 MG/ML
1.25 INJECTION INTRAVENOUS EVERY 6 HOURS PRN
Status: DISCONTINUED | OUTPATIENT
Start: 2022-01-01 | End: 2022-01-02 | Stop reason: HOSPADM

## 2022-01-01 RX ORDER — ACETAMINOPHEN 325 MG/1
650 TABLET ORAL EVERY 6 HOURS PRN
Status: DISCONTINUED | OUTPATIENT
Start: 2022-01-01 | End: 2022-01-02 | Stop reason: HOSPADM

## 2022-01-01 RX ORDER — ONDANSETRON 2 MG/ML
4 INJECTION INTRAMUSCULAR; INTRAVENOUS EVERY 4 HOURS PRN
Status: DISCONTINUED | OUTPATIENT
Start: 2022-01-01 | End: 2022-01-02 | Stop reason: HOSPADM

## 2022-01-01 RX ORDER — HYDROCODONE BITARTRATE AND ACETAMINOPHEN 5; 325 MG/1; MG/1
1-2 TABLET ORAL EVERY 6 HOURS PRN
Status: DISCONTINUED | OUTPATIENT
Start: 2022-01-01 | End: 2022-01-02 | Stop reason: HOSPADM

## 2022-01-01 RX ADMIN — ENOXAPARIN SODIUM 80 MG: 80 INJECTION SUBCUTANEOUS at 15:59

## 2022-01-01 RX ADMIN — ENALAPRILAT 1.25 MG: 1.25 INJECTION INTRAVENOUS at 18:07

## 2022-01-01 RX ADMIN — IOHEXOL 80 ML: 350 INJECTION, SOLUTION INTRAVENOUS at 16:45

## 2022-01-01 RX ADMIN — LABETALOL HYDROCHLORIDE 10 MG: 5 INJECTION, SOLUTION INTRAVENOUS at 15:59

## 2022-01-01 ASSESSMENT — ENCOUNTER SYMPTOMS
CONSTITUTIONAL NEGATIVE: 1
SEIZURES: 0
PHOTOPHOBIA: 0
NERVOUS/ANXIOUS: 0
SPUTUM PRODUCTION: 0
FEVER: 0
PALPITATIONS: 0
VOMITING: 0
MUSCULOSKELETAL NEGATIVE: 1
CONSTIPATION: 0
FOCAL WEAKNESS: 0
PSYCHIATRIC NEGATIVE: 1
NAUSEA: 0
COUGH: 0
SPEECH CHANGE: 0
CHILLS: 0
GASTROINTESTINAL NEGATIVE: 1
HALLUCINATIONS: 0
LOSS OF CONSCIOUSNESS: 0
ABDOMINAL PAIN: 0
CARDIOVASCULAR NEGATIVE: 1
DIARRHEA: 0
SHORTNESS OF BREATH: 1
EYE PAIN: 0
NECK PAIN: 0
BACK PAIN: 0
NEUROLOGICAL NEGATIVE: 1
EYES NEGATIVE: 1
SINUS PAIN: 0

## 2022-01-01 ASSESSMENT — PATIENT HEALTH QUESTIONNAIRE - PHQ9
SUM OF ALL RESPONSES TO PHQ9 QUESTIONS 1 AND 2: 0
1. LITTLE INTEREST OR PLEASURE IN DOING THINGS: NOT AT ALL
2. FEELING DOWN, DEPRESSED, IRRITABLE, OR HOPELESS: NOT AT ALL

## 2022-01-01 ASSESSMENT — LIFESTYLE VARIABLES
DOES PATIENT WANT TO STOP DRINKING: NO
TOTAL SCORE: 0
TOTAL SCORE: 0
EVER FELT BAD OR GUILTY ABOUT YOUR DRINKING: NO
HAVE YOU EVER FELT YOU SHOULD CUT DOWN ON YOUR DRINKING: NO
CONSUMPTION TOTAL: INCOMPLETE
EVER HAD A DRINK FIRST THING IN THE MORNING TO STEADY YOUR NERVES TO GET RID OF A HANGOVER: NO
TOTAL SCORE: 0
DO YOU DRINK ALCOHOL: NO
HAVE PEOPLE ANNOYED YOU BY CRITICIZING YOUR DRINKING: NO
ALCOHOL_USE: YES

## 2022-01-01 ASSESSMENT — PAIN DESCRIPTION - PAIN TYPE: TYPE: ACUTE PAIN;CHRONIC PAIN

## 2022-01-01 ASSESSMENT — FIBROSIS 4 INDEX
FIB4 SCORE: 1.89
FIB4 SCORE: 5.67

## 2022-01-01 NOTE — ED NOTES
Pt resting zak, hypertensive, pt said that he stopped taking his bp medication due to financial reason. Here for right leg swelling and pain back of knee. He has history of left leg dvt and was on blood thinner for 6months and had ivc filter placed.   No chest pain and sob at this time but had an episode of chest discomfort couple days ago with sob.

## 2022-01-01 NOTE — ED PROVIDER NOTES
ED Provider Note    Scribed for Subha Spears M.D. by Josue Cortez-Reyes. 1/1/2022  2:28 PM    Primary care provider: Edwina Chni D.O.  Means of arrival: Walk-in  History obtained from: Patient  History limited by: None  CHIEF COMPLAINT  Chief Complaint   Patient presents with   • Leg Pain     Patient has a hx of 3 DVTs in his left leg in the past but is no longer on thinner. Fro 2 days his right leg has now been hurting with calf pain that radiates up to thigh. Last night he had an episode of SOB for about 20 mintues, now resolved.        HPI  Jose A Ponce is a 61 y.o. male who presents to the ED for evaluation of right lower extremity pain and swelling onset 2 days ago. The patient states that his pain is mostly centered around his calf and behind his knee noting that it radiates up to his thigh/groin. He endorses an episode of shortness of breath two nights ago. He declares that his episode of shortness of breath lasted about 20 minutes noting that he was gasping for air on the floor during this time. The patient denies any recent long car/plane trips or surgeries within the last 6 months. He endorses additional sharp chest pain about 2 weeks ago stating that he experienced it with deep breaths.  That pain was initially on the left side of his chest and then moved to the right side of his chest.  It resolved on its own with no recurrent chest pain in the last 2 weeks.  The patient denies any additional dizziness, coughing up blood, or shortness of breath since his episode 2 nights ago. The patient endorses a history of 3 left DVTs about 2 years ago. The patient has a history of hypertension. He notes that he is not on antihypertensives as he is unable to afford it. The patient reports that he smokes about half a pack of cigarettes a day.  Patient states he was taken off the blood thinners 6 months after his DVT stating that he was told he did not need them anymore.    REVIEW OF SYSTEMS  See  "HPI for further details. Pertinent positives include Right lower extremity pain, right lower extremity swelling, shortness of breath, and sharp chest pain. Pertinent negatives include no No dizziness, coughing up blood, or shortness of breath.  All other systems are negative.    PAST MEDICAL HISTORY  Past Medical History:   Diagnosis Date   • Breath shortness 10/2017    \"In the past, not an issue now\" sob before heart surgery   • CAD (coronary artery disease)     S/P CABG X2   • Dental disorder 10/2017    Upper dentures   • DVT (deep venous thrombosis) (HCC)    • Grief reaction 2017    Girlfriend  this year.   • Hx of hernia repair     Mesh   • Hyperlipidemia    • Hypertension    • Psychiatric problem 10/2017    Depression   • Tobacco use        FAMILY HISTORY  Family History   Problem Relation Age of Onset   • Cancer Mother    • Cancer Father    • No Known Problems Sister    • No Known Problems Sister    • Heart Disease Maternal Grandfather 89        probable MI and sudden death       SOCIAL HISTORY  Social History     Socioeconomic History   • Marital status: Single     Spouse name: Not noted   • Number of children: Not noted   • Years of education: Not noted   • Highest education level: Not noted   Occupational History   • Not on file   Tobacco Use   • Smoking status: Current Some Day Smoker     Packs/day: 0.25     Years: 42.00     Pack years: 10.50     Types: Cigarettes     Last attempt to quit: 2016     Years since quittin.5   • Smokeless tobacco: Never Used   • Tobacco comment: vape occ   Vaping Use   • Vaping Use: Some days   Substance and Sexual Activity   • Alcohol use: Yes     Alcohol/week: 0.0 oz     Comment: 2 per day   • Drug use: No   • Sexual activity: Not noted   Other Topics Concern   • Not noted   Social History Narrative   • Not noted     Social Determinants of Health     Financial Resource Strain:    • Difficulty of Paying Living Expenses: Not noted   Food Insecurity:    • Worried " About Running Out of Food in the Last Year: Not noted   • Ran Out of Food in the Last Year: Not noted   Transportation Needs:    • Lack of Transportation (Medical): Not noted   • Lack of Transportation (Non-Medical): Not noted   Physical Activity:    • Days of Exercise per Week: Not noted   • Minutes of Exercise per Session: Not noted   Stress:    • Feeling of Stress : Not noted   Social Connections:    • Frequency of Communication with Friends and Family: Not noted   • Frequency of Social Gatherings with Friends and Family: Not noted   • Attends Yazdanism Services: Not noted   • Active Member of Clubs or Organizations: Not noted   • Attends Club or Organization Meetings: Not noted   • Marital Status: Not on file   Intimate Partner Violence:    • Fear of Current or Ex-Partner: Not on file   • Emotionally Abused: Not on file   • Physically Abused: Not on file   • Sexually Abused: Not on file   Housing Stability:    • Unable to Pay for Housing in the Last Year: Not on file   • Number of Places Lived in the Last Year: Not on file   • Unstable Housing in the Last Year: Not on file       SURGICAL HISTORY  Past Surgical History:   Procedure Laterality Date   • MULTIPLE CORONARY ARTERY BYPASS ENDO VEIN HARVEST  6/13/2017    Procedure: MULTIPLE CORONARY ARTERY BYPASS x2 RIGHT LEG ENDO VEIN HARVEST;  Surgeon: Gray Barboaz M.D.;  Location: SURGERY [facility];  Service:    • HUBERT  6/13/2017    Procedure: HUBERT - INTRAOP;  Surgeon: Gray Barboza M.D.;  Location: SURGERY [facility];  Service:    • INGUINAL HERNIA REPAIR Bilateral 2000   • ORIF, FRACTURE, TIBIA Right 1980s   • ZZZ CARDIAC CATH         CURRENT MEDICATIONS  Home Medications     Reviewed by Neha Moore R.N. (Registered Nurse) on 01/01/22 at 2114  Med List Status: Complete   Medication Last Dose Status        Patient Julius Taking any Medications                       ALLERGIES  No Known Allergies    PHYSICAL EXAM  VITAL SIGNS: BP (!) 197/114   Pulse  "(!) 118   Temp 36.6 °C (97.8 °F) (Temporal)   Resp 16   Ht 1.854 m (6' 1\")   Wt 81.1 kg (178 lb 12.7 oz)   SpO2 98%   BMI 23.59 kg/m²      Constitutional: Well developed, well nourished; No acute distress; Non-toxic appearance.   HENT: Normocephalic, atraumatic; Bilateral external ears normal; Oropharyngeal exam deferred to COVID-19 outbreak and lack of oropharyngeal complaints  Eyes: PERRL, EOMI, Conjunctiva normal. No discharge.   Neck:  Supple, nontender midline; No stridor; No nuchal rigidity.   Lymphatic: No cervical lymphadenopathy noted.   Cardiovascular: Regular rate and rhythm without murmurs, rubs, or gallop.   Thorax & Lungs: No respiratory distress, bronchial sounding breath sounds in right lower lobe, otherwise clear without wheezing, rales or rhonchi. Nontender chest wall. No crepitus or subcutaneous air  Abdomen: Soft, nontender, bowel sounds normal. No obvious masses; No pulsatile masses; no rebound, guarding, or peritoneal signs.   Skin: Good color; warm and dry without rash or petechia.  Back: Nontender, No CVA tenderness.   Extremities: Distal radial, dorsalis pedis, posterior tibial pulses are equal bilaterally; No edema; Nontender calves or saphenous, No cyanosis, No clubbing.   Musculoskeletal: Right lower extremity is swollen when compared to the left.  There is a subtle erythematous hue to the right lower leg.  2+ DP and PT pulses equal bilaterally.  There is tenderness to the popliteal fossa.  Trace pretibial edema.  Mild tenderness to the medial thigh.  Neurologic: Alert & oriented x 4, clear speech    EKG  12 Lead EKG interpreted by me as noted below    LABS/RADIOLOGY/PROCEDURES  Results for orders placed or performed during the hospital encounter of 01/01/22   EC-ECHOCARDIOGRAM COMPLETE W/O CONT   Result Value Ref Range    Eject.Frac. MOD BP 62.35     Eject.Frac. MOD 4C 60.26     Eject.Frac. MOD 2C 64.74     Left Ventrical Ejection Fraction 65    CBC with Differential   Result Value " Ref Range    WBC 8.2 4.8 - 10.8 K/uL    RBC 5.34 4.70 - 6.10 M/uL    Hemoglobin 18.5 (H) 14.0 - 18.0 g/dL    Hematocrit 52.6 (H) 42.0 - 52.0 %    MCV 98.5 (H) 81.4 - 97.8 fL    MCH 34.6 (H) 27.0 - 33.0 pg    MCHC 35.2 33.7 - 35.3 g/dL    RDW 49.2 35.9 - 50.0 fL    Platelet Count 170 164 - 446 K/uL    MPV 10.1 9.0 - 12.9 fL    Neutrophils-Polys 71.80 44.00 - 72.00 %    Lymphocytes 17.70 (L) 22.00 - 41.00 %    Monocytes 8.90 0.00 - 13.40 %    Eosinophils 0.50 0.00 - 6.90 %    Basophils 0.60 0.00 - 1.80 %    Immature Granulocytes 0.50 0.00 - 0.90 %    Nucleated RBC 0.00 /100 WBC    Neutrophils (Absolute) 5.90 1.82 - 7.42 K/uL    Lymphs (Absolute) 1.45 1.00 - 4.80 K/uL    Monos (Absolute) 0.73 0.00 - 0.85 K/uL    Eos (Absolute) 0.04 0.00 - 0.51 K/uL    Baso (Absolute) 0.05 0.00 - 0.12 K/uL    Immature Granulocytes (abs) 0.04 0.00 - 0.11 K/uL    NRBC (Absolute) 0.00 K/uL   Comp Metabolic Panel   Result Value Ref Range    Sodium 137 135 - 145 mmol/L    Potassium 4.1 3.6 - 5.5 mmol/L    Chloride 101 96 - 112 mmol/L    Co2 20 20 - 33 mmol/L    Anion Gap 16.0 7.0 - 16.0    Glucose 171 (H) 65 - 99 mg/dL    Bun 19 8 - 22 mg/dL    Creatinine 0.79 0.50 - 1.40 mg/dL    Calcium 9.3 8.5 - 10.5 mg/dL    AST(SGOT) 23 12 - 45 U/L    ALT(SGPT) 19 2 - 50 U/L    Alkaline Phosphatase 111 (H) 30 - 99 U/L    Total Bilirubin 1.5 0.1 - 1.5 mg/dL    Albumin 3.9 3.2 - 4.9 g/dL    Total Protein 7.6 6.0 - 8.2 g/dL    Globulin 3.7 (H) 1.9 - 3.5 g/dL    A-G Ratio 1.1 g/dL   ESTIMATED GFR   Result Value Ref Range    GFR If African American >60 >60 mL/min/1.73 m 2    GFR If Non African American >60 >60 mL/min/1.73 m 2   TROPONIN   Result Value Ref Range    Troponin T 14 6 - 19 ng/L   MAGNESIUM   Result Value Ref Range    Magnesium 1.9 1.5 - 2.5 mg/dL   PHOSPHORUS   Result Value Ref Range    Phosphorus 2.7 2.5 - 4.5 mg/dL   PROCALCITONIN   Result Value Ref Range    Procalcitonin <0.05 <0.25 ng/mL   COV-2, FLU A/B, AND RSV BY PCR (2-4 HOURS CEPHEID):  Collect NP swab in VTM    Specimen: Respirate   Result Value Ref Range    Influenza virus A RNA Negative Negative    Influenza virus B, PCR Negative Negative    RSV, PCR Negative Negative    SARS-CoV-2 by PCR NotDetected     SARS-CoV-2 Source NP Swab    EKG   Result Value Ref Range    Report       [facility] Emergency Dept[facility]    Test Date:  2022  Pt Name:    CESAR MASTERS                Department: ER  MRN:        [id number]                      Room:       T215  Gender:     Male                         Technician: [id number]  :        1960                   Requested By:ER TRIAGE PROTOCOL  Order #:    [id number]                    Reading MD: Subha Spears    Measurements  Intervals                                Axis  Rate:       111                          P:          42  AR:         136                          QRS:        -17  QRSD:       96                           T:          -18  QT:         352  QTc:        479    Interpretive Statements  SINUS TACHYCARDIA rate 111  Normal axis  Normal intervals  ST depression I, II, AVF, V3-V6 (unchanged from 3/2/20)  ST elevation AVR (unchanged from 3/2/20)  PROBABLE LEFT ATRIAL ABNORMALITY  BORDERLINE LEFT AXIS DEVIATION  NONSPECIFIC REPOL ABNORMALITY, DIFFUSE LEADS  BORDERLINE PROLONGED QT INTERVAL  Compared t o ECG 2020 11:38:49  Electronically Signed On 2022 23:14:53 PST by Subha Spears             EC-ECHOCARDIOGRAM COMPLETE W/O CONT   Final Result      CT-CTA CHEST PULMONARY ARTERY W/ RECONS   Final Result      1.  New extensive pulmonary thromboemboli with saddle embolus seen, mostly extending to the left. No evidence of right heart strain. There is some left ventricular dilatation      2.  Multifocal lower lung zone areas of new peripheral consolidation with adjacent groundglass is suspicious for pulmonary infarctions      3.  Right middle lobe new 12 mm right middle lobe nodule is worrisome for bronchogenic carcinoma. Short interval  follow-up is recommended to confirm resolution. This could instead be related to the above impression      4.  Prior sternotomy      Findings were discussed with BYRON HANSEN on 1/1/2022 4:28 PM.      DX-CHEST-PORTABLE (1 VIEW)   Final Result      1.  Bilateral areas of peripheral alveolar opacity in the lower lobe which may represent alveolar edema or pneumonia. Differential diagnosis would include Covid 19 pneumonia.      2.  No lobar consolidation.      3.  CABG changes.      US-EXTREMITY VENOUS LOWER UNILAT RIGHT   Final Result          COURSE & MEDICAL DECISION MAKING  Pertinent Labs & Imaging studies reviewed. (See chart for details)    Reviewed patient's old medical records which showed that the patient was evaluated by Dr. Stockton in march of 2020 and had a venogram and recanalization of left hemorhal vein with angio thrombosis as well as EKOS thrombolysis for left DVT. The patient had a cabbage with Dr. Barboza in 2017 with 2 grafts. In March of 2020 Dr. Coffey noted the patient's femoral vein to be narrowed and recommended that the patient be placed on lifelong anticoagulation. He was placed on Xarelto at that time.     2:28 PM - Patient seen and examined at bedside. Discussed plan of care, including plan to obtain the above labs and imaging. Patient agrees to the plan of care. The patient will be medicated with Lovenox and labetolol. Ordered for labs, imaging, and EKG to evaluate his symptoms.     4:29 PM - I discussed the patient's case and the above findings with radiology who noted that the patient had multiple PE's including a saddle embolism. The patient was also noted to have a 12 mm right middle lobe nodule.     4:38 PM - Paged Intensivist.     4:40 PM - I reevaluated the patient at bedside. I updated him on his imaging results noting that he was found to have multiple PE's including a saddle embolus. I discussed my plan of having the patient evaluated for hospitalization. Patient verbalizes  understanding and agreement to this plan of care.     4:46 PM - I discussed the patient's case and the above findings with Dr. Summers (Intensivist) who agrees to evaluate the patient and determine if he is a candidate for EKOS. Patient will be hospitalized under his care at this time unless the patient can be admitted to the stepdown unit for further evaluation and treatment of his PE.    5:22 PM - I discussed the patient's case and the above findings with Dr. Summers (Intensivist) who informed me that the patient will be admitted to the floor and stay on lovenox treatment. He determined that the patient does not need EKOS at this time.    5:35 PM - Paged Hospitalist.     6:07 PM - I discussed the patient's case and the above findings with Dr. Gonzalez (Hospitalist) who agrees to evaluate the patient for hosptialization.    Patient was told about the 12 mm lung nodule which is new compared to previous CT scan.  Patient is aware this could represent an early lung cancer and needs very close follow-up.  He understands and agrees.      Patient presents to the ER with complaints of pain and swelling to his right lower extremity for the last several days.  He reports that a few weeks ago he was having some intermittent sharp pleuritic chest pain.  It initially began on the left and then it moved over to the right.  It seemed to resolve.  2 days ago he had significant shortness of breath in the middle the night that actually kept him on the floor for about 20 minutes.  That seemed to resolve and since then he has been fine from a chest pain and shortness of breath standpoint.  He was found to have a swollen, mildly erythematous right lower extremity with some tenderness in the popliteal fossa.  He describes discomfort all the way up into his right groin.  He has extensive DVT throughout the right lower extremity seen on ultrasound.  Given his complaints of chest pains and shortness of breath I went ahead and did a CT scan of  the chest.  The patient has extensive PE including saddle emboli.  Patient was given Lovenox.  Thankfully there is no right heart strain.  He seems to be tolerating these PEs remarkably well from a hemodynamic standpoint and that he is not tachycardic or hypotensive.  Additionally, he is not hypoxic.  The radiologist informing that he has a new 12 mm pulmonary nodule.  This is concerning for lung cancer as patient is a smoker.  I spoke with the patient and told him that he must get this pulmonary nodule followed up.  Perhaps this nodule is contributing to his PEs and DVT if it is indeed a cancer.  I spoke with Dr. Summers, intensivist on-call.  He will evaluate the patient and decide if patient might be a candidate for EKOS.  He will need to be hospitalized.  At this time he will be hospitalized under the care of the intensivist unless the intensivist feels he can be hospitalized in the stepdown unit for further evaluation and management of his DVT and PE.        CRITICAL CARE  The very real possibility of a deterioration of this patient's condition required the highest level of my preparedness for sudden, emergent intervention.  I provided critical care services, which included medication orders, frequent reevaluations of the patient's condition and response to treatment, ordering and reviewing test results, and discussing the case with various consultants.  The critical care time associated with the care of the patient was 35 minutes. Review chart for interventions. This time is exclusive of any other billable procedures.       DISPOSITION:  Patient will be hospitalized by Dr. Gonzalez in guarded condition.      FINAL IMPRESSION  1. Acute saddle pulmonary embolism without acute cor pulmonale (HCC) Acute   2. Acute deep vein thrombosis (DVT) of distal vein of right lower extremity (HCC) Acute   3. Pulmonary infarct (HCC) Acute    The critical care time associated with the care of the patient was 35 minutes.      This  dictation has been created using voice recognition software. The accuracy of the dictation is limited by the abilities of the software. I expect there may be some errors of grammar and possibly content. I made every attempt to manually correct the errors within my dictation. However, errors related to voice recognition software may still exist and should be interpreted within the appropriate context.     I, Josue Cortez-Reyes (Scribalyssa), am scribing for, and in the presence of, Subha Spears M.D..    Electronically signed by: Josue Cortez-Reyes (Chaunceyibalyssa), 1/1/2022    Subha CANADA M.D. personally performed the services described in this documentation, as scribed by Josue Cortez-Reyes in my presence, and it is both accurate and complete. C.    The note accurately reflects work and decisions made by me.  Subha Spears M.D.  1/1/2022  4:51 PM

## 2022-01-01 NOTE — ED TRIAGE NOTES
"Chief Complaint   Patient presents with   • Leg Pain     Patient has a hx of 3 DVTs in his left leg in the past but is no longer on thinner. Fro 2 days his right leg has now been hurting with calf pain that radiates up to thigh. Last night he had an episode of SOB for about 20 mintues, now resolved.        Patient to triage ambulatory with a cane, AAOx4, Appropriate precautions in place.     Explained wait time and triage process. Placed back in lobby. Told to notify ED tech or RN of any changes, verbalized understanding.    BP (!) 197/114   Pulse (!) 118   Temp 36.6 °C (97.8 °F) (Temporal)   Resp 16   Ht 1.854 m (6' 1\")   Wt 81.1 kg (178 lb 12.7 oz)   SpO2 98%   BMI 23.59 kg/m²     "

## 2022-01-01 NOTE — ED NOTES
Patient walked with a steady gate while using his cane at this time to er [facility] area room 21. Placed patient into gown, on heart monitor, on auto bp, spo2, gave warm blanket, and call light. Ready to be seen. rn aware

## 2022-01-02 VITALS
DIASTOLIC BLOOD PRESSURE: 68 MMHG | RESPIRATION RATE: 16 BRPM | HEIGHT: 73 IN | SYSTOLIC BLOOD PRESSURE: 120 MMHG | OXYGEN SATURATION: 90 % | HEART RATE: 89 BPM | WEIGHT: 164.46 LBS | BODY MASS INDEX: 21.8 KG/M2 | TEMPERATURE: 98.4 F

## 2022-01-02 LAB
ANION GAP SERPL CALC-SCNC: 13 MMOL/L (ref 7–16)
BASOPHILS # BLD AUTO: 1.1 % (ref 0–1.8)
BASOPHILS # BLD: 0.09 K/UL (ref 0–0.12)
BUN SERPL-MCNC: 19 MG/DL (ref 8–22)
CALCIUM SERPL-MCNC: 9.1 MG/DL (ref 8.5–10.5)
CHLORIDE SERPL-SCNC: 104 MMOL/L (ref 96–112)
CO2 SERPL-SCNC: 18 MMOL/L (ref 20–33)
CREAT SERPL-MCNC: 0.6 MG/DL (ref 0.5–1.4)
EOSINOPHIL # BLD AUTO: 0.15 K/UL (ref 0–0.51)
EOSINOPHIL NFR BLD: 1.8 % (ref 0–6.9)
ERYTHROCYTE [DISTWIDTH] IN BLOOD BY AUTOMATED COUNT: 51.1 FL (ref 35.9–50)
GLUCOSE SERPL-MCNC: 90 MG/DL (ref 65–99)
HCT VFR BLD AUTO: 48.2 % (ref 42–52)
HGB BLD-MCNC: 16.3 G/DL (ref 14–18)
IMM GRANULOCYTES # BLD AUTO: 0.04 K/UL (ref 0–0.11)
IMM GRANULOCYTES NFR BLD AUTO: 0.5 % (ref 0–0.9)
LYMPHOCYTES # BLD AUTO: 2.14 K/UL (ref 1–4.8)
LYMPHOCYTES NFR BLD: 26.1 % (ref 22–41)
MCH RBC QN AUTO: 34.2 PG (ref 27–33)
MCHC RBC AUTO-ENTMCNC: 33.8 G/DL (ref 33.7–35.3)
MCV RBC AUTO: 101 FL (ref 81.4–97.8)
MONOCYTES # BLD AUTO: 0.91 K/UL (ref 0–0.85)
MONOCYTES NFR BLD AUTO: 11.1 % (ref 0–13.4)
NEUTROPHILS # BLD AUTO: 4.86 K/UL (ref 1.82–7.42)
NEUTROPHILS NFR BLD: 59.4 % (ref 44–72)
NRBC # BLD AUTO: 0 K/UL
NRBC BLD-RTO: 0 /100 WBC
PLATELET # BLD AUTO: 181 K/UL (ref 164–446)
PMV BLD AUTO: 10.5 FL (ref 9–12.9)
POTASSIUM SERPL-SCNC: 3.8 MMOL/L (ref 3.6–5.5)
RBC # BLD AUTO: 4.77 M/UL (ref 4.7–6.1)
SODIUM SERPL-SCNC: 135 MMOL/L (ref 135–145)
WBC # BLD AUTO: 8.2 K/UL (ref 4.8–10.8)

## 2022-01-02 PROCEDURE — 99217 PR OBSERVATION CARE DISCHARGE: CPT | Mod: 25 | Performed by: STUDENT IN AN ORGANIZED HEALTH CARE EDUCATION/TRAINING PROGRAM

## 2022-01-02 PROCEDURE — 96372 THER/PROPH/DIAG INJ SC/IM: CPT

## 2022-01-02 PROCEDURE — 700111 HCHG RX REV CODE 636 W/ 250 OVERRIDE (IP): Performed by: STUDENT IN AN ORGANIZED HEALTH CARE EDUCATION/TRAINING PROGRAM

## 2022-01-02 PROCEDURE — G0378 HOSPITAL OBSERVATION PER HR: HCPCS

## 2022-01-02 PROCEDURE — 36415 COLL VENOUS BLD VENIPUNCTURE: CPT

## 2022-01-02 PROCEDURE — 85025 COMPLETE CBC W/AUTO DIFF WBC: CPT

## 2022-01-02 PROCEDURE — 80048 BASIC METABOLIC PNL TOTAL CA: CPT

## 2022-01-02 PROCEDURE — 99406 BEHAV CHNG SMOKING 3-10 MIN: CPT | Performed by: STUDENT IN AN ORGANIZED HEALTH CARE EDUCATION/TRAINING PROGRAM

## 2022-01-02 RX ORDER — RIVAROXABAN 15 MG-20MG
KIT ORAL
Qty: 51 EACH | Refills: 0 | Status: SHIPPED | OUTPATIENT
Start: 2022-01-02 | End: 2022-01-02 | Stop reason: SDUPTHER

## 2022-01-02 RX ORDER — RIVAROXABAN 15 MG-20MG
KIT ORAL
Qty: 51 EACH | Refills: 0 | Status: SHIPPED | OUTPATIENT
Start: 2022-01-02 | End: 2022-01-30

## 2022-01-02 RX ORDER — LOSARTAN POTASSIUM 50 MG/1
50 TABLET ORAL DAILY
Qty: 30 TABLET | Refills: 0 | Status: SHIPPED | OUTPATIENT
Start: 2022-01-02 | End: 2022-02-17 | Stop reason: SDUPTHER

## 2022-01-02 RX ORDER — LOSARTAN POTASSIUM 50 MG/1
50 TABLET ORAL DAILY
Qty: 30 TABLET | Refills: 0 | Status: SHIPPED | OUTPATIENT
Start: 2022-01-02 | End: 2022-01-02 | Stop reason: SDUPTHER

## 2022-01-02 RX ADMIN — ENOXAPARIN SODIUM 80 MG: 80 INJECTION SUBCUTANEOUS at 15:57

## 2022-01-02 RX ADMIN — ENOXAPARIN SODIUM 80 MG: 80 INJECTION SUBCUTANEOUS at 05:07

## 2022-01-02 ASSESSMENT — LIFESTYLE VARIABLES
TOTAL SCORE: 0
TOTAL SCORE: 0
ALCOHOL_USE: NO
HOW MANY TIMES IN THE PAST YEAR HAVE YOU HAD 5 OR MORE DRINKS IN A DAY: 0
EVER HAD A DRINK FIRST THING IN THE MORNING TO STEADY YOUR NERVES TO GET RID OF A HANGOVER: NO
EVER FELT BAD OR GUILTY ABOUT YOUR DRINKING: NO
AVERAGE NUMBER OF DAYS PER WEEK YOU HAVE A DRINK CONTAINING ALCOHOL: 0
CONSUMPTION TOTAL: NEGATIVE
TOTAL SCORE: 0
HAVE PEOPLE ANNOYED YOU BY CRITICIZING YOUR DRINKING: NO
HAVE YOU EVER FELT YOU SHOULD CUT DOWN ON YOUR DRINKING: NO
ON A TYPICAL DAY WHEN YOU DRINK ALCOHOL HOW MANY DRINKS DO YOU HAVE: 0

## 2022-01-02 ASSESSMENT — PAIN DESCRIPTION - PAIN TYPE: TYPE: ACUTE PAIN;CHRONIC PAIN

## 2022-01-02 NOTE — CONSULTS
Critical Care Consultation    Date of consult: 1/1/2022    Referring Physician  Subha Spears M.D.    Reason for Consultation  Saddle PE    History of Presenting Illness  61 y.o. male w/ HX of CAD prior DVT however currently not on AC who presented 1/1/2022 with RLE pain and SOB. Found to have a saddle PE with mostly extension into the left. No right heart strain on CT. At present hemodynmaically stable and a RYAN score of 0. At present his O2 sats are 95% on room air    Code Status  Prior    Review of Systems  Review of Systems   Constitutional: Negative.    HENT: Negative.    Eyes: Negative.    Respiratory: Positive for shortness of breath.    Cardiovascular: Negative.    Gastrointestinal: Negative.    Genitourinary: Negative.    Musculoskeletal: Negative.    Skin: Negative.    Neurological: Negative.    Endo/Heme/Allergies: Negative.    Psychiatric/Behavioral: Negative.        Past Medical History   has a past medical history of Breath shortness (10/2017), CAD (coronary artery disease), Dental disorder (10/2017), DVT (deep venous thrombosis) (HCC), Grief reaction (2017), hernia repair, Hyperlipidemia, Hypertension, Psychiatric problem (10/2017), and Tobacco use.    Surgical History   has a past surgical history that includes inguinal hernia repair (Bilateral, 2000); orif, fracture, tibia (Right, 1980s); multiple coronary artery bypass endo vein harvest (6/13/2017); yarely (6/13/2017); and [facility] cardiac cath.    Family History  family history includes Cancer in his father and mother; Heart Disease (age of onset: 89) in his maternal grandfather; No Known Problems in his sister and sister.    Social History   reports that he has been smoking cigarettes. He has a 10.50 pack-year smoking history. He has never used smokeless tobacco. He reports current alcohol use. He reports that he does not use drugs.    Medications  Home Medications     Reviewed by Yadi Miles R.N. (Registered Nurse) on 01/01/22 at 1123  [facility] List  Status: Partial   Medication Last Dose Status   aspirin EC (ECOTRIN) 81 MG Tablet Delayed Response  Active   fluticasone (FLONASE) 50 MCG/ACT nasal spray  Active   losartan (COZAAR) 100 MG Tab  Active   metoprolol SR (TOPROL XL) 100 MG TABLET SR 24 HR  Active   multivitamin (THERAGRAN) Tab  Active   nitroglycerin (NITROSTAT) 0.4 MG SL Tab  Active   sertraline (ZOLOFT) 100 MG Tab  Active              Current Facility-Administered Medications   Medication Dose Route Frequency Provider Last Rate Last Admin   • [COMPLETED] iohexol (OMNIPAQUE) 350 mg/mL (IV)  100 mL Intravenous Once Subha Spears M.D.   80 mL at 01/01/22 1645     Current Outpatient Medications   Medication Sig Dispense Refill   • fluticasone (FLONASE) 50 MCG/ACT nasal spray Administer 1 Spray into affected nostril(S) every day. 16 g 0   • metoprolol SR (TOPROL XL) 100 MG TABLET SR 24 HR TAKE 1 TABLET BY MOUTH EVERYDAY AT BEDTIME 30 tablet 0   • sertraline (ZOLOFT) 100 MG Tab TAKE 1 TABLET BY MOUTH EVERY DAY 30 tablet 0   • losartan (COZAAR) 100 MG Tab Take 1 Tab by mouth every evening. For further refills please contact new cardiologist. Thank you 90 Tab 3   • nitroglycerin (NITROSTAT) 0.4 MG SL Tab Place 1 Tab under tongue as needed for Chest Pain. 25 Tab 11   • aspirin EC (ECOTRIN) 81 MG Tablet Delayed Response Take 1 Tab by mouth every day. 30 Tab 11   • multivitamin (THERAGRAN) Tab Take 1 Tab by mouth every day.         Allergies  No Known Allergies    Vital Signs last 24 hours  Temp:  [36.1 °C (97 °F)-36.6 °C (97.8 °F)] 36.1 °C (97 °F)  Pulse:  [] 94  Resp:  [12-20] 20  BP: (159-197)/(103-117) 182/110  SpO2:  [95 %-98 %] 95 %    Physical Exam  Physical Exam  HENT:      Head: Normocephalic and atraumatic.      Right Ear: External ear normal.      Left Ear: External ear normal.      Mouth/Throat:      Pharynx: Oropharynx is clear.   Eyes:      Pupils: Pupils are equal, round, and reactive to light.   Cardiovascular:      Rate and Rhythm: Regular  rhythm. Tachycardia present.      Pulses: Normal pulses.   Pulmonary:      Effort: No respiratory distress.   Abdominal:      General: Abdomen is flat.   Musculoskeletal:      Right lower leg: Edema present.   Skin:     General: Skin is warm and dry.      Findings: Erythema present.   Neurological:      General: No focal deficit present.      Mental Status: He is alert and oriented to person, place, and time. Mental status is at baseline.      Cranial Nerves: No cranial nerve deficit.      Motor: No weakness.   Psychiatric:         Mood and Affect: Mood normal.         Behavior: Behavior normal.         Fluids  No intake or output data in the 24 hours ending 22 1643    Laboratory  Recent Results (from the past 48 hour(s))   EKG    Collection Time: 22 11:26 AM   Result Value Ref Range    Report       [facility] Emergency Dept.    Test Date:  2022  Pt Name:    CESAR MASTERS                Department: ER  MRN:        [id number]                      Room:  Gender:     Male                         Technician: [id number]  :        1960                   Requested By:ER TRIAGE PROTOCOL  Order #:    [id number]                    Reading MD:    Measurements  Intervals                                Axis  Rate:       111                          P:          42  MT:         136                          QRS:        -17  QRSD:       96                           T:          -18  QT:         352  QTc:        479    Interpretive Statements  SINUS TACHYCARDIA  PROBABLE LEFT ATRIAL ABNORMALITY  BORDERLINE LEFT AXIS DEVIATION  NONSPECIFIC REPOL ABNORMALITY, DIFFUSE LEADS  BORDERLINE PROLONGED QT INTERVAL  Compared to ECG 2020 11:38:49  Sinus rhythm no longer present  Right ventricular hypertrophy no longer present     CBC with Differential    Collection Time: 22 12:16 PM   Result Value Ref Range    WBC 8.2 4.8 - 10.8 K/uL    RBC 5.34 4.70 - 6.10 M/uL    Hemoglobin 18.5 (H) 14.0 - 18.0  g/dL    Hematocrit 52.6 (H) 42.0 - 52.0 %    MCV 98.5 (H) 81.4 - 97.8 fL    MCH 34.6 (H) 27.0 - 33.0 pg    MCHC 35.2 33.7 - 35.3 g/dL    RDW 49.2 35.9 - 50.0 fL    Platelet Count 170 164 - 446 K/uL    MPV 10.1 9.0 - 12.9 fL    Neutrophils-Polys 71.80 44.00 - 72.00 %    Lymphocytes 17.70 (L) 22.00 - 41.00 %    Monocytes 8.90 0.00 - 13.40 %    Eosinophils 0.50 0.00 - 6.90 %    Basophils 0.60 0.00 - 1.80 %    Immature Granulocytes 0.50 0.00 - 0.90 %    Nucleated RBC 0.00 /100 WBC    Neutrophils (Absolute) 5.90 1.82 - 7.42 K/uL    Lymphs (Absolute) 1.45 1.00 - 4.80 K/uL    Monos (Absolute) 0.73 0.00 - 0.85 K/uL    Eos (Absolute) 0.04 0.00 - 0.51 K/uL    Baso (Absolute) 0.05 0.00 - 0.12 K/uL    Immature Granulocytes (abs) 0.04 0.00 - 0.11 K/uL    NRBC (Absolute) 0.00 K/uL   Comp Metabolic Panel    Collection Time: 01/01/22 12:16 PM   Result Value Ref Range    Sodium 137 135 - 145 mmol/L    Potassium 4.1 3.6 - 5.5 mmol/L    Chloride 101 96 - 112 mmol/L    Co2 20 20 - 33 mmol/L    Anion Gap 16.0 7.0 - 16.0    Glucose 171 (H) 65 - 99 mg/dL    Bun 19 8 - 22 mg/dL    Creatinine 0.79 0.50 - 1.40 mg/dL    Calcium 9.3 8.5 - 10.5 mg/dL    AST(SGOT) 23 12 - 45 U/L    ALT(SGPT) 19 2 - 50 U/L    Alkaline Phosphatase 111 (H) 30 - 99 U/L    Total Bilirubin 1.5 0.1 - 1.5 mg/dL    Albumin 3.9 3.2 - 4.9 g/dL    Total Protein 7.6 6.0 - 8.2 g/dL    Globulin 3.7 (H) 1.9 - 3.5 g/dL    A-G Ratio 1.1 g/dL   ESTIMATED GFR    Collection Time: 01/01/22 12:16 PM   Result Value Ref Range    GFR If African American >60 >60 mL/min/1.73 m 2    GFR If Non African American >60 >60 mL/min/1.73 m 2   TROPONIN    Collection Time: 01/01/22  3:53 PM   Result Value Ref Range    Troponin T 14 6 - 19 ng/L       Imaging  CT-CTA CHEST PULMONARY ARTERY W/ RECONS   Final Result      1.  New extensive pulmonary thromboemboli with saddle embolus seen, mostly extending to the left. No evidence of right heart strain. There is some left ventricular dilatation      2.   Multifocal lower lung zone areas of new peripheral consolidation with adjacent groundglass is suspicious for pulmonary infarctions      3.  Right middle lobe new 12 mm right middle lobe nodule is worrisome for bronchogenic carcinoma. Short interval follow-up is recommended to confirm resolution. This could instead be related to the above impression      4.  Prior sternotomy      Findings were discussed with BYRON HANSEN on 1/1/2022 4:28 PM.      DX-CHEST-PORTABLE (1 VIEW)   Final Result      1.  Bilateral areas of peripheral alveolar opacity in the lower lobe which may represent alveolar edema or pneumonia. Differential diagnosis would include Covid 19 pneumonia.      2.  No lobar consolidation.      3.  CABG changes.      US-EXTREMITY VENOUS LOWER UNILAT RIGHT   Final Result          Assessment/Plan  Acute saddle pulmonary embolism without acute cor pulmonale (HCC)  Assessment & Plan  Hx of prior DVT  Lovenox started in the ED and will conitnue  Stat echo to evaluate RV  RYAN score of 0  Will hold off on EKOS for now unless decompensates  Monitor RLE edema - could consider IR thrombectomy if no improvement (prior on the left in 2020)    OK for the telemetry floor at present      Discussed patient condition and risk of morbidity and/or mortality with RN, RT, Pharmacy, Code status disscussed, Charge nurse / hot rounds and Patient.    The patient remains critically ill.  Critical care time = 35 minutes in directly providing and coordinating critical care and extensive data review.  No time overlap and excludes procedures.

## 2022-01-02 NOTE — PROGRESS NOTES
Pt picked up in ER and transported to room T 215 on Telemetry monitor   Pt awake and alert, no c/o pain, or chest pain at this time , pt has no c/o SOB at this time   Pt ambulated from bed to wheelchair with cane and standby assist due to soreness and swelling  in right lower leg   Pt oriented to room, safety education provided   Admission assessment completed   Discussed plan of care for NOC with pt and pt agrees   Bed in lowest position and locked   Call light within reach

## 2022-01-02 NOTE — PROGRESS NOTES
4 Eyes Skin Assessment Completed by MITCH Solomon and MITCH David.    Head WDL  Ears WDL  Nose WDL  Mouth WDL  Neck WDL  Breast/Chest Scar  Shoulder Blades Discoloration and age spots  Spine WDL  (R) Arm/Elbow/Hand WDL, some dark colored age spots   (L) Arm/Elbow/Hand WDL, some dark colored age spots   Abdomen Scar, umbilical hernia  Groin WDL  Scrotum/Coccyx/Buttocks WDL  (R) Leg Redness and Edema  (L) Leg WDL  (R) Heel/Foot/Toe WDL  (L) Heel/Foot/Toe WDL          Devices In Places Tele Box, Blood Pressure Cuff and Pulse Ox      Interventions In Place Pressure Redistribution Mattress    Possible Skin Injury No    Pictures Uploaded Into Epic N/A  Wound Consult Placed N/A  RN Wound Prevention Protocol Ordered No

## 2022-01-02 NOTE — DISCHARGE SUMMARY
Discharge Summary    CHIEF COMPLAINT ON ADMISSION  Chief Complaint   Patient presents with   • Leg Pain     Patient has a hx of 3 DVTs in his left leg in the past but is no longer on thinner. Fro 2 days his right leg has now been hurting with calf pain that radiates up to thigh. Last night he had an episode of SOB for about 20 mintues, now resolved.        Reason for Admission  Acute saddle pulmonary embolism without acute cor pulmonale     Admission Date  1/1/2022    CODE STATUS  Full Code    HPI & HOSPITAL COURSE  60 y/o M with CAD s/p CABG, HTN, HLD , prior left LE DVT with femoral vein abnormality presented 1/1/2022 with acute right lower extremity DVT and saddle PE without right heart strain.     Brief history, approximately 2 weeks ago he had some right-sided pleuritic chest pain that subsided that was followed by right-sided pleuritic chest pain.  Subsequently, developed right lower extremity pain and swelling described as moderate intensity and throbbing/achy.  He had an episode 2 nights ago where he had severe dyspnea and could not catch his breath, had to lay down on the ground for about 20 minutes before it subsided, patient subsequently went to sleep. Woke up the next day and due to continued pain in the right lower extremity difficulty ambulating he presented for evaluation.  Symptoms aggravated by standing and walking, alleviated with rest. No recent fevers or chills, headache or vision changes, no cough nausea vomiting constipation diarrhea or dysuria.        In the ED patient was tachycardic, hypertensive, tachypneic, normal saturations on room air.  Initial work-up with no leukocytosis, hemoglobin 18.5, normal electrolytes, normal renal and liver function, troponin T within normal limits.  CTA chest shows negative PE with saddle emboli worse on the left, no evidence of right heart strain.  Does have a right middle lobe 12 mm nodule worrisome for bronchogenic carcinoma, finding was discussed with  patient.  TTE EF 60%, normal diastolic function, unchanged from echo in 2017.  Critical care was consulted and felt patient was stable for floors.  Patient subsequently started on Lovenox and referred to hospitalist for admission.    1/2/2022 in CDU: stable vitals, satting well in RA. Pt denies chest pain or discomfort. Exam at bedside grossly unremarkable except mild erythema and tenderness in right calf. Reports of being told to stop Xarelto after 6 months. He has not seen PCP or has not been taking his home meds for about 6 months now.     Current plan needing a lifelong anticoag discussed - he agreed to continue with Xarelto (previously well tolerated). Losartan restarted for BP. Advised a close outpatient follow up with PCP. CM team assisted with Xarelto pre-auth.     Tobacco cessation counseling and education provided for more than 5 minutes. Nicotine replacement options provided including patch, and further medical treatments including Wellbutrin and Chantix.  As well as over the counter options of lozenges and gum. Pt agreed to keep trying to self-stop. He does not want additional costs or copay for above options.     Therefore, he is discharged in fair and stable condition to home with close outpatient follow-up.    The patient recovered much more quickly than anticipated on admission.    Discharge Date  01/02/22    FOLLOW UP ITEMS POST DISCHARGE  N/A    DISCHARGE DIAGNOSES  Principal Problem:    Acute saddle pulmonary embolism without acute cor pulmonale (HCC) POA: Yes  Active Problems:    Coronary artery disease involving coronary bypass graft of native heart without angina pectoris POA: Yes    Essential hypertension POA: Yes    Tobacco abuse POA: Yes    Acute deep vein thrombosis (DVT) of femoral vein of right lower extremity (HCC) POA: Yes  Resolved Problems:    * No resolved hospital problems. *      FOLLOW UP  N/A    MEDICATIONS ON DISCHARGE     Medication List      START taking these medications       Instructions   losartan 50 MG Tabs  Commonly known as: COZAAR   Take 1 Tablet by mouth every day.  Dose: 50 mg     Xarelto Starter Pack 15 & 20 MG Tbpk  Start taking on: January 2, 2022  Generic drug: Rivaroxaban   Doctor's comments: 28 days supply for starter pack  Take 15 mg by mouth 2 times a day for 21 days, THEN 20 mg every day for 7 days.            Allergies  No Known Allergies    DIET  Orders Placed This Encounter   Procedures   • Diet Order Diet: Regular     Standing Status:   Standing     Number of Occurrences:   1     Order Specific Question:   Diet:     Answer:   Regular [1]       ACTIVITY  As tolerated.  Weight bearing as tolerated    CONSULTATIONS  Critical Care (chart review)    PROCEDURES  N/A    LABORATORY  Lab Results   Component Value Date    SODIUM 135 01/02/2022    POTASSIUM 3.8 01/02/2022    CHLORIDE 104 01/02/2022    CO2 18 (L) 01/02/2022    GLUCOSE 90 01/02/2022    BUN 19 01/02/2022    CREATININE 0.60 01/02/2022        Lab Results   Component Value Date    WBC 8.2 01/02/2022    HEMOGLOBIN 16.3 01/02/2022    HEMATOCRIT 48.2 01/02/2022    PLATELETCT 181 01/02/2022        Total time of the discharge process exceeds 35 minutes.

## 2022-01-02 NOTE — H&P
Hospital Medicine History & Physical Note    Date of Service  1/1/2022    Primary Care Physician  Edwina Chin D.O.    Consultants  critical care    Specialist Names: Dr. Shook    Code Status  Full Code    Chief Complaint  Chief Complaint   Patient presents with   • Leg Pain     Patient has a hx of 3 DVTs in his left leg in the past but is no longer on thinner. Fro 2 days his right leg has now been hurting with calf pain that radiates up to thigh. Last night he had an episode of SOB for about 20 mintues, now resolved.        History of Presenting Illness  Jose A Ponce is a 61 y.o. male coronary arteries disease status post CABG, hypertension, hyperlipidemia, prior left lower extremity DVT with femoral vein abnormality  was recommended  to be on lifelong anticoagulation, who presented 1/1/2022 with acute right lower extremity DVT and saddle PE without right heart strain.  Patient approximately 2 weeks ago he had some right-sided pleuritic chest pain that subsided that was followed by right-sided pleuritic chest pain.  Subsequently developed right lower extremity pain and swelling described as moderate intensity and throbbing/achy.  He had an episode 2 nights ago where he had severe dyspnea and could not catch his breath, had to lay down on the ground for about 20 minutes before it subsided, patient subsequently went to sleep.  Woke up the next day and due to continued pain in the right lower extremity difficulty ambulating he presented for evaluation.  Symptoms aggravated by standing and walking, alleviated with rest.  No recent fevers or chills, headache or vision changes, no cough nausea vomiting constipation diarrhea or dysuria.  In the ED patient was tachycardic, hypertensive, tachypneic, normal saturations on room air.  Initial work-up with no leukocytosis, hemoglobin 18.5, normal electrolytes, normal renal and liver function, troponin T within normal limits.  CTA chest shows negative PE with  saddle emboli worse on the left, no evidence of right heart strain.  Does have a right middle lobe 12 mm nodule worrisome for bronchogenic carcinoma, finding was discussed with patient.  TTE EF 60%, normal diastolic function, unchanged from echo in 2017.  Critical care was consulted and felt patient was stable for floors.  Patient subsequently started on Lovenox and referred to hospitalist for admission    I discussed the plan of care with patient and bedside RN.    Review of Systems  Review of Systems   Constitutional: Negative for chills and fever.   HENT: Negative for congestion and sinus pain.    Eyes: Negative for photophobia and pain.   Respiratory: Positive for shortness of breath. Negative for cough and sputum production.    Cardiovascular: Positive for chest pain and leg swelling. Negative for palpitations.   Gastrointestinal: Negative for abdominal pain, constipation, diarrhea, nausea and vomiting.   Genitourinary: Negative for dysuria and urgency.   Musculoskeletal: Negative for back pain and neck pain.   Neurological: Negative for speech change, focal weakness, seizures and loss of consciousness.   Psychiatric/Behavioral: Negative for hallucinations. The patient is not nervous/anxious.        Past Medical History   has a past medical history of Breath shortness (10/2017), CAD (coronary artery disease), Dental disorder (10/2017), DVT (deep venous thrombosis) (HCC), Grief reaction (2017), hernia repair, Hyperlipidemia, Hypertension, Psychiatric problem (10/2017), and Tobacco use.    Surgical History   has a past surgical history that includes inguinal hernia repair (Bilateral, 2000); orif, fracture, tibia (Right, 1980s); multiple coronary artery bypass endo vein harvest (6/13/2017); yarely (6/13/2017); and [facility] cardiac cath.     Family History  family history includes Cancer in his father and mother; Heart Disease (age of onset: 89) in his maternal grandfather; No Known Problems in his sister and sister.        Social History   reports that he has been smoking cigarettes. He has a 10.50 pack-year smoking history. He has never used smokeless tobacco. He reports current alcohol use. He reports that he does not use drugs.    Allergies  No Known Allergies    Medications  None       Physical Exam  Temp:  [36.1 °C (97 °F)-36.6 °C (97.8 °F)] 36.1 °C (97 °F)  Pulse:  [] 96  Resp:  [12-22] 20  BP: (155-199)/() 155/90  SpO2:  [93 %-98 %] 94 %  Blood Pressure: 155/90   Temperature: 36.1 °C (97 °F)   Pulse: 96   Respiration: 20   Pulse Oximetry: 94 %       Physical Exam  Vitals and nursing note reviewed.   Constitutional:       General: He is not in acute distress.     Appearance: He is normal weight. He is not toxic-appearing.   HENT:      Head: Normocephalic and atraumatic.      Nose: Nose normal. No rhinorrhea.      Mouth/Throat:      Mouth: Mucous membranes are moist.      Pharynx: Oropharynx is clear.   Eyes:      Extraocular Movements: Extraocular movements intact.      Conjunctiva/sclera: Conjunctivae normal.      Pupils: Pupils are equal, round, and reactive to light.   Cardiovascular:      Rate and Rhythm: Normal rate and regular rhythm.      Heart sounds: No murmur heard.      Pulmonary:      Effort: Pulmonary effort is normal. No respiratory distress.      Breath sounds: No wheezing or rhonchi.   Abdominal:      General: Bowel sounds are normal.      Palpations: Abdomen is soft.      Tenderness: There is no abdominal tenderness. There is no guarding or rebound.   Musculoskeletal:         General: Swelling present.      Cervical back: Normal range of motion and neck supple.      Right lower leg: Edema present.   Skin:     General: Skin is warm and dry.      Capillary Refill: Capillary refill takes less than 2 seconds.      Findings: Erythema (RLE) present.   Neurological:      General: No focal deficit present.      Mental Status: He is alert and oriented to person, place, and time. Mental status is at  baseline.      Cranial Nerves: No cranial nerve deficit.      Sensory: No sensory deficit.      Motor: No weakness.   Psychiatric:         Mood and Affect: Mood normal.         Behavior: Behavior normal.         Thought Content: Thought content normal.         Judgment: Judgment normal.         Laboratory:  Recent Labs     01/01/22  1216   WBC 8.2   RBC 5.34   HEMOGLOBIN 18.5*   HEMATOCRIT 52.6*   MCV 98.5*   MCH 34.6*   MCHC 35.2   RDW 49.2   PLATELETCT 170   MPV 10.1     Recent Labs     01/01/22  1216   SODIUM 137   POTASSIUM 4.1   CHLORIDE 101   CO2 20   GLUCOSE 171*   BUN 19   CREATININE 0.79   CALCIUM 9.3     Recent Labs     01/01/22  1216   ALTSGPT 19   ASTSGOT 23   ALKPHOSPHAT 111*   TBILIRUBIN 1.5   GLUCOSE 171*         No results for input(s): NTPROBNP in the last 72 hours.      Recent Labs     01/01/22  1553   TROPONINT 14       Imaging:  EC-ECHOCARDIOGRAM COMPLETE W/O CONT   Final Result      CT-CTA CHEST PULMONARY ARTERY W/ RECONS   Final Result      1.  New extensive pulmonary thromboemboli with saddle embolus seen, mostly extending to the left. No evidence of right heart strain. There is some left ventricular dilatation      2.  Multifocal lower lung zone areas of new peripheral consolidation with adjacent groundglass is suspicious for pulmonary infarctions      3.  Right middle lobe new 12 mm right middle lobe nodule is worrisome for bronchogenic carcinoma. Short interval follow-up is recommended to confirm resolution. This could instead be related to the above impression      4.  Prior sternotomy      Findings were discussed with BYRON HANSEN on 1/1/2022 4:28 PM.      DX-CHEST-PORTABLE (1 VIEW)   Final Result      1.  Bilateral areas of peripheral alveolar opacity in the lower lobe which may represent alveolar edema or pneumonia. Differential diagnosis would include Covid 19 pneumonia.      2.  No lobar consolidation.      3.  CABG changes.      US-EXTREMITY VENOUS LOWER UNILAT RIGHT   Final Result           X-Ray:  I have personally reviewed the images and compared with prior images. and My impression is: Bilateral lower lobe opacities, edema versus pneumonia  EKG:  I have personally reviewed the images and compared with prior images. and My impression is: Sinus tachycardia, left atrial normality, no ST segment elevations or depressions    Assessment/Plan:  I anticipate this patient is appropriate for observation status at this time.    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  Would recommend lifelong anticoagulation given multiple DVTs now with saddle PE.  Patient had to stop taking his medications recently due to finances, discuss with case management for an affordable outpatient anticoagulation regimen.    TTE  No right heart strain, patient hypertensive up to systolic 200.  Lovenox 1 mg/kg every 12 hours    Acute deep vein thrombosis (DVT) of femoral vein of right lower extremity (HCC)- (present on admission)  Assessment & Plan  Acute right common femoral and distal DVT mostly occlusive.  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  Recommend lifelong anticoagulation    Essential hypertension- (present on admission)  Assessment & Plan  Was previously on Cozaar and metoprolol.  IV antihypertensives with parameters    Coronary artery disease involving coronary bypass graft of native heart without angina pectoris- (present on admission)  Assessment & Plan  Has been unable to afford any of his outpatient  medications due to finances, recommend developing appropriate outpatient regimen prior to discharge.  Had some intermittent chest pain with development of his PE, no further chest pain, EKG with sinus tachycardia no ST segment elevation or depressions.      VTE prophylaxis: therapeutic anticoagulation with Lovenox

## 2022-01-02 NOTE — ASSESSMENT & PLAN NOTE
Has been unable to afford any of his outpatient medications due to finances, recommend developing appropriate outpatient regimen prior to discharge.  Had some intermittent chest pain with development of his PE, no further chest pain, EKG with sinus tachycardia no ST segment elevation or depressions.

## 2022-01-02 NOTE — ASSESSMENT & PLAN NOTE
Hx of prior DVT  Lovenox started in the ED and will conitnue  Stat echo to evaluate RV  RYAN score of 0  Will hold off on EKOS for now unless decompensates  Monitor RLE edema - could consider IR thrombectomy if no improvement (prior on the left in 2020)    OK for the telemetry floor at present

## 2022-01-02 NOTE — DISCHARGE PLANNING
Anticipated Discharge Disposition:   Home    Action:   Discussed discharge planning needs with MD. Per MD, pt will need xarelto at discharge. Medications sent to [facility]. RN CM called [facility], xarelto starter pack not in stock. MD to send med to pt's pharmacy.    RN CM called [facility]. Per pharmacy, they do not have xarelto starter pack available today but can order for tomorrow. Xarelto requires prior auth. MITCH MELISSA called [employer], pending prior auth. MD updated via Voalte.    MITCH MELISSA called [employer], they are closed, will open tomorrow. Prior auth submitted through Cover my Meds: Jose A Ponce Key: [id number] - [place] Case ID: [id number], Approved.    RN CM called [facility] about prior auth approval, spoke to Raffaele. Per Raffaele, medication went through insurance. He will order med, expected to be available tomorrow, if not, he will contact other [facility] locations and inform pt. MD and bedside RN informed via Voalte.    Barriers to Discharge:   None.    Plan:   Hospital Care Management will continue to follow and assist with discharge planning needs.

## 2022-01-02 NOTE — ASSESSMENT & PLAN NOTE
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  Would recommend lifelong anticoagulation given multiple DVTs now with saddle PE.  Patient had to stop taking his medications recently due to finances, discuss with case management for an affordable outpatient anticoagulation regimen.    TTE  No right heart strain, patient hypertensive up to systolic 200.  Lovenox 1 mg/kg every 12 hours

## 2022-01-02 NOTE — PROGRESS NOTES
Monitor summary: Per Monitor Room    SR rhythm rate 79-97  Rare PVC's Ectopy  0.18/0.08/0.37    Strip unable to be uploaded paper copy placed in pt's chart

## 2022-01-02 NOTE — ED NOTES
Med rec updated and complete. Allergies reviewed. Pt has not taken any medications > 6 months. All medications removed from med rec.  Pt confirmed name and date of birth.      Home pharmacy [facility] [phone]

## 2022-01-02 NOTE — ASSESSMENT & PLAN NOTE
Acute right common femoral and distal DVT mostly occlusive.  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  Recommend lifelong anticoagulation

## 2022-01-03 NOTE — DISCHARGE INSTRUCTIONS
Discharge Instructions    Discharged to home by car with relative. Discharged via wheelchair, hospital escort: Yes.  Special equipment needed: Not Applicable    Be sure to schedule a follow-up appointment with your primary care doctor or any specialists as instructed.     Discharge Plan:   Diet Plan: Discussed  Activity Level: Discussed  Confirmed Follow up Appointment: Appointment Scheduled  Confirmed Symptoms Management: Discussed  Medication Reconciliation Updated: Yes  Influenza Vaccine Indication: Not indicated: Previously immunized this influenza season and > 8 years of age    I understand that a diet low in cholesterol, fat, and sodium is recommended for good health. Unless I have been given specific instructions below for another diet, I accept this instruction as my diet prescription.   Other diet: regular diet    Special Instructions: None    · Is patient discharged on Warfarin / Coumadin?   No     Depression / Suicide Risk    As you are discharged from this [facility] facility, it is important to learn how to keep safe from harming yourself.    Recognize the warning signs:  · Abrupt changes in personality, positive or negative- including increase in energy   · Giving away possessions  · Change in eating patterns- significant weight changes-  positive or negative  · Change in sleeping patterns- unable to sleep or sleeping all the time   · Unwillingness or inability to communicate  · Depression  · Unusual sadness, discouragement and loneliness  · Talk of wanting to die  · Neglect of personal appearance   · Rebelliousness- reckless behavior  · Withdrawal from people/activities they love  · Confusion- inability to concentrate     If you or a loved one observes any of these behaviors or has concerns about self-harm, here's what you can do:  · Talk about it- your feelings and reasons for harming yourself  · Remove any means that you might use to hurt yourself (examples: pills, rope, extension cords,  firearm)  · Get professional help from the community (Mental Health, Substance Abuse, psychological counseling)  · Do not be alone:Call your Safe Contact- someone whom you trust who will be there for you.  · Call your local CRISIS HOTLINE 313-0891 or 780-092-7726  · Call your local Children's Mobile Crisis Response Team [place] (363) 118-4511 or www.[employer]  · Call the toll free National Suicide Prevention Hotlines   · National Suicide Prevention Lifeline 134-806-XEQZ (4375)  · National Hope Line Network 800-SUICIDE (109-7683)

## 2022-01-03 NOTE — PROGRESS NOTES
IV dc'd.  Discharge instructions given to patient; patient verbalizes understanding, all questions answered.  Copy of DC summary provided, signed copy in chart.  2 prescriptions electronically sent to pt's pharmacy, copies in chart.  Pt states personal belongings are in possession.  Pt escorted off unit by this RN without incident.

## 2022-01-27 DIAGNOSIS — I82.411 ACUTE DEEP VEIN THROMBOSIS (DVT) OF FEMORAL VEIN OF RIGHT LOWER EXTREMITY (HCC): ICD-10-CM

## 2022-01-27 DIAGNOSIS — I26.92 ACUTE SADDLE PULMONARY EMBOLISM WITHOUT ACUTE COR PULMONALE (HCC): ICD-10-CM

## 2022-01-27 DIAGNOSIS — I82.422 ACUTE DEEP VEIN THROMBOSIS (DVT) OF ILIAC VEIN OF LEFT LOWER EXTREMITY (HCC): ICD-10-CM

## 2022-01-27 DIAGNOSIS — R91.1 LUNG NODULE: ICD-10-CM

## 2022-02-03 ENCOUNTER — DOCUMENTATION (OUTPATIENT)
Dept: VASCULAR LAB | Facility: MEDICAL CENTER | Age: 62
End: 2022-02-03

## 2022-02-03 DIAGNOSIS — I82.422 ACUTE DEEP VEIN THROMBOSIS (DVT) OF ILIAC VEIN OF LEFT LOWER EXTREMITY (HCC): ICD-10-CM

## 2022-02-03 NOTE — PROGRESS NOTES
[facility]    Received anticoagulation referral from Dr. Hancock.    PCP: Poli  INS: [employer]  Preferred location: [place]     Jose A is not new to our clinic but has been referred again after having another DVT on 1/1. He has been taking the Xarelto starter pack but hasn't received a follow-up prescription. He reports having two days of medication left. Scheduled patient for tomorrow to get samples and sent a Xarelto prescription.     Ricardo Rangel, NavinD

## 2022-02-04 ENCOUNTER — ANTICOAGULATION VISIT (OUTPATIENT)
Dept: VASCULAR LAB | Facility: MEDICAL CENTER | Age: 62
End: 2022-02-04
Attending: INTERNAL MEDICINE
Payer: COMMERCIAL

## 2022-02-04 DIAGNOSIS — I82.411 ACUTE DEEP VEIN THROMBOSIS (DVT) OF FEMORAL VEIN OF RIGHT LOWER EXTREMITY (HCC): ICD-10-CM

## 2022-02-04 DIAGNOSIS — I26.92 ACUTE SADDLE PULMONARY EMBOLISM WITHOUT ACUTE COR PULMONALE (HCC): ICD-10-CM

## 2022-02-04 PROBLEM — I82.409 DEEP VEIN THROMBOSIS (HCC): Status: ACTIVE | Noted: 2022-02-04

## 2022-02-04 PROBLEM — I26.99 ACUTE PULMONARY EMBOLISM (HCC): Status: ACTIVE | Noted: 2022-02-04

## 2022-02-04 PROCEDURE — 99213 OFFICE O/P EST LOW 20 MIN: CPT

## 2022-02-04 NOTE — Clinical Note
Hi Dr Bloch,    Pt with history of DVT x 3 with new RLE VTE and saddle PE. Stopped taking Xarelto ~6 months before newest VTE. Xarelto resumed.

## 2022-02-05 NOTE — PROGRESS NOTES
"NEW DOAC   .  Anticoagulation Patient Findings    PCP: Edwina Chin D.O.  Dx: DVT/saddle PE  Target End Date = TBD    Pt Hx: Pt was previously on Xarelto for DVT. Chart review of LLE venous US 3/2/2020 shows \"Occluded acute or sub-actue thrombus indicated in the external iliac, common femoral, profunda and popliteal veins.\" Pt was previously followed by [facility] but was discharged from clinic in 2/2021 and reports he had stopped taking his Xarelto for ~6 months before most recent VTE per chart review.    Pt presented to [facility] ED 1/1/2022 with chief complaint RLE discomfort radiating to thigh and SOB x 20 minutes. RLE venous US showed \"Acute deep venous thrombosis is seen in the common femoral, femoral, popliteal, gastrocnemius, posterior tibial and peroneal veins. Deep venous thrombosis is mostly occlusive in the common femoral vein and occlusive throughout the remaining effected veins.\" CTA showed \"New extensive pulmonary thromboemboli with saddle embolus seen, mostly extending to the left. No evidence of right heart strain. There is some left ventricular dilatation. Multifocal lower lung zone areas of new peripheral consolidation with adjacent groundglass is suspicious for pulmonary infarctions. Right middle lobe new 12 mm right middle lobe nodule is worrisome for bronchogenic carcinoma.\" Pt was extensively hypertensive with SBP up to 200 mmHg.    Pt was previously a smoker but reports he quit cold-turkey approximately one month ago. No known family history of clotting or cancer. History of CABG x 2 in 2017.    DVT 1/1    Labs:  Lab Results   Component Value Date/Time    WBC 8.2 01/02/2022 01:29 AM    RBC 4.77 01/02/2022 01:29 AM    HEMOGLOBIN 16.3 01/02/2022 01:29 AM    HEMATOCRIT 48.2 01/02/2022 01:29 AM    .0 (H) 01/02/2022 01:29 AM    MCH 34.2 (H) 01/02/2022 01:29 AM    MCHC 33.8 01/02/2022 01:29 AM    MPV 10.5 01/02/2022 01:29 AM    NEUTSPOLYS 59.40 01/02/2022 01:29 AM    " LYMPHOCYTES 26.10 01/02/2022 01:29 AM    MONOCYTES 11.10 01/02/2022 01:29 AM    EOSINOPHILS 1.80 01/02/2022 01:29 AM    BASOPHILS 1.10 01/02/2022 01:29 AM      Lab Results   Component Value Date/Time    SODIUM 135 01/02/2022 01:29 AM    POTASSIUM 3.8 01/02/2022 01:29 AM    CHLORIDE 104 01/02/2022 01:29 AM    CO2 18 (L) 01/02/2022 01:29 AM    GLUCOSE 90 01/02/2022 01:29 AM    BUN 19 01/02/2022 01:29 AM    CREATININE 0.60 01/02/2022 01:29 AM          Pt is NOT new to Xarelto or to [facility]. Discussed:   · Indication for DOAC therapy.  · Importance of monitoring and compliance.   · Monitoring parameters, signs and symptoms of bleeding or clotting.  · DOAC therapy, side effects, potential DDIs, OTC medications  · Hormonal therapy  · Pregnancy  · DDI  · Pt NOT on antiplatelet/NSAID therapy  · Lifestyle safety, ie smoking, ETOH, hobby safety, fall safety/prevention  · Procedures for missed doses or suspected missed doses, surgeries/procedures, travel, dental work, any medication changes    Pt completing Xarelto starter pack tomorrow. Pt given Xarelto 20 mg samples. Prescription for maintenance dosing previously sent to pharmacy; pt to follow up with pharmacy and see if prescription is ready.     DOAC affordable = yes    Samples provided: yes (2 weeks)    Labs to be completed prior to next f/u - CBC, CMP    F/U - 3 months    Susana Mcgowan PharmD  PGY1 Pharmacy Practice Resident

## 2022-02-07 ENCOUNTER — SUPERVISING PHYSICIAN REVIEW (OUTPATIENT)
Dept: VASCULAR LAB | Facility: MEDICAL CENTER | Age: 62
End: 2022-02-07

## 2022-02-07 NOTE — PROGRESS NOTES
Initial anticoag note and most recent d/c summary reviewed.   Pending further recs we will continue with indefinite anticoagulation for recurrent dvt/pe as recommended at d/c.   Will defer any indicated age appropriate screening for occult malignancy to pcp.    Michael Bloch, MD  Anticoagulation Clinic    Cc:  LIZZY Chin

## 2022-02-17 ENCOUNTER — OFFICE VISIT (OUTPATIENT)
Dept: MEDICAL GROUP | Facility: PHYSICIAN GROUP | Age: 62
End: 2022-02-17
Payer: COMMERCIAL

## 2022-02-17 VITALS
TEMPERATURE: 98.5 F | OXYGEN SATURATION: 95 % | HEART RATE: 121 BPM | SYSTOLIC BLOOD PRESSURE: 180 MMHG | DIASTOLIC BLOOD PRESSURE: 110 MMHG | WEIGHT: 186 LBS | HEIGHT: 73 IN | BODY MASS INDEX: 24.65 KG/M2

## 2022-02-17 DIAGNOSIS — E78.5 DYSLIPIDEMIA: ICD-10-CM

## 2022-02-17 DIAGNOSIS — I82.411 ACUTE DEEP VEIN THROMBOSIS (DVT) OF FEMORAL VEIN OF RIGHT LOWER EXTREMITY (HCC): ICD-10-CM

## 2022-02-17 DIAGNOSIS — I10 ESSENTIAL HYPERTENSION: ICD-10-CM

## 2022-02-17 PROCEDURE — 99214 OFFICE O/P EST MOD 30 MIN: CPT | Performed by: NURSE PRACTITIONER

## 2022-02-17 RX ORDER — LOSARTAN POTASSIUM 50 MG/1
50 TABLET ORAL DAILY
Qty: 90 TABLET | Refills: 3 | Status: SHIPPED | OUTPATIENT
Start: 2022-02-17 | End: 2023-03-15

## 2022-02-17 ASSESSMENT — ENCOUNTER SYMPTOMS
MUSCULOSKELETAL NEGATIVE: 1
COUGH: 0
CONSTITUTIONAL NEGATIVE: 1
PALPITATIONS: 0
FEVER: 0
EYES NEGATIVE: 1
PSYCHIATRIC NEGATIVE: 1
SPUTUM PRODUCTION: 0
GASTROINTESTINAL NEGATIVE: 1
NEUROLOGICAL NEGATIVE: 1
SHORTNESS OF BREATH: 0

## 2022-02-17 ASSESSMENT — FIBROSIS 4 INDEX: FIB4 SCORE: 1.78

## 2022-02-17 NOTE — PROGRESS NOTES
Subjective:     CC:    Chief Complaint   Patient presents with   • Establish Care     Patient is here to establish care and need medication refill        HISTORY OF THE PRESENT ILLNESS: Patient is a 61 y.o. male, here today to establish care. Prior PCP was Dr Chin. He has an extensive history of left leg DVT x3 (most recently in right leg), CAD s/p CABG, HTN. The below problems were discussed/reviewed at this visit:    Problem   Acute Deep Vein Thrombosis (Dvt) of Femoral Vein of Right Lower Extremity (Hcc)    Prior history of DVT in left leg, recent hospitatlization 1/2-1/2/2022 for right leg DVT, restarted on xarelto & losartan. He is followed at anticoagulation clinic, next appt 4/07/2022.     Essential Hypertension    Restarted on Losartan 50mg QD in 1/2022 but states he ran out a week ago     Dyslipidemia    States was on statin in the past but not currently; no recent labs; will check fasting lipid          Current Outpatient Medications Ordered in Epic   Medication Sig Dispense Refill   • losartan (COZAAR) 50 MG Tab Take 1 Tablet by mouth every day. 90 Tablet 3   • rivaroxaban (XARELTO) 20 MG Tab tablet Take 1 Tablet by mouth with dinner. 30 Tablet 5     No current Epic-ordered facility-administered medications on file.        Past Surgical History:   Procedure Laterality Date   • MULTIPLE CORONARY ARTERY BYPASS ENDO VEIN HARVEST  6/13/2017    Procedure: MULTIPLE CORONARY ARTERY BYPASS x2 RIGHT LEG ENDO VEIN HARVEST;  Surgeon: Gray Barboza M.D.;  Location: SURGERY [facility];  Service:    • HUBERT  6/13/2017    Procedure: HUBERT - INTRAOP;  Surgeon: Gray Barboza M.D.;  Location: SURGERY [facility];  Service:    • INGUINAL HERNIA REPAIR Bilateral 2000   • ORIF, FRACTURE, TIBIA Right 1980s   • ZZZ CARDIAC CATH          Allergies:  Patient has no known allergies.    Health Maintenance: Completed  - declined vaccines & colorectal screen; indications discussed    ROS:   Review of Systems  "  Constitutional: Negative.  Negative for fever and malaise/fatigue.   HENT: Negative.    Eyes: Negative.    Respiratory: Negative for cough, sputum production and shortness of breath.    Cardiovascular: Negative for chest pain, palpitations and leg swelling.   Gastrointestinal: Negative.    Genitourinary: Negative.    Musculoskeletal: Negative.    Neurological: Negative.    Endo/Heme/Allergies: Negative.    Psychiatric/Behavioral: Negative.          Objective:     Exam: BP (!) 180/110   Pulse (!) 121   Temp 36.9 °C (98.5 °F)   Ht 1.854 m (6' 1\")   Wt 84.4 kg (186 lb)   SpO2 95%  Body mass index is 24.54 kg/m².    Physical Exam  Constitutional:       Appearance: Normal appearance.   Cardiovascular:      Rate and Rhythm: Normal rate and regular rhythm.      Pulses: Normal pulses.      Heart sounds: Normal heart sounds.   Pulmonary:      Effort: Pulmonary effort is normal.      Breath sounds: Normal breath sounds.   Musculoskeletal:         General: Normal range of motion.      Cervical back: Normal range of motion and neck supple.      Right lower leg: No edema.      Left lower leg: No edema.   Skin:     General: Skin is warm and dry.   Neurological:      General: No focal deficit present.      Mental Status: He is alert and oriented to person, place, and time.   Psychiatric:         Mood and Affect: Mood normal.         Behavior: Behavior normal.         Thought Content: Thought content normal.         Judgment: Judgment normal.       Labs:   Ref. Range 1/2/2022 01:29   Sodium Latest Ref Range: 135 - 145 mmol/L 135   Potassium Latest Ref Range: 3.6 - 5.5 mmol/L 3.8   Chloride Latest Ref Range: 96 - 112 mmol/L 104   Co2 Latest Ref Range: 20 - 33 mmol/L 18 (L)   Anion Gap Latest Ref Range: 7.0 - 16.0  13.0   Glucose Latest Ref Range: 65 - 99 mg/dL 90   Bun Latest Ref Range: 8 - 22 mg/dL 19   Creatinine Latest Ref Range: 0.50 - 1.40 mg/dL 0.60   GFR If  Latest Ref Range: >60 mL/min/1.73 m 2 >60 "   GFR If Non  Latest Ref Range: >60 mL/min/1.73 m 2 >60   Calcium Latest Ref Range: 8.5 - 10.5 mg/dL 9.1       Assessment & Plan:   61 y.o. male with the following -    Problem List Items Addressed This Visit     Dyslipidemia    Relevant Orders    Lipid Profile    Essential hypertension     Recent hospitalization for right leg DVT & was restarted on xarelto & Losartan; states while on 50mg his home BP runs 130s/80s; he ran out of medication about a week ago & BP has been running high, in clinic today 180/110; denies CP, palpitations, dizziness, numbness/tingling  - Refilled Losartan 50mg QD which he will  this morning  - fasting lipid ordered today  - he is to keep home BP log & bring in for review at next appt         Relevant Medications    losartan (COZAAR) 50 MG Tab    Acute deep vein thrombosis (DVT) of femoral vein of right lower extremity (HCC)     Prior history of DVT in left leg, recent hospitatlization 1/2-1/2/2022 for right leg DVT, restarted on xarelto & losartan. He is followed at anticoagulation clinic, next appt 4/07/2022. No calf/leg pain, SOB, CP reported today  - continue Xarelto 20mg Q evening; monitoring by anticoagulation clinic         Relevant Medications    losartan (COZAAR) 50 MG Tab          Educated in proper administration of medication(s) ordered today including safety, possible SE, risks, benefits, rationale and alternatives to therapy.     Return in about 4 weeks (around 3/17/2022) for BP check, HTN mgt.    Please note that this dictation was created using voice recognition software. I have made every reasonable attempt to correct obvious errors, but I expect that there are errors of grammar and possibly content that I did not discover before finalizing the note.

## 2022-02-17 NOTE — ASSESSMENT & PLAN NOTE
Recent hospitalization for right leg DVT & was restarted on xarelto & Losartan; states while on 50mg his home BP runs 130s/80s; he ran out of medication about a week ago & BP has been running high, in clinic today 180/110; denies CP, palpitations, dizziness, numbness/tingling  - Refilled Losartan 50mg QD which he will  this morning  - fasting lipid ordered today  - he is to keep home BP log & bring in for review at next appt

## 2022-02-17 NOTE — ASSESSMENT & PLAN NOTE
Prior history of DVT in left leg, recent hospitatlization 1/2-1/2/2022 for right leg DVT, restarted on xarelto & losartan. He is followed at anticoagulation clinic, next appt 4/07/2022. No calf/leg pain, SOB, CP reported today  - continue Xarelto 20mg Q evening; monitoring by anticoagulation clinic

## 2022-04-07 ENCOUNTER — TELEPHONE (OUTPATIENT)
Dept: VASCULAR LAB | Facility: MEDICAL CENTER | Age: 62
End: 2022-04-07
Payer: COMMERCIAL

## 2022-04-07 NOTE — TELEPHONE ENCOUNTER
Pt missed his Xarelto LOT visit today. Per chart review he is to be on indefinite therapy for recurrent VTE. Called pt to discuss but no answer and vm full. Will try to call pt again on Monday.    Sherry GALVAN

## 2022-04-12 ENCOUNTER — TELEPHONE (OUTPATIENT)
Dept: VASCULAR LAB | Facility: MEDICAL CENTER | Age: 62
End: 2022-04-12
Payer: COMMERCIAL

## 2022-04-12 NOTE — TELEPHONE ENCOUNTER
Pt missed their LOT appointment on 4/7.  However pt will be on Xarelto indefinitely, so this appt should be a 15 min f/u DOAC appt    2nd call  Unable to LVM to reschedule as mb is full  Letter sent    Shweta Milian, PharmD

## 2022-04-12 NOTE — LETTER
Jose A Ponce  [address]  [place] [address]      Dear Jose A Ponce ,    We have been unsuccessful in our attempts to contact you regarding your Anticoagulation Service appointments.  Anticoagulants are medications that can cause potential harmful side effects when not monitored properly. Without proper monitoring there is potential for serious, sometimes life-threatening bleeding problems or life-threatening blood clots or stroke could result.    Please contact our clinic so we may assist you.  We are open Monday-Friday 8 am until 5 pm.  You may reach our Service at [phone].    To monitor your anticoagulant effectively, we need to be able to communicate with you.  This is a requirement to be followed by our Service.  Please contact the clinic as soon as possible to establish care and provide your current contact information.  Thank you.        Sincerely,        Mark Eddy PharmD, [facility]  Pharmacy Clinical Supervisor  [facility]  Outpatient Anticoagulation Service

## 2022-04-19 ENCOUNTER — TELEPHONE (OUTPATIENT)
Dept: VASCULAR LAB | Facility: MEDICAL CENTER | Age: 62
End: 2022-04-19
Payer: COMMERCIAL

## 2022-04-19 NOTE — TELEPHONE ENCOUNTER
Pt missed their LOT appointment on 4/7.  However pt will be on Xarelto indefinitely, so this appt should be a 15 min f/u DOAC appt     3rd call  Unable to LVM to reschedule as mb is full    Unable to contact emergency contact on file as phone # is out of service    Letter was sent last week    Will send [employer] message     Shweta Milian, NavinD

## 2022-04-26 ENCOUNTER — TELEPHONE (OUTPATIENT)
Dept: VASCULAR LAB | Facility: MEDICAL CENTER | Age: 62
End: 2022-04-26
Payer: COMMERCIAL

## 2022-04-26 NOTE — TELEPHONE ENCOUNTER
Pt missed their LOT appointment on 4/7.  However pt will be on Xarelto indefinitely, so this appt should be a 15 min f/u DOAC appt     4th call  Unable to LVM to reschedule as mb is full  Unable to contact emergency contact on file as phone # is out of service     Letter and [employer] message have already been sent     Try again in 1 week    Shweta Milian, PharmD

## 2022-05-03 ENCOUNTER — TELEPHONE (OUTPATIENT)
Dept: VASCULAR LAB | Facility: MEDICAL CENTER | Age: 62
End: 2022-05-03
Payer: COMMERCIAL

## 2022-05-03 NOTE — LETTER
Jose A Ponce  [address]  [place] [address]    05/03/22    Dear Jose A Ponce ,    We have been unsuccessful in our attempts to contact you regarding your Anticoagulation Service appointments. Xarelto is a potent blood-thinning agent that requires monitoring to ensure that the dosage is correct for your body.  If it isn't, you could develop serious, sometimes life-threatening bleeding problems or life-threatening blood clots or stroke could result.    To monitor you effectively, we need to be able to communicate with you.  This is a requirement to be followed by our Service.       If you repeatedly fail to keep your lab appointments, you are at risk of being discharged from the Anticoagulation Service.    It is extremely important that you contact the clinic as soon as possible to arrange appropriate follow up.  We are open Monday-Friday 8 am until 5 pm.  You may reach our Service at [phone].           Sincerely,           Mark Eddy, PharmD, [facility]  Clinic Supervisor  [facility]  Outpatient Anticoagulation Service

## 2022-05-03 NOTE — TELEPHONE ENCOUNTER
Pt missed their LOT appointment on 4/7.  However pt will be on Xarelto indefinitely, so this appt should be a 15 min f/u DOAC appt     5th call  Unable to LVM to reschedule as mb is full  Unable to contact emergency contact on file as phone # is out of service     Will send 2nd letter  F/u in ~3 weeks     Shweta Milian, PharmD

## 2022-05-25 ENCOUNTER — TELEPHONE (OUTPATIENT)
Dept: MEDICAL GROUP | Facility: PHYSICIAN GROUP | Age: 62
End: 2022-05-25

## 2022-05-25 ENCOUNTER — ANTICOAGULATION VISIT (OUTPATIENT)
Dept: VASCULAR LAB | Facility: MEDICAL CENTER | Age: 62
End: 2022-05-25
Attending: INTERNAL MEDICINE
Payer: COMMERCIAL

## 2022-05-25 VITALS — DIASTOLIC BLOOD PRESSURE: 126 MMHG | SYSTOLIC BLOOD PRESSURE: 191 MMHG | HEART RATE: 100 BPM

## 2022-05-25 DIAGNOSIS — I82.411 ACUTE DEEP VEIN THROMBOSIS (DVT) OF FEMORAL VEIN OF RIGHT LOWER EXTREMITY (HCC): ICD-10-CM

## 2022-05-25 DIAGNOSIS — I26.92 ACUTE SADDLE PULMONARY EMBOLISM WITHOUT ACUTE COR PULMONALE (HCC): ICD-10-CM

## 2022-05-25 DIAGNOSIS — I82.422 ACUTE DEEP VEIN THROMBOSIS (DVT) OF ILIAC VEIN OF LEFT LOWER EXTREMITY (HCC): ICD-10-CM

## 2022-05-25 PROCEDURE — 99212 OFFICE O/P EST SF 10 MIN: CPT

## 2022-05-25 NOTE — PROGRESS NOTES
Target end date: Indefinite     Indication: Hx of recurrent DVT/PE     Drug: Xarelto 20 mg once daily     CHADsVASC = N/a    Health Status Since Last Assessment   Patient denies any new relevant medical problems, ED visits or hospitalizations   Patient denies any embolic events (stroke/tia/systemic embolism)    Adherence with DOAC Therapy   Pt has no missed any doses in the average week    Bleeding Risk Assessment     Denies Epistaxis   Pt denies any excessive or unusual bleeding/hematomas.  Pt denies any GI bleeds or hematemesis.  Pt denies any concerning daily headache or sub dural hematoma symptoms.     Pt denies any hematuria    Latest Hemoglobin Needs updating   ETOH overuse - A couple of beers daily     Creatinine Clearance/Renal Function     Latest CrCl ~ 85 mL/min    Hepatic function   Latest LFTs WNL   Pt denies any history of liver dysfunction      Drug Interactions   Platelets: 173   ASA/other antiplatelets - None   NSAID - None   Other drug interactions - No significant DDI noted   x Verified no anticonvulsant or azole therapy, education provided for future use.     Examination   Blood Pressure - Recorded in vitals. Significantly elevated today. Pt denies s/sx of HTN (no CP, visual disturbances, etc.). States he did not take his losartan this AM. Recommended he go to the ED - he refuses. Pt plans to go home, take his losartan and monitor BP at home - if it remains this elevated he states he will go to the ED.   Symptomatic hypotension - Denies   Significant gait impairment/imbalance/high risk for falls? No obvious impairments    Final Assessment and Recommendations:   Patient appears stable from the anticoagulation standpoint.     Benefits of continued DOAC therapy outweigh risks for this patient   Recommend pt continue with current DOAC therapy - Xarelto 20 mg once daily   DOAC is affordable     Other Actions: cmp/ cbc hemogram ordered prior to next visit    Follow up:   Will follow up with patient 6  months.     Kash Arteaga, PharmD, BCACP

## 2022-05-25 NOTE — Clinical Note
Just FYI on pt's BP - significantly elevated today in clinic. He refused to go to ED. Denied s/sx of HTN during our visit - states he felt at his baseline.  Let me know if I can assist further!  Kash

## 2022-05-26 NOTE — TELEPHONE ENCOUNTER
----- Message from Kash Arteaga, Dustin sent at 5/25/2022  8:13 AM PDT -----  Just FYI on pt's BP - significantly elevated today in clinic. He refused to go to ED. Denied s/sx of HTN during our visit - states he felt at his baseline.    Let me know if I can assist further!    Kash

## 2022-06-01 ENCOUNTER — HOSPITAL ENCOUNTER (OUTPATIENT)
Dept: LAB | Facility: MEDICAL CENTER | Age: 62
End: 2022-06-01
Attending: INTERNAL MEDICINE
Payer: COMMERCIAL

## 2022-06-01 ENCOUNTER — HOSPITAL ENCOUNTER (OUTPATIENT)
Dept: LAB | Facility: MEDICAL CENTER | Age: 62
End: 2022-06-01
Attending: NURSE PRACTITIONER
Payer: COMMERCIAL

## 2022-06-01 DIAGNOSIS — E78.5 DYSLIPIDEMIA: ICD-10-CM

## 2022-06-01 DIAGNOSIS — I26.92 ACUTE SADDLE PULMONARY EMBOLISM WITHOUT ACUTE COR PULMONALE (HCC): ICD-10-CM

## 2022-06-01 LAB
ALBUMIN SERPL BCP-MCNC: 4.3 G/DL (ref 3.2–4.9)
ALBUMIN/GLOB SERPL: 1.4 G/DL
ALP SERPL-CCNC: 102 U/L (ref 30–99)
ALT SERPL-CCNC: 42 U/L (ref 2–50)
ANION GAP SERPL CALC-SCNC: 14 MMOL/L (ref 7–16)
AST SERPL-CCNC: 52 U/L (ref 12–45)
BASOPHILS # BLD AUTO: 1.3 % (ref 0–1.8)
BASOPHILS # BLD: 0.1 K/UL (ref 0–0.12)
BILIRUB SERPL-MCNC: 1.5 MG/DL (ref 0.1–1.5)
BUN SERPL-MCNC: 17 MG/DL (ref 8–22)
CALCIUM SERPL-MCNC: 9.4 MG/DL (ref 8.5–10.5)
CHLORIDE SERPL-SCNC: 105 MMOL/L (ref 96–112)
CHOLEST SERPL-MCNC: 229 MG/DL (ref 100–199)
CO2 SERPL-SCNC: 22 MMOL/L (ref 20–33)
CREAT SERPL-MCNC: 1.1 MG/DL (ref 0.5–1.4)
EOSINOPHIL # BLD AUTO: 0.07 K/UL (ref 0–0.51)
EOSINOPHIL NFR BLD: 0.9 % (ref 0–6.9)
ERYTHROCYTE [DISTWIDTH] IN BLOOD BY AUTOMATED COUNT: 45.7 FL (ref 35.9–50)
FASTING STATUS PATIENT QL REPORTED: NORMAL
FASTING STATUS PATIENT QL REPORTED: NORMAL
GFR SERPLBLD CREATININE-BSD FMLA CKD-EPI: 76 ML/MIN/1.73 M 2
GLOBULIN SER CALC-MCNC: 3 G/DL (ref 1.9–3.5)
GLUCOSE SERPL-MCNC: 139 MG/DL (ref 65–99)
HCT VFR BLD AUTO: 48.3 % (ref 42–52)
HDLC SERPL-MCNC: 48 MG/DL
HGB BLD-MCNC: 17.1 G/DL (ref 14–18)
IMM GRANULOCYTES # BLD AUTO: 0.03 K/UL (ref 0–0.11)
IMM GRANULOCYTES NFR BLD AUTO: 0.4 % (ref 0–0.9)
LDLC SERPL CALC-MCNC: 121 MG/DL
LYMPHOCYTES # BLD AUTO: 1.76 K/UL (ref 1–4.8)
LYMPHOCYTES NFR BLD: 22.7 % (ref 22–41)
MCH RBC QN AUTO: 36.9 PG (ref 27–33)
MCHC RBC AUTO-ENTMCNC: 35.4 G/DL (ref 33.7–35.3)
MCV RBC AUTO: 104.1 FL (ref 81.4–97.8)
MONOCYTES # BLD AUTO: 0.7 K/UL (ref 0–0.85)
MONOCYTES NFR BLD AUTO: 9 % (ref 0–13.4)
NEUTROPHILS # BLD AUTO: 5.09 K/UL (ref 1.82–7.42)
NEUTROPHILS NFR BLD: 65.7 % (ref 44–72)
NRBC # BLD AUTO: 0 K/UL
NRBC BLD-RTO: 0 /100 WBC
PLATELET # BLD AUTO: 229 K/UL (ref 164–446)
PMV BLD AUTO: 10.6 FL (ref 9–12.9)
POTASSIUM SERPL-SCNC: 4.5 MMOL/L (ref 3.6–5.5)
PROT SERPL-MCNC: 7.3 G/DL (ref 6–8.2)
RBC # BLD AUTO: 4.64 M/UL (ref 4.7–6.1)
SODIUM SERPL-SCNC: 141 MMOL/L (ref 135–145)
TRIGL SERPL-MCNC: 300 MG/DL (ref 0–149)
WBC # BLD AUTO: 7.8 K/UL (ref 4.8–10.8)

## 2022-06-01 PROCEDURE — 80061 LIPID PANEL: CPT

## 2022-06-01 PROCEDURE — 80053 COMPREHEN METABOLIC PANEL: CPT

## 2022-06-01 PROCEDURE — 36415 COLL VENOUS BLD VENIPUNCTURE: CPT

## 2022-06-01 PROCEDURE — 85025 COMPLETE CBC W/AUTO DIFF WBC: CPT

## 2022-06-04 ENCOUNTER — APPOINTMENT (OUTPATIENT)
Dept: RADIOLOGY | Facility: MEDICAL CENTER | Age: 62
End: 2022-06-04
Attending: EMERGENCY MEDICINE
Payer: COMMERCIAL

## 2022-06-04 ENCOUNTER — APPOINTMENT (OUTPATIENT)
Dept: RADIOLOGY | Facility: MEDICAL CENTER | Age: 62
End: 2022-06-04
Payer: COMMERCIAL

## 2022-06-04 ENCOUNTER — HOSPITAL ENCOUNTER (OUTPATIENT)
Facility: MEDICAL CENTER | Age: 62
End: 2022-06-05
Attending: EMERGENCY MEDICINE | Admitting: HOSPITALIST
Payer: COMMERCIAL

## 2022-06-04 DIAGNOSIS — I10 ESSENTIAL HYPERTENSION: ICD-10-CM

## 2022-06-04 DIAGNOSIS — E78.5 DYSLIPIDEMIA: ICD-10-CM

## 2022-06-04 DIAGNOSIS — K85.90 ACUTE PANCREATITIS, UNSPECIFIED COMPLICATION STATUS, UNSPECIFIED PANCREATITIS TYPE: ICD-10-CM

## 2022-06-04 PROBLEM — Z72.0 TOBACCO ABUSE: Status: RESOLVED | Noted: 2020-03-02 | Resolved: 2022-06-04

## 2022-06-04 PROBLEM — I82.90 VENOUS THROMBOEMBOLISM: Status: ACTIVE | Noted: 2022-06-04

## 2022-06-04 PROBLEM — F10.90 CHRONIC ALCOHOL USE: Status: ACTIVE | Noted: 2022-06-04

## 2022-06-04 PROBLEM — K85.80 OTHER ACUTE PANCREATITIS, UNSPECIFIED COMPLICATION STATUS: Status: ACTIVE | Noted: 2022-06-04

## 2022-06-04 LAB
ALBUMIN SERPL BCP-MCNC: 4.3 G/DL (ref 3.2–4.9)
ALBUMIN/GLOB SERPL: 1.4 G/DL
ALP SERPL-CCNC: 96 U/L (ref 30–99)
ALT SERPL-CCNC: 42 U/L (ref 2–50)
AMPHET UR QL SCN: NEGATIVE
ANION GAP SERPL CALC-SCNC: 17 MMOL/L (ref 7–16)
AST SERPL-CCNC: 54 U/L (ref 12–45)
BARBITURATES UR QL SCN: NEGATIVE
BASOPHILS # BLD AUTO: 1.1 % (ref 0–1.8)
BASOPHILS # BLD: 0.1 K/UL (ref 0–0.12)
BENZODIAZ UR QL SCN: NEGATIVE
BILIRUB SERPL-MCNC: 1.9 MG/DL (ref 0.1–1.5)
BUN SERPL-MCNC: 16 MG/DL (ref 8–22)
BZE UR QL SCN: NEGATIVE
CALCIUM SERPL-MCNC: 9.1 MG/DL (ref 8.5–10.5)
CANNABINOIDS UR QL SCN: POSITIVE
CHLORIDE SERPL-SCNC: 104 MMOL/L (ref 96–112)
CO2 SERPL-SCNC: 17 MMOL/L (ref 20–33)
CREAT SERPL-MCNC: 0.97 MG/DL (ref 0.5–1.4)
EKG IMPRESSION: NORMAL
EOSINOPHIL # BLD AUTO: 0.13 K/UL (ref 0–0.51)
EOSINOPHIL NFR BLD: 1.4 % (ref 0–6.9)
ERYTHROCYTE [DISTWIDTH] IN BLOOD BY AUTOMATED COUNT: 43.6 FL (ref 35.9–50)
ETHANOL BLD-MCNC: <10.1 MG/DL
GFR SERPLBLD CREATININE-BSD FMLA CKD-EPI: 89 ML/MIN/1.73 M 2
GLOBULIN SER CALC-MCNC: 3.1 G/DL (ref 1.9–3.5)
GLUCOSE SERPL-MCNC: 133 MG/DL (ref 65–99)
HCT VFR BLD AUTO: 45.4 % (ref 42–52)
HGB BLD-MCNC: 16.8 G/DL (ref 14–18)
IMM GRANULOCYTES # BLD AUTO: 0.04 K/UL (ref 0–0.11)
IMM GRANULOCYTES NFR BLD AUTO: 0.4 % (ref 0–0.9)
LACTATE BLD-SCNC: 1.4 MMOL/L (ref 0.5–2)
LIPASE SERPL-CCNC: 864 U/L (ref 11–82)
LYMPHOCYTES # BLD AUTO: 1.62 K/UL (ref 1–4.8)
LYMPHOCYTES NFR BLD: 17.6 % (ref 22–41)
MAGNESIUM SERPL-MCNC: 1.6 MG/DL (ref 1.5–2.5)
MCH RBC QN AUTO: 36.6 PG (ref 27–33)
MCHC RBC AUTO-ENTMCNC: 37 G/DL (ref 33.7–35.3)
MCV RBC AUTO: 98.9 FL (ref 81.4–97.8)
METHADONE UR QL SCN: NEGATIVE
MONOCYTES # BLD AUTO: 0.61 K/UL (ref 0–0.85)
MONOCYTES NFR BLD AUTO: 6.6 % (ref 0–13.4)
NEUTROPHILS # BLD AUTO: 6.68 K/UL (ref 1.82–7.42)
NEUTROPHILS NFR BLD: 72.9 % (ref 44–72)
NRBC # BLD AUTO: 0 K/UL
NRBC BLD-RTO: 0 /100 WBC
NT-PROBNP SERPL IA-MCNC: 162 PG/ML (ref 0–125)
OPIATES UR QL SCN: POSITIVE
OXYCODONE UR QL SCN: NEGATIVE
PCP UR QL SCN: NEGATIVE
PHOSPHATE SERPL-MCNC: 1.9 MG/DL (ref 2.5–4.5)
PLATELET # BLD AUTO: 215 K/UL (ref 164–446)
PMV BLD AUTO: 10 FL (ref 9–12.9)
POTASSIUM SERPL-SCNC: 3.6 MMOL/L (ref 3.6–5.5)
PROPOXYPH UR QL SCN: NEGATIVE
PROT SERPL-MCNC: 7.4 G/DL (ref 6–8.2)
RBC # BLD AUTO: 4.59 M/UL (ref 4.7–6.1)
SODIUM SERPL-SCNC: 138 MMOL/L (ref 135–145)
TROPONIN T SERPL-MCNC: 9 NG/L (ref 6–19)
WBC # BLD AUTO: 9.2 K/UL (ref 4.8–10.8)

## 2022-06-04 PROCEDURE — 99285 EMERGENCY DEPT VISIT HI MDM: CPT

## 2022-06-04 PROCEDURE — 84484 ASSAY OF TROPONIN QUANT: CPT

## 2022-06-04 PROCEDURE — 700102 HCHG RX REV CODE 250 W/ 637 OVERRIDE(OP): Performed by: STUDENT IN AN ORGANIZED HEALTH CARE EDUCATION/TRAINING PROGRAM

## 2022-06-04 PROCEDURE — 83880 ASSAY OF NATRIURETIC PEPTIDE: CPT

## 2022-06-04 PROCEDURE — 80053 COMPREHEN METABOLIC PANEL: CPT

## 2022-06-04 PROCEDURE — 80307 DRUG TEST PRSMV CHEM ANLYZR: CPT

## 2022-06-04 PROCEDURE — 71045 X-RAY EXAM CHEST 1 VIEW: CPT

## 2022-06-04 PROCEDURE — G0378 HOSPITAL OBSERVATION PER HR: HCPCS

## 2022-06-04 PROCEDURE — 83735 ASSAY OF MAGNESIUM: CPT

## 2022-06-04 PROCEDURE — 76705 ECHO EXAM OF ABDOMEN: CPT

## 2022-06-04 PROCEDURE — 99220 PR INITIAL OBSERVATION CARE,LEVL III: CPT | Mod: GC | Performed by: HOSPITALIST

## 2022-06-04 PROCEDURE — 700111 HCHG RX REV CODE 636 W/ 250 OVERRIDE (IP): Performed by: EMERGENCY MEDICINE

## 2022-06-04 PROCEDURE — 96374 THER/PROPH/DIAG INJ IV PUSH: CPT

## 2022-06-04 PROCEDURE — 96365 THER/PROPH/DIAG IV INF INIT: CPT

## 2022-06-04 PROCEDURE — 83690 ASSAY OF LIPASE: CPT

## 2022-06-04 PROCEDURE — 93005 ELECTROCARDIOGRAM TRACING: CPT | Performed by: EMERGENCY MEDICINE

## 2022-06-04 PROCEDURE — 96376 TX/PRO/DX INJ SAME DRUG ADON: CPT

## 2022-06-04 PROCEDURE — 700105 HCHG RX REV CODE 258: Performed by: STUDENT IN AN ORGANIZED HEALTH CARE EDUCATION/TRAINING PROGRAM

## 2022-06-04 PROCEDURE — 83605 ASSAY OF LACTIC ACID: CPT

## 2022-06-04 PROCEDURE — 74176 CT ABD & PELVIS W/O CONTRAST: CPT

## 2022-06-04 PROCEDURE — 36415 COLL VENOUS BLD VENIPUNCTURE: CPT

## 2022-06-04 PROCEDURE — 700111 HCHG RX REV CODE 636 W/ 250 OVERRIDE (IP): Performed by: STUDENT IN AN ORGANIZED HEALTH CARE EDUCATION/TRAINING PROGRAM

## 2022-06-04 PROCEDURE — 93005 ELECTROCARDIOGRAM TRACING: CPT

## 2022-06-04 PROCEDURE — 82077 ASSAY SPEC XCP UR&BREATH IA: CPT

## 2022-06-04 PROCEDURE — 84100 ASSAY OF PHOSPHORUS: CPT

## 2022-06-04 PROCEDURE — 96375 TX/PRO/DX INJ NEW DRUG ADDON: CPT

## 2022-06-04 PROCEDURE — 85025 COMPLETE CBC W/AUTO DIFF WBC: CPT

## 2022-06-04 PROCEDURE — 700105 HCHG RX REV CODE 258: Performed by: EMERGENCY MEDICINE

## 2022-06-04 PROCEDURE — A9270 NON-COVERED ITEM OR SERVICE: HCPCS | Performed by: STUDENT IN AN ORGANIZED HEALTH CARE EDUCATION/TRAINING PROGRAM

## 2022-06-04 RX ORDER — MAGNESIUM SULFATE 1 G/100ML
1 INJECTION INTRAVENOUS ONCE
Status: COMPLETED | OUTPATIENT
Start: 2022-06-04 | End: 2022-06-04

## 2022-06-04 RX ORDER — FOLIC ACID 1 MG/1
1 TABLET ORAL DAILY
Status: DISCONTINUED | OUTPATIENT
Start: 2022-06-04 | End: 2022-06-05 | Stop reason: HOSPADM

## 2022-06-04 RX ORDER — MORPHINE SULFATE 4 MG/ML
4 INJECTION INTRAVENOUS ONCE
Status: COMPLETED | OUTPATIENT
Start: 2022-06-04 | End: 2022-06-04

## 2022-06-04 RX ORDER — ACETAMINOPHEN 500 MG
1000 TABLET ORAL 3 TIMES DAILY
Status: DISCONTINUED | OUTPATIENT
Start: 2022-06-04 | End: 2022-06-05

## 2022-06-04 RX ORDER — LOSARTAN POTASSIUM 50 MG/1
100 TABLET ORAL DAILY
Status: DISCONTINUED | OUTPATIENT
Start: 2022-06-05 | End: 2022-06-05 | Stop reason: HOSPADM

## 2022-06-04 RX ORDER — LOSARTAN POTASSIUM 50 MG/1
50 TABLET ORAL ONCE
Status: COMPLETED | OUTPATIENT
Start: 2022-06-04 | End: 2022-06-04

## 2022-06-04 RX ORDER — MORPHINE SULFATE 4 MG/ML
2 INJECTION INTRAVENOUS EVERY 4 HOURS PRN
Status: DISCONTINUED | OUTPATIENT
Start: 2022-06-04 | End: 2022-06-05

## 2022-06-04 RX ORDER — GAUZE BANDAGE 2" X 2"
100 BANDAGE TOPICAL DAILY
Status: DISCONTINUED | OUTPATIENT
Start: 2022-06-04 | End: 2022-06-05 | Stop reason: HOSPADM

## 2022-06-04 RX ORDER — SODIUM CHLORIDE, SODIUM LACTATE, POTASSIUM CHLORIDE, CALCIUM CHLORIDE 600; 310; 30; 20 MG/100ML; MG/100ML; MG/100ML; MG/100ML
INJECTION, SOLUTION INTRAVENOUS CONTINUOUS
Status: DISCONTINUED | OUTPATIENT
Start: 2022-06-04 | End: 2022-06-05

## 2022-06-04 RX ORDER — SODIUM CHLORIDE 9 MG/ML
500 INJECTION, SOLUTION INTRAVENOUS ONCE
Status: COMPLETED | OUTPATIENT
Start: 2022-06-04 | End: 2022-06-04

## 2022-06-04 RX ORDER — LOSARTAN POTASSIUM 50 MG/1
50 TABLET ORAL DAILY
Status: DISCONTINUED | OUTPATIENT
Start: 2022-06-04 | End: 2022-06-04

## 2022-06-04 RX ORDER — ONDANSETRON 2 MG/ML
4 INJECTION INTRAMUSCULAR; INTRAVENOUS ONCE
Status: COMPLETED | OUTPATIENT
Start: 2022-06-04 | End: 2022-06-04

## 2022-06-04 RX ORDER — ATORVASTATIN CALCIUM 80 MG/1
80 TABLET, FILM COATED ORAL EVERY EVENING
Status: DISCONTINUED | OUTPATIENT
Start: 2022-06-04 | End: 2022-06-05 | Stop reason: HOSPADM

## 2022-06-04 RX ADMIN — THERA TABS 1 TABLET: TAB at 14:01

## 2022-06-04 RX ADMIN — SODIUM CHLORIDE, POTASSIUM CHLORIDE, SODIUM LACTATE AND CALCIUM CHLORIDE: 600; 310; 30; 20 INJECTION, SOLUTION INTRAVENOUS at 17:24

## 2022-06-04 RX ADMIN — SODIUM CHLORIDE 500 ML: 9 INJECTION, SOLUTION INTRAVENOUS at 06:57

## 2022-06-04 RX ADMIN — MORPHINE SULFATE 2 MG: 4 INJECTION INTRAVENOUS at 14:02

## 2022-06-04 RX ADMIN — LOSARTAN POTASSIUM 50 MG: 50 TABLET, FILM COATED ORAL at 12:39

## 2022-06-04 RX ADMIN — DIBASIC SODIUM PHOSPHATE, MONOBASIC POTASSIUM PHOSPHATE AND MONOBASIC SODIUM PHOSPHATE 250 MG: 852; 155; 130 TABLET ORAL at 17:24

## 2022-06-04 RX ADMIN — ONDANSETRON HYDROCHLORIDE 4 MG: 2 SOLUTION INTRAMUSCULAR; INTRAVENOUS at 06:57

## 2022-06-04 RX ADMIN — MORPHINE SULFATE 4 MG: 4 INJECTION INTRAVENOUS at 06:57

## 2022-06-04 RX ADMIN — THIAMINE HCL TAB 100 MG 100 MG: 100 TAB at 13:30

## 2022-06-04 RX ADMIN — FOLIC ACID 1 MG: 1 TABLET ORAL at 13:30

## 2022-06-04 RX ADMIN — DIBASIC SODIUM PHOSPHATE, MONOBASIC POTASSIUM PHOSPHATE AND MONOBASIC SODIUM PHOSPHATE 250 MG: 852; 155; 130 TABLET ORAL at 14:05

## 2022-06-04 RX ADMIN — MAGNESIUM SULFATE HEPTAHYDRATE 1 G: 1 INJECTION, SOLUTION INTRAVENOUS at 14:02

## 2022-06-04 RX ADMIN — ACETAMINOPHEN 1000 MG: 500 TABLET ORAL at 14:12

## 2022-06-04 RX ADMIN — RIVAROXABAN 20 MG: 20 TABLET, FILM COATED ORAL at 18:00

## 2022-06-04 RX ADMIN — DIBASIC SODIUM PHOSPHATE, MONOBASIC POTASSIUM PHOSPHATE AND MONOBASIC SODIUM PHOSPHATE 250 MG: 852; 155; 130 TABLET ORAL at 22:09

## 2022-06-04 RX ADMIN — MORPHINE SULFATE 2 MG: 4 INJECTION INTRAVENOUS at 19:59

## 2022-06-04 RX ADMIN — ATORVASTATIN CALCIUM 80 MG: 80 TABLET, FILM COATED ORAL at 17:24

## 2022-06-04 RX ADMIN — LOSARTAN POTASSIUM 50 MG: 50 TABLET, FILM COATED ORAL at 09:31

## 2022-06-04 RX ADMIN — ACETAMINOPHEN 1000 MG: 500 TABLET ORAL at 22:05

## 2022-06-04 RX ADMIN — SODIUM CHLORIDE, POTASSIUM CHLORIDE, SODIUM LACTATE AND CALCIUM CHLORIDE: 600; 310; 30; 20 INJECTION, SOLUTION INTRAVENOUS at 09:31

## 2022-06-04 ASSESSMENT — PAIN DESCRIPTION - PAIN TYPE
TYPE: ACUTE PAIN
TYPE: ACUTE PAIN

## 2022-06-04 ASSESSMENT — PATIENT HEALTH QUESTIONNAIRE - PHQ9
1. LITTLE INTEREST OR PLEASURE IN DOING THINGS: NOT AT ALL
SUM OF ALL RESPONSES TO PHQ9 QUESTIONS 1 AND 2: 0
2. FEELING DOWN, DEPRESSED, IRRITABLE, OR HOPELESS: NOT AT ALL

## 2022-06-04 ASSESSMENT — ENCOUNTER SYMPTOMS
DIARRHEA: 0
HEARTBURN: 0
HEADACHES: 0
FOCAL WEAKNESS: 0
MYALGIAS: 0
NECK PAIN: 0
BACK PAIN: 0
FEVER: 0
NAUSEA: 0
WEIGHT LOSS: 0
COUGH: 0
DIZZINESS: 0
SHORTNESS OF BREATH: 1
CHILLS: 0
ABDOMINAL PAIN: 1
VOMITING: 0
DOUBLE VISION: 0
PALPITATIONS: 0
CONSTIPATION: 0
WHEEZING: 0
SPUTUM PRODUCTION: 0
BLOOD IN STOOL: 0

## 2022-06-04 ASSESSMENT — LIFESTYLE VARIABLES
TOTAL SCORE: 0
TOTAL SCORE: 0
AGITATION: NORMAL ACTIVITY
NAUSEA AND VOMITING: NO NAUSEA AND NO VOMITING
TOTAL SCORE: 1
ALCOHOL_USE: YES
HEADACHE, FULLNESS IN HEAD: NOT PRESENT
AGITATION: NORMAL ACTIVITY
TOTAL SCORE: 0
PAROXYSMAL SWEATS: BARELY PERCEPTIBLE SWEATING. PALMS MOIST
TOTAL SCORE: 0
VISUAL DISTURBANCES: NOT PRESENT
AUDITORY DISTURBANCES: NOT PRESENT
NAUSEA AND VOMITING: NO NAUSEA AND NO VOMITING
HOW MANY TIMES IN THE PAST YEAR HAVE YOU HAD 5 OR MORE DRINKS IN A DAY: 50
ON A TYPICAL DAY WHEN YOU DRINK ALCOHOL HOW MANY DRINKS DO YOU HAVE: 3
VISUAL DISTURBANCES: NOT PRESENT
TREMOR: NO TREMOR
CONSUMPTION TOTAL: POSITIVE
EVER HAD A DRINK FIRST THING IN THE MORNING TO STEADY YOUR NERVES TO GET RID OF A HANGOVER: NO
ANXIETY: NO ANXIETY (AT EASE)
HAVE YOU EVER FELT YOU SHOULD CUT DOWN ON YOUR DRINKING: NO
AVERAGE NUMBER OF DAYS PER WEEK YOU HAVE A DRINK CONTAINING ALCOHOL: 5
PAROXYSMAL SWEATS: NO SWEAT VISIBLE
HEADACHE, FULLNESS IN HEAD: NOT PRESENT
ORIENTATION AND CLOUDING OF SENSORIUM: ORIENTED AND CAN DO SERIAL ADDITIONS
HAVE PEOPLE ANNOYED YOU BY CRITICIZING YOUR DRINKING: NO
EVER FELT BAD OR GUILTY ABOUT YOUR DRINKING: NO
ANXIETY: NO ANXIETY (AT EASE)
TREMOR: NO TREMOR
ORIENTATION AND CLOUDING OF SENSORIUM: ORIENTED AND CAN DO SERIAL ADDITIONS
AUDITORY DISTURBANCES: NOT PRESENT

## 2022-06-04 ASSESSMENT — FIBROSIS 4 INDEX
FIB4 SCORE: 2.36
FIB4 SCORE: 2.14

## 2022-06-04 NOTE — ASSESSMENT & PLAN NOTE
- reports daily alcohol use. Alcohol cessation advised  - Start thiamine, vitamins supplementation  - monitor for withdrawal

## 2022-06-04 NOTE — ED TRIAGE NOTES
"Chief Complaint   Patient presents with   • Abdominal Pain   • Chest Pain     With inspiration.     Pt arrives with above complaint. Onset 0100 when he woke up to use the bathroom. Pt has extensive history of PE's and DVT's. Pt has complaint of chest pain with inspiration that also started at 0100. Abd pain is described as \"dull\". Pt rates pain at a 7/10.   Pt is on Xarelto.     BP (!) 163/118   Pulse (!) 125   Temp 36.9 °C (98.5 °F) (Temporal)   Resp 16   Ht 1.854 m (6' 1\")   Wt 85.8 kg (189 lb 2.5 oz)   SpO2 97%   BMI 24.96 kg/m²     "

## 2022-06-04 NOTE — PROGRESS NOTES
2 RN Skin Assessment Completed by Ebonie RN and Lakeisha RN.     Head: WDL  Ears: WDL  Nose: WDL  Mouth: WDL  Neck: WDL  Breasts/Chest: WDL  Shoulder Blades: WDL  Spine: WDL  (R) Arm/Elbow/hand: WDL  (L) Arm/Elbow/hand: WDL  Abdomen:WDL  Groin: WDL  Sacrum/Coccyx/Buttocks: blanching  (R) Leg: WDL  (L) Leg: WDL  (R) Heel/Foot/Toe: blanching  (L) Heel/Foot/Toe: blanching              Devices in place: Pulse Ox     Interventions in place:  Pillows     Possible skin injury found: No     Pictures uploaded into Epic: N/A  Wound Consult Placed: N/A

## 2022-06-04 NOTE — ASSESSMENT & PLAN NOTE
- suspect secondary to chronic alcohol use, possibly biliary microlithiasis. Physical exam, imaging not suggestive of acute biliary pathology, symptoms improved, will defer MRCP at this time. Can consider if LFT uptrends  - IVF 150ml/hr, start early oral feeding as tolaterated   - pain control with scheduled tylenol, morphine 2mg q4H PRN, monitor sedation, respiratory status  - alcohol cessation counseling

## 2022-06-04 NOTE — CARE PLAN
The patient is Stable - Low risk of patient condition declining or worsening    Shift Goals  Clinical Goals: pain control, BP control  Patient Goals: control BP  Family Goals: n/a    Progress made toward(s) clinical / shift goals:      Problem: Pain - Standard  Goal: Alleviation of pain or a reduction in pain to the patient’s comfort goal  Outcome: Progressing     Problem: Knowledge Deficit - Standard  Goal: Patient and family/care givers will demonstrate understanding of plan of care, disease process/condition, diagnostic tests and medications  Outcome: Progressing     Problem: Optimal Care for Alcohol Withdrawal  Goal: Optimal Care for the alcohol withdrawal patient  Outcome: Progressing     Problem: Seizure Precautions  Goal: Implementation of seizure precautions  Outcome: Progressing     Problem: Lifestyle Changes  Goal: Patient's ability to identify lifestyle changes and available resources to help reduce recurrence of condition will improve  Outcome: Progressing     Problem: Psychosocial  Goal: Patient's level of anxiety will decrease  Outcome: Progressing  Goal: Spiritual and cultural needs incorporated into hospitalization  Outcome: Progressing     Problem: Risk for Aspiration  Goal: Patient's risk for aspiration will be absent or decrease  Outcome: Progressing       Patient is not progressing towards the following goals:

## 2022-06-04 NOTE — PROGRESS NOTES
A/P    Jose A Ponce is a 61 y.o. male p/w abdominal pain 2/2 acute etoh pancreatitis per lipase/CT scan. RUQ u/s no stones. TG 300s. Conservative management/CIWAH. ADAT.

## 2022-06-04 NOTE — ED NOTES
PIV established, labs (rainbow) drawn and sent, IVF started, and patient medicated per MAR.    BP on LEFT arm 188/104 and on RIGHT arm 172/104

## 2022-06-04 NOTE — PROGRESS NOTES
Assessment completed. Pt A&Ox4. Respirations are even and unlabored on RA. Pt reports 8/10 abdominal pain at this time-medication per MAR. Monitors applied, hypertension noted, call light and belongings within reach. POC updated (monitor BP, pain control). Pt educated on room and call light, pt verbalized understanding. Communication board updated. Needs met.

## 2022-06-04 NOTE — ED NOTES
Patient ambulated from lobby to room independently with steady gait.  Patient placed on monitor and chart up for ERP.  Labs drawn and sent in triage.

## 2022-06-04 NOTE — H&P
"History & Physical Note    Date of Admission: 6/4/2022  Admission Status: Observation-Outpatient  [facility] Team: UNIQUE  Attending: Tamanna Campos M.D.   Senior Resident: Dr. Leonard  Contact Number: [phone]    Chief Complaint: Acute pancreatitis    History of Present Illness (HPI):     Jose A is a 61 y.o. 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 Patient reports sudden onset of abdominal pain that woke him up from sleep around 1AM. He reports the pain was epigastric, persistent, gnawing, non radiating. He reports associated SOB and chest pain. He denies any nausea or vomiting. He presented to the ED as he was concerned for another PE, reports that this pain is different from his previous episode. Reports that he has never experienced this pain prior. He denies any recent illness, denies any recent medication changes. He reports that he was started on ASA, Plavix, metoprolol, losartan and atorvastatin after his CABG, however he stopped taking them as he lost his insurance. He recently regained insurance and has established with new PCP, only taking Losartan 50mg, Xarelto 20mg BID. He denies known scorpion bite. Reports that he drinks 2-3 drinks beer a day, does have episodes of binge drink however has not done that \"in months.\" Reports that he does not watch his diet, does consume lots of salt and greasy foods.   On presentation to ED, bp elevated to 170s/100s, tachycardic to 125, spO2 normal on RA. Trop normal at 9, proBNP mildly elevated 162. Lipase 864, AST 54, ALT 42, ALP 96, T.rnonie 1.9, CO2 17, AG 17, K+ 3.6, Mg 1.6.   Lipid panel with .   CT abdomen with edema around the the pancreatic tail extends to the left paracolic gutter and splenic hilum, biliary duct nondilated and without stone in the gallbladder, with 11mm right adrenal adenoma. US RUQ without abnormalities.       Review of Systems: Review of Systems   Constitutional: Negative for chills, " fever and weight loss.   Eyes: Negative for double vision.   Respiratory: Positive for shortness of breath. Negative for cough, sputum production and wheezing.    Cardiovascular: Positive for chest pain. Negative for palpitations and leg swelling.        With inspiration   Gastrointestinal: Positive for abdominal pain. Negative for blood in stool, constipation, diarrhea, heartburn, nausea and vomiting.   Genitourinary: Negative for dysuria, frequency and urgency.   Musculoskeletal: Negative for back pain, myalgias and neck pain.   Skin: Negative for itching and rash.   Neurological: Negative for dizziness, focal weakness and headaches.       Past Medical History:   Past Medical History was reviewed with patient.   has a past medical history of Breath shortness (10/2017), CAD (coronary artery disease), Dental disorder (10/2017), DVT (deep venous thrombosis) (HCC), Grief reaction (2017), hernia repair, Hyperlipidemia, Hypertension, Psychiatric problem (10/2017), and Tobacco use.    Past Surgical History: Past Surgical History was reviewed with patient.   has a past surgical history that includes inguinal hernia repair (Bilateral, 2000); orif, fracture, tibia (Right, 1980s); multiple coronary artery bypass endo vein harvest (6/13/2017); yarely (6/13/2017); and [facility] cardiac cath.    Medications: Medications have been reviewed with patient.  Prior to Admission Medications   Prescriptions Last Dose Informant Patient Reported? Taking?   Multiple Vitamins-Minerals (CENTRUM SILVER 50+MEN) Tab 6/3/2022 at PM Patient Yes Yes   Sig: Take 1 Tablet by mouth every evening.   losartan (COZAAR) 50 MG Tab 6/3/2022 at 0630 Patient No No   Sig: Take 1 Tablet by mouth every day.   rivaroxaban (XARELTO) 20 MG Tab tablet 6/3/2022 at 0630 Patient No No   Sig: Take 1 Tablet by mouth with dinner.   Patient taking differently: Take 20 mg by mouth every morning with breakfast.      Facility-Administered Medications: None        Allergies:  Allergies have been reviewed with patient.  No Known Allergies    Family History:   family history includes Cancer in his father and mother; Heart Disease (age of onset: 89) in his maternal grandfather; No Known Problems in his sister and sister.     Social History:   Tobacco: 1.5 ppd x 40 yrs, quit 1/2022  Alcohol: 2-3 drinks daily   Recreational drugs (illegal and prescription):  Denies  Employment: Salesperson  Activity Level: Independent   Living situation:  Home by self  Recent travel: Denies  Primary Care Provider: reviewed Lori Villalobos D.N.P.  Other (stressors, spirituality, exposures):  None    Physical Exam:    Vitals:  Temp:  [36.9 °C (98.4 °F)-36.9 °C (98.5 °F)] 36.9 °C (98.4 °F)  Pulse:  [] 92  Resp:  [16-20] 20  BP: (158-188)/() 175/104  SpO2:  [92 %-97 %] 94 %    Physical Exam  Constitutional:       General: He is not in acute distress.     Appearance: Normal appearance.   HENT:      Head: Normocephalic and atraumatic.      Nose: Nose normal.      Mouth/Throat:      Mouth: Mucous membranes are moist.      Pharynx: Oropharynx is clear.   Eyes:      General: No scleral icterus.     Extraocular Movements: Extraocular movements intact.      Pupils: Pupils are equal, round, and reactive to light.   Cardiovascular:      Rate and Rhythm: Regular rhythm. Tachycardia present.      Pulses: Normal pulses.      Heart sounds: Normal heart sounds. No murmur heard.  Pulmonary:      Effort: Pulmonary effort is normal.      Breath sounds: Normal breath sounds. No wheezing.   Chest:      Chest wall: No tenderness.   Abdominal:      General: There is no distension.      Palpations: Abdomen is soft.      Tenderness: There is no abdominal tenderness.   Musculoskeletal:         General: No swelling. Normal range of motion.      Cervical back: Normal range of motion and neck supple. No tenderness.      Right lower leg: No edema.      Left lower leg: No edema.   Skin:     General: Skin is warm.      Capillary  Refill: Capillary refill takes less than 2 seconds.      Coloration: Skin is not jaundiced.   Neurological:      General: No focal deficit present.      Mental Status: He is alert and oriented to person, place, and time.      Motor: No weakness.   Psychiatric:         Mood and Affect: Mood normal.         Thought Content: Thought content normal.       Labs:    Most Recent   Sodium 138  06/04/22 05:14   Potassium 3.6  06/04/22 05:14   Chloride 104  06/04/22 05:14   Co2 17 (L)  06/04/22 05:14   Anion Gap 17.0 (H)  06/04/22 05:14   Glucose 133 (H)  06/04/22 05:14   Bun 16  06/04/22 05:14   Creatinine 0.97  06/04/22 05:14   GFR (CKD-EPI) 89 [1]  06/04/22 05:14   GFR If  >60  01/02/22 01:29   GFR If Non  >60  01/02/22 01:29   Calcium 9.1  06/04/22 05:14   AST(SGOT) 54 (H)  06/04/22 05:14   ALT(SGPT) 42  06/04/22 05:14   Alkaline Phosphatase 96  06/04/22 05:14   Total Bilirubin 1.9 (H) [2]  06/04/22 05:14   Albumin 4.3  06/04/22 05:14   Total Protein 7.4  06/04/22 05:14   Globulin 3.1  06/04/22 05:14   A-G Ratio 1.4  06/04/22 05:14   Phosphorus 1.9 (L)  06/04/22 05:14   Magnesium 1.6  06/04/22 05:14   Lipase 864 (H) [3]  06/04/22 05:14      Most Recent   Cholesterol,Tot 229 (H)  06/01/22 07:54   Triglycerides 300 (H)  06/01/22 07:54   HDL 48  06/01/22 07:54    (H)  06/01/22 07:54       EKG: Sinus tachycardia   Aberrant conduction of SV complex(es)   Left atrial enlargement   ST depr, consider ischemia, anterolateral lds   No acute ST changes    Imaging:   US-RUQ   Final Result      No abnormalities identified on ultrasound right upper quadrant abdomen.      DX-CHEST-PORTABLE (1 VIEW)   Final Result      Mild interstitial pulmonary edema and atelectasis      CT-ABDOMEN-PELVIS W/O   Final Result      1.  Acute pancreatitis   2.  No evidence of cholelithiasis   3.  Atherosclerosis   4.  Umbilical hernia containing fat   5.  11 mm RIGHT adrenal adenoma        Previous Data Review:  reviewed    Problem Representation:      * Other acute pancreatitis, unspecified complication status- (present on admission)  Assessment & Plan  - suspect secondary to chronic alcohol use, possibly biliary microlithiasis. Physical exam, imaging not suggestive of acute biliary pathology, symptoms improved, will defer MRCP at this time. Can consider if LFT uptrends  - IVF 150ml/hr, start early oral feeding as tolaterated   - pain control with scheduled tylenol, morphine 2mg q4H PRN, monitor sedation, respiratory status  - alcohol cessation counseling    Venous thromboembolism  Assessment & Plan  - w/ previous hx of multiple DVTs, recent hx of saddle  PE in January  - continue Xarelto 20mg daily    Chronic alcohol use  Assessment & Plan  - reports daily alcohol use. Alcohol cessation advised  - Start thiamine, vitamins supplementation  - monitor for withdrawal     Essential hypertension- (present on admission)  Assessment & Plan  - chronic, uncontrolled, home bp consistently <140mmHg, -190   - increase Losartan to 100mg daily, low salt diet advised. Continue to monitor    Coronary artery disease involving coronary bypass graft of native heart without angina pectoris- (present on admission)  Assessment & Plan  - previously on Atorvastatin 80mg but has discontinued due to finances. Restart as LDL not at goal  - follow up with PCP for repeat lipids in 2-3 months for dose adjustment, A1C screening

## 2022-06-04 NOTE — ASSESSMENT & PLAN NOTE
- previously on Atorvastatin 80mg but has discontinued due to finances. Restart as LDL not at goal  - follow up with PCP for repeat lipids in 2-3 months for dose adjustment, A1C screening

## 2022-06-04 NOTE — ED PROVIDER NOTES
"ED Provider Note    CHIEF COMPLAINT  Chief Complaint   Patient presents with   • Abdominal Pain   • Chest Pain     With inspiration.       HPI  Jose A Ponce is a 61 y.o. male with history of coronary artery disease status post CABG, dyslipidemia, hypertension, and DVT on Xarelto who presents complaining of abdominal pain and chest pain.    Patient states he awoke this morning at 1 AM to go to the bathroom.  He noticed periumbilical/central abdominal pain.  He states this \"feels achy all over.\"  The pain in his belly increases with lying down and decreases with walking around and is associated with shortness of breath.  He also reports chest pain that is dull with deep inspiration.    Patient states he has been taking his Xarelto as prescribed since diagnosed with a saddle pulmonary embolism on New Year's Gabi.    Patient's resting heart rate is in the 90s.  He states he has been taking losartan 50 mg daily as prescribed.  His medical insurance changed and he was unable to continue other medications that he was prescribed upon discharge such as metoprolol.  He noticed that his blood pressures have been elevated at home so he took the metoprolol that he had leftover for a few days but this seemed to make his numbers worse.    Patient denies nausea, vomiting, diarrhea, fever, chills, diaphoresis, leg swelling, calf pain, hemoptysis, history of DTs or tremulousness if he discontinues alcohol.      ALLERGIES  No Known Allergies    CURRENT MEDICATIONS  Home Medications     Reviewed by Aleah Wyatt R.N. (Registered Nurse) on 06/04/22 at 0510  Med List Status: Not Addressed   Medication Last Dose Status   losartan (COZAAR) 50 MG Tab  Active   rivaroxaban (XARELTO) 20 MG Tab tablet  Active                PAST MEDICAL HISTORY   has a past medical history of Breath shortness (10/2017), CAD (coronary artery disease), Dental disorder (10/2017), DVT (deep venous thrombosis) ([facility]), Grief reaction (2017), hernia repair, " "Hyperlipidemia, Hypertension, Psychiatric problem (10/2017), and Tobacco use.    SURGICAL HISTORY   has a past surgical history that includes inguinal hernia repair (Bilateral, ); orif, fracture, tibia (Right, ); multiple coronary artery bypass endo vein harvest (2017); yarely (2017); and [facility] cardiac cath.    SOCIAL HISTORY  Social History     Tobacco Use   • Smoking status: Former Smoker     Packs/day: 0.25     Years: 42.00     Pack years: 10.50     Types: Cigarettes     Quit date: 2022     Years since quittin.4   • Smokeless tobacco: Never Used   • Tobacco comment: vape occ   Vaping Use   • Vaping Use: Never used   Substance and Sexual Activity   • Alcohol use: Yes     Alcohol/week: 0.0 oz     Comment: 2 per day   • Drug use: Yes     Comment: THC   • Sexual activity: Not Currently       Family Hx:  No family history of AAA or TAD      REVIEW OF SYSTEMS  See HPI for further details.  All other systems are negative except as above in HPI.    PHYSICAL EXAM  VITAL SIGNS: BP (!) 188/104   Pulse 98   Temp 36.9 °C (98.4 °F) (Temporal)   Resp 20   Ht 1.854 m (6' 1\")   Wt 85.8 kg (189 lb 2.5 oz)   SpO2 96%   BMI 24.96 kg/m²     General:  WDWN male, nontoxic appearing in NAD; A+Ox3; V/S as above; tachycardic, elevated blood pressure, afebrile  Skin: warm and dry; good color; no rash  HEENT: NCAT; EOMs intact; PERRL; no scleral icterus   Neck: FROM; no meningismus, no LAD  Cardiovascular: Tachycardic heart rate and regular rhythm.  No murmurs, rubs, or gallops; pulses 2+ bilaterally radially  Lungs: Clear to auscultation with good air movement bilaterally.  No wheezes, rhonchi, or rales.   Abdomen: BS present; soft; NTND; no rebound, guarding, or rigidity.  No organomegaly or pulsatile mass; no CVAT; no mottling  Extremities: SUAREZ x 4; no e/o trauma; no pedal edema; neg Yolanda's  Neurologic: CNs III-XII grossly intact; speech clear; distal sensation intact; strength 5/5 UE/LEs  Psychiatric: " Appropriate affect, normal mood    LABS  Results for orders placed or performed during the hospital encounter of 06/04/22   CBC with Differential   Result Value Ref Range    WBC 9.2 4.8 - 10.8 K/uL    RBC 4.59 (L) 4.70 - 6.10 M/uL    Hemoglobin 16.8 14.0 - 18.0 g/dL    Hematocrit 45.4 42.0 - 52.0 %    MCV 98.9 (H) 81.4 - 97.8 fL    MCH 36.6 (H) 27.0 - 33.0 pg    MCHC 37.0 (H) 33.7 - 35.3 g/dL    RDW 43.6 35.9 - 50.0 fL    Platelet Count 215 164 - 446 K/uL    MPV 10.0 9.0 - 12.9 fL    Neutrophils-Polys 72.90 (H) 44.00 - 72.00 %    Lymphocytes 17.60 (L) 22.00 - 41.00 %    Monocytes 6.60 0.00 - 13.40 %    Eosinophils 1.40 0.00 - 6.90 %    Basophils 1.10 0.00 - 1.80 %    Immature Granulocytes 0.40 0.00 - 0.90 %    Nucleated RBC 0.00 /100 WBC    Neutrophils (Absolute) 6.68 1.82 - 7.42 K/uL    Lymphs (Absolute) 1.62 1.00 - 4.80 K/uL    Monos (Absolute) 0.61 0.00 - 0.85 K/uL    Eos (Absolute) 0.13 0.00 - 0.51 K/uL    Baso (Absolute) 0.10 0.00 - 0.12 K/uL    Immature Granulocytes (abs) 0.04 0.00 - 0.11 K/uL    NRBC (Absolute) 0.00 K/uL   Complete Metabolic Panel (CMP)   Result Value Ref Range    Sodium 138 135 - 145 mmol/L    Potassium 3.6 3.6 - 5.5 mmol/L    Chloride 104 96 - 112 mmol/L    Co2 17 (L) 20 - 33 mmol/L    Anion Gap 17.0 (H) 7.0 - 16.0    Glucose 133 (H) 65 - 99 mg/dL    Bun 16 8 - 22 mg/dL    Creatinine 0.97 0.50 - 1.40 mg/dL    Calcium 9.1 8.5 - 10.5 mg/dL    AST(SGOT) 54 (H) 12 - 45 U/L    ALT(SGPT) 42 2 - 50 U/L    Alkaline Phosphatase 96 30 - 99 U/L    Total Bilirubin 1.9 (H) 0.1 - 1.5 mg/dL    Albumin 4.3 3.2 - 4.9 g/dL    Total Protein 7.4 6.0 - 8.2 g/dL    Globulin 3.1 1.9 - 3.5 g/dL    A-G Ratio 1.4 g/dL   Troponin   Result Value Ref Range    Troponin T 9 6 - 19 ng/L   ESTIMATED GFR   Result Value Ref Range    GFR (CKD-EPI) 89 >60 mL/min/1.73 m 2   LIPASE   Result Value Ref Range    Lipase 864 (H) 11 - 82 U/L   proBrain Natriuretic Peptide, NT   Result Value Ref Range    NT-proBNP 162 (H) 0 - 125 pg/mL    EKG   Result Value Ref Range    Report       [facility] Emergency Dept.    Test Date:  2022  Pt Name:    JOSE A PONCE                Department: ER  MRN:        [id number]                      Room:  Gender:     Male                         Technician: [id number]  :        1960                   Requested By:ER TRIAGE PROTOCOL  Order #:    [id number]                    Reading MD: CARMEN HOPKINS MD    Measurements  Intervals                                Axis  Rate:       116                          P:          26  KS:         149                          QRS:        -1  QRSD:       100                          T:          25  QT:         320  QTc:        445    Interpretive Statements  Sinus tachycardia  Aberrant conduction of SV complex(es)  Left atrial enlargement  ST depr, consider ischemia, anterolateral lds  Compared to ECG 2022 11:26:49  no acute ST changes  Electronically Signed On 2022 6:06:32 PDT by CARMEN HOPKINS MD           IMAGING  US-RUQ   Final Result      No abnormalities identified on ultrasound right upper quadrant abdomen.      DX-CHEST-PORTABLE (1 VIEW)   Final Result      Mild interstitial pulmonary edema and atelectasis      CT-ABDOMEN-PELVIS W/O   Final Result      1.  Acute pancreatitis   2.  No evidence of cholelithiasis   3.  Atherosclerosis   4.  Umbilical hernia containing fat   5.  11 mm RIGHT adrenal adenoma            MEDICAL RECORD  I have reviewed patient's medical record and pertinent results are listed below.      COURSE & MEDICAL DECISION MAKING  I have reviewed any medical record information, laboratory studies and radiographic results as noted.    Jose A Ponce is a 61 y.o. male who presents complaining of abdominal pain which is his chief complaint.  He mentions chest pain as well and is concerned given his history of saddle pulmonary embolism.  However, the patient has been compliant with Xarelto since starting it in  January which makes this less likely.  The patient's vitals reveal elevated blood pressure and tachycardia.  He is not hypoxic.  He is not febrile.  Patient admits to 2 drinks per day.  There may be an element of withdrawal.  Pancreatitis is possible as well.  Given the fact that he is anticoagulated, I considered intra-abdominal bleeding.  I also considered dehydration, anemia, ACS, TAD.    Appropriate PPE was worn at all times while interacting with the patient.    EKG demonstrates no acute ST changes but persistent ST depression and sinus tachycardia.    Blood pressures in both arms requested.    Patient was given morphine and Zofran.    CT abdomen/pelvis without contrast given the current shortage.    7:32 AM  Labs noted with an elevated lipase, making pancreatitis the most likely cause of the patient's symptoms today.  I do not feel we need to investigate for PE given his compliance with Xarelto    CT abd/pelvis confirms pancreatitis.  RUQ US ordered. [place] hospitalist.    7:47 AM  Discussed with [facility] resident who agrees to admit the patient.    The total critical care time on this patient is 40 minutes, resuscitating patient, speaking with admitting physician, and deciphering test results. This 40 minutes is exclusive of separately billable procedures.      IMPRESSION  1. Acute pancreatitis, unspecified complication status, unspecified pancreatitis type       Electronically signed by: Rose Mary Mooney M.D., 6/4/2022 6:06 AM

## 2022-06-04 NOTE — ASSESSMENT & PLAN NOTE
- w/ previous hx of multiple DVTs, recent hx of saddle  PE in January  - continue Xarelto 20mg daily

## 2022-06-04 NOTE — ED NOTES
Med rec completed per patient at bedside.  Allergies reviewed with patient. NKDA.  No outpatient antibiotics in the last 30 days.  Patient's preferred pharmacy: [facility] on [address].    Patient is on XARELTO.

## 2022-06-04 NOTE — ASSESSMENT & PLAN NOTE
- chronic, uncontrolled, home bp consistently <140mmHg, -190   - increase Losartan to 100mg daily, low salt diet advised. Continue to monitor

## 2022-06-05 VITALS
HEIGHT: 73 IN | SYSTOLIC BLOOD PRESSURE: 141 MMHG | BODY MASS INDEX: 25.3 KG/M2 | RESPIRATION RATE: 16 BRPM | DIASTOLIC BLOOD PRESSURE: 83 MMHG | HEART RATE: 106 BPM | WEIGHT: 190.92 LBS | TEMPERATURE: 98.5 F | OXYGEN SATURATION: 91 %

## 2022-06-05 LAB
ALBUMIN SERPL BCP-MCNC: 3.3 G/DL (ref 3.2–4.9)
ALBUMIN/GLOB SERPL: 1.3 G/DL
ALP SERPL-CCNC: 77 U/L (ref 30–99)
ALT SERPL-CCNC: 29 U/L (ref 2–50)
ANION GAP SERPL CALC-SCNC: 11 MMOL/L (ref 7–16)
AST SERPL-CCNC: 36 U/L (ref 12–45)
BILIRUB SERPL-MCNC: 2.3 MG/DL (ref 0.1–1.5)
BUN SERPL-MCNC: 19 MG/DL (ref 8–22)
CALCIUM SERPL-MCNC: 8.3 MG/DL (ref 8.5–10.5)
CHLORIDE SERPL-SCNC: 103 MMOL/L (ref 96–112)
CO2 SERPL-SCNC: 22 MMOL/L (ref 20–33)
CREAT SERPL-MCNC: 1.07 MG/DL (ref 0.5–1.4)
ERYTHROCYTE [DISTWIDTH] IN BLOOD BY AUTOMATED COUNT: 46.5 FL (ref 35.9–50)
GFR SERPLBLD CREATININE-BSD FMLA CKD-EPI: 79 ML/MIN/1.73 M 2
GLOBULIN SER CALC-MCNC: 2.6 G/DL (ref 1.9–3.5)
GLUCOSE SERPL-MCNC: 105 MG/DL (ref 65–99)
HCT VFR BLD AUTO: 41 % (ref 42–52)
HGB BLD-MCNC: 14.3 G/DL (ref 14–18)
LIPASE SERPL-CCNC: 217 U/L (ref 11–82)
MAGNESIUM SERPL-MCNC: 1.8 MG/DL (ref 1.5–2.5)
MCH RBC QN AUTO: 36.3 PG (ref 27–33)
MCHC RBC AUTO-ENTMCNC: 34.9 G/DL (ref 33.7–35.3)
MCV RBC AUTO: 104.1 FL (ref 81.4–97.8)
PHOSPHATE SERPL-MCNC: 3.9 MG/DL (ref 2.5–4.5)
PLATELET # BLD AUTO: 173 K/UL (ref 164–446)
PMV BLD AUTO: 10.5 FL (ref 9–12.9)
POTASSIUM SERPL-SCNC: 3.8 MMOL/L (ref 3.6–5.5)
PROT SERPL-MCNC: 5.9 G/DL (ref 6–8.2)
RBC # BLD AUTO: 3.94 M/UL (ref 4.7–6.1)
SODIUM SERPL-SCNC: 136 MMOL/L (ref 135–145)
WBC # BLD AUTO: 8.8 K/UL (ref 4.8–10.8)

## 2022-06-05 PROCEDURE — 700111 HCHG RX REV CODE 636 W/ 250 OVERRIDE (IP): Performed by: STUDENT IN AN ORGANIZED HEALTH CARE EDUCATION/TRAINING PROGRAM

## 2022-06-05 PROCEDURE — A9270 NON-COVERED ITEM OR SERVICE: HCPCS | Performed by: HOSPITALIST

## 2022-06-05 PROCEDURE — 700102 HCHG RX REV CODE 250 W/ 637 OVERRIDE(OP): Performed by: STUDENT IN AN ORGANIZED HEALTH CARE EDUCATION/TRAINING PROGRAM

## 2022-06-05 PROCEDURE — 700105 HCHG RX REV CODE 258: Performed by: STUDENT IN AN ORGANIZED HEALTH CARE EDUCATION/TRAINING PROGRAM

## 2022-06-05 PROCEDURE — 99217 PR OBSERVATION CARE DISCHARGE: CPT | Performed by: HOSPITALIST

## 2022-06-05 PROCEDURE — G0378 HOSPITAL OBSERVATION PER HR: HCPCS

## 2022-06-05 PROCEDURE — 80053 COMPREHEN METABOLIC PANEL: CPT

## 2022-06-05 PROCEDURE — 83690 ASSAY OF LIPASE: CPT

## 2022-06-05 PROCEDURE — 84100 ASSAY OF PHOSPHORUS: CPT

## 2022-06-05 PROCEDURE — 700102 HCHG RX REV CODE 250 W/ 637 OVERRIDE(OP): Performed by: HOSPITALIST

## 2022-06-05 PROCEDURE — A9270 NON-COVERED ITEM OR SERVICE: HCPCS | Performed by: STUDENT IN AN ORGANIZED HEALTH CARE EDUCATION/TRAINING PROGRAM

## 2022-06-05 PROCEDURE — 96376 TX/PRO/DX INJ SAME DRUG ADON: CPT

## 2022-06-05 PROCEDURE — 83735 ASSAY OF MAGNESIUM: CPT

## 2022-06-05 PROCEDURE — 85027 COMPLETE CBC AUTOMATED: CPT

## 2022-06-05 RX ORDER — CARVEDILOL 6.25 MG/1
6.25 TABLET ORAL ONCE
Status: COMPLETED | OUTPATIENT
Start: 2022-06-05 | End: 2022-06-05

## 2022-06-05 RX ORDER — HYDRALAZINE HYDROCHLORIDE 25 MG/1
50 TABLET, FILM COATED ORAL EVERY 8 HOURS
Status: DISCONTINUED | OUTPATIENT
Start: 2022-06-05 | End: 2022-06-05 | Stop reason: HOSPADM

## 2022-06-05 RX ORDER — OXYCODONE HYDROCHLORIDE 5 MG/1
5 TABLET ORAL EVERY 6 HOURS PRN
Qty: 16 TABLET | Refills: 0 | Status: SHIPPED | OUTPATIENT
Start: 2022-06-05 | End: 2022-06-09

## 2022-06-05 RX ORDER — CARVEDILOL 12.5 MG/1
12.5 TABLET ORAL 2 TIMES DAILY WITH MEALS
Status: DISCONTINUED | OUTPATIENT
Start: 2022-06-05 | End: 2022-06-05 | Stop reason: HOSPADM

## 2022-06-05 RX ORDER — CARVEDILOL 6.25 MG/1
6.25 TABLET ORAL 2 TIMES DAILY WITH MEALS
Status: DISCONTINUED | OUTPATIENT
Start: 2022-06-05 | End: 2022-06-05

## 2022-06-05 RX ORDER — OXYCODONE HYDROCHLORIDE 5 MG/1
5 TABLET ORAL EVERY 4 HOURS PRN
Status: DISCONTINUED | OUTPATIENT
Start: 2022-06-05 | End: 2022-06-05 | Stop reason: HOSPADM

## 2022-06-05 RX ORDER — ONDANSETRON 4 MG/1
4 TABLET, ORALLY DISINTEGRATING ORAL EVERY 6 HOURS PRN
Qty: 10 TABLET | Refills: 0 | Status: SHIPPED | OUTPATIENT
Start: 2022-06-05 | End: 2022-11-23

## 2022-06-05 RX ORDER — ATORVASTATIN CALCIUM 40 MG/1
40 TABLET, FILM COATED ORAL EVERY EVENING
Qty: 30 TABLET | Refills: 5 | Status: SHIPPED | OUTPATIENT
Start: 2022-06-05 | End: 2022-11-23

## 2022-06-05 RX ORDER — CARVEDILOL 12.5 MG/1
12.5 TABLET ORAL 2 TIMES DAILY WITH MEALS
Qty: 60 TABLET | Refills: 5 | Status: SHIPPED | OUTPATIENT
Start: 2022-06-05 | End: 2022-11-23

## 2022-06-05 RX ORDER — FAMOTIDINE 20 MG/1
20 TABLET, FILM COATED ORAL 2 TIMES DAILY
Qty: 60 TABLET | Refills: 0 | Status: SHIPPED | OUTPATIENT
Start: 2022-06-05 | End: 2022-11-23

## 2022-06-05 RX ADMIN — OXYCODONE 5 MG: 5 TABLET ORAL at 13:22

## 2022-06-05 RX ADMIN — THIAMINE HCL TAB 100 MG 100 MG: 100 TAB at 06:00

## 2022-06-05 RX ADMIN — OXYCODONE 5 MG: 5 TABLET ORAL at 09:14

## 2022-06-05 RX ADMIN — ACETAMINOPHEN 1000 MG: 500 TABLET ORAL at 07:47

## 2022-06-05 RX ADMIN — THERA TABS 1 TABLET: TAB at 06:00

## 2022-06-05 RX ADMIN — DIBASIC SODIUM PHOSPHATE, MONOBASIC POTASSIUM PHOSPHATE AND MONOBASIC SODIUM PHOSPHATE 250 MG: 852; 155; 130 TABLET ORAL at 02:13

## 2022-06-05 RX ADMIN — MORPHINE SULFATE 2 MG: 4 INJECTION INTRAVENOUS at 02:11

## 2022-06-05 RX ADMIN — FOLIC ACID 1 MG: 1 TABLET ORAL at 06:00

## 2022-06-05 RX ADMIN — CARVEDILOL 6.25 MG: 6.25 TABLET, FILM COATED ORAL at 09:14

## 2022-06-05 RX ADMIN — SODIUM CHLORIDE, POTASSIUM CHLORIDE, SODIUM LACTATE AND CALCIUM CHLORIDE: 600; 310; 30; 20 INJECTION, SOLUTION INTRAVENOUS at 07:44

## 2022-06-05 RX ADMIN — LOSARTAN POTASSIUM 100 MG: 50 TABLET, FILM COATED ORAL at 05:59

## 2022-06-05 RX ADMIN — CARVEDILOL 6.25 MG: 6.25 TABLET, FILM COATED ORAL at 14:59

## 2022-06-05 ASSESSMENT — LIFESTYLE VARIABLES
HEADACHE, FULLNESS IN HEAD: NOT PRESENT
AGITATION: NORMAL ACTIVITY
ANXIETY: NO ANXIETY (AT EASE)
ORIENTATION AND CLOUDING OF SENSORIUM: ORIENTED AND CAN DO SERIAL ADDITIONS
NAUSEA AND VOMITING: NO NAUSEA AND NO VOMITING
AGITATION: NORMAL ACTIVITY
TREMOR: NO TREMOR
ANXIETY: NO ANXIETY (AT EASE)
TREMOR: NO TREMOR
PAROXYSMAL SWEATS: NO SWEAT VISIBLE
ORIENTATION AND CLOUDING OF SENSORIUM: ORIENTED AND CAN DO SERIAL ADDITIONS
TOTAL SCORE: 0
HEADACHE, FULLNESS IN HEAD: NOT PRESENT
AUDITORY DISTURBANCES: NOT PRESENT
TOTAL SCORE: 0
TOTAL SCORE: 0
AUDITORY DISTURBANCES: NOT PRESENT
HEADACHE, FULLNESS IN HEAD: NOT PRESENT
NAUSEA AND VOMITING: NO NAUSEA AND NO VOMITING
AUDITORY DISTURBANCES: NOT PRESENT
ORIENTATION AND CLOUDING OF SENSORIUM: ORIENTED AND CAN DO SERIAL ADDITIONS
PAROXYSMAL SWEATS: NO SWEAT VISIBLE
AGITATION: NORMAL ACTIVITY
VISUAL DISTURBANCES: NOT PRESENT
PAROXYSMAL SWEATS: NO SWEAT VISIBLE
NAUSEA AND VOMITING: NO NAUSEA AND NO VOMITING
ANXIETY: NO ANXIETY (AT EASE)
VISUAL DISTURBANCES: NOT PRESENT
VISUAL DISTURBANCES: NOT PRESENT
TREMOR: NO TREMOR

## 2022-06-05 ASSESSMENT — PAIN DESCRIPTION - PAIN TYPE
TYPE: ACUTE PAIN

## 2022-06-05 NOTE — CARE PLAN
Problem: Pain - Standard  Goal: Alleviation of pain or a reduction in pain to the patient’s comfort goal  Outcome: Progressing     Problem: Optimal Care for Alcohol Withdrawal  Goal: Optimal Care for the alcohol withdrawal patient  Outcome: Progressing   The patient is Stable - Low risk of patient condition declining or worsening    Shift Goals  Clinical Goals: pain management  Patient Goals: rest  Family Goals: n/a    Progress made toward(s) clinical / shift goals:  Patient given IV Morphine PRN with positive results, per patient. Patient does not show signs of withdrawal and scores 0 for CIWA overnight.    Patient is not progressing towards the following goals:

## 2022-06-05 NOTE — PROGRESS NOTES
Discharge instructions, medications and follow-up reviewed with pt, pt verbalized understanding and denies questions. Discharge paperwork given to pt. PIV removed, TeleBox removed, armband removed. Pt ambulate off unit with hospital escort.

## 2022-06-05 NOTE — CARE PLAN
Problem: Pain - Standard  Goal: Alleviation of pain or a reduction in pain to the patient’s comfort goal  Outcome: Progressing     Problem: Knowledge Deficit - Standard  Goal: Patient and family/care givers will demonstrate understanding of plan of care, disease process/condition, diagnostic tests and medications  Outcome: Progressing   The patient is Stable - Low risk of patient condition declining or worsening    Shift Goals  Clinical Goals: Pain Management, Rest  Patient Goals: Rest  Family Goals: No family present    Progress made toward(s) clinical / shift goals:  Pt updated on POC, all questions answered. Pt provided with prn oxycodone 5mg for pain with improvement of pain. Pt denies any additional needs at this time.    Patient is not progressing towards the following goals:

## 2022-06-05 NOTE — DISCHARGE INSTRUCTIONS
Discharge Instructions    Discharged to home by car with relative. Discharged via wheelchair, hospital escort: Yes.  Special equipment needed: Not Applicable    Be sure to schedule a follow-up appointment with your primary care doctor or any specialists as instructed.     Discharge Plan:   Diet Plan: Discussed  Activity Level: Discussed  Confirmed Follow up Appointment: Patient to Call and Schedule Appointment  Confirmed Symptoms Management: Discussed  Medication Reconciliation Updated: Yes    I understand that a diet low in cholesterol, fat, and sodium is recommended for good health. Unless I have been given specific instructions below for another diet, I accept this instruction as my diet prescription.   Other diet: Cardiac 2gm Na    Special Instructions: None    Is patient discharged on Warfarin / Coumadin?   No     Depression / Suicide Risk    As you are discharged from this [facility] facility, it is important to learn how to keep safe from harming yourself.    Recognize the warning signs:  Abrupt changes in personality, positive or negative- including increase in energy   Giving away possessions  Change in eating patterns- significant weight changes-  positive or negative  Change in sleeping patterns- unable to sleep or sleeping all the time   Unwillingness or inability to communicate  Depression  Unusual sadness, discouragement and loneliness  Talk of wanting to die  Neglect of personal appearance   Rebelliousness- reckless behavior  Withdrawal from people/activities they love  Confusion- inability to concentrate     If you or a loved one observes any of these behaviors or has concerns about self-harm, here's what you can do:  Talk about it- your feelings and reasons for harming yourself  Remove any means that you might use to hurt yourself (examples: pills, rope, extension cords, firearm)  Get professional help from the community (Mental Health, Substance Abuse, psychological counseling)  Do not be  alone:Call your Safe Contact- someone whom you trust who will be there for you.  Call your local CRISIS HOTLINE 101-3959 or 571-782-7064  Call your local Children's Mobile Crisis Response Team [place] (138) 385-8488 or www.Happyshop  Call the toll free National Suicide Prevention Hotlines   National Suicide Prevention Lifeline 283-787-XHOB (8133)  [place] [facility] 800-SUICIDE (584-7338)

## 2022-06-06 NOTE — DISCHARGE SUMMARY
"Discharge Summary    CHIEF COMPLAINT ON ADMISSION  Chief Complaint   Patient presents with   • Abdominal Pain   • Chest Pain     With inspiration.       Reason for Admission  Chest Pain     Admission Date  6/4/2022    CODE STATUS  Prior    HPI & HOSPITAL COURSE  As per unr h+p    Jose A is a 61 y.o. male CAD s/p CABG (2017), HTN, HLD , prior DVTs, saddle PE on Xarelto presented w/ acute abdominal pain. Patient reports sudden onset of abdominal pain that woke him up from sleep around 1AM. He reports the pain was epigastric, persistent, gnawing, non radiating. He reports associated SOB and chest pain. He denies any nausea or vomiting. He presented to the ED as he was concerned for another PE, reports that this pain is different from his previous episode. Reports that he has never experienced this pain prior. He denies any recent illness, denies any recent medication changes. He reports that he was started on ASA, Plavix, metoprolol, losartan and atorvastatin after his CABG, however he stopped taking them as he lost his insurance. He recently regained insurance and has established with new PCP, only taking Losartan 50mg, Xarelto 20mg BID. He denies known scorpion bite. Reports that he drinks 2-3 drinks beer a day, does have episodes of binge drink however has not done that \"in months.\" Reports that he does not watch his diet, does consume lots of salt and greasy foods.   On presentation to ED, bp elevated to 170s/100s, tachycardic to 125, spO2 normal on RA. Trop normal at 9, proBNP mildly elevated 162. Lipase 864, AST 54, ALT 42, ALP 96, T.ronnie 1.9, CO2 17, AG 17, K+ 3.6, Mg 1.6.   Lipid panel with .   CT abdomen with edema around the the pancreatic tail extends to the left paracolic gutter and splenic hilum, biliary duct nondilated and without stone in the gallbladder, with 11mm right adrenal adenoma. US RUQ without abnormalities.        The patient was admitted to the hospital given hydration.  Given pain " management with p.o. oxycodone and IV morphine.  He was started on a clear good diet and advance as tolerated to a GI soft diet.  Follow-up with lipase showed significant improvement from 800 to 200.    On the day of discharge patient was able to tolerate a solid diet and able to control his pain with p.o. analgesia.    Patient blood pressure was elevated and we added and titrated Coreg and once his blood pressure was controlled he was discharged home with a new prescription for Coreg      Therefore, he is discharged in good and stable condition to home with close outpatient follow-up.    The patient recovered much more quickly than anticipated on admission.    Discharge Date  6/5/2022    FOLLOW UP ITEMS POST DISCHARGE      DISCHARGE DIAGNOSES  Principal Problem:    Other acute pancreatitis, unspecified complication status POA: Yes  Active Problems:    Coronary artery disease involving coronary bypass graft of native heart without angina pectoris POA: Yes    Essential hypertension POA: Yes      Overview: Restarted on Losartan 50mg QD in 1/2022 but states he ran out a week ago    Chronic alcohol use POA: Yes    Venous thromboembolism POA: Yes  Resolved Problems:    Tobacco abuse POA: Yes      FOLLOW UP  Future Appointments   Date Time Provider Department Center   11/23/2022  8:00 AM [facility] EXAM 4 VMED None     MARY GutiérrezNLazaroP.  [address] [facility] [id number] [phone]    Schedule an appointment as soon as possible for a visit  As needed      MEDICATIONS ON DISCHARGE     Medication List      START taking these medications      Instructions   atorvastatin 40 MG Tabs  Commonly known as: LIPITOR   Take 1 Tablet by mouth every evening.  Dose: 40 mg     carvedilol 12.5 MG Tabs  Commonly known as: COREG   Take 1 Tablet by mouth 2 times a day with meals.  Dose: 12.5 mg     famotidine 20 MG Tabs  Commonly known as: PEPCID   Take 1 Tablet by mouth 2 times a day.  Dose: 20 mg     ondansetron 4 MG Tbdp  Commonly  known as: ZOFRAN ODT   Take 1 Tablet by mouth every 6 hours as needed for Nausea.  Dose: 4 mg     oxyCODONE immediate-release 5 MG Tabs  Commonly known as: ROXICODONE   Take 1 Tablet by mouth every 6 hours as needed for Severe Pain for up to 4 days.  Dose: 5 mg        CHANGE how you take these medications      Instructions   rivaroxaban 20 MG Tabs tablet  What changed: when to take this  Commonly known as: Xarelto   Doctor's comments: This rx was submitted by a pharmacist working under a collaborative practice agreement.  Take 1 Tablet by mouth with dinner.  Dose: 20 mg        CONTINUE taking these medications      Instructions   Centrum Silver 50+Men Tabs   Take 1 Tablet by mouth every evening.  Dose: 1 Tablet     losartan 50 MG Tabs  Commonly known as: COZAAR   Take 1 Tablet by mouth every day.  Dose: 50 mg            Allergies  No Known Allergies    DIET  No orders of the defined types were placed in this encounter.      ACTIVITY  As tolerated.  Weight bearing as tolerated    CONSULTATIONS      PROCEDURES      LABORATORY  Lab Results   Component Value Date    SODIUM 136 06/05/2022    POTASSIUM 3.8 06/05/2022    CHLORIDE 103 06/05/2022    CO2 22 06/05/2022    GLUCOSE 105 (H) 06/05/2022    BUN 19 06/05/2022    CREATININE 1.07 06/05/2022        Lab Results   Component Value Date    WBC 8.8 06/05/2022    HEMOGLOBIN 14.3 06/05/2022    HEMATOCRIT 41.0 (L) 06/05/2022    PLATELETCT 173 06/05/2022        Total time of the discharge process exceeds 38 minutes.

## 2022-06-09 ENCOUNTER — TELEPHONE (OUTPATIENT)
Dept: VASCULAR LAB | Facility: MEDICAL CENTER | Age: 62
End: 2022-06-09
Payer: COMMERCIAL

## 2022-06-09 NOTE — TELEPHONE ENCOUNTER
Contacted patient at [phone] to discuss [facility] and services/benefits offered. No answer.  Unable to leave message, voicemail full.      Jennifer Dupree  Rx Coordinator   [phone]

## 2022-11-23 ENCOUNTER — ANTICOAGULATION VISIT (OUTPATIENT)
Dept: VASCULAR LAB | Facility: MEDICAL CENTER | Age: 62
End: 2022-11-23
Attending: INTERNAL MEDICINE
Payer: COMMERCIAL

## 2022-11-23 VITALS — DIASTOLIC BLOOD PRESSURE: 109 MMHG | SYSTOLIC BLOOD PRESSURE: 175 MMHG | HEART RATE: 88 BPM

## 2022-11-23 DIAGNOSIS — I82.422 ACUTE DEEP VEIN THROMBOSIS (DVT) OF ILIAC VEIN OF LEFT LOWER EXTREMITY (HCC): ICD-10-CM

## 2022-11-23 DIAGNOSIS — I26.92 ACUTE SADDLE PULMONARY EMBOLISM WITHOUT ACUTE COR PULMONALE (HCC): ICD-10-CM

## 2022-11-23 DIAGNOSIS — I82.411 ACUTE DEEP VEIN THROMBOSIS (DVT) OF FEMORAL VEIN OF RIGHT LOWER EXTREMITY (HCC): ICD-10-CM

## 2022-11-23 PROCEDURE — 99212 OFFICE O/P EST SF 10 MIN: CPT

## 2022-11-23 NOTE — PROGRESS NOTES
Target end date:Indefinite     Indication: Recurrent VTE     Drug: Xarelto 20 mg daily     CHADsVASC = n/a    Health Status Since Last Assessment   Patient denies any new relevant medical problems, ED visits or hospitalizations   Patient denies any embolic events (stroke/tia/systemic embolism)    Adherence with DOAC Therapy   Pt has not missed any doses in the average week    Bleeding Risk Assessment     Denies Epistaxis   Pt denies any excessive or unusual bleeding/hematomas.  Pt denies any GI bleeds or hematemesis.  Pt denies any concerning daily headache or sub dural hematoma symptoms.     Pt denies any hematuria Denies   Latest Hemoglobin 14.3 but overdue.    ETOH overuse couple beers per day, pt educated about the interaction.     Creatinine Clearance/Renal Function     Latest ClCr > 30 mL/min    Hepatic function   Latest LFTs WNL but due to repeat   Pt denies any history of liver dysfunction      Drug Interactions   Platelets: 173    ASA/other antiplatelets none   NSAID none   Other drug interactions none   X Verified no anticonvulsant or azole therapy, education provided for future use.     Examination   Blood Pressure Significantly elevated at 175/109.  He historically has been high.  NO s/s of end organ damage, but pt declines to be seen at ER.  He has promised me he will reach out to PCP to address HTN. He was on carvedilol in the past but through medication reconciliation he is no longer taking it.     Stressed importance of following up and how this pressure can lead to CVD.    Discussed smoking tobacco at length.  Pt not interested in quitting yet but knows we can help him remove barriers to treatment.      Symptomatic hypotension none   Significant gait impairment/imbalance/high risk for falls? No     Final Assessment and Recommendations:   Patient appears stable from the anticoagulation standpoint.     Benefits of continued DOAC therapy outweigh risks for this patient   Recommend pt continue with  current DOAC therapy  (with dose adjustment)   DOAC is affordable     Other Actions: cmp/ cbc hemogram ordered prior to next visit    Follow up:   Will follow up with patient 6  months.     Mark Eddy, NavinD

## 2023-01-20 DIAGNOSIS — I82.411 ACUTE DEEP VEIN THROMBOSIS (DVT) OF FEMORAL VEIN OF RIGHT LOWER EXTREMITY (HCC): ICD-10-CM

## 2023-03-15 ENCOUNTER — OFFICE VISIT (OUTPATIENT)
Dept: MEDICAL GROUP | Facility: PHYSICIAN GROUP | Age: 63
End: 2023-03-15
Payer: COMMERCIAL

## 2023-03-15 ENCOUNTER — HOSPITAL ENCOUNTER (OUTPATIENT)
Dept: LAB | Facility: MEDICAL CENTER | Age: 63
End: 2023-03-15
Attending: NURSE PRACTITIONER
Payer: COMMERCIAL

## 2023-03-15 ENCOUNTER — HOSPITAL ENCOUNTER (OUTPATIENT)
Dept: LAB | Facility: MEDICAL CENTER | Age: 63
End: 2023-03-15
Attending: INTERNAL MEDICINE
Payer: COMMERCIAL

## 2023-03-15 VITALS
HEIGHT: 73 IN | DIASTOLIC BLOOD PRESSURE: 90 MMHG | HEART RATE: 115 BPM | RESPIRATION RATE: 16 BRPM | SYSTOLIC BLOOD PRESSURE: 172 MMHG | BODY MASS INDEX: 26.24 KG/M2 | WEIGHT: 198 LBS | OXYGEN SATURATION: 98 % | TEMPERATURE: 98 F

## 2023-03-15 DIAGNOSIS — Z11.59 NEED FOR HEPATITIS C SCREENING TEST: ICD-10-CM

## 2023-03-15 DIAGNOSIS — E78.5 DYSLIPIDEMIA: ICD-10-CM

## 2023-03-15 DIAGNOSIS — I10 ESSENTIAL HYPERTENSION: ICD-10-CM

## 2023-03-15 DIAGNOSIS — I26.92 ACUTE SADDLE PULMONARY EMBOLISM WITHOUT ACUTE COR PULMONALE (HCC): ICD-10-CM

## 2023-03-15 LAB
ALBUMIN SERPL BCP-MCNC: 4.3 G/DL (ref 3.2–4.9)
ALBUMIN/GLOB SERPL: 1.2 G/DL
ALP SERPL-CCNC: 86 U/L (ref 30–99)
ALT SERPL-CCNC: 93 U/L (ref 2–50)
ANION GAP SERPL CALC-SCNC: 16 MMOL/L (ref 7–16)
AST SERPL-CCNC: 90 U/L (ref 12–45)
BILIRUB SERPL-MCNC: 1 MG/DL (ref 0.1–1.5)
BUN SERPL-MCNC: 21 MG/DL (ref 8–22)
CALCIUM ALBUM COR SERPL-MCNC: 10.1 MG/DL (ref 8.5–10.5)
CALCIUM SERPL-MCNC: 10.3 MG/DL (ref 8.5–10.5)
CHLORIDE SERPL-SCNC: 101 MMOL/L (ref 96–112)
CHOLEST SERPL-MCNC: 296 MG/DL (ref 100–199)
CO2 SERPL-SCNC: 20 MMOL/L (ref 20–33)
CREAT SERPL-MCNC: 1.07 MG/DL (ref 0.5–1.4)
ERYTHROCYTE [DISTWIDTH] IN BLOOD BY AUTOMATED COUNT: 47.9 FL (ref 35.9–50)
FASTING STATUS PATIENT QL REPORTED: NORMAL
GFR SERPLBLD CREATININE-BSD FMLA CKD-EPI: 78 ML/MIN/1.73 M 2
GLOBULIN SER CALC-MCNC: 3.5 G/DL (ref 1.9–3.5)
GLUCOSE SERPL-MCNC: 139 MG/DL (ref 65–99)
HCT VFR BLD AUTO: 46.2 % (ref 42–52)
HCV AB SER QL: NORMAL
HDLC SERPL-MCNC: 55 MG/DL
HGB BLD-MCNC: 16.2 G/DL (ref 14–18)
LDLC SERPL CALC-MCNC: 217 MG/DL
MCH RBC QN AUTO: 36.5 PG (ref 27–33)
MCHC RBC AUTO-ENTMCNC: 35.1 G/DL (ref 33.7–35.3)
MCV RBC AUTO: 104.1 FL (ref 81.4–97.8)
PLATELET # BLD AUTO: 206 K/UL (ref 164–446)
PMV BLD AUTO: 10.6 FL (ref 9–12.9)
POTASSIUM SERPL-SCNC: 4.7 MMOL/L (ref 3.6–5.5)
PROT SERPL-MCNC: 7.8 G/DL (ref 6–8.2)
RBC # BLD AUTO: 4.44 M/UL (ref 4.7–6.1)
SODIUM SERPL-SCNC: 137 MMOL/L (ref 135–145)
TRIGL SERPL-MCNC: 122 MG/DL (ref 0–149)
TSH SERPL DL<=0.005 MIU/L-ACNC: 3.08 UIU/ML (ref 0.38–5.33)
WBC # BLD AUTO: 7.1 K/UL (ref 4.8–10.8)

## 2023-03-15 PROCEDURE — 86803 HEPATITIS C AB TEST: CPT

## 2023-03-15 PROCEDURE — 84443 ASSAY THYROID STIM HORMONE: CPT

## 2023-03-15 PROCEDURE — 80061 LIPID PANEL: CPT

## 2023-03-15 PROCEDURE — 80053 COMPREHEN METABOLIC PANEL: CPT

## 2023-03-15 PROCEDURE — 99214 OFFICE O/P EST MOD 30 MIN: CPT | Performed by: NURSE PRACTITIONER

## 2023-03-15 PROCEDURE — 36415 COLL VENOUS BLD VENIPUNCTURE: CPT

## 2023-03-15 PROCEDURE — 85027 COMPLETE CBC AUTOMATED: CPT

## 2023-03-15 RX ORDER — LOSARTAN POTASSIUM 100 MG/1
100 TABLET ORAL DAILY
Qty: 90 TABLET | Refills: 3 | Status: SHIPPED | OUTPATIENT
Start: 2023-03-15 | End: 2023-08-02 | Stop reason: SDUPTHER

## 2023-03-15 ASSESSMENT — ENCOUNTER SYMPTOMS
MUSCULOSKELETAL NEGATIVE: 1
PSYCHIATRIC NEGATIVE: 1
SHORTNESS OF BREATH: 0
GASTROINTESTINAL NEGATIVE: 1
CONSTITUTIONAL NEGATIVE: 1
COUGH: 0
NEUROLOGICAL NEGATIVE: 1
PALPITATIONS: 0
EYES NEGATIVE: 1
SPUTUM PRODUCTION: 0
FEVER: 0

## 2023-03-15 ASSESSMENT — FIBROSIS 4 INDEX: FIB4 SCORE: 2.4

## 2023-03-15 ASSESSMENT — PATIENT HEALTH QUESTIONNAIRE - PHQ9: CLINICAL INTERPRETATION OF PHQ2 SCORE: 0

## 2023-03-15 NOTE — PROGRESS NOTES
"Subjective       CC:   Chief Complaint   Patient presents with    Medication Management        HPI:   Patient is a 62 y.o. established male patient with medical history listed below here today for evaluation and management of hypertension, dyslipidemia. He is following at anticoagulation clinic for indefinite xarelto (hx PE, DVT).     Problem   Essential Hypertension    Restarted on Losartan 50mg QD in 2022; states he ran out a week ago. His BP has been trending high before he ran out so will increase dose today.     Dyslipidemia    States was on statin in the past but not currently;          Patient Active Problem List   Diagnosis    Former smoker    Grief reaction    Dyslipidemia    Coronary artery disease involving coronary bypass graft of native heart without angina pectoris    S/P CABG x 2    Essential hypertension    Ganglion cyst    Umbilical hernia without obstruction and without gangrene    Mood disorder (HCC)    Prediabetes    Acute deep vein thrombosis (DVT) of iliac vein of left lower extremity (HCC)    Acute saddle pulmonary embolism without acute cor pulmonale (HCC)    Acute deep vein thrombosis (DVT) of femoral vein of right lower extremity (HCC)    Lung nodule    Acute pulmonary embolism (HCC)    Deep vein thrombosis (HCC)    Other acute pancreatitis, unspecified complication status    Chronic alcohol use    Venous thromboembolism       Past Medical History:   Diagnosis Date    Breath shortness 10/2017    \"In the past, not an issue now\" sob before heart surgery    CAD (coronary artery disease)     S/P CABG X2    Dental disorder 10/2017    Upper dentures    DVT (deep venous thrombosis) (HCC)     Grief reaction 2017    Girlfriend  this year.    Hx of hernia repair     Mesh    Hyperlipidemia     Hypertension     Psychiatric problem 10/2017    Depression    Tobacco use         Past Surgical History:   Procedure Laterality Date    MULTIPLE CORONARY ARTERY BYPASS ENDO VEIN HARVEST  2017    " "Procedure: MULTIPLE CORONARY ARTERY BYPASS x2 RIGHT LEG ENDO VEIN HARVEST;  Surgeon: Gray Barboza M.D.;  Location: SURGERY [facility];  Service:     HUBERT  6/13/2017    Procedure: HUBERT - INTRAOP;  Surgeon: Gray Barboza M.D.;  Location: SURGERY [facility];  Service:     INGUINAL HERNIA REPAIR Bilateral 2000    ORIF, FRACTURE, TIBIA Right 1980s    ZZZ CARDIAC CATH          Current Outpatient Medications on File Prior to Visit   Medication Sig Dispense Refill    rivaroxaban (XARELTO) 20 MG Tab tablet Take 1 Tablet by mouth with dinner. 90 Tablet 1    Multiple Vitamins-Minerals (CENTRUM SILVER 50+MEN) Tab Take 1 Tablet by mouth every evening.       No current facility-administered medications on file prior to visit.        Health Maintenance: Completed  - declined vaccines, colon cancer screening    ROS:  Review of Systems   Constitutional: Negative.  Negative for fever and malaise/fatigue.   HENT: Negative.     Eyes: Negative.    Respiratory:  Negative for cough, sputum production and shortness of breath.    Cardiovascular:  Negative for chest pain, palpitations and leg swelling.   Gastrointestinal: Negative.    Genitourinary: Negative.    Musculoskeletal: Negative.    Neurological: Negative.    Endo/Heme/Allergies: Negative.    Psychiatric/Behavioral: Negative.       Objective       Exam:  BP (!) 172/90   Pulse (!) 115   Temp 36.7 °C (98 °F) (Temporal)   Resp 16   Ht 1.854 m (6' 1\")   Wt 89.8 kg (198 lb)   SpO2 98%   BMI 26.12 kg/m²  Body mass index is 26.12 kg/m².    Physical Exam  Constitutional:       Appearance: Normal appearance.   Cardiovascular:      Rate and Rhythm: Normal rate and regular rhythm.      Pulses: Normal pulses.      Heart sounds: Normal heart sounds.   Pulmonary:      Effort: Pulmonary effort is normal.      Breath sounds: Normal breath sounds.   Musculoskeletal:      Right lower leg: No edema.      Left lower leg: No edema.   Skin:     General: Skin is warm and dry. "   Neurological:      General: No focal deficit present.      Mental Status: He is alert and oriented to person, place, and time.       Assessment & Plan       62 y.o. male with the following -     Problem List Items Addressed This Visit       Essential hypertension (Chronic)     Chronic; goal BP < 130/80  BP in clinic today 172/90; denies CP, palpitations, dizziness, edema.   - increase Losartan to 100mg QD  - daily vaper; counseled on cessation today  - check annual labs   - keep weekly BP log and bring in at next visit for review         Relevant Medications    losartan (COZAAR) 100 MG Tab    Other Relevant Orders    Lipid Profile    TSH WITH REFLEX TO FT4    Dyslipidemia      Latest Reference Range & Units 22 07:54   Cholesterol,Tot 100 - 199 mg/dL 229 (H)   Triglycerides 0 - 149 mg/dL 300 (H)   HDL >=40 mg/dL 48   LDL <100 mg/dL 121 (H)   The 10-year ASCVD risk score (Louis JC, et al., 2019) is: 21.8%    Chronic; LDL goal < 100; was on Atorvastatin in the past, not currently taking this. He has increased CVD risk with hypertension, smoking, past DVT/PE  - recheck fasting lipid with labs today; recommend restarting statin therapy         Relevant Orders    Lipid Profile     Other Visit Diagnoses       Need for hepatitis C screening test        Relevant Orders    HCV Scrn ( [id number] [employer])          Medications Prescribed Today:  1. Essential hypertension  - losartan (COZAAR) 100 MG Tab; Take 1 Tablet by mouth every day.  Dispense: 90 Tablet; Refill: 3    Educated in proper administration of medication(s) ordered today including safety, possible SE, risks, benefits, rationale and alternatives to therapy.     Return in about 3 months (around 6/15/2023) for htn, dyslipidemia.    Please note that this dictation was created using voice recognition software. I have made every reasonable attempt to correct obvious errors, but I expect that there are errors of grammar and possibly content that I did not  discover before finalizing the note.

## 2023-03-15 NOTE — ASSESSMENT & PLAN NOTE
Latest Reference Range & Units 06/01/22 07:54   Cholesterol,Tot 100 - 199 mg/dL 229 (H)   Triglycerides 0 - 149 mg/dL 300 (H)   HDL >=40 mg/dL 48   LDL <100 mg/dL 121 (H)   The 10-year ASCVD risk score (Louis JC, et al., 2019) is: 21.8%    Chronic; LDL goal < 100; was on Atorvastatin in the past, not currently taking this. He has increased CVD risk with hypertension, smoking, past DVT/PE  - recheck fasting lipid with labs today; recommend restarting statin therapy

## 2023-03-15 NOTE — ASSESSMENT & PLAN NOTE
Chronic; goal BP < 130/80  BP in clinic today 172/90; denies CP, palpitations, dizziness, edema.   - increase Losartan to 100mg QD  - daily vaper; counseled on cessation today  - check annual labs   - keep weekly BP log and bring in at next visit for review

## 2023-05-17 ENCOUNTER — ANTICOAGULATION VISIT (OUTPATIENT)
Dept: VASCULAR LAB | Facility: MEDICAL CENTER | Age: 63
End: 2023-05-17
Attending: INTERNAL MEDICINE
Payer: COMMERCIAL

## 2023-05-17 VITALS — HEART RATE: 98 BPM | DIASTOLIC BLOOD PRESSURE: 75 MMHG | SYSTOLIC BLOOD PRESSURE: 123 MMHG

## 2023-05-17 DIAGNOSIS — I26.92 ACUTE SADDLE PULMONARY EMBOLISM WITHOUT ACUTE COR PULMONALE (HCC): ICD-10-CM

## 2023-05-17 DIAGNOSIS — I82.411 ACUTE DEEP VEIN THROMBOSIS (DVT) OF FEMORAL VEIN OF RIGHT LOWER EXTREMITY (HCC): ICD-10-CM

## 2023-05-17 PROCEDURE — 99212 OFFICE O/P EST SF 10 MIN: CPT | Performed by: NURSE PRACTITIONER

## 2023-05-17 NOTE — PROGRESS NOTES
Target end date:Indefinite     Indication: Recurrent VTE     Drug: Xarelto 20 mg daily     CHADsVASC = n/a    Health Status Since Last Assessment   Patient denies any new relevant medical problems, ED visits or hospitalizations   Patient denies any embolic events (stroke/tia/systemic embolism)    Adherence with DOAC Therapy   Pt has not missed any doses in the average week    Bleeding Risk Assessment     Denies Epistaxis   Pt denies any excessive or unusual bleeding/hematomas.  Pt denies any GI bleeds or hematemesis.  Pt denies any concerning daily headache or sub dural hematoma symptoms.     Pt denies any hematuria Denies   Latest Hemoglobin 16.2   ETOH overuse couple beers per day, pt educated about the interaction.     Creatinine Clearance/Renal Function     Latest ClCr > 30 mL/min    Hepatic function   Latest LFTs elevated - 90-93 (36/29 prior)   Pt denies any history of liver dysfunction      Drug Interactions   Platelets: 206   ASA/other antiplatelets none   NSAID none   Other drug interactions none   X Verified no anticonvulsant or azole therapy, education provided for future use.     Examination   Blood Pressure 123/75   Symptomatic hypotension none   Significant gait impairment/imbalance/high risk for falls? No     Final Assessment and Recommendations:  Patient appears stable from the anticoagulation standpoint.    Benefits of continued DOAC therapy outweigh risks for this patient  Recommend pt continue with current DOAC therapy  (with dose adjustment)    DOAC cost recently went up. Given a $10 copay card today. Also given some samples to help out until he finds out if he is approved. Offered to have pt return in 2-4 weeks but he declines and prefers to return in 6 months but he will call us if he is NOT approved for discount card. Asked that he call us before he runs out of Xarelto.    Also discussed elevated liver enzymes. We discussed alcohol. He is not ready to quit. Aware of the increased bleeding  risk. Asked pt to recheck labs in 3 months but he wants to hold off. Will discuss further with his PCP next month. CBC, CMP ordered for prior to next visit.    Discussed smoking tobacco. He is currently smoking 2-3 cigarettes per day.  Pt not ready to set a quit date yet but knows we can help him remove barriers to treatment.      Other Actions: cmp/ cbc hemogram ordered prior to next visit    Follow up:   Will follow up with patient 6 months.     YUMIKO Regan.        none

## 2023-06-14 ENCOUNTER — OFFICE VISIT (OUTPATIENT)
Dept: MEDICAL GROUP | Facility: PHYSICIAN GROUP | Age: 63
End: 2023-06-14
Payer: COMMERCIAL

## 2023-06-14 VITALS
SYSTOLIC BLOOD PRESSURE: 130 MMHG | BODY MASS INDEX: 25.45 KG/M2 | DIASTOLIC BLOOD PRESSURE: 78 MMHG | HEIGHT: 73 IN | HEART RATE: 114 BPM | WEIGHT: 192 LBS | RESPIRATION RATE: 16 BRPM | TEMPERATURE: 97.7 F

## 2023-06-14 DIAGNOSIS — E78.5 DYSLIPIDEMIA: ICD-10-CM

## 2023-06-14 DIAGNOSIS — I10 ESSENTIAL HYPERTENSION: Chronic | ICD-10-CM

## 2023-06-14 PROCEDURE — 99214 OFFICE O/P EST MOD 30 MIN: CPT | Performed by: NURSE PRACTITIONER

## 2023-06-14 PROCEDURE — 3075F SYST BP GE 130 - 139MM HG: CPT | Performed by: NURSE PRACTITIONER

## 2023-06-14 PROCEDURE — 3078F DIAST BP <80 MM HG: CPT | Performed by: NURSE PRACTITIONER

## 2023-06-14 RX ORDER — ATORVASTATIN CALCIUM 10 MG/1
10 TABLET, FILM COATED ORAL NIGHTLY
Qty: 90 TABLET | Refills: 3 | Status: SHIPPED | OUTPATIENT
Start: 2023-06-14 | End: 2023-08-02 | Stop reason: SDUPTHER

## 2023-06-14 ASSESSMENT — ENCOUNTER SYMPTOMS
NEUROLOGICAL NEGATIVE: 1
PALPITATIONS: 0
EYES NEGATIVE: 1
COUGH: 0
SPUTUM PRODUCTION: 0
FEVER: 0
CONSTITUTIONAL NEGATIVE: 1
SHORTNESS OF BREATH: 0
GASTROINTESTINAL NEGATIVE: 1
MUSCULOSKELETAL NEGATIVE: 1

## 2023-06-14 ASSESSMENT — FIBROSIS 4 INDEX: FIB4 SCORE: 2.81

## 2023-06-14 NOTE — ASSESSMENT & PLAN NOTE
Chronic; goal BP < 130/80  BP in clinic today 130/78; improved since we increased losartan in 3/2023; denies CP, palpitations, dizziness, edema.   - continue Losartan to 100mg QD  - daily vaper; counseled on cessation today  - TC//217; restarting him on atorvastatin today  - keep weekly BP log and bring in at next visit for review

## 2023-06-14 NOTE — PROGRESS NOTES
"Subjective       CC:   Chief Complaint   Patient presents with    Hypertension Follow-up     Losartan, no concerns        HPI:   Patient is a 62 y.o. established male patient with medical history listed below here today for evaluation and management of hypertension. We increased losartan to 100mg at last visit. Tolerating, improvement in BP.     Problem   Essential Hypertension    Restarted on Losartan 50mg QD in 2022;   Increased to 100mg QD in 3/2023.      Dyslipidemia    States was on statin in the past but not currently;   TC/LDL elevated on recent labs and we will restart atorvastatin today   Also with hx hypertension, daily smoker, PE/DVT on daily xarelto         Patient Active Problem List   Diagnosis    Former smoker    Grief reaction    Dyslipidemia    Coronary artery disease involving coronary bypass graft of native heart without angina pectoris    S/P CABG x 2    Essential hypertension    Ganglion cyst    Umbilical hernia without obstruction and without gangrene    Mood disorder (HCC)    Prediabetes    Acute deep vein thrombosis (DVT) of iliac vein of left lower extremity (HCC)    Acute saddle pulmonary embolism without acute cor pulmonale (HCC)    Acute deep vein thrombosis (DVT) of femoral vein of right lower extremity (HCC)    Lung nodule    Acute pulmonary embolism (HCC)    Deep vein thrombosis (HCC)    Other acute pancreatitis, unspecified complication status    Chronic alcohol use    Venous thromboembolism       Past Medical History:   Diagnosis Date    Breath shortness 10/2017    \"In the past, not an issue now\" sob before heart surgery    CAD (coronary artery disease)     S/P CABG X2    Dental disorder 10/2017    Upper dentures    DVT (deep venous thrombosis) (HCC)     Grief reaction     Girlfriend  this year.    Hx of hernia repair     Mesh    Hyperlipidemia     Hypertension     Psychiatric problem 10/2017    Depression    Tobacco use         Past Surgical History:   Procedure Laterality " "Date    MULTIPLE CORONARY ARTERY BYPASS ENDO VEIN HARVEST  6/13/2017    Procedure: MULTIPLE CORONARY ARTERY BYPASS x2 RIGHT LEG ENDO VEIN HARVEST;  Surgeon: Gray Barboza M.D.;  Location: SURGERY [facility];  Service:     HUBERT  6/13/2017    Procedure: UHBERT - INTRAOP;  Surgeon: Gray Barboza M.D.;  Location: SURGERY [facility];  Service:     INGUINAL HERNIA REPAIR Bilateral 2000    ORIF, FRACTURE, TIBIA Right 1980s    ZZZ CARDIAC CATH          Current Outpatient Medications on File Prior to Visit   Medication Sig Dispense Refill    losartan (COZAAR) 100 MG Tab Take 1 Tablet by mouth every day. 90 Tablet 3    rivaroxaban (XARELTO) 20 MG Tab tablet Take 1 Tablet by mouth with dinner. 90 Tablet 1    Multiple Vitamins-Minerals (CENTRUM SILVER 50+MEN) Tab Take 1 Tablet by mouth every evening.       No current facility-administered medications on file prior to visit.        Health Maintenance: Completed    ROS:  Review of Systems   Constitutional: Negative.  Negative for fever and malaise/fatigue.   Eyes: Negative.    Respiratory:  Negative for cough, sputum production and shortness of breath.    Cardiovascular:  Negative for chest pain, palpitations and leg swelling.   Gastrointestinal: Negative.    Genitourinary: Negative.    Musculoskeletal: Negative.    Neurological: Negative.    Endo/Heme/Allergies: Negative.      Objective       Exam:  /78   Pulse (!) 114   Temp 36.5 °C (97.7 °F) (Temporal)   Resp 16   Ht 1.854 m (6' 1\")   Wt 87.1 kg (192 lb)   BMI 25.33 kg/m²  Body mass index is 25.33 kg/m².    Physical Exam  Constitutional:       Appearance: Normal appearance.   Cardiovascular:      Rate and Rhythm: Normal rate and regular rhythm.      Pulses: Normal pulses.      Heart sounds: Normal heart sounds.   Pulmonary:      Effort: Pulmonary effort is normal.      Breath sounds: Normal breath sounds.   Musculoskeletal:      Right lower leg: No edema.      Left lower leg: No edema.   Skin:     " General: Skin is warm and dry.   Neurological:      General: No focal deficit present.      Mental Status: He is alert and oriented to person, place, and time.       Labs: reviewed with patient; questions answered    Assessment & Plan       62 y.o. male with the following -     Problem List Items Addressed This Visit       Essential hypertension (Chronic)     Chronic; goal BP < 130/80  BP in clinic today 130/78; improved since we increased losartan in 3/2023; denies CP, palpitations, dizziness, edema.   - continue Losartan to 100mg QD  - daily vaper; counseled on cessation today  - TC//217; restarting him on atorvastatin today  - keep weekly BP log and bring in at next visit for review         Relevant Medications    atorvastatin (LIPITOR) 10 MG Tab    Dyslipidemia      Latest Reference Range & Units 06/01/22 07:54 03/15/23 09:09   Cholesterol,Tot 100 - 199 mg/dL 229 (H) 296 (H)   Triglycerides 0 - 149 mg/dL 300 (H) 122   HDL >=40 mg/dL 48 55   LDL <100 mg/dL 121 (H) 217 (H)   The 10-year ASCVD risk score (Gustine DK, et al., 2019) is: 15.5%    Chronic; LDL goal < 100; was on Atorvastatin in the past, we will restart this today as his LDL got higher & ascvd score is > 7.5%. He has increased CVD risk with hypertension, smoking, past DVT/PE. His LFT was also elevated on recent labs, he drinks 3-4 beer at night. No abdominal pain.   - start atorvastatin 10mg QHS  - counseled on alcohol/smoking cessation, heart healthy eating  - recheck fasting lipid with CMP in 1-2 months; liver US if LFT still elevated         Relevant Medications    atorvastatin (LIPITOR) 10 MG Tab    Other Relevant Orders    Comp Metabolic Panel    Lipid Profile       Medications Prescribed Today:  Dyslipidemia  - atorvastatin (LIPITOR) 10 MG Tab; Take 1 Tablet by mouth every evening.  Dispense: 90 Tablet; Refill: 3    Educated in proper administration of medication(s) ordered today including safety, possible SE, risks, benefits, rationale and  alternatives to therapy.     Return in about 2 months (around 8/14/2023) for dyslipidemia, elevated LFT.    Please note that this dictation was created using voice recognition software. I have made every reasonable attempt to correct obvious errors, but I expect that there are errors of grammar and possibly content that I did not discover before finalizing the note.

## 2023-06-14 NOTE — ASSESSMENT & PLAN NOTE
Latest Reference Range & Units 06/01/22 07:54 03/15/23 09:09   Cholesterol,Tot 100 - 199 mg/dL 229 (H) 296 (H)   Triglycerides 0 - 149 mg/dL 300 (H) 122   HDL >=40 mg/dL 48 55   LDL <100 mg/dL 121 (H) 217 (H)   The 10-year ASCVD risk score (Louis JC, et al., 2019) is: 15.5%    Chronic; LDL goal < 100; was on Atorvastatin in the past, we will restart this today as his LDL got higher & ascvd score is > 7.5%. He has increased CVD risk with hypertension, smoking, past DVT/PE. His LFT was also elevated on recent labs, he drinks 3-4 beer at night. No abdominal pain.   - start atorvastatin 10mg QHS  - counseled on alcohol/smoking cessation, heart healthy eating  - recheck fasting lipid with CMP in 1-2 months; liver US if LFT still elevated

## 2023-07-26 DIAGNOSIS — I82.422 ACUTE DEEP VEIN THROMBOSIS (DVT) OF ILIAC VEIN OF LEFT LOWER EXTREMITY (HCC): ICD-10-CM

## 2023-07-26 DIAGNOSIS — I82.411 ACUTE DEEP VEIN THROMBOSIS (DVT) OF FEMORAL VEIN OF RIGHT LOWER EXTREMITY (HCC): ICD-10-CM

## 2023-07-26 DIAGNOSIS — I26.92 ACUTE SADDLE PULMONARY EMBOLISM WITHOUT ACUTE COR PULMONALE (HCC): ICD-10-CM

## 2023-07-27 ENCOUNTER — TELEPHONE (OUTPATIENT)
Dept: VASCULAR LAB | Facility: MEDICAL CENTER | Age: 63
End: 2023-07-27
Payer: COMMERCIAL

## 2023-07-27 NOTE — TELEPHONE ENCOUNTER
Attempted to contact patient at [phone] to discuss [facility] and services/benefits offered. No answer, not able to leave voicemail.    Alice Nicolas

## 2023-08-01 DIAGNOSIS — I26.92 ACUTE SADDLE PULMONARY EMBOLISM WITHOUT ACUTE COR PULMONALE (HCC): ICD-10-CM

## 2023-08-01 DIAGNOSIS — I82.422 ACUTE DEEP VEIN THROMBOSIS (DVT) OF ILIAC VEIN OF LEFT LOWER EXTREMITY (HCC): ICD-10-CM

## 2023-08-01 DIAGNOSIS — I82.411 ACUTE DEEP VEIN THROMBOSIS (DVT) OF FEMORAL VEIN OF RIGHT LOWER EXTREMITY (HCC): ICD-10-CM

## 2023-08-02 DIAGNOSIS — E78.5 DYSLIPIDEMIA: ICD-10-CM

## 2023-08-02 DIAGNOSIS — I10 ESSENTIAL HYPERTENSION: ICD-10-CM

## 2023-08-02 NOTE — TELEPHONE ENCOUNTER
Received request via: Pharmacy    Was the patient seen in the last year in this department? Yes    Does the patient have an active prescription (recently filled or refills available) for medication(s) requested?  Pt is requesting new mail order pharmacy [facility], I updated pharmacy.     Does the patient have MCFP Plus and need 100 day supply (blood pressure, diabetes and cholesterol meds only)? Patient does not have SCP

## 2023-08-03 ENCOUNTER — DOCUMENTATION (OUTPATIENT)
Dept: PHARMACY | Facility: MEDICAL CENTER | Age: 63
End: 2023-08-03
Payer: COMMERCIAL

## 2023-08-03 NOTE — PROGRESS NOTES
Drug:Xarelto 20mg tablets   Status: No PA Needed  Coverage dates: N/A    Copay: refill too soon til 10/07/23 (Last fill 07/27/23)  Fill with :  patient decision     Will outreach to offer services and/or release to preferred pharmacy

## 2023-08-04 RX ORDER — LOSARTAN POTASSIUM 100 MG/1
100 TABLET ORAL DAILY
Qty: 90 TABLET | Refills: 3 | Status: ON HOLD | OUTPATIENT
Start: 2023-08-04 | End: 2024-01-15

## 2023-08-04 RX ORDER — ATORVASTATIN CALCIUM 10 MG/1
10 TABLET, FILM COATED ORAL NIGHTLY
Qty: 90 TABLET | Refills: 3 | Status: ON HOLD | OUTPATIENT
Start: 2023-08-04 | End: 2024-03-05

## 2023-08-10 ENCOUNTER — DOCUMENTATION (OUTPATIENT)
Dept: MEDICAL GROUP | Facility: PHYSICIAN GROUP | Age: 63
End: 2023-08-10
Payer: COMMERCIAL

## 2023-08-10 DIAGNOSIS — I82.411 ACUTE DEEP VEIN THROMBOSIS (DVT) OF FEMORAL VEIN OF RIGHT LOWER EXTREMITY (HCC): ICD-10-CM

## 2023-08-10 DIAGNOSIS — I26.92 ACUTE SADDLE PULMONARY EMBOLISM WITHOUT ACUTE COR PULMONALE (HCC): ICD-10-CM

## 2023-08-10 DIAGNOSIS — I82.422 ACUTE DEEP VEIN THROMBOSIS (DVT) OF ILIAC VEIN OF LEFT LOWER EXTREMITY (HCC): ICD-10-CM

## 2023-08-19 ENCOUNTER — TELEPHONE (OUTPATIENT)
Dept: URGENT CARE | Facility: PHYSICIAN GROUP | Age: 63
End: 2023-08-19
Payer: COMMERCIAL

## 2023-08-19 NOTE — LETTER
8/19/2023            Jose A Ponce  [address]  [place] [address]              Dear Jose A,    Your care is very important to us, and we have noticed that on 08/16/2023, you missed your appointment with Lori Villalobos D.N.P. at [facility]       We’re committed to providing you with the best care possible. Your appointment time is reserved for you and your provider to discuss any current or new health concerns and, together, determine the best plan of care for you. Please call [phone] to reschedule at your earliest convenience.        In some cases, [facility] offers additional resources to make your healthcare more accessible, including transportation assistance, financial assistance and virtual visits. To learn more about these resources, please call [phone].       In order to keep you as informed as possible, below is a brief summary of our policy regarding missed appointments:        If a patient “No Shows”??three (3) or more appointments within a rolling 12-month           period, they may be dismissed from the practice for failure to follow clinician      recommendations.     If you have any concerns regarding the care you are receiving, please talk with your provider or call the office at [phone] and request to speak with the Practice . We’re committed to providing excellent care, and your feedback is invaluable.          Sincerely,  Lori Villalobos D.N.P.

## 2023-11-15 ENCOUNTER — APPOINTMENT (OUTPATIENT)
Dept: VASCULAR LAB | Facility: MEDICAL CENTER | Age: 63
End: 2023-11-15
Payer: COMMERCIAL

## 2023-12-28 NOTE — ED NOTES
Patient resting in stretcher awaiting results.   This is a 52-year-old male with PMHx of obesity who was transferred to our facility with severe abdominal pain, significant for cholangitis and gallstone pancreatitis.     Lipase at outside facility was 6000, noted obstructive liver test patterns.  On admission, GI was consulted patient underwent ERCP with biliary sphincterotomy, calculi removal, biliary stent insertion 12/26.  CBD stent was removed during surgery.    General surgery was consulted, patient underwent laparoscopic cholecystectomy converted to open cholecystectomy with hepaticojejunostomy for proximal common bile duct injury near the hepatic bifurcation 12/27.  Patient to follow-up with hepatobiliary surgery on January 10 for drain and staple removal.

## 2024-01-06 ENCOUNTER — APPOINTMENT (OUTPATIENT)
Dept: RADIOLOGY | Facility: MEDICAL CENTER | Age: 64
DRG: 563 | End: 2024-01-06
Attending: ORTHOPAEDIC SURGERY
Payer: COMMERCIAL

## 2024-01-06 ENCOUNTER — APPOINTMENT (OUTPATIENT)
Dept: RADIOLOGY | Facility: MEDICAL CENTER | Age: 64
DRG: 563 | End: 2024-01-06
Attending: EMERGENCY MEDICINE
Payer: COMMERCIAL

## 2024-01-06 ENCOUNTER — HOSPITAL ENCOUNTER (INPATIENT)
Facility: MEDICAL CENTER | Age: 64
LOS: 9 days | DRG: 563 | End: 2024-01-15
Attending: EMERGENCY MEDICINE | Admitting: HOSPITALIST
Payer: COMMERCIAL

## 2024-01-06 DIAGNOSIS — F10.10 ALCOHOL ABUSE: ICD-10-CM

## 2024-01-06 DIAGNOSIS — E83.42 HYPOMAGNESEMIA: ICD-10-CM

## 2024-01-06 DIAGNOSIS — Z87.891 FORMER SMOKER: ICD-10-CM

## 2024-01-06 DIAGNOSIS — R11.10 VOMITING AND DIARRHEA: ICD-10-CM

## 2024-01-06 DIAGNOSIS — R74.01 TRANSAMINITIS: ICD-10-CM

## 2024-01-06 DIAGNOSIS — I82.411 ACUTE DEEP VEIN THROMBOSIS (DVT) OF FEMORAL VEIN OF RIGHT LOWER EXTREMITY (HCC): ICD-10-CM

## 2024-01-06 DIAGNOSIS — R91.1 LUNG NODULE: ICD-10-CM

## 2024-01-06 DIAGNOSIS — E86.0 DEHYDRATION: ICD-10-CM

## 2024-01-06 DIAGNOSIS — Z86.711 HISTORY OF PULMONARY EMBOLISM: ICD-10-CM

## 2024-01-06 DIAGNOSIS — Z72.0 TOBACCO ABUSE: ICD-10-CM

## 2024-01-06 DIAGNOSIS — F43.21 GRIEF REACTION: ICD-10-CM

## 2024-01-06 DIAGNOSIS — Z95.1 S/P CABG X 2: ICD-10-CM

## 2024-01-06 DIAGNOSIS — N28.9 ACUTE RENAL INSUFFICIENCY: ICD-10-CM

## 2024-01-06 DIAGNOSIS — E78.5 DYSLIPIDEMIA: ICD-10-CM

## 2024-01-06 DIAGNOSIS — S42.211A CLOSED FRACTURE OF NECK OF RIGHT HUMERUS, INITIAL ENCOUNTER: ICD-10-CM

## 2024-01-06 DIAGNOSIS — F10.90 CHRONIC ALCOHOL USE: ICD-10-CM

## 2024-01-06 DIAGNOSIS — E87.20 METABOLIC ACIDOSIS: ICD-10-CM

## 2024-01-06 DIAGNOSIS — R19.7 VOMITING AND DIARRHEA: ICD-10-CM

## 2024-01-06 DIAGNOSIS — D50.9 IRON DEFICIENCY ANEMIA, UNSPECIFIED IRON DEFICIENCY ANEMIA TYPE: ICD-10-CM

## 2024-01-06 DIAGNOSIS — I10 ESSENTIAL HYPERTENSION: Chronic | ICD-10-CM
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
## 2024-01-06 DIAGNOSIS — S42.291A OTHER CLOSED DISPLACED FRACTURE OF PROXIMAL END OF RIGHT HUMERUS, INITIAL ENCOUNTER: ICD-10-CM

## 2024-01-06 DIAGNOSIS — N17.9 AKI (ACUTE KIDNEY INJURY) (HCC): ICD-10-CM

## 2024-01-06 DIAGNOSIS — K85.80 OTHER ACUTE PANCREATITIS, UNSPECIFIED COMPLICATION STATUS: ICD-10-CM

## 2024-01-06 PROBLEM — I26.99 ACUTE PULMONARY EMBOLISM (HCC): Status: RESOLVED | Noted: 2022-02-04 | Resolved: 2024-01-06

## 2024-01-06 PROBLEM — I26.92 ACUTE SADDLE PULMONARY EMBOLISM WITHOUT ACUTE COR PULMONALE (HCC): Status: RESOLVED | Noted: 2022-01-01 | Resolved: 2024-01-06

## 2024-01-06 PROBLEM — R65.10 SIRS (SYSTEMIC INFLAMMATORY RESPONSE SYNDROME) (HCC): Status: ACTIVE | Noted: 2024-01-06

## 2024-01-06 PROBLEM — I82.90 VENOUS THROMBOEMBOLISM: Status: RESOLVED | Noted: 2022-06-04 | Resolved: 2024-01-06

## 2024-01-06 PROBLEM — I82.409 DEEP VEIN THROMBOSIS (HCC): Status: RESOLVED | Noted: 2022-02-04 | Resolved: 2024-01-06

## 2024-01-06 LAB
ALBUMIN SERPL BCP-MCNC: 4.4 G/DL (ref 3.2–4.9)
ALBUMIN/GLOB SERPL: 1.4 G/DL
ALP SERPL-CCNC: 97 U/L (ref 30–99)
ALT SERPL-CCNC: 222 U/L (ref 2–50)
ANION GAP SERPL CALC-SCNC: 20 MMOL/L (ref 7–16)
ANION GAP SERPL CALC-SCNC: 36 MMOL/L (ref 7–16)
APTT PPP: 32.1 SEC (ref 24.7–36)
AST SERPL-CCNC: 233 U/L (ref 12–45)
BASOPHILS # BLD AUTO: 0.2 % (ref 0–1.8)
BASOPHILS # BLD: 0.03 K/UL (ref 0–0.12)
BILIRUB SERPL-MCNC: 1.6 MG/DL (ref 0.1–1.5)
BUN SERPL-MCNC: 35 MG/DL (ref 8–22)
BUN SERPL-MCNC: 37 MG/DL (ref 8–22)
CALCIUM ALBUM COR SERPL-MCNC: 9.1 MG/DL (ref 8.5–10.5)
CALCIUM SERPL-MCNC: 8.5 MG/DL (ref 8.5–10.5)
CALCIUM SERPL-MCNC: 9.4 MG/DL (ref 8.5–10.5)
CHLORIDE SERPL-SCNC: 93 MMOL/L (ref 96–112)
CHLORIDE SERPL-SCNC: 97 MMOL/L (ref 96–112)
CK SERPL-CCNC: 389 U/L (ref 0–154)
CO2 SERPL-SCNC: 15 MMOL/L (ref 20–33)
CO2 SERPL-SCNC: 9 MMOL/L (ref 20–33)
CREAT SERPL-MCNC: 2.68 MG/DL (ref 0.5–1.4)
CREAT SERPL-MCNC: 2.76 MG/DL (ref 0.5–1.4)
EKG IMPRESSION: NORMAL
EOSINOPHIL # BLD AUTO: 0.01 K/UL (ref 0–0.51)
EOSINOPHIL NFR BLD: 0.1 % (ref 0–6.9)
ERYTHROCYTE [DISTWIDTH] IN BLOOD BY AUTOMATED COUNT: 47.8 FL (ref 35.9–50)
FLUAV RNA SPEC QL NAA+PROBE: NEGATIVE
FLUBV RNA SPEC QL NAA+PROBE: NEGATIVE
GFR SERPLBLD CREATININE-BSD FMLA CKD-EPI: 25 ML/MIN/1.73 M 2
GFR SERPLBLD CREATININE-BSD FMLA CKD-EPI: 26 ML/MIN/1.73 M 2
GLOBULIN SER CALC-MCNC: 3.1 G/DL (ref 1.9–3.5)
GLUCOSE BLD STRIP.AUTO-MCNC: 108 MG/DL (ref 65–99)
GLUCOSE SERPL-MCNC: 103 MG/DL (ref 65–99)
GLUCOSE SERPL-MCNC: 104 MG/DL (ref 65–99)
HCT VFR BLD AUTO: 36.9 % (ref 42–52)
HGB BLD-MCNC: 12.6 G/DL (ref 14–18)
IMM GRANULOCYTES # BLD AUTO: 0.08 K/UL (ref 0–0.11)
IMM GRANULOCYTES NFR BLD AUTO: 0.6 % (ref 0–0.9)
INR PPP: 2 (ref 0.87–1.13)
LACTATE SERPL-SCNC: 11.7 MMOL/L (ref 0.5–2)
LACTATE SERPL-SCNC: 2.5 MMOL/L (ref 0.5–2)
LACTATE SERPL-SCNC: 3.8 MMOL/L (ref 0.5–2)
LACTATE SERPL-SCNC: 7 MMOL/L (ref 0.5–2)
LYMPHOCYTES # BLD AUTO: 0.92 K/UL (ref 1–4.8)
LYMPHOCYTES NFR BLD: 6.5 % (ref 22–41)
MCH RBC QN AUTO: 37 PG (ref 27–33)
MCHC RBC AUTO-ENTMCNC: 34.1 G/DL (ref 32.3–36.5)
MCV RBC AUTO: 108.2 FL (ref 81.4–97.8)
MONOCYTES # BLD AUTO: 1.12 K/UL (ref 0–0.85)
MONOCYTES NFR BLD AUTO: 7.9 % (ref 0–13.4)
NEUTROPHILS # BLD AUTO: 12.09 K/UL (ref 1.82–7.42)
NEUTROPHILS NFR BLD: 84.7 % (ref 44–72)
NRBC # BLD AUTO: 0.02 K/UL
NRBC BLD-RTO: 0.1 /100 WBC (ref 0–0.2)
PLATELET # BLD AUTO: 195 K/UL (ref 164–446)
PMV BLD AUTO: 10.5 FL (ref 9–12.9)
POTASSIUM SERPL-SCNC: 5 MMOL/L (ref 3.6–5.5)
POTASSIUM SERPL-SCNC: 5 MMOL/L (ref 3.6–5.5)
PROT SERPL-MCNC: 7.5 G/DL (ref 6–8.2)
PROTHROMBIN TIME: 22.9 SEC (ref 12–14.6)
RBC # BLD AUTO: 3.41 M/UL (ref 4.7–6.1)
RSV RNA SPEC QL NAA+PROBE: NEGATIVE
SARS-COV-2 RNA RESP QL NAA+PROBE: NOTDETECTED
SODIUM SERPL-SCNC: 132 MMOL/L (ref 135–145)
SODIUM SERPL-SCNC: 138 MMOL/L (ref 135–145)
SPECIMEN SOURCE: NORMAL
WBC # BLD AUTO: 14.3 K/UL (ref 4.8–10.8)

## 2024-01-06 PROCEDURE — 770020 HCHG ROOM/CARE - TELE (206)

## 2024-01-06 PROCEDURE — 700105 HCHG RX REV CODE 258: Performed by: EMERGENCY MEDICINE

## 2024-01-06 PROCEDURE — 99223 1ST HOSP IP/OBS HIGH 75: CPT | Performed by: HOSPITALIST

## 2024-01-06 PROCEDURE — 85730 THROMBOPLASTIN TIME PARTIAL: CPT

## 2024-01-06 PROCEDURE — 93005 ELECTROCARDIOGRAM TRACING: CPT | Performed by: EMERGENCY MEDICINE

## 2024-01-06 PROCEDURE — 80048 BASIC METABOLIC PNL TOTAL CA: CPT

## 2024-01-06 PROCEDURE — 93005 ELECTROCARDIOGRAM TRACING: CPT

## 2024-01-06 PROCEDURE — 700105 HCHG RX REV CODE 258: Performed by: HOSPITALIST

## 2024-01-06 PROCEDURE — 700102 HCHG RX REV CODE 250 W/ 637 OVERRIDE(OP): Performed by: HOSPITALIST

## 2024-01-06 PROCEDURE — 80053 COMPREHEN METABOLIC PANEL: CPT

## 2024-01-06 PROCEDURE — 36415 COLL VENOUS BLD VENIPUNCTURE: CPT

## 2024-01-06 PROCEDURE — 85025 COMPLETE CBC W/AUTO DIFF WBC: CPT

## 2024-01-06 PROCEDURE — 700111 HCHG RX REV CODE 636 W/ 250 OVERRIDE (IP): Mod: JZ | Performed by: EMERGENCY MEDICINE

## 2024-01-06 PROCEDURE — 73060 X-RAY EXAM OF HUMERUS: CPT | Mod: RT

## 2024-01-06 PROCEDURE — A9270 NON-COVERED ITEM OR SERVICE: HCPCS | Performed by: HOSPITALIST

## 2024-01-06 PROCEDURE — 73200 CT UPPER EXTREMITY W/O DYE: CPT | Mod: RT

## 2024-01-06 PROCEDURE — 0241U HCHG SARS-COV-2 COVID-19 NFCT DS RESP RNA 4 TRGT MIC: CPT

## 2024-01-06 PROCEDURE — 99285 EMERGENCY DEPT VISIT HI MDM: CPT

## 2024-01-06 PROCEDURE — 82550 ASSAY OF CK (CPK): CPT

## 2024-01-06 PROCEDURE — 700111 HCHG RX REV CODE 636 W/ 250 OVERRIDE (IP): Performed by: HOSPITALIST

## 2024-01-06 PROCEDURE — 96376 TX/PRO/DX INJ SAME DRUG ADON: CPT

## 2024-01-06 PROCEDURE — 96375 TX/PRO/DX INJ NEW DRUG ADDON: CPT

## 2024-01-06 PROCEDURE — 700111 HCHG RX REV CODE 636 W/ 250 OVERRIDE (IP): Mod: JZ

## 2024-01-06 PROCEDURE — 96374 THER/PROPH/DIAG INJ IV PUSH: CPT

## 2024-01-06 PROCEDURE — 85610 PROTHROMBIN TIME: CPT

## 2024-01-06 PROCEDURE — 82962 GLUCOSE BLOOD TEST: CPT

## 2024-01-06 PROCEDURE — 83605 ASSAY OF LACTIC ACID: CPT | Mod: 91

## 2024-01-06 PROCEDURE — 73030 X-RAY EXAM OF SHOULDER: CPT | Mod: RT

## 2024-01-06 RX ORDER — PROMETHAZINE HYDROCHLORIDE 25 MG/1
12.5-25 SUPPOSITORY RECTAL EVERY 4 HOURS PRN
Status: DISCONTINUED | OUTPATIENT
Start: 2024-01-06 | End: 2024-01-15 | Stop reason: HOSPADM

## 2024-01-06 RX ORDER — AMOXICILLIN 250 MG
2 CAPSULE ORAL 2 TIMES DAILY
Status: DISCONTINUED | OUTPATIENT
Start: 2024-01-06 | End: 2024-01-15 | Stop reason: HOSPADM

## 2024-01-06 RX ORDER — POLYETHYLENE GLYCOL 3350 17 G/17G
1 POWDER, FOR SOLUTION ORAL
Status: DISCONTINUED | OUTPATIENT
Start: 2024-01-06 | End: 2024-01-15 | Stop reason: HOSPADM

## 2024-01-06 RX ORDER — ONDANSETRON 2 MG/ML
4 INJECTION INTRAMUSCULAR; INTRAVENOUS ONCE
Status: COMPLETED | OUTPATIENT
Start: 2024-01-06 | End: 2024-01-06

## 2024-01-06 RX ORDER — PROMETHAZINE HYDROCHLORIDE 25 MG/1
12.5-25 TABLET ORAL EVERY 4 HOURS PRN
Status: DISCONTINUED | OUTPATIENT
Start: 2024-01-06 | End: 2024-01-15 | Stop reason: HOSPADM

## 2024-01-06 RX ORDER — ONDANSETRON 4 MG/1
4 TABLET, ORALLY DISINTEGRATING ORAL EVERY 4 HOURS PRN
Status: DISCONTINUED | OUTPATIENT
Start: 2024-01-06 | End: 2024-01-15 | Stop reason: HOSPADM

## 2024-01-06 RX ORDER — OXYCODONE HYDROCHLORIDE 5 MG/1
5-10 TABLET ORAL
Status: DISCONTINUED | OUTPATIENT
Start: 2024-01-06 | End: 2024-01-07

## 2024-01-06 RX ORDER — ONDANSETRON 2 MG/ML
4 INJECTION INTRAMUSCULAR; INTRAVENOUS EVERY 4 HOURS PRN
Status: DISCONTINUED | OUTPATIENT
Start: 2024-01-06 | End: 2024-01-15 | Stop reason: HOSPADM

## 2024-01-06 RX ORDER — HYDROMORPHONE HYDROCHLORIDE 1 MG/ML
0.5 INJECTION, SOLUTION INTRAMUSCULAR; INTRAVENOUS; SUBCUTANEOUS
Status: DISCONTINUED | OUTPATIENT
Start: 2024-01-06 | End: 2024-01-07

## 2024-01-06 RX ORDER — BISACODYL 10 MG
10 SUPPOSITORY, RECTAL RECTAL
Status: DISCONTINUED | OUTPATIENT
Start: 2024-01-06 | End: 2024-01-15 | Stop reason: HOSPADM

## 2024-01-06 RX ORDER — SODIUM CHLORIDE, SODIUM LACTATE, POTASSIUM CHLORIDE, CALCIUM CHLORIDE 600; 310; 30; 20 MG/100ML; MG/100ML; MG/100ML; MG/100ML
INJECTION, SOLUTION INTRAVENOUS CONTINUOUS
Status: DISCONTINUED | OUTPATIENT
Start: 2024-01-06 | End: 2024-01-08

## 2024-01-06 RX ORDER — NICOTINE 21 MG/24HR
21 PATCH, TRANSDERMAL 24 HOURS TRANSDERMAL
Status: DISCONTINUED | OUTPATIENT
Start: 2024-01-06 | End: 2024-01-15 | Stop reason: HOSPADM

## 2024-01-06 RX ORDER — SODIUM CHLORIDE, SODIUM LACTATE, POTASSIUM CHLORIDE, AND CALCIUM CHLORIDE .6; .31; .03; .02 G/100ML; G/100ML; G/100ML; G/100ML
2000 INJECTION, SOLUTION INTRAVENOUS
Status: COMPLETED | OUTPATIENT
Start: 2024-01-06 | End: 2024-01-06

## 2024-01-06 RX ORDER — PROCHLORPERAZINE EDISYLATE 5 MG/ML
5-10 INJECTION INTRAMUSCULAR; INTRAVENOUS EVERY 4 HOURS PRN
Status: DISCONTINUED | OUTPATIENT
Start: 2024-01-06 | End: 2024-01-15 | Stop reason: HOSPADM

## 2024-01-06 RX ORDER — MORPHINE SULFATE 4 MG/ML
4 INJECTION INTRAVENOUS ONCE
Status: COMPLETED | OUTPATIENT
Start: 2024-01-06 | End: 2024-01-06

## 2024-01-06 RX ORDER — MORPHINE SULFATE 4 MG/ML
4 INJECTION INTRAVENOUS
Status: DISCONTINUED | OUTPATIENT
Start: 2024-01-06 | End: 2024-01-06

## 2024-01-06 RX ORDER — HYDROMORPHONE HYDROCHLORIDE 1 MG/ML
1 INJECTION, SOLUTION INTRAMUSCULAR; INTRAVENOUS; SUBCUTANEOUS ONCE
Status: COMPLETED | OUTPATIENT
Start: 2024-01-06 | End: 2024-01-06

## 2024-01-06 RX ORDER — SODIUM CHLORIDE, SODIUM LACTATE, POTASSIUM CHLORIDE, AND CALCIUM CHLORIDE .6; .31; .03; .02 G/100ML; G/100ML; G/100ML; G/100ML
1000 INJECTION, SOLUTION INTRAVENOUS ONCE
Status: COMPLETED | OUTPATIENT
Start: 2024-01-06 | End: 2024-01-06

## 2024-01-06 RX ADMIN — HYDROMORPHONE HYDROCHLORIDE 0.5 MG: 1 INJECTION, SOLUTION INTRAMUSCULAR; INTRAVENOUS; SUBCUTANEOUS at 21:06

## 2024-01-06 RX ADMIN — FENTANYL CITRATE 100 MCG: 50 INJECTION, SOLUTION INTRAMUSCULAR; INTRAVENOUS at 08:32

## 2024-01-06 RX ADMIN — SODIUM CHLORIDE, POTASSIUM CHLORIDE, SODIUM LACTATE AND CALCIUM CHLORIDE 1000 ML: 600; 310; 30; 20 INJECTION, SOLUTION INTRAVENOUS at 07:48

## 2024-01-06 RX ADMIN — FENTANYL CITRATE 100 MCG: 50 INJECTION, SOLUTION INTRAMUSCULAR; INTRAVENOUS at 07:47

## 2024-01-06 RX ADMIN — MORPHINE SULFATE 4 MG: 4 INJECTION, SOLUTION INTRAMUSCULAR; INTRAVENOUS at 09:24

## 2024-01-06 RX ADMIN — HYDROMORPHONE HYDROCHLORIDE 0.5 MG: 1 INJECTION, SOLUTION INTRAMUSCULAR; INTRAVENOUS; SUBCUTANEOUS at 17:49

## 2024-01-06 RX ADMIN — HYDROMORPHONE HYDROCHLORIDE 1 MG: 1 INJECTION, SOLUTION INTRAMUSCULAR; INTRAVENOUS; SUBCUTANEOUS at 11:00

## 2024-01-06 RX ADMIN — SODIUM CHLORIDE, POTASSIUM CHLORIDE, SODIUM LACTATE AND CALCIUM CHLORIDE: 600; 310; 30; 20 INJECTION, SOLUTION INTRAVENOUS at 13:11

## 2024-01-06 RX ADMIN — ONDANSETRON 4 MG: 2 INJECTION INTRAMUSCULAR; INTRAVENOUS at 07:47

## 2024-01-06 RX ADMIN — MORPHINE SULFATE 4 MG: 4 INJECTION, SOLUTION INTRAMUSCULAR; INTRAVENOUS at 15:41

## 2024-01-06 RX ADMIN — ONDANSETRON 4 MG: 2 INJECTION INTRAMUSCULAR; INTRAVENOUS at 15:44

## 2024-01-06 RX ADMIN — SODIUM CHLORIDE, POTASSIUM CHLORIDE, SODIUM LACTATE AND CALCIUM CHLORIDE 2000 ML: 600; 310; 30; 20 INJECTION, SOLUTION INTRAVENOUS at 09:43

## 2024-01-06 RX ADMIN — NICOTINE TRANSDERMAL SYSTEM 21 MG: 21 PATCH, EXTENDED RELEASE TRANSDERMAL at 13:11

## 2024-01-06 ASSESSMENT — COGNITIVE AND FUNCTIONAL STATUS - GENERAL
HELP NEEDED FOR BATHING: A LITTLE
MOVING FROM LYING ON BACK TO SITTING ON SIDE OF FLAT BED: A LOT
STANDING UP FROM CHAIR USING ARMS: A LOT
TURNING FROM BACK TO SIDE WHILE IN FLAT BAD: A LOT
MOVING TO AND FROM BED TO CHAIR: A LOT
DRESSING REGULAR UPPER BODY CLOTHING: TOTAL
SUGGESTED CMS G CODE MODIFIER DAILY ACTIVITY: CK
SUGGESTED CMS G CODE MODIFIER MOBILITY: CL
DAILY ACTIVITIY SCORE: 16
MOBILITY SCORE: 14
TOILETING: A LOT
WALKING IN HOSPITAL ROOM: A LITTLE
DRESSING REGULAR LOWER BODY CLOTHING: A LOT
CLIMB 3 TO 5 STEPS WITH RAILING: A LITTLE

## 2024-01-06 ASSESSMENT — LIFESTYLE VARIABLES
TOTAL SCORE: 0
CONSUMPTION TOTAL: NEGATIVE
EVER FELT BAD OR GUILTY ABOUT YOUR DRINKING: NO
ON A TYPICAL DAY WHEN YOU DRINK ALCOHOL HOW MANY DRINKS DO YOU HAVE: 2
HAVE PEOPLE ANNOYED YOU BY CRITICIZING YOUR DRINKING: NO
ALCOHOL_USE: YES
HOW MANY TIMES IN THE PAST YEAR HAVE YOU HAD 5 OR MORE DRINKS IN A DAY: 0
HAVE YOU EVER FELT YOU SHOULD CUT DOWN ON YOUR DRINKING: NO
EVER HAD A DRINK FIRST THING IN THE MORNING TO STEADY YOUR NERVES TO GET RID OF A HANGOVER: NO
AVERAGE NUMBER OF DAYS PER WEEK YOU HAVE A DRINK CONTAINING ALCOHOL: 6
DOES PATIENT WANT TO STOP DRINKING: NO
TOTAL SCORE: 0
TOTAL SCORE: 0

## 2024-01-06 ASSESSMENT — ENCOUNTER SYMPTOMS
FALLS: 1
VOMITING: 1
SHORTNESS OF BREATH: 0
WEAKNESS: 1
FEVER: 0
DIARRHEA: 0
CHILLS: 0

## 2024-01-06 ASSESSMENT — PAIN DESCRIPTION - PAIN TYPE
TYPE: ACUTE PAIN

## 2024-01-06 ASSESSMENT — FIBROSIS 4 INDEX
FIB4 SCORE: 5.05
FIB4 SCORE: 5.05

## 2024-01-06 ASSESSMENT — PATIENT HEALTH QUESTIONNAIRE - PHQ9
1. LITTLE INTEREST OR PLEASURE IN DOING THINGS: NOT AT ALL
2. FEELING DOWN, DEPRESSED, IRRITABLE, OR HOPELESS: NOT AT ALL
SUM OF ALL RESPONSES TO PHQ9 QUESTIONS 1 AND 2: 0

## 2024-01-06 NOTE — ASSESSMENT & PLAN NOTE
Dr. Barry ortho consulted, recommended non operative management.    Nonweightbearing right upper extremity in sling  Outpatient follow-up in 1 to 2 weeks  PT/OT  Multimodal pain managements including po and iv narcotics prn. Monitoring respiratory status and sedation score.   vitamin d level pending  Extensive edema and ecchymosis post injury.  Normal venous and arterial u/s.  Spironolactone for edema and reduce hyponatremia.  Elevated hand above heart level in sling.  1/14: Patient refusing to wear his arm sling for the last 2 days.  I have reinstructed to patient that he needs to mobilize his arm in order for it to heal and that it actually will heal better immobilized then with ORIF.  He states the sling is getting caught in tubes and IV lines.  Therefore I have ordered a right shoulder immobilizer.  I will order repeat x-ray of shoulder while in right shoulder immobilizer.

## 2024-01-06 NOTE — ED NOTES
Bedside report received from April, RN. Assumed care of patient. Pt assessed, AAO x 4. Medicated patient per MAR. Covid swab collected and sent to lab. Fall precautions in place.  Pt repositioned and comfortable.  Call light within reach.

## 2024-01-06 NOTE — ED NOTES
Santhosh from Lab called with critical result of CO2 9 at 0758. Critical lab result read back to Santhosh.   Dr. Lyons notified of critical lab result at 0758.  Critical lab result read back by Dr. Lyons.

## 2024-01-06 NOTE — ED NOTES
IV fluid bolus completed. Blood drawn and sent to lab. Patient noted to have oxygen saturation 88% on room air while sleeping. Patient placed on 2 L nasal cannula. Oxygen saturation 95% at this time. Call light within reach.

## 2024-01-06 NOTE — ASSESSMENT & PLAN NOTE
Spent approx 5 mins on Tobacco cessation education . Discussed options of nicotine patch, medical treatment with wellbutrin and chantix. Discussed the benefits of quitting smoking and risks of continued smoking including cardiovascular disease, cancer and COPD.   Code 52883  -I have ordered nicotine replacement therapy

## 2024-01-06 NOTE — ED NOTES
Phone report to [place] [address] [address]. Patient being transported to [facility] by Mary MEYER with chart and all belongings.

## 2024-01-06 NOTE — ED NOTES
Lab called with critical result of lactic 11.7 at 0740. Critical lab result read back to lab.   Dr. Lyons notified of critical lab result at 1145.  Critical lab result read back by Dr. Lyons.

## 2024-01-06 NOTE — CONSULTS
DATE OF SERVICE:  01/06/2024     ORTHOPEDIC CONSULTATION     REQUESTING PHYSICIAN:  Juan Lyons MD, emergency department.     REASON FOR CONSULTATION:  Right proximal humerus fracture.     CHIEF COMPLAINT:  Right shoulder pain.     HISTORY OF PRESENT ILLNESS:  The patient is 63 years old.  He had a   ground-level fall yesterday.  He does take Xarelto.  He injured his right   shoulder.  He presented to [facility].  He was found to have   right proximal humerus fracture.  He has multiple underlying medical   comorbidities including lactic acidosis, leukocytosis, acute renal injury,   transaminitis, history of pulmonary embolism, on Xarelto, history of coronary   artery disease requiring coronary artery bypass grafting.  He is right hand   dominant.  He denies other obvious injuries.     ALLERGIES:  No known drug allergies.     OUTPATIENT MEDICATIONS:  Lipitor, Cozaar, Xarelto, multivitamin.     PAST MEDICAL DIAGNOSES:  Coronary artery disease status post CABG, history of   DVT/PE, on Xarelto, hyperlipidemia, hypertension.     PAST SURGICAL HISTORY:  Inguinal herniorrhaphy, right tibia ORIF in the 1980s,   multiple coronary artery bypass grafting in 2017.     SOCIAL HISTORY:  The patient quit smoking about 2 years ago.  He does drink   alcohol.  He uses THC.     PHYSICAL EXAMINATION:  VITAL SIGNS:  Temperature is 97.5, heart rate 109, respiratory rate 20, blood   pressure 111/59, pulse oximetry 93% on room air.  GENERAL APPEARANCE:  The patient is alert, pleasant, cooperative, in   intermittent distress due to pain in the right shoulder.  MUSCULOSKELETAL:  Right upper extremity has a sling in place.  He has   sensation intact to light touch in axillary, median, radial and ulnar nerve   distributions.  He is able to demonstrate normal motor function with AIN, PIN,   median, radial and ulnar nerve distributions with palpable radial pulse.    There is some mild ecchymosis at the medial proximal  arm.  There are no   obvious wounds present to the right upper extremity.  He has a sling in place.     DIAGNOSTIC IMAGING:  Plain x-rays of right humerus and right shoulder suggest   a comminuted mildly displaced surgical neck proximal humerus fracture without   evidence of obvious dislocation.     ASSESSMENT:  A 63-year-old male being admitted to the hospitalist service with   metabolic and lactic acidosis, acute kidney injury, transaminitis.  He has a   right mildly displaced surgical neck proximal humerus fracture with some   comminution.     RECOMMENDATIONS:  1.  I discussed these findings with the patient and given the overall   radiographic alignment of his fracture being mildly displaced, recommend a   trial of nonoperative management.  2.  We will obtain a CT imaging just to further characterize the fracture   pattern given the limitation on plain x-rays alone to help guide ongoing   treatment recommendations.  3.  We discussed pros and cons of nonoperative or surgical management.  I feel   he is a good candidate for nonoperative management given his underlying   medical comorbidities and reasonable alignment of his fracture.  We did   discuss potential for fracture displacement, requiring surgical intervention.  4. He should be nonweightbearing to right upper extremity in a sling and come   out of the sling to work on progressive elbow range of motion and ultimately   pendulum exercises.  5.  He should follow up with me in 1-2 weeks for x-rays of the right shoulder,   3 views to confirm stable fracture alignment and determine whether we can   continue the routine nonoperative management or whether we have to consider   surgical intervention depending on his overall fracture alignment and how he   was tolerating nonoperative management.        ______________________________  MD ORIANA Chaney/YELENA/EDUARD    DD:  01/06/2024 12:26  DT:  01/06/2024 13:18    Job#:  [id number]

## 2024-01-06 NOTE — ASSESSMENT & PLAN NOTE
His baseline Cr is 1 and is currently 2.76 due to prerenal with a mild concomitant rhabdomyolysis  Received IV fluid  Encourage oral hydration  Avoid nephrotoxins

## 2024-01-06 NOTE — ED TRIAGE NOTES
Jose A Ponce  63 y.o. male  Chief Complaint   Patient presents with    T-5000 FALL     BIB [facility] from home for MGLF yesterday after his legs gave out. - LOC, - head trauma, + thinner (xarelto). Pt complains of R shoulder pain. Per EMS pt spent most of the night on the ground unable to get up. Pt reports he crawled to the phone this morning to call 911.     Pt wheeled in wheelchair to triage for above complaint.     Pt is alert, oriented, and follows commands. Pt continuously stating he is going to pass out. Tachypnea noted during triage. Pt speaking in full sentences and responds appropriately to questions. No acute distress noted in triage and respirations are even and unlabored.     ER charge notified, pt moved to red 12.    Vitals:    01/06/24 0650   BP: (!) 89/64   Pulse: (!) 115   Resp: 18   Temp: 36.4 °C (97.5 °F)   SpO2: 96%

## 2024-01-06 NOTE — ED NOTES
Medicated patient per MAR for 9/10 R shoulder pain. Patient denies further needs. Call light within reach.

## 2024-01-06 NOTE — ASSESSMENT & PLAN NOTE
1/7/2024  SIRS criteria identified on my evaluation include:  Tachycardia, with heart rate greater than 90 BPM  SIRS is non-infectious, the patient does not have sepsis  Tachycardia and leukocytosis 14.3  Refrain from antibiotics

## 2024-01-06 NOTE — ASSESSMENT & PLAN NOTE
Hold on Xarelto due to acute kidney failure  Last dose was 1/6 and with MAXIMO likely okay to continue to hold today with sufficient anticoagulation.   If he needs surgery, continue to hold and if not restart when his Cr comes down.  No surgery planned.  MAXIMO improving.   1/10 increase in ecchymosis, arm circumference, hold xarelto.  Us venous and arterial wnl.

## 2024-01-06 NOTE — ED NOTES
Medicated patient per MAR. Additional warm blankets provided. Patient denies further needs. Call light within reach.

## 2024-01-06 NOTE — ASSESSMENT & PLAN NOTE
Patient has been hypertensive.  Hold losartan with acute kidney injury  1/13: Changed amlodipine 5 mg daily to metoprolol 50 mg p.o. twice daily and spironolactone in case amlodipine is causing increased swelling of the peripheral.

## 2024-01-06 NOTE — ED PROVIDER NOTES
ED Provider Note    CHIEF COMPLAINT  Chief Complaint   Patient presents with    T-5000 FALL     BIB [facility] from home for MGLF yesterday after his legs gave out. - LOC, - head trauma, + thinner (xarelto). Pt complains of R shoulder pain. Per EMS pt spent most of the night on the ground unable to get up. Pt reports he crawled to the phone this morning to call 911.       HPI/ROS    Jose A Ponce is a 63 y.o. male who presents with right upper extremity pain.  The patient's been sick over the last couple of days with vomiting and diarrhea.  He states he is getting up to use the restroom and he was so weak when he fell down striking his right shoulder.  He presents with severe pain in this region.  The patient was so weak he could not get up off the ground.  EMS was contacted after the patient crawled to the phone this morning.  The patient presents with severe pain to the right upper extremity with an obvious deformity.  He states he has not any associated fevers but has had the cramping abdominal pain as well as vomiting and diarrhea.  He is currently on Xarelto secondary to a pulmonary embolus.  He states he did not strike his head nor does he have any neck pain.  The patient does abuse tobacco products.    PAST MEDICAL HISTORY   has a past medical history of Breath shortness (10/2017), CAD (coronary artery disease), Dental disorder (10/2017), DVT (deep venous thrombosis) (HCC), Grief reaction (2017), hernia repair, Hyperlipidemia, Hypertension, Psychiatric problem (10/2017), and Tobacco use.    SURGICAL HISTORY   has a past surgical history that includes inguinal hernia repair (Bilateral, 2000); orif, fracture, tibia (Right, 1980s); multiple coronary artery bypass endo vein harvest (6/13/2017); yarely (6/13/2017); and [facility] cardiac cath.    FAMILY HISTORY  Family History   Problem Relation Age of Onset    Cancer Mother     Cancer Father     No Known Problems Sister     No Known Problems Sister     Heart Disease  Maternal Grandfather 89        probable MI and sudden death       SOCIAL HISTORY  Social History     Tobacco Use    Smoking status: Former     Current packs/day: 0.00     Average packs/day: 0.3 packs/day for 42.0 years (10.5 ttl pk-yrs)     Types: Cigarettes     Start date: 1980     Quit date: 2022     Years since quittin.0    Smokeless tobacco: Never    Tobacco comments:     vape occ   Vaping Use    Vaping Use: Never used   Substance and Sexual Activity    Alcohol use: Yes     Alcohol/week: 0.0 oz     Comment: 2 per day    Drug use: Yes     Comment: THC    Sexual activity: Not Currently       CURRENT MEDICATIONS  Home Medications       Reviewed by Pao Selby R.N. (Registered Nurse) on 24 at 0656  Med List Status: <None>     Medication Last Dose Status   atorvastatin (LIPITOR) 10 MG Tab  Active   losartan (COZAAR) 100 MG Tab  Active   Multiple Vitamins-Minerals (CENTRUM SILVER 50+MEN) Tab  Active   rivaroxaban (XARELTO) 20 MG Tab tablet  Active                    ALLERGIES  No Known Allergies    PHYSICAL EXAM  VITAL SIGNS: /66   Pulse (!) 111   Temp 36.4 °C (97.5 °F) (Temporal)   Resp 16   SpO2 97%    In general the patient appears ill and uncomfortable    Head is normocephalic and atraumatic, ENT no signs of trauma    Cervical, thoracic, lumbar spine has no midline tenderness no step-offs    Pulmonary the patient's lungs are clear to auscultation bilaterally    Cardiovascular S1-S2 with a tachycardic rate    GI hyperactive bowel sounds with mild diffuse tenderness    Skin slight ecchymosis to the right proximal humerus region otherwise no signs of trauma    Extremities soft tissue swelling and tenderness of the right proximal humerus with deformity consistent with a fracture    Neurologic examination GCS of 15    DIAGNOSTIC STUDIES   Results for orders placed or performed during the hospital encounter of 24   CBC With Differential   Result Value Ref Range    WBC 14.3 (H) 4.8 -  10.8 K/uL    RBC 3.41 (L) 4.70 - 6.10 M/uL    Hemoglobin 12.6 (L) 14.0 - 18.0 g/dL    Hematocrit 36.9 (L) 42.0 - 52.0 %    .2 (H) 81.4 - 97.8 fL    MCH 37.0 (H) 27.0 - 33.0 pg    MCHC 34.1 32.3 - 36.5 g/dL    RDW 47.8 35.9 - 50.0 fL    Platelet Count 195 164 - 446 K/uL    MPV 10.5 9.0 - 12.9 fL    Neutrophils-Polys 84.70 (H) 44.00 - 72.00 %    Lymphocytes 6.50 (L) 22.00 - 41.00 %    Monocytes 7.90 0.00 - 13.40 %    Eosinophils 0.10 0.00 - 6.90 %    Basophils 0.20 0.00 - 1.80 %    Immature Granulocytes 0.60 0.00 - 0.90 %    Nucleated RBC 0.10 0.00 - 0.20 /100 WBC    Neutrophils (Absolute) 12.09 (H) 1.82 - 7.42 K/uL    Lymphs (Absolute) 0.92 (L) 1.00 - 4.80 K/uL    Monos (Absolute) 1.12 (H) 0.00 - 0.85 K/uL    Eos (Absolute) 0.01 0.00 - 0.51 K/uL    Baso (Absolute) 0.03 0.00 - 0.12 K/uL    Immature Granulocytes (abs) 0.08 0.00 - 0.11 K/uL    NRBC (Absolute) 0.02 K/uL   Comp Metabolic Panel   Result Value Ref Range    Sodium 138 135 - 145 mmol/L    Potassium 5.0 3.6 - 5.5 mmol/L    Chloride 93 (L) 96 - 112 mmol/L    Co2 9 (LL) 20 - 33 mmol/L    Anion Gap 36.0 (H) 7.0 - 16.0    Glucose 104 (H) 65 - 99 mg/dL    Bun 35 (H) 8 - 22 mg/dL    Creatinine 2.76 (H) 0.50 - 1.40 mg/dL    Calcium 9.4 8.5 - 10.5 mg/dL    Correct Calcium 9.1 8.5 - 10.5 mg/dL    AST(SGOT) 233 (H) 12 - 45 U/L    ALT(SGPT) 222 (H) 2 - 50 U/L    Alkaline Phosphatase 97 30 - 99 U/L    Total Bilirubin 1.6 (H) 0.1 - 1.5 mg/dL    Albumin 4.4 3.2 - 4.9 g/dL    Total Protein 7.5 6.0 - 8.2 g/dL    Globulin 3.1 1.9 - 3.5 g/dL    A-G Ratio 1.4 g/dL   Lactic Acid   Result Value Ref Range    Lactic Acid 11.7 (HH) 0.5 - 2.0 mmol/L   Lactic Acid   Result Value Ref Range    Lactic Acid 7.0 (HH) 0.5 - 2.0 mmol/L   CoV-2, Flu A/B, And RSV by PCR ([employer])    Specimen: Respirate   Result Value Ref Range    Influenza virus A RNA Negative Negative    Influenza virus B, PCR Negative Negative    RSV, PCR Negative Negative    SARS-CoV-2 by PCR NotDetected      SARS-CoV-2 Source NP Swab    APTT   Result Value Ref Range    APTT 32.1 24.7 - 36.0 sec   Prothrombin Time   Result Value Ref Range    PT 22.9 (H) 12.0 - 14.6 sec    INR 2.00 (H) 0.87 - 1.13   CREATINE KINASE   Result Value Ref Range    CPK Total 389 (H) 0 - 154 U/L   ESTIMATED GFR   Result Value Ref Range    GFR (CKD-EPI) 25 (A) >60 mL/min/1.73 m 2   EKG   Result Value Ref Range    Report       [facility] Emergency Dept.    Test Date:  2024  Pt Name:    CESAR MASTERS                Department: ER  MRN:        [id number]                      Room:        12  Gender:     Male                         Technician:  :        1960                   Requested By:ER TRIAGE PROTOCOL  Order #:    [id number]                    Reading MD: CHARISSE ALMONTE MD    Measurements  Intervals                                Axis  Rate:       113                          P:          30  PA:         119                          QRS:        48  QRSD:       106                          T:          50  QT:         359  QTc:        493    Interpretive Statements  Twelve-lead EKG shows a sinus tachycardia with a ventricular rate of 113,  otherwise normal intervals, normal QRS, ST segment depression that is  unchanged from prior in the precordial leads V3 through V6.  Electronically Signed On 2024 11:15:50 PST by CHARISSE ALMONTE MD     POCT glucose device results   Result Value Ref Range    POC Glucose, Blood 108 (H) 65 - 99 mg/dL       EKG  I have independently interpreted this EKG  Please see my interpretation above    RADIOLOGY  DX-HUMERUS 2+ RIGHT   Final Result      Nondisplaced right humeral neck fracture          COURSE & MEDICAL DECISION MAKING    This is a 63-year-old gentleman who presents acutely ill in appearance after a fall.  Clinically had evidence of a right humeral fracture and x-rays confirm the fracture and the patient was placed in a sling.  He has required multiple doses of  narcotics for pain control.  Unfortunately the patient is also been suffering from vomiting and diarrhea most likely from a viral gastroenteritis as his abdomen is nonsurgical.  He has not been on recent antibiotics nor is he had any foreign travel.  The patient has a profound acidosis with a bicarb of 9 and an initial lactic acidosis of 11.7.  The patient was aggressively hydrated and his lactic acidosis has been responding.  The patient's repeat lactic acid is 7.  I ordered 2 more liters of lactated Ringer's for suspected prerenal acidosis and then we will repeat the metabolic panel as well as a lactic acid to make sure the patient continues to respond.  The patient feels significantly better with IV hydration.  His vital signs have been improving.  He does not appear septic nor toxic clinically.  I do not see any evidence of a focal bacterial infection.  Again he has not been on any antibiotics to put him at risk for C. difficile colitis nor is he had any blood in his stool.  The patient does have acute renal insufficiency but I suspect this is prerenal.  The patient remains in guarded condition until he has improvement in his laboratory analysis with his metabolic panel.  The patient will be admitted to the hospitalist.  FINAL DIAGNOSIS  1. Fall, initial encounter    2. Other closed displaced fracture of proximal end of right humerus, initial encounter    3. Vomiting and diarrhea    4. Dehydration    5. Acute renal insufficiency    6.  Critical care time 45 minutes    Disposition  The patient will be admitted in guarded condition       Electronically signed by: Juan Lyons M.D., 1/6/2024 7:20 AM

## 2024-01-06 NOTE — H&P
"Hospital Medicine History & Physical Note    Date of Service  1/6/2024    Primary Care Physician  Lori Villalobos D.N.P.    Consultants  none    Code Status  Full Code    Chief Complaint  Chief Complaint   Patient presents with    T-5000 FALL     BIB [facility] from home for [facility] yesterday after his legs gave out. - LOC, - head trauma, + thinner (xarelto). Pt complains of R shoulder pain. Per EMS pt spent most of the night on the ground unable to get up. Pt reports he crawled to the phone this morning to call 911.       History of Presenting Illness  Jose A Ponce is a 63 y.o. male who presented 1/6/2024 with fall  Mr. Ponce has a past medical history of coronary artery bypass graft 2017 as well as DVT with PE on Xarelto as his hypertension that had been experiencing 1 week of intractable vomiting \"I could not keep anything down for 5 days\".  Last night while in the kitchen his legs gave out due to weakness and he fell on his right shoulder.  He spent the night on the hardwood floor of the kitchen and this morning was able to crawl to his phone and call 911.  In the emergency room labs were drawn and his lactic acid is 11.7 with a leukocytosis of 14.  Bicarb of 9 and acute kidney injury with creatinine of 2.76 with baseline of 1.  X-ray reveals a nondisplaced right femoral neck fracture.  After 2 L of IV lactated Ringer's his repeat lactic acid is 7.  His CPK in the emergency room was 389.  Dr. Barry, orthopedic surgery has been consulted.    I discussed the plan of care with Dr. Lyons.    Review of Systems  Review of Systems   Constitutional:  Negative for chills and fever.   Respiratory:  Negative for shortness of breath.    Cardiovascular:  Negative for chest pain.   Gastrointestinal:  Positive for vomiting. Negative for diarrhea.   Musculoskeletal:  Positive for falls.        Right shoulder pain   Neurological:  Positive for weakness.   All other systems reviewed and are negative.      Past Medical " History   has a past medical history of Breath shortness (10/2017), CAD (coronary artery disease), Dental disorder (10/2017), DVT (deep venous thrombosis) (HCC), Grief reaction (2017), hernia repair, Hyperlipidemia, Hypertension, Psychiatric problem (10/2017), and Tobacco use.    Surgical History   has a past surgical history that includes inguinal hernia repair (Bilateral, 2000); orif, fracture, tibia (Right, 1980s); multiple coronary artery bypass endo vein harvest (6/13/2017); yarely (6/13/2017); and [facility] cardiac cath.     Family History  Paternal grandfather and 5 maternal uncles had heart attacks    Social History   reports that he quit smoking about 2 years ago. His smoking use included cigarettes. He started smoking about 44 years ago. He has a 10.5 pack-year smoking history. He has never used smokeless tobacco. He reports current alcohol use. He reports current drug use.he lives alone  He works at [employer] in [employer] though works remotely.    Allergies  No Known Allergies    Medications  Prior to Admission Medications   Prescriptions Last Dose Informant Patient Reported? Taking?   Multiple Vitamins-Minerals (CENTRUM SILVER 50+MEN) Tab 1/5/2024 at midday Patient Yes No   Sig: Take 1 Tablet by mouth every evening.   atorvastatin (LIPITOR) 10 MG Tab 1/5/2024 at midday Patient No No   Sig: Take 1 Tablet by mouth every evening.   losartan (COZAAR) 100 MG Tab 1/5/2024 at midday Patient No No   Sig: Take 1 Tablet by mouth every day.   rivaroxaban (XARELTO) 20 MG Tab tablet 1/5/2024 at midday Patient No No   Sig: Take 1 Tablet by mouth with dinner.      Facility-Administered Medications: None       Physical Exam  Temp:  [36.4 °C (97.5 °F)] 36.4 °C (97.5 °F)  Pulse:  [] 109  Resp:  [12-20] 20  BP: ()/(52-66) 116/63  SpO2:  [93 %-97 %] 93 %  Blood Pressure: 116/63   Temperature: 36.4 °C (97.5 °F)   Pulse: (!) 109   Respiration: 20   Pulse Oximetry: 93 %       Physical Exam  Vitals and nursing note  "reviewed.   Constitutional:       Appearance: He is ill-appearing.   HENT:      Head: Normocephalic and atraumatic.   Cardiovascular:      Rate and Rhythm: Regular rhythm. Tachycardia present.   Pulmonary:      Effort: Pulmonary effort is normal.      Breath sounds: Normal breath sounds.   Abdominal:      General: There is no distension.      Tenderness: There is no abdominal tenderness.   Musculoskeletal:      Comments: Right arm in a brace  Right knee bruise  Left knee abrasion and bruise   Skin:     General: Skin is dry.   Neurological:      General: No focal deficit present.      Mental Status: He is alert and oriented to person, place, and time.   Psychiatric:         Mood and Affect: Mood normal.         Behavior: Behavior normal.         Laboratory:  Recent Labs     01/06/24  0701   WBC 14.3*   RBC 3.41*   HEMOGLOBIN 12.6*   HEMATOCRIT 36.9*   .2*   MCH 37.0*   MCHC 34.1   RDW 47.8   PLATELETCT 195   MPV 10.5     Recent Labs     01/06/24  0701   SODIUM 138   POTASSIUM 5.0   CHLORIDE 93*   CO2 9*   GLUCOSE 104*   BUN 35*   CREATININE 2.76*   CALCIUM 9.4     Recent Labs     01/06/24  0701   ALTSGPT 222*   ASTSGOT 233*   ALKPHOSPHAT 97   TBILIRUBIN 1.6*   GLUCOSE 104*     Recent Labs     01/06/24  0701   APTT 32.1   INR 2.00*     No results for input(s): \"NTPROBNP\" in the last 72 hours.      No results for input(s): \"TROPONINT\" in the last 72 hours.    Imaging:  DX-HUMERUS 2+ RIGHT   Final Result      Nondisplaced right humeral neck fracture      DX-SHOULDER 2+ RIGHT    (Results Pending)   CT-SHOULDER W/O PLUS RECONS RIGHT    (Results Pending)       EKG shows sinus rhythm tachycardia with ST depression inferiorly and laterally which is similar to previous    Assessment/Plan:  Justification for Admission Status  I anticipate this patient will require at least two midnights for appropriate medical management, necessitating inpatient admission because IV fluids due to acute kidney injury    Patient will need " a Telemetry bed on MEDICAL service .  The need is secondary to as above.    * Lactic acidosis- (present on admission)  Assessment & Plan  His initial lactic acid was markedly elevated at 11.7 and has gone down to 7 with IV fluids  He has no apparent source of infection  IV fluids and trend    Metabolic acidosis- (present on admission)  Assessment & Plan  Bicarb of 9 with anion gap of 36  Due to dehydration  Aggressive IV fluids and trend labs    MAXIMO (acute kidney injury) (HCC)- (present on admission)  Assessment & Plan  His baseline Cr is 1 and is currently 2.76  Due to dehydration with mild concomitant rhabdomyolysis   IV fluids  Follow urine output  BMP ordered for the morning    SIRS (systemic inflammatory response syndrome) (HCC)- (present on admission)  Assessment & Plan  SIRS criteria identified on my evaluation include:  Tachycardia, with heart rate greater than 90 BPM  SIRS is non-infectious, the patient does not have sepsis  Tachycardia and leukocytosis 14.3  Refrain from antibiotics    Transaminitis- (present on admission)  Assessment & Plan  Moderately elevated  May be from recent viral illness and/or hypotension with low-flow state  Supportive care and liver enzymes ordered for the morning.    History of pulmonary embolism- (present on admission)  Assessment & Plan  Hold on Xarelto due to acute kidney failure  Last dose was 1/6 and with MAXIMO likely okay to continue to hold today with sufficient anticoagulation.   If he needs surgery, continue to hold and if not restart when his Cr comes down.    Closed fracture of neck of right humerus- (present on admission)  Assessment & Plan  Likely non-operative  Dr. Barry ortho consulted   If he needs surgery, Xarelto will need to be held  Oxycodone for moderate pain and IV morphine for severe pain    Essential hypertension- (present on admission)  Assessment & Plan  Hold Losartan due to low blood pressure    Coronary artery disease involving coronary bypass graft  of native heart without angina pectoris- (present on admission)  Assessment & Plan  Hx of CABG  Hold lipitor and if his liver enzymes don't go up and CPK goes down then restart.  CPK ordered for the morning    Tobacco abuse- (present on admission)  Assessment & Plan  He continues to smoke  Cessation discussed and encouraged  Nicotine patch offered        VTE prophylaxis: SCDs/TEDs

## 2024-01-06 NOTE — ED NOTES
Med rec completed per pt.  Allergies reviewed with pt.   Home antibiotics in past 30 days: none   Anticoagulants/antiplatelets in past 14 days:   - Xarelto 20mg daily, last taken yesterday midday   Preferred pharmacy: [facility] Intern

## 2024-01-06 NOTE — ED NOTES
Bedside report received from April, RN. Assumed care of patient. Patient's concerns addressed. Patient aware of POC. Fall precautions in place.  Pt repositioned and comfortable.  Call light within reach.

## 2024-01-06 NOTE — ASSESSMENT & PLAN NOTE
Moderately elevated  Patient admits to daily alcohol use.  Supportive care and liver enzymes ordered for the morning.  No signs of alcohol withdrawal.

## 2024-01-07 LAB
ANION GAP SERPL CALC-SCNC: 15 MMOL/L (ref 7–16)
BASOPHILS # BLD AUTO: 0.3 % (ref 0–1.8)
BASOPHILS # BLD: 0.02 K/UL (ref 0–0.12)
BUN SERPL-MCNC: 43 MG/DL (ref 8–22)
CALCIUM SERPL-MCNC: 8.2 MG/DL (ref 8.5–10.5)
CHLORIDE SERPL-SCNC: 97 MMOL/L (ref 96–112)
CO2 SERPL-SCNC: 20 MMOL/L (ref 20–33)
CREAT SERPL-MCNC: 2.38 MG/DL (ref 0.5–1.4)
EOSINOPHIL # BLD AUTO: 0 K/UL (ref 0–0.51)
EOSINOPHIL NFR BLD: 0 % (ref 0–6.9)
ERYTHROCYTE [DISTWIDTH] IN BLOOD BY AUTOMATED COUNT: 46.1 FL (ref 35.9–50)
GFR SERPLBLD CREATININE-BSD FMLA CKD-EPI: 30 ML/MIN/1.73 M 2
GLUCOSE SERPL-MCNC: 105 MG/DL (ref 65–99)
HCT VFR BLD AUTO: 27 % (ref 42–52)
HGB BLD-MCNC: 9.3 G/DL (ref 14–18)
IMM GRANULOCYTES # BLD AUTO: 0.02 K/UL (ref 0–0.11)
IMM GRANULOCYTES NFR BLD AUTO: 0.3 % (ref 0–0.9)
LYMPHOCYTES # BLD AUTO: 1.14 K/UL (ref 1–4.8)
LYMPHOCYTES NFR BLD: 15 % (ref 22–41)
MAGNESIUM SERPL-MCNC: 1.5 MG/DL (ref 1.5–2.5)
MCH RBC QN AUTO: 37.1 PG (ref 27–33)
MCHC RBC AUTO-ENTMCNC: 34.4 G/DL (ref 32.3–36.5)
MCV RBC AUTO: 107.6 FL (ref 81.4–97.8)
MONOCYTES # BLD AUTO: 0.82 K/UL (ref 0–0.85)
MONOCYTES NFR BLD AUTO: 10.8 % (ref 0–13.4)
NEUTROPHILS # BLD AUTO: 5.6 K/UL (ref 1.82–7.42)
NEUTROPHILS NFR BLD: 73.6 % (ref 44–72)
NRBC # BLD AUTO: 0 K/UL
NRBC BLD-RTO: 0 /100 WBC (ref 0–0.2)
PHOSPHATE SERPL-MCNC: 3.1 MG/DL (ref 2.5–4.5)
PLATELET # BLD AUTO: 112 K/UL (ref 164–446)
PMV BLD AUTO: 11.1 FL (ref 9–12.9)
POTASSIUM SERPL-SCNC: 4.1 MMOL/L (ref 3.6–5.5)
RBC # BLD AUTO: 2.51 M/UL (ref 4.7–6.1)
SODIUM SERPL-SCNC: 132 MMOL/L (ref 135–145)
WBC # BLD AUTO: 7.6 K/UL (ref 4.8–10.8)

## 2024-01-07 PROCEDURE — A9270 NON-COVERED ITEM OR SERVICE: HCPCS

## 2024-01-07 PROCEDURE — 80048 BASIC METABOLIC PNL TOTAL CA: CPT

## 2024-01-07 PROCEDURE — 99233 SBSQ HOSP IP/OBS HIGH 50: CPT | Performed by: INTERNAL MEDICINE

## 2024-01-07 PROCEDURE — 83735 ASSAY OF MAGNESIUM: CPT

## 2024-01-07 PROCEDURE — 85025 COMPLETE CBC W/AUTO DIFF WBC: CPT

## 2024-01-07 PROCEDURE — 770006 HCHG ROOM/CARE - MED/SURG/GYN SEMI*

## 2024-01-07 PROCEDURE — 700102 HCHG RX REV CODE 250 W/ 637 OVERRIDE(OP): Performed by: INTERNAL MEDICINE

## 2024-01-07 PROCEDURE — 36415 COLL VENOUS BLD VENIPUNCTURE: CPT

## 2024-01-07 PROCEDURE — A9270 NON-COVERED ITEM OR SERVICE: HCPCS | Performed by: INTERNAL MEDICINE

## 2024-01-07 PROCEDURE — 700102 HCHG RX REV CODE 250 W/ 637 OVERRIDE(OP): Performed by: HOSPITALIST

## 2024-01-07 PROCEDURE — A9270 NON-COVERED ITEM OR SERVICE: HCPCS | Performed by: HOSPITALIST

## 2024-01-07 PROCEDURE — 700105 HCHG RX REV CODE 258: Performed by: HOSPITALIST

## 2024-01-07 PROCEDURE — 700111 HCHG RX REV CODE 636 W/ 250 OVERRIDE (IP): Performed by: INTERNAL MEDICINE

## 2024-01-07 PROCEDURE — 700111 HCHG RX REV CODE 636 W/ 250 OVERRIDE (IP): Performed by: HOSPITALIST

## 2024-01-07 PROCEDURE — 700102 HCHG RX REV CODE 250 W/ 637 OVERRIDE(OP)

## 2024-01-07 PROCEDURE — 84100 ASSAY OF PHOSPHORUS: CPT

## 2024-01-07 RX ORDER — HYDROMORPHONE HYDROCHLORIDE 1 MG/ML
1 INJECTION, SOLUTION INTRAMUSCULAR; INTRAVENOUS; SUBCUTANEOUS
Status: DISCONTINUED | OUTPATIENT
Start: 2024-01-07 | End: 2024-01-12

## 2024-01-07 RX ORDER — OXYCODONE HYDROCHLORIDE 5 MG/1
5 TABLET ORAL
Status: DISCONTINUED | OUTPATIENT
Start: 2024-01-07 | End: 2024-01-12

## 2024-01-07 RX ORDER — AMLODIPINE BESYLATE 5 MG/1
5 TABLET ORAL
Status: DISCONTINUED | OUTPATIENT
Start: 2024-01-07 | End: 2024-01-13

## 2024-01-07 RX ORDER — CYCLOBENZAPRINE HCL 10 MG
10 TABLET ORAL 3 TIMES DAILY PRN
Status: DISCONTINUED | OUTPATIENT
Start: 2024-01-07 | End: 2024-01-15 | Stop reason: HOSPADM

## 2024-01-07 RX ORDER — OXYCODONE HYDROCHLORIDE 10 MG/1
10 TABLET ORAL
Status: DISCONTINUED | OUTPATIENT
Start: 2024-01-07 | End: 2024-01-12

## 2024-01-07 RX ADMIN — SODIUM CHLORIDE, POTASSIUM CHLORIDE, SODIUM LACTATE AND CALCIUM CHLORIDE: 600; 310; 30; 20 INJECTION, SOLUTION INTRAVENOUS at 22:50

## 2024-01-07 RX ADMIN — DOCUSATE SODIUM 50 MG AND SENNOSIDES 8.6 MG 2 TABLET: 8.6; 5 TABLET, FILM COATED ORAL at 17:22

## 2024-01-07 RX ADMIN — SODIUM CHLORIDE, POTASSIUM CHLORIDE, SODIUM LACTATE AND CALCIUM CHLORIDE: 600; 310; 30; 20 INJECTION, SOLUTION INTRAVENOUS at 01:48

## 2024-01-07 RX ADMIN — HYDROMORPHONE HYDROCHLORIDE 1 MG: 1 INJECTION, SOLUTION INTRAMUSCULAR; INTRAVENOUS; SUBCUTANEOUS at 09:54

## 2024-01-07 RX ADMIN — HYDROMORPHONE HYDROCHLORIDE 1 MG: 1 INJECTION, SOLUTION INTRAMUSCULAR; INTRAVENOUS; SUBCUTANEOUS at 21:46

## 2024-01-07 RX ADMIN — NICOTINE TRANSDERMAL SYSTEM 21 MG: 21 PATCH, EXTENDED RELEASE TRANSDERMAL at 04:32

## 2024-01-07 RX ADMIN — RIVAROXABAN 20 MG: 20 TABLET, FILM COATED ORAL at 17:22

## 2024-01-07 RX ADMIN — AMLODIPINE BESYLATE 5 MG: 5 TABLET ORAL at 08:09

## 2024-01-07 RX ADMIN — CYCLOBENZAPRINE 10 MG: 10 TABLET, FILM COATED ORAL at 17:22

## 2024-01-07 RX ADMIN — SODIUM CHLORIDE, POTASSIUM CHLORIDE, SODIUM LACTATE AND CALCIUM CHLORIDE: 600; 310; 30; 20 INJECTION, SOLUTION INTRAVENOUS at 09:39

## 2024-01-07 RX ADMIN — HYDROMORPHONE HYDROCHLORIDE 0.5 MG: 1 INJECTION, SOLUTION INTRAMUSCULAR; INTRAVENOUS; SUBCUTANEOUS at 00:58

## 2024-01-07 RX ADMIN — CYCLOBENZAPRINE 10 MG: 10 TABLET, FILM COATED ORAL at 00:28

## 2024-01-07 RX ADMIN — HYDROMORPHONE HYDROCHLORIDE 1 MG: 1 INJECTION, SOLUTION INTRAMUSCULAR; INTRAVENOUS; SUBCUTANEOUS at 15:08

## 2024-01-07 RX ADMIN — HYDROMORPHONE HYDROCHLORIDE 0.5 MG: 1 INJECTION, SOLUTION INTRAMUSCULAR; INTRAVENOUS; SUBCUTANEOUS at 04:32

## 2024-01-07 ASSESSMENT — FIBROSIS 4 INDEX: FIB4 SCORE: 8.8

## 2024-01-07 ASSESSMENT — ENCOUNTER SYMPTOMS
VOMITING: 0
MYALGIAS: 1
ABDOMINAL PAIN: 0

## 2024-01-07 ASSESSMENT — PAIN DESCRIPTION - PAIN TYPE
TYPE: ACUTE PAIN
TYPE: ACUTE PAIN

## 2024-01-07 NOTE — PROGRESS NOTES
Pt arrived to T813, on RA, A&Ox4. 9/10 pain. Bed in low and locked position, call light within reach, will continue to monitor.

## 2024-01-07 NOTE — CARE PLAN
The patient is Stable - Low risk of patient condition declining or worsening         Progress made toward(s) clinical / shift goals:      Problem: Pain - Standard  Goal: Alleviation of pain or a reduction in pain to the patient’s comfort goal  Description: Target End Date:  Prior to discharge or change in level of care    Document on Vitals flowsheet    1.  Document pain using the appropriate pain scale per order or unit policy  2.  Educate and implement non-pharmacologic comfort measures (i.e. relaxation, distraction, massage, cold/heat therapy, etc.)  3.  Pain management medications as ordered  4.  Reassess pain after pain med administration per policy  5.  If opiods administered assess patient's response to pain medication is appropriate per POSS sedation scale  6.  Follow pain management plan developed in collaboration with patient and interdisciplinary team (including palliative care or pain specialists if applicable)  Outcome: Progressing     Problem: Knowledge Deficit - Standard  Goal: Patient and family/care givers will demonstrate understanding of plan of care, disease process/condition, diagnostic tests and medications  Description: Target End Date:  1-3 days or as soon as patient condition allows    Document in Patient Education    1.  Patient and family/caregiver oriented to unit, equipment, visitation policy and means for communicating concern  2.  Complete/review Learning Assessment  3.  Assess knowledge level of disease process/condition, treatment plan, diagnostic tests and medications  4.  Explain disease process/condition, treatment plan, diagnostic tests and medications  Outcome: Progressing     Problem: Hemodynamics  Goal: Patient's hemodynamics, fluid balance and neurologic status will be stable or improve  Description: Target End Date:  Prior to discharge or change in level of care    Document on Assessment and I/O flowsheet templates    1.  Monitor vital signs, pulse oximetry and cardiac  monitor per provider order and/or policy  2.  Maintain blood pressure per provider order  3.  Hemodynamic monitoring per provider order  4.  Manage IV fluids and IV infusions  5.  Monitor intake and output  6.  Daily weights per unit policy or provider order  7.  Assess peripheral pulses and capillary refill  8.  Assess color and body temperature  9.  Position patient for maximum circulation/cardiac output  10. Monitor for signs/symptoms of excessive bleeding  11. Assess mental status, restlessness and changes in level of consciousness  12. Monitor temperature and report fever or hypothermia to provider immediately. Consideration of targeted temperature management.  Outcome: Progressing     Problem: Fluid Volume  Goal: Fluid volume balance will be maintained  Description: Target End Date:  Prior to discharge or change in level of care    Document on I/O flowsheet    1.  Monitor intake and output as ordered  2.  Promote oral intake as appropriate  3.  Report inadequate intake or output to physician  4.  Administer IV therapy as ordered  5.  Weights per provider order  6.  Assess for signs and symptoms of bleeding  7.  Monitor for signs of fluid overload (respiratory changes, edema, weight gain, increased abdominal girth)  8.  Monitor of signs for inadequate fluid volume (poor skin turgor, dry mucous membranes)  9.  Instruct patient on adherence to fluid restrictions  Outcome: Progressing     Problem: Urinary - Renal Perfusion  Goal: Ability to achieve and maintain adequate renal perfusion and functioning will improve  Description: Target End Date:  Prior to discharge or change in level of care    Document on I/O and Assessment flowsheet    1.  Urine output will remain greater than 0.5ml/Kg/HR  2.  Monitor amount and/or characteristics of urine per order/policy. Specific gravity per order/policy  3.  Assess signs and symptoms of renal dysfunction  Outcome: Progressing     Problem: Respiratory  Goal: Patient will  achieve/maintain optimum respiratory ventilation and gas exchange  Description: Target End Date:  Prior to discharge or change in level of care    Document on Assessment flowsheet    1.  Assess and monitor rate, rhythm, depth and effort of respiration  2.  Breath sounds assessed qshift and/or as needed  3.  Assess O2 saturation, administer/titrate oxygen as ordered  4.  Position patient for maximum ventilatory efficiency  5.  Turn, cough, and deep breath with splinting to improve effectiveness  6.  Collaborate with RT to administer medication/treatments per order  7.  Encourage use of incentive spirometer and encourage patient to cough after use and utilize splinting techniques if applicable  8.  Airway suctioning  9.  Monitor sputum production for changes in color, consistency and frequency  10. Perform frequent oral hygiene  11. Alternate physical activity with rest periods  Outcome: Progressing     Problem: Mechanical Ventilation  Goal: Safe management of artificial airway and ventilation  Description: Target End Date:  when vent discontinued    Document on VAP flowsheet and Airway LDA    1.  Daily awakening trials per provider order/policy  2.  Suctioning and care of ET/Trach tube (document on LDA)  3.  Collaborate with RT to administer medications/treatments  4.  Ambu bag at bedside and available for transport  5.  Trach patient - replacement trach at bedside  6.  Provide communication tools if applicable  Outcome: Progressing  Goal: Successful weaning off mechanical ventilator, spontaneously maintains adequate gas exchange  Description: Target End Date:  when vent discontinued    1.  Follow universal weaning protocol for patients on mechanical ventilation per order  2.  Review contraindication list.  Obtain provider order to wean in presence of contraindication.  3.  Obtain Robert Sedation-Agitation Score  4.  Robert Score 1-2:  sedation vacation  5.  Robert Score 3-4:  Collaborate with provider and/or RT to  determine readiness for trial  6.  Begin 2 hour trial of weaning following protocol  7.  Evaluate for fatigue parameters per protocol  8.  Fatigue parameters triggered:  Stop wean and return to previous ASV% setting or increase % minute volume to offset work or breathing  Outcome: Progressing  Goal: Patient will be able to express needs and understand communication  Description: Target End Date:  when vent discontinued    1.  Assess ability to communicate and understand  2.  Provide communication tools  3.  Collaborate with Speech Therapy for [facility]  Outcome: Progressing     Problem: Physical Regulation  Goal: Diagnostic test results will improve  Description: Target End Date:  Prior to discharge or change in level of care    1.  Monitor lactic acid levels  2.  Monitor ABG's  3.  Monitor diagnostic test results  Outcome: Progressing  Goal: Signs and symptoms of infection will decrease  Description: Target End Date:  Prior to discharge or change in level of care    1.  Remove potential routes of infection, such as central lines and urinary catheter  2.  Follow facility protocol for changing IV tubing and sites  3.  Collaborate with Infectious Disease  4.  Antibiotic therapy per provider order  5.  Note drug effects and monitor for antibiotic toxicity  Outcome: Progressing

## 2024-01-07 NOTE — PROGRESS NOTES
Bedside report received from day RN Fernanda, pt care assumed, assessment completed. Pt is A&Ox4, pain 3/10, SR/ST on the monitor. Updated on POC, questions answered. Bed in lowest, locked position, treaded socks on, call light and belongings within reach. Fall precautions in place.

## 2024-01-07 NOTE — PROGRESS NOTES
"[facility] Medicine Daily Progress Note    Date of Service  1/7/2024    Chief Complaint  Jose A Ponce is a 63 y.o. male admitted 1/6/2024 with   Chief Complaint   Patient presents with    T-5000 FALL     BIB [facility] from home for MGLF yesterday after his legs gave out. - LOC, - head trauma, + thinner (xarelto). Pt complains of R shoulder pain. Per EMS pt spent most of the night on the ground unable to get up. Pt reports he crawled to the phone this morning to call 911.         Hospital Course  No notes on file    Mr. Ponce has a past medical history of coronary artery bypass graft 2017 as well as DVT with PE on Xarelto as his hypertension that had been experiencing 1 week of intractable vomiting \"I could not keep anything down for 5 days\".  Last night while in the kitchen his legs gave out due to weakness and he fell on his right shoulder.  He spent the night on the hardwood floor of the kitchen and this morning was able to crawl to his phone and call 911.  In the emergency room labs were drawn and his lactic acid is 11.7 with a leukocytosis of 14.  Bicarb of 9 and acute kidney injury with creatinine of 2.76 with baseline of 1.  X-ray reveals a nondisplaced right humeral neck fracture.  After 2 L of IV lactated Ringer's his repeat lactic acid is 7.  His CPK in the emergency room was 389.  Dr. Barry, orthopedic surgery has been consulted.     Orthopedic surgery recommended non operative management.  Nonweightbearing right upper extremity in sling    Interval Problem Update  Patient was seen and examined at bedside.  I have personally reviewed and interpreted vitals, labs, and imaging.    1/7.  Afebrile.  Tachycardia is improved.  Has been hypertensive.  On room air.  Leukocytosis resolved.  Likely dilutional.  Wbc 14.3 > 7.6  Hgb 12.6 > 9.3.  Platelets 195 > 112  Bicarb 9 > 15 > 20  Anion gap 36 > 20 > 15  Lactic 11.7 > 2.5  Cr 2.38  Denies fever, chills or chest pain, shortness of breath.  Right shoulder " pain is controlled.  Nausea vomiting improved.  Able to eat for the first time in days.  PT/OT.  Okay to transfer off telemetry    I have discussed this patient's plan of care and discharge plan at IDT rounds today with Case Management, Nursing, Nursing leadership, and other members of the IDT team.    Consultants/Specialty  orthopedics    Code Status  Full Code    Disposition  The patient is not medically cleared for discharge to home or a post-acute facility.  Anticipate discharge to: home with organized home healthcare and close outpatient follow-up    I have placed the appropriate orders for post-discharge needs.    Review of Systems  Review of Systems   Gastrointestinal:  Negative for abdominal pain and vomiting.   Musculoskeletal:  Positive for joint pain and myalgias.        Physical Exam  Temp:  [36.7 °C (98.1 °F)-37.3 °C (99.1 °F)] 37 °C (98.6 °F)  Pulse:  [] 99  Resp:  [12-20] 18  BP: ()/(52-93) 160/93  SpO2:  [88 %-97 %] 97 %    Physical Exam  Vitals and nursing note reviewed.   Constitutional:       Appearance: Normal appearance. He is ill-appearing.   HENT:      Head: Normocephalic and atraumatic.      Nose: Nose normal.      Mouth/Throat:      Mouth: Mucous membranes are moist.      Pharynx: Oropharynx is clear.   Eyes:      Extraocular Movements: Extraocular movements intact.      Conjunctiva/sclera: Conjunctivae normal.   Cardiovascular:      Rate and Rhythm: Regular rhythm. Tachycardia present.      Pulses: Normal pulses.      Heart sounds: Normal heart sounds. No murmur heard.     No friction rub. No gallop.   Pulmonary:      Effort: Pulmonary effort is normal. No respiratory distress.      Breath sounds: Normal breath sounds. No wheezing or rales.   Chest:      Chest wall: No tenderness.   Abdominal:      General: Abdomen is flat. Bowel sounds are normal. There is no distension.      Palpations: Abdomen is soft. There is no mass.      Tenderness: There is no abdominal tenderness. There  is no guarding.   Musculoskeletal:      Cervical back: Normal range of motion and neck supple.      Comments: Right upper extremity in sling   Skin:     General: Skin is warm.      Capillary Refill: Capillary refill takes less than 2 seconds.   Neurological:      General: No focal deficit present.      Mental Status: He is alert and oriented to person, place, and time. Mental status is at baseline.      Cranial Nerves: No cranial nerve deficit.      Motor: No weakness.   Psychiatric:         Mood and Affect: Mood normal.         Behavior: Behavior normal.         Fluids    Intake/Output Summary (Last 24 hours) at 1/7/2024 0709  Last data filed at 1/6/2024 1220  Gross per 24 hour   Intake 3000 ml   Output --   Net 3000 ml       Laboratory  Recent Labs     01/06/24  0701 01/07/24  0203   WBC 14.3* 7.6   RBC 3.41* 2.51*   HEMOGLOBIN 12.6* 9.3*   HEMATOCRIT 36.9* 27.0*   .2* 107.6*   MCH 37.0* 37.1*   MCHC 34.1 34.4   RDW 47.8 46.1   PLATELETCT 195 112*   MPV 10.5 11.1     Recent Labs     01/06/24  0701 01/06/24  1235   SODIUM 138 132*   POTASSIUM 5.0 5.0   CHLORIDE 93* 97   CO2 9* 15*   GLUCOSE 104* 103*   BUN 35* 37*   CREATININE 2.76* 2.68*   CALCIUM 9.4 8.5     Recent Labs     01/06/24  0701   APTT 32.1   INR 2.00*               Imaging  CT-SHOULDER W/O PLUS RECONS RIGHT   Final Result      1.  Comminuted and mildly displaced right humeral neck fracture.   2.  No shoulder dislocation.   3.  Osteopenia.   4.  Prominent edema about the right shoulder, upper arm, clavicle and axilla.      DX-SHOULDER 2+ RIGHT   Final Result      1.  Displaced comminuted proximal right humeral neck fracture is identified.      DX-HUMERUS 2+ RIGHT   Final Result      Nondisplaced right humeral neck fracture           Assessment/Plan  * Lactic acidosis- (present on admission)  Assessment & Plan  1/7/2024  His initial lactic acid was markedly elevated at 11.7 and has gone down to 7 with IV fluids  He has no apparent source of  infection  IV fluids and trend    SIRS (systemic inflammatory response syndrome) (HCC)- (present on admission)  Assessment & Plan  1/7/2024  SIRS criteria identified on my evaluation include:  Tachycardia, with heart rate greater than 90 BPM  SIRS is non-infectious, the patient does not have sepsis  Tachycardia and leukocytosis 14.3  Refrain from antibiotics    Transaminitis- (present on admission)  Assessment & Plan  1/7/2024  Moderately elevated  May be from recent viral illness and/or hypotension with low-flow state  Supportive care and liver enzymes ordered for the morning.    Metabolic acidosis- (present on admission)  Assessment & Plan  1/7/2024  Bicarb of 9 with anion gap of 36  Due to dehydration  Aggressive IV fluids and trend labs    History of pulmonary embolism- (present on admission)  Assessment & Plan  1/7/2024  Hold on Xarelto due to acute kidney failure  Last dose was 1/6 and with MAXIMO likely okay to continue to hold today with sufficient anticoagulation.   If he needs surgery, continue to hold and if not restart when his Cr comes down.    Closed fracture of neck of right humerus- (present on admission)  Assessment & Plan  1/7/2024  Likely non-operative  Dr. Barry ortho consulted   If he needs surgery, Xarelto will need to be held  Oxycodone for moderate pain and IV morphine for severe pain    Tobacco abuse- (present on admission)  Assessment & Plan  Spent approx 5 mins on Tobacco cessation education . Discussed options of nicotine patch, medical treatment with wellbutrin and chantix. Discussed the benefits of quitting smoking and risks of continued smoking including cardiovascular disease, cancer and COPD.   Code 03984  -I have ordered nicotine replacement therapy    MAXIMO (acute kidney injury) (HCC)- (present on admission)  Assessment & Plan  1/7/2024  His baseline Cr is 1 and is currently 2.76  Due to dehydration with mild concomitant rhabdomyolysis   IV fluids  Follow urine output  BMP ordered for  the morning    Essential hypertension- (present on admission)  Assessment & Plan  1/7/2024  Patient has been hypertensive.  Hold losartan with acute kidney injury  Started amlodipine    Coronary artery disease involving coronary bypass graft of native heart without angina pectoris- (present on admission)  Assessment & Plan  1/7/2024  Hx of CABG  Hold lipitor and if his liver enzymes don't go up and CPK goes down then restart.  CPK ordered for the morning         VTE prophylaxis: Rivaroxaban    I have performed a physical exam and reviewed and updated ROS and Plan today (1/7/2024). In review of yesterday's note (1/6/2024), there are no changes except as documented above.      Greater than 52 minutes spent prepping to see patient (e.g. review of tests) obtaining and/or reviewing separately obtained history. Performing a medically appropriate examination and/ evaluation.  Counseling and educating the patient/family/caregiver.  Ordering medications, tests, or procedures.  Referring and communicating with other health care professionals.  Documenting clinical information in EPIC.  Independently interpreting results and communicating results to patient/family/caregiver.  Care coordination.

## 2024-01-07 NOTE — PROGRESS NOTES
4 Eyes Skin Assessment Completed by MITCH Peace and TYLER Miller.    Head WDL  Ears Redness and Blanching  Nose WDL  Mouth WDL  Neck WDL  Breast/Chest Redness and Rash  Shoulder Blades Redness and Blanching  Spine Redness and Blanching  (R) Arm/Elbow/Hand Bruising and Swelling  (L) Arm/Elbow/Hand WDL  Abdomen Scar and Scab  Groin WDL  Scrotum/Coccyx/Buttocks Redness and Blanching  (R) Leg Redness and Blanching  (L) Leg Scab, Bruising, and Abrasion  (R) Heel/Foot/Toe Redness and Blanching  (L) Heel/Foot/Toe Redness and Blanching          Devices In Places Tele Box and Pulse Ox      Interventions In Place Pillows and Heels Loaded W/Pillows    Possible Skin Injury No    Pictures Uploaded Into Epic Yes  Wound Consult Placed N/A  RN Wound Prevention Protocol Ordered Yes

## 2024-01-07 NOTE — CARE PLAN
The patient is Stable - Low risk of patient condition declining or worsening    Shift Goals  Clinical Goals: pain control, safety  Patient Goals: pain control  Family Goals: duncan    Progress made toward(s) clinical / shift goals:    Problem: Pain - Standard  Goal: Alleviation of pain or a reduction in pain to the patient’s comfort goal  Outcome: Progressing  Note: Pt experiencing severe pain due to fracture. Current pain medication orders help alleviate pt pain.      Problem: Knowledge Deficit - Standard  Goal: Patient and family/care givers will demonstrate understanding of plan of care, disease process/condition, diagnostic tests and medications  Outcome: Progressing  Note: Pt educated on POC, medications, and treatment plan. Pt verbalizes understanding and all questions answered.      Problem: Fall Risk  Goal: Patient will remain free from falls  Outcome: Progressing  Note: Pt has remained free from falls throughout the night. Pt educated to use call light for assistance before attempting mobilization.

## 2024-01-07 NOTE — CARE PLAN
"  Problem: Pain - Standard  Goal: Alleviation of pain or a reduction in pain to the patient’s comfort goal  Outcome: Progressing  Note: Pain was managed with PRN medication and was administered as ordered     Problem: Knowledge Deficit - Standard  Goal: Patient and family/care givers will demonstrate understanding of plan of care, disease process/condition, diagnostic tests and medications  Outcome: Progressing  Note: Discussed plan of care and verba;ized understanding      Problem: Hemodynamics  Goal: Patient's hemodynamics, fluid balance and neurologic status will be stable or improve  Outcome: Progressing     Problem: Respiratory  Goal: Patient will achieve/maintain optimum respiratory ventilation and gas exchange  Outcome: Progressing     Problem: Skin Integrity  Goal: Skin integrity is maintained or improved  Outcome: Progressing     Problem: Fall Risk  Goal: Patient will remain free from falls  Outcome: Progressing   The patient is Watcher - Medium risk of patient condition declining or worsening    Shift Goals  Clinical Goals: pain management; Monitor VS and safety from fall  Patient Goals: control my pain  Family Goals: duncan    Progress made toward(s) clinical / shift goals:   remains free from fall at this time, call light within reach and bed alarm kept on,/80   Pulse 94   Temp 36.8 °C (98.2 °F) (Temporal)   Resp 16   Ht 1.854 m (6' 1\")   Wt 83 kg (182 lb 15.7 oz)   SpO2 95%           "

## 2024-01-08 LAB
ANION GAP SERPL CALC-SCNC: 13 MMOL/L (ref 7–16)
BUN SERPL-MCNC: 29 MG/DL (ref 8–22)
CALCIUM SERPL-MCNC: 8.2 MG/DL (ref 8.5–10.5)
CHLORIDE SERPL-SCNC: 99 MMOL/L (ref 96–112)
CK SERPL-CCNC: 244 U/L (ref 0–154)
CO2 SERPL-SCNC: 22 MMOL/L (ref 20–33)
CREAT SERPL-MCNC: 1.36 MG/DL (ref 0.5–1.4)
ERYTHROCYTE [DISTWIDTH] IN BLOOD BY AUTOMATED COUNT: 43.9 FL (ref 35.9–50)
GFR SERPLBLD CREATININE-BSD FMLA CKD-EPI: 58 ML/MIN/1.73 M 2
GLUCOSE SERPL-MCNC: 99 MG/DL (ref 65–99)
HCT VFR BLD AUTO: 23.9 % (ref 42–52)
HGB BLD-MCNC: 8.3 G/DL (ref 14–18)
LACTATE SERPL-SCNC: 1.1 MMOL/L (ref 0.5–2)
MCH RBC QN AUTO: 36.6 PG (ref 27–33)
MCHC RBC AUTO-ENTMCNC: 34.7 G/DL (ref 32.3–36.5)
MCV RBC AUTO: 105.3 FL (ref 81.4–97.8)
PLATELET # BLD AUTO: 102 K/UL (ref 164–446)
PLATELETS.RETICULATED NFR BLD AUTO: 6.7 % (ref 0.6–13.1)
PMV BLD AUTO: 11.2 FL (ref 9–12.9)
POTASSIUM SERPL-SCNC: 4.1 MMOL/L (ref 3.6–5.5)
RBC # BLD AUTO: 2.27 M/UL (ref 4.7–6.1)
SODIUM SERPL-SCNC: 134 MMOL/L (ref 135–145)
WBC # BLD AUTO: 6.1 K/UL (ref 4.8–10.8)

## 2024-01-08 PROCEDURE — 700102 HCHG RX REV CODE 250 W/ 637 OVERRIDE(OP): Performed by: INTERNAL MEDICINE

## 2024-01-08 PROCEDURE — 80048 BASIC METABOLIC PNL TOTAL CA: CPT

## 2024-01-08 PROCEDURE — 82550 ASSAY OF CK (CPK): CPT

## 2024-01-08 PROCEDURE — A9270 NON-COVERED ITEM OR SERVICE: HCPCS | Performed by: HOSPITALIST

## 2024-01-08 PROCEDURE — 700102 HCHG RX REV CODE 250 W/ 637 OVERRIDE(OP): Performed by: HOSPITALIST

## 2024-01-08 PROCEDURE — 36415 COLL VENOUS BLD VENIPUNCTURE: CPT

## 2024-01-08 PROCEDURE — 83605 ASSAY OF LACTIC ACID: CPT

## 2024-01-08 PROCEDURE — 85055 RETICULATED PLATELET ASSAY: CPT

## 2024-01-08 PROCEDURE — 99233 SBSQ HOSP IP/OBS HIGH 50: CPT | Performed by: STUDENT IN AN ORGANIZED HEALTH CARE EDUCATION/TRAINING PROGRAM

## 2024-01-08 PROCEDURE — A9270 NON-COVERED ITEM OR SERVICE: HCPCS | Performed by: INTERNAL MEDICINE

## 2024-01-08 PROCEDURE — 85027 COMPLETE CBC AUTOMATED: CPT

## 2024-01-08 PROCEDURE — 770006 HCHG ROOM/CARE - MED/SURG/GYN SEMI*

## 2024-01-08 RX ADMIN — DOCUSATE SODIUM 50 MG AND SENNOSIDES 8.6 MG 2 TABLET: 8.6; 5 TABLET, FILM COATED ORAL at 04:52

## 2024-01-08 RX ADMIN — OXYCODONE HYDROCHLORIDE 10 MG: 10 TABLET ORAL at 04:49

## 2024-01-08 RX ADMIN — AMLODIPINE BESYLATE 5 MG: 5 TABLET ORAL at 04:51

## 2024-01-08 RX ADMIN — OXYCODONE HYDROCHLORIDE 10 MG: 10 TABLET ORAL at 17:12

## 2024-01-08 RX ADMIN — RIVAROXABAN 20 MG: 20 TABLET, FILM COATED ORAL at 17:12

## 2024-01-08 RX ADMIN — OXYCODONE HYDROCHLORIDE 10 MG: 10 TABLET ORAL at 13:42

## 2024-01-08 RX ADMIN — OXYCODONE HYDROCHLORIDE 10 MG: 10 TABLET ORAL at 09:30

## 2024-01-08 RX ADMIN — NICOTINE TRANSDERMAL SYSTEM 21 MG: 21 PATCH, EXTENDED RELEASE TRANSDERMAL at 04:51

## 2024-01-08 RX ADMIN — OXYCODONE HYDROCHLORIDE 10 MG: 10 TABLET ORAL at 20:53

## 2024-01-08 ASSESSMENT — PAIN DESCRIPTION - PAIN TYPE
TYPE: ACUTE PAIN

## 2024-01-08 ASSESSMENT — ENCOUNTER SYMPTOMS
VOMITING: 0
ABDOMINAL PAIN: 0
MYALGIAS: 1

## 2024-01-08 NOTE — PROGRESS NOTES
KEISHA from hematology lab called with critical result of Hemoglobin at 8.3 and Hematocrit at 23.9. Critical lab results read back to KEISHA.   Dr. Allen notified of critical lab results of Hgb 8.3 and Hct 23.9.

## 2024-01-08 NOTE — PROGRESS NOTES
[facility] Medicine Daily Progress Note    Date of Service  1/8/2024    Chief Complaint  Jose A Ponce is a 63 y.o. male admitted 1/6/2024 with   Chief Complaint   Patient presents with    T-5000 FALL     BIB [facility] from home for MGLF yesterday after his legs gave out. - LOC, - head trauma, + thinner (xarelto). Pt complains of R shoulder pain. Per EMS pt spent most of the night on the ground unable to get up. Pt reports he crawled to the phone this morning to call 911.         Hospital Course  Mr. Ponce has a past medical history of coronary artery bypass graft 2017 as well as DVT with PE on Xarelto as his hypertension that had been experiencing 1 week of intractable vomiting and fell on hardwood floor of the kitchen and was able to crawl to his phone and call 911.   Noted MAXIMO and lactic acidosis.    X-ray reveals a nondisplaced right humeral neck fracture.  Dr. Barry, orthopedic surgery recommended non operative management.  Nonweightbearing right upper extremity in sling    Interval Problem Update  Patient was seen and examined at bedside.  I have personally reviewed and interpreted vitals, labs, and imaging.    MAXIMO improving  Lactic acid normal  Dc IVF, encourage oral hydration  No nausea, vomiting or abdominal pain.  Tolerating diet   on sling  PT/OT     I have discussed this patient's plan of care and discharge plan at IDT rounds today with Case Management, Nursing, Nursing leadership, and other members of the IDT team.    Consultants/Specialty  orthopedics    Code Status  Full Code    Disposition  The patient is not medically cleared for discharge to home or a post-acute facility.      I have placed the appropriate orders for post-discharge needs.    Review of Systems  Review of Systems   Gastrointestinal:  Negative for abdominal pain and vomiting.   Musculoskeletal:  Positive for joint pain and myalgias.        Physical Exam  Temp:  [36.4 °C (97.6 °F)-37.2 °C (99 °F)] 36.7 °C (98 °F)  Pulse:  []  105  Resp:  [16-18] 17  BP: (116-146)/(63-83) 135/81  SpO2:  [91 %-96 %] 95 %    Physical Exam  Vitals and nursing note reviewed.   Constitutional:       Appearance: Normal appearance. He is ill-appearing.   HENT:      Head: Normocephalic and atraumatic.      Nose: Nose normal.      Mouth/Throat:      Mouth: Mucous membranes are moist.      Pharynx: Oropharynx is clear.   Eyes:      Extraocular Movements: Extraocular movements intact.      Conjunctiva/sclera: Conjunctivae normal.   Cardiovascular:      Rate and Rhythm: Regular rhythm. Tachycardia present.      Pulses: Normal pulses.      Heart sounds: Normal heart sounds. No murmur heard.     No friction rub. No gallop.   Pulmonary:      Effort: Pulmonary effort is normal. No respiratory distress.      Breath sounds: Normal breath sounds. No wheezing or rales.   Chest:      Chest wall: No tenderness.   Abdominal:      General: Abdomen is flat. Bowel sounds are normal. There is no distension.      Palpations: Abdomen is soft. There is no mass.      Tenderness: There is no abdominal tenderness. There is no guarding.   Musculoskeletal:      Cervical back: Normal range of motion and neck supple.      Comments: Right upper extremity in sling   Skin:     General: Skin is warm.      Capillary Refill: Capillary refill takes less than 2 seconds.   Neurological:      General: No focal deficit present.      Mental Status: He is alert and oriented to person, place, and time. Mental status is at baseline.      Cranial Nerves: No cranial nerve deficit.      Motor: No weakness.   Psychiatric:         Mood and Affect: Mood normal.         Behavior: Behavior normal.         Fluids    Intake/Output Summary (Last 24 hours) at 1/8/2024 1404  Last data filed at 1/8/2024 0933  Gross per 24 hour   Intake 480 ml   Output 1130 ml   Net -650 ml         Laboratory  Recent Labs     01/06/24  0701 01/07/24  0203 01/08/24  0824   WBC 14.3* 7.6 6.1   RBC 3.41* 2.51* 2.27*   HEMOGLOBIN 12.6* 9.3*  8.3*   HEMATOCRIT 36.9* 27.0* 23.9*   .2* 107.6* 105.3*   MCH 37.0* 37.1* 36.6*   MCHC 34.1 34.4 34.7   RDW 47.8 46.1 43.9   PLATELETCT 195 112* 102*   MPV 10.5 11.1 11.2       Recent Labs     01/06/24  1235 01/07/24  1151 01/08/24  0824   SODIUM 132* 132* 134*   POTASSIUM 5.0 4.1 4.1   CHLORIDE 97 97 99   CO2 15* 20 22   GLUCOSE 103* 105* 99   BUN 37* 43* 29*   CREATININE 2.68* 2.38* 1.36   CALCIUM 8.5 8.2* 8.2*       Recent Labs     01/06/24  0701   APTT 32.1   INR 2.00*                 Imaging  CT-SHOULDER W/O PLUS RECONS RIGHT   Final Result      1.  Comminuted and mildly displaced right humeral neck fracture.   2.  No shoulder dislocation.   3.  Osteopenia.   4.  Prominent edema about the right shoulder, upper arm, clavicle and axilla.      DX-SHOULDER 2+ RIGHT   Final Result      1.  Displaced comminuted proximal right humeral neck fracture is identified.      DX-HUMERUS 2+ RIGHT   Final Result      Nondisplaced right humeral neck fracture           Assessment/Plan  * Lactic acidosis- (present on admission)  Assessment & Plan  Resolved with aggressive IV fluid hydration    SIRS (systemic inflammatory response syndrome) (HCC)- (present on admission)  Assessment & Plan  1/7/2024  SIRS criteria identified on my evaluation include:  Tachycardia, with heart rate greater than 90 BPM  SIRS is non-infectious, the patient does not have sepsis  Tachycardia and leukocytosis 14.3  Refrain from antibiotics    Transaminitis- (present on admission)  Assessment & Plan  1/7/2024  Moderately elevated  May be from recent viral illness and/or hypotension with low-flow state  Supportive care and liver enzymes ordered for the morning.    Metabolic acidosis- (present on admission)  Assessment & Plan  1/7/2024  Bicarb of 9 with anion gap of 36  Due to dehydration  Aggressive IV fluids and trend labs    History of pulmonary embolism- (present on admission)  Assessment & Plan  Hold on Xarelto due to acute kidney failure  Last  dose was 1/6 and with MAXIMO likely okay to continue to hold today with sufficient anticoagulation.   If he needs surgery, continue to hold and if not restart when his Cr comes down.  No surgery planned.  MAXIMO improving. Xarelto resumed    Closed fracture of neck of right humerus- (present on admission)  Assessment & Plan  Dr. Barry ortho consulted, recommended non operative management.    Nonweightbearing right upper extremity in sling  Outpatient follow-up in 1 to 2 weeks  PT/OT  Multimodal pain managements including po and iv narcotics prn. Monitoring respiratory status and sedation score      Tobacco abuse- (present on admission)  Assessment & Plan  Spent approx 5 mins on Tobacco cessation education . Discussed options of nicotine patch, medical treatment with wellbutrin and chantix. Discussed the benefits of quitting smoking and risks of continued smoking including cardiovascular disease, cancer and COPD.   Code 48227  -I have ordered nicotine replacement therapy    MAXIMO (acute kidney injury) (HCC)- (present on admission)  Assessment & Plan  His baseline Cr is 1 and is currently 2.76 due to prerenal with a mild concomitant rhabdomyolysis  Received IV fluid  Encourage oral hydration  Avoid nephrotoxins    Essential hypertension- (present on admission)  Assessment & Plan  Patient has been hypertensive.  Hold losartan with acute kidney injury  Started amlodipine    Coronary artery disease involving coronary bypass graft of native heart without angina pectoris- (present on admission)  Assessment & Plan  Hx of CABG  Resume lipitor once CPK trending down            VTE prophylaxis: Rivaroxaban    I have performed a physical exam and reviewed and updated ROS and Plan today (1/8/2024). In review of yesterday's note (1/7/2024), there are no changes except as documented above.      53 minutes spent prepping to see patient (e.g. review of tests) obtaining and/or reviewing separately obtained history. Performing a medically  appropriate examination and/ evaluation.  Counseling and educating the patient/family/caregiver.  Ordering medications, tests, or procedures.  Referring and communicating with other health care professionals.  Documenting clinical information in EPIC.  Independently interpreting results and communicating results to patient/family/caregiver.  Care coordination.

## 2024-01-08 NOTE — PROGRESS NOTES
Received report from tele and assumed care of patient at change of shift. Patient is A&Ox4, on RA, and reports pain of 8/10, dilaudid and oxy for pain interventions. Pt's pain comfort goal is 0/10. Patient assessment completed, bed in lowest position, and call light and personal belongings are within reach. Patient expressed no further needs at this time.

## 2024-01-08 NOTE — CARE PLAN
The patient is Stable - Low risk of patient condition declining or worsening    Shift Goals  Clinical Goals: Pain control, pt will remain free from falls, maintain skin integrity, wean O2  Patient Goals: pain control  Family Goals: ARELIS    Progress made toward(s) clinical / shift goals: Pt remained free from falls, pt turns self from side to side with assistance of 1 person, pt's spO2 is 93% on .5L NC.     Patient is not progressing towards the following goals: Pt's pain level did not decrease to comfort goal of 0/10 and was medicated per MAR      Problem: Pain - Standard  Goal: Alleviation of pain or a reduction in pain to the patient’s comfort goal  Outcome: Not Progressing

## 2024-01-08 NOTE — PROGRESS NOTES
Pt is A&O x4 and on 1L NC. Pt presents with even and non-labored breathing and denies SOB. Pt reports pain 8/10 on his right arm. Pt medicated per MAR for pain relief. Bed in lowest and locked position. Pt refused the bed alarm, pt oriented and calls appropriately. Call light within reach and pt declines any further needs at this time.

## 2024-01-08 NOTE — CARE PLAN
The patient is Stable - Low risk of patient condition declining or worsening    Shift Goals  Clinical Goals: pain control, monitor labs, ivmf  Patient Goals: pain control  Family Goals: duncan    Progress made toward(s) clinical / shift goals:    Problem: Pain - Standard  Goal: Alleviation of pain or a reduction in pain to the patient’s comfort goal  Outcome: Progressing     Problem: Knowledge Deficit - Standard  Goal: Patient and family/care givers will demonstrate understanding of plan of care, disease process/condition, diagnostic tests and medications  Outcome: Progressing     Problem: Hemodynamics  Goal: Patient's hemodynamics, fluid balance and neurologic status will be stable or improve  Outcome: Progressing     Problem: Fluid Volume  Goal: Fluid volume balance will be maintained  Outcome: Progressing     Problem: Physical Regulation  Goal: Diagnostic test results will improve  Outcome: Progressing     Problem: Fall Risk  Goal: Patient will remain free from falls  Outcome: Progressing       Patient is not progressing towards the following goals:

## 2024-01-09 PROBLEM — E83.42 HYPOMAGNESEMIA: Status: ACTIVE | Noted: 2024-01-09

## 2024-01-09 LAB
ALBUMIN SERPL BCP-MCNC: 3 G/DL (ref 3.2–4.9)
ALBUMIN/GLOB SERPL: 1.2 G/DL
ALP SERPL-CCNC: 70 U/L (ref 30–99)
ALT SERPL-CCNC: 73 U/L (ref 2–50)
ANION GAP SERPL CALC-SCNC: 13 MMOL/L (ref 7–16)
AST SERPL-CCNC: 67 U/L (ref 12–45)
BILIRUB SERPL-MCNC: 1.1 MG/DL (ref 0.1–1.5)
BUN SERPL-MCNC: 22 MG/DL (ref 8–22)
CALCIUM ALBUM COR SERPL-MCNC: 9.2 MG/DL (ref 8.5–10.5)
CALCIUM SERPL-MCNC: 8.4 MG/DL (ref 8.5–10.5)
CHLORIDE SERPL-SCNC: 97 MMOL/L (ref 96–112)
CO2 SERPL-SCNC: 24 MMOL/L (ref 20–33)
CREAT SERPL-MCNC: 1.34 MG/DL (ref 0.5–1.4)
ERYTHROCYTE [DISTWIDTH] IN BLOOD BY AUTOMATED COUNT: 43.8 FL (ref 35.9–50)
GFR SERPLBLD CREATININE-BSD FMLA CKD-EPI: 59 ML/MIN/1.73 M 2
GLOBULIN SER CALC-MCNC: 2.5 G/DL (ref 1.9–3.5)
GLUCOSE SERPL-MCNC: 117 MG/DL (ref 65–99)
HCT VFR BLD AUTO: 23.8 % (ref 42–52)
HGB BLD-MCNC: 8.1 G/DL (ref 14–18)
MAGNESIUM SERPL-MCNC: 1 MG/DL (ref 1.5–2.5)
MCH RBC QN AUTO: 36.3 PG (ref 27–33)
MCHC RBC AUTO-ENTMCNC: 34 G/DL (ref 32.3–36.5)
MCV RBC AUTO: 106.7 FL (ref 81.4–97.8)
PHOSPHATE SERPL-MCNC: 2.4 MG/DL (ref 2.5–4.5)
PLATELET # BLD AUTO: 131 K/UL (ref 164–446)
PMV BLD AUTO: 11.4 FL (ref 9–12.9)
POTASSIUM SERPL-SCNC: 3.5 MMOL/L (ref 3.6–5.5)
PROT SERPL-MCNC: 5.5 G/DL (ref 6–8.2)
RBC # BLD AUTO: 2.23 M/UL (ref 4.7–6.1)
SODIUM SERPL-SCNC: 134 MMOL/L (ref 135–145)
WBC # BLD AUTO: 6 K/UL (ref 4.8–10.8)

## 2024-01-09 PROCEDURE — A9270 NON-COVERED ITEM OR SERVICE: HCPCS | Performed by: INTERNAL MEDICINE

## 2024-01-09 PROCEDURE — 85027 COMPLETE CBC AUTOMATED: CPT

## 2024-01-09 PROCEDURE — 700111 HCHG RX REV CODE 636 W/ 250 OVERRIDE (IP): Performed by: INTERNAL MEDICINE

## 2024-01-09 PROCEDURE — 770006 HCHG ROOM/CARE - MED/SURG/GYN SEMI*

## 2024-01-09 PROCEDURE — A9270 NON-COVERED ITEM OR SERVICE: HCPCS | Performed by: HOSPITALIST

## 2024-01-09 PROCEDURE — 80053 COMPREHEN METABOLIC PANEL: CPT

## 2024-01-09 PROCEDURE — 83735 ASSAY OF MAGNESIUM: CPT

## 2024-01-09 PROCEDURE — 700102 HCHG RX REV CODE 250 W/ 637 OVERRIDE(OP): Performed by: INTERNAL MEDICINE

## 2024-01-09 PROCEDURE — 99233 SBSQ HOSP IP/OBS HIGH 50: CPT | Performed by: HOSPITALIST

## 2024-01-09 PROCEDURE — 36415 COLL VENOUS BLD VENIPUNCTURE: CPT

## 2024-01-09 PROCEDURE — A9270 NON-COVERED ITEM OR SERVICE: HCPCS | Mod: JZ | Performed by: HOSPITALIST

## 2024-01-09 PROCEDURE — 700102 HCHG RX REV CODE 250 W/ 637 OVERRIDE(OP): Mod: JZ | Performed by: HOSPITALIST

## 2024-01-09 PROCEDURE — 700111 HCHG RX REV CODE 636 W/ 250 OVERRIDE (IP): Performed by: HOSPITALIST

## 2024-01-09 PROCEDURE — 700102 HCHG RX REV CODE 250 W/ 637 OVERRIDE(OP): Performed by: HOSPITALIST

## 2024-01-09 PROCEDURE — 84100 ASSAY OF PHOSPHORUS: CPT

## 2024-01-09 RX ORDER — POTASSIUM CHLORIDE 20 MEQ/1
40 TABLET, EXTENDED RELEASE ORAL ONCE
Status: COMPLETED | OUTPATIENT
Start: 2024-01-09 | End: 2024-01-09

## 2024-01-09 RX ORDER — MAGNESIUM SULFATE HEPTAHYDRATE 40 MG/ML
4 INJECTION, SOLUTION INTRAVENOUS ONCE
Status: COMPLETED | OUTPATIENT
Start: 2024-01-09 | End: 2024-01-09

## 2024-01-09 RX ADMIN — OXYCODONE HYDROCHLORIDE 5 MG: 5 TABLET ORAL at 13:41

## 2024-01-09 RX ADMIN — HYDROMORPHONE HYDROCHLORIDE 1 MG: 1 INJECTION, SOLUTION INTRAMUSCULAR; INTRAVENOUS; SUBCUTANEOUS at 06:24

## 2024-01-09 RX ADMIN — OXYCODONE HYDROCHLORIDE 10 MG: 10 TABLET ORAL at 05:20

## 2024-01-09 RX ADMIN — OXYCODONE HYDROCHLORIDE 10 MG: 10 TABLET ORAL at 21:04

## 2024-01-09 RX ADMIN — DIBASIC SODIUM PHOSPHATE, MONOBASIC POTASSIUM PHOSPHATE AND MONOBASIC SODIUM PHOSPHATE 250 MG: 852; 155; 130 TABLET ORAL at 11:02

## 2024-01-09 RX ADMIN — RIVAROXABAN 20 MG: 20 TABLET, FILM COATED ORAL at 18:01

## 2024-01-09 RX ADMIN — MAGNESIUM SULFATE HEPTAHYDRATE 4 G: 4 INJECTION, SOLUTION INTRAVENOUS at 10:57

## 2024-01-09 RX ADMIN — DIBASIC SODIUM PHOSPHATE, MONOBASIC POTASSIUM PHOSPHATE AND MONOBASIC SODIUM PHOSPHATE 250 MG: 852; 155; 130 TABLET ORAL at 18:01

## 2024-01-09 RX ADMIN — DOCUSATE SODIUM 50 MG AND SENNOSIDES 8.6 MG 2 TABLET: 8.6; 5 TABLET, FILM COATED ORAL at 05:21

## 2024-01-09 RX ADMIN — NICOTINE TRANSDERMAL SYSTEM 21 MG: 21 PATCH, EXTENDED RELEASE TRANSDERMAL at 05:22

## 2024-01-09 RX ADMIN — POTASSIUM CHLORIDE 40 MEQ: 1500 TABLET, EXTENDED RELEASE ORAL at 10:46

## 2024-01-09 RX ADMIN — AMLODIPINE BESYLATE 5 MG: 5 TABLET ORAL at 05:21

## 2024-01-09 ASSESSMENT — PAIN DESCRIPTION - PAIN TYPE
TYPE: ACUTE PAIN

## 2024-01-09 ASSESSMENT — ENCOUNTER SYMPTOMS
VOMITING: 0
MYALGIAS: 1
ABDOMINAL PAIN: 0

## 2024-01-09 NOTE — PROGRESS NOTES
4 Eyes Skin Assessment Completed by vania, RN and Tasneem RN.    Head WDL  Ears WDL  Nose WDL  Mouth WDL  Neck WDL  Breast/Chest Bruising  Shoulder Blades WDL  Spine Bruising  (R) Arm/Elbow/Hand Bruising  (L) Arm/Elbow/Hand WDL  Abdomen WDL  Groin WDL  Scrotum/Coccyx/Buttocks redness  (R) Leg WDL  (L) Leg WDL  (R) Heel/Foot/Toe WDL  (L) Heel/Foot/Toe WDL          Devices In Places Pulse Ox and Nasal Cannula      Interventions In Place Gray Ear Foams    Possible Skin Injury No    Pictures Uploaded Into Epic N/A  Wound Consult Placed N/A  RN Wound Prevention Protocol Ordered No

## 2024-01-09 NOTE — PROGRESS NOTES
Assumed care of patient. AO x4. Patient in bed, resting with  no complaints of pain . Respiration even and unlabored on exam. Patient express no further concern  at this time. Call light and personal belongings within reach. Bed locked and in lowest position. Treaded socks in place. Care plan ongoing.

## 2024-01-09 NOTE — CARE PLAN
The patient is Stable - Low risk of patient condition declining or worsening    Shift Goals  Clinical Goals: Pain management, wean O2  Patient Goals: Pain control  Family Goals: ARELIS    Progress made toward(s) clinical / shift goals: Pt remained free from falls, pt's O2 sat is 93% on room air. Pt requested one time dose of pain medication throughout the night.      Problem: Pain - Standard  Goal: Alleviation of pain or a reduction in pain to the patient’s comfort goal 3  Pt will state pain 3 on  a scale of 0-10 by the end of the shift.  Outcome: Progressing     Problem: Knowledge Deficit - Standard  Goal: Patient and family/care givers will demonstrate understanding of plan of care, disease process/condition, diagnostic tests and medications  Outcome: Progressing     Problem: Hemodynamics  Goal: Patient's hemodynamics, fluid balance and neurologic status will be stable or improve  Outcome: Progressing       Patient is not progressing towards the following goals:

## 2024-01-09 NOTE — PROGRESS NOTES
Pt is A&O x4 and on RA. Pt presents with even and non-labored breathing and denies SOB. Pt reports pain 4/10 on his right arm. Pt declines the need for any pain meds for relief. Bed in lowest and locked position. Bed alarm is on. Call light within reach and pt declines any further needs at this time.

## 2024-01-09 NOTE — CARE PLAN
The patient is Stable - Low risk of patient condition declining or worsening    Shift Goals  Clinical Goals: Pain management, pt will remain free from falls, maintain skin integrity  Patient Goals: pain control  Family Goals: ARELIS    Progress made toward(s) clinical / shift goals: Pt remained free from falls, pt turns self from side to side with assistance of 1 person, waffle overlay in place, sling on R arm    Patient is not progressing towards the following goals: Pt's pain level did not decrease to comfort goal of 0/10 and was medicated per MAR for relief.    Problem: Pain - Standard  Goal: Alleviation of pain or a reduction in pain to the patient’s comfort goal  Outcome: Not Progressing

## 2024-01-10 ENCOUNTER — APPOINTMENT (OUTPATIENT)
Dept: RADIOLOGY | Facility: MEDICAL CENTER | Age: 64
DRG: 563 | End: 2024-01-10
Attending: HOSPITALIST
Payer: COMMERCIAL

## 2024-01-10 LAB
ALBUMIN SERPL BCP-MCNC: 2.8 G/DL (ref 3.2–4.9)
ALBUMIN/GLOB SERPL: 1 G/DL
ALP SERPL-CCNC: 76 U/L (ref 30–99)
ALT SERPL-CCNC: 67 U/L (ref 2–50)
ANION GAP SERPL CALC-SCNC: 14 MMOL/L (ref 7–16)
AST SERPL-CCNC: 78 U/L (ref 12–45)
BASOPHILS # BLD AUTO: 0.6 % (ref 0–1.8)
BASOPHILS # BLD: 0.04 K/UL (ref 0–0.12)
BILIRUB SERPL-MCNC: 1.1 MG/DL (ref 0.1–1.5)
BUN SERPL-MCNC: 14 MG/DL (ref 8–22)
CALCIUM ALBUM COR SERPL-MCNC: 9.4 MG/DL (ref 8.5–10.5)
CALCIUM SERPL-MCNC: 8.4 MG/DL (ref 8.5–10.5)
CHLORIDE SERPL-SCNC: 97 MMOL/L (ref 96–112)
CO2 SERPL-SCNC: 22 MMOL/L (ref 20–33)
CREAT SERPL-MCNC: 0.98 MG/DL (ref 0.5–1.4)
EOSINOPHIL # BLD AUTO: 0.08 K/UL (ref 0–0.51)
EOSINOPHIL NFR BLD: 1.2 % (ref 0–6.9)
ERYTHROCYTE [DISTWIDTH] IN BLOOD BY AUTOMATED COUNT: 44.1 FL (ref 35.9–50)
GFR SERPLBLD CREATININE-BSD FMLA CKD-EPI: 87 ML/MIN/1.73 M 2
GLOBULIN SER CALC-MCNC: 2.9 G/DL (ref 1.9–3.5)
GLUCOSE SERPL-MCNC: 107 MG/DL (ref 65–99)
HCT VFR BLD AUTO: 22.9 % (ref 42–52)
HGB BLD-MCNC: 8.1 G/DL (ref 14–18)
IMM GRANULOCYTES # BLD AUTO: 0.03 K/UL (ref 0–0.11)
IMM GRANULOCYTES NFR BLD AUTO: 0.5 % (ref 0–0.9)
LYMPHOCYTES # BLD AUTO: 1.48 K/UL (ref 1–4.8)
LYMPHOCYTES NFR BLD: 22.7 % (ref 22–41)
MAGNESIUM SERPL-MCNC: 1.4 MG/DL (ref 1.5–2.5)
MCH RBC QN AUTO: 37.3 PG (ref 27–33)
MCHC RBC AUTO-ENTMCNC: 35.4 G/DL (ref 32.3–36.5)
MCV RBC AUTO: 105.5 FL (ref 81.4–97.8)
MONOCYTES # BLD AUTO: 1 K/UL (ref 0–0.85)
MONOCYTES NFR BLD AUTO: 15.3 % (ref 0–13.4)
NEUTROPHILS # BLD AUTO: 3.9 K/UL (ref 1.82–7.42)
NEUTROPHILS NFR BLD: 59.7 % (ref 44–72)
NRBC # BLD AUTO: 0 K/UL
NRBC BLD-RTO: 0 /100 WBC (ref 0–0.2)
PHOSPHATE SERPL-MCNC: 2.4 MG/DL (ref 2.5–4.5)
PLATELET # BLD AUTO: 169 K/UL (ref 164–446)
PMV BLD AUTO: 10.5 FL (ref 9–12.9)
POTASSIUM SERPL-SCNC: 3.6 MMOL/L (ref 3.6–5.5)
PROT SERPL-MCNC: 5.7 G/DL (ref 6–8.2)
RBC # BLD AUTO: 2.17 M/UL (ref 4.7–6.1)
SODIUM SERPL-SCNC: 133 MMOL/L (ref 135–145)
WBC # BLD AUTO: 6.5 K/UL (ref 4.8–10.8)

## 2024-01-10 PROCEDURE — A9270 NON-COVERED ITEM OR SERVICE: HCPCS | Performed by: INTERNAL MEDICINE

## 2024-01-10 PROCEDURE — A9270 NON-COVERED ITEM OR SERVICE: HCPCS | Performed by: HOSPITALIST

## 2024-01-10 PROCEDURE — 700111 HCHG RX REV CODE 636 W/ 250 OVERRIDE (IP): Mod: JZ | Performed by: HOSPITALIST

## 2024-01-10 PROCEDURE — 85025 COMPLETE CBC W/AUTO DIFF WBC: CPT

## 2024-01-10 PROCEDURE — 83735 ASSAY OF MAGNESIUM: CPT

## 2024-01-10 PROCEDURE — 93922 UPR/L XTREMITY ART 2 LEVELS: CPT | Mod: RT

## 2024-01-10 PROCEDURE — 80053 COMPREHEN METABOLIC PANEL: CPT

## 2024-01-10 PROCEDURE — 93971 EXTREMITY STUDY: CPT | Mod: RT

## 2024-01-10 PROCEDURE — 700102 HCHG RX REV CODE 250 W/ 637 OVERRIDE(OP): Performed by: INTERNAL MEDICINE

## 2024-01-10 PROCEDURE — 700102 HCHG RX REV CODE 250 W/ 637 OVERRIDE(OP): Performed by: HOSPITALIST

## 2024-01-10 PROCEDURE — 84100 ASSAY OF PHOSPHORUS: CPT

## 2024-01-10 PROCEDURE — 36415 COLL VENOUS BLD VENIPUNCTURE: CPT

## 2024-01-10 PROCEDURE — 99233 SBSQ HOSP IP/OBS HIGH 50: CPT | Performed by: HOSPITALIST

## 2024-01-10 PROCEDURE — 770006 HCHG ROOM/CARE - MED/SURG/GYN SEMI*

## 2024-01-10 RX ORDER — MAGNESIUM SULFATE HEPTAHYDRATE 40 MG/ML
2 INJECTION, SOLUTION INTRAVENOUS ONCE
Status: COMPLETED | OUTPATIENT
Start: 2024-01-10 | End: 2024-01-10

## 2024-01-10 RX ADMIN — OXYCODONE HYDROCHLORIDE 10 MG: 10 TABLET ORAL at 00:37

## 2024-01-10 RX ADMIN — OXYCODONE HYDROCHLORIDE 10 MG: 10 TABLET ORAL at 12:42

## 2024-01-10 RX ADMIN — DIBASIC SODIUM PHOSPHATE, MONOBASIC POTASSIUM PHOSPHATE AND MONOBASIC SODIUM PHOSPHATE 250 MG: 852; 155; 130 TABLET ORAL at 12:42

## 2024-01-10 RX ADMIN — DIBASIC SODIUM PHOSPHATE, MONOBASIC POTASSIUM PHOSPHATE AND MONOBASIC SODIUM PHOSPHATE 250 MG: 852; 155; 130 TABLET ORAL at 04:55

## 2024-01-10 RX ADMIN — MAGNESIUM SULFATE HEPTAHYDRATE 2 G: 2 INJECTION, SOLUTION INTRAVENOUS at 10:37

## 2024-01-10 RX ADMIN — DOCUSATE SODIUM 50 MG AND SENNOSIDES 8.6 MG 2 TABLET: 8.6; 5 TABLET, FILM COATED ORAL at 04:55

## 2024-01-10 RX ADMIN — POLYETHYLENE GLYCOL 3350 1 PACKET: 17 POWDER, FOR SOLUTION ORAL at 17:17

## 2024-01-10 RX ADMIN — OXYCODONE HYDROCHLORIDE 10 MG: 10 TABLET ORAL at 04:54

## 2024-01-10 RX ADMIN — DOCUSATE SODIUM 50 MG AND SENNOSIDES 8.6 MG 2 TABLET: 8.6; 5 TABLET, FILM COATED ORAL at 17:17

## 2024-01-10 RX ADMIN — DIBASIC SODIUM PHOSPHATE, MONOBASIC POTASSIUM PHOSPHATE AND MONOBASIC SODIUM PHOSPHATE 250 MG: 852; 155; 130 TABLET ORAL at 17:17

## 2024-01-10 RX ADMIN — DIBASIC SODIUM PHOSPHATE, MONOBASIC POTASSIUM PHOSPHATE AND MONOBASIC SODIUM PHOSPHATE 250 MG: 852; 155; 130 TABLET ORAL at 00:37

## 2024-01-10 RX ADMIN — OXYCODONE HYDROCHLORIDE 10 MG: 10 TABLET ORAL at 18:47

## 2024-01-10 RX ADMIN — OXYCODONE HYDROCHLORIDE 10 MG: 10 TABLET ORAL at 08:06

## 2024-01-10 RX ADMIN — OXYCODONE HYDROCHLORIDE 10 MG: 10 TABLET ORAL at 15:46

## 2024-01-10 RX ADMIN — OXYCODONE HYDROCHLORIDE 10 MG: 10 TABLET ORAL at 21:57

## 2024-01-10 RX ADMIN — AMLODIPINE BESYLATE 5 MG: 5 TABLET ORAL at 04:55

## 2024-01-10 RX ADMIN — NICOTINE TRANSDERMAL SYSTEM 21 MG: 21 PATCH, EXTENDED RELEASE TRANSDERMAL at 04:56

## 2024-01-10 ASSESSMENT — ENCOUNTER SYMPTOMS
VOMITING: 0
ABDOMINAL PAIN: 0
MYALGIAS: 1

## 2024-01-10 ASSESSMENT — PAIN DESCRIPTION - PAIN TYPE
TYPE: ACUTE PAIN

## 2024-01-10 NOTE — ASSESSMENT & PLAN NOTE
Mag 1.0 2/2 excessive GI losses.  1/9 replaced 4gm IV mag chloride.  1/10 mag 1.4, ordered 2 gm IV mag.

## 2024-01-10 NOTE — THERAPY
01/10/24 1323   Interdisciplinary Plan of Care Collaboration   Collaboration Comments Met w/ pt to attempt eval.  Pt concerned regarding increased RUE edema.  Spoke w/ MD, who reports that edema has increased since yesterday.  Hospitalist will reach out to ortho, and requested defer eval today.

## 2024-01-10 NOTE — PROGRESS NOTES
"0750: Received report, assumed pt care. Pt a&o x 4, VSS, Assessment completed. Resting comfortably in bed with call light, bedside table in reach. Pt complains of 10/10 R shoulder and arm pain. R upper arm/elbow bruised and swollen. Pt could barely lift R arm, however denies numbness or tingling. Pt able to move fingers and . Pt declines wearing the sling at bedside. As per pt, \"it makes the pain worse. I can't wear it.\" Oxycodone will be administered as per MAR to relieve pain.  Side rails up x 2. Instructed to use call light when needing assistance, verbalized understanding. Bed alarm on, bed in low position. Will continue to monitor.       "

## 2024-01-10 NOTE — DISCHARGE PLANNING
Case Management Discharge Planning    Admission Date: 1/6/2024  GMLOS: 2.8  ALOS: 4    6-Clicks ADL Score: 16  6-Clicks Mobility Score: 14  PT and/or OT Eval ordered: Yes  Post-acute Referrals Ordered: Yes  Post-acute Choice Obtained: Yes  Has referral(s) been sent to post-acute provider:  Yes      Anticipated Discharge Dispo:      DME Needed: No    Action(s) Taken: OTHER    Discussed this case during Morning Huddle. Per MD, patient is expected to discharge home with no post acute needs.    Escalations Completed: None    Medically Clear: No    Next Steps: Awaiting Medical Clearance.    Barriers to Discharge: Medical clearance    Is the patient up for discharge tomorrow:

## 2024-01-10 NOTE — CARE PLAN
The patient is Watcher - Medium risk of patient condition declining or worsening    Shift Goals  Clinical Goals: control pain to 5/10, rest, decrease arm swelling  Patient Goals: control pain, rest  Family Goals: ARELIS    Progress made toward(s) clinical / shift goals:  Ultrasound done to assess R arm swelling. Consult placed to Ortho for increasing R arm pain and swelling.     Patient is not progressing towards the following goals:      Problem: Pain - Standard  Goal: Alleviation of pain or a reduction in pain to the patient’s comfort goal  Description: Target End Date:  Prior to discharge or change in level of care    Document on Vitals flowsheet    1.  Document pain using the appropriate pain scale per order or unit policy  2.  Educate and implement non-pharmacologic comfort measures (i.e. relaxation, distraction, massage, cold/heat therapy, etc.)  3.  Pain management medications as ordered  4.  Reassess pain after pain med administration per policy  5.  If opiods administered assess patient's response to pain medication is appropriate per POSS sedation scale  6.  Follow pain management plan developed in collaboration with patient and interdisciplinary team (including palliative care or pain specialists if applicable)  Outcome: Not Progressing

## 2024-01-10 NOTE — PROGRESS NOTES
Hospital Medicine Daily Progress Note    Date of Service  1/9/2024    Chief Complaint  Jose A Ponce is a 63 y.o. male admitted 1/6/2024 with   Chief Complaint   Patient presents with    T-5000 FALL     BIB [facility] from home for MGLF yesterday after his legs gave out. - LOC, - head trauma, + thinner (xarelto). Pt complains of R shoulder pain. Per EMS pt spent most of the night on the ground unable to get up. Pt reports he crawled to the phone this morning to call 911.         Hospital Course  Mr. Ponce has a past medical history of coronary artery bypass graft 2017 as well as DVT with PE on Xarelto as his hypertension that had been experiencing 1 week of intractable vomiting and fell on hardwood floor of the kitchen and was able to crawl to his phone and call 911.   Noted MAXIMO and lactic acidosis.    X-ray reveals a nondisplaced right humeral neck fracture.  Dr. Barry, orthopedic surgery recommended non operative management.  Nonweightbearing right upper extremity in sling    Interval Problem Update  1/9:  lives alone, right humerus fracture in sling.  Pending PT/OT evals.  Patient wants me to show him his fracture xray. Replacing IV magnesium, phosphorus and potassium s/p recent emesis, now resolved.    I have discussed this patient's plan of care and discharge plan at IDT rounds today with Case Management, Nursing, Nursing leadership, and other members of the IDT team.    Consultants/Specialty  orthopedics    Code Status  Full Code    Disposition  The patient is not medically cleared for discharge to home or a post-acute facility.  Anticipate discharge to: home with organized home healthcare and close outpatient follow-up    I have placed the appropriate orders for post-discharge needs.    Review of Systems  Review of Systems   Gastrointestinal:  Negative for abdominal pain and vomiting.   Musculoskeletal:  Positive for joint pain and myalgias.        Physical Exam  Temp:  [36.4 °C (97.5 °F)-37.8 °C (100.1  °F)] 37.8 °C (100.1 °F)  Pulse:  [] 111  Resp:  [16-17] 17  BP: (137-146)/(80-90) 146/90  SpO2:  [93 %-99 %] 93 %    Physical Exam  Vitals and nursing note reviewed.   Constitutional:       Appearance: Normal appearance. He is ill-appearing.   HENT:      Head: Normocephalic and atraumatic.      Nose: Nose normal.      Mouth/Throat:      Mouth: Mucous membranes are moist.      Pharynx: Oropharynx is clear.   Eyes:      Extraocular Movements: Extraocular movements intact.      Conjunctiva/sclera: Conjunctivae normal.   Cardiovascular:      Rate and Rhythm: Regular rhythm. Tachycardia present.      Pulses: Normal pulses.      Heart sounds: Normal heart sounds. No murmur heard.     No friction rub. No gallop.   Pulmonary:      Effort: Pulmonary effort is normal. No respiratory distress.      Breath sounds: Normal breath sounds. No wheezing or rales.   Chest:      Chest wall: No tenderness.   Abdominal:      General: Abdomen is flat. Bowel sounds are normal. There is no distension.      Palpations: Abdomen is soft. There is no mass.      Tenderness: There is no abdominal tenderness. There is no guarding.   Musculoskeletal:      Cervical back: Normal range of motion and neck supple.      Comments: Right upper extremity in sling   Skin:     General: Skin is warm.      Capillary Refill: Capillary refill takes less than 2 seconds.   Neurological:      General: No focal deficit present.      Mental Status: He is alert and oriented to person, place, and time. Mental status is at baseline.      Cranial Nerves: No cranial nerve deficit.      Motor: No weakness.   Psychiatric:         Mood and Affect: Mood normal.         Behavior: Behavior normal.         Fluids    Intake/Output Summary (Last 24 hours) at 1/9/2024 2234  Last data filed at 1/9/2024 1327  Gross per 24 hour   Intake 330 ml   Output 900 ml   Net -570 ml       Laboratory  Recent Labs     01/07/24  0203 01/08/24  0824 01/09/24  0134   WBC 7.6 6.1 6.0   RBC 2.51*  2.27* 2.23*   HEMOGLOBIN 9.3* 8.3* 8.1*   HEMATOCRIT 27.0* 23.9* 23.8*   .6* 105.3* 106.7*   MCH 37.1* 36.6* 36.3*   MCHC 34.4 34.7 34.0   RDW 46.1 43.9 43.8   PLATELETCT 112* 102* 131*   MPV 11.1 11.2 11.4     Recent Labs     01/07/24  1151 01/08/24  0824 01/09/24  0134   SODIUM 132* 134* 134*   POTASSIUM 4.1 4.1 3.5*   CHLORIDE 97 99 97   CO2 20 22 24   GLUCOSE 105* 99 117*   BUN 43* 29* 22   CREATININE 2.38* 1.36 1.34   CALCIUM 8.2* 8.2* 8.4*                     Imaging  CT-SHOULDER W/O PLUS RECONS RIGHT   Final Result      1.  Comminuted and mildly displaced right humeral neck fracture.   2.  No shoulder dislocation.   3.  Osteopenia.   4.  Prominent edema about the right shoulder, upper arm, clavicle and axilla.      DX-SHOULDER 2+ RIGHT   Final Result      1.  Displaced comminuted proximal right humeral neck fracture is identified.      DX-HUMERUS 2+ RIGHT   Final Result      Nondisplaced right humeral neck fracture           Assessment/Plan  * Lactic acidosis- (present on admission)  Assessment & Plan  Resolved with aggressive IV fluid hydration    Hypomagnesemia  Assessment & Plan  Mag 1.0 2/2 excessive GI losses.  1/9 replaced 4gm IV mag chloride.    SIRS (systemic inflammatory response syndrome) (HCC)- (present on admission)  Assessment & Plan  1/7/2024  SIRS criteria identified on my evaluation include:  Tachycardia, with heart rate greater than 90 BPM  SIRS is non-infectious, the patient does not have sepsis  Tachycardia and leukocytosis 14.3  Refrain from antibiotics    Transaminitis- (present on admission)  Assessment & Plan  1/7/2024  Moderately elevated  May be from recent viral illness and/or hypotension with low-flow state  Supportive care and liver enzymes ordered for the morning.    Metabolic acidosis- (present on admission)  Assessment & Plan  1/7/2024  Bicarb of 9 with anion gap of 36  Due to dehydration  Aggressive IV fluids and trend labs    History of pulmonary embolism- (present on  admission)  Assessment & Plan  Hold on Xarelto due to acute kidney failure  Last dose was 1/6 and with MAXIMO likely okay to continue to hold today with sufficient anticoagulation.   If he needs surgery, continue to hold and if not restart when his Cr comes down.  No surgery planned.  MAXIMO improving. Xarelto resumed    Closed fracture of neck of right humerus- (present on admission)  Assessment & Plan  Dr. Barry ortho consulted, recommended non operative management.    Nonweightbearing right upper extremity in sling  Outpatient follow-up in 1 to 2 weeks  PT/OT  Multimodal pain managements including po and iv narcotics prn. Monitoring respiratory status and sedation score      Tobacco abuse- (present on admission)  Assessment & Plan  Spent approx 5 mins on Tobacco cessation education . Discussed options of nicotine patch, medical treatment with wellbutrin and chantix. Discussed the benefits of quitting smoking and risks of continued smoking including cardiovascular disease, cancer and COPD.   Code 14881  -I have ordered nicotine replacement therapy    MAXIMO (acute kidney injury) (HCC)- (present on admission)  Assessment & Plan  His baseline Cr is 1 and is currently 2.76 due to prerenal with a mild concomitant rhabdomyolysis  Received IV fluid  Encourage oral hydration  Avoid nephrotoxins    Essential hypertension- (present on admission)  Assessment & Plan  Patient has been hypertensive.  Hold losartan with acute kidney injury  Started amlodipine    Coronary artery disease involving coronary bypass graft of native heart without angina pectoris- (present on admission)  Assessment & Plan  Hx of CABG  Resume lipitor once CPK trending down            VTE prophylaxis: Rivaroxaban    I have performed a physical exam and reviewed and updated ROS and Plan today (1/9/2024). In review of yesterday's note (1/8/2024), there are no changes except as documented above.

## 2024-01-10 NOTE — CARE PLAN
The patient is Stable - Low risk of patient condition declining or worsening    Shift Goals  Clinical Goals: Pain management  Patient Goals: Comfort  Family Goals: ARELIS    Progress made toward(s) clinical / shift goals: Patient pain is managed with prn medications.     Problem: Pain - Standard  Goal: Alleviation of pain or a reduction in pain to the patient’s comfort goal 3  Patient will state 3 on a scale of 0-10 by the end of the shift.  Outcome: Progressing     Problem: Knowledge Deficit - Standard  Goal: Patient and family/care givers will demonstrate understanding of plan of care, disease process/condition, diagnostic tests and medications.  Outcome: Progressing       Patient is not progressing towards the following goals:

## 2024-01-10 NOTE — THERAPY
Occupational Therapy Contact Note    OT consult received. Per MD, she reached out to ortho as pt w/ increased edema and pain in RUE today. Will defer eval and attempt as able.     Maryan Jarquin, OTR/L

## 2024-01-11 PROBLEM — F10.10 ALCOHOL ABUSE: Status: ACTIVE | Noted: 2024-01-11

## 2024-01-11 LAB
ALBUMIN SERPL BCP-MCNC: 3.2 G/DL (ref 3.2–4.9)
ALBUMIN/GLOB SERPL: 1.2 G/DL
ALP SERPL-CCNC: 85 U/L (ref 30–99)
ALT SERPL-CCNC: 63 U/L (ref 2–50)
ANION GAP SERPL CALC-SCNC: 13 MMOL/L (ref 7–16)
AST SERPL-CCNC: 71 U/L (ref 12–45)
BASOPHILS # BLD AUTO: 0.6 % (ref 0–1.8)
BASOPHILS # BLD: 0.04 K/UL (ref 0–0.12)
BILIRUB SERPL-MCNC: 1.4 MG/DL (ref 0.1–1.5)
BUN SERPL-MCNC: 13 MG/DL (ref 8–22)
CALCIUM ALBUM COR SERPL-MCNC: 8.7 MG/DL (ref 8.5–10.5)
CALCIUM SERPL-MCNC: 8.1 MG/DL (ref 8.5–10.5)
CHLORIDE SERPL-SCNC: 92 MMOL/L (ref 96–112)
CO2 SERPL-SCNC: 24 MMOL/L (ref 20–33)
CREAT SERPL-MCNC: 0.9 MG/DL (ref 0.5–1.4)
EOSINOPHIL # BLD AUTO: 0.05 K/UL (ref 0–0.51)
EOSINOPHIL NFR BLD: 0.7 % (ref 0–6.9)
ERYTHROCYTE [DISTWIDTH] IN BLOOD BY AUTOMATED COUNT: 43.9 FL (ref 35.9–50)
GFR SERPLBLD CREATININE-BSD FMLA CKD-EPI: 96 ML/MIN/1.73 M 2
GLOBULIN SER CALC-MCNC: 2.7 G/DL (ref 1.9–3.5)
GLUCOSE SERPL-MCNC: 126 MG/DL (ref 65–99)
HCT VFR BLD AUTO: 24 % (ref 42–52)
HGB BLD-MCNC: 8.3 G/DL (ref 14–18)
IMM GRANULOCYTES # BLD AUTO: 0.03 K/UL (ref 0–0.11)
IMM GRANULOCYTES NFR BLD AUTO: 0.4 % (ref 0–0.9)
LYMPHOCYTES # BLD AUTO: 0.99 K/UL (ref 1–4.8)
LYMPHOCYTES NFR BLD: 14.5 % (ref 22–41)
MAGNESIUM SERPL-MCNC: 1.4 MG/DL (ref 1.5–2.5)
MCH RBC QN AUTO: 36.6 PG (ref 27–33)
MCHC RBC AUTO-ENTMCNC: 34.6 G/DL (ref 32.3–36.5)
MCV RBC AUTO: 105.7 FL (ref 81.4–97.8)
MONOCYTES # BLD AUTO: 1.16 K/UL (ref 0–0.85)
MONOCYTES NFR BLD AUTO: 17 % (ref 0–13.4)
NEUTROPHILS # BLD AUTO: 4.54 K/UL (ref 1.82–7.42)
NEUTROPHILS NFR BLD: 66.8 % (ref 44–72)
NRBC # BLD AUTO: 0 K/UL
NRBC BLD-RTO: 0 /100 WBC (ref 0–0.2)
PLATELET # BLD AUTO: 196 K/UL (ref 164–446)
PMV BLD AUTO: 10.4 FL (ref 9–12.9)
POTASSIUM SERPL-SCNC: 3.5 MMOL/L (ref 3.6–5.5)
PROT SERPL-MCNC: 5.9 G/DL (ref 6–8.2)
RBC # BLD AUTO: 2.27 M/UL (ref 4.7–6.1)
SODIUM SERPL-SCNC: 129 MMOL/L (ref 135–145)
WBC # BLD AUTO: 6.8 K/UL (ref 4.8–10.8)

## 2024-01-11 PROCEDURE — 700102 HCHG RX REV CODE 250 W/ 637 OVERRIDE(OP): Performed by: HOSPITALIST

## 2024-01-11 PROCEDURE — 97166 OT EVAL MOD COMPLEX 45 MIN: CPT

## 2024-01-11 PROCEDURE — A9270 NON-COVERED ITEM OR SERVICE: HCPCS | Performed by: HOSPITALIST

## 2024-01-11 PROCEDURE — 99232 SBSQ HOSP IP/OBS MODERATE 35: CPT | Performed by: HOSPITALIST

## 2024-01-11 PROCEDURE — 36415 COLL VENOUS BLD VENIPUNCTURE: CPT

## 2024-01-11 PROCEDURE — 97162 PT EVAL MOD COMPLEX 30 MIN: CPT

## 2024-01-11 PROCEDURE — 83735 ASSAY OF MAGNESIUM: CPT

## 2024-01-11 PROCEDURE — 85025 COMPLETE CBC W/AUTO DIFF WBC: CPT

## 2024-01-11 PROCEDURE — 97535 SELF CARE MNGMENT TRAINING: CPT

## 2024-01-11 PROCEDURE — 770006 HCHG ROOM/CARE - MED/SURG/GYN SEMI*

## 2024-01-11 PROCEDURE — A9270 NON-COVERED ITEM OR SERVICE: HCPCS | Performed by: INTERNAL MEDICINE

## 2024-01-11 PROCEDURE — 700111 HCHG RX REV CODE 636 W/ 250 OVERRIDE (IP): Performed by: HOSPITALIST

## 2024-01-11 PROCEDURE — 700102 HCHG RX REV CODE 250 W/ 637 OVERRIDE(OP): Performed by: INTERNAL MEDICINE

## 2024-01-11 PROCEDURE — 80053 COMPREHEN METABOLIC PANEL: CPT

## 2024-01-11 PROCEDURE — 700102 HCHG RX REV CODE 250 W/ 637 OVERRIDE(OP): Mod: JZ | Performed by: HOSPITALIST

## 2024-01-11 PROCEDURE — A9270 NON-COVERED ITEM OR SERVICE: HCPCS | Mod: JZ | Performed by: HOSPITALIST

## 2024-01-11 RX ORDER — MAGNESIUM SULFATE HEPTAHYDRATE 40 MG/ML
4 INJECTION, SOLUTION INTRAVENOUS ONCE
Status: COMPLETED | OUTPATIENT
Start: 2024-01-11 | End: 2024-01-11

## 2024-01-11 RX ORDER — POTASSIUM CHLORIDE 20 MEQ/1
40 TABLET, EXTENDED RELEASE ORAL 2 TIMES DAILY
Status: COMPLETED | OUTPATIENT
Start: 2024-01-11 | End: 2024-01-12

## 2024-01-11 RX ADMIN — POTASSIUM CHLORIDE 40 MEQ: 1500 TABLET, EXTENDED RELEASE ORAL at 08:36

## 2024-01-11 RX ADMIN — OXYCODONE HYDROCHLORIDE 10 MG: 10 TABLET ORAL at 04:38

## 2024-01-11 RX ADMIN — OXYCODONE HYDROCHLORIDE 10 MG: 10 TABLET ORAL at 07:52

## 2024-01-11 RX ADMIN — OXYCODONE HYDROCHLORIDE 10 MG: 10 TABLET ORAL at 21:19

## 2024-01-11 RX ADMIN — OXYCODONE HYDROCHLORIDE 10 MG: 10 TABLET ORAL at 14:37

## 2024-01-11 RX ADMIN — DOCUSATE SODIUM 50 MG AND SENNOSIDES 8.6 MG 2 TABLET: 8.6; 5 TABLET, FILM COATED ORAL at 05:37

## 2024-01-11 RX ADMIN — NICOTINE TRANSDERMAL SYSTEM 21 MG: 21 PATCH, EXTENDED RELEASE TRANSDERMAL at 05:37

## 2024-01-11 RX ADMIN — AMLODIPINE BESYLATE 5 MG: 5 TABLET ORAL at 05:37

## 2024-01-11 RX ADMIN — OXYCODONE HYDROCHLORIDE 10 MG: 10 TABLET ORAL at 11:19

## 2024-01-11 RX ADMIN — OXYCODONE HYDROCHLORIDE 10 MG: 10 TABLET ORAL at 18:03

## 2024-01-11 RX ADMIN — RIVAROXABAN 20 MG: 20 TABLET, FILM COATED ORAL at 18:03

## 2024-01-11 RX ADMIN — MAGNESIUM SULFATE HEPTAHYDRATE 4 G: 4 INJECTION, SOLUTION INTRAVENOUS at 08:37

## 2024-01-11 RX ADMIN — OXYCODONE HYDROCHLORIDE 10 MG: 10 TABLET ORAL at 01:19

## 2024-01-11 RX ADMIN — POTASSIUM CHLORIDE 40 MEQ: 1500 TABLET, EXTENDED RELEASE ORAL at 18:03

## 2024-01-11 ASSESSMENT — GAIT ASSESSMENTS
DISTANCE (FEET): 3
GAIT LEVEL OF ASSIST: MINIMAL ASSIST
ASSISTIVE DEVICE: NONE;HAND HELD ASSIST

## 2024-01-11 ASSESSMENT — COGNITIVE AND FUNCTIONAL STATUS - GENERAL
CLIMB 3 TO 5 STEPS WITH RAILING: A LOT
DAILY ACTIVITIY SCORE: 14
EATING MEALS: A LITTLE
PERSONAL GROOMING: A LITTLE
DRESSING REGULAR LOWER BODY CLOTHING: A LOT
MOBILITY SCORE: 14
STANDING UP FROM CHAIR USING ARMS: A LITTLE
SUGGESTED CMS G CODE MODIFIER DAILY ACTIVITY: CK
TURNING FROM BACK TO SIDE WHILE IN FLAT BAD: UNABLE
DRESSING REGULAR UPPER BODY CLOTHING: A LOT
WALKING IN HOSPITAL ROOM: A LITTLE
MOVING FROM LYING ON BACK TO SITTING ON SIDE OF FLAT BED: UNABLE
TOILETING: A LOT
SUGGESTED CMS G CODE MODIFIER MOBILITY: CL
HELP NEEDED FOR BATHING: A LOT

## 2024-01-11 ASSESSMENT — PAIN DESCRIPTION - PAIN TYPE
TYPE: ACUTE PAIN

## 2024-01-11 ASSESSMENT — ENCOUNTER SYMPTOMS
ABDOMINAL PAIN: 0
MYALGIAS: 1
VOMITING: 0

## 2024-01-11 ASSESSMENT — ACTIVITIES OF DAILY LIVING (ADL): TOILETING: INDEPENDENT

## 2024-01-11 NOTE — PROGRESS NOTES
[facility] Medicine Daily Progress Note    Date of Service  1/10/2024    Chief Complaint  Jose A Ponce is a 63 y.o. male admitted 1/6/2024 with   Chief Complaint   Patient presents with    T-5000 FALL     BIB [facility] from home for MGLF yesterday after his legs gave out. - LOC, - head trauma, + thinner (xarelto). Pt complains of R shoulder pain. Per EMS pt spent most of the night on the ground unable to get up. Pt reports he crawled to the phone this morning to call 911.         Hospital Course  Mr. Ponce has a past medical history of coronary artery bypass graft 2017 as well as DVT with PE on Xarelto as his hypertension that had been experiencing 1 week of intractable vomiting and fell on hardwood floor of the kitchen and was able to crawl to his phone and call 911.   Noted MAXIMO and lactic acidosis.    X-ray reveals a nondisplaced right humeral neck fracture.  Dr. Barry, orthopedic surgery recommended non operative management.  Nonweightbearing right upper extremity in sling    Interval Problem Update  1/9:  lives alone, right humerus fracture in sling.  Pending PT/OT evals.  Patient wants me to show him his fracture xray. Replacing IV magnesium, phosphorus and potassium s/p recent emesis, now resolved.  1/10:  increased ecchymosis and tightness to right elbow/forearm since yesterday.  Good 2+ radial pulse and normal movement of right fingers, normal warmth and cap refill.  D/w orthopedic surgeon, no change.  Holding xarelto.  Us negative for dvt.  Arterial u/s normal.    I have discussed this patient's plan of care and discharge plan at IDT rounds today with Case Management, Nursing, Nursing leadership, and other members of the IDT team.    Consultants/Specialty  orthopedics    Code Status  Full Code    Disposition  The patient is not medically cleared for discharge to home or a post-acute facility.  Anticipate discharge to: home with close outpatient follow-up    I have placed the appropriate orders for  post-discharge needs.    Review of Systems  Review of Systems   Gastrointestinal:  Negative for abdominal pain and vomiting.   Musculoskeletal:  Positive for joint pain and myalgias.        Physical Exam  Temp:  [36.7 °C (98 °F)-37.8 °C (100.1 °F)] 36.7 °C (98 °F)  Pulse:  [] 95  Resp:  [17-20] 20  BP: (137-157)/(87-98) 137/88  SpO2:  [93 %-98 %] 98 %    Physical Exam  Vitals and nursing note reviewed.   Constitutional:       Appearance: Normal appearance. He is not ill-appearing.   HENT:      Head: Normocephalic and atraumatic.      Nose: Nose normal.      Mouth/Throat:      Mouth: Mucous membranes are moist.      Pharynx: Oropharynx is clear.   Eyes:      Extraocular Movements: Extraocular movements intact.      Conjunctiva/sclera: Conjunctivae normal.   Cardiovascular:      Rate and Rhythm: Normal rate and regular rhythm.      Pulses: Normal pulses.      Heart sounds: Normal heart sounds. No murmur heard.     No friction rub. No gallop.   Pulmonary:      Effort: Pulmonary effort is normal. No respiratory distress.      Breath sounds: Normal breath sounds. No wheezing or rales.   Chest:      Chest wall: No tenderness.   Abdominal:      General: Abdomen is flat. Bowel sounds are normal. There is no distension.      Palpations: Abdomen is soft. There is no mass.      Tenderness: There is no abdominal tenderness. There is no guarding.   Musculoskeletal:      Cervical back: Normal range of motion and neck supple.      Comments: Right upper extremity in sling   Skin:     General: Skin is warm.      Capillary Refill: Capillary refill takes less than 2 seconds.   Neurological:      General: No focal deficit present.      Mental Status: He is alert and oriented to person, place, and time. Mental status is at baseline.      Cranial Nerves: No cranial nerve deficit.      Motor: No weakness.   Psychiatric:         Mood and Affect: Mood normal.         Behavior: Behavior normal.         Thought Content: Thought content  normal.         Judgment: Judgment normal.         Fluids    Intake/Output Summary (Last 24 hours) at 1/10/2024 1715  Last data filed at 1/10/2024 1240  Gross per 24 hour   Intake 330 ml   Output 1100 ml   Net -770 ml       Laboratory  Recent Labs     01/08/24  0824 01/09/24  0134 01/10/24  0233   WBC 6.1 6.0 6.5   RBC 2.27* 2.23* 2.17*   HEMOGLOBIN 8.3* 8.1* 8.1*   HEMATOCRIT 23.9* 23.8* 22.9*   .3* 106.7* 105.5*   MCH 36.6* 36.3* 37.3*   MCHC 34.7 34.0 35.4   RDW 43.9 43.8 44.1   PLATELETCT 102* 131* 169   MPV 11.2 11.4 10.5     Recent Labs     01/08/24  0824 01/09/24  0134 01/10/24  0233   SODIUM 134* 134* 133*   POTASSIUM 4.1 3.5* 3.6   CHLORIDE 99 97 97   CO2 22 24 22   GLUCOSE 99 117* 107*   BUN 29* 22 14   CREATININE 1.36 1.34 0.98   CALCIUM 8.2* 8.4* 8.4*                     Imaging  US-EXTREMITY VENOUS UPPER UNILAT RIGHT   Final Result      US-EXTREMITY ARTERY UPPER UNILAT W/WBI (COMBO)   Final Result      CT-SHOULDER W/O PLUS RECONS RIGHT   Final Result      1.  Comminuted and mildly displaced right humeral neck fracture.   2.  No shoulder dislocation.   3.  Osteopenia.   4.  Prominent edema about the right shoulder, upper arm, clavicle and axilla.      DX-SHOULDER 2+ RIGHT   Final Result      1.  Displaced comminuted proximal right humeral neck fracture is identified.      DX-HUMERUS 2+ RIGHT   Final Result      Nondisplaced right humeral neck fracture           Assessment/Plan  * Lactic acidosis- (present on admission)  Assessment & Plan  Resolved with aggressive IV fluid hydration    Hypomagnesemia- (present on admission)  Assessment & Plan  Mag 1.0 2/2 excessive GI losses.  1/9 replaced 4gm IV mag chloride.  1/10 mag 1.4, ordered 2 gm IV mag.    SIRS (systemic inflammatory response syndrome) (HCC)- (present on admission)  Assessment & Plan  1/7/2024  SIRS criteria identified on my evaluation include:  Tachycardia, with heart rate greater than 90 BPM  SIRS is non-infectious, the patient does not  have sepsis  Tachycardia and leukocytosis 14.3  Refrain from antibiotics    Transaminitis- (present on admission)  Assessment & Plan    Moderately elevated  May be from recent viral illness and/or hypotension with low-flow state  Supportive care and liver enzymes ordered for the morning.  Repeat CMP in a.m.    Metabolic acidosis- (present on admission)  Assessment & Plan    Bicarb of 9 with anion gap of 36  Due to dehydration  Aggressive IV fluids completed.    History of pulmonary embolism- (present on admission)  Assessment & Plan  Hold on Xarelto due to acute kidney failure  Last dose was 1/6 and with MAXIMO likely okay to continue to hold today with sufficient anticoagulation.   If he needs surgery, continue to hold and if not restart when his Cr comes down.  No surgery planned.  MAXIMO improving.   1/10 increase in ecchymosis, arm circumference, hold xarelto.  Us venous and arterial wnl.    Closed fracture of neck of right humerus- (present on admission)  Assessment & Plan  Dr. Barry ortho consulted, recommended non operative management.    Nonweightbearing right upper extremity in sling  Outpatient follow-up in 1 to 2 weeks  PT/OT  Multimodal pain managements including po and iv narcotics prn. Monitoring respiratory status and sedation score      Tobacco abuse- (present on admission)  Assessment & Plan  Spent approx 5 mins on Tobacco cessation education . Discussed options of nicotine patch, medical treatment with wellbutrin and chantix. Discussed the benefits of quitting smoking and risks of continued smoking including cardiovascular disease, cancer and COPD.   Code 32498  -I have ordered nicotine replacement therapy    MAXIMO (acute kidney injury) (HCC)- (present on admission)  Assessment & Plan  His baseline Cr is 1 and is currently 2.76 due to prerenal with a mild concomitant rhabdomyolysis  Received IV fluid  Encourage oral hydration  Avoid nephrotoxins    Essential hypertension- (present on  admission)  Assessment & Plan  Patient has been hypertensive.  Hold losartan with acute kidney injury  Started amlodipine    Coronary artery disease involving coronary bypass graft of native heart without angina pectoris- (present on admission)  Assessment & Plan  Hx of CABG  Resume lipitor once CPK trending down            VTE prophylaxis: Rivaroxaban    I have performed a physical exam and reviewed and updated ROS and Plan today (1/10/2024). In review of yesterday's note (1/9/2024), there are no changes except as documented above.

## 2024-01-11 NOTE — CARE PLAN
The patient is Stable - Low risk of patient condition declining or worsening    Shift Goals  Clinical Goals: control pain to pts pain goal of 7/10, remain free from falls by end of shift, encourage RUE exercises  Patient Goals: pain control  Family Goals: ARELSI    Progress made toward(s) clinical / shift goals:      Patient is not progressing towards the following goals:

## 2024-01-11 NOTE — CARE PLAN
The patient is Stable - Low risk of patient condition declining or worsening    Shift Goals  Clinical Goals: control pain to pts pain goal of 7/10, remain free from falls by end of shift, encourage RUE exercises, up to chair with sling.  Patient Goals: pain control  Family Goals: ARELIS    Progress made toward(s) clinical / shift goals:  Pt pain controlled at 7/10 with PRN medications. Pt remains free from falls. Bed alarm on, bed in low position. Hourly rounding and bedside commitment in place. Pt up to chair for 60 mins with sling. Pt tolerated well.       Problem: Pain - Standard  Goal: Alleviation of pain or a reduction in pain to the patient’s comfort goal  Description: Target End Date:  Prior to discharge or change in level of care    Document on Vitals flowsheet    1.  Document pain using the appropriate pain scale per order or unit policy  2.  Educate and implement non-pharmacologic comfort measures (i.e. relaxation, distraction, massage, cold/heat therapy, etc.)  3.  Pain management medications as ordered  4.  Reassess pain after pain med administration per policy  5.  If opiods administered assess patient's response to pain medication is appropriate per POSS sedation scale  6.  Follow pain management plan developed in collaboration with patient and interdisciplinary team (including palliative care or pain specialists if applicable)  Outcome: Progressing       Patient is not progressing towards the following goals:

## 2024-01-11 NOTE — THERAPY
Physical Therapy   Initial Evaluation     Patient Name: Jose A Ponce  Age:  63 y.o., Sex:  male  Medical Record #: [id number]  Today's Date: 1/11/2024     Precautions  Precautions: Non Weight Bearing Right Upper Extremity (sling)    Assessment  Patient is 63 y.o. male admitted s/p fall, sustaining a right humerus fx, non op.  He lives alone in a ground floor apartment, where he was indep w/ mobility.  He owns, but does not use a cane.  Rec'd pt alert, in bed, agreeable to work w/ PT>  Educated regarding gentle ROM of right hand/wrist and elbow.  Pt is able to sit eob w/o assist.  Assisted into his sling w/ improvement in his pain.  He is able to stand w/ min assist and ambulate a short distance to his chair.  Tremulous movements noted during all activity, causing unsafe gait and fear.  Pt reports ambulating to the bathroom w/ nsg and feeling as though his legs will give out.  Anticipate that w/ oob activities by both nsg and therapy, that in the next several days he may be able to d/c home.  If he is unable to improve to that degree, then post acute placement would be needed as pt lives alone.  PT will follow and address impairments noted below.  Plan    Physical Therapy Initial Treatment Plan   Treatment Plan : Gait Training, Therapeutic Activities (transfers)  Treatment Frequency: 3 Times per Week  Duration: Until Therapy Goals Met    DC Equipment Recommendations: Unable to determine at this time  Discharge Recommendations: Recommend home health for continued physical therapy services      Objective       01/11/24 0924   Prior Living Situation   Housing / Facility 1 Story Apartment / Condo   Steps Into Home 0   Steps In Home 0   Equipment Owned None   Lives with - Patient's Self Care Capacity Alone and Able to Care For Self   Prior Level of Functional Mobility   Bed Mobility Independent   Transfer Status Independent   Ambulation Independent   Assistive Devices Used None   Cognition    Level of Consciousness  Alert   Strength Lower Body   Comments   (grossly 4/5)   Balance Assessment   Sitting Balance (Static) Fair +   Sitting Balance (Dynamic) Fair +   Standing Balance (Static) Fair   Standing Balance (Dynamic) Fair -   Weight Shift Sitting Fair   Weight Shift Standing Fair   Bed Mobility    Supine to Sit Supervised   Gait Analysis   Gait Level Of Assist Minimal Assist   Assistive Device None;Hand Held Assist   Distance (Feet) 3   Deviation   (tremulous)   Functional Mobility   Sit to Stand Minimal Assist   Bed, Chair, Wheelchair Transfer Minimal Assist   Short Term Goals    Short Term Goal # 1 Pt to move sit to/from stand w/ spv in 6 visits   Short Term Goal # 2 Pt to ambulate 150 ft w/ spv in 6 visits   Education Group   Education Provided Role of Physical Therapist;Weight Bearing Status;Exercises - Seated   Role of Physical Therapist Patient Response Patient;Acceptance;Explanation;Verbal Demonstration   Exercises - Seated Patient Response Patient;Acceptance;Explanation;Verbal Demonstration;Action Demonstration  (hand/wrist/elbow ROM)   Weight Bearing Status Patient Response Patient;Acceptance;Explanation;Verbal Demonstration;Action Demonstration   Physical Therapy Initial Treatment Plan    Treatment Plan  Gait Training;Therapeutic Activities  (transfers)   Treatment Frequency 3 Times per Week   Duration Until Therapy Goals Met   Problem List    Problems Impaired Transfers;Impaired Ambulation;Impaired Balance;Decreased Activity Tolerance   Anticipated Discharge Equipment and Recommendations   DC Equipment Recommendations Unable to determine at this time   Discharge Recommendations Recommend home health for continued physical therapy services

## 2024-01-11 NOTE — PROGRESS NOTES
Assumed care of patient at 1900. Received report from day RN. Patient A&Ox4, on RA, Reporting a pain level of 10/10, pt states he wants to wait for next oxy dose. R arm edema and bruising noted. Call light within reach, belongings within reach, Fall precautions in place, bed in lowest position. Patient does not have any other needs at this time.

## 2024-01-11 NOTE — THERAPY
"Occupational Therapy   Initial Evaluation     Patient Name: Jose A Ponce  Age:  63 y.o., Sex:  male  Medical Record #: [id number]  Today's Date: 1/11/2024     Precautions  Precautions: Fall Risk, Non Weight Bearing Right Upper Extremity, Sling Right Upper Extremity  Comments: Per Asha: \"Progressive elbow ROM and ultimately pendulums\"    Assessment  Patient is 63 y.o. male admitted after GLF, sustaining a R humeral neck fx managed non-op w/ a sling. The week prior the had one week of vomiting so he was dehydrated w/ MAXIMO and rhabdo.  Additional factors influencing patient status / progress: weakness, fatigue, impaired balance, pain.      Plan    Occupational Therapy Initial Treatment Plan   Treatment Interventions: Self Care / Activities of Daily Living, Adaptive Equipment, Neuro Re-Education / Balance, Therapeutic Exercises, Therapeutic Activity  Treatment Frequency: 5 Times per Week  Duration: Until Therapy Goals Met    DC Equipment Recommendations: Unable to determine at this time  Discharge Recommendations: Recommend post-acute placement for additional occupational therapy services prior to discharge home (at this time, may progress to home w/ home health OT)     Subjective    \"I've broken 69 bones, and none of them have hurt this bad.\"     Objective       01/11/24 1106   Prior Living Situation   Prior Services Home-Independent   Housing / Facility 1 Story Apartment / Condo   Steps Into Home 0   Steps In Home 0   Bathroom Set up Bathtub / Shower Combination;Shower Curtain;Grab Bars   Equipment Owned Grab Bar(s) In Tub / Shower;Single Point Cane   Lives with - Patient's Self Care Capacity Alone and Able to Care For Self   Comments Pt did not speak of social support.   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)   History of Falls   History of Falls Yes   Precautions   Precautions Fall Risk;Non Weight Bearing Right Upper Extremity;Sling Right Upper Extremity   Comments Per Asha: \"Progressive elbow ROM and ultimately pendulums\"   Pain 0 - 10 Group   Therapist Pain Assessment Nurse Notified;During Activity  (mod c/o R sh pain, medicated during)   Cognition    Cognition / Consciousness WDL   Level of Consciousness Alert   Comments pleasant and cooperative   Active ROM Upper Body   Active ROM Upper Body  X   Dominant Hand Right   Comments LUE WFL, R UE NT at shoulder, able to flex/extend elbow and open/close R hand   Strength Upper Body   Upper Body Strength  X   Comments LUE 4/5, RUE NT   Balance Assessment   Sitting Balance (Static) Fair +   Sitting Balance (Dynamic) Fair   Standing Balance (Static) Fair -   Standing Balance (Dynamic) Poor +   Weight Shift Sitting Fair   Weight Shift Standing Fair   Comments w/ HHA   Bed Mobility    Supine to Sit Standby Assist   Scooting Standby Assist   Comments HOB elevated   ADL Assessment   Grooming Supervision;Seated   Upper Body Dressing Maximal Assist  (pt wearing his personal tshirt from admit 5 days ago; assisted with doffing. donned sling and new gown)   Lower Body Dressing Maximal Assist  (don socks, pt attempted to utilize one handed tech but ultimately required maxA due to pain)   Toileting   (declined)   How much help from another person does the patient currently need...   Putting on and taking off regular lower body clothing? 2   Bathing (including washing, rinsing, and drying)? 2   Toileting, which includes using a " toilet, bedpan, or urinal? 2   Putting on and taking off regular upper body clothing? 2   Taking care of personal grooming such as brushing teeth? 3   Eating meals? 3   6 Clicks Daily Activity Score 14   Functional Mobility   Sit to Stand Minimal Assist   Bed, Chair, Wheelchair Transfer Minimal Assist   Mobility EOB>STS>Steps to chair   Comments w/ HHA   Edema / Skin Assessment   Comments RUE very edematous; ASHOK MATHIAS stated ok to put tubigrip and isotoner glove on, will apply next session   Patient / Family Goals   Patient / Family Goal #1 go home   Short Term Goals   Short Term Goal # 1 pt will demo UB dressing including sling w/ supv   Short Term Goal # 2 pt will dress LB with supv and AE prn   Short Term Goal # 3 pt will demo toileting with supv   Short Term Goal # 4 pt will demo toilet txf w/ supv

## 2024-01-11 NOTE — PROGRESS NOTES
"0800:  Received report, assumed pt care. Pt a&o x 4, VSS, Assessment completed. Resting comfortably in bed with call light, bedside table in reach. Pt complains of 9/10 R shoulder and arm pain. R upper arm/elbow bruised and swollen. Pt could barely lift R arm, however denies numbness or tingling. Pulses 2+. Pt able to move fingers and . Fingers warm. R lateral upper chest also bruised. Pt declines wearing the sling at bedside. As per pt, \"it makes the pain worse. I can't wear it.\" Oxycodone administered as per MAR to relieve pain.  Side rails up x 2. Instructed to use call light when needing assistance, verbalized understanding. Condom catheter in place, skin intact. Emptied 1275 ml of sheldon urine. Waffle overlay in place, pt encouraged t turn and change position often. Bed alarm on, bed in low position. Will continue to monitor.    "

## 2024-01-12 LAB
ALBUMIN SERPL BCP-MCNC: 3.4 G/DL (ref 3.2–4.9)
ALBUMIN/GLOB SERPL: 1.1 G/DL
ALP SERPL-CCNC: 92 U/L (ref 30–99)
ALT SERPL-CCNC: 62 U/L (ref 2–50)
ANION GAP SERPL CALC-SCNC: 13 MMOL/L (ref 7–16)
AST SERPL-CCNC: 68 U/L (ref 12–45)
BASOPHILS # BLD AUTO: 0.7 % (ref 0–1.8)
BASOPHILS # BLD: 0.06 K/UL (ref 0–0.12)
BILIRUB SERPL-MCNC: 1.1 MG/DL (ref 0.1–1.5)
BUN SERPL-MCNC: 17 MG/DL (ref 8–22)
CALCIUM ALBUM COR SERPL-MCNC: 9.3 MG/DL (ref 8.5–10.5)
CALCIUM SERPL-MCNC: 8.8 MG/DL (ref 8.5–10.5)
CHLORIDE SERPL-SCNC: 95 MMOL/L (ref 96–112)
CO2 SERPL-SCNC: 21 MMOL/L (ref 20–33)
CREAT SERPL-MCNC: 1.01 MG/DL (ref 0.5–1.4)
EOSINOPHIL # BLD AUTO: 0.13 K/UL (ref 0–0.51)
EOSINOPHIL NFR BLD: 1.5 % (ref 0–6.9)
ERYTHROCYTE [DISTWIDTH] IN BLOOD BY AUTOMATED COUNT: 44.4 FL (ref 35.9–50)
GFR SERPLBLD CREATININE-BSD FMLA CKD-EPI: 83 ML/MIN/1.73 M 2
GLOBULIN SER CALC-MCNC: 3.1 G/DL (ref 1.9–3.5)
GLUCOSE SERPL-MCNC: 133 MG/DL (ref 65–99)
HCT VFR BLD AUTO: 26 % (ref 42–52)
HGB BLD-MCNC: 9.2 G/DL (ref 14–18)
IMM GRANULOCYTES # BLD AUTO: 0.08 K/UL (ref 0–0.11)
IMM GRANULOCYTES NFR BLD AUTO: 0.9 % (ref 0–0.9)
LYMPHOCYTES # BLD AUTO: 1.24 K/UL (ref 1–4.8)
LYMPHOCYTES NFR BLD: 13.9 % (ref 22–41)
MAGNESIUM SERPL-MCNC: 1.5 MG/DL (ref 1.5–2.5)
MCH RBC QN AUTO: 37.2 PG (ref 27–33)
MCHC RBC AUTO-ENTMCNC: 35.4 G/DL (ref 32.3–36.5)
MCV RBC AUTO: 105.3 FL (ref 81.4–97.8)
MONOCYTES # BLD AUTO: 1.48 K/UL (ref 0–0.85)
MONOCYTES NFR BLD AUTO: 16.6 % (ref 0–13.4)
NEUTROPHILS # BLD AUTO: 5.94 K/UL (ref 1.82–7.42)
NEUTROPHILS NFR BLD: 66.4 % (ref 44–72)
NRBC # BLD AUTO: 0 K/UL
NRBC BLD-RTO: 0 /100 WBC (ref 0–0.2)
PLATELET # BLD AUTO: 279 K/UL (ref 164–446)
PMV BLD AUTO: 9.8 FL (ref 9–12.9)
POTASSIUM SERPL-SCNC: 4.7 MMOL/L (ref 3.6–5.5)
PROT SERPL-MCNC: 6.5 G/DL (ref 6–8.2)
RBC # BLD AUTO: 2.47 M/UL (ref 4.7–6.1)
SODIUM SERPL-SCNC: 129 MMOL/L (ref 135–145)
WBC # BLD AUTO: 8.9 K/UL (ref 4.8–10.8)

## 2024-01-12 PROCEDURE — A9270 NON-COVERED ITEM OR SERVICE: HCPCS | Mod: JZ | Performed by: HOSPITALIST

## 2024-01-12 PROCEDURE — 700111 HCHG RX REV CODE 636 W/ 250 OVERRIDE (IP): Performed by: HOSPITALIST

## 2024-01-12 PROCEDURE — 700102 HCHG RX REV CODE 250 W/ 637 OVERRIDE(OP): Performed by: INTERNAL MEDICINE

## 2024-01-12 PROCEDURE — 80053 COMPREHEN METABOLIC PANEL: CPT

## 2024-01-12 PROCEDURE — A9270 NON-COVERED ITEM OR SERVICE: HCPCS | Performed by: PHYSICAL MEDICINE & REHABILITATION

## 2024-01-12 PROCEDURE — 700102 HCHG RX REV CODE 250 W/ 637 OVERRIDE(OP): Performed by: HOSPITALIST

## 2024-01-12 PROCEDURE — 700102 HCHG RX REV CODE 250 W/ 637 OVERRIDE(OP): Mod: JZ | Performed by: HOSPITALIST

## 2024-01-12 PROCEDURE — 700111 HCHG RX REV CODE 636 W/ 250 OVERRIDE (IP)

## 2024-01-12 PROCEDURE — A9270 NON-COVERED ITEM OR SERVICE: HCPCS | Performed by: INTERNAL MEDICINE

## 2024-01-12 PROCEDURE — 700101 HCHG RX REV CODE 250: Performed by: PHYSICAL MEDICINE & REHABILITATION

## 2024-01-12 PROCEDURE — 99406 BEHAV CHNG SMOKING 3-10 MIN: CPT | Performed by: PHYSICAL MEDICINE & REHABILITATION

## 2024-01-12 PROCEDURE — 99232 SBSQ HOSP IP/OBS MODERATE 35: CPT | Performed by: HOSPITALIST

## 2024-01-12 PROCEDURE — 85025 COMPLETE CBC W/AUTO DIFF WBC: CPT

## 2024-01-12 PROCEDURE — 770006 HCHG ROOM/CARE - MED/SURG/GYN SEMI*

## 2024-01-12 PROCEDURE — 99223 1ST HOSP IP/OBS HIGH 75: CPT | Mod: 25 | Performed by: PHYSICAL MEDICINE & REHABILITATION

## 2024-01-12 PROCEDURE — A9270 NON-COVERED ITEM OR SERVICE: HCPCS | Performed by: HOSPITALIST

## 2024-01-12 PROCEDURE — 700102 HCHG RX REV CODE 250 W/ 637 OVERRIDE(OP): Performed by: PHYSICAL MEDICINE & REHABILITATION

## 2024-01-12 PROCEDURE — 36415 COLL VENOUS BLD VENIPUNCTURE: CPT

## 2024-01-12 PROCEDURE — 700111 HCHG RX REV CODE 636 W/ 250 OVERRIDE (IP): Performed by: INTERNAL MEDICINE

## 2024-01-12 PROCEDURE — 83735 ASSAY OF MAGNESIUM: CPT

## 2024-01-12 RX ORDER — HYDROMORPHONE HYDROCHLORIDE 1 MG/ML
1 INJECTION, SOLUTION INTRAMUSCULAR; INTRAVENOUS; SUBCUTANEOUS
Status: DISCONTINUED | OUTPATIENT
Start: 2024-01-12 | End: 2024-01-15 | Stop reason: HOSPADM

## 2024-01-12 RX ORDER — HYDROMORPHONE HYDROCHLORIDE 1 MG/ML
1 INJECTION, SOLUTION INTRAMUSCULAR; INTRAVENOUS; SUBCUTANEOUS ONCE
Status: ACTIVE | OUTPATIENT
Start: 2024-01-12 | End: 2024-01-13

## 2024-01-12 RX ORDER — LIDOCAINE 4 G/G
1 PATCH TOPICAL EVERY 24 HOURS
Status: DISCONTINUED | OUTPATIENT
Start: 2024-01-12 | End: 2024-01-15 | Stop reason: HOSPADM

## 2024-01-12 RX ORDER — ACETAMINOPHEN 500 MG
1000 TABLET ORAL EVERY 6 HOURS
Status: DISCONTINUED | OUTPATIENT
Start: 2024-01-12 | End: 2024-01-13

## 2024-01-12 RX ORDER — OXYCODONE HYDROCHLORIDE 5 MG/1
5 TABLET ORAL
Status: DISCONTINUED | OUTPATIENT
Start: 2024-01-12 | End: 2024-01-15 | Stop reason: HOSPADM

## 2024-01-12 RX ORDER — OXYCODONE HYDROCHLORIDE 10 MG/1
10 TABLET ORAL
Status: DISCONTINUED | OUTPATIENT
Start: 2024-01-12 | End: 2024-01-15 | Stop reason: HOSPADM

## 2024-01-12 RX ADMIN — ONDANSETRON 4 MG: 4 TABLET, ORALLY DISINTEGRATING ORAL at 09:20

## 2024-01-12 RX ADMIN — ACETAMINOPHEN 1000 MG: 500 TABLET ORAL at 21:00

## 2024-01-12 RX ADMIN — OXYCODONE HYDROCHLORIDE 10 MG: 10 TABLET ORAL at 11:12

## 2024-01-12 RX ADMIN — NICOTINE TRANSDERMAL SYSTEM 21 MG: 21 PATCH, EXTENDED RELEASE TRANSDERMAL at 06:08

## 2024-01-12 RX ADMIN — OXYCODONE HYDROCHLORIDE 10 MG: 10 TABLET ORAL at 15:19

## 2024-01-12 RX ADMIN — ACETAMINOPHEN 1000 MG: 500 TABLET ORAL at 15:19

## 2024-01-12 RX ADMIN — LIDOCAINE 1 PATCH: 4 PATCH TOPICAL at 15:20

## 2024-01-12 RX ADMIN — POTASSIUM CHLORIDE 40 MEQ: 1500 TABLET, EXTENDED RELEASE ORAL at 06:07

## 2024-01-12 RX ADMIN — RIVAROXABAN 20 MG: 20 TABLET, FILM COATED ORAL at 17:08

## 2024-01-12 RX ADMIN — AMLODIPINE BESYLATE 5 MG: 5 TABLET ORAL at 06:08

## 2024-01-12 RX ADMIN — OXYCODONE HYDROCHLORIDE 10 MG: 10 TABLET ORAL at 08:05

## 2024-01-12 RX ADMIN — OXYCODONE HYDROCHLORIDE 10 MG: 10 TABLET ORAL at 18:44

## 2024-01-12 RX ADMIN — OXYCODONE HYDROCHLORIDE 10 MG: 10 TABLET ORAL at 00:27

## 2024-01-12 RX ADMIN — HYDROMORPHONE HYDROCHLORIDE 1 MG: 1 INJECTION, SOLUTION INTRAMUSCULAR; INTRAVENOUS; SUBCUTANEOUS at 21:41

## 2024-01-12 RX ADMIN — DOCUSATE SODIUM 50 MG AND SENNOSIDES 8.6 MG 2 TABLET: 8.6; 5 TABLET, FILM COATED ORAL at 06:07

## 2024-01-12 ASSESSMENT — PAIN DESCRIPTION - PAIN TYPE
TYPE: ACUTE PAIN

## 2024-01-12 ASSESSMENT — ENCOUNTER SYMPTOMS
MYALGIAS: 1
ABDOMINAL PAIN: 0
VOMITING: 0

## 2024-01-12 NOTE — PROGRESS NOTES
Pt is A&O x4 and on RA. Pt presents with even and non-labored breathing and denies SOB. Pt reports pain 9/10 on his right arm. Pt medicated per MAR for relief. Bed in lowest and locked position. Pt refused the bed alarm but calls appropriately. Call light within reach and pt declines any further needs at this time.

## 2024-01-12 NOTE — PROGRESS NOTES
Received report and assumed care of patient at change of shift. Patient is A&Ox 4, on room air, and reports 6/10 pain, declines medication until next scheduled dose at 2119 per MAR at this time. Patient assessment completed, bed in lowest position, and call light and personal belongings are within reach. Patient expressed no further needs at this time.

## 2024-01-12 NOTE — DISCHARGE PLANNING
[facility] Transitional Care Coordination    Referral from: Dr Mejia  Insurance Provider on Facesheet: UMR  Potential Rehab Diagnosis: Humerus fx, debility    Chart review indicates patient may have on going medical management and may have therapy needs to possibly meet inpatient rehab facility criteria with the goal of returning to community.    D/C support: Potentially sisters, lives alone     Physiatry consultation forwarded per protocol.     Physiatry to consult, TCC will follow.     Thank you for the referral.

## 2024-01-12 NOTE — CONSULTS
Physical Medicine and Rehabilitation Consultation          Date of initial consultation: 1/12/2024  Consulting provider: Sherry Mejia M.D.   Reason for consultation: assess for acute inpatient rehab appropriateness  LOS: 6 Day(s)    Chief complaint: Fall    HPI: The patient is a 63 y.o. right hand dominant male with a past medical history of CABG 2017, DVT with PE on Xarelto, hypertension;  who presented on 1/6/2024  6:56 AM with 1 week of intractable vomiting and ground-level fall.  In the ED patient's lactic acid was 11.7, leukocytosis of 14, bicarb 9, MAXIMO with creatinine 2.76.  Patient was found to have a minimally displaced right humeral neck fracture on x-ray.  Patient was seen by Dr. Lalit Barry MD of orthopedic surgery who found the fracture to be mildly displaced and recommended nonoperative management, NWB, and follow-up in clinic in 1 to 2 weeks.  Sling for comfort.    The patient currently reports continued severe pain in his right arm.  Patient reports 69 prior fractures and this is the worst.  He feels crippled by pain.  Currently using 105 morphine equivalents per day with Roxicodone 10 mg every 3 hours.  He has no other complaints besides this.  He is requesting reevaluation for surgical fixation.    ROS  Pertinent positives are mentioned in the HPI, all others reviewed and are negative.    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  Comminuted and mildly displaced right humeral neck fracture.  2.  No shoulder dislocation.  3.  Osteopenia.  4.  Prominent edema about the right shoulder, upper arm, clavicle and  "axilla.    PROCEDURES:  None    PMH:  Past Medical History:   Diagnosis Date    Breath shortness 10/2017    \"In the past, not an issue now\" sob before heart surgery    CAD (coronary artery disease)     S/P CABG X2    Dental disorder 10/2017    Upper dentures    DVT (deep venous thrombosis) ([facility])     Grief reaction 2017    Girlfriend  this year.    Hx of hernia repair     Mesh    Hyperlipidemia     Hypertension     Psychiatric problem 10/2017    Depression    Tobacco use        PSH:  Past Surgical History:   Procedure Laterality Date    MULTIPLE CORONARY ARTERY BYPASS ENDO VEIN HARVEST  2017    Procedure: MULTIPLE CORONARY ARTERY BYPASS x2 RIGHT LEG ENDO VEIN HARVEST;  Surgeon: Gray Barboza M.D.;  Location: SURGERY [facility];  Service:     HUBERT  2017    Procedure: HUBERT - INTRAOP;  Surgeon: Gray Barboza M.D.;  Location: SURGERY [facility];  Service:     INGUINAL HERNIA REPAIR Bilateral     ORIF, FRACTURE, TIBIA Right 1980s    ZZZ CARDIAC CATH         FHX:  Family History   Problem Relation Age of Onset    Cancer Mother     Cancer Father     No Known Problems Sister     No Known Problems Sister     Heart Disease Maternal Grandfather 89        probable MI and sudden death       Medications:  Current Facility-Administered Medications   Medication Dose    cyclobenzaprine (Flexeril) tablet 10 mg  10 mg    amLODIPine (Norvasc) tablet 5 mg  5 mg    Pharmacy Consult Request ...Pain Management Review 1 Each  1 Each    oxyCODONE immediate-release (Roxicodone) tablet 5 mg  5 mg    Or    oxyCODONE immediate release (Roxicodone) tablet 10 mg  10 mg    Or    HYDROmorphone (Dilaudid) injection 1 mg  1 mg    rivaroxaban (Xarelto) tablet 20 mg  20 mg    nicotine (Nicoderm) 21 MG/24HR 21 mg  21 mg    senna-docusate (Pericolace Or Senokot S) 8.6-50 MG per tablet 2 Tablet  2 Tablet    And    polyethylene glycol/lytes (Miralax) Packet 1 Packet  1 Packet    And    magnesium hydroxide (Milk Of Magnesia) " "suspension 30 mL  30 mL    And    bisacodyl (Dulcolax) suppository 10 mg  10 mg    ondansetron (Zofran) syringe/vial injection 4 mg  4 mg    ondansetron (Zofran ODT) dispertab 4 mg  4 mg    promethazine (Phenergan) tablet 12.5-25 mg  12.5-25 mg    promethazine (Phenergan) suppository 12.5-25 mg  12.5-25 mg    prochlorperazine (Compazine) injection 5-10 mg  5-10 mg       Allergies:  No Known Allergies      Physical Exam:  Vitals: /78   Pulse (!) 104   Temp 36.6 °C (97.9 °F) (Temporal)   Resp 20   Ht 1.854 m (6' 1\")   Wt 83 kg (182 lb 15.7 oz)   SpO2 95%   Gen: NAD  Head: NC/AT  Eyes/ Nose/ Mouth: PERRLA, moist mucous membranes  Cardio: RRR, good distal perfusion, warm extremities  Pulm: normal respiratory effort, no cyanosis   Abd: Soft NTND, negative borborygmi   Ext: Right arm in sling, held close to body, tremulous    Mental status: answers questions appropriately follows commands  Speech: fluent, no aphasia or dysarthria    Motor:      Upper Extremity  Myotome R L   Shoulder flexion C5 0/5 5   Elbow flexion C5 0/5 5   Wrist extension C6 2/5 5   Elbow extension C7 0/5 5   Finger flexion C8 3/5 5   Finger abduction T1 3/5 5     Lower Extremity Myotome R L   Hip flexion L2 5 5   Knee extension L3 5 5   Ankle dorsiflexion L4 5 5   Toe extension L5 5 5   Ankle plantarflexion S1 5 5     Sensory:   intact to light touch through out      Labs: Reviewed and significant for   Recent Labs     01/10/24  0233 01/11/24  0155 01/12/24  1035   RBC 2.17* 2.27* 2.47*   HEMOGLOBIN 8.1* 8.3* 9.2*   HEMATOCRIT 22.9* 24.0* 26.0*   PLATELETCT 169 196 279     Recent Labs     01/10/24  0233 01/11/24  0155 01/12/24  1035   SODIUM 133* 129* 129*   POTASSIUM 3.6 3.5* 4.7   CHLORIDE 97 92* 95*   CO2 22 24 21   GLUCOSE 107* 126* 133*   BUN 14 13 17   CREATININE 0.98 0.90 1.01   CALCIUM 8.4* 8.1* 8.8     Recent Results (from the past 24 hour(s))   Comp Metabolic Panel    Collection Time: 01/12/24 10:35 AM   Result Value Ref Range "    Sodium 129 (L) 135 - 145 mmol/L    Potassium 4.7 3.6 - 5.5 mmol/L    Chloride 95 (L) 96 - 112 mmol/L    Co2 21 20 - 33 mmol/L    Anion Gap 13.0 7.0 - 16.0    Glucose 133 (H) 65 - 99 mg/dL    Bun 17 8 - 22 mg/dL    Creatinine 1.01 0.50 - 1.40 mg/dL    Calcium 8.8 8.5 - 10.5 mg/dL    Correct Calcium 9.3 8.5 - 10.5 mg/dL    AST(SGOT) 68 (H) 12 - 45 U/L    ALT(SGPT) 62 (H) 2 - 50 U/L    Alkaline Phosphatase 92 30 - 99 U/L    Total Bilirubin 1.1 0.1 - 1.5 mg/dL    Albumin 3.4 3.2 - 4.9 g/dL    Total Protein 6.5 6.0 - 8.2 g/dL    Globulin 3.1 1.9 - 3.5 g/dL    A-G Ratio 1.1 g/dL   CBC WITH DIFFERENTIAL    Collection Time: 01/12/24 10:35 AM   Result Value Ref Range    WBC 8.9 4.8 - 10.8 K/uL    RBC 2.47 (L) 4.70 - 6.10 M/uL    Hemoglobin 9.2 (L) 14.0 - 18.0 g/dL    Hematocrit 26.0 (L) 42.0 - 52.0 %    .3 (H) 81.4 - 97.8 fL    MCH 37.2 (H) 27.0 - 33.0 pg    MCHC 35.4 32.3 - 36.5 g/dL    RDW 44.4 35.9 - 50.0 fL    Platelet Count 279 164 - 446 K/uL    MPV 9.8 9.0 - 12.9 fL    Neutrophils-Polys 66.40 44.00 - 72.00 %    Lymphocytes 13.90 (L) 22.00 - 41.00 %    Monocytes 16.60 (H) 0.00 - 13.40 %    Eosinophils 1.50 0.00 - 6.90 %    Basophils 0.70 0.00 - 1.80 %    Immature Granulocytes 0.90 0.00 - 0.90 %    Nucleated RBC 0.00 0.00 - 0.20 /100 WBC    Neutrophils (Absolute) 5.94 1.82 - 7.42 K/uL    Lymphs (Absolute) 1.24 1.00 - 4.80 K/uL    Monos (Absolute) 1.48 (H) 0.00 - 0.85 K/uL    Eos (Absolute) 0.13 0.00 - 0.51 K/uL    Baso (Absolute) 0.06 0.00 - 0.12 K/uL    Immature Granulocytes (abs) 0.08 0.00 - 0.11 K/uL    NRBC (Absolute) 0.00 K/uL   MAGNESIUM    Collection Time: 01/12/24 10:35 AM   Result Value Ref Range    Magnesium 1.5 1.5 - 2.5 mg/dL   ESTIMATED GFR    Collection Time: 01/12/24 10:35 AM   Result Value Ref Range    GFR (CKD-EPI) 83 >60 mL/min/1.73 m 2     ASSESSMENT:  Patient is a 63 y.o. male admitted with right humerus fracture after ground-level fall.      [facility] Code / Diagnosis to Support: 0008.9 -  Orthopaedic Disorders: Other Orthopaedic    Rehabilitation: Impaired ADLs and mobility  Patient is a good candidate for inpatient rehab based on needs for PT, OT.  Patient will also benefit from family training.  Patient has a good discharge situation which will be home with community support.     Barriers to transfer include: Insurance authorization, TCCs to verify disposition, medical clearance and bed availability     All cases are subject to administrative review and recommendations may change    Disposition recommendations:  -Good candidate for IPR.  Patient has significant deficits with mobility and ADLs secondary to his right humerus fracture which is causing him a great deal of discomfort  -TCC to submit to EMR insurance for prior physician  -PMR to follow in the periphery for rehab appropriateness, please reach out with questions or request for medical management    Medical Complexity:    Right humerus fracture  -Displaced comminuted humeral head, reviewed by surgeon and recommended nonoperative management  -Message placed to Dr. Barry requesting reevaluation due to patient's continued discomfort and functional debility  -PT OT as tolerated  -Pain control as below  -Potential candidate for IPR    Pain control  -Using 105 morphine equivalents per day  -Oxycodone 10 mg every 3 hours as needed  -Flexeril 10 mg 3 times daily as needed  -Adding scheduled Tylenol  -Adding lidocaine patch    Acute anemia  -Hemoglobin 9.2  -Hemoglobin rebounding from a gemma of 8.1 on 1/9  -Close observation monitoring with serial labs    Hyponatremia  -Sodium 129  -Limit free water intake  -Primary team managing  -Monitoring with serial labs    Mild elevation of liver enzymes  -Overall much improved since admission  -Monitor for elevations with initiation of scheduled Tylenol    Hypertension  -Amlodipine 5 mg daily    History of DVT with PE  -Xarelto 20 mg with p.m. meal    Tobacco abuse  - Tobacco abuse cessation counseling  given today for ~5 min as it pertains to vascular disease, heart attack, stroke, wound healing, and pulmonary disease.     DVT PPX: Xarelto 20 mg      Thank you for allowing us to participate in the care of this patient.     Patient was seen for >80 minutes on unit/floor of which > 50% of time was spent on counseling and coordination of care regarding the above, including prognosis, risk reduction, benefits of treatment, and options for next stage of care.    Eulalio Marrero, DO   Physical Medicine and Rehabilitation     Please note that this dictation was created using voice recognition software. I have made every reasonable attempt to correct obvious errors, but there may be errors of grammar and possibly content that I did not discover before finalizing the note.

## 2024-01-12 NOTE — PROGRESS NOTES
[facility] Medicine Daily Progress Note    Date of Service  1/11/2024    Chief Complaint  Jose A Ponce is a 63 y.o. male admitted 1/6/2024 with   Chief Complaint   Patient presents with    T-5000 FALL     BIB [facility] from home for MGLF yesterday after his legs gave out. - LOC, - head trauma, + thinner (xarelto). Pt complains of R shoulder pain. Per EMS pt spent most of the night on the ground unable to get up. Pt reports he crawled to the phone this morning to call 911.         Hospital Course  Mr. Ponce has a past medical history of coronary artery bypass graft 2017 as well as DVT with PE on Xarelto as his hypertension that had been experiencing 1 week of intractable vomiting and fell on hardwood floor of the kitchen and was able to crawl to his phone and call 911.   Noted MAXIMO and lactic acidosis.    X-ray reveals a nondisplaced right humeral neck fracture.  Dr. Barry, orthopedic surgery recommended non operative management.  Nonweightbearing right upper extremity in sling    Interval Problem Update  1/9:  lives alone, right humerus fracture in sling.  Pending PT/OT evals.  Patient wants me to show him his fracture xray. Replacing IV magnesium, phosphorus and potassium s/p recent emesis, now resolved.  1/10:  increased ecchymosis and tightness to right elbow/forearm since yesterday.  Good 2+ radial pulse and normal movement of right fingers, normal warmth and cap refill.  D/w orthopedic surgeon, no change.  Holding xarelto.  Us negative for dvt.  Arterial u/s normal.  1/11:  decreased edema noted right lower arm, remains with significant ecchymosis right chest wall.  Restart xarelto. Patient feels unsteady on his feet with ambulation today, low Na.  Admits to daily alcohol use. Will restrict po fluids to 1500 ml. On regular diet. Continue to increase ambulation. Replacing Magnesium 4gm IV and potassium.    I have discussed this patient's plan of care and discharge plan at IDT rounds today with Case  Management, Nursing, Nursing leadership, and other members of the IDT team.    Consultants/Specialty  orthopedics    Code Status  Full Code    Disposition  The patient is not medically cleared for discharge to home or a post-acute facility.  Anticipate discharge to: home with close outpatient follow-up    I have placed the appropriate orders for post-discharge needs.    Review of Systems  Review of Systems   Gastrointestinal:  Negative for abdominal pain and vomiting.   Musculoskeletal:  Positive for joint pain and myalgias.        Physical Exam  Temp:  [36.4 °C (97.5 °F)-36.9 °C (98.4 °F)] 36.7 °C (98 °F)  Pulse:  [100-104] 104  Resp:  [16-20] 20  BP: (127-149)/(77-95) 127/77  SpO2:  [92 %-96 %] 94 %    Physical Exam  Vitals and nursing note reviewed.   Constitutional:       Appearance: Normal appearance. He is not ill-appearing.   HENT:      Head: Normocephalic and atraumatic.      Nose: Nose normal.      Mouth/Throat:      Mouth: Mucous membranes are moist.      Pharynx: Oropharynx is clear.   Eyes:      Extraocular Movements: Extraocular movements intact.      Conjunctiva/sclera: Conjunctivae normal.   Cardiovascular:      Rate and Rhythm: Normal rate and regular rhythm.      Pulses: Normal pulses.      Heart sounds: Normal heart sounds. No murmur heard.     No friction rub. No gallop.   Pulmonary:      Effort: Pulmonary effort is normal. No respiratory distress.      Breath sounds: Normal breath sounds. No wheezing or rales.   Chest:      Chest wall: No tenderness.   Abdominal:      General: Abdomen is flat. Bowel sounds are normal. There is no distension.      Palpations: Abdomen is soft. There is no mass.      Tenderness: There is no abdominal tenderness. There is no guarding.   Musculoskeletal:      Cervical back: Normal range of motion and neck supple.      Comments: Right upper extremity in sling   Skin:     General: Skin is warm.      Capillary Refill: Capillary refill takes less than 2 seconds.    Neurological:      General: No focal deficit present.      Mental Status: He is alert and oriented to person, place, and time. Mental status is at baseline.      Cranial Nerves: No cranial nerve deficit.      Motor: No weakness.   Psychiatric:         Mood and Affect: Mood normal.         Behavior: Behavior normal.         Thought Content: Thought content normal.         Judgment: Judgment normal.         Fluids    Intake/Output Summary (Last 24 hours) at 1/11/2024 1825  Last data filed at 1/11/2024 1400  Gross per 24 hour   Intake 340 ml   Output 1275 ml   Net -935 ml       Laboratory  Recent Labs     01/09/24  0134 01/10/24  0233 01/11/24  0155   WBC 6.0 6.5 6.8   RBC 2.23* 2.17* 2.27*   HEMOGLOBIN 8.1* 8.1* 8.3*   HEMATOCRIT 23.8* 22.9* 24.0*   .7* 105.5* 105.7*   MCH 36.3* 37.3* 36.6*   MCHC 34.0 35.4 34.6   RDW 43.8 44.1 43.9   PLATELETCT 131* 169 196   MPV 11.4 10.5 10.4     Recent Labs     01/09/24  0134 01/10/24  0233 01/11/24  0155   SODIUM 134* 133* 129*   POTASSIUM 3.5* 3.6 3.5*   CHLORIDE 97 97 92*   CO2 24 22 24   GLUCOSE 117* 107* 126*   BUN 22 14 13   CREATININE 1.34 0.98 0.90   CALCIUM 8.4* 8.4* 8.1*                     Imaging  US-EXTREMITY VENOUS UPPER UNILAT RIGHT   Final Result      US-EXTREMITY ARTERY UPPER UNILAT W/WBI (COMBO)   Final Result      CT-SHOULDER W/O PLUS RECONS RIGHT   Final Result      1.  Comminuted and mildly displaced right humeral neck fracture.   2.  No shoulder dislocation.   3.  Osteopenia.   4.  Prominent edema about the right shoulder, upper arm, clavicle and axilla.      DX-SHOULDER 2+ RIGHT   Final Result      1.  Displaced comminuted proximal right humeral neck fracture is identified.      DX-HUMERUS 2+ RIGHT   Final Result      Nondisplaced right humeral neck fracture           Assessment/Plan  * Lactic acidosis- (present on admission)  Assessment & Plan  Resolved with aggressive IV fluid hydration    Alcohol abuse- (present on admission)  Assessment &  Plan  Replacing low magnesium repeatedly.  Elevated LFTs.  Admits to daily drinking.  Unsteady on feet 1/11.  No withdrawal symptoms.  Patient understands he has to cut back on alcohol use or abstain completely.    Hypomagnesemia- (present on admission)  Assessment & Plan  Mag 1.0 2/2 excessive GI losses.  1/9 replaced 4gm IV mag chloride.  1/10 mag 1.4, ordered 2 gm IV mag.    SIRS (systemic inflammatory response syndrome) (HCC)- (present on admission)  Assessment & Plan  1/7/2024  SIRS criteria identified on my evaluation include:  Tachycardia, with heart rate greater than 90 BPM  SIRS is non-infectious, the patient does not have sepsis  Tachycardia and leukocytosis 14.3  Refrain from antibiotics    Transaminitis- (present on admission)  Assessment & Plan    Moderately elevated  May be from recent viral illness and/or hypotension with low-flow state  Supportive care and liver enzymes ordered for the morning.  Repeat CMP in a.m.    Metabolic acidosis- (present on admission)  Assessment & Plan    Bicarb of 9 with anion gap of 36  Due to dehydration  Aggressive IV fluids completed.    History of pulmonary embolism- (present on admission)  Assessment & Plan  Hold on Xarelto due to acute kidney failure  Last dose was 1/6 and with MAXIMO likely okay to continue to hold today with sufficient anticoagulation.   If he needs surgery, continue to hold and if not restart when his Cr comes down.  No surgery planned.  MAXIMO improving.   1/10 increase in ecchymosis, arm circumference, hold xarelto.  Us venous and arterial wnl.    Closed fracture of neck of right humerus- (present on admission)  Assessment & Plan  Dr. Barry ortho consulted, recommended non operative management.    Nonweightbearing right upper extremity in sling  Outpatient follow-up in 1 to 2 weeks  PT/OT  Multimodal pain managements including po and iv narcotics prn. Monitoring respiratory status and sedation score      Tobacco abuse- (present on  admission)  Assessment & Plan  Spent approx 5 mins on Tobacco cessation education . Discussed options of nicotine patch, medical treatment with wellbutrin and chantix. Discussed the benefits of quitting smoking and risks of continued smoking including cardiovascular disease, cancer and COPD.   Code 50763  -I have ordered nicotine replacement therapy    MAXIMO (acute kidney injury) (HCC)- (present on admission)  Assessment & Plan  His baseline Cr is 1 and is currently 2.76 due to prerenal with a mild concomitant rhabdomyolysis  Received IV fluid  Encourage oral hydration  Avoid nephrotoxins    Essential hypertension- (present on admission)  Assessment & Plan  Patient has been hypertensive.  Hold losartan with acute kidney injury  Started amlodipine    Coronary artery disease involving coronary bypass graft of native heart without angina pectoris- (present on admission)  Assessment & Plan  Hx of CABG  Resume lipitor once CPK trending down            VTE prophylaxis: Rivaroxaban    I have performed a physical exam and reviewed and updated ROS and Plan today (1/11/2024). In review of yesterday's note (1/10/2024), there are no changes except as documented above.

## 2024-01-12 NOTE — DISCHARGE PLANNING
Case Management Discharge Planning    Admission Date: 1/6/2024  GMLOS: 2.8  ALOS: 6    6-Clicks ADL Score: 14  6-Clicks Mobility Score: 14  PT and/or OT Eval ordered: Yes  Post-acute Referrals Ordered: Yes  Post-acute Choice Obtained: Yes  Has referral(s) been sent to post-acute provider:  Yes      Anticipated Discharge Dispo: Discharge Disposition: D/T to home under [facility]HA care in anticipation of covered skilled care (06)    DME Needed: No    Action(s) Taken: RN SHIN met with patient to obtain H/H; patient declined stating I do not want H/H, I am going to be working a lot.     Pt discussed during IDT rounds. Per MD patient would need rehab placement vs SNF. PMR referral placed by provider. [facility] following.    [facility] sent blanket SNF referral out.  Awaiting acceptance.     Rehab choice obtained. Pt choose [facility]. Sent to [facility].     Escalations Completed: None    Medically Clear: No    Next Steps: F/U with medical team regarding D/C needs/ barriers.     Barriers to Discharge: Medical clearance and Pending Placement    Is the patient up for discharge tomorrow: No

## 2024-01-12 NOTE — CARE PLAN
The patient is Stable - Low risk of patient condition declining or worsening    Shift Goals  Clinical Goals: Pt's pain will decrease to comfort goal of 6/10, pt will remain free from falls, maintain skin integrity, monitor lab values  Patient Goals: pain control, rest, comfort  Family Goals: ARELIS    Progress made toward(s) clinical / shift goals: Pt remained free from falls, pt able to turn self side to side, waffle overlay in place, pt sat up to chair for 30 minutes with assistance from 2 people      Problem: Knowledge Deficit - Standard  Goal: Patient and family/care givers will demonstrate understanding of plan of care, disease process/condition, diagnostic tests and medications  Outcome: Progressing     Problem: Skin Integrity  Goal: Skin integrity is maintained or improved  Outcome: Progressing     Problem: Fall Risk  Goal: Patient will remain free from falls  Outcome: Progressing     Problem: Mobility  Goal: Patient's capacity to carry out activities will improve  Outcome: Progressing       Patient is not progressing towards the following goals: Pt's pain remained >6/10. Pt medicated per MAR for relief.       Problem: Pain - Standard  Goal: Alleviation of pain or a reduction in pain to the patient’s comfort goal  Outcome: Not Progressing

## 2024-01-12 NOTE — CARE PLAN
The patient is Stable - Low risk of patient condition declining or worsening    Shift Goals  Clinical Goals: Patient will maintain pain goal of 6/10 throughout the shift and be medicated as needed.  Patient Goals: Patient will rest comfortably throughout the shift      Progress made toward(s) clinical / shift goals:      Patient is not progressing towards the following goals:

## 2024-01-12 NOTE — ASSESSMENT & PLAN NOTE
Replacing low magnesium repeatedly.  Elevated LFTs.  Admits to daily drinking.  Unsteady on feet 1/11.  No withdrawal symptoms.  Patient understands he has to cut back on alcohol use or abstain completely.

## 2024-01-13 PROBLEM — E87.1 HYPONATREMIA: Status: ACTIVE | Noted: 2024-01-13

## 2024-01-13 LAB
ANION GAP SERPL CALC-SCNC: 11 MMOL/L (ref 7–16)
BASOPHILS # BLD AUTO: 0.5 % (ref 0–1.8)
BASOPHILS # BLD: 0.04 K/UL (ref 0–0.12)
BUN SERPL-MCNC: 18 MG/DL (ref 8–22)
CALCIUM SERPL-MCNC: 8.4 MG/DL (ref 8.5–10.5)
CHLORIDE SERPL-SCNC: 98 MMOL/L (ref 96–112)
CO2 SERPL-SCNC: 21 MMOL/L (ref 20–33)
CREAT SERPL-MCNC: 1.05 MG/DL (ref 0.5–1.4)
EOSINOPHIL # BLD AUTO: 0.12 K/UL (ref 0–0.51)
EOSINOPHIL NFR BLD: 1.6 % (ref 0–6.9)
ERYTHROCYTE [DISTWIDTH] IN BLOOD BY AUTOMATED COUNT: 45.9 FL (ref 35.9–50)
FERRITIN SERPL-MCNC: 433 NG/ML (ref 22–322)
GFR SERPLBLD CREATININE-BSD FMLA CKD-EPI: 80 ML/MIN/1.73 M 2
GLUCOSE SERPL-MCNC: 210 MG/DL (ref 65–99)
HCT VFR BLD AUTO: 25.7 % (ref 42–52)
HGB BLD-MCNC: 8.7 G/DL (ref 14–18)
IMM GRANULOCYTES # BLD AUTO: 0.08 K/UL (ref 0–0.11)
IMM GRANULOCYTES NFR BLD AUTO: 1.1 % (ref 0–0.9)
IRON SATN MFR SERPL: 18 % (ref 15–55)
IRON SERPL-MCNC: 35 UG/DL (ref 50–180)
LYMPHOCYTES # BLD AUTO: 1.14 K/UL (ref 1–4.8)
LYMPHOCYTES NFR BLD: 15.1 % (ref 22–41)
MAGNESIUM SERPL-MCNC: 1.4 MG/DL (ref 1.5–2.5)
MCH RBC QN AUTO: 36.3 PG (ref 27–33)
MCHC RBC AUTO-ENTMCNC: 33.9 G/DL (ref 32.3–36.5)
MCV RBC AUTO: 107.1 FL (ref 81.4–97.8)
MONOCYTES # BLD AUTO: 1.24 K/UL (ref 0–0.85)
MONOCYTES NFR BLD AUTO: 16.4 % (ref 0–13.4)
NEUTROPHILS # BLD AUTO: 4.94 K/UL (ref 1.82–7.42)
NEUTROPHILS NFR BLD: 65.3 % (ref 44–72)
NRBC # BLD AUTO: 0 K/UL
NRBC BLD-RTO: 0 /100 WBC (ref 0–0.2)
PLATELET # BLD AUTO: 296 K/UL (ref 164–446)
PMV BLD AUTO: 9.7 FL (ref 9–12.9)
POTASSIUM SERPL-SCNC: 4.3 MMOL/L (ref 3.6–5.5)
RBC # BLD AUTO: 2.4 M/UL (ref 4.7–6.1)
SODIUM SERPL-SCNC: 130 MMOL/L (ref 135–145)
TIBC SERPL-MCNC: 194 UG/DL (ref 250–450)
UIBC SERPL-MCNC: 159 UG/DL (ref 110–370)
VIT B12 SERPL-MCNC: 767 PG/ML (ref 211–911)
WBC # BLD AUTO: 7.6 K/UL (ref 4.8–10.8)

## 2024-01-13 PROCEDURE — 82607 VITAMIN B-12: CPT

## 2024-01-13 PROCEDURE — 700102 HCHG RX REV CODE 250 W/ 637 OVERRIDE(OP)

## 2024-01-13 PROCEDURE — 700102 HCHG RX REV CODE 250 W/ 637 OVERRIDE(OP): Performed by: HOSPITALIST

## 2024-01-13 PROCEDURE — A9270 NON-COVERED ITEM OR SERVICE: HCPCS

## 2024-01-13 PROCEDURE — 700101 HCHG RX REV CODE 250: Performed by: PHYSICAL MEDICINE & REHABILITATION

## 2024-01-13 PROCEDURE — A9270 NON-COVERED ITEM OR SERVICE: HCPCS | Performed by: INTERNAL MEDICINE

## 2024-01-13 PROCEDURE — 82728 ASSAY OF FERRITIN: CPT

## 2024-01-13 PROCEDURE — 80048 BASIC METABOLIC PNL TOTAL CA: CPT

## 2024-01-13 PROCEDURE — A9270 NON-COVERED ITEM OR SERVICE: HCPCS | Performed by: HOSPITALIST

## 2024-01-13 PROCEDURE — 700102 HCHG RX REV CODE 250 W/ 637 OVERRIDE(OP): Performed by: INTERNAL MEDICINE

## 2024-01-13 PROCEDURE — 770006 HCHG ROOM/CARE - MED/SURG/GYN SEMI*

## 2024-01-13 PROCEDURE — 36415 COLL VENOUS BLD VENIPUNCTURE: CPT

## 2024-01-13 PROCEDURE — A9270 NON-COVERED ITEM OR SERVICE: HCPCS | Performed by: PHYSICAL MEDICINE & REHABILITATION

## 2024-01-13 PROCEDURE — 83735 ASSAY OF MAGNESIUM: CPT

## 2024-01-13 PROCEDURE — 83540 ASSAY OF IRON: CPT

## 2024-01-13 PROCEDURE — 99232 SBSQ HOSP IP/OBS MODERATE 35: CPT | Performed by: HOSPITALIST

## 2024-01-13 PROCEDURE — 85025 COMPLETE CBC W/AUTO DIFF WBC: CPT

## 2024-01-13 PROCEDURE — 83550 IRON BINDING TEST: CPT

## 2024-01-13 PROCEDURE — 700102 HCHG RX REV CODE 250 W/ 637 OVERRIDE(OP): Performed by: PHYSICAL MEDICINE & REHABILITATION

## 2024-01-13 PROCEDURE — 700111 HCHG RX REV CODE 636 W/ 250 OVERRIDE (IP): Performed by: HOSPITALIST

## 2024-01-13 RX ORDER — METOPROLOL TARTRATE 50 MG/1
50 TABLET, FILM COATED ORAL 2 TIMES DAILY
Status: DISCONTINUED | OUTPATIENT
Start: 2024-01-13 | End: 2024-01-15 | Stop reason: HOSPADM

## 2024-01-13 RX ORDER — FOLIC ACID 1 MG/1
1 TABLET ORAL DAILY
Status: DISCONTINUED | OUTPATIENT
Start: 2024-01-13 | End: 2024-01-15 | Stop reason: HOSPADM

## 2024-01-13 RX ORDER — MAGNESIUM SULFATE HEPTAHYDRATE 40 MG/ML
4 INJECTION, SOLUTION INTRAVENOUS ONCE
Status: COMPLETED | OUTPATIENT
Start: 2024-01-13 | End: 2024-01-13

## 2024-01-13 RX ORDER — FERROUS SULFATE 325(65) MG
325 TABLET ORAL
Status: DISCONTINUED | OUTPATIENT
Start: 2024-01-14 | End: 2024-01-15 | Stop reason: HOSPADM

## 2024-01-13 RX ORDER — SPIRONOLACTONE 25 MG/1
25 TABLET ORAL
Status: DISCONTINUED | OUTPATIENT
Start: 2024-01-13 | End: 2024-01-15 | Stop reason: HOSPADM

## 2024-01-13 RX ORDER — ASCORBIC ACID 500 MG
500 TABLET ORAL DAILY
Status: DISCONTINUED | OUTPATIENT
Start: 2024-01-13 | End: 2024-01-15 | Stop reason: HOSPADM

## 2024-01-13 RX ORDER — ACETAMINOPHEN 500 MG
1000 TABLET ORAL EVERY 8 HOURS
Status: DISCONTINUED | OUTPATIENT
Start: 2024-01-13 | End: 2024-01-15 | Stop reason: HOSPADM

## 2024-01-13 RX ORDER — GAUZE BANDAGE 2" X 2"
100 BANDAGE TOPICAL DAILY
Status: DISCONTINUED | OUTPATIENT
Start: 2024-01-13 | End: 2024-01-15 | Stop reason: HOSPADM

## 2024-01-13 RX ADMIN — NICOTINE TRANSDERMAL SYSTEM 21 MG: 21 PATCH, EXTENDED RELEASE TRANSDERMAL at 05:15

## 2024-01-13 RX ADMIN — OXYCODONE HYDROCHLORIDE 10 MG: 10 TABLET ORAL at 01:56

## 2024-01-13 RX ADMIN — OXYCODONE HYDROCHLORIDE 10 MG: 10 TABLET ORAL at 07:19

## 2024-01-13 RX ADMIN — OXYCODONE HYDROCHLORIDE 10 MG: 10 TABLET ORAL at 10:32

## 2024-01-13 RX ADMIN — METOPROLOL TARTRATE 50 MG: 50 TABLET, FILM COATED ORAL at 17:33

## 2024-01-13 RX ADMIN — RIVAROXABAN 20 MG: 20 TABLET, FILM COATED ORAL at 17:33

## 2024-01-13 RX ADMIN — OXYCODONE HYDROCHLORIDE 10 MG: 10 TABLET ORAL at 13:56

## 2024-01-13 RX ADMIN — DOCUSATE SODIUM 50 MG AND SENNOSIDES 8.6 MG 2 TABLET: 8.6; 5 TABLET, FILM COATED ORAL at 05:15

## 2024-01-13 RX ADMIN — MAGNESIUM SULFATE HEPTAHYDRATE 4 G: 4 INJECTION, SOLUTION INTRAVENOUS at 18:33

## 2024-01-13 RX ADMIN — Medication 100 MG: at 09:56

## 2024-01-13 RX ADMIN — OXYCODONE HYDROCHLORIDE 10 MG: 10 TABLET ORAL at 20:28

## 2024-01-13 RX ADMIN — METOPROLOL TARTRATE 50 MG: 50 TABLET, FILM COATED ORAL at 09:57

## 2024-01-13 RX ADMIN — ACETAMINOPHEN 1000 MG: 500 TABLET ORAL at 22:36

## 2024-01-13 RX ADMIN — FOLIC ACID 1 MG: 1 TABLET ORAL at 09:57

## 2024-01-13 RX ADMIN — SPIRONOLACTONE 25 MG: 25 TABLET ORAL at 09:57

## 2024-01-13 RX ADMIN — OXYCODONE HYDROCHLORIDE AND ACETAMINOPHEN 500 MG: 500 TABLET ORAL at 15:24

## 2024-01-13 RX ADMIN — OXYCODONE HYDROCHLORIDE 10 MG: 10 TABLET ORAL at 17:33

## 2024-01-13 RX ADMIN — ACETAMINOPHEN 1000 MG: 500 TABLET ORAL at 03:53

## 2024-01-13 RX ADMIN — LIDOCAINE 1 PATCH: 4 PATCH TOPICAL at 15:24

## 2024-01-13 RX ADMIN — ACETAMINOPHEN 1000 MG: 500 TABLET ORAL at 08:38

## 2024-01-13 RX ADMIN — AMLODIPINE BESYLATE 5 MG: 5 TABLET ORAL at 05:14

## 2024-01-13 ASSESSMENT — PAIN DESCRIPTION - PAIN TYPE
TYPE: ACUTE PAIN

## 2024-01-13 ASSESSMENT — ENCOUNTER SYMPTOMS
MYALGIAS: 1
ABDOMINAL PAIN: 0
VOMITING: 0

## 2024-01-13 ASSESSMENT — FIBROSIS 4 INDEX: FIB4 SCORE: 1.84

## 2024-01-13 NOTE — PROGRESS NOTES
Assumed care of pt at 0700. Report received from nightshift RN. Pt is A&O x4 and on RA. Pt presents with mild work of breathing due to pain. Pt reports pain 8/10 on his right arm. Pt medicated per MAR for relief. Bed in lowest and locked position. Pt refused the bed alarm but calls appropriately. Call light within reach and pt declines any further needs at this time.

## 2024-01-13 NOTE — DISCHARGE PLANNING
Per physiatry patient is a candidate for IPR, plan to submit to insurance Monday. Monitoring pain management, TCC will follow.

## 2024-01-13 NOTE — PROGRESS NOTES
[facility] Medicine Daily Progress Note    Date of Service  1/13/2024    Chief Complaint  Jose A Ponce is a 63 y.o. male admitted 1/6/2024 with   Chief Complaint   Patient presents with    T-5000 FALL     BIB [facility] from home for MGLF yesterday after his legs gave out. - LOC, - head trauma, + thinner (xarelto). Pt complains of R shoulder pain. Per EMS pt spent most of the night on the ground unable to get up. Pt reports he crawled to the phone this morning to call 911.         Hospital Course  Mr. Ponce has a past medical history of coronary artery bypass graft 2017 as well as DVT with PE on Xarelto as his hypertension that had been experiencing 1 week of intractable vomiting and fell on hardwood floor of the kitchen and was able to crawl to his phone and call 911.   Noted MAXIMO and lactic acidosis.    X-ray reveals a nondisplaced right humeral neck fracture.  Dr. Barry, orthopedic surgery recommended non operative management.  Nonweightbearing right upper extremity in sling    Interval Problem Update  1/9:  lives alone, right humerus fracture in sling.  Pending PT/OT evals.  Patient wants me to show him his fracture xray. Replacing IV magnesium, phosphorus and potassium s/p recent emesis, now resolved.  1/10:  increased ecchymosis and tightness to right elbow/forearm since yesterday.  Good 2+ radial pulse and normal movement of right fingers, normal warmth and cap refill.  D/w orthopedic surgeon, no change.  Holding xarelto.  Us negative for dvt.  Arterial u/s normal.  1/11:  decreased edema noted right lower arm, remains with significant ecchymosis right chest wall.  Restart xarelto. Patient feels unsteady on his feet with ambulation today, low Na.  Admits to daily alcohol use. Will restrict po fluids to 1500 ml. On regular diet. Continue to increase ambulation. Replacing Magnesium 4gm IV and potassium.  1/12: Patient only able to ambulate 3 feet from bed to chair.  He states he is unable to go home by  himself.  I did discuss with patient [facility] which he is agreeable to.  Appreciate physiatry consult.  No further swelling of right arm.  Normal cap refill and normal radial pulse noted, distal fingers warm to touch.  1/13:  right arm with edema, ecchymosis, no increase.  Added spironolactone 25mg po daily for alcohol related hyponatremia and reduce edema.  Added thiamine and folate daily.  Ferritin and  B12 normal. Changed norvasc to metoprolol bid, norvasc can increase peripheral edema.    I have discussed this patient's plan of care and discharge plan at IDT rounds today with Case Management, Nursing, Nursing leadership, and other members of the IDT team.    Consultants/Specialty  orthopedics    Code Status  Full Code    Disposition  The patient is medically cleared for discharge to home or a post-acute facility.  Anticipate discharge to: an inpatient rehabilitation hospital    I have placed the appropriate orders for post-discharge needs.    Review of Systems  Review of Systems   Gastrointestinal:  Negative for abdominal pain and vomiting.   Musculoskeletal:  Positive for joint pain and myalgias.        Physical Exam  Temp:  [36.3 °C (97.4 °F)-36.7 °C (98.1 °F)] 36.3 °C (97.4 °F)  Pulse:  [] 103  Resp:  [16-20] 16  BP: (128-135)/(71-82) 128/82  SpO2:  [94 %-97 %] 94 %    Physical Exam  Vitals and nursing note reviewed.   Constitutional:       Appearance: Normal appearance. He is not ill-appearing.   HENT:      Head: Normocephalic and atraumatic.      Nose: Nose normal.      Mouth/Throat:      Mouth: Mucous membranes are moist.      Pharynx: Oropharynx is clear.   Eyes:      Extraocular Movements: Extraocular movements intact.      Conjunctiva/sclera: Conjunctivae normal.   Cardiovascular:      Rate and Rhythm: Normal rate and regular rhythm.      Pulses: Normal pulses.      Heart sounds: Normal heart sounds. No murmur heard.     No friction rub. No gallop.   Pulmonary:      Effort: Pulmonary effort is  normal. No respiratory distress.      Breath sounds: Normal breath sounds. No wheezing or rales.   Chest:      Chest wall: No tenderness.   Abdominal:      General: Abdomen is flat. Bowel sounds are normal. There is no distension.      Palpations: Abdomen is soft. There is no mass.      Tenderness: There is no abdominal tenderness. There is no guarding.   Musculoskeletal:      Cervical back: Normal range of motion and neck supple.      Comments: Right upper extremity in sling   Skin:     General: Skin is warm.      Capillary Refill: Capillary refill takes less than 2 seconds.   Neurological:      General: No focal deficit present.      Mental Status: He is alert and oriented to person, place, and time. Mental status is at baseline.      Cranial Nerves: No cranial nerve deficit.      Motor: No weakness.   Psychiatric:         Mood and Affect: Mood normal.         Behavior: Behavior normal.         Thought Content: Thought content normal.         Judgment: Judgment normal.         Fluids    Intake/Output Summary (Last 24 hours) at 1/13/2024 1441  Last data filed at 1/13/2024 1401  Gross per 24 hour   Intake 840 ml   Output 2900 ml   Net -2060 ml       Laboratory  Recent Labs     01/11/24  0155 01/12/24  1035 01/13/24  1012   WBC 6.8 8.9 7.6   RBC 2.27* 2.47* 2.40*   HEMOGLOBIN 8.3* 9.2* 8.7*   HEMATOCRIT 24.0* 26.0* 25.7*   .7* 105.3* 107.1*   MCH 36.6* 37.2* 36.3*   MCHC 34.6 35.4 33.9   RDW 43.9 44.4 45.9   PLATELETCT 196 279 296   MPV 10.4 9.8 9.7     Recent Labs     01/11/24  0155 01/12/24  1035 01/13/24  1012   SODIUM 129* 129* 130*   POTASSIUM 3.5* 4.7 4.3   CHLORIDE 92* 95* 98   CO2 24 21 21   GLUCOSE 126* 133* 210*   BUN 13 17 18   CREATININE 0.90 1.01 1.05   CALCIUM 8.1* 8.8 8.4*                     Imaging  US-EXTREMITY VENOUS UPPER UNILAT RIGHT   Final Result      US-EXTREMITY ARTERY UPPER UNILAT W/WBI (COMBO)   Final Result      CT-SHOULDER W/O PLUS RECONS RIGHT   Final Result      1.  Comminuted  and mildly displaced right humeral neck fracture.   2.  No shoulder dislocation.   3.  Osteopenia.   4.  Prominent edema about the right shoulder, upper arm, clavicle and axilla.      DX-SHOULDER 2+ RIGHT   Final Result      1.  Displaced comminuted proximal right humeral neck fracture is identified.      DX-HUMERUS 2+ RIGHT   Final Result      Nondisplaced right humeral neck fracture           Assessment/Plan  * Lactic acidosis- (present on admission)  Assessment & Plan  Resolved with aggressive IV fluid hydration    Hyponatremia  Assessment & Plan  Sodium 129.  Likely from daily alcohol heavy use.  Ordered spironolactone 25 mg p.o. daily to help with swelling post humerus fracture as well as is reduced sodium level.    Alcohol abuse- (present on admission)  Assessment & Plan  Replacing low magnesium repeatedly.  Elevated LFTs.  Admits to daily drinking.  Unsteady on feet 1/11.  No withdrawal symptoms.  Patient understands he has to cut back on alcohol use or abstain completely.    Hypomagnesemia- (present on admission)  Assessment & Plan  Mag 1.0 2/2 excessive GI losses.  1/9 replaced 4gm IV mag chloride.  1/10 mag 1.4, ordered 2 gm IV mag.    SIRS (systemic inflammatory response syndrome) (HCC)- (present on admission)  Assessment & Plan  1/7/2024  SIRS criteria identified on my evaluation include:  Tachycardia, with heart rate greater than 90 BPM  SIRS is non-infectious, the patient does not have sepsis  Tachycardia and leukocytosis 14.3  Refrain from antibiotics    Transaminitis- (present on admission)  Assessment & Plan    Moderately elevated  Patient admits to daily alcohol use.  Supportive care and liver enzymes ordered for the morning.  No signs of alcohol withdrawal.      Metabolic acidosis- (present on admission)  Assessment & Plan    Bicarb of 9 with anion gap of 36  Due to dehydration  Aggressive IV fluids completed.    History of pulmonary embolism- (present on admission)  Assessment & Plan  Hold on  Xarelto due to acute kidney failure  Last dose was 1/6 and with MAXIMO likely okay to continue to hold today with sufficient anticoagulation.   If he needs surgery, continue to hold and if not restart when his Cr comes down.  No surgery planned.  MAXIMO improving.   1/10 increase in ecchymosis, arm circumference, hold xarelto.  Us venous and arterial wnl.    Closed fracture of neck of right humerus- (present on admission)  Assessment & Plan  Dr. Barry ortho consulted, recommended non operative management.    Nonweightbearing right upper extremity in sling  Outpatient follow-up in 1 to 2 weeks  PT/OT  Multimodal pain managements including po and iv narcotics prn. Monitoring respiratory status and sedation score.  Check vitamin d level.  Extensive edema and ecchymosis post injury.  Normal venous and arterial u/s.  Spironolactone for edema and reduce hyponatremia.  Elevated hand above heart level in sling.      Tobacco abuse- (present on admission)  Assessment & Plan  Spent approx 5 mins on Tobacco cessation education . Discussed options of nicotine patch, medical treatment with wellbutrin and chantix. Discussed the benefits of quitting smoking and risks of continued smoking including cardiovascular disease, cancer and COPD.   Code 78463  -I have ordered nicotine replacement therapy    MAXIMO (acute kidney injury) (HCC)- (present on admission)  Assessment & Plan  His baseline Cr is 1 and is currently 2.76 due to prerenal with a mild concomitant rhabdomyolysis  Received IV fluid  Encourage oral hydration  Avoid nephrotoxins    Essential hypertension- (present on admission)  Assessment & Plan  Patient has been hypertensive.  Hold losartan with acute kidney injury  1/13: Changed amlodipine 5 mg daily to metoprolol 50 mg p.o. twice daily and spironolactone in case amlodipine is causing increased swelling of the peripheral.    Coronary artery disease involving coronary bypass graft of native heart without angina pectoris- (present  on admission)  Assessment & Plan  Hx of CABG  Resume lipitor once CPK trending down            VTE prophylaxis: Rivaroxaban    I have performed a physical exam and reviewed and updated ROS and Plan today (1/13/2024). In review of yesterday's note (1/12/2024), there are no changes except as documented above.

## 2024-01-13 NOTE — PROGRESS NOTES
Assumed care of pt at 1900. Report received from Day RN. Pt is A&O x 4 .       Patient stated that their pain is 7/10 currently. Pt's pain comfort goal is 2/10.       Bed in lowest locked position  hourly rounding in place  No bed alarm needed       Notes reviewed, Labs reviewed, Orders reviewed, communication board updated.        4Ps: Pain, Potty, Positioning, and Possessions discussed with patient.    Patient has no pain.  incontinence.   Pt position comfortable   Call-light in reach.        Questions concerning hospital course of treatment from current providers answered.

## 2024-01-13 NOTE — ASSESSMENT & PLAN NOTE
Sodium 129.  Likely from daily alcohol heavy use.  Ordered spironolactone 25 mg p.o. daily to help with swelling post humerus fracture as well as is reduced sodium level.

## 2024-01-13 NOTE — CARE PLAN
The patient is Stable - Low risk of patient condition declining or worsening    Shift Goals  Clinical Goals: Pt's pain will decrease to comfort goal of 6/10, pt will remain free from falls, maintain skin integrity, monitor lab values, ambulate up to chair/commode  Patient Goals: pain control, rest  Family Goals: ARELIS    Progress made toward(s) clinical / shift goals: Pt remained free from falls, pt able to turn self side to side as tolerated due to R arm/shoulder pain, pt educated on the use of call light for assistance up to commode       Problem: Knowledge Deficit - Standard  Goal: Patient and family/care givers will demonstrate understanding of plan of care, disease process/condition, diagnostic tests and medications  Outcome: Progressing     Problem: Skin Integrity  Goal: Skin integrity is maintained or improved  Outcome: Progressing     Problem: Fall Risk  Goal: Patient will remain free from falls  Outcome: Progressing       Patient is not progressing towards the following goals: Pt's pain remained >6/10. Pt medicated per MAR for relief.       Problem: Pain - Standard  Goal: Alleviation of pain or a reduction in pain to the patient’s comfort goal  Outcome: Not Progressing

## 2024-01-13 NOTE — PROGRESS NOTES
[facility] Medicine Daily Progress Note    Date of Service  1/12/2024    Chief Complaint  Jose A Ponce is a 63 y.o. male admitted 1/6/2024 with   Chief Complaint   Patient presents with    T-5000 FALL     BIB [facility] from home for MGLF yesterday after his legs gave out. - LOC, - head trauma, + thinner (xarelto). Pt complains of R shoulder pain. Per EMS pt spent most of the night on the ground unable to get up. Pt reports he crawled to the phone this morning to call 911.         Hospital Course  Mr. Ponce has a past medical history of coronary artery bypass graft 2017 as well as DVT with PE on Xarelto as his hypertension that had been experiencing 1 week of intractable vomiting and fell on hardwood floor of the kitchen and was able to crawl to his phone and call 911.   Noted MAXIMO and lactic acidosis.    X-ray reveals a nondisplaced right humeral neck fracture.  Dr. Barry, orthopedic surgery recommended non operative management.  Nonweightbearing right upper extremity in sling    Interval Problem Update  1/9:  lives alone, right humerus fracture in sling.  Pending PT/OT evals.  Patient wants me to show him his fracture xray. Replacing IV magnesium, phosphorus and potassium s/p recent emesis, now resolved.  1/10:  increased ecchymosis and tightness to right elbow/forearm since yesterday.  Good 2+ radial pulse and normal movement of right fingers, normal warmth and cap refill.  D/w orthopedic surgeon, no change.  Holding xarelto.  Us negative for dvt.  Arterial u/s normal.  1/11:  decreased edema noted right lower arm, remains with significant ecchymosis right chest wall.  Restart xarelto. Patient feels unsteady on his feet with ambulation today, low Na.  Admits to daily alcohol use. Will restrict po fluids to 1500 ml. On regular diet. Continue to increase ambulation. Replacing Magnesium 4gm IV and potassium.  1/12: Patient only able to ambulate 3 feet from bed to chair.  He states he is unable to go home by  himself.  I did discuss [facility] which he is agreeable to.  Appreciate physiatry consult.  No further swelling of right arm.  Normal cap refill and normal radial pulse noted, distal fingers warm to touch.    I have discussed this patient's plan of care and discharge plan at IDT rounds today with Case Management, Nursing, Nursing leadership, and other members of the IDT team.    Consultants/Specialty  orthopedics    Code Status  Full Code    Disposition  The patient is medically cleared for discharge to home or a post-acute facility.  Anticipate discharge to: an inpatient rehabilitation hospital    I have placed the appropriate orders for post-discharge needs.    Review of Systems  Review of Systems   Gastrointestinal:  Negative for abdominal pain and vomiting.   Musculoskeletal:  Positive for joint pain and myalgias.        Physical Exam  Temp:  [36.4 °C (97.5 °F)-36.7 °C (98.1 °F)] 36.7 °C (98.1 °F)  Pulse:  [] 116  Resp:  [18-20] 20  BP: (128-141)/(71-82) 131/71  SpO2:  [93 %-95 %] 95 %    Physical Exam  Vitals and nursing note reviewed.   Constitutional:       Appearance: Normal appearance. He is not ill-appearing.   HENT:      Head: Normocephalic and atraumatic.      Nose: Nose normal.      Mouth/Throat:      Mouth: Mucous membranes are moist.      Pharynx: Oropharynx is clear.   Eyes:      Extraocular Movements: Extraocular movements intact.      Conjunctiva/sclera: Conjunctivae normal.   Cardiovascular:      Rate and Rhythm: Normal rate and regular rhythm.      Pulses: Normal pulses.      Heart sounds: Normal heart sounds. No murmur heard.     No friction rub. No gallop.   Pulmonary:      Effort: Pulmonary effort is normal. No respiratory distress.      Breath sounds: Normal breath sounds. No wheezing or rales.   Chest:      Chest wall: No tenderness.   Abdominal:      General: Abdomen is flat. Bowel sounds are normal. There is no distension.      Palpations: Abdomen is soft. There is no  mass.      Tenderness: There is no abdominal tenderness. There is no guarding.   Musculoskeletal:      Cervical back: Normal range of motion and neck supple.      Comments: Right upper extremity in sling   Skin:     General: Skin is warm.      Capillary Refill: Capillary refill takes less than 2 seconds.   Neurological:      General: No focal deficit present.      Mental Status: He is alert and oriented to person, place, and time. Mental status is at baseline.      Cranial Nerves: No cranial nerve deficit.      Motor: No weakness.   Psychiatric:         Mood and Affect: Mood normal.         Behavior: Behavior normal.         Thought Content: Thought content normal.         Judgment: Judgment normal.         Fluids    Intake/Output Summary (Last 24 hours) at 1/12/2024 1703  Last data filed at 1/12/2024 1533  Gross per 24 hour   Intake 1120 ml   Output 1950 ml   Net -830 ml       Laboratory  Recent Labs     01/10/24  0233 01/11/24  0155 01/12/24  1035   WBC 6.5 6.8 8.9   RBC 2.17* 2.27* 2.47*   HEMOGLOBIN 8.1* 8.3* 9.2*   HEMATOCRIT 22.9* 24.0* 26.0*   .5* 105.7* 105.3*   MCH 37.3* 36.6* 37.2*   MCHC 35.4 34.6 35.4   RDW 44.1 43.9 44.4   PLATELETCT 169 196 279   MPV 10.5 10.4 9.8     Recent Labs     01/10/24  0233 01/11/24  0155 01/12/24  1035   SODIUM 133* 129* 129*   POTASSIUM 3.6 3.5* 4.7   CHLORIDE 97 92* 95*   CO2 22 24 21   GLUCOSE 107* 126* 133*   BUN 14 13 17   CREATININE 0.98 0.90 1.01   CALCIUM 8.4* 8.1* 8.8                     Imaging  US-EXTREMITY VENOUS UPPER UNILAT RIGHT   Final Result      US-EXTREMITY ARTERY UPPER UNILAT W/WBI (COMBO)   Final Result      CT-SHOULDER W/O PLUS RECONS RIGHT   Final Result      1.  Comminuted and mildly displaced right humeral neck fracture.   2.  No shoulder dislocation.   3.  Osteopenia.   4.  Prominent edema about the right shoulder, upper arm, clavicle and axilla.      DX-SHOULDER 2+ RIGHT   Final Result      1.  Displaced comminuted proximal right humeral neck  fracture is identified.      DX-HUMERUS 2+ RIGHT   Final Result      Nondisplaced right humeral neck fracture           Assessment/Plan  * Lactic acidosis- (present on admission)  Assessment & Plan  Resolved with aggressive IV fluid hydration    Alcohol abuse- (present on admission)  Assessment & Plan  Replacing low magnesium repeatedly.  Elevated LFTs.  Admits to daily drinking.  Unsteady on feet 1/11.  No withdrawal symptoms.  Patient understands he has to cut back on alcohol use or abstain completely.    Hypomagnesemia- (present on admission)  Assessment & Plan  Mag 1.0 2/2 excessive GI losses.  1/9 replaced 4gm IV mag chloride.  1/10 mag 1.4, ordered 2 gm IV mag.    SIRS (systemic inflammatory response syndrome) (HCC)- (present on admission)  Assessment & Plan  1/7/2024  SIRS criteria identified on my evaluation include:  Tachycardia, with heart rate greater than 90 BPM  SIRS is non-infectious, the patient does not have sepsis  Tachycardia and leukocytosis 14.3  Refrain from antibiotics    Transaminitis- (present on admission)  Assessment & Plan    Moderately elevated  May be from recent viral illness and/or hypotension with low-flow state  Supportive care and liver enzymes ordered for the morning.  Repeat CMP in a.m.    Metabolic acidosis- (present on admission)  Assessment & Plan    Bicarb of 9 with anion gap of 36  Due to dehydration  Aggressive IV fluids completed.    History of pulmonary embolism- (present on admission)  Assessment & Plan  Hold on Xarelto due to acute kidney failure  Last dose was 1/6 and with MAXIMO likely okay to continue to hold today with sufficient anticoagulation.   If he needs surgery, continue to hold and if not restart when his Cr comes down.  No surgery planned.  MAXIMO improving.   1/10 increase in ecchymosis, arm circumference, hold xarelto.  Us venous and arterial wnl.    Closed fracture of neck of right humerus- (present on admission)  Assessment & Plan  Dr. Barry ortho consulted,  recommended non operative management.    Nonweightbearing right upper extremity in sling  Outpatient follow-up in 1 to 2 weeks  PT/OT  Multimodal pain managements including po and iv narcotics prn. Monitoring respiratory status and sedation score      Tobacco abuse- (present on admission)  Assessment & Plan  Spent approx 5 mins on Tobacco cessation education . Discussed options of nicotine patch, medical treatment with wellbutrin and chantix. Discussed the benefits of quitting smoking and risks of continued smoking including cardiovascular disease, cancer and COPD.   Code 62934  -I have ordered nicotine replacement therapy    MAXIMO (acute kidney injury) (HCC)- (present on admission)  Assessment & Plan  His baseline Cr is 1 and is currently 2.76 due to prerenal with a mild concomitant rhabdomyolysis  Received IV fluid  Encourage oral hydration  Avoid nephrotoxins    Essential hypertension- (present on admission)  Assessment & Plan  Patient has been hypertensive.  Hold losartan with acute kidney injury  Started amlodipine    Coronary artery disease involving coronary bypass graft of native heart without angina pectoris- (present on admission)  Assessment & Plan  Hx of CABG  Resume lipitor once CPK trending down            VTE prophylaxis: Rivaroxaban    I have performed a physical exam and reviewed and updated ROS and Plan today (1/12/2024). In review of yesterday's note (1/11/2024), there are no changes except as documented above.

## 2024-01-14 ENCOUNTER — APPOINTMENT (OUTPATIENT)
Dept: RADIOLOGY | Facility: MEDICAL CENTER | Age: 64
DRG: 563 | End: 2024-01-14
Attending: HOSPITALIST
Payer: COMMERCIAL

## 2024-01-14 ENCOUNTER — APPOINTMENT (OUTPATIENT)
Dept: RADIOLOGY | Facility: MEDICAL CENTER | Age: 64
DRG: 563 | End: 2024-01-14
Attending: PHYSICAL MEDICINE & REHABILITATION
Payer: COMMERCIAL

## 2024-01-14 ENCOUNTER — HOSPITAL ENCOUNTER (INPATIENT)
Facility: REHABILITATION | Age: 64
End: 2024-01-14
Attending: HOSPITALIST | Admitting: HOSPITALIST
Payer: COMMERCIAL

## 2024-01-14 LAB
ANION GAP SERPL CALC-SCNC: 12 MMOL/L (ref 7–16)
BUN SERPL-MCNC: 21 MG/DL (ref 8–22)
CALCIUM SERPL-MCNC: 8.7 MG/DL (ref 8.5–10.5)
CHLORIDE SERPL-SCNC: 96 MMOL/L (ref 96–112)
CO2 SERPL-SCNC: 21 MMOL/L (ref 20–33)
CREAT SERPL-MCNC: 1.09 MG/DL (ref 0.5–1.4)
GFR SERPLBLD CREATININE-BSD FMLA CKD-EPI: 76 ML/MIN/1.73 M 2
GLUCOSE SERPL-MCNC: 109 MG/DL (ref 65–99)
POTASSIUM SERPL-SCNC: 5 MMOL/L (ref 3.6–5.5)
SODIUM SERPL-SCNC: 129 MMOL/L (ref 135–145)

## 2024-01-14 PROCEDURE — 700102 HCHG RX REV CODE 250 W/ 637 OVERRIDE(OP): Performed by: INTERNAL MEDICINE

## 2024-01-14 PROCEDURE — A9270 NON-COVERED ITEM OR SERVICE: HCPCS | Performed by: HOSPITALIST

## 2024-01-14 PROCEDURE — 73030 X-RAY EXAM OF SHOULDER: CPT | Mod: RT

## 2024-01-14 PROCEDURE — 700102 HCHG RX REV CODE 250 W/ 637 OVERRIDE(OP)

## 2024-01-14 PROCEDURE — 700102 HCHG RX REV CODE 250 W/ 637 OVERRIDE(OP): Performed by: HOSPITALIST

## 2024-01-14 PROCEDURE — 99232 SBSQ HOSP IP/OBS MODERATE 35: CPT | Performed by: PHYSICAL MEDICINE & REHABILITATION

## 2024-01-14 PROCEDURE — A9270 NON-COVERED ITEM OR SERVICE: HCPCS | Performed by: INTERNAL MEDICINE

## 2024-01-14 PROCEDURE — 82652 VIT D 1 25-DIHYDROXY: CPT

## 2024-01-14 PROCEDURE — A9270 NON-COVERED ITEM OR SERVICE: HCPCS

## 2024-01-14 PROCEDURE — 80048 BASIC METABOLIC PNL TOTAL CA: CPT

## 2024-01-14 PROCEDURE — 700101 HCHG RX REV CODE 250: Performed by: PHYSICAL MEDICINE & REHABILITATION

## 2024-01-14 PROCEDURE — 99232 SBSQ HOSP IP/OBS MODERATE 35: CPT | Performed by: HOSPITALIST

## 2024-01-14 PROCEDURE — 770006 HCHG ROOM/CARE - MED/SURG/GYN SEMI*

## 2024-01-14 RX ADMIN — CYCLOBENZAPRINE 10 MG: 10 TABLET, FILM COATED ORAL at 18:17

## 2024-01-14 RX ADMIN — DOCUSATE SODIUM 50 MG AND SENNOSIDES 8.6 MG 2 TABLET: 8.6; 5 TABLET, FILM COATED ORAL at 05:31

## 2024-01-14 RX ADMIN — OXYCODONE HYDROCHLORIDE 5 MG: 5 TABLET ORAL at 18:17

## 2024-01-14 RX ADMIN — Medication 100 MG: at 05:32

## 2024-01-14 RX ADMIN — POLYETHYLENE GLYCOL 3350 1 PACKET: 17 POWDER, FOR SOLUTION ORAL at 02:18

## 2024-01-14 RX ADMIN — NICOTINE TRANSDERMAL SYSTEM 21 MG: 21 PATCH, EXTENDED RELEASE TRANSDERMAL at 05:32

## 2024-01-14 RX ADMIN — LIDOCAINE 1 PATCH: 4 PATCH TOPICAL at 15:45

## 2024-01-14 RX ADMIN — CYCLOBENZAPRINE 10 MG: 10 TABLET, FILM COATED ORAL at 11:15

## 2024-01-14 RX ADMIN — FOLIC ACID 1 MG: 1 TABLET ORAL at 05:32

## 2024-01-14 RX ADMIN — METOPROLOL TARTRATE 50 MG: 50 TABLET, FILM COATED ORAL at 05:32

## 2024-01-14 RX ADMIN — OXYCODONE HYDROCHLORIDE 10 MG: 10 TABLET ORAL at 07:52

## 2024-01-14 RX ADMIN — OXYCODONE HYDROCHLORIDE 10 MG: 10 TABLET ORAL at 01:01

## 2024-01-14 RX ADMIN — METOPROLOL TARTRATE 50 MG: 50 TABLET, FILM COATED ORAL at 18:18

## 2024-01-14 RX ADMIN — MAGNESIUM HYDROXIDE 30 ML: 1200 LIQUID ORAL at 02:18

## 2024-01-14 RX ADMIN — SPIRONOLACTONE 25 MG: 25 TABLET ORAL at 05:32

## 2024-01-14 RX ADMIN — OXYCODONE HYDROCHLORIDE 10 MG: 10 TABLET ORAL at 11:15

## 2024-01-14 RX ADMIN — ACETAMINOPHEN 1000 MG: 500 TABLET ORAL at 15:44

## 2024-01-14 RX ADMIN — DOCUSATE SODIUM 50 MG AND SENNOSIDES 8.6 MG 2 TABLET: 8.6; 5 TABLET, FILM COATED ORAL at 18:17

## 2024-01-14 RX ADMIN — ACETAMINOPHEN 1000 MG: 500 TABLET ORAL at 05:32

## 2024-01-14 RX ADMIN — OXYCODONE HYDROCHLORIDE AND ACETAMINOPHEN 500 MG: 500 TABLET ORAL at 05:32

## 2024-01-14 RX ADMIN — FERROUS SULFATE TAB 325 MG (65 MG ELEMENTAL FE) 325 MG: 325 (65 FE) TAB at 07:52

## 2024-01-14 RX ADMIN — RIVAROXABAN 20 MG: 20 TABLET, FILM COATED ORAL at 18:18

## 2024-01-14 RX ADMIN — OXYCODONE HYDROCHLORIDE 10 MG: 10 TABLET ORAL at 21:18

## 2024-01-14 ASSESSMENT — ENCOUNTER SYMPTOMS
VOMITING: 0
ABDOMINAL PAIN: 0
MYALGIAS: 1

## 2024-01-14 ASSESSMENT — PAIN DESCRIPTION - PAIN TYPE
TYPE: ACUTE PAIN

## 2024-01-14 NOTE — CARE PLAN
The patient is Stable - Low risk of patient condition declining or worsening    Shift Goals  Clinical Goals: pain management and increased movement of lefat arm  Patient Goals: Same  Family Goals: ARELIS    Progress made toward(s) clinical / shift goals:    Problem: Pain - Standard  Goal: Alleviation of pain or a reduction in pain to the patient’s comfort goal  Outcome: Progressing   Requesting less PRN pain Rx    Patient is not progressing towards the following goals:

## 2024-01-14 NOTE — PROGRESS NOTES
Pt A&Ox4, RA. SBA/pivot to BSC due to generalized weakness. R arm bruised and swollen. Pt declines ice pack or elevation, medicated per MAR. Condom cath in place. Waffle overlay. Bed alarm refused. Calls appropriately. Compliant with 1500 FR.

## 2024-01-14 NOTE — PROGRESS NOTES
Assumed care of pt at 1900. Report received from Day RN. Pt is A&O x 4 .         Patient stated that their pain is 7/10 currently. Pt's pain comfort goal is 2/10.        Bed in lowest locked position  hourly rounding in place  No bed alarm needed        Notes reviewed, Labs reviewed, Orders reviewed, communication board updated.         4Ps: Pain, Potty, Positioning, and Possessions discussed with patient.    Patient has no pain.  BP for stooling and condom catheter for urine. .   Pt position comfortable   Call-light in reach.        Questions concerning hospital course of treatment from current providers answered.

## 2024-01-14 NOTE — PROGRESS NOTES
[facility] Medicine Daily Progress Note    Date of Service  1/14/2024    Chief Complaint  Jose A Ponce is a 63 y.o. male admitted 1/6/2024 with   Chief Complaint   Patient presents with    T-5000 FALL     BIB [facility] from home for MGLF yesterday after his legs gave out. - LOC, - head trauma, + thinner (xarelto). Pt complains of R shoulder pain. Per EMS pt spent most of the night on the ground unable to get up. Pt reports he crawled to the phone this morning to call 911.         Hospital Course  Mr. Ponce has a past medical history of coronary artery bypass graft 2017 as well as DVT with PE on Xarelto as his hypertension that had been experiencing 1 week of intractable vomiting and fell on hardwood floor of the kitchen and was able to crawl to his phone and call 911.   Noted MAXIMO and lactic acidosis.    X-ray reveals a nondisplaced right humeral neck fracture.  Dr. Barry, orthopedic surgery recommended non operative management.  Nonweightbearing right upper extremity in sling    Interval Problem Update  1/9:  lives alone, right humerus fracture in sling.  Pending PT/OT evals.  Patient wants me to show him his fracture xray. Replacing IV magnesium, phosphorus and potassium s/p recent emesis, now resolved.  1/10:  increased ecchymosis and tightness to right elbow/forearm since yesterday.  Good 2+ radial pulse and normal movement of right fingers, normal warmth and cap refill.  D/w orthopedic surgeon, no change.  Holding xarelto.  Us negative for dvt.  Arterial u/s normal.  1/11:  decreased edema noted right lower arm, remains with significant ecchymosis right chest wall.  Restart xarelto. Patient feels unsteady on his feet with ambulation today, low Na.  Admits to daily alcohol use. Will restrict po fluids to 1500 ml. On regular diet. Continue to increase ambulation. Replacing Magnesium 4gm IV and potassium.  1/12: Patient only able to ambulate 3 feet from bed to chair.  He states he is unable to go home by  himself.  I did discuss [facility] which he is agreeable [facility].  Appreciate physiatry consult.  No further swelling of right arm.  Normal cap refill and normal radial pulse noted, distal fingers warm to touch.  1/13:  right arm with edema, ecchymosis, no increase.  Added spironolactone 25mg po daily for alcohol related hyponatremia and reduce edema.  Added thiamine and folate daily.  Ferritin and  B12 normal. Changed norvasc to metoprolol bid, norvasc can increase peripheral edema.  1/14: Patient seen for the second day with his arm out of the sling.  He states he refuses to put it on because he gets tangled up at night.  He is complaining of right proximal humerus pain.  He wants to have surgery on his arm.  I have ordered a right shoulder immobilizer for patient.  His was instructed that his right humerus fracture will heal better in immobilization then with surgery as long as he wears the immobilizer.  I discussed with physiatry physician as well.  I will order a shoulder x-ray in a.m. with the immobilizer in place.    I have discussed this patient's plan of care and discharge plan at IDT rounds today with Case Management, Nursing, Nursing leadership, and other members of the IDT team.    Consultants/Specialty  orthopedics  Physiatry    Code Status  Full Code    Disposition  The patient is medically cleared for discharge to home or a post-acute facility.  Anticipate discharge to: an inpatient rehabilitation hospital    I have placed the appropriate orders for post-discharge needs.    Review of Systems  Review of Systems   Gastrointestinal:  Negative for abdominal pain and vomiting.   Musculoskeletal:  Positive for joint pain and myalgias.        Physical Exam  Temp:  [36.4 °C (97.5 °F)-37 °C (98.6 °F)] 36.8 °C (98.2 °F)  Pulse:  [93-98] 97  Resp:  [16-17] 17  BP: (113-127)/(71-80) 127/80  SpO2:  [94 %-97 %] 95 %    Physical Exam  Vitals and nursing note reviewed.   Constitutional:       Appearance:  Normal appearance. He is not ill-appearing.   HENT:      Head: Normocephalic and atraumatic.      Nose: Nose normal.      Mouth/Throat:      Mouth: Mucous membranes are moist.      Pharynx: Oropharynx is clear.   Eyes:      Extraocular Movements: Extraocular movements intact.      Conjunctiva/sclera: Conjunctivae normal.   Cardiovascular:      Rate and Rhythm: Normal rate and regular rhythm.      Pulses: Normal pulses.      Heart sounds: Normal heart sounds. No murmur heard.     No friction rub. No gallop.   Pulmonary:      Effort: Pulmonary effort is normal. No respiratory distress.      Breath sounds: Normal breath sounds. No wheezing or rales.   Chest:      Chest wall: No tenderness.   Abdominal:      General: Abdomen is flat. Bowel sounds are normal. There is no distension.      Palpations: Abdomen is soft. There is no mass.      Tenderness: There is no abdominal tenderness. There is no guarding.   Musculoskeletal:      Cervical back: Normal range of motion and neck supple.      Comments: Right upper extremity out of sling, edema has not increased, but present, NV intact with normal radial pulse.   Skin:     General: Skin is warm.      Capillary Refill: Capillary refill takes less than 2 seconds.   Neurological:      General: No focal deficit present.      Mental Status: He is alert and oriented to person, place, and time. Mental status is at baseline.      Cranial Nerves: No cranial nerve deficit.      Motor: No weakness.   Psychiatric:         Mood and Affect: Mood normal.         Behavior: Behavior normal.         Thought Content: Thought content normal.         Judgment: Judgment normal.         Fluids    Intake/Output Summary (Last 24 hours) at 1/14/2024 1410  Last data filed at 1/14/2024 1200  Gross per 24 hour   Intake 850 ml   Output 1650 ml   Net -800 ml       Laboratory  Recent Labs     01/12/24  1035 01/13/24  1012   WBC 8.9 7.6   RBC 2.47* 2.40*   HEMOGLOBIN 9.2* 8.7*   HEMATOCRIT 26.0* 25.7*   MCV  105.3* 107.1*   MCH 37.2* 36.3*   MCHC 35.4 33.9   RDW 44.4 45.9   PLATELETCT 279 296   MPV 9.8 9.7     Recent Labs     01/12/24  1035 01/13/24  1012 01/14/24  0352   SODIUM 129* 130* 129*   POTASSIUM 4.7 4.3 5.0   CHLORIDE 95* 98 96   CO2 21 21 21   GLUCOSE 133* 210* 109*   BUN 17 18 21   CREATININE 1.01 1.05 1.09   CALCIUM 8.8 8.4* 8.7                     Imaging  US-EXTREMITY VENOUS UPPER UNILAT RIGHT   Final Result      US-EXTREMITY ARTERY UPPER UNILAT W/WBI (COMBO)   Final Result      CT-SHOULDER W/O PLUS RECONS RIGHT   Final Result      1.  Comminuted and mildly displaced right humeral neck fracture.   2.  No shoulder dislocation.   3.  Osteopenia.   4.  Prominent edema about the right shoulder, upper arm, clavicle and axilla.      DX-SHOULDER 2+ RIGHT   Final Result      1.  Displaced comminuted proximal right humeral neck fracture is identified.      DX-HUMERUS 2+ RIGHT   Final Result      Nondisplaced right humeral neck fracture      DX-SHOULDER 2+ RIGHT    (Results Pending)        Assessment/Plan  * Lactic acidosis- (present on admission)  Assessment & Plan  Resolved with aggressive IV fluid hydration    Hyponatremia- (present on admission)  Assessment & Plan  Sodium 129.  Likely from daily alcohol heavy use.  Ordered spironolactone 25 mg p.o. daily to help with swelling post humerus fracture as well as is reduced sodium level.    Alcohol abuse- (present on admission)  Assessment & Plan  Replacing low magnesium repeatedly.  Elevated LFTs.  Admits to daily drinking.  Unsteady on feet 1/11.  No withdrawal symptoms.  Patient understands he has to cut back on alcohol use or abstain completely.    Hypomagnesemia- (present on admission)  Assessment & Plan  Mag 1.0 2/2 excessive GI losses.  1/9 replaced 4gm IV mag chloride.  1/10 mag 1.4, ordered 2 gm IV mag.    SIRS (systemic inflammatory response syndrome) (HCC)- (present on admission)  Assessment & Plan  1/7/2024  SIRS criteria identified on my evaluation  include:  Tachycardia, with heart rate greater than 90 BPM  SIRS is non-infectious, the patient does not have sepsis  Tachycardia and leukocytosis 14.3  Refrain from antibiotics    Transaminitis- (present on admission)  Assessment & Plan    Moderately elevated  Patient admits to daily alcohol use.  Supportive care and liver enzymes ordered for the morning.  No signs of alcohol withdrawal.      Metabolic acidosis- (present on admission)  Assessment & Plan    Bicarb of 9 with anion gap of 36  Due to dehydration  Aggressive IV fluids completed.    History of pulmonary embolism- (present on admission)  Assessment & Plan  Hold on Xarelto due to acute kidney failure  Last dose was 1/6 and with MAXIMO likely okay to continue to hold today with sufficient anticoagulation.   If he needs surgery, continue to hold and if not restart when his Cr comes down.  No surgery planned.  MAXIMO improving.   1/10 increase in ecchymosis, arm circumference, hold xarelto.  Us venous and arterial wnl.    Closed fracture of neck of right humerus- (present on admission)  Assessment & Plan  Dr. Barry ortho consulted, recommended non operative management.    Nonweightbearing right upper extremity in sling  Outpatient follow-up in 1 to 2 weeks  PT/OT  Multimodal pain managements including po and iv narcotics prn. Monitoring respiratory status and sedation score.   vitamin d level pending  Extensive edema and ecchymosis post injury.  Normal venous and arterial u/s.  Spironolactone for edema and reduce hyponatremia.  Elevated hand above heart level in sling.  1/14: Patient refusing to wear his arm sling for the last 2 days.  I have reinstructed to patient that he needs to mobilize his arm in order for it to heal and that it actually will heal better immobilized then with ORIF.  He states the sling is getting caught in tubes and IV lines.  Therefore I have ordered a right shoulder immobilizer.  I will order repeat x-ray of shoulder while in right  shoulder immobilizer.    Tobacco abuse- (present on admission)  Assessment & Plan  Spent approx 5 mins on Tobacco cessation education . Discussed options of nicotine patch, medical treatment with wellbutrin and chantix. Discussed the benefits of quitting smoking and risks of continued smoking including cardiovascular disease, cancer and COPD.   Code 93268  -I have ordered nicotine replacement therapy    MAXIMO (acute kidney injury) (HCC)- (present on admission)  Assessment & Plan  His baseline Cr is 1 and is currently 2.76 due to prerenal with a mild concomitant rhabdomyolysis  Received IV fluid  Encourage oral hydration  Avoid nephrotoxins    Essential hypertension- (present on admission)  Assessment & Plan  Patient has been hypertensive.  Hold losartan with acute kidney injury  1/13: Changed amlodipine 5 mg daily to metoprolol 50 mg p.o. twice daily and spironolactone in case amlodipine is causing increased swelling of the peripheral.    Coronary artery disease involving coronary bypass graft of native heart without angina pectoris- (present on admission)  Assessment & Plan  Hx of CABG  Resume lipitor once CPK trending down            VTE prophylaxis: Rivaroxaban    I have performed a physical exam and reviewed and updated ROS and Plan today (1/14/2024). In review of yesterday's note (1/13/2024), there are no changes except as documented above.

## 2024-01-14 NOTE — PROGRESS NOTES
Physical Medicine and Rehabilitation Consultation          Date of initial consultation: 1/12/2024  Consulting provider: Sherry Mejia M.D.   Reason for consultation: assess for acute inpatient rehab appropriateness  LOS: 8 Day(s)    Chief complaint: Fall    HPI: The patient is a 63 y.o. right hand dominant male with a past medical history of CABG 2017, DVT with PE on Xarelto, hypertension;  who presented on 1/6/2024  6:56 AM with 1 week of intractable vomiting and ground-level fall.  In the ED patient's lactic acid was 11.7, leukocytosis of 14, bicarb 9, MAXIMO with creatinine 2.76.  Patient was found to have a minimally displaced right humeral neck fracture on x-ray.  Patient was seen by Dr. Lalit Barry MD of orthopedic surgery who found the fracture to be mildly displaced and recommended nonoperative management, NWB, and follow-up in clinic in 1 to 2 weeks.  Sling for comfort.    The patient currently reports continued severe pain in his right arm.  Patient reports 69 prior fractures and this is the worst.  He feels crippled by pain.  Currently using 105 morphine equivalents per day with Roxicodone 10 mg every 3 hours.  He has no other complaints besides this.  He is requesting reevaluation for surgical fixation.    1/14/2024  Patient is still having a lot of pain in his right arm. Case discussed with ortho who has no plans for surgery so long as the humeral head hasn't moved. Discussed ordering XR with primary team to evaluate today.     ROS  Pertinent positives are mentioned in the HPI, all others reviewed and are negative.    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  Comminuted and mildly displaced right humeral neck fracture.  2.  No shoulder dislocation.  3.  Osteopenia.  4.  Prominent edema about the right shoulder, upper arm, clavicle and axilla.    PROCEDURES:  None    PMH:  Past Medical History:   Diagnosis Date    Breath shortness 10/2017    \"In the past, not an issue now\" sob before heart surgery    CAD (coronary artery disease)     S/P CABG X2    Dental disorder 10/2017    Upper dentures    DVT (deep venous thrombosis) ([facility])     Grief reaction 2017    Girlfriend  this year.    Hx of hernia repair     Mesh    Hyperlipidemia     Hypertension     Psychiatric problem 10/2017    Depression    Tobacco use        PSH:  Past Surgical History:   Procedure Laterality Date    MULTIPLE CORONARY ARTERY BYPASS ENDO VEIN HARVEST  2017    Procedure: MULTIPLE CORONARY ARTERY BYPASS x2 RIGHT LEG ENDO VEIN HARVEST;  Surgeon: Gray Barboza M.D.;  Location: SURGERY [facility];  Service:     HUBERT  2017    Procedure: HUBERT - INTRAOP;  Surgeon: Gray Barboza M.D.;  Location: SURGERY [facility];  Service:     INGUINAL HERNIA REPAIR Bilateral     ORIF, FRACTURE, TIBIA Right     ZZZ CARDIAC CATH         FHX:  Family History   Problem Relation Age of Onset    Cancer Mother     Cancer Father     No Known Problems Sister     No Known Problems Sister     Heart Disease Maternal Grandfather 89        probable MI and sudden death       Medications:  Current Facility-Administered Medications   Medication Dose    thiamine (Vitamin B-1) tablet 100 mg  100 mg    folic acid (Folvite) tablet 1 mg  1 mg    metoprolol tartrate (Lopressor) tablet 50 mg  50 mg    spironolactone (Aldactone) tablet 25 mg  25 mg    acetaminophen (Tylenol) tablet 1,000 mg  1,000 mg    ferrous sulfate tablet 325 mg  325 mg    ascorbic acid (Vitamin C) tablet 500 mg  500 mg    lidocaine (Asperflex) 4 % patch 1 Patch  1 Patch    oxyCODONE immediate-release (Roxicodone) tablet 5 mg " " 5 mg    Or    oxyCODONE immediate release (Roxicodone) tablet 10 mg  10 mg    Or    HYDROmorphone (Dilaudid) injection 1 mg  1 mg    cyclobenzaprine (Flexeril) tablet 10 mg  10 mg    Pharmacy Consult Request ...Pain Management Review 1 Each  1 Each    rivaroxaban (Xarelto) tablet 20 mg  20 mg    nicotine (Nicoderm) 21 MG/24HR 21 mg  21 mg    senna-docusate (Pericolace Or Senokot S) 8.6-50 MG per tablet 2 Tablet  2 Tablet    And    polyethylene glycol/lytes (Miralax) Packet 1 Packet  1 Packet    And    magnesium hydroxide (Milk Of Magnesia) suspension 30 mL  30 mL    And    bisacodyl (Dulcolax) suppository 10 mg  10 mg    ondansetron (Zofran) syringe/vial injection 4 mg  4 mg    ondansetron (Zofran ODT) dispertab 4 mg  4 mg    promethazine (Phenergan) tablet 12.5-25 mg  12.5-25 mg    promethazine (Phenergan) suppository 12.5-25 mg  12.5-25 mg    prochlorperazine (Compazine) injection 5-10 mg  5-10 mg       Allergies:  No Known Allergies      Physical Exam:  Vitals: /80   Pulse 97   Temp 36.8 °C (98.2 °F) (Temporal)   Resp 17   Ht 1.854 m (6' 1\")   Wt 84.9 kg (187 lb 2.7 oz)   SpO2 95%   Gen: NAD  Head: NC/AT  Eyes/ Nose/ Mouth: PERRLA, moist mucous membranes  Cardio: RRR, good distal perfusion, warm extremities  Pulm: normal respiratory effort, no cyanosis   Abd: Soft NTND, negative borborygmi   Ext: Right arm in sling, held close to body, tremulous      Labs: Reviewed and significant for   Recent Labs     01/12/24  1035 01/13/24  1012   RBC 2.47* 2.40*   HEMOGLOBIN 9.2* 8.7*   HEMATOCRIT 26.0* 25.7*   PLATELETCT 279 296   IRON  --  35*   FERRITIN  --  433.0*   TOTIRONBC  --  194*     Recent Labs     01/12/24  1035 01/13/24  1012 01/14/24  0352   SODIUM 129* 130* 129*   POTASSIUM 4.7 4.3 5.0   CHLORIDE 95* 98 96   CO2 21 21 21   GLUCOSE 133* 210* 109*   BUN 17 18 21   CREATININE 1.01 1.05 1.09   CALCIUM 8.8 8.4* 8.7     Recent Results (from the past 24 hour(s))   Basic Metabolic Panel    Collection Time: " 01/14/24  3:52 AM   Result Value Ref Range    Sodium 129 (L) 135 - 145 mmol/L    Potassium 5.0 3.6 - 5.5 mmol/L    Chloride 96 96 - 112 mmol/L    Co2 21 20 - 33 mmol/L    Glucose 109 (H) 65 - 99 mg/dL    Bun 21 8 - 22 mg/dL    Creatinine 1.09 0.50 - 1.40 mg/dL    Calcium 8.7 8.5 - 10.5 mg/dL    Anion Gap 12.0 7.0 - 16.0   ESTIMATED GFR    Collection Time: 01/14/24  3:52 AM   Result Value Ref Range    GFR (CKD-EPI) 76 >60 mL/min/1.73 m 2     ASSESSMENT:  Patient is a 63 y.o. male admitted with right humerus fracture after ground-level fall.      [facility] Code / Diagnosis to Support: 0008.9 - Orthopaedic Disorders: Other Orthopaedic    Rehabilitation: Impaired ADLs and mobility  Patient is a good candidate for inpatient rehab based on needs for PT, OT.  Patient will also benefit from family training.  Patient has a good discharge situation which will be home with community support.     Barriers to transfer include: Insurance authorization, TCCs to verify disposition, medical clearance and bed availability     All cases are subject to administrative review and recommendations may change    Disposition recommendations:  -Good candidate for IPR.  Patient has significant deficits with mobility and ADLs secondary to his right humerus fracture which is causing him a great deal of discomfort  -TCC to submit to EMR insurance for prior physician  -PMR to follow in the periphery for rehab appropriateness, please reach out with questions or request for medical management    Medical Complexity:    Right humerus fracture  -Displaced comminuted humeral head, reviewed by surgeon and recommended nonoperative management  -XR right shoulder, 2 view ordered today.   -PT OT as tolerated  -Pain control as below  - Can consider immobilizer  -Potential candidate for IPR    Pain control  -Using 105 morphine equivalents per day  -Oxycodone 10 mg every 3 hours as needed  -Flexeril 10 mg 3 times daily as needed  -Adding scheduled Tylenol  -Adding  lidocaine patch    Acute anemia  -Hemoglobin 9.2 --> 8.7  -Close observation monitoring with serial labs  - Started on oral iron supplement     Hyponatremia  -Sodium 129  -Limit free water intake  -Primary team managing  -Monitoring with serial labs    Mild elevation of liver enzymes  -Overall much improved since admission  -Monitor for elevations with initiation of scheduled Tylenol    Hypertension  -Amlodipine 5 mg daily    History of DVT with PE  -Xarelto 20 mg with p.m. meal    Tobacco abuse  - Tobacco abuse cessation counseling given on initial consult for ~5 min as it pertains to vascular disease, heart attack, stroke, wound healing, and pulmonary disease.     DVT PPX: Xarelto 20 mg      Thank you for allowing us to participate in the care of this patient.     Patient was seen for >35 minutes on unit/floor of which > 50% of time was spent on counseling and coordination of care regarding the above, including prognosis, risk reduction, benefits of treatment, and options for next stage of care.    Eulalio Marrero, DO   Physical Medicine and Rehabilitation     Please note that this dictation was created using voice recognition software. I have made every reasonable attempt to correct obvious errors, but there may be errors of grammar and possibly content that I did not discover before finalizing the note.

## 2024-01-14 NOTE — PROGRESS NOTES
Reconfigured R shoulder immobilizer on pt. Pt is comfortable and tolerating dme well at this time.    Contact traction for questions or concerns.

## 2024-01-15 ENCOUNTER — PHARMACY VISIT (OUTPATIENT)
Dept: PHARMACY | Facility: MEDICAL CENTER | Age: 64
End: 2024-01-15
Payer: COMMERCIAL

## 2024-01-15 VITALS
WEIGHT: 187.17 LBS | DIASTOLIC BLOOD PRESSURE: 71 MMHG | SYSTOLIC BLOOD PRESSURE: 123 MMHG | BODY MASS INDEX: 24.81 KG/M2 | TEMPERATURE: 97.8 F | HEART RATE: 79 BPM | OXYGEN SATURATION: 94 % | RESPIRATION RATE: 18 BRPM | HEIGHT: 73 IN

## 2024-01-15 PROBLEM — Z79.01 CHRONIC ANTICOAGULATION: Status: ACTIVE | Noted: 2024-01-15

## 2024-01-15 PROBLEM — R65.10 SIRS (SYSTEMIC INFLAMMATORY RESPONSE SYNDROME) (HCC): Status: RESOLVED | Noted: 2024-01-06 | Resolved: 2024-01-15

## 2024-01-15 PROBLEM — N17.9 AKI (ACUTE KIDNEY INJURY) (HCC): Status: RESOLVED | Noted: 2020-03-02 | Resolved: 2024-01-15

## 2024-01-15 PROBLEM — E87.20 METABOLIC ACIDOSIS: Status: RESOLVED | Noted: 2024-01-06 | Resolved: 2024-01-15

## 2024-01-15 PROBLEM — E87.20 LACTIC ACIDOSIS: Status: RESOLVED | Noted: 2024-01-06 | Resolved: 2024-01-15

## 2024-01-15 PROBLEM — E83.42 HYPOMAGNESEMIA: Status: RESOLVED | Noted: 2024-01-09 | Resolved: 2024-01-15

## 2024-01-15 PROBLEM — R74.01 TRANSAMINITIS: Status: RESOLVED | Noted: 2024-01-06 | Resolved: 2024-01-15

## 2024-01-15 PROCEDURE — 700102 HCHG RX REV CODE 250 W/ 637 OVERRIDE(OP)

## 2024-01-15 PROCEDURE — RXMED WILLOW AMBULATORY MEDICATION CHARGE: Performed by: HOSPITALIST

## 2024-01-15 PROCEDURE — A9270 NON-COVERED ITEM OR SERVICE: HCPCS | Performed by: HOSPITALIST

## 2024-01-15 PROCEDURE — 700102 HCHG RX REV CODE 250 W/ 637 OVERRIDE(OP): Performed by: HOSPITALIST

## 2024-01-15 PROCEDURE — 99239 HOSP IP/OBS DSCHRG MGMT >30: CPT | Performed by: HOSPITALIST

## 2024-01-15 PROCEDURE — A9270 NON-COVERED ITEM OR SERVICE: HCPCS

## 2024-01-15 RX ORDER — ASCORBIC ACID 500 MG
500 TABLET ORAL DAILY
Qty: 30 TABLET | Refills: 0 | Status: SHIPPED | OUTPATIENT
Start: 2024-01-16 | End: 2024-02-18

## 2024-01-15 RX ORDER — SPIRONOLACTONE 25 MG/1
25 TABLET ORAL DAILY
Qty: 30 TABLET | Refills: 0 | Status: SHIPPED | OUTPATIENT
Start: 2024-01-16 | End: 2024-02-15

## 2024-01-15 RX ORDER — LANOLIN ALCOHOL/MO/W.PET/CERES
100 CREAM (GRAM) TOPICAL DAILY
Qty: 30 TABLET | Refills: 0 | Status: SHIPPED | OUTPATIENT
Start: 2024-01-16 | End: 2024-02-15

## 2024-01-15 RX ORDER — AMOXICILLIN 250 MG
2 CAPSULE ORAL 2 TIMES DAILY
Qty: 120 TABLET | Refills: 0 | Status: SHIPPED | OUTPATIENT
Start: 2024-01-15 | End: 2024-02-15

## 2024-01-15 RX ORDER — OXYCODONE HYDROCHLORIDE 10 MG/1
10 TABLET ORAL EVERY 6 HOURS PRN
Qty: 20 TABLET | Refills: 0 | Status: ON HOLD | OUTPATIENT
Start: 2024-01-15 | End: 2024-01-24

## 2024-01-15 RX ORDER — ACETAMINOPHEN 500 MG
1000 TABLET ORAL EVERY 8 HOURS
Qty: 100 TABLET | Refills: 0 | Status: SHIPPED | OUTPATIENT
Start: 2024-01-15 | End: 2024-02-18

## 2024-01-15 RX ORDER — FOLIC ACID 1 MG/1
1 TABLET ORAL DAILY
Qty: 30 TABLET | Refills: 0 | Status: SHIPPED | OUTPATIENT
Start: 2024-01-16 | End: 2024-02-18

## 2024-01-15 RX ORDER — FERROUS SULFATE 325(65) MG
325 TABLET ORAL
Qty: 30 TABLET | Refills: 0 | Status: SHIPPED | OUTPATIENT
Start: 2024-01-16 | End: 2024-02-18

## 2024-01-15 RX ORDER — NICOTINE 21 MG/24HR
1 PATCH, TRANSDERMAL 24 HOURS TRANSDERMAL EVERY 24 HOURS
Qty: 30 PATCH | Refills: 0 | Status: SHIPPED | OUTPATIENT
Start: 2024-01-15 | End: 2024-02-15

## 2024-01-15 RX ORDER — LIDOCAINE 4 G/G
1 PATCH TOPICAL EVERY 24 HOURS
Qty: 10 PATCH | Refills: 0 | Status: SHIPPED | OUTPATIENT
Start: 2024-01-15 | End: 2024-02-18

## 2024-01-15 RX ORDER — METOPROLOL TARTRATE 50 MG/1
50 TABLET, FILM COATED ORAL 2 TIMES DAILY
Qty: 60 TABLET | Refills: 0 | Status: SHIPPED | OUTPATIENT
Start: 2024-01-15 | End: 2024-02-15 | Stop reason: SDUPTHER

## 2024-01-15 RX ADMIN — NICOTINE TRANSDERMAL SYSTEM 21 MG: 21 PATCH, EXTENDED RELEASE TRANSDERMAL at 05:30

## 2024-01-15 RX ADMIN — OXYCODONE HYDROCHLORIDE AND ACETAMINOPHEN 500 MG: 500 TABLET ORAL at 05:30

## 2024-01-15 RX ADMIN — FOLIC ACID 1 MG: 1 TABLET ORAL at 05:29

## 2024-01-15 RX ADMIN — METOPROLOL TARTRATE 50 MG: 50 TABLET, FILM COATED ORAL at 05:30

## 2024-01-15 RX ADMIN — OXYCODONE HYDROCHLORIDE 10 MG: 10 TABLET ORAL at 01:03

## 2024-01-15 RX ADMIN — SPIRONOLACTONE 25 MG: 25 TABLET ORAL at 05:30

## 2024-01-15 RX ADMIN — Medication 100 MG: at 05:30

## 2024-01-15 RX ADMIN — OXYCODONE HYDROCHLORIDE 10 MG: 10 TABLET ORAL at 05:29

## 2024-01-15 RX ADMIN — ACETAMINOPHEN 1000 MG: 500 TABLET ORAL at 05:30

## 2024-01-15 RX ADMIN — FERROUS SULFATE TAB 325 MG (65 MG ELEMENTAL FE) 325 MG: 325 (65 FE) TAB at 07:42

## 2024-01-15 ASSESSMENT — PAIN DESCRIPTION - PAIN TYPE
TYPE: ACUTE PAIN

## 2024-01-15 NOTE — DISCHARGE INSTRUCTIONS
No weight bearing with right arm.  Continue in Arm sling with occasional removal for ROM, extension of elbow from flexion to extension.

## 2024-01-15 NOTE — CARE PLAN
The patient is Stable - Low risk of patient condition declining or worsening    Shift Goals  Clinical Goals: Pain management w/ pain goal 5-6/10  Patient Goals: Go home today  Family Goals: N/A    Progress made toward(s) clinical / shift goals: Patient reports pain currently at goal; declines PRN pain medication    Patient is not progressing towards the following goals: Patient refused rehab and HH; states he needs to go back to work. 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 DC order placed to d/c home with no needs. Patient agreeable to DC. Pending transport  to [facility]       Self

## 2024-01-15 NOTE — PROGRESS NOTES
Received bedside report from NOC shift RN and assumed care of patient. Labs and orders noted. Assessment performed. Patient is alert and oriented x4 with no signs of labored breathing or distress. Patient denies any dizziness, chest pain, nausea, numbness, or tingling at this time. Patient re-educated on R shoulder immobilizer and placed back on. Patient updated on plan of care; verbalizes understanding and states all of his questions/concerns have been addressed and answered appropriately. Patient states all of his needs are being met at this time. Safety precautions in place including patient call light within reach, personal possessions nearby, bed in low position and locked, patient rounding in practice, and non-skid socks in place.

## 2024-01-15 NOTE — PROGRESS NOTES
Assumed care of patient at 1900. Received report from day RN. Patient A&Ox4, on RA, Reporting a pain level of 9/10. Pt medicated as per MAR. Pt sling on R arm in place. Call light within reach, belongings within reach, bed in lowest position. Patient does not have any other needs at this time. Pt refused bed alarm. Educated pt that due to his injury and medications he is a high fall risk and the bed alarm should be on for his safety, pt continued to refuse.

## 2024-01-15 NOTE — PROGRESS NOTES
Patient refusing bed alarm despite education given on the importance of having a bed alarm in place. HIGH fall risk. Reinforcing safety teaching throughout shift. Patient states he will call for staff assistance prior to attempting getting OOB; remains free of injury since start of shift.

## 2024-01-15 NOTE — PROGRESS NOTES
Patient transferred to [facility] via . All personal belongings with patient including glasses, wallet, keys and phone.    No phone ; patient states he did not bring one with him.

## 2024-01-15 NOTE — DISCHARGE SUMMARY
Discharge Summary    CHIEF COMPLAINT ON ADMISSION  Chief Complaint   Patient presents with    T-5000 FALL     BIB [facility] from home for [facility]GL[facility] yesterday after his legs gave out. - LOC, - head trauma, + thinner (xarelto). Pt complains of R shoulder pain. Per EMS pt spent most of the night on the ground unable to get up. Pt reports he crawled to the phone this morning to call 911.       Reason for Admission  GLF     Admission Date  1/6/2024    CODE STATUS  Full Code    HPI & HOSPITAL COURSE  Mr. Ponce has a past medical history of coronary artery bypass graft 2017 as well as DVT with PE on Xarelto as his hypertension that had been experiencing 1 week of intractable vomiting and fell on hardwood floor of the kitchen and was able to crawl to his phone and call 911.   Noted MAXIMO and lactic acidosis.    X-ray reveals a nondisplaced right humeral neck fracture.  Dr. Barry, orthopedic surgery recommended non operative management.  Nonweightbearing right upper extremity in sling     Interval Problem Update  1/9:  lives alone, right humerus fracture in sling.  Pending PT/OT evals.  Patient wants me to show him his fracture xray. Replacing IV magnesium, phosphorus and potassium s/p recent emesis, now resolved.  1/10:  increased ecchymosis and tightness to right elbow/forearm since yesterday.  Good 2+ radial pulse and normal movement of right fingers, normal warmth and cap refill.  D/w orthopedic surgeon, no change.  Holding xarelto.  Us negative for dvt.  Arterial u/s normal.  1/11:  decreased edema noted right lower arm, remains with significant ecchymosis right chest wall.  Restart xarelto. Patient feels unsteady on his feet with ambulation today, low Na.  Admits to daily alcohol use. Will restrict po fluids to 1500 ml. On regular diet. Continue to increase ambulation. Replacing Magnesium 4gm IV and potassium.  1/12: Patient only able to ambulate 3 feet from bed to chair.  He states he is unable to go home by himself.  I did  discuss with patient [facility] which he is agreeable to.  Appreciate physiatry consult.  No further swelling of right arm.  Normal cap refill and normal radial pulse noted, distal fingers warm to touch.  1/13:  right arm with edema, ecchymosis, no increase.  Added spironolactone 25mg po daily for alcohol related hyponatremia and reduce edema.  Added thiamine and folate daily.  Ferritin and  B12 normal. Changed norvasc to metoprolol bid, norvasc can increase peripheral edema.  1/14: Patient seen for the second day with his arm out of the sling.  He states he refuses to put it on because he gets tangled up at night.  He is complaining of right proximal humerus pain.  He wants to have surgery on his arm.  I have ordered a right shoulder immobilizer for patient.  His was instructed that his right humerus fracture will heal better in immobilization then with surgery as long as he wears the immobilizer.  I discussed with physiatry physician as well.  I will order a shoulder x-ray in a.m. with the immobilizer in place.     Patient's xrays show displacement of fracture site, however he was not compliant in keeping his right arm in an arm sling.   After orthopedic technician placed his shoulder sling properly, he felt significant decrease in fracture pain.  He feels comfortable going home, ambulating well in room, able to dress himself.  He understands he will need close follow up with Dr. Barry in 2 weeks for recheck and set up outpatient physical therapy.  Right arm swelling significantly reduced on spironolactone and metoprolol, off norvasc.  BP stable at 123/71. On RA.   Remain NWB right arm.  He no longer felt he needed rehab, wanting to go home with arm sling and pain medications.  On RA, steady with walking around room, Hg stable.     Therefore, he is discharged in good and stable condition to home with close outpatient follow-up.    The patient met 2-midnight criteria for an inpatient stay at the time of  discharge.    Discharge Date  1/15/2024    FOLLOW UP ITEMS POST DISCHARGE  Follow up with Dr. Barry in 2 weeks for repeat xrays right shoulder, outpatient PT set up.      DISCHARGE DIAGNOSES  Principal Problem (Resolved):    Lactic acidosis (POA: Yes)  Active Problems:    Coronary artery disease involving coronary bypass graft of native heart without angina pectoris (POA: Yes)    S/P CABG x 2 (POA: Yes)      Overview: June 2017: CABG x 2 (LIMA to distal LAD, rSVG to distal diagonal) by Dr. Barboza.    Essential hypertension (Chronic) (POA: Yes)      Overview: Restarted on Losartan 50mg QD in 1/2022;       Increased to 100mg QD in 3/2023.     Tobacco abuse (POA: Yes)    Chronic alcohol use (POA: Yes)    Closed fracture of neck of right humerus (POA: Yes)    History of pulmonary embolism (POA: Yes)    Hyponatremia (POA: Yes)    Chronic anticoagulation (POA: Unknown)  Resolved Problems:    MAXIMO (acute kidney injury) (HCC) (POA: Yes)    Metabolic acidosis (POA: Yes)    Transaminitis (POA: Yes)    SIRS (systemic inflammatory response syndrome) (HCC) (POA: Yes)    Hypomagnesemia (POA: Yes)      FOLLOW UP  No future appointments.  Lalit Barry M.D.  [address]  [address]  [place] [id number] [phone]    Schedule an appointment as soon as possible for a visit in 2 week(s)  recheck xrays right shoulder.      MEDICATIONS ON DISCHARGE     Medication List        START taking these medications        Instructions   acetaminophen 500 MG Tabs  Commonly known as: Tylenol   Take 2 Tablets by mouth every 8 hours.  Dose: 1,000 mg     ascorbic acid 500 MG tablet  Start taking on: January 16, 2024  Commonly known as: Vitamin C   Take 1 Tablet by mouth every day.  Dose: 500 mg     ferrous sulfate 325 (65 Fe) MG tablet  Start taking on: January 16, 2024   Take 1 Tablet by mouth every morning with breakfast.  Dose: 325 mg     folic acid 1 MG Tabs  Start taking on: January 16, 2024  Commonly known as: Folvite    Take 1 Tablet by mouth every day.  Dose: 1 mg     lidocaine 4 % Ptch  Commonly known as: Asperflex   Place 1 Patch on the skin every 24 hours.  Dose: 1 Patch     metoprolol tartrate 50 MG Tabs  Commonly known as: Lopressor   Take 1 Tablet by mouth 2 times a day.  Dose: 50 mg     nicotine 21 MG/24HR Pt24  Commonly known as: Nicoderm   Place 1 Patch on the skin every 24 hours.  Dose: 1 Patch     oxyCODONE immediate release 10 MG immediate release tablet  Commonly known as: Roxicodone   Take 1 Tablet by mouth every 6 hours as needed for Severe Pain for up to 5 days.  Dose: 10 mg     senna-docusate 8.6-50 MG Tabs  Commonly known as: Pericolace Or Senokot S   Take 2 Tablets by mouth 2 times a day.  Dose: 2 Tablet     spironolactone 25 MG Tabs  Start taking on: January 16, 2024  Commonly known as: Aldactone   Take 1 Tablet by mouth every day.  Dose: 25 mg     thiamine 100 MG tablet  Start taking on: January 16, 2024  Commonly known as: Thiamine   Take 1 Tablet by mouth every day.  Dose: 100 mg            CONTINUE taking these medications        Instructions   atorvastatin 10 MG Tabs  Commonly known as: Lipitor   Take 1 Tablet by mouth every evening.  Dose: 10 mg     Centrum Silver 50+Men Tabs   Take 1 Tablet by mouth every evening.  Dose: 1 Tablet     rivaroxaban 20 MG Tabs tablet  Commonly known as: Xarelto   Doctor's comments: This rx was submitted by a pharmacist working under a collaborative practice agreement.  Take 1 Tablet by mouth with dinner.  Dose: 20 mg            STOP taking these medications      losartan 100 MG Tabs  Commonly known as: Cozaar              Allergies  No Known Allergies    DIET  Orders Placed This Encounter   Procedures    Diet Order Diet: Regular; Fluid modifications: (optional): 1500 ml Fluid Restriction     Standing Status:   Standing     Number of Occurrences:   1     Order Specific Question:   Diet:     Answer:   Regular [1]     Order Specific Question:   Fluid modifications: (optional)      Answer:   1500 ml Fluid Restriction [9]       ACTIVITY  As tolerated.  0-lb weight restriction RIGHT arm  NWB right arm.    CONSULTATIONS  Ortho  physiatry    PROCEDURES  1/6/2024 1:43 PM     HISTORY/REASON FOR EXAM:  T-5000 FALL.  Shoulder pain after fall     TECHNIQUE/ EXAM DESCRIPTION AND NUMBER OF VIEWS:  CT scan of the RIGHT shoulder without contrast and including reconstructions.     Thin-section noncontrast helical images were obtained from the clavicle through the scapula. Coronal and sagittal reconstructions were generated.     Up to date radiation dose reduction adjustments have been utilized to meet ALARA standards for radiation dose reduction.     COMPARISON: Radiographs earlier in the day     FINDINGS:  There is generalized osteopenia. There is a significant comminuted fracture of the right humeral neck with mild displacement of the principle fracture fragment. Probable small amount of joint effusion/hemorrhage. There is no shoulder dislocation.   Acromioclavicular and glenohumeral joints are intact with probably minimal degenerative changes.     There is edema within the anterior right shoulder, upper arm and axilla     IMPRESSION:     1.  Comminuted and mildly displaced right humeral neck fracture.  2.  No shoulder dislocation.  3.  Osteopenia.  4.  Prominent edema about the right shoulder, upper arm, clavicle and axilla.           Exam Ended: 01/06/24  2:06 PM Last Resulted: 01/06/24  2:13 PM             LABORATORY  Lab Results   Component Value Date    SODIUM 129 (L) 01/14/2024    POTASSIUM 5.0 01/14/2024    CHLORIDE 96 01/14/2024    CO2 21 01/14/2024    GLUCOSE 109 (H) 01/14/2024    BUN 21 01/14/2024    CREATININE 1.09 01/14/2024        Lab Results   Component Value Date    WBC 7.6 01/13/2024    HEMOGLOBIN 8.7 (L) 01/13/2024    HEMATOCRIT 25.7 (L) 01/13/2024    PLATELETCT 296 01/13/2024        Total time of the discharge process exceeds 45 minutes.

## 2024-01-15 NOTE — PREADMISSION SCREENING NOTE
"  Pre-Admission Screening Form    Patient Information:   Name: Jose A Ponce     MRN: [id number]       : 1960      Age: 63 y.o.   Gender: male      Race: White [7]       Marital Status: Single [1]  Family Contact: FrankMargaritaLaura        Relationship: Sister [14]  Sister [14]  Home Phone: [phone]           Cell Phone: [phone]  Advanced Directives: Copy in Chart  Code Status:  FULL  Current Attending Provider: Sherry Mejia M.D.  Referring Physician: Dr. Mejia      Physiatrist Consult: Dr. Marrero       Referral Date: 2024  Primary Payor Source:  [facility]  Secondary Payor Source:      Medical Information:   Date of Admission to Acute Care [place]2024  Room Number: S534/01  Rehabilitation Diagnosis: 0008.9 - Orthopaedic Disorders: Other Orthopaedic  Immunization History   Administered Date(s) Administered    Influenza Vaccine Quad Inj (Pf) 10/01/2016, 10/17/2019    PFIZER PURPLE CAP SARS-COV-2 VACCINATION (12+) 2021, 2021     No Known Allergies  Past Medical History:   Diagnosis Date    Breath shortness 10/2017    \"In the past, not an issue now\" sob before heart surgery    CAD (coronary artery disease)     S/P CABG X2    Dental disorder 10/2017    Upper dentures    DVT (deep venous thrombosis) (HCC)     Grief reaction 2017    Girlfriend  this year.    Hx of hernia repair     Mesh    Hyperlipidemia     Hypertension     Psychiatric problem 10/2017    Depression    Tobacco use      Past Surgical History:   Procedure Laterality Date    MULTIPLE CORONARY ARTERY BYPASS ENDO VEIN HARVEST  2017    Procedure: MULTIPLE CORONARY ARTERY BYPASS x2 RIGHT LEG ENDO VEIN HARVEST;  Surgeon: Gray Barboza M.D.;  Location: SURGERY [facility];  Service:     HUBERT  2017    Procedure: HUBERT - INTRAOP;  Surgeon: Gray Barboza M.D.;  Location: SURGERY [facility];  Service:     INGUINAL HERNIA REPAIR Bilateral 2000    ORIF, FRACTURE, TIBIA " Right [facility]    ZZZ CARDIAC CATH         History Leading to Admission, Conditions that Caused the Need for Rehab (CMS): right humerus fx, acute anemia, hyponatremia, HTN, histrory of DVT with PE  Co-morbidities:  as listed above and below   Potential Risk - Complications: Cognitive Impairment, Deep Vein Thrombosis, Incontinence, Malnutrition, Pain, and Paralysis  Level of Risk: High    Ongoing Medical Management Needed (Medical/Nursing Needs):   Patient Active Problem List    Diagnosis Date Noted    Hyponatremia 01/13/2024    Alcohol abuse 01/11/2024    Hypomagnesemia 01/09/2024    Lactic acidosis 01/06/2024    Closed fracture of neck of right humerus 01/06/2024    History of pulmonary embolism 01/06/2024    Metabolic acidosis 01/06/2024    Transaminitis 01/06/2024    SIRS (systemic inflammatory response syndrome) ([facility]) 01/06/2024    Other acute pancreatitis, unspecified complication status 06/04/2022    Chronic alcohol use 06/04/2022    Lung nodule 01/27/2022    Acute deep vein thrombosis (DVT) of femoral vein of right lower extremity ([facility]) 01/01/2022    MAXIMO (acute kidney injury) ([facility]) 03/02/2020    Acute deep vein thrombosis (DVT) of iliac vein of left lower extremity ([facility]) 03/02/2020    Tobacco abuse 03/02/2020    Umbilical hernia without obstruction and without gangrene 04/24/2019    Mood disorder ([facility]) 04/24/2019    Prediabetes 04/24/2019    Ganglion cyst 08/21/2017    Essential hypertension 08/03/2017    S/P CABG x 2 06/27/2017    Coronary artery disease involving coronary bypass graft of native heart without angina pectoris 06/13/2017    Former smoker 06/03/2017    Grief reaction 06/03/2017    Dyslipidemia 06/03/2017     Consult Orders  IP Consult For Physiatry [[id number]] ordered by Sherry Mejia M.D. at 01/12/24 1038       Expand All Collapse All                                                    Physical Medicine and Rehabilitation Consultation                                                                             Date of initial consultation: 1/12/2024  Consulting provider: Sherry Mejia M.D.   Reason for consultation: assess for acute inpatient rehab appropriateness  LOS: 6 Day(s)     Chief complaint: Fall     HPI: The patient is a 63 y.o. right hand dominant male with a past medical history of CABG 2017, DVT with PE on Xarelto, hypertension;  who presented on 1/6/2024  6:56 AM with 1 week of intractable vomiting and ground-level fall.  In the ED patient's lactic acid was 11.7, leukocytosis of 14, bicarb 9, MAXIMO with creatinine 2.76.  Patient was found to have a nondisplaced right humeral neck fracture on x-ray.  Patient was seen by Dr. Lalit Barry MD of orthopedic surgery who found the fracture to be mildly displaced and recommended nonoperative management, NWB, and follow-up in clinic in 1 to 2 weeks.  Sling for comfort.     The patient currently reports continued severe pain in his right arm.  Patient reports 69 prior fractures and this is the worst.  He feels crippled by pain.  Currently using 105 morphine equivalents per day with Roxicodone 10 mg every 3 hours.  He has no other complaints besides this.  He is requesting reevaluation for surgical fixation.     ROS  Pertinent positives are mentioned in the HPI, all others reviewed and are negative.     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  Comminuted and mildly displaced right humeral neck fracture.  2.  No shoulder dislocation.  3.  Osteopenia.  4.  Prominent edema about the right shoulder, upper arm, clavicle and axilla.     PROCEDURES:  None     PMH:    Past Medical History  Past  "Medical History:  Diagnosis Date   Breath shortness 10/2017    \"In the past, not an issue now\" sob before heart surgery   CAD (coronary artery disease)      S/P CABG X2   Dental disorder 10/2017    Upper dentures   DVT (deep venous thrombosis) (formerly [facility])     Grief reaction 2017    Girlfriend  this year.   Hx of hernia repair      Mesh   Hyperlipidemia     Hypertension     Psychiatric problem 10/2017    Depression   Tobacco use            PSH:    Past Surgical History  Past Surgical History:  Procedure Laterality Date   MULTIPLE CORONARY ARTERY BYPASS ENDO VEIN HARVEST   2017    Procedure: MULTIPLE CORONARY ARTERY BYPASS x2 RIGHT LEG ENDO VEIN HARVEST;  Surgeon: Gray Barboza M.D.;  Location: SURGERY [facility];  Service:    HUBERT   2017    Procedure: HUBERT - INTRAOP;  Surgeon: Gray Barboza M.D.;  Location: SURGERY [facility];  Service:    INGUINAL HERNIA REPAIR Bilateral    ORIF, FRACTURE, TIBIA Right    ZZZ CARDIAC CATH              FHX:    Family History  Family History  Problem Relation Age of Onset   Cancer Mother     Cancer Father     No Known Problems Sister     No Known Problems Sister     Heart Disease Maternal Grandfather 89        probable MI and sudden death          Medications:    Current Facility-Administered Medications  Medication Dose   cyclobenzaprine (Flexeril) tablet 10 mg  10 mg   amLODIPine (Norvasc) tablet 5 mg  5 mg   Pharmacy Consult Request ...Pain Management Review 1 Each  1 Each   oxyCODONE immediate-release (Roxicodone) tablet 5 mg  5 mg    Or   oxyCODONE immediate release (Roxicodone) tablet 10 mg  10 mg    Or   HYDROmorphone (Dilaudid) injection 1 mg  1 mg   rivaroxaban (Xarelto) tablet 20 mg  20 mg   nicotine (Nicoderm) 21 MG/24HR 21 mg  21 mg   senna-docusate (Pericolace Or Senokot S) 8.6-50 MG per tablet 2 Tablet  2 Tablet    And   polyethylene glycol/lytes (Miralax) Packet 1 Packet  1 Packet    And   magnesium hydroxide (Milk Of Magnesia) suspension 30 " "mL  30 mL    And   bisacodyl (Dulcolax) suppository 10 mg  10 mg   ondansetron (Zofran) syringe/vial injection 4 mg  4 mg   ondansetron (Zofran ODT) dispertab 4 mg  4 mg   promethazine (Phenergan) tablet 12.5-25 mg  12.5-25 mg   promethazine (Phenergan) suppository 12.5-25 mg  12.5-25 mg   prochlorperazine (Compazine) injection 5-10 mg  5-10 mg        Allergies:  No Known Allergies        Physical Exam:  Vitals: /78   Pulse (!) 104   Temp 36.6 °C (97.9 °F) (Temporal)   Resp 20   Ht 1.854 m (6' 1\")   Wt 83 kg (182 lb 15.7 oz)   SpO2 95%   Gen: NAD  Head: NC/AT  Eyes/ Nose/ Mouth: PERRLA, moist mucous membranes  Cardio: RRR, good distal perfusion, warm extremities  Pulm: normal respiratory effort, no cyanosis   Abd: Soft NTND, negative borborygmi   Ext: Right arm in sling, held close to body, tremulous     Mental status: answers questions appropriately follows commands  Speech: fluent, no aphasia or dysarthria     Motor:                              Upper Extremity  Myotome R L  Shoulder flexion C5 0/5 5  Elbow flexion C5 0/5 5  Wrist extension C6 2/5 5  Elbow extension C7 0/5 5  Finger flexion C8 3/5 5  Finger abduction T1 3/5 5       Lower Extremity Myotome R L  Hip flexion L2 5 5  Knee extension L3 5 5  Ankle dorsiflexion L4 5 5  Toe extension L5 5 5  Ankle plantarflexion S1 5 5     Sensory:   intact to light touch through out        Labs: Reviewed and significant for     Recent Labs    01/10/24  0233 01/11/24  0155 01/12/24  1035  RBC 2.17* 2.27* 2.47*  HEMOGLOBIN 8.1* 8.3* 9.2*  HEMATOCRIT 22.9* 24.0* 26.0*  PLATELETCT 169 196 279       Recent Labs    01/10/24  0233 01/11/24  0155 01/12/24  1035  SODIUM 133* 129* 129*  POTASSIUM 3.6 3.5* 4.7  CHLORIDE 97 92* 95*  CO2 22 24 21  GLUCOSE 107* 126* 133*  BUN 14 13 17  CREATININE 0.98 0.90 1.01  CALCIUM 8.4* 8.1* 8.8       Recent Results  Recent Results (from the past 24 hour(s))  Comp Metabolic Panel    Collection Time: 01/12/24 10:35 AM  Result Value Ref " Range    Sodium 129 (L) 135 - 145 mmol/L    Potassium 4.7 3.6 - 5.5 mmol/L    Chloride 95 (L) 96 - 112 mmol/L    Co2 21 20 - 33 mmol/L    Anion Gap 13.0 7.0 - 16.0    Glucose 133 (H) 65 - 99 mg/dL    Bun 17 8 - 22 mg/dL    Creatinine 1.01 0.50 - 1.40 mg/dL    Calcium 8.8 8.5 - 10.5 mg/dL    Correct Calcium 9.3 8.5 - 10.5 mg/dL    AST(SGOT) 68 (H) 12 - 45 U/L    ALT(SGPT) 62 (H) 2 - 50 U/L    Alkaline Phosphatase 92 30 - 99 U/L    Total Bilirubin 1.1 0.1 - 1.5 mg/dL    Albumin 3.4 3.2 - 4.9 g/dL    Total Protein 6.5 6.0 - 8.2 g/dL    Globulin 3.1 1.9 - 3.5 g/dL    A-G Ratio 1.1 g/dL  CBC WITH DIFFERENTIAL    Collection Time: 01/12/24 10:35 AM  Result Value Ref Range    WBC 8.9 4.8 - 10.8 K/uL    RBC 2.47 (L) 4.70 - 6.10 M/uL    Hemoglobin 9.2 (L) 14.0 - 18.0 g/dL    Hematocrit 26.0 (L) 42.0 - 52.0 %    .3 (H) 81.4 - 97.8 fL    MCH 37.2 (H) 27.0 - 33.0 pg    MCHC 35.4 32.3 - 36.5 g/dL    RDW 44.4 35.9 - 50.0 fL    Platelet Count 279 164 - 446 K/uL    MPV 9.8 9.0 - 12.9 fL    Neutrophils-Polys 66.40 44.00 - 72.00 %    Lymphocytes 13.90 (L) 22.00 - 41.00 %    Monocytes 16.60 (H) 0.00 - 13.40 %    Eosinophils 1.50 0.00 - 6.90 %    Basophils 0.70 0.00 - 1.80 %    Immature Granulocytes 0.90 0.00 - 0.90 %    Nucleated RBC 0.00 0.00 - 0.20 /100 WBC    Neutrophils (Absolute) 5.94 1.82 - 7.42 K/uL    Lymphs (Absolute) 1.24 1.00 - 4.80 K/uL    Monos (Absolute) 1.48 (H) 0.00 - 0.85 K/uL    Eos (Absolute) 0.13 0.00 - 0.51 K/uL    Baso (Absolute) 0.06 0.00 - 0.12 K/uL    Immature Granulocytes (abs) 0.08 0.00 - 0.11 K/uL    NRBC (Absolute) 0.00 K/uL  MAGNESIUM    Collection Time: 01/12/24 10:35 AM  Result Value Ref Range    Magnesium 1.5 1.5 - 2.5 mg/dL  ESTIMATED GFR    Collection Time: 01/12/24 10:35 AM  Result Value Ref Range    GFR (CKD-EPI) 83 >60 mL/min/1.73 m 2       ASSESSMENT:  Patient is a 63 y.o. male admitted with right humerus fracture after ground-level fall.       [facility] Code / Diagnosis to Support: 0008.9 -  Orthopaedic Disorders: Other Orthopaedic     Rehabilitation: Impaired ADLs and mobility  Patient is a good candidate for inpatient rehab based on needs for PT, OT.  Patient will also benefit from family training.  Patient has a good discharge situation which will be home with community support.      Barriers to transfer include: Insurance authorization, TCCs to verify disposition, medical clearance and bed availability      All cases are subject to administrative review and recommendations may change     Disposition recommendations:  -Good candidate for IPR.  Patient has significant deficits with mobility and ADLs secondary to his right humerus fracture which is causing him a great deal of discomfort  -TCC to submit to EMR insurance for prior physician  -PMR to follow in the periphery for rehab appropriateness, please reach out with questions or request for medical management     Medical Complexity:     Right humerus fracture  -Displaced comminuted humeral head, reviewed by surgeon and recommended nonoperative management  -Message placed to Dr. Barry requesting reevaluation due to patient's continued discomfort and functional debility  -PT OT as tolerated  -Pain control as below  -Potential candidate for IPR     Pain control  -Using 105 morphine equivalents per day  -Oxycodone 10 mg every 3 hours as needed  -Flexeril 10 mg 3 times daily as needed  -Adding scheduled Tylenol  -Adding lidocaine patch     Acute anemia  -Hemoglobin 9.2  -Hemoglobin rebounding from a gemma of 8.1 on 1/9  -Close observation monitoring with serial labs     Hyponatremia  -Sodium 129  -Limit free water intake  -Primary team managing  -Monitoring with serial labs     Mild elevation of liver enzymes  -Overall much improved since admission  -Monitor for elevations with initiation of scheduled Tylenol     Hypertension  -Amlodipine 5 mg daily     History of DVT with PE  -Xarelto 20 mg with p.m. meal     Tobacco abuse  - Tobacco abuse cessation  "counseling given today for ~5 min as it pertains to vascular disease, heart attack, stroke, wound healing, and pulmonary disease.      DVT PPX: Xarelto 20 mg        Thank you for allowing us to participate in the care of this patient.      Patient was seen for >80 minutes on unit/floor of which > 50% of time was spent on counseling and coordination of care regarding the above, including prognosis, risk reduction, benefits of treatment, and options for next stage of care.     Eulalio Marrero, DO   Physical Medicine and Rehabilitation         Current Vital Signs:   Temperature: 36.6 °C (97.8 °F) Pulse: 79 Respiration: 18 Blood Pressure: 123/71  Weight: 84.9 kg (187 lb 2.7 oz) Height: 185.4 cm (6' 1\")  Pulse Oximetry: 94 % O2 (LPM): 0                  DATE OF SERVICE:  01/06/2024      ORTHOPEDIC CONSULTATION     REQUESTING PHYSICIAN:  Juan Lyons MD, emergency department.     REASON FOR CONSULTATION:  Right proximal humerus fracture.     CHIEF COMPLAINT:  Right shoulder pain.     HISTORY OF PRESENT ILLNESS:  The patient is 63 years old.  He had a   ground-level fall yesterday.  He does take Xarelto.  He injured his right   shoulder.  He presented to [facility] Emergency Department.  He was found to have   right proximal humerus fracture.  He has multiple underlying medical   comorbidities including lactic acidosis, leukocytosis, acute renal injury,   transaminitis, history of pulmonary embolism, on Xarelto, history of coronary   artery disease requiring coronary artery bypass grafting.  He is right hand   dominant.  He denies other obvious injuries.     ALLERGIES:  No known drug allergies.     OUTPATIENT MEDICATIONS:  Lipitor, Cozaar, Xarelto, multivitamin.     PAST MEDICAL DIAGNOSES:  Coronary artery disease status post CABG, history of   DVT/PE, on Xarelto, hyperlipidemia, hypertension.     PAST SURGICAL HISTORY:  Inguinal herniorrhaphy, right tibia ORIF in the 1980s,   multiple coronary artery bypass grafting in " 2017.     SOCIAL HISTORY:  The patient quit smoking about 2 years ago.  He does drink   alcohol.  He uses THC.     PHYSICAL EXAMINATION:  VITAL SIGNS:  Temperature is 97.5, heart rate 109, respiratory rate 20, blood   pressure 111/59, pulse oximetry 93% on room air.  GENERAL APPEARANCE:  The patient is alert, pleasant, cooperative, in   intermittent distress due to pain in the right shoulder.  MUSCULOSKELETAL:  Right upper extremity has a sling in place.  He has   sensation intact to light touch in axillary, median, radial and ulnar nerve   distributions.  He is able to demonstrate normal motor function with AIN, PIN,   median, radial and ulnar nerve distributions with palpable radial pulse.    There is some mild ecchymosis at the medial proximal arm.  There are no   obvious wounds present to the right upper extremity.  He has a sling in place.     DIAGNOSTIC IMAGING:  Plain x-rays of right humerus and right shoulder suggest   a comminuted mildly displaced surgical neck proximal humerus fracture without   evidence of obvious dislocation.     ASSESSMENT:  A 63-year-old male being admitted to the hospitalist service with   metabolic and lactic acidosis, acute kidney injury, transaminitis.  He has a   right mildly displaced surgical neck proximal humerus fracture with some   comminution.     RECOMMENDATIONS:  1.  I discussed these findings with the patient and given the overall   radiographic alignment of his fracture being mildly displaced, recommend a   trial of nonoperative management.  2.  We will obtain a CT imaging just to further characterize the fracture   pattern given the limitation on plain x-rays alone to help guide ongoing   treatment recommendations.  3.  We discussed pros and cons of nonoperative or surgical management.  I feel   he is a good candidate for nonoperative management given his underlying   medical comorbidities and reasonable alignment of his fracture.  We did   discuss potential for  fracture displacement, requiring surgical intervention.  4. He should be nonweightbearing to right upper extremity in a sling and come   out of the sling to work on progressive elbow range of motion and ultimately   pendulum exercises.  5.  He should follow up with me in 1-2 weeks for x-rays of the right shoulder,   3 views to confirm stable fracture alignment and determine whether we can   continue the routine nonoperative management or whether we have to consider   surgical intervention depending on his overall fracture alignment and how he   was tolerating nonoperative management.            Completed Laboratory Reports:  Recent Labs     01/12/24  1035 01/13/24  1012 01/14/24  0352   WBC 8.9 7.6  --    HEMOGLOBIN 9.2* 8.7*  --    HEMATOCRIT 26.0* 25.7*  --    PLATELETCT 279 296  --    SODIUM 129* 130* 129*   POTASSIUM 4.7 4.3 5.0   BUN 17 18 21   CREATININE 1.01 1.05 1.09   ALBUMIN 3.4  --   --    GLUCOSE 133* 210* 109*     Additional Labs: Not Applicable    Prior Living Situation:   Housing / Facility: 1 Story Apartment / Condo  Steps Into Home: 0  Steps In Home: 0  Lives with - Patient's Self Care Capacity: Alone and Able to Care For Self  Equipment Owned: Grab Bar(s) In Tub / Shower, Single Point Cane    Prior Level of Function / Living Situation:   Physical Therapy: Prior Services: Home-Independent  Housing / Facility: 1 Story Apartment / Condo  Steps Into Home: 0  Steps In Home: 0  Bathroom Set up: Bathtub / Shower Combination, Shower Curtain, Grab Bars  Equipment Owned: Grab Bar(s) In Tub / Shower, Single Point Cane  Lives with - Patient's Self Care Capacity: Alone and Able to Care For Self  Bed Mobility: Independent  Transfer Status: Independent  Ambulation: Independent  Assistive Devices Used: None  Current Level of Function:   Gait Level Of Assist: Minimal Assist  Assistive Device: None, Hand Held Assist  Distance (Feet): 3  Deviation:  (tremulous)  Supine to Sit: Standby Assist  Scooting: Standby  Assist  Comments: HOB elevated  Sit to Stand: Minimal Assist  Bed, Chair, Wheelchair Transfer: Minimal Assist     Occupational Therapy:   Self Feeding: Independent  Grooming / Hygiene: Independent  Bathing: Independent  Dressing: Independent  Toileting: Independent  Medication Management: Independent  Laundry: Independent  Kitchen Mobility: Independent  Finances: Independent  Home Management: Independent  Shopping: Independent  Prior Level Of Mobility: Independent Without Device in Community, Independent Without Device in Home  Driving / Transportation: Driving Independent  Prior Services: Home-Independent  Housing / Facility: 1 Story Apartment / Condo  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 donned sling and new gown)  Lower Body Dressing: Maximal Assist (don socks, pt attempted to utilize one handed tech but ultimately required maxA due to pain)  Toileting:  (declined)  Speech Language Pathology:      Rehabilitation Prognosis/Potential: Good  Estimated Length of Stay: 12-14 days    Nursing:      Cason in Place    Scope/Intensity of Services Recommended:  Physical Therapy: 1.5 hr / day  5 days / week. Therapeutic Interventions Required: Maximize Endurance, Mobility, Strength, and Safety  Occupational Therapy: 1.5 hr / day 5 days / week. Therapeutic Interventions Required: Maximize Self Care, ADLs, IADLs, and Energy Conservation  Rehabilitation Nursin/7. Therapeutic Interventions Required: Monitor Pain, Skin, Wound(s), Vital Signs, Intake and Output, Labs, Safety, Aspiration Risk, and Family Training  Rehabilitation Physician: 3 - 5 days / week. Therapeutic Interventions Required: Medical Management  Respiratory Care: consult. Therapeutic Interventions Required: Pulmonary Toileting  Dietician: consult. Therapeutic  Interventions Required: Please advise on a diet that is both healthy and promotes healing    He requires 24-hour rehabilitation nursing to manage bowel and bladder function, skin care, surgical incision, wound, nutrition and fluid intake, pulmonary hygiene, pain control, safety, medication management, and patient/family goals. In addition, rehabilitation nursing will reiterate and reinforce therapy skills and equipment use, including ADLs, as well as provide education to the patient and family. Jose A Ponce is willing to participate in and is able to tolerate the proposed plan of care.    Rehabilitation Goals and Plan (Expected frequency & duration of treatment in the IRF):   Return to the Community and Family Able to Provide 24/7 Assistance  Anticipated Date of Rehabilitation Admission: unknown  Patient/Family oriented IRF level of care/facility/plan: Yes  Patient/Family willing to participate in IRF care/facility/plan: Yes  Patient able to tolerate IRF level of care proposed: Yes  Patient has potential to benefit IRF level of care proposed: Yes  Comments:       Special Needs or Precautions - Medical Necessity:  Safety Concerns/Precautions:  Fall Risk / High Risk for Falls  Diet:   DIET ORDERS (From admission to next 24h)       Start     Ordered    01/12/24 0548  Diet Early Tray  ONCE        Question:  Early Tray Type  Answer:  1 Time Only    01/12/24 0547    01/11/24 1822  Diet Order Diet: Regular; Fluid modifications: (optional): 1500 ml Fluid Restriction  ALL MEALS        Question Answer Comment   Diet: Regular    Fluid modifications: (optional) 1500 ml Fluid Restriction        01/11/24 1821                    Anticipated Discharge Destination / Patient/Family Goal:  Destination: Home with Assistance Support System: Family  and Friends  Anticipated home health services: OT, PT, and Nursing  Previously used HH service/ provider: Not Applicable  Anticipated DME Needs: Wheelchair  Outpatient Services: OT  and PT  Alternative resources to address additional identified needs:   No future appointments.    Pre-Screen Completed: 1/15/2024 7:51 AM Naya Irby L.P.N.

## 2024-01-15 NOTE — CARE PLAN
The patient is Stable - Low risk of patient condition declining or worsening    Shift Goals  Clinical Goals: control pain down to pts pain goal of 7-8/10  Patient Goals: sleep  Family Goals: ARELIS    Progress made toward(s) clinical / shift goals:      Patient is not progressing towards the following goals:

## 2024-01-16 LAB — 1,25(OH)2D3 SERPL-MCNC: 36.5 PG/ML (ref 19.9–79.3)

## 2024-01-21 ENCOUNTER — APPOINTMENT (OUTPATIENT)
Dept: RADIOLOGY | Facility: MEDICAL CENTER | Age: 64
End: 2024-01-21
Attending: EMERGENCY MEDICINE
Payer: COMMERCIAL

## 2024-01-21 ENCOUNTER — HOSPITAL ENCOUNTER (OUTPATIENT)
Facility: MEDICAL CENTER | Age: 64
End: 2024-01-24
Attending: EMERGENCY MEDICINE | Admitting: STUDENT IN AN ORGANIZED HEALTH CARE EDUCATION/TRAINING PROGRAM
Payer: COMMERCIAL

## 2024-01-21 DIAGNOSIS — R29.6 MULTIPLE FALLS: ICD-10-CM

## 2024-01-21 DIAGNOSIS — S42.201D CLOSED FRACTURE OF PROXIMAL END OF RIGHT HUMERUS WITH ROUTINE HEALING, UNSPECIFIED FRACTURE MORPHOLOGY, SUBSEQUENT ENCOUNTER: ICD-10-CM

## 2024-01-21 DIAGNOSIS — D64.9 ANEMIA, UNSPECIFIED TYPE: ICD-10-CM

## 2024-01-21 DIAGNOSIS — S42.211D CLOSED FRACTURE OF NECK OF RIGHT HUMERUS WITH ROUTINE HEALING, SUBSEQUENT ENCOUNTER: ICD-10-CM

## 2024-01-21 DIAGNOSIS — E86.0 DEHYDRATION: ICD-10-CM

## 2024-01-21 DIAGNOSIS — E87.1 HYPONATREMIA: ICD-10-CM

## 2024-01-21 DIAGNOSIS — R53.1 WEAKNESS: ICD-10-CM

## 2024-01-21 DIAGNOSIS — E87.20 LACTIC ACIDOSIS: ICD-10-CM

## 2024-01-21 DIAGNOSIS — R53.1 GENERALIZED WEAKNESS: ICD-10-CM

## 2024-01-21 LAB
ALBUMIN SERPL BCP-MCNC: 3.8 G/DL (ref 3.2–4.9)
ALBUMIN/GLOB SERPL: 1 G/DL
ALP SERPL-CCNC: 149 U/L (ref 30–99)
ALT SERPL-CCNC: 27 U/L (ref 2–50)
ANION GAP SERPL CALC-SCNC: 23 MMOL/L (ref 7–16)
APPEARANCE UR: CLEAR
AST SERPL-CCNC: 53 U/L (ref 12–45)
BASOPHILS # BLD AUTO: 1.4 % (ref 0–1.8)
BASOPHILS # BLD: 0.14 K/UL (ref 0–0.12)
BILIRUB SERPL-MCNC: 0.6 MG/DL (ref 0.1–1.5)
BILIRUB UR QL STRIP.AUTO: NEGATIVE
BUN SERPL-MCNC: 17 MG/DL (ref 8–22)
CALCIUM ALBUM COR SERPL-MCNC: 9 MG/DL (ref 8.5–10.5)
CALCIUM SERPL-MCNC: 8.8 MG/DL (ref 8.5–10.5)
CHLORIDE SERPL-SCNC: 96 MMOL/L (ref 96–112)
CK SERPL-CCNC: 42 U/L (ref 0–154)
CO2 SERPL-SCNC: 12 MMOL/L (ref 20–33)
COLOR UR: YELLOW
CREAT SERPL-MCNC: 1.2 MG/DL (ref 0.5–1.4)
EKG IMPRESSION: NORMAL
EKG IMPRESSION: NORMAL
EOSINOPHIL # BLD AUTO: 0.03 K/UL (ref 0–0.51)
EOSINOPHIL NFR BLD: 0.3 % (ref 0–6.9)
ERYTHROCYTE [DISTWIDTH] IN BLOOD BY AUTOMATED COUNT: 48.2 FL (ref 35.9–50)
GFR SERPLBLD CREATININE-BSD FMLA CKD-EPI: 68 ML/MIN/1.73 M 2
GLOBULIN SER CALC-MCNC: 3.8 G/DL (ref 1.9–3.5)
GLUCOSE SERPL-MCNC: 78 MG/DL (ref 65–99)
GLUCOSE UR STRIP.AUTO-MCNC: NEGATIVE MG/DL
HCT VFR BLD AUTO: 32 % (ref 42–52)
HGB BLD-MCNC: 10.8 G/DL (ref 14–18)
IMM GRANULOCYTES # BLD AUTO: 0.14 K/UL (ref 0–0.11)
IMM GRANULOCYTES NFR BLD AUTO: 1.4 % (ref 0–0.9)
KETONES UR STRIP.AUTO-MCNC: 15 MG/DL
LACTATE SERPL-SCNC: 1.8 MMOL/L (ref 0.5–2)
LACTATE SERPL-SCNC: 2.4 MMOL/L (ref 0.5–2)
LEUKOCYTE ESTERASE UR QL STRIP.AUTO: NEGATIVE
LYMPHOCYTES # BLD AUTO: 2.17 K/UL (ref 1–4.8)
LYMPHOCYTES NFR BLD: 21.7 % (ref 22–41)
MCH RBC QN AUTO: 35.3 PG (ref 27–33)
MCHC RBC AUTO-ENTMCNC: 33.8 G/DL (ref 32.3–36.5)
MCV RBC AUTO: 104.6 FL (ref 81.4–97.8)
MICRO URNS: ABNORMAL
MONOCYTES # BLD AUTO: 0.64 K/UL (ref 0–0.85)
MONOCYTES NFR BLD AUTO: 6.4 % (ref 0–13.4)
NEUTROPHILS # BLD AUTO: 6.89 K/UL (ref 1.82–7.42)
NEUTROPHILS NFR BLD: 68.8 % (ref 44–72)
NITRITE UR QL STRIP.AUTO: NEGATIVE
NRBC # BLD AUTO: 0 K/UL
NRBC BLD-RTO: 0 /100 WBC (ref 0–0.2)
PH UR STRIP.AUTO: 5.5 [PH] (ref 5–8)
PLATELET # BLD AUTO: 545 K/UL (ref 164–446)
PMV BLD AUTO: 9 FL (ref 9–12.9)
POTASSIUM SERPL-SCNC: 5 MMOL/L (ref 3.6–5.5)
PROT SERPL-MCNC: 7.6 G/DL (ref 6–8.2)
PROT UR QL STRIP: NEGATIVE MG/DL
RBC # BLD AUTO: 3.06 M/UL (ref 4.7–6.1)
RBC UR QL AUTO: NEGATIVE
SODIUM SERPL-SCNC: 131 MMOL/L (ref 135–145)
SP GR UR STRIP.AUTO: 1.02
TROPONIN T SERPL-MCNC: 20 NG/L (ref 6–19)
UROBILINOGEN UR STRIP.AUTO-MCNC: 1 MG/DL
WBC # BLD AUTO: 10 K/UL (ref 4.8–10.8)

## 2024-01-21 PROCEDURE — 80053 COMPREHEN METABOLIC PANEL: CPT

## 2024-01-21 PROCEDURE — 700105 HCHG RX REV CODE 258: Performed by: EMERGENCY MEDICINE

## 2024-01-21 PROCEDURE — 85025 COMPLETE CBC W/AUTO DIFF WBC: CPT

## 2024-01-21 PROCEDURE — 700102 HCHG RX REV CODE 250 W/ 637 OVERRIDE(OP): Performed by: STUDENT IN AN ORGANIZED HEALTH CARE EDUCATION/TRAINING PROGRAM

## 2024-01-21 PROCEDURE — 99223 1ST HOSP IP/OBS HIGH 75: CPT | Performed by: STUDENT IN AN ORGANIZED HEALTH CARE EDUCATION/TRAINING PROGRAM

## 2024-01-21 PROCEDURE — 82550 ASSAY OF CK (CPK): CPT

## 2024-01-21 PROCEDURE — 87086 URINE CULTURE/COLONY COUNT: CPT

## 2024-01-21 PROCEDURE — 93005 ELECTROCARDIOGRAM TRACING: CPT

## 2024-01-21 PROCEDURE — 83605 ASSAY OF LACTIC ACID: CPT | Mod: 91

## 2024-01-21 PROCEDURE — 71045 X-RAY EXAM CHEST 1 VIEW: CPT

## 2024-01-21 PROCEDURE — 81003 URINALYSIS AUTO W/O SCOPE: CPT

## 2024-01-21 PROCEDURE — 700105 HCHG RX REV CODE 258: Performed by: STUDENT IN AN ORGANIZED HEALTH CARE EDUCATION/TRAINING PROGRAM

## 2024-01-21 PROCEDURE — A9270 NON-COVERED ITEM OR SERVICE: HCPCS | Performed by: STUDENT IN AN ORGANIZED HEALTH CARE EDUCATION/TRAINING PROGRAM

## 2024-01-21 PROCEDURE — G0378 HOSPITAL OBSERVATION PER HR: HCPCS

## 2024-01-21 PROCEDURE — 87040 BLOOD CULTURE FOR BACTERIA: CPT

## 2024-01-21 PROCEDURE — 99285 EMERGENCY DEPT VISIT HI MDM: CPT

## 2024-01-21 PROCEDURE — 84484 ASSAY OF TROPONIN QUANT: CPT

## 2024-01-21 PROCEDURE — 93005 ELECTROCARDIOGRAM TRACING: CPT | Performed by: EMERGENCY MEDICINE

## 2024-01-21 PROCEDURE — 36415 COLL VENOUS BLD VENIPUNCTURE: CPT

## 2024-01-21 RX ORDER — ASCORBIC ACID 500 MG
500 TABLET ORAL DAILY
Status: DISCONTINUED | OUTPATIENT
Start: 2024-01-22 | End: 2024-01-24 | Stop reason: HOSPADM

## 2024-01-21 RX ORDER — GAUZE BANDAGE 2" X 2"
100 BANDAGE TOPICAL DAILY
Status: DISCONTINUED | OUTPATIENT
Start: 2024-01-22 | End: 2024-01-24 | Stop reason: HOSPADM

## 2024-01-21 RX ORDER — OXYCODONE HYDROCHLORIDE 10 MG/1
10 TABLET ORAL
Status: DISCONTINUED | OUTPATIENT
Start: 2024-01-21 | End: 2024-01-23

## 2024-01-21 RX ORDER — ACETAMINOPHEN 325 MG/1
650 TABLET ORAL EVERY 6 HOURS PRN
Status: DISCONTINUED | OUTPATIENT
Start: 2024-01-27 | End: 2024-01-24 | Stop reason: HOSPADM

## 2024-01-21 RX ORDER — PROMETHAZINE HYDROCHLORIDE 25 MG/1
12.5-25 TABLET ORAL EVERY 4 HOURS PRN
Status: DISCONTINUED | OUTPATIENT
Start: 2024-01-21 | End: 2024-01-24 | Stop reason: HOSPADM

## 2024-01-21 RX ORDER — NICOTINE 21 MG/24HR
21 PATCH, TRANSDERMAL 24 HOURS TRANSDERMAL
Status: DISCONTINUED | OUTPATIENT
Start: 2024-01-21 | End: 2024-01-24 | Stop reason: HOSPADM

## 2024-01-21 RX ORDER — SODIUM CHLORIDE, SODIUM LACTATE, POTASSIUM CHLORIDE, AND CALCIUM CHLORIDE .6; .31; .03; .02 G/100ML; G/100ML; G/100ML; G/100ML
500 INJECTION, SOLUTION INTRAVENOUS
Status: DISCONTINUED | OUTPATIENT
Start: 2024-01-21 | End: 2024-01-24 | Stop reason: HOSPADM

## 2024-01-21 RX ORDER — PROCHLORPERAZINE EDISYLATE 5 MG/ML
5-10 INJECTION INTRAMUSCULAR; INTRAVENOUS EVERY 4 HOURS PRN
Status: DISCONTINUED | OUTPATIENT
Start: 2024-01-21 | End: 2024-01-24 | Stop reason: HOSPADM

## 2024-01-21 RX ORDER — BISACODYL 10 MG
10 SUPPOSITORY, RECTAL RECTAL
Status: DISCONTINUED | OUTPATIENT
Start: 2024-01-21 | End: 2024-01-24 | Stop reason: HOSPADM

## 2024-01-21 RX ORDER — METOPROLOL TARTRATE 50 MG/1
50 TABLET, FILM COATED ORAL 2 TIMES DAILY
Status: DISCONTINUED | OUTPATIENT
Start: 2024-01-21 | End: 2024-01-24 | Stop reason: HOSPADM

## 2024-01-21 RX ORDER — OXYCODONE HYDROCHLORIDE 5 MG/1
5 TABLET ORAL
Status: DISCONTINUED | OUTPATIENT
Start: 2024-01-21 | End: 2024-01-23

## 2024-01-21 RX ORDER — ATORVASTATIN CALCIUM 10 MG/1
10 TABLET, FILM COATED ORAL NIGHTLY
Status: DISCONTINUED | OUTPATIENT
Start: 2024-01-21 | End: 2024-01-24 | Stop reason: HOSPADM

## 2024-01-21 RX ORDER — ACETAMINOPHEN 325 MG/1
650 TABLET ORAL EVERY 6 HOURS
Status: DISCONTINUED | OUTPATIENT
Start: 2024-01-22 | End: 2024-01-24 | Stop reason: HOSPADM

## 2024-01-21 RX ORDER — SODIUM CHLORIDE 9 MG/ML
1000 INJECTION, SOLUTION INTRAVENOUS ONCE
Status: COMPLETED | OUTPATIENT
Start: 2024-01-21 | End: 2024-01-21

## 2024-01-21 RX ORDER — SODIUM CHLORIDE 9 MG/ML
INJECTION, SOLUTION INTRAVENOUS CONTINUOUS
Status: DISCONTINUED | OUTPATIENT
Start: 2024-01-21 | End: 2024-01-23

## 2024-01-21 RX ORDER — FOLIC ACID 1 MG/1
1 TABLET ORAL DAILY
Status: DISCONTINUED | OUTPATIENT
Start: 2024-01-22 | End: 2024-01-24 | Stop reason: HOSPADM

## 2024-01-21 RX ORDER — POLYETHYLENE GLYCOL 3350 17 G/17G
1 POWDER, FOR SOLUTION ORAL
Status: DISCONTINUED | OUTPATIENT
Start: 2024-01-21 | End: 2024-01-24 | Stop reason: HOSPADM

## 2024-01-21 RX ORDER — GUAIFENESIN/DEXTROMETHORPHAN 100-10MG/5
10 SYRUP ORAL EVERY 6 HOURS PRN
Status: DISCONTINUED | OUTPATIENT
Start: 2024-01-21 | End: 2024-01-24 | Stop reason: HOSPADM

## 2024-01-21 RX ORDER — PROMETHAZINE HYDROCHLORIDE 25 MG/1
12.5-25 SUPPOSITORY RECTAL EVERY 4 HOURS PRN
Status: DISCONTINUED | OUTPATIENT
Start: 2024-01-21 | End: 2024-01-24 | Stop reason: HOSPADM

## 2024-01-21 RX ORDER — ONDANSETRON 4 MG/1
4 TABLET, ORALLY DISINTEGRATING ORAL EVERY 4 HOURS PRN
Status: DISCONTINUED | OUTPATIENT
Start: 2024-01-21 | End: 2024-01-24 | Stop reason: HOSPADM

## 2024-01-21 RX ORDER — LABETALOL HYDROCHLORIDE 5 MG/ML
10 INJECTION, SOLUTION INTRAVENOUS EVERY 4 HOURS PRN
Status: DISCONTINUED | OUTPATIENT
Start: 2024-01-21 | End: 2024-01-24 | Stop reason: HOSPADM

## 2024-01-21 RX ORDER — HYDROMORPHONE HYDROCHLORIDE 1 MG/ML
0.5 INJECTION, SOLUTION INTRAMUSCULAR; INTRAVENOUS; SUBCUTANEOUS
Status: DISCONTINUED | OUTPATIENT
Start: 2024-01-21 | End: 2024-01-23

## 2024-01-21 RX ORDER — ONDANSETRON 2 MG/ML
4 INJECTION INTRAMUSCULAR; INTRAVENOUS EVERY 4 HOURS PRN
Status: DISCONTINUED | OUTPATIENT
Start: 2024-01-21 | End: 2024-01-24 | Stop reason: HOSPADM

## 2024-01-21 RX ORDER — AMOXICILLIN 250 MG
2 CAPSULE ORAL 2 TIMES DAILY
Status: DISCONTINUED | OUTPATIENT
Start: 2024-01-21 | End: 2024-01-24 | Stop reason: HOSPADM

## 2024-01-21 RX ORDER — ACETAMINOPHEN 325 MG/1
650 TABLET ORAL EVERY 6 HOURS PRN
Status: DISCONTINUED | OUTPATIENT
Start: 2024-01-21 | End: 2024-01-21

## 2024-01-21 RX ORDER — FERROUS SULFATE 325(65) MG
325 TABLET ORAL
Status: DISCONTINUED | OUTPATIENT
Start: 2024-01-22 | End: 2024-01-24 | Stop reason: HOSPADM

## 2024-01-21 RX ADMIN — ACETAMINOPHEN 650 MG: 325 TABLET, FILM COATED ORAL at 23:32

## 2024-01-21 RX ADMIN — ACETAMINOPHEN 650 MG: 325 TABLET, FILM COATED ORAL at 18:51

## 2024-01-21 RX ADMIN — NICOTINE TRANSDERMAL SYSTEM 21 MG: 21 PATCH, EXTENDED RELEASE TRANSDERMAL at 21:37

## 2024-01-21 RX ADMIN — SODIUM CHLORIDE: 9 INJECTION, SOLUTION INTRAVENOUS at 17:44

## 2024-01-21 RX ADMIN — SODIUM CHLORIDE 1000 ML: 9 INJECTION, SOLUTION INTRAVENOUS at 14:45

## 2024-01-21 RX ADMIN — METOPROLOL TARTRATE 50 MG: 50 TABLET, FILM COATED ORAL at 20:22

## 2024-01-21 RX ADMIN — ATORVASTATIN CALCIUM 10 MG: 10 TABLET, FILM COATED ORAL at 20:22

## 2024-01-21 RX ADMIN — OXYCODONE HYDROCHLORIDE 10 MG: 10 TABLET ORAL at 20:23

## 2024-01-21 ASSESSMENT — LIFESTYLE VARIABLES
HAVE PEOPLE ANNOYED YOU BY CRITICIZING YOUR DRINKING: NO
CONSUMPTION TOTAL: POSITIVE
EVER FELT BAD OR GUILTY ABOUT YOUR DRINKING: YES
EVER HAD A DRINK FIRST THING IN THE MORNING TO STEADY YOUR NERVES TO GET RID OF A HANGOVER: NO
ON A TYPICAL DAY WHEN YOU DRINK ALCOHOL HOW MANY DRINKS DO YOU HAVE: 3
DOES PATIENT WANT TO TALK TO SOMEONE ABOUT QUITTING: YES
AVERAGE NUMBER OF DAYS PER WEEK YOU HAVE A DRINK CONTAINING ALCOHOL: 5
TOTAL SCORE: 2
DOES PATIENT WANT TO STOP DRINKING: YES
HOW MANY TIMES IN THE PAST YEAR HAVE YOU HAD 5 OR MORE DRINKS IN A DAY: 2
ALCOHOL_USE: YES
HAVE YOU EVER FELT YOU SHOULD CUT DOWN ON YOUR DRINKING: YES

## 2024-01-21 ASSESSMENT — PAIN DESCRIPTION - PAIN TYPE
TYPE: ACUTE PAIN

## 2024-01-21 ASSESSMENT — FIBROSIS 4 INDEX
FIB4 SCORE: 1.84
FIB4 SCORE: 1.18

## 2024-01-21 ASSESSMENT — HEART SCORE
TROPONIN: 1-3 TIMES NORMAL LIMIT
HEART SCORE: 5
RISK FACTORS: >2 RISK FACTORS OR HX OF ATHEROSCLEROTIC DISEASE
AGE: 45-64
HISTORY: SLIGHTLY SUSPICIOUS
ECG: NON-SPECIFIC REPOLARIZATION DISTURBANCE

## 2024-01-21 NOTE — ED PROVIDER NOTES
ED Provider Note    CHIEF COMPLAINT  Chief Complaint   Patient presents with    Extremity Weakness     BIB EMS from home, pt has been having general weakness especially bilateral LE. Legs have been giving out more frequently and pt has lowered self to ground. 1/6 had GLF w/ R shoulder injury, bruising noticed around R pectoral and bicep.        EXTERNAL RECORDS REVIEWED  Inpatient Notes discharge summary from January 15, 2024, admitted for fall secondary to weakness with right arm fracture .  Restarted on Xarelto    HPI/ROS  LIMITATION TO HISTORY   Select: : None  OUTSIDE HISTORIAN(S):  None    Jose A Ponce is a 63 y.o. male who presents complaining of generalized weakness.  He states he was in good health until last November when he had 10 days of COVID.  He recovered from that, 2 weeks ago developed vomiting and diarrhea.  During this time he became weak and fell injuring his right shoulder.  He stated he spent several days in the hospital recovering from this, was discharged Tuesday, 5 days ago.  Since that time has been too weak to perform daily activities, unable to feed himself or go get food.  He states for approximate minute is able to stand and his legs weaken and give out on him.  No syncope.  No new trauma.  He denies head and neck injury, denies headache.  No acute numbness.  His weakness is generalized he states.  Patient does have history of CABG, denies chest pain or shortness of breath.  No cough.    PAST MEDICAL HISTORY   has a past medical history of Breath shortness (10/2017), CAD (coronary artery disease), Dental disorder (10/2017), DVT (deep venous thrombosis) (HCC), Grief reaction (2017), hernia repair, Hyperlipidemia, Hypertension, Psychiatric problem (10/2017), and Tobacco use.    SURGICAL HISTORY   has a past surgical history that includes inguinal hernia repair (Bilateral, 2000); orif, fracture, tibia (Right, 1980s); multiple coronary artery bypass endo vein harvest (6/13/2017);  "yarely (2017); and zzz cardiac cath.    FAMILY HISTORY  Family History   Problem Relation Age of Onset    Cancer Mother     Cancer Father     No Known Problems Sister     No Known Problems Sister     Heart Disease Maternal Grandfather 89        probable MI and sudden death       SOCIAL HISTORY  Social History     Tobacco Use    Smoking status: Former     Current packs/day: 0.00     Average packs/day: 0.3 packs/day for 42.0 years (10.5 ttl pk-yrs)     Types: Cigarettes     Start date: 1980     Quit date: 2022     Years since quittin.0    Smokeless tobacco: Never    Tobacco comments:     vape occ   Vaping Use    Vaping Use: Never used   Substance and Sexual Activity    Alcohol use: Yes     Alcohol/week: 0.0 oz     Comment: 2 per day    Drug use: Yes     Comment: THC    Sexual activity: Not Currently       CURRENT MEDICATIONS  Home Medications    **Home medications have not yet been reviewed for this encounter**         ALLERGIES  No Known Allergies    PHYSICAL EXAM  VITAL SIGNS: BP (!) 136/91   Pulse 98   Temp 36.8 °C (98.3 °F) (Temporal)   Resp 18   Ht 1.867 m (6' 1.5\")   Wt 85.7 kg (189 lb)   SpO2 98%   BMI 24.60 kg/m²    Constitutional: Ill appearance  ENT: Tacky mucous membranes, no facial trauma  Cardiac: Intermittently tachycardic rate and regular rhythm  Respiratory: Clear lung sounds  GI: Abdomen is soft and nontender, no distention  Skin: Purplish brownish bruising right shoulder and chest wall, no acute hematoma  Neurologic: Sensation and strength grossly intact, speech clear, no focal deficit  Psychiatric: Normal mood, cooperative    DIAGNOSTIC STUDIES / PROCEDURES  EKG  I have independently interpreted this EKG  Below    LABS  Results for orders placed or performed during the hospital encounter of 24   Lactic Acid   Result Value Ref Range    Lactic Acid 2.4 (H) 0.5 - 2.0 mmol/L   Lactic Acid   Result Value Ref Range    Lactic Acid 1.8 0.5 - 2.0 mmol/L   CBC with Differential "   Result Value Ref Range    WBC 10.0 4.8 - 10.8 K/uL    RBC 3.06 (L) 4.70 - 6.10 M/uL    Hemoglobin 10.8 (L) 14.0 - 18.0 g/dL    Hematocrit 32.0 (L) 42.0 - 52.0 %    .6 (H) 81.4 - 97.8 fL    MCH 35.3 (H) 27.0 - 33.0 pg    MCHC 33.8 32.3 - 36.5 g/dL    RDW 48.2 35.9 - 50.0 fL    Platelet Count 545 (H) 164 - 446 K/uL    MPV 9.0 9.0 - 12.9 fL    Neutrophils-Polys 68.80 44.00 - 72.00 %    Lymphocytes 21.70 (L) 22.00 - 41.00 %    Monocytes 6.40 0.00 - 13.40 %    Eosinophils 0.30 0.00 - 6.90 %    Basophils 1.40 0.00 - 1.80 %    Immature Granulocytes 1.40 (H) 0.00 - 0.90 %    Nucleated RBC 0.00 0.00 - 0.20 /100 WBC    Neutrophils (Absolute) 6.89 1.82 - 7.42 K/uL    Lymphs (Absolute) 2.17 1.00 - 4.80 K/uL    Monos (Absolute) 0.64 0.00 - 0.85 K/uL    Eos (Absolute) 0.03 0.00 - 0.51 K/uL    Baso (Absolute) 0.14 (H) 0.00 - 0.12 K/uL    Immature Granulocytes (abs) 0.14 (H) 0.00 - 0.11 K/uL    NRBC (Absolute) 0.00 K/uL   Complete Metabolic Panel   Result Value Ref Range    Sodium 131 (L) 135 - 145 mmol/L    Potassium 5.0 3.6 - 5.5 mmol/L    Chloride 96 96 - 112 mmol/L    Co2 12 (L) 20 - 33 mmol/L    Anion Gap 23.0 (H) 7.0 - 16.0    Glucose 78 65 - 99 mg/dL    Bun 17 8 - 22 mg/dL    Creatinine 1.20 0.50 - 1.40 mg/dL    Calcium 8.8 8.5 - 10.5 mg/dL    Correct Calcium 9.0 8.5 - 10.5 mg/dL    AST(SGOT) 53 (H) 12 - 45 U/L    ALT(SGPT) 27 2 - 50 U/L    Alkaline Phosphatase 149 (H) 30 - 99 U/L    Total Bilirubin 0.6 0.1 - 1.5 mg/dL    Albumin 3.8 3.2 - 4.9 g/dL    Total Protein 7.6 6.0 - 8.2 g/dL    Globulin 3.8 (H) 1.9 - 3.5 g/dL    A-G Ratio 1.0 g/dL   Urinalysis    Specimen: Urine   Result Value Ref Range    Color Yellow     Character Clear     Specific Gravity 1.016 <1.035    Ph 5.5 5.0 - 8.0    Glucose Negative Negative mg/dL    Ketones 15 (A) Negative mg/dL    Protein Negative Negative mg/dL    Bilirubin Negative Negative    Urobilinogen, Urine 1.0 Negative    Nitrite Negative Negative    Leukocyte Esterase Negative  Negative    Occult Blood Negative Negative    Micro Urine Req see below    TROPONIN   Result Value Ref Range    Troponin T 20 (H) 6 - 19 ng/L   ESTIMATED GFR   Result Value Ref Range    GFR (CKD-EPI) 68 >60 mL/min/1.73 m 2   CREATINE KINASE   Result Value Ref Range    CPK Total 42 0 - 154 U/L   EKG   Result Value Ref Range    Report       [facility] Emergency Dept.    Test Date:  2024  Pt Name:    CESAR MASTERS                Department: ER  MRN:        [id number]                      Room:       GR 26  Gender:     Male                         Technician: [id number]  :        1960                   Requested By:ER TRIAGE PROTOCOL  Order #:    [id number]                    Reading MD: DAVINA CONNER MD    Measurements  Intervals                                Axis  Rate:       94                           P:          -26  HI:         118                          QRS:        -1  QRSD:       106                          T:          31  QT:         353  QTc:        442    Interpretive Statements  Sinus rhythm  Borderline short HI interval  RSR' in V1 or V2, right VCD or RVH  Nonspecific repol abnormality, diffuse leads  Borderline ST elevation, anterior leads  Compared to ECG 2024 07:05:25  Right ventricular hypertrophy now present  RSR' in V1 or V2 now present  Early repolarization now p resent  ST (T wave) deviation now present  Sinus tachycardia no longer present  Electronically Signed On 2024 18:48:27 PST by DAVINA CONNER MD     EKG   Result Value Ref Range    Report       [facility] Emergency Dept.    Test Date:  2024  Pt Name:    CESAR MASTERS                Department: ER  MRN:        [id number]                      Room:        26  Gender:     Male                         Technician: [id number]  :        1960                   Requested By:ER TRIAGE PROTOCOL  Order #:    [id number]                    Reading MD: DAVINA CONNER  MD    Measurements  Intervals                                Axis  Rate:       97                           P:          22  LA:         141                          QRS:        -15  QRSD:       104                          T:          24  QT:         349  QTc:        444    Interpretive Statements  Sinus rhythm  Borderline left axis deviation  RSR' in V1 or V2, right VCD or RVH  Compared to ECG 01/21/2024 14:01:17  Early repolarization no longer present  ST (T wave) deviation no longer present  Electronically Signed On 01- 18:48:35 PST by DAVINA CONNER MD           RADIOLOGY  I have independently interpreted the diagnostic imaging associated with this visit and am waiting the final reading from the radiologist.   My preliminary interpretation is as follows: Chest x-ray negative for pneumonia  Radiologist interpretation:   DX-CHEST-PORTABLE (1 VIEW)   Final Result      1.  No acute cardiopulmonary abnormality identified.      2.  Right proximal humeral fracture and there is inferior subluxation at the glenohumeral joint            COURSE & MEDICAL DECISION MAKING    ED Observation Status? No; Patient does not meet criteria for ED Observation.     INITIAL ASSESSMENT, COURSE AND PLAN  Care Narrative: Patient has been home for the last 5 days, not doing well.  He feels very weak and has been unable to care for self at his home, not making himself food or able to go to the store.  He has had several near falls again secondary to weakness.  Previously broke his right arm from this.  Here we find him to be dehydrated, showing evidence of lactic acidosis, low CO2.  His blood sugar is normal.  Weakness does not appear to be from infection, chest x-ray negative, urinalysis negative.  Patient was given IV hydration, plan for hospitalization for ongoing evaluation and treatment.  HYDRATION: Based on the patient's presentation of Dehydration the patient was given IV fluids. IV Hydration was used because oral  hydration was not adequate alone. Upon recheck following hydration, the patient was stabilizing.      ADDITIONAL PROBLEM LIST  Anemia: Has improved in the last 8 days, stable    Right humerus fracture: Continue to wear shoulder immobilizer    Hyponatremia: Receiving normal saline infusion, consider water restriction    DISPOSITION AND DISCUSSIONS  I have discussed management of the patient with the following physicians and THIAGO's: Admitting hospitalist service    Decision tools and prescription drugs considered including, but not limited to: HEART Score 5 .    FINAL DIAGNOSIS  1. Weakness    2. Multiple falls    3. Dehydration    4. Lactic acidosis    5. Hyponatremia    6. Anemia, unspecified type    7. Closed fracture of proximal end of right humerus with routine healing, unspecified fracture morphology, subsequent encounter           Electronically signed by: Jesús Chong M.D., 1/21/2024 2:35 PM

## 2024-01-21 NOTE — ED TRIAGE NOTES
Chief Complaint   Patient presents with    Extremity Weakness     BIB EMS from home, pt has been having general weakness especially bilateral LE. Legs have been giving out more frequently and pt has lowered self to ground. 1/6 had GLF w/ R shoulder injury, bruising noticed around R pectoral and bicep.      Vitals:    01/21/24 1400   BP: (!) 136/91   Pulse: 98   Resp: 18   Temp: 36.8 °C (98.3 °F)   SpO2: 98%     Pt placed in gown and connected to monitoring equipment, R sling in place from previous visit. EKG done at bedside. Chart up for ERP.

## 2024-01-22 PROBLEM — D68.318 ACQUIRED CIRCULATING ANTICOAGULANTS (HCC): Status: ACTIVE | Noted: 2024-01-22

## 2024-01-22 LAB
ALBUMIN SERPL BCP-MCNC: 3.5 G/DL (ref 3.2–4.9)
BASOPHILS # BLD AUTO: 1.4 % (ref 0–1.8)
BASOPHILS # BLD: 0.13 K/UL (ref 0–0.12)
BUN SERPL-MCNC: 19 MG/DL (ref 8–22)
CALCIUM ALBUM COR SERPL-MCNC: 8.8 MG/DL (ref 8.5–10.5)
CALCIUM SERPL-MCNC: 8.4 MG/DL (ref 8.5–10.5)
CHLORIDE SERPL-SCNC: 101 MMOL/L (ref 96–112)
CO2 SERPL-SCNC: 17 MMOL/L (ref 20–33)
CREAT SERPL-MCNC: 1.04 MG/DL (ref 0.5–1.4)
EOSINOPHIL # BLD AUTO: 0.09 K/UL (ref 0–0.51)
EOSINOPHIL NFR BLD: 1 % (ref 0–6.9)
ERYTHROCYTE [DISTWIDTH] IN BLOOD BY AUTOMATED COUNT: 47.5 FL (ref 35.9–50)
GFR SERPLBLD CREATININE-BSD FMLA CKD-EPI: 81 ML/MIN/1.73 M 2
GLUCOSE SERPL-MCNC: 105 MG/DL (ref 65–99)
HCT VFR BLD AUTO: 30.2 % (ref 42–52)
HGB BLD-MCNC: 10.2 G/DL (ref 14–18)
IMM GRANULOCYTES # BLD AUTO: 0.11 K/UL (ref 0–0.11)
IMM GRANULOCYTES NFR BLD AUTO: 1.2 % (ref 0–0.9)
LYMPHOCYTES # BLD AUTO: 2.63 K/UL (ref 1–4.8)
LYMPHOCYTES NFR BLD: 29 % (ref 22–41)
MAGNESIUM SERPL-MCNC: 1.8 MG/DL (ref 1.5–2.5)
MCH RBC QN AUTO: 35.3 PG (ref 27–33)
MCHC RBC AUTO-ENTMCNC: 33.8 G/DL (ref 32.3–36.5)
MCV RBC AUTO: 104.5 FL (ref 81.4–97.8)
MONOCYTES # BLD AUTO: 0.69 K/UL (ref 0–0.85)
MONOCYTES NFR BLD AUTO: 7.6 % (ref 0–13.4)
NEUTROPHILS # BLD AUTO: 5.42 K/UL (ref 1.82–7.42)
NEUTROPHILS NFR BLD: 59.8 % (ref 44–72)
NRBC # BLD AUTO: 0.02 K/UL
NRBC BLD-RTO: 0.2 /100 WBC (ref 0–0.2)
PHOSPHATE SERPL-MCNC: 3.4 MG/DL (ref 2.5–4.5)
PLATELET # BLD AUTO: 498 K/UL (ref 164–446)
PMV BLD AUTO: 9.5 FL (ref 9–12.9)
POTASSIUM SERPL-SCNC: 4.1 MMOL/L (ref 3.6–5.5)
RBC # BLD AUTO: 2.89 M/UL (ref 4.7–6.1)
SODIUM SERPL-SCNC: 131 MMOL/L (ref 135–145)
WBC # BLD AUTO: 9.1 K/UL (ref 4.8–10.8)

## 2024-01-22 PROCEDURE — G0378 HOSPITAL OBSERVATION PER HR: HCPCS

## 2024-01-22 PROCEDURE — A9270 NON-COVERED ITEM OR SERVICE: HCPCS | Performed by: INTERNAL MEDICINE

## 2024-01-22 PROCEDURE — A9270 NON-COVERED ITEM OR SERVICE: HCPCS | Performed by: STUDENT IN AN ORGANIZED HEALTH CARE EDUCATION/TRAINING PROGRAM

## 2024-01-22 PROCEDURE — 85025 COMPLETE CBC W/AUTO DIFF WBC: CPT

## 2024-01-22 PROCEDURE — 97162 PT EVAL MOD COMPLEX 30 MIN: CPT

## 2024-01-22 PROCEDURE — 99406 BEHAV CHNG SMOKING 3-10 MIN: CPT

## 2024-01-22 PROCEDURE — 99233 SBSQ HOSP IP/OBS HIGH 50: CPT | Mod: FS | Performed by: INTERNAL MEDICINE

## 2024-01-22 PROCEDURE — 700102 HCHG RX REV CODE 250 W/ 637 OVERRIDE(OP): Performed by: INTERNAL MEDICINE

## 2024-01-22 PROCEDURE — 96374 THER/PROPH/DIAG INJ IV PUSH: CPT

## 2024-01-22 PROCEDURE — 700111 HCHG RX REV CODE 636 W/ 250 OVERRIDE (IP): Performed by: STUDENT IN AN ORGANIZED HEALTH CARE EDUCATION/TRAINING PROGRAM

## 2024-01-22 PROCEDURE — 83735 ASSAY OF MAGNESIUM: CPT

## 2024-01-22 PROCEDURE — 36415 COLL VENOUS BLD VENIPUNCTURE: CPT

## 2024-01-22 PROCEDURE — 80069 RENAL FUNCTION PANEL: CPT

## 2024-01-22 PROCEDURE — 700102 HCHG RX REV CODE 250 W/ 637 OVERRIDE(OP): Performed by: STUDENT IN AN ORGANIZED HEALTH CARE EDUCATION/TRAINING PROGRAM

## 2024-01-22 RX ORDER — IPRATROPIUM BROMIDE AND ALBUTEROL SULFATE 2.5; .5 MG/3ML; MG/3ML
3 SOLUTION RESPIRATORY (INHALATION)
Status: DISCONTINUED | OUTPATIENT
Start: 2024-01-22 | End: 2024-01-24 | Stop reason: HOSPADM

## 2024-01-22 RX ORDER — TRAZODONE HYDROCHLORIDE 50 MG/1
50 TABLET ORAL
Status: DISCONTINUED | OUTPATIENT
Start: 2024-01-22 | End: 2024-01-24 | Stop reason: HOSPADM

## 2024-01-22 RX ADMIN — HYDROMORPHONE HYDROCHLORIDE 0.5 MG: 1 INJECTION, SOLUTION INTRAMUSCULAR; INTRAVENOUS; SUBCUTANEOUS at 03:22

## 2024-01-22 RX ADMIN — FERROUS SULFATE TAB 325 MG (65 MG ELEMENTAL FE) 325 MG: 325 (65 FE) TAB at 06:34

## 2024-01-22 RX ADMIN — Medication 100 MG: at 06:34

## 2024-01-22 RX ADMIN — TRAZODONE HYDROCHLORIDE 50 MG: 50 TABLET ORAL at 20:18

## 2024-01-22 RX ADMIN — FOLIC ACID 1 MG: 1 TABLET ORAL at 06:34

## 2024-01-22 RX ADMIN — OXYCODONE HYDROCHLORIDE 10 MG: 10 TABLET ORAL at 02:03

## 2024-01-22 RX ADMIN — METOPROLOL TARTRATE 50 MG: 50 TABLET, FILM COATED ORAL at 17:06

## 2024-01-22 RX ADMIN — OXYCODONE 5 MG: 5 TABLET ORAL at 11:16

## 2024-01-22 RX ADMIN — ACETAMINOPHEN 650 MG: 325 TABLET, FILM COATED ORAL at 11:13

## 2024-01-22 RX ADMIN — RIVAROXABAN 20 MG: 20 TABLET, FILM COATED ORAL at 17:06

## 2024-01-22 RX ADMIN — ACETAMINOPHEN 650 MG: 325 TABLET, FILM COATED ORAL at 17:07

## 2024-01-22 RX ADMIN — OXYCODONE 5 MG: 5 TABLET ORAL at 06:33

## 2024-01-22 RX ADMIN — ACETAMINOPHEN 650 MG: 325 TABLET, FILM COATED ORAL at 06:33

## 2024-01-22 RX ADMIN — METOPROLOL TARTRATE 50 MG: 50 TABLET, FILM COATED ORAL at 06:34

## 2024-01-22 RX ADMIN — OXYCODONE HYDROCHLORIDE AND ACETAMINOPHEN 500 MG: 500 TABLET ORAL at 06:34

## 2024-01-22 RX ADMIN — ATORVASTATIN CALCIUM 10 MG: 10 TABLET, FILM COATED ORAL at 20:18

## 2024-01-22 RX ADMIN — OXYCODONE 5 MG: 5 TABLET ORAL at 17:06

## 2024-01-22 ASSESSMENT — COGNITIVE AND FUNCTIONAL STATUS - GENERAL
HELP NEEDED FOR BATHING: A LOT
DAILY ACTIVITIY SCORE: 14
MOBILITY SCORE: 22
TURNING FROM BACK TO SIDE WHILE IN FLAT BAD: A LITTLE
DRESSING REGULAR LOWER BODY CLOTHING: A LOT
WALKING IN HOSPITAL ROOM: A LITTLE
TOILETING: A LOT
DRESSING REGULAR UPPER BODY CLOTHING: A LOT
SUGGESTED CMS G CODE MODIFIER MOBILITY: CJ
PERSONAL GROOMING: A LITTLE
SUGGESTED CMS G CODE MODIFIER MOBILITY: CJ
MOBILITY SCORE: 21
EATING MEALS: A LITTLE
SUGGESTED CMS G CODE MODIFIER DAILY ACTIVITY: CK
TURNING FROM BACK TO SIDE WHILE IN FLAT BAD: UNABLE

## 2024-01-22 ASSESSMENT — PAIN DESCRIPTION - PAIN TYPE
TYPE: ACUTE PAIN

## 2024-01-22 ASSESSMENT — ENCOUNTER SYMPTOMS
GASTROINTESTINAL NEGATIVE: 1
DIZZINESS: 0
NAUSEA: 0
CHILLS: 0
WEAKNESS: 1
RESPIRATORY NEGATIVE: 1
HEARTBURN: 0
BRUISES/BLEEDS EASILY: 0
HEADACHES: 0
VOMITING: 0
COUGH: 0
BLURRED VISION: 0
MYALGIAS: 1
EYES NEGATIVE: 1
PSYCHIATRIC NEGATIVE: 1
ABDOMINAL PAIN: 0
FEVER: 0
SHORTNESS OF BREATH: 0
CARDIOVASCULAR NEGATIVE: 1
DEPRESSION: 0
PALPITATIONS: 0
DOUBLE VISION: 0

## 2024-01-22 ASSESSMENT — GAIT ASSESSMENTS
DISTANCE (FEET): 200
GAIT LEVEL OF ASSIST: SUPERVISED

## 2024-01-22 ASSESSMENT — LIFESTYLE VARIABLES: SUBSTANCE_ABUSE: 0

## 2024-01-22 NOTE — ASSESSMENT & PLAN NOTE
Immobilizer in place  Evaluated by orthopedic last admission, outpatient follow-up  Supportive pain control    1/23 pain control, OT evaluation  , PT recommending home health, patient does not want home health at this time  Improved pain control, limit narcotics  Shower chair ordered,  to assist

## 2024-01-22 NOTE — PROGRESS NOTES
4 Eyes Skin Assessment Completed by MITCH Smith and MITCH Rao.    Head WDL  Ears WDL  Nose WDL  Mouth WDL  Neck WDL  Breast/Chest WDL  Shoulder Blades WDL  Spine WDL  (R) Arm/Elbow/Hand Edema  (L) Arm/Elbow/Hand WDL  Abdomen Bruising  Groin WDL  Scrotum/Coccyx/Buttocks Ingrown hairs   (R) Leg WDL  (L) Leg WDL  (R) Heel/Foot/Toe WDL  (L) Heel/Foot/Toe WDL          Devices In Places Blood Pressure Cuff/immobilizer       Interventions In Place Sacral Mepilex, Waffle Overlay, Pillows, and Barrier Cream    Possible Skin Injury Yes    Pictures Uploaded Into Epic N/A  Wound Consult Placed Yes  RN Wound Prevention Protocol Ordered Yes

## 2024-01-22 NOTE — CARE PLAN
The patient is Stable - Low risk of patient condition declining or worsening    Shift Goals  Clinical Goals: PT/OT eval, pain control  Patient Goals: rest/comfort  Family Goals: ARELIS    Progress made toward(s) clinical / shift goals:  Pt transferred to floor today. Aox4. On RA. Pt eval completed. 8/10 aching shoulder/arm pain treated with PRN oxycodone with relief. No acute distress noted. Pt remains free of falls.     Patient is not progressing towards the following goals:

## 2024-01-22 NOTE — PROGRESS NOTES
Report received from night shift RN. Patient A&O, in bed resting comfortably. VSS, denies pain, bed in lowest position and locked, call light in reach.

## 2024-01-22 NOTE — THERAPY
Physical Therapy   Initial Evaluation     Patient Name: Jose A Ponce  Age:  63 y.o., Sex:  male  Medical Record #: [id number]  Today's Date: 1/22/2024     Precautions  Precautions: Non Weight Bearing Right Upper Extremity  Comments: sling    Assessment  Patient is 63 y.o. male recently admitted 2/2 GLF sustaining a right humerus fx, non op.  Hx of CAD, CABG 2017, DVT/PE, HTN.  Admitted w/ c/o weakness, needing to lower himself to the floor, but being able to return to standing w/o assist.  Found to be dehydrated.  He lives alone in an apartment on the ground floor.  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  Today, his LE strength is 5/5 bilaterally.  He is able to mobilize at spv level, including accepting perterbations during ambulation w/o loss of balance or need of assist.  No signs of balance deviations.  He reports feeling much better and stronger.  No acute PT needs.  Plan    Physical Therapy Initial Treatment Plan   Duration: Evaluation only    DC Equipment Recommendations: None  Discharge Recommendations: Recommend home health for continued physical therapy services        Objective       01/22/24 1219   Prior Living Situation   Housing / Facility 1 Story Apartment / Condo   Steps Into Home 0   Steps In Home 0   Equipment Owned Single Point Cane   Lives with - Patient's Self Care Capacity Alone and Able to Care For Self   Prior Level of Functional Mobility   Bed Mobility Independent   Transfer Status Independent   Ambulation Independent   Assistive Devices Used None   Strength Lower Body   Comments grossly 5/5 bilaterally   Balance Assessment   Sitting Balance (Static) Fair +   Sitting Balance (Dynamic) Fair +   Standing Balance (Static) Fair +   Standing Balance (Dynamic) Fair +   Weight Shift Sitting Good   Weight Shift Standing Good   Bed Mobility    Supine to Sit Supervised   Sit to Supine Standby Assist   Gait Analysis   Gait Level Of Assist Supervised    Assistive Device None   Distance (Feet) 200   Functional Mobility   Sit to Stand Supervised   Education Group   Education Provided Role of Physical Therapist;Weight Bearing Status   Role of Physical Therapist Patient Response Patient;Acceptance;Explanation;Verbal Demonstration   Weight Bearing Status Patient Response Patient;Acceptance;Explanation;Verbal Demonstration;Action Demonstration   Physical Therapy Initial Treatment Plan    Duration Evaluation only   Anticipated Discharge Equipment and Recommendations   DC Equipment Recommendations None   Discharge Recommendations Recommend home health for continued physical therapy services

## 2024-01-22 NOTE — CARE PLAN
The patient is Stable - Low risk of patient condition declining or worsening    Shift Goals  Clinical Goals: PT/OT, wound care, safety  Patient Goals: rest, eat  Family Goals: not at bedside    Progress made toward(s) clinical / shift goals:    Problem: Knowledge Deficit - Standard  Goal: Patient and family/care givers will demonstrate understanding of plan of care, disease process/condition, diagnostic tests and medications  Description: Target End Date:  1-3 days or as soon as patient condition allows    Document in Patient Education    1.  Patient and family/caregiver oriented to unit, equipment, visitation policy and means for communicating concern  2.  Complete/review Learning Assessment  3.  Assess knowledge level of disease process/condition, treatment plan, diagnostic tests and medications  4.  Explain disease process/condition, treatment plan, diagnostic tests and medications  Outcome: Progressing     Problem: Hemodynamics  Goal: Patient's hemodynamics, fluid balance and neurologic status will be stable or improve  Description: Target End Date:  Prior to discharge or change in level of care    Document on Assessment and I/O flowsheet templates    1.  Monitor vital signs, pulse oximetry and cardiac monitor per provider order and/or policy  2.  Maintain blood pressure per provider order  3.  Hemodynamic monitoring per provider order  4.  Manage IV fluids and IV infusions  5.  Monitor intake and output  6.  Daily weights per unit policy or provider order  7.  Assess peripheral pulses and capillary refill  8.  Assess color and body temperature  9.  Position patient for maximum circulation/cardiac output  10. Monitor for signs/symptoms of excessive bleeding  11. Assess mental status, restlessness and changes in level of consciousness  12. Monitor temperature and report fever or hypothermia to provider immediately. Consideration of targeted temperature management.  Outcome: Progressing     Problem: Fluid  Volume  Goal: Fluid volume balance will be maintained  Description: Target End Date:  Prior to discharge or change in level of care    Document on I/O flowsheet    1.  Monitor intake and output as ordered  2.  Promote oral intake as appropriate  3.  Report inadequate intake or output to physician  4.  Administer IV therapy as ordered  5.  Weights per provider order  6.  Assess for signs and symptoms of bleeding  7.  Monitor for signs of fluid overload (respiratory changes, edema, weight gain, increased abdominal girth)  8.  Monitor of signs for inadequate fluid volume (poor skin turgor, dry mucous membranes)  9.  Instruct patient on adherence to fluid restrictions  Outcome: Progressing     Problem: Urinary - Renal Perfusion  Goal: Ability to achieve and maintain adequate renal perfusion and functioning will improve  Description: Target End Date:  Prior to discharge or change in level of care    Document on I/O and Assessment flowsheet    1.  Urine output will remain greater than 0.5ml/Kg/HR  2.  Monitor amount and/or characteristics of urine per order/policy. Specific gravity per order/policy  3.  Assess signs and symptoms of renal dysfunction  Outcome: Progressing     Problem: Respiratory  Goal: Patient will achieve/maintain optimum respiratory ventilation and gas exchange  Description: Target End Date:  Prior to discharge or change in level of care    Document on Assessment flowsheet    1.  Assess and monitor rate, rhythm, depth and effort of respiration  2.  Breath sounds assessed qshift and/or as needed  3.  Assess O2 saturation, administer/titrate oxygen as ordered  4.  Position patient for maximum ventilatory efficiency  5.  Turn, cough, and deep breath with splinting to improve effectiveness  6.  Collaborate with RT to administer medication/treatments per order  7.  Encourage use of incentive spirometer and encourage patient to cough after use and utilize splinting techniques if applicable  8.  Airway  suctioning  9.  Monitor sputum production for changes in color, consistency and frequency  10. Perform frequent oral hygiene  11. Alternate physical activity with rest periods  Outcome: Progressing     Problem: Physical Regulation  Goal: Diagnostic test results will improve  Description: Target End Date:  Prior to discharge or change in level of care    1.  Monitor lactic acid levels  2.  Monitor ABG's  3.  Monitor diagnostic test results  Outcome: Progressing  Goal: Signs and symptoms of infection will decrease  Description: Target End Date:  Prior to discharge or change in level of care    1.  Remove potential routes of infection, such as central lines and urinary catheter  2.  Follow facility protocol for changing IV tubing and sites  3.  Collaborate with Infectious Disease  4.  Antibiotic therapy per provider order  5.  Note drug effects and monitor for antibiotic toxicity  Outcome: Progressing     Problem: Pain - Standard  Goal: Alleviation of pain or a reduction in pain to the patient’s comfort goal  Description: Target End Date:  Prior to discharge or change in level of care    Document on Vitals flowsheet    1.  Document pain using the appropriate pain scale per order or unit policy  2.  Educate and implement non-pharmacologic comfort measures (i.e. relaxation, distraction, massage, cold/heat therapy, etc.)  3.  Pain management medications as ordered  4.  Reassess pain after pain med administration per policy  5.  If opiods administered assess patient's response to pain medication is appropriate per POSS sedation scale  6.  Follow pain management plan developed in collaboration with patient and interdisciplinary team (including palliative care or pain specialists if applicable)  Outcome: Progressing     Problem: Skin Integrity  Goal: Skin integrity is maintained or improved  Description: Target End Date:  Prior to discharge or change in level of care    Document interventions on Skin Risk/Sammy flowsheet  groups and corresponding LDA    1.  Assess and monitor skin integrity, appearance and/or temperature  2.  Assess risk factors for impaired skin integrity and/or pressures ulcers  3.  Implement precautions to protect skin integrity in collaboration with interdisciplinary team  4.  Implement pressure ulcer prevention protocol if at risk for skin breakdown  5.  Confirm wound care consult if at risk for skin breakdown  6.  Ensure patient use of pressure relieving devices  (Low air loss bed, waffle overlay, heel protectors, ROHO cushion, etc)  Outcome: Progressing     Problem: Fall Risk  Goal: Patient will remain free from falls  Description: Target End Date:  Prior to discharge or change in level of care    Document interventions on the [facility] Fall Risk Assessment    1.  Assess for fall risk factors  2.  Implement fall precautions  Outcome: Progressing

## 2024-01-22 NOTE — ASSESSMENT & PLAN NOTE
Admits to smoking 1 pack of cigarettes daily  Smoking cessation counseling provided: 4 minutes  Offered nicotine patch, nicotine gum, Chantix as alternative.  Provided patient with standard tobacco cessation information per protocol

## 2024-01-22 NOTE — ED NOTES
Previous RN called report to T2, RN. Transport is present to take pt to room. Pt being taken upstairs at this time by transport via gurney. Pt is awake and alert, talking to staff, in no apparent distress at time of transfer. Pt's paperwork and belongings sent upstairs with pt and transport.

## 2024-01-22 NOTE — PROGRESS NOTES
4 Eyes Skin Assessment Completed by MITCH Corbin and Hung SCOTT.    Head WDL  Ears WDL  Nose WDL  Mouth WDL  Neck WDL  Breast/Chest WDL  Shoulder Blades WDL  Spine WDL  (R) Arm/Elbow/Hand WDL  (L) Arm/Elbow/Hand WDL  Abdomen WDL  Groin Redness, excoriation  Scrotum/Coccyx/Buttocks Redness  (R) Leg WDL  (L) Leg WDL  (R) Heel/Foot/Toe WDL  (L) Heel/Foot/Toe WDL          Devices In Places Tele Box and Pulse Ox      Interventions In Place Waffle Overlay and Pillows    Possible Skin Injury Yes    Pictures Uploaded Into Epic Yes  Wound Consult Placed Yes  RN Wound Prevention Protocol Ordered No

## 2024-01-22 NOTE — PROGRESS NOTES
[facility] Medicine Daily Progress Note    Date of Service  1/22/2024    Chief Complaint  Jose A Ponce is a 63 y.o. male admitted 1/21/2024 with generalized weakness    Hospital Course  MrLazaro Ponce is a 63 y.o. male with history of CAD s/p CABG 2017, history of DVT/PE on Xarelto, hypertension, recent admission for traumatic fall with right humerus fracture managed conservatively and discharged 1/15/2024 who presented 1/21/2024 with evaluation for generalized weakness.      Patient reported unable to ambulate, generalized weakness.  In ER, no revealing workup.  He does however have mild dehydration with anion gap metabolic acidosis and lactic acid 2.4.  He received 1 L IVF bolus in ER, admission requested by ERP for observation.  Admitted to medicine service.     Patient admitted as he continues to have generalized weakness and need for PT/OT.    Interval Problem Update  -Patient seen and examined.  Patient noted to have a right arm sling from his right humeral fracture.  Patient denies receiving any physical therapy for his existing fracture.  Patient has worked with physical therapy today and feels that he has somewhat improved.  Discussed with patient possibly getting home health versus outpatient physical therapy.  At this time, patient is very adamant about not having someone go to his home to provide therapy and would rather go as an outpatient.  -Plan of care: Continue to encourage patient increase in activity; work with physical therapy and need for evaluate for need postacute; no home health as patient does not want anyone going to his house; will need to order outpatient physical therapy  -Disposition: Anticipated stay overnight until noted improvement, await PT/OT recommendations post acute  -Lab work: Reviewed; expected  -VSS at this time    I have discussed this patient's plan of care and discharge plan at IDT rounds today with Case Management, Nursing, Nursing leadership, and other  members of the IDT team.    Consultants/Specialty  NONE    Code Status  Full Code    Disposition  The patient is not medically cleared for discharge to home or a post-acute facility.  Anticipate discharge to: home with close outpatient follow-up    I have placed the appropriate orders for post-discharge needs.    Review of Systems  Review of Systems   Constitutional:  Positive for malaise/fatigue. Negative for chills and fever.   HENT: Negative.     Eyes: Negative.    Respiratory: Negative.     Cardiovascular: Negative.    Gastrointestinal: Negative.    Genitourinary: Negative.    Musculoskeletal:  Positive for myalgias.   Skin: Negative.    Neurological:  Positive for weakness.   Endo/Heme/Allergies: Negative.    Psychiatric/Behavioral: Negative.          Physical Exam  Temp:  [36.3 °C (97.3 °F)-37.1 °C (98.8 °F)] 36.3 °C (97.3 °F)  Pulse:  [] 77  Resp:  [17-20] 20  BP: (123-187)/(63-94) 123/64  SpO2:  [94 %-99 %] 98 %    Physical Exam  Vitals and nursing note reviewed.   HENT:      Head: Normocephalic.      Nose: Nose normal.      Mouth/Throat:      Mouth: Mucous membranes are moist.      Pharynx: Oropharynx is clear.   Eyes:      Pupils: Pupils are equal, round, and reactive to light.   Cardiovascular:      Rate and Rhythm: Normal rate and regular rhythm.      Pulses: Normal pulses.      Heart sounds: Normal heart sounds.   Pulmonary:      Effort: Pulmonary effort is normal.      Breath sounds: Normal breath sounds.   Abdominal:      General: Bowel sounds are normal.      Palpations: Abdomen is soft.   Musculoskeletal:         General: Tenderness present.      Cervical back: Normal range of motion and neck supple.   Skin:     General: Skin is dry.      Capillary Refill: Capillary refill takes 2 to 3 seconds.   Neurological:      Mental Status: He is alert. Mental status is at baseline.         Fluids    Intake/Output Summary (Last 24 hours) at 1/22/2024 1114  Last data filed at 1/22/2024 0325  Gross per 24  hour   Intake --   Output 595 ml   Net -595 ml       Laboratory  Recent Labs     01/21/24  1455 01/22/24  0806   WBC 10.0 9.1   RBC 3.06* 2.89*   HEMOGLOBIN 10.8* 10.2*   HEMATOCRIT 32.0* 30.2*   .6* 104.5*   MCH 35.3* 35.3*   MCHC 33.8 33.8   RDW 48.2 47.5   PLATELETCT 545* 498*   MPV 9.0 9.5     Recent Labs     01/21/24  1455 01/22/24  0806   SODIUM 131* 131*   POTASSIUM 5.0 4.1   CHLORIDE 96 101   CO2 12* 17*   GLUCOSE 78 105*   BUN 17 19   CREATININE 1.20 1.04   CALCIUM 8.8 8.4*                   Imaging  DX-CHEST-PORTABLE (1 VIEW)   Final Result      1.  No acute cardiopulmonary abnormality identified.      2.  Right proximal humeral fracture and there is inferior subluxation at the glenohumeral joint           Assessment/Plan  * Generalized weakness- (present on admission)  Assessment & Plan  PT/OT    Dehydration  Assessment & Plan  IVF  improved    History of pulmonary embolism- (present on admission)  Assessment & Plan  History of DVT and PE  Continue Xarelto    Closed fracture of neck of right humerus- (present on admission)  Assessment & Plan  Immobilizer in place  Evaluated by orthopedic last admission, outpatient follow-up  Supportive pain control    Lactic acidosis- (present on admission)  Assessment & Plan  Resolved with IVF    Tobacco abuse- (present on admission)  Assessment & Plan  Admits to smoking 1 pack of cigarettes daily  Smoking cessation counseling provided: 4 minutes  Offered nicotine patch, nicotine gum, Chantix as alternative.  Provided patient with standard tobacco cessation information per protocol    S/P CABG x 2- (present on admission)  Assessment & Plan  Per history  Xarelto, statin, BB    Dyslipidemia- (present on admission)  Assessment & Plan  Statin         VTE prophylaxis:   SCDs/TEDs      I have performed a physical exam and reviewed and updated ROS and Plan today (1/22/2024). In review of yesterday's note (1/21/2024), there are no changes except as documented  above.      Mitch CANADA DNP performed a substantiated portion of the service face-to-face with same patient on the same date of service INDEPENDENTLY from the MD on assessment, examination and discussion in plan of care FOR 17 MINUTES.  I was personally involved in reviewing and conducting the medical decision making, including the information as described above.

## 2024-01-22 NOTE — H&P
Hospital Medicine History & Physical Note    Date of Service  1/21/2024    Primary Care Physician  Lori Villalobos D.N.P.    Consultants  None    Code Status  Full Code    Chief Complaint  Chief Complaint   Patient presents with    Extremity Weakness     BIB EMS from home, pt has been having general weakness especially bilateral LE. Legs have been giving out more frequently and pt has lowered self to ground. 1/6 had GLF w/ R shoulder injury, bruising noticed around R pectoral and bicep.        History of Presenting Illness  Jose A Ponce is a 63 y.o. male with history of CAD s/p CABG 2017, history of DVT/PE on Xarelto, hypertension, recent admission for traumatic fall with right humerus fracture managed conservatively and discharged 1/15/2024 who presented 1/21/2024 with evaluation for generalized weakness.  Patient reported unable to ambulate, generalized weakness.  In ER, no revealing workup.  He does however have mild dehydration with anion gap metabolic acidosis and lactic acid 2.4.  He received 1 L IVF bolus in ER, admission requested by ERP for observation.  Admitted to medicine service.    I discussed the plan of care with patient, bedside RN, and pharmacy.    Review of Systems  Review of Systems   Constitutional:  Negative for chills and fever.   HENT:  Negative for hearing loss and tinnitus.    Eyes:  Negative for blurred vision and double vision.   Respiratory:  Negative for cough and shortness of breath.    Cardiovascular:  Negative for chest pain and palpitations.   Gastrointestinal:  Negative for abdominal pain, heartburn, nausea and vomiting.   Musculoskeletal:  Positive for joint pain (right shoulder) and myalgias.   Skin:  Negative for itching and rash.   Neurological:  Positive for weakness. Negative for dizziness and headaches.   Endo/Heme/Allergies:  Negative for environmental allergies. Does not bruise/bleed easily.   Psychiatric/Behavioral:  Negative for depression and substance abuse.     All other systems reviewed and are negative.      Past Medical History   has a past medical history of Breath shortness (10/2017), CAD (coronary artery disease), Dental disorder (10/2017), DVT (deep venous thrombosis) (HCC), Grief reaction (2017), hernia repair, Hyperlipidemia, Hypertension, Psychiatric problem (10/2017), and Tobacco use.    Surgical History   has a past surgical history that includes inguinal hernia repair (Bilateral, 2000); orif, fracture, tibia (Right, 1980s); multiple coronary artery bypass endo vein harvest (6/13/2017); yarely (6/13/2017); and [facility] cardiac cath.     Family History  family history includes Cancer in his father and mother; Heart Disease (age of onset: 89) in his maternal grandfather; No Known Problems in his sister and sister.   Family history reviewed with patient. There is no family history that is pertinent to the chief complaint.     Social History   reports that he has been smoking cigarettes. He started smoking about 44 years ago. He has a 44.1 pack-year smoking history. He has never used smokeless tobacco. He reports current alcohol use. He reports current drug use.    Allergies  No Known Allergies    Medications  Prior to Admission Medications   Prescriptions Last Dose Informant Patient Reported? Taking?   Multiple Vitamins-Minerals (CENTRUM SILVER 50+MEN) Tab 1/20/2024 at PM Patient Yes No   Sig: Take 1 Tablet by mouth every evening.   acetaminophen (TYLENOL) 500 MG Tab 1/21/2024 at AM Patient No No   Sig: Take 2 Tablets by mouth every 8 hours.   ascorbic acid (VITAMIN C) 500 MG tablet 1/21/2024 at AM Patient No No   Sig: Take 1 Tablet by mouth every day.   atorvastatin (LIPITOR) 10 MG Tab 1/20/2024 at PM Patient No No   Sig: Take 1 Tablet by mouth every evening.   ferrous sulfate 325 (65 Fe) MG tablet 1/21/2024 at AM Patient No No   Sig: Take 1 Tablet by mouth every morning with breakfast.   folic acid (FOLVITE) 1 MG Tab 1/21/2024 at AM Patient No No   Sig: Take 1 Tablet  by mouth every day.   lidocaine (ASPERFLEX) 4 % Patch NEW RX Patient No No   Sig: Place 1 Patch on the skin every 24 hours.   metoprolol tartrate (LOPRESSOR) 50 MG Tab 1/21/2024 at AM Patient No No   Sig: Take 1 Tablet by mouth 2 times a day.   nicotine (NICODERM) 21 MG/24HR PATCH 24 HR 2 DAYS at OFF Patient No No   Sig: Place 1 Patch on the skin every 24 hours.   oxyCODONE immediate release (ROXICODONE) 10 MG immediate release tablet 2 DAYS at PRN Patient No No   Sig: Take 1 Tablet by mouth every 6 hours as needed for Severe Pain for up to 5 days.   rivaroxaban (XARELTO) 20 MG Tab tablet 1/20/2024 at PM Patient No No   Sig: Take 1 Tablet by mouth with dinner.   senna-docusate (PERICOLACE OR SENOKOT S) 8.6-50 MG Tab 1/16/2024 Patient No No   Sig: Take 2 Tablets by mouth 2 times a day.   spironolactone (ALDACTONE) 25 MG Tab 1/21/2024 at UNK Patient No No   Sig: Take 1 Tablet by mouth every day.   thiamine (THIAMINE) 100 MG tablet 1/21/2024 at AM Patient No No   Sig: Take 1 Tablet by mouth every day.      Facility-Administered Medications: None       Physical Exam  Temp:  [36.8 °C (98.3 °F)-37.1 °C (98.8 °F)] 37.1 °C (98.8 °F)  Pulse:  [] 100  Resp:  [17-18] 18  BP: (136-181)/(77-94) 181/94  SpO2:  [94 %-99 %] 98 %  Blood Pressure: (!) 158/80   Temperature: 36.8 °C (98.3 °F)   Pulse: (!) 107   Respiration: 17   Pulse Oximetry: 97 %       Physical Exam  Vitals and nursing note reviewed.   Constitutional:       General: He is not in acute distress.  HENT:      Head: Normocephalic and atraumatic.      Nose: Nose normal.      Mouth/Throat:      Mouth: Mucous membranes are moist.      Pharynx: Oropharynx is clear.   Eyes:      General: No scleral icterus.     Extraocular Movements: Extraocular movements intact.   Cardiovascular:      Rate and Rhythm: Normal rate and regular rhythm.      Pulses: Normal pulses.      Heart sounds:      No friction rub.   Pulmonary:      Effort: No respiratory distress.      Breath  "sounds: No wheezing or rales.   Chest:      Chest wall: No tenderness.   Abdominal:      General: There is no distension.      Tenderness: There is no abdominal tenderness. There is no guarding or rebound.   Musculoskeletal:         General: Signs of injury present.      Cervical back: Neck supple. No tenderness.      Right lower leg: No edema.      Left lower leg: No edema.      Comments: Right shoulder -- immobilizer in place   Skin:     General: Skin is warm and dry.      Capillary Refill: Capillary refill takes less than 2 seconds.   Neurological:      General: No focal deficit present.      Mental Status: He is alert and oriented to person, place, and time.   Psychiatric:         Mood and Affect: Mood normal.         Laboratory:  Recent Labs     01/21/24  1455   WBC 10.0   RBC 3.06*   HEMOGLOBIN 10.8*   HEMATOCRIT 32.0*   .6*   MCH 35.3*   MCHC 33.8   RDW 48.2   PLATELETCT 545*   MPV 9.0     Recent Labs     01/21/24  1455   SODIUM 131*   POTASSIUM 5.0   CHLORIDE 96   CO2 12*   GLUCOSE 78   BUN 17   CREATININE 1.20   CALCIUM 8.8     Recent Labs     01/21/24  1455   ALTSGPT 27   ASTSGOT 53*   ALKPHOSPHAT 149*   TBILIRUBIN 0.6   GLUCOSE 78         No results for input(s): \"NTPROBNP\" in the last 72 hours.      Recent Labs     01/21/24  1455   TROPONINT 20*       Imaging:  DX-CHEST-PORTABLE (1 VIEW)   Final Result      1.  No acute cardiopulmonary abnormality identified.      2.  Right proximal humeral fracture and there is inferior subluxation at the glenohumeral joint          X-Ray:  I have personally reviewed the images and compared with prior images.  EKG:  I have personally reviewed the images and compared with prior images.    Assessment/Plan:  Justification for Admission Status  I anticipate this patient is appropriate for observation status at this time.      * Generalized weakness- (present on admission)  Assessment & Plan  PT/OT    Dehydration  Assessment & Plan  IVF  improved    History of " pulmonary embolism- (present on admission)  Assessment & Plan  History of DVT and PE  Continue Xarelto    Closed fracture of neck of right humerus- (present on admission)  Assessment & Plan  Immobilizer in place  Evaluated by orthopedic last admission, outpatient follow-up  Supportive pain control    Lactic acidosis- (present on admission)  Assessment & Plan  Resolved with IVF    S/P CABG x 2- (present on admission)  Assessment & Plan  Per history  Xarelto, statin, BB    Dyslipidemia- (present on admission)  Assessment & Plan  Statin    Tobacco abuse- (present on admission)  Assessment & Plan  Admits to smoking 1 pack of cigarettes daily  Smoking cessation counseling provided: 4 minutes  Offered nicotine patch, nicotine gum, Chantix as alternative.  Provided patient with standard tobacco cessation information per protocol        VTE prophylaxis: Xarelto

## 2024-01-22 NOTE — ED NOTES
Med Rec complete per PT  Allergies Reviewed    Pt reports taking anticoagulant in the last 14 days  Anticoagulant: XARELTO, Last dose: 1/20 PM

## 2024-01-22 NOTE — HOSPITAL COURSE
. Jose A Ponce is a 63 y.o. male with history of CAD s/p CABG 2017, history of DVT/PE on Xarelto, hypertension, recent admission for traumatic fall with right humerus fracture managed conservatively and discharged 1/15/2024 who presented 1/21/2024 with evaluation for generalized weakness.      Patient reported unable to ambulate, generalized weakness.  In ER, no revealing workup.  He does however have mild dehydration with anion gap metabolic acidosis and lactic acid 2.4.  He received 1 L IVF bolus in ER, admission requested by ERP for observation.  Admitted to medicine service.     Patient admitted as he continues to have generalized weakness and need for PT/OT.

## 2024-01-23 PROBLEM — G47.00 INSOMNIA: Status: ACTIVE | Noted: 2024-01-23

## 2024-01-23 LAB
BACTERIA UR CULT: NORMAL
SIGNIFICANT IND 70042: NORMAL
SITE SITE: NORMAL
SOURCE SOURCE: NORMAL

## 2024-01-23 PROCEDURE — 700102 HCHG RX REV CODE 250 W/ 637 OVERRIDE(OP): Performed by: STUDENT IN AN ORGANIZED HEALTH CARE EDUCATION/TRAINING PROGRAM

## 2024-01-23 PROCEDURE — A9270 NON-COVERED ITEM OR SERVICE: HCPCS | Performed by: INTERNAL MEDICINE

## 2024-01-23 PROCEDURE — 99233 SBSQ HOSP IP/OBS HIGH 50: CPT | Performed by: INTERNAL MEDICINE

## 2024-01-23 PROCEDURE — G0378 HOSPITAL OBSERVATION PER HR: HCPCS

## 2024-01-23 PROCEDURE — 700111 HCHG RX REV CODE 636 W/ 250 OVERRIDE (IP): Performed by: STUDENT IN AN ORGANIZED HEALTH CARE EDUCATION/TRAINING PROGRAM

## 2024-01-23 PROCEDURE — 97166 OT EVAL MOD COMPLEX 45 MIN: CPT

## 2024-01-23 PROCEDURE — 700102 HCHG RX REV CODE 250 W/ 637 OVERRIDE(OP): Performed by: INTERNAL MEDICINE

## 2024-01-23 PROCEDURE — 700111 HCHG RX REV CODE 636 W/ 250 OVERRIDE (IP): Performed by: INTERNAL MEDICINE

## 2024-01-23 PROCEDURE — A9270 NON-COVERED ITEM OR SERVICE: HCPCS | Performed by: STUDENT IN AN ORGANIZED HEALTH CARE EDUCATION/TRAINING PROGRAM

## 2024-01-23 PROCEDURE — 96376 TX/PRO/DX INJ SAME DRUG ADON: CPT

## 2024-01-23 PROCEDURE — 97602 WOUND(S) CARE NON-SELECTIVE: CPT

## 2024-01-23 PROCEDURE — 97535 SELF CARE MNGMENT TRAINING: CPT

## 2024-01-23 RX ORDER — OXYCODONE HYDROCHLORIDE 10 MG/1
10 TABLET ORAL
Status: DISCONTINUED | OUTPATIENT
Start: 2024-01-23 | End: 2024-01-24 | Stop reason: HOSPADM

## 2024-01-23 RX ORDER — HYDROMORPHONE HYDROCHLORIDE 1 MG/ML
0.5 INJECTION, SOLUTION INTRAMUSCULAR; INTRAVENOUS; SUBCUTANEOUS EVERY 8 HOURS PRN
Status: DISCONTINUED | OUTPATIENT
Start: 2024-01-23 | End: 2024-01-24 | Stop reason: HOSPADM

## 2024-01-23 RX ORDER — CHOLECALCIFEROL (VITAMIN D3) 125 MCG
5 CAPSULE ORAL NIGHTLY PRN
Status: DISCONTINUED | OUTPATIENT
Start: 2024-01-23 | End: 2024-01-24 | Stop reason: HOSPADM

## 2024-01-23 RX ORDER — OXYCODONE HYDROCHLORIDE 5 MG/1
5 TABLET ORAL
Status: DISCONTINUED | OUTPATIENT
Start: 2024-01-23 | End: 2024-01-24 | Stop reason: HOSPADM

## 2024-01-23 RX ADMIN — OXYCODONE HYDROCHLORIDE 10 MG: 10 TABLET ORAL at 16:52

## 2024-01-23 RX ADMIN — ATORVASTATIN CALCIUM 10 MG: 10 TABLET, FILM COATED ORAL at 21:24

## 2024-01-23 RX ADMIN — HYDROMORPHONE HYDROCHLORIDE 0.5 MG: 1 INJECTION, SOLUTION INTRAMUSCULAR; INTRAVENOUS; SUBCUTANEOUS at 22:38

## 2024-01-23 RX ADMIN — RIVAROXABAN 20 MG: 20 TABLET, FILM COATED ORAL at 16:55

## 2024-01-23 RX ADMIN — Medication 100 MG: at 05:06

## 2024-01-23 RX ADMIN — FOLIC ACID 1 MG: 1 TABLET ORAL at 05:06

## 2024-01-23 RX ADMIN — ACETAMINOPHEN 650 MG: 325 TABLET, FILM COATED ORAL at 05:06

## 2024-01-23 RX ADMIN — OXYCODONE HYDROCHLORIDE 10 MG: 10 TABLET ORAL at 12:16

## 2024-01-23 RX ADMIN — OXYCODONE HYDROCHLORIDE AND ACETAMINOPHEN 500 MG: 500 TABLET ORAL at 05:07

## 2024-01-23 RX ADMIN — TRAZODONE HYDROCHLORIDE 50 MG: 50 TABLET ORAL at 21:24

## 2024-01-23 RX ADMIN — OXYCODONE HYDROCHLORIDE 10 MG: 10 TABLET ORAL at 21:24

## 2024-01-23 RX ADMIN — ACETAMINOPHEN 650 MG: 325 TABLET, FILM COATED ORAL at 12:16

## 2024-01-23 RX ADMIN — HYDROMORPHONE HYDROCHLORIDE 0.5 MG: 1 INJECTION, SOLUTION INTRAMUSCULAR; INTRAVENOUS; SUBCUTANEOUS at 05:05

## 2024-01-23 RX ADMIN — METOPROLOL TARTRATE 50 MG: 50 TABLET, FILM COATED ORAL at 05:06

## 2024-01-23 RX ADMIN — METOPROLOL TARTRATE 50 MG: 50 TABLET, FILM COATED ORAL at 16:52

## 2024-01-23 RX ADMIN — ACETAMINOPHEN 650 MG: 325 TABLET, FILM COATED ORAL at 16:51

## 2024-01-23 RX ADMIN — NICOTINE TRANSDERMAL SYSTEM 21 MG: 21 PATCH, EXTENDED RELEASE TRANSDERMAL at 05:06

## 2024-01-23 RX ADMIN — OXYCODONE HYDROCHLORIDE 10 MG: 10 TABLET ORAL at 03:46

## 2024-01-23 RX ADMIN — FERROUS SULFATE TAB 325 MG (65 MG ELEMENTAL FE) 325 MG: 325 (65 FE) TAB at 09:43

## 2024-01-23 ASSESSMENT — ENCOUNTER SYMPTOMS
DEPRESSION: 0
RESPIRATORY NEGATIVE: 1
NERVOUS/ANXIOUS: 0
PALPITATIONS: 0
PSYCHIATRIC NEGATIVE: 1
WEAKNESS: 1
EYES NEGATIVE: 1
MYALGIAS: 1
NAUSEA: 0
CHILLS: 0
VOMITING: 0
CARDIOVASCULAR NEGATIVE: 1
FEVER: 0
DIZZINESS: 0
GASTROINTESTINAL NEGATIVE: 1
HEADACHES: 0

## 2024-01-23 ASSESSMENT — COGNITIVE AND FUNCTIONAL STATUS - GENERAL
HELP NEEDED FOR BATHING: A LITTLE
SUGGESTED CMS G CODE MODIFIER DAILY ACTIVITY: CI
DAILY ACTIVITIY SCORE: 23

## 2024-01-23 ASSESSMENT — PAIN DESCRIPTION - PAIN TYPE
TYPE: ACUTE PAIN

## 2024-01-23 ASSESSMENT — ACTIVITIES OF DAILY LIVING (ADL): TOILETING: INDEPENDENT

## 2024-01-23 NOTE — CARE PLAN
The patient is Stable - Low risk of patient condition declining or worsening    Shift Goals  Clinical Goals: pts pain will be controlled to pts pain goal of 5/10 throughout shift  Patient Goals: pain control, sleep  Family Goals: ARELIS    Progress made toward(s) clinical / shift goals:      Patient is not progressing towards the following goals:

## 2024-01-23 NOTE — RESPIRATORY CARE
" EDUCATION by COPD CLINICAL EDUCATOR  1/22/2024 at 4:04 PM by Lida Collins, RRT     Smoking Cessation Intervention and education completed, 3 minutes spent on smoking cessation education with patient.  Provided smoking cessation packet with \"Tips to Quit\" and brochure for \"Free Virtual Smoking Cessation Classes\".   "

## 2024-01-23 NOTE — DIETARY
"Nutrition services: Day 0 of admit.  Jose A Ponce is a 63 y.o. male with admitting DX of Generalized weakness.  Consult received for Malnutrition Screening Tool score of 3 for unplanned weight loss of 14-23 lb x 1 month and poor appetite.    Spoke with pt at bedside. He states he lost weight due to not eating well for 10 days due to COVID, followed by a gastrointestinal illness that lasted 5 days when he could not eat. He reports a usual weight of 190 lb. Pt states his appetite has now returned and he is hungry. Observed pt eating lunch with good appetite at time of visit. Pt does have a menu at bedside and obtained his meal preference at lunch today.    Assessment:  Height: 185.4 cm (6' 1\")  Weight: 77.6 kg (171 lb 1.2 oz)  Body mass index is 22.57 kg/m²., BMI classification: Normal  Diet/Intake: Regular    Evaluation:   Per chart review, weight on 1/6/24 = 82.8 kg. Pt with 6.2% weight loss in <1 month which is severe.  Recorded PO intake currently %.  Medications include Vit C, Folic Acid, Thiamine  No significant fat or muscle wasting noted.    Malnutrition Risk: Pt met ASPEN criteria for severe malnutrition in context of acute illness related to COVID followed by a gastrointestinal illness as evidenced by 6.2% weight loss in <1 month with reported inadequate intake <50% for >5 days. This is improving as pt now reports a good appetite and PO intake appears good.    Recommendations/Plan:   Encourage continued good intake of meals >50-75%.  Document intake of all meals as % taken in ADL's to provide interdisciplinary communication across all shifts.   Monitor weight.  Nutrition rep will continue to see patient for ongoing meal and snack preferences.   RD to monitor per department policy.        "

## 2024-01-23 NOTE — PROGRESS NOTES
Assumed care of patient at 1900. Received report from day RN. Patient A&Ox4, on RA, Reporting a pain level of 5/10, pt declining any interventions as he states this is a good pain level for him. RUE sling in place. Pt refusing bed alarm, educated on fall risk status and importance of bed alarm. Pt continued to refuse bed alarm. Charge notified. Call light within reach, belongings within reach, Fall precautions in place, bed in lowest position. Patient does not have any other needs at this time.

## 2024-01-23 NOTE — PROGRESS NOTES
[facility] Medicine Daily Progress Note    Date of Service  1/23/2024    Chief Complaint  Jose A Ponce is a 63 y.o. male admitted 1/21/2024 with generalized weakness    Hospital Course  MrLazaro Ponce is a 63 y.o. male with history of CAD s/p CABG 2017, history of DVT/PE on Xarelto, hypertension, recent admission for traumatic fall with right humerus fracture managed conservatively and discharged 1/15/2024 who presented 1/21/2024 with evaluation for generalized weakness.      Patient reported unable to ambulate, generalized weakness.  In ER, no revealing workup.  He does however have mild dehydration with anion gap metabolic acidosis and lactic acid 2.4.  He received 1 L IVF bolus in ER, admission requested by ERP for observation.  Admitted to medicine service.     Patient admitted as he continues to have generalized weakness and need for PT/OT.    Interval Problem Update  -Patient seen and examined.  Patient noted to have a right arm sling from his right humeral fracture.  Patient denies receiving any physical therapy for his existing fracture.  Patient has worked with physical therapy today and feels that he has somewhat improved.  Discussed with patient possibly getting home health versus outpatient physical therapy.  At this time, patient is very adamant about not having someone go to his home to provide therapy and would rather go as an outpatient.  -Plan of care: Continue to encourage patient increase in activity; work with physical therapy and need for evaluate for need postacute; no home health as patient does not want anyone going to his house; will need to order outpatient physical therapy  -Disposition: Anticipated stay overnight until noted improvement, await PT/OT recommendations post acute  -Lab work: Reviewed; expected  -VSS at this time    1/23 with right arm sling, reports improved pain control  Still unable to sleep, tolerating oral intake  Patient refusing home health referral,  requesting shower chair  Pain controlled    I have discussed this patient's plan of care and discharge plan at IDT rounds today with Case Management, Nursing, Nursing leadership, and other members of the IDT team.    Consultants/Specialty  NONE    Code Status  Full Code    Disposition  The patient is not medically cleared for discharge to home or a post-acute facility.      I have placed the appropriate orders for post-discharge needs.    Review of Systems  Review of Systems   Constitutional:  Negative for chills, fever and malaise/fatigue.   HENT: Negative.     Eyes: Negative.    Respiratory: Negative.     Cardiovascular: Negative.  Negative for chest pain and palpitations.   Gastrointestinal: Negative.  Negative for nausea and vomiting.   Genitourinary: Negative.    Musculoskeletal:  Positive for joint pain and myalgias.   Skin: Negative.    Neurological:  Positive for weakness. Negative for dizziness and headaches.   Endo/Heme/Allergies: Negative.    Psychiatric/Behavioral: Negative.  Negative for depression. The patient is not nervous/anxious.         Physical Exam  Temp:  [36.3 °C (97.3 °F)-36.8 °C (98.2 °F)] 36.3 °C (97.3 °F)  Pulse:  [77-93] 77  Resp:  [18-20] 20  BP: (125-139)/(75-89) 136/85  SpO2:  [97 %-100 %] 97 %    Physical Exam  Vitals and nursing note reviewed.   Constitutional:       General: He is not in acute distress.     Appearance: He is not ill-appearing or diaphoretic.   HENT:      Head: Normocephalic.      Nose: Nose normal.      Mouth/Throat:      Mouth: Mucous membranes are moist.      Pharynx: Oropharynx is clear.   Eyes:      Pupils: Pupils are equal, round, and reactive to light.   Cardiovascular:      Rate and Rhythm: Normal rate and regular rhythm.      Pulses: Normal pulses.      Heart sounds: Normal heart sounds.   Pulmonary:      Effort: Pulmonary effort is normal. No respiratory distress.      Breath sounds: Normal breath sounds. No wheezing.   Abdominal:      General: Bowel sounds  are normal. There is no distension.      Palpations: Abdomen is soft.      Tenderness: There is no abdominal tenderness.   Musculoskeletal:         General: Tenderness present. No swelling.      Cervical back: Normal range of motion and neck supple.   Skin:     General: Skin is dry.      Capillary Refill: Capillary refill takes 2 to 3 seconds.      Coloration: Skin is not jaundiced or pale.   Neurological:      Mental Status: He is alert. Mental status is at baseline.      Cranial Nerves: No cranial nerve deficit.      Sensory: No sensory deficit.      Motor: Weakness present.      Coordination: Coordination normal.         Fluids    Intake/Output Summary (Last 24 hours) at 1/23/2024 1501  Last data filed at 1/23/2024 0540  Gross per 24 hour   Intake 240 ml   Output 1080 ml   Net -840 ml       Laboratory  Recent Labs     01/21/24  1455 01/22/24  0806   WBC 10.0 9.1   RBC 3.06* 2.89*   HEMOGLOBIN 10.8* 10.2*   HEMATOCRIT 32.0* 30.2*   .6* 104.5*   MCH 35.3* 35.3*   MCHC 33.8 33.8   RDW 48.2 47.5   PLATELETCT 545* 498*   MPV 9.0 9.5     Recent Labs     01/21/24  1455 01/22/24  0806   SODIUM 131* 131*   POTASSIUM 5.0 4.1   CHLORIDE 96 101   CO2 12* 17*   GLUCOSE 78 105*   BUN 17 19   CREATININE 1.20 1.04   CALCIUM 8.8 8.4*                   Imaging  DX-CHEST-PORTABLE (1 VIEW)   Final Result      1.  No acute cardiopulmonary abnormality identified.      2.  Right proximal humeral fracture and there is inferior subluxation at the glenohumeral joint           Assessment/Plan  * Generalized weakness- (present on admission)  Assessment & Plan  PT/OT    Insomnia  Assessment & Plan  Patient on trazodone, add melatonin as needed    Dehydration  Assessment & Plan  IVF  improved    History of pulmonary embolism- (present on admission)  Assessment & Plan  History of DVT and PE  Continue Xarelto    Closed fracture of neck of right humerus- (present on admission)  Assessment & Plan  Immobilizer in place  Evaluated by  orthopedic last admission, outpatient follow-up  Supportive pain control    1/23 pain control, OT evaluation  , PT recommending home health, patient does not want home health at this time  Improved pain control, limit narcotics  Shower chair ordered,  to assist    Lactic acidosis- (present on admission)  Assessment & Plan  Resolved with IVF    Tobacco abuse- (present on admission)  Assessment & Plan  Admits to smoking 1 pack of cigarettes daily  Smoking cessation counseling provided: 4 minutes  Offered nicotine patch, nicotine gum, Chantix as alternative.  Provided patient with standard tobacco cessation information per protocol    S/P CABG x 2- (present on admission)  Assessment & Plan  Per history  Xarelto, statin, BB    Dyslipidemia- (present on admission)  Assessment & Plan  Statin         VTE prophylaxis:   SCDs/TEDs      I have performed a physical exam and reviewed and updated ROS and Plan today (1/23/2024). In review of yesterday's note (1/22/2024), there are no changes except as documented above.      IMitch DNP performed a substantiated portion of the service face-to-face with same patient on the same date of service INDEPENDENTLY from the MD on assessment, examination and discussion in plan of care FOR 17 MINUTES.  I was personally involved in reviewing and conducting the medical decision making, including the information as described above.

## 2024-01-23 NOTE — WOUND TEAM
[facility]  Inpatient Services  Wound and Skin Care Brief Evaluation    Admission Date: 1/21/2024     Last order of IP CONSULT TO WOUND CARE was found on 1/22/2024 from Hospital Encounter on 1/21/2024     HPI, PMH, SH: Reviewed    Chief Complaint   Patient presents with    Extremity Weakness     BIB EMS from home, pt has been having general weakness especially bilateral LE. Legs have been giving out more frequently and pt has lowered self to ground. 1/6 had GLF w/ R shoulder injury, bruising noticed around R pectoral and bicep.      Diagnosis: Generalized weakness [R53.1]     Unit where seen by Wound Team: S530/02     Wound consult placed regarding Sacrum. Chart and images reviewed.. This clinician in to assess patient. Patient pleasant and agreeable. Sacrum and bilateral heels. Non-selectively debrided with Moist warm washcloth.     No pressure injuries or advanced wound care needs identified. Wound consult completed. No further follow up unless indicated and consulted.          PREVENTATIVE INTERVENTIONS:    Q shift Sammy - performed per nursing policy  Q shift pressure point assessments - performed per nursing policy    Surface/Positioning  Standard/trauma mattress - Currently in Place  Waffle overlay  - Currently in Place    Offloading/Redistribution  Heel offloading dressing (Silicone dressing) - Currently in Place      Mobilization      Ambulate

## 2024-01-24 ENCOUNTER — PHARMACY VISIT (OUTPATIENT)
Dept: PHARMACY | Facility: MEDICAL CENTER | Age: 64
End: 2024-01-24
Payer: COMMERCIAL

## 2024-01-24 VITALS
HEIGHT: 73 IN | SYSTOLIC BLOOD PRESSURE: 138 MMHG | OXYGEN SATURATION: 95 % | DIASTOLIC BLOOD PRESSURE: 78 MMHG | BODY MASS INDEX: 22.67 KG/M2 | TEMPERATURE: 97.4 F | RESPIRATION RATE: 16 BRPM | HEART RATE: 74 BPM | WEIGHT: 171.08 LBS

## 2024-01-24 PROCEDURE — RXMED WILLOW AMBULATORY MEDICATION CHARGE: Performed by: INTERNAL MEDICINE

## 2024-01-24 PROCEDURE — G0378 HOSPITAL OBSERVATION PER HR: HCPCS

## 2024-01-24 PROCEDURE — 700102 HCHG RX REV CODE 250 W/ 637 OVERRIDE(OP): Performed by: STUDENT IN AN ORGANIZED HEALTH CARE EDUCATION/TRAINING PROGRAM

## 2024-01-24 PROCEDURE — 700102 HCHG RX REV CODE 250 W/ 637 OVERRIDE(OP): Performed by: INTERNAL MEDICINE

## 2024-01-24 PROCEDURE — A9270 NON-COVERED ITEM OR SERVICE: HCPCS | Performed by: INTERNAL MEDICINE

## 2024-01-24 PROCEDURE — A9270 NON-COVERED ITEM OR SERVICE: HCPCS | Performed by: STUDENT IN AN ORGANIZED HEALTH CARE EDUCATION/TRAINING PROGRAM

## 2024-01-24 PROCEDURE — 99239 HOSP IP/OBS DSCHRG MGMT >30: CPT | Performed by: INTERNAL MEDICINE

## 2024-01-24 RX ORDER — OXYCODONE HYDROCHLORIDE 5 MG/1
5 TABLET ORAL
Qty: 20 TABLET | Refills: 0 | Status: SHIPPED | OUTPATIENT
Start: 2024-01-24 | End: 2024-01-28

## 2024-01-24 RX ORDER — AMOXICILLIN 250 MG
2 CAPSULE ORAL 2 TIMES DAILY
Qty: 30 TABLET | Refills: 0 | Status: SHIPPED | OUTPATIENT
Start: 2024-01-24 | End: 2024-02-15

## 2024-01-24 RX ADMIN — OXYCODONE HYDROCHLORIDE 10 MG: 10 TABLET ORAL at 05:50

## 2024-01-24 RX ADMIN — OXYCODONE HYDROCHLORIDE AND ACETAMINOPHEN 500 MG: 500 TABLET ORAL at 05:49

## 2024-01-24 RX ADMIN — OXYCODONE HYDROCHLORIDE 10 MG: 10 TABLET ORAL at 09:51

## 2024-01-24 RX ADMIN — METOPROLOL TARTRATE 50 MG: 50 TABLET, FILM COATED ORAL at 05:49

## 2024-01-24 RX ADMIN — NICOTINE TRANSDERMAL SYSTEM 21 MG: 21 PATCH, EXTENDED RELEASE TRANSDERMAL at 05:50

## 2024-01-24 RX ADMIN — ACETAMINOPHEN 650 MG: 325 TABLET, FILM COATED ORAL at 00:19

## 2024-01-24 RX ADMIN — FERROUS SULFATE TAB 325 MG (65 MG ELEMENTAL FE) 325 MG: 325 (65 FE) TAB at 09:51

## 2024-01-24 RX ADMIN — Medication 100 MG: at 05:49

## 2024-01-24 RX ADMIN — FOLIC ACID 1 MG: 1 TABLET ORAL at 05:49

## 2024-01-24 RX ADMIN — ACETAMINOPHEN 650 MG: 325 TABLET, FILM COATED ORAL at 05:50

## 2024-01-24 ASSESSMENT — PAIN DESCRIPTION - PAIN TYPE
TYPE: ACUTE PAIN
TYPE: ACUTE PAIN

## 2024-01-24 NOTE — THERAPY
Occupational Therapy   Initial Evaluation     Patient Name: Jose A Ponce  Age:  63 y.o., Sex:  male  Medical Record #: [id number]  Today's Date: 1/23/2024    Precautions: Non Weight Bearing Right Upper Extremity  Comments: sling    Assessment    Patient is 63 y.o. male admitted with generalized weakness, recent R humerus fx in sling. Pt provided extensive education regarding compensatory strategies for ADLs, splint adjustment/fit, DME acquisition via [facility] and collaborated with / to ensure pt gets his FMLA paperwork filed which was causing him substantial anxiety. Recommend DC home with home health once medically cleared.      Plan    Occupational Therapy Initial Treatment Plan   Duration: Discharge Needs Only    DC Equipment Recommendations: Tub / Shower Seat (provided [facility] contact info)  Discharge Recommendations: Recommend home health for continued occupational therapy services     Objective     01/23/24 1614   Prior Living Situation   Prior Services Home-Independent   Housing / Facility 1 Story Apartment / Condo   Steps Into Home 0   Steps In Home 0   Bathroom Set up Bathtub / Shower Combination;Grab Bars   Equipment Owned Single Point Cane   Lives with - Patient's Self Care Capacity Alone and Able to Care For Self   Comments pt lives alone, no local social support, states his girlfriend passed away but did not clarify when   Prior Level of ADL Function   Self Feeding Independent   Grooming / Hygiene Independent   Bathing Independent   Dressing Independent   Toileting Independent   Prior Level of IADL Function   Medication Management Independent   Laundry Independent   Kitchen Mobility Independent   Finances Independent   Home Management Independent   Shopping Independent   Prior Level Of Mobility Independent Without Device in Community   Driving / Transportation Driving Independent   Occupation (Pre-Hospital Vocational) Employed Full Time   Precautions   Precautions Non  Weight Bearing Right Upper Extremity   Comments sling   Pain 0 - 10 Group   Therapist Pain Assessment Post Activity Pain Same as Prior to Activity;0;Nurse Notified  (no c/o increased pain)   Cognition    Cognition / Consciousness WDL   Level of Consciousness Alert   Comments pleasant, motivated, self-reports some anxiety regarding injury and fear of falling again   Active ROM Upper Body   Active ROM Upper Body  X   Dominant Hand Right   Comments LUE WFL, RUE not tested   Coordination Upper Body   Coordination WDL   Balance Assessment   Sitting Balance (Static) Fair   Sitting Balance (Dynamic) Fair   Standing Balance (Static) Fair   Standing Balance (Dynamic) Fair   Weight Shift Sitting Good   Weight Shift Standing Fair   Comments no AD   Bed Mobility    Supine to Sit Supervised   Sit to Supine Supervised   Scooting Supervised   ADL Assessment   Eating Modified Independent   Grooming Supervision;Standing   Upper Body Dressing Minimal Assist   Lower Body Dressing Supervision   Toileting Supervision   Comments reviewed UB dressing and adjusted sling, pt receptive and able to return-demonstrate don/doff   How much help from another person does the patient currently need...   Putting on and taking off regular lower body clothing? 4   Bathing (including washing, rinsing, and drying)? 3   Toileting, which includes using a toilet, bedpan, or urinal? 4   Putting on and taking off regular upper body clothing? 4   Taking care of personal grooming such as brushing teeth? 4   Eating meals? 4   6 Clicks Daily Activity Score 23   Functional Mobility   Sit to Stand Supervised   Bed, Chair, Wheelchair Transfer Supervised   Toilet Transfers Supervised   Mobility no AD   Activity Tolerance   Comments functional for self-care ADLs   Education Group   Education Provided Upper Extremity Range of Motion;Upper Extremity Strengthening;Home Safety;Transfers;Role of Occupational Therapist;Activities of Daily Living;Adaptive Equipment;Weight  Bearing Precautions;Splints Wear and Care   Role of Occupational Therapist Patient Response Patient;Acceptance;Explanation;Verbal Demonstration   Upper Ext ROM Patient Response Patient;Acceptance;Explanation;Verbal Demonstration   UE Strengthening Patient Response Patient;Acceptance;Explanation;Verbal Demonstration   Splints Wear & Care Patient Response Patient;Acceptance;Explanation;Verbal Demonstration   Home Safety Patient Response Patient;Acceptance;Explanation;Verbal Demonstration   Transfers Patient Response Patient;Acceptance;Explanation;Verbal Demonstration   ADL Patient Response Patient;Acceptance;Explanation;Verbal Demonstration   Adaptive Equipment Patient Response Patient;Acceptance;Explanation;Verbal Demonstration   Weight Bearing Precautions Patient Response Patient;Acceptance;Explanation;Verbal Demonstration   Occupational Therapy Initial Treatment Plan    Duration Discharge Needs Only   Problem List   Problem List None   Anticipated Discharge Equipment and Recommendations   DC Equipment Recommendations Tub / Shower Seat  (provided [facility] contact info)   Discharge Recommendations Recommend home health for continued occupational therapy services

## 2024-01-24 NOTE — DISCHARGE PLANNING
3153 RN SHIN provided pt with Lori Villalobos [facility]'s fax number per his request. Pt needs employer to fax FMLA paperwork to PCP. Pt notified that insurance doesn't cover shower chair. He states he will buy shower chair at [facility]. Chart reviewed. No needs anticipated by RNCM at this time.

## 2024-01-24 NOTE — CARE PLAN
The patient is Stable - Low risk of patient condition declining or worsening    Shift Goals  Clinical Goals: pts pain will be controlled down to pts pain goal of 5/10 throughout shift  Patient Goals: pain control  Family Goals: ARELIS    Progress made toward(s) clinical / shift goals:      Patient is not progressing towards the following goals:

## 2024-01-24 NOTE — DISCHARGE SUMMARY
Discharge Summary    CHIEF COMPLAINT ON ADMISSION  Chief Complaint   Patient presents with    Extremity Weakness     BIB EMS from home, pt has been having general weakness especially bilateral LE. Legs have been giving out more frequently and pt has lowered self to ground. 1/6 had GLF w/ R shoulder injury, bruising noticed around R pectoral and bicep.        Reason for Admission  EMS     Admission Date  1/21/2024    CODE STATUS  Full Code    HPI & HOSPITAL COURSE    Mr. Jose A Ponce is a 63 y.o. male with history of CAD s/p CABG 2017, history of DVT/PE on Xarelto, hypertension, recent admission for traumatic fall with right humerus fracture managed conservatively and discharged 1/15/2024 who presented 1/21/2024 with evaluation for generalized weakness.      Patient reported unable to ambulate, generalized weakness.  In ER, no revealing workup.  He does however have mild dehydration with anion gap metabolic acidosis and lactic acid 2.4.  He received 1 L IVF bolus in ER, admission requested by ERP for observation.  Admitted to medicine service.     Patient admitted as he continues to have generalized weakness and need for PT/OT.  Patient has been seen by therapy with recommendation for home health.  Patient reports improving strength and activity tolerance.  At this point he is refusing home health assistance.  Patient is requesting shower chair and plans to purchase at local pharmacy.    On admission, patient was noted to be mildly dehydrated and was started on IV fluids.  Patient reports feeling better.  Given improved pain control and activity tolerance, patient will discharge to home to self.        Therefore, he is discharged in good and stable condition to home with close outpatient follow-up.    The patient met 2-midnight criteria for an inpatient stay at the time of discharge.    Discharge Date  1/24/2024    FOLLOW UP ITEMS POST DISCHARGE  Pcp in 1 week    DISCHARGE DIAGNOSES  Principal Problem:     Generalized weakness (POA: Yes)  Active Problems:    Dyslipidemia (POA: Yes)      Overview: States was on statin in the past but not currently;       TC/LDL elevated on recent labs and we will restart atorvastatin today       Also with hx hypertension, daily smoker, PE/DVT on daily xarelto    S/P CABG x 2 (POA: Yes)      Overview: June 2017: CABG x 2 (LIMA to distal LAD, rSVG to distal diagonal) by Dr. Barboza.    Tobacco abuse (POA: Yes)    Lactic acidosis (POA: Yes)    Closed fracture of neck of right humerus (POA: Yes)    History of pulmonary embolism (POA: Yes)    Dehydration (POA: Unknown)    Acquired circulating anticoagulants (HCC) (POA: Yes)    Insomnia (POA: Unknown)  Resolved Problems:    * No resolved hospital problems. *      FOLLOW UP  No future appointments.  Llait Barry M.D.  [address]  [address]  [facility] [id number] [phone]    Schedule an appointment as soon as possible for a visit  Follow up in 1-2 weeks    Lori Villalobos D.N.P.  [address] [id number] [phone]    Schedule an appointment as soon as possible for a visit        MEDICATIONS ON DISCHARGE     Medication List        CHANGE how you take these medications        Instructions   oxyCODONE immediate-release 5 MG Tabs  What changed:   medication strength  how much to take  when to take this  Commonly known as: Roxicodone   Take 1 Tablet by mouth every 3 hours as needed for Severe Pain for up to 3 days.  Dose: 5 mg     * Stimulant Laxative 8.6-50 MG Tabs  What changed: Another medication with the same name was added. Make sure you understand how and when to take each.  Generic drug: senna-docusate   Take 2 Tablets by mouth 2 times a day.  Dose: 2 Tablet     * senna-docusate 8.6-50 MG Tabs  What changed: You were already taking a medication with the same name, and this prescription was added. Make sure you understand how and when to take each.  Commonly known as: Pericolace Or Senokot S   Take 2  Tablets by mouth 2 times a day.  Dose: 2 Tablet           * This list has 2 medication(s) that are the same as other medications prescribed for you. Read the directions carefully, and ask your doctor or other care provider to review them with you.                CONTINUE taking these medications        Instructions   Acetaminophen Extra Strength 500 MG Tabs   Take 2 Tablets by mouth every 8 hours.  Dose: 1,000 mg     ascorbic acid 500 MG Tabs  Commonly known as: Ascorbic Acid   Take 1 Tablet by mouth every day.  Dose: 500 mg     atorvastatin 10 MG Tabs  Commonly known as: Lipitor   Take 1 Tablet by mouth every evening.  Dose: 10 mg     Centrum Silver 50+Men Tabs   Take 1 Tablet by mouth every evening.  Dose: 1 Tablet     FeroSul 325 (65 Fe) MG tablet  Generic drug: ferrous sulfate   Take 1 Tablet by mouth every morning with breakfast.  Dose: 325 mg     folic acid 1 MG Tabs  Commonly known as: Folvite   Take 1 Tablet by mouth every day.  Dose: 1 mg     Lidocaine Pain Relief 4 % Ptch  Generic drug: lidocaine   Place 1 Patch on the skin every 24 hours.  Dose: 1 Patch     metoprolol tartrate 50 MG Tabs  Commonly known as: Lopressor   Take 1 Tablet by mouth 2 times a day.  Dose: 50 mg     nicotine 21 MG/24HR Pt24  Commonly known as: Nicoderm   Place 1 Patch on the skin every 24 hours.  Dose: 1 Patch     rivaroxaban 20 MG Tabs tablet  Commonly known as: Xarelto   Doctor's comments: This rx was submitted by a pharmacist working under a collaborative practice agreement.  Take 1 Tablet by mouth with dinner.  Dose: 20 mg     spironolactone 25 MG Tabs  Commonly known as: Aldactone   Take 1 Tablet by mouth every day.  Dose: 25 mg     thiamine 100 MG tablet  Commonly known as: Thiamine   Take 1 Tablet by mouth every day.  Dose: 100 mg              Allergies  No Known Allergies    DIET  Orders Placed This Encounter   Procedures    Diet Order Diet: Regular     Standing Status:   Standing     Number of Occurrences:   1     Order  Specific Question:   Diet:     Answer:   Regular [1]       ACTIVITY  As tolerated.  Weight bearing as tolerated    CONSULTATIONS  none    PROCEDURES  none    LABORATORY  Lab Results   Component Value Date    SODIUM 131 (L) 01/22/2024    POTASSIUM 4.1 01/22/2024    CHLORIDE 101 01/22/2024    CO2 17 (L) 01/22/2024    GLUCOSE 105 (H) 01/22/2024    BUN 19 01/22/2024    CREATININE 1.04 01/22/2024        Lab Results   Component Value Date    WBC 9.1 01/22/2024    HEMOGLOBIN 10.2 (L) 01/22/2024    HEMATOCRIT 30.2 (L) 01/22/2024    PLATELETCT 498 (H) 01/22/2024        Total time of the discharge process exceeds 42 minutes.

## 2024-01-24 NOTE — PROGRESS NOTES
Assumed care of patient at 1900. Received report from day RN. Patient A&Ox4, on RA, Reporting a pain level of 8/10 in R shoulder, pt medicated as per MAR. Call light within reach, belongings within reach, Fall precautions in place, bed in lowest position. Pt refusing bed alarm, education provided, pt continued to refuse bed alarm, charge notified. Patient does not have any other needs at this time.

## 2024-01-24 NOTE — CARE PLAN
Problem: Knowledge Deficit - Standard  Goal: Patient and family/care givers will demonstrate understanding of plan of care, disease process/condition, diagnostic tests and medications  Outcome: Progressing   The patient is Stable - Low risk of patient condition declining or worsening    Shift Goals  Clinical Goals: discharge  Patient Goals: discharge  Family Goals: ARELIS    Progress made toward(s) clinical / shift goals:  Patient has discharge orders in place.    Patient is not progressing towards the following goals:

## 2024-01-26 LAB
BACTERIA BLD CULT: NORMAL
BACTERIA BLD CULT: NORMAL
SIGNIFICANT IND 70042: NORMAL
SIGNIFICANT IND 70042: NORMAL
SITE SITE: NORMAL
SITE SITE: NORMAL
SOURCE SOURCE: NORMAL
SOURCE SOURCE: NORMAL

## 2024-01-31 DIAGNOSIS — I82.411 ACUTE DEEP VEIN THROMBOSIS (DVT) OF FEMORAL VEIN OF RIGHT LOWER EXTREMITY (HCC): ICD-10-CM

## 2024-02-09 ENCOUNTER — TELEPHONE (OUTPATIENT)
Dept: VASCULAR LAB | Facility: MEDICAL CENTER | Age: 64
End: 2024-02-09
Payer: COMMERCIAL

## 2024-02-09 NOTE — TELEPHONE ENCOUNTER
Called pt regarding missed anticoag f/u appt - no answer. LVM asking pt to c/b to reschedule.     Will f/u at a later time.    Kash Arteaga, NavinD, BCACP

## 2024-02-15 ENCOUNTER — HOSPITAL ENCOUNTER (OUTPATIENT)
Dept: RADIOLOGY | Facility: MEDICAL CENTER | Age: 64
End: 2024-02-15
Attending: NURSE PRACTITIONER
Payer: COMMERCIAL

## 2024-02-15 ENCOUNTER — OFFICE VISIT (OUTPATIENT)
Dept: MEDICAL GROUP | Facility: PHYSICIAN GROUP | Age: 64
End: 2024-02-15
Payer: COMMERCIAL

## 2024-02-15 VITALS
TEMPERATURE: 98.7 F | WEIGHT: 172 LBS | RESPIRATION RATE: 18 BRPM | OXYGEN SATURATION: 96 % | HEART RATE: 100 BPM | BODY MASS INDEX: 22.07 KG/M2 | SYSTOLIC BLOOD PRESSURE: 142 MMHG | HEIGHT: 74 IN | DIASTOLIC BLOOD PRESSURE: 80 MMHG

## 2024-02-15 DIAGNOSIS — S42.211A CLOSED DISPLACED FRACTURE OF SURGICAL NECK OF RIGHT HUMERUS, UNSPECIFIED FRACTURE MORPHOLOGY, INITIAL ENCOUNTER: ICD-10-CM

## 2024-02-15 DIAGNOSIS — I10 ESSENTIAL HYPERTENSION: Chronic | ICD-10-CM

## 2024-02-15 DIAGNOSIS — Z09 HOSPITAL DISCHARGE FOLLOW-UP: Primary | ICD-10-CM

## 2024-02-15 DIAGNOSIS — Z02.89 ENCOUNTER FOR COMPLETION OF FORM WITH PATIENT: ICD-10-CM

## 2024-02-15 PROCEDURE — 3077F SYST BP >= 140 MM HG: CPT | Performed by: NURSE PRACTITIONER

## 2024-02-15 PROCEDURE — 3079F DIAST BP 80-89 MM HG: CPT | Performed by: NURSE PRACTITIONER

## 2024-02-15 PROCEDURE — 73030 X-RAY EXAM OF SHOULDER: CPT | Mod: RT

## 2024-02-15 PROCEDURE — 99213 OFFICE O/P EST LOW 20 MIN: CPT | Performed by: NURSE PRACTITIONER

## 2024-02-15 RX ORDER — METOPROLOL TARTRATE 50 MG/1
50 TABLET, FILM COATED ORAL 2 TIMES DAILY
Qty: 180 TABLET | Refills: 3 | Status: SHIPPED | OUTPATIENT
Start: 2024-02-15

## 2024-02-15 ASSESSMENT — ENCOUNTER SYMPTOMS
PALPITATIONS: 0
COUGH: 0
FEVER: 0
MUSCULOSKELETAL NEGATIVE: 1
CONSTITUTIONAL NEGATIVE: 1
EYES NEGATIVE: 1
SHORTNESS OF BREATH: 0
GASTROINTESTINAL NEGATIVE: 1
SPUTUM PRODUCTION: 0
NEUROLOGICAL NEGATIVE: 1

## 2024-02-15 ASSESSMENT — FIBROSIS 4 INDEX: FIB4 SCORE: 1.29

## 2024-02-15 NOTE — LETTER
February 15, 2024    To Whom It May Concern:         This is confirmation that Jose A Ponce attended his scheduled appointment with Lori Villalobos D.N.P. on 2/15/24. He may return to work without restrictions as of 2/16/2024.          If you have any questions please do not hesitate to call me at the phone number listed below.    Sincerely,          Lori Villalobos D.N.P.  [phone]

## 2024-02-15 NOTE — PROGRESS NOTES
Subjective:     Jose A Ponce is a 63 y.o. male who presents for Hospital Follow-up.    Transitional Care Management     HPI:   Recently hospitalized on 1/6/2024 for ground level fall, sustaining nondisplaced right humeral neck fraction.  Dr. Barry, orthopedic surgery was consulted and recommended non operative management with nonweightbearing right upper extremity in sling. He was noncompliant with arm sling while in hospital, repeat xray showed displacement of fracture site and his arm sling was placed properly by orthopedic technician. Pain improved and arm swelling reduced so he was discharged to home on 1/15/2024. He was to follow up with ortho in 2 weeks but did schedule this.     He presented to ER again on 1/21/2024 with complaints of generalized weakness. He was found to be mildly dehydrated with with anion gap metabolic acidosis and lactic acid 2.4.  He received 1 L IVF bolus in ER, and admitted for observation. He was evaluated by PT/OT while in hospital and recommended to start home health. Patient reported improvement in his stability and declined home assistance. He was discharged home on 1/24/2024. While he was in hospital, states his blood pressure was fluctuating on Losartan so he was switched to Metoprolol 25mg BID which he prefers because his readings remained stable with this. He would like to continue on Metoprolol for hypertension management.     He has been out of work since 1/6/2024. States arm pain has resolved and he would like to go back to work tomorrow.     Current medicines (including reconciliation performed today)  Current Outpatient Medications   Medication Sig Dispense Refill    metoprolol tartrate (LOPRESSOR) 50 MG Tab Take 1 Tablet by mouth 2 times a day. 180 Tablet 3    lidocaine (ASPERFLEX) 4 % Patch Place 1 Patch on the skin every 24 hours. 10 Patch 0    acetaminophen (TYLENOL) 500 MG Tab Take 2 Tablets by mouth every 8 hours. 100 Tablet 0    ascorbic acid (VITAMIN C)  "500 MG tablet Take 1 Tablet by mouth every day. 30 Tablet 0    folic acid (FOLVITE) 1 MG Tab Take 1 Tablet by mouth every day. 30 Tablet 0    ferrous sulfate 325 (65 Fe) MG tablet Take 1 Tablet by mouth every morning with breakfast. 30 Tablet 0    rivaroxaban (XARELTO) 20 MG Tab tablet Take 1 Tablet by mouth with dinner. 90 Tablet 1    atorvastatin (LIPITOR) 10 MG Tab Take 1 Tablet by mouth every evening. 90 Tablet 3     No current facility-administered medications for this visit.       Allergies:   Patient has no known allergies.    Social History     Tobacco Use    Smoking status: Every Day     Current packs/day: 1.00     Average packs/day: 1 pack/day for 44.1 years (44.1 ttl pk-yrs)     Types: Cigarettes     Start date: 1/1/1980    Smokeless tobacco: Never    Tobacco comments:     vape occ   Vaping Use    Vaping Use: Never used   Substance Use Topics    Alcohol use: Yes     Comment: 2-3 drinks 5 days a week    Drug use: Yes     Comment: THC       ROS:  Review of Systems   Constitutional: Negative.  Negative for fever and malaise/fatigue.   Eyes: Negative.    Respiratory:  Negative for cough, sputum production and shortness of breath.    Cardiovascular:  Negative for chest pain, palpitations and leg swelling.   Gastrointestinal: Negative.    Genitourinary: Negative.    Musculoskeletal: Negative.    Neurological: Negative.    Endo/Heme/Allergies: Negative.          Objective:     Vitals:    02/15/24 1310   BP: (!) 142/80   Pulse: 100   Resp: 18   Temp: 37.1 °C (98.7 °F)   TempSrc: Temporal   SpO2: 96%   Weight: 78 kg (172 lb)   Height: 1.867 m (6' 1.5\")     Body mass index is 22.38 kg/m².    Physical Exam  Constitutional:       Appearance: Normal appearance.   Cardiovascular:      Rate and Rhythm: Normal rate and regular rhythm.      Pulses: Normal pulses.      Heart sounds: Normal heart sounds.   Pulmonary:      Effort: Pulmonary effort is normal.      Breath sounds: Normal breath sounds.   Musculoskeletal:        "  General: Normal range of motion.      Right lower leg: No edema.      Left lower leg: No edema.   Skin:     General: Skin is warm and dry.   Neurological:      Mental Status: He is alert.         Assessment and Plan:   1. Hospital discharge follow-up  2. Closed displaced fracture of surgical neck of right humerus, unspecified fracture morphology, initial encounter  Repeat xray today showed Inferior subluxation of the right humeral head relative to glenoid. Comminuted right humeral neck fracture with deformity. This appears healing with some new callus formation since prior. There is impaction and mild displacement. Patient does not wish to follow up with ortho at this time, denies pain in right arm. No swelling or bruising noted on exam. Work forms completed to return to work tomorrow per his request.     - DX-SHOULDER 2+ RIGHT; Future    3. Essential hypertension  D/c Losartan; continue with Metoprolol 50mg BID (refilled today)    - metoprolol tartrate (LOPRESSOR) 50 MG Tab; Take 1 Tablet by mouth 2 times a day.  Dispense: 180 Tablet; Refill: 3    - Chart and discharge summary were reviewed.   - Hospitalization and results reviewed with patient.   - Medications reviewed including instructions regarding high risk medications, dosing and side effects.  - Recommended Services: No services needed at this time  - Advance directive/POLST on file?  No     Follow-up:Return in about 4 weeks (around 3/14/2024) for HTN.

## 2024-02-18 ENCOUNTER — APPOINTMENT (OUTPATIENT)
Dept: RADIOLOGY | Facility: MEDICAL CENTER | Age: 64
DRG: 641 | End: 2024-02-18
Attending: EMERGENCY MEDICINE
Payer: COMMERCIAL

## 2024-02-18 ENCOUNTER — APPOINTMENT (OUTPATIENT)
Dept: RADIOLOGY | Facility: MEDICAL CENTER | Age: 64
DRG: 641 | End: 2024-02-18
Attending: STUDENT IN AN ORGANIZED HEALTH CARE EDUCATION/TRAINING PROGRAM
Payer: COMMERCIAL

## 2024-02-18 ENCOUNTER — HOSPITAL ENCOUNTER (INPATIENT)
Facility: MEDICAL CENTER | Age: 64
LOS: 2 days | DRG: 641 | End: 2024-02-20
Attending: EMERGENCY MEDICINE | Admitting: STUDENT IN AN ORGANIZED HEALTH CARE EDUCATION/TRAINING PROGRAM
Payer: COMMERCIAL

## 2024-02-18 DIAGNOSIS — Z79.01 CHRONIC ANTICOAGULATION: ICD-10-CM

## 2024-02-18 DIAGNOSIS — S42.201G: ICD-10-CM

## 2024-02-18 DIAGNOSIS — I95.9 HYPOTENSION, UNSPECIFIED HYPOTENSION TYPE: ICD-10-CM

## 2024-02-18 DIAGNOSIS — F10.90 CHRONIC ALCOHOL USE: ICD-10-CM

## 2024-02-18 DIAGNOSIS — E87.1 HYPONATREMIA: ICD-10-CM

## 2024-02-18 DIAGNOSIS — R94.31 ABNORMAL EKG: ICD-10-CM

## 2024-02-18 DIAGNOSIS — R55 SYNCOPE, UNSPECIFIED SYNCOPE TYPE: ICD-10-CM

## 2024-02-18 DIAGNOSIS — R79.89 ELEVATED TROPONIN: ICD-10-CM

## 2024-02-18 DIAGNOSIS — N17.9 ACUTE KIDNEY INJURY (HCC): ICD-10-CM

## 2024-02-18 DIAGNOSIS — D68.318 ACQUIRED CIRCULATING ANTICOAGULANTS (HCC): ICD-10-CM

## 2024-02-18 LAB
ALBUMIN SERPL BCP-MCNC: 3.6 G/DL (ref 3.2–4.9)
ALBUMIN/GLOB SERPL: 1.4 G/DL
ALP SERPL-CCNC: 103 U/L (ref 30–99)
ALT SERPL-CCNC: 21 U/L (ref 2–50)
AMPHET UR QL SCN: NEGATIVE
ANION GAP SERPL CALC-SCNC: 15 MMOL/L (ref 7–16)
ANION GAP SERPL CALC-SCNC: 19 MMOL/L (ref 7–16)
APTT PPP: 38.5 SEC (ref 24.7–36)
AST SERPL-CCNC: 39 U/L (ref 12–45)
B-OH-BUTYR SERPL-MCNC: 0.74 MMOL/L (ref 0.02–0.27)
BARBITURATES UR QL SCN: NEGATIVE
BASOPHILS # BLD AUTO: 0.7 % (ref 0–1.8)
BASOPHILS # BLD: 0.06 K/UL (ref 0–0.12)
BENZODIAZ UR QL SCN: NEGATIVE
BILIRUB SERPL-MCNC: 0.6 MG/DL (ref 0.1–1.5)
BUN SERPL-MCNC: 22 MG/DL (ref 8–22)
BUN SERPL-MCNC: 23 MG/DL (ref 8–22)
BZE UR QL SCN: NEGATIVE
CALCIUM ALBUM COR SERPL-MCNC: 8.4 MG/DL (ref 8.5–10.5)
CALCIUM SERPL-MCNC: 8.1 MG/DL (ref 8.5–10.5)
CALCIUM SERPL-MCNC: 8.1 MG/DL (ref 8.5–10.5)
CANNABINOIDS UR QL SCN: NEGATIVE
CHLORIDE SERPL-SCNC: 95 MMOL/L (ref 96–112)
CHLORIDE SERPL-SCNC: 97 MMOL/L (ref 96–112)
CO2 SERPL-SCNC: 12 MMOL/L (ref 20–33)
CO2 SERPL-SCNC: 15 MMOL/L (ref 20–33)
CREAT SERPL-MCNC: 1.64 MG/DL (ref 0.5–1.4)
CREAT SERPL-MCNC: 1.77 MG/DL (ref 0.5–1.4)
EKG IMPRESSION: NORMAL
EOSINOPHIL # BLD AUTO: 0.04 K/UL (ref 0–0.51)
EOSINOPHIL NFR BLD: 0.5 % (ref 0–6.9)
ERYTHROCYTE [DISTWIDTH] IN BLOOD BY AUTOMATED COUNT: 46.9 FL (ref 35.9–50)
FENTANYL UR QL: POSITIVE
FERRITIN SERPL-MCNC: 437 NG/ML (ref 22–322)
GFR SERPLBLD CREATININE-BSD FMLA CKD-EPI: 43 ML/MIN/1.73 M 2
GFR SERPLBLD CREATININE-BSD FMLA CKD-EPI: 47 ML/MIN/1.73 M 2
GLOBULIN SER CALC-MCNC: 2.6 G/DL (ref 1.9–3.5)
GLUCOSE SERPL-MCNC: 193 MG/DL (ref 65–99)
GLUCOSE SERPL-MCNC: 87 MG/DL (ref 65–99)
HCT VFR BLD AUTO: 31.6 % (ref 42–52)
HGB BLD-MCNC: 11.4 G/DL (ref 14–18)
HGB RETIC QN AUTO: 37.9 PG/CELL (ref 29–35)
IMM GRANULOCYTES # BLD AUTO: 0.04 K/UL (ref 0–0.11)
IMM GRANULOCYTES NFR BLD AUTO: 0.5 % (ref 0–0.9)
IMM RETICS NFR: 13.3 % (ref 2.6–16.1)
INR PPP: 1.97 (ref 0.87–1.13)
IRON SATN MFR SERPL: 53 % (ref 15–55)
IRON SERPL-MCNC: 127 UG/DL (ref 50–180)
LACTATE SERPL-SCNC: 3 MMOL/L (ref 0.5–2)
LYMPHOCYTES # BLD AUTO: 2.17 K/UL (ref 1–4.8)
LYMPHOCYTES NFR BLD: 27 % (ref 22–41)
MAGNESIUM SERPL-MCNC: 1.8 MG/DL (ref 1.5–2.5)
MCH RBC QN AUTO: 35.5 PG (ref 27–33)
MCHC RBC AUTO-ENTMCNC: 36.1 G/DL (ref 32.3–36.5)
MCV RBC AUTO: 98.4 FL (ref 81.4–97.8)
METHADONE UR QL SCN: NEGATIVE
MONOCYTES # BLD AUTO: 0.78 K/UL (ref 0–0.85)
MONOCYTES NFR BLD AUTO: 9.7 % (ref 0–13.4)
NEUTROPHILS # BLD AUTO: 4.94 K/UL (ref 1.82–7.42)
NEUTROPHILS NFR BLD: 61.6 % (ref 44–72)
NRBC # BLD AUTO: 0 K/UL
NRBC BLD-RTO: 0 /100 WBC (ref 0–0.2)
OPIATES UR QL SCN: NEGATIVE
OXYCODONE UR QL SCN: NEGATIVE
PCP UR QL SCN: NEGATIVE
PLATELET # BLD AUTO: 256 K/UL (ref 164–446)
PMV BLD AUTO: 9.1 FL (ref 9–12.9)
POTASSIUM SERPL-SCNC: 3.8 MMOL/L (ref 3.6–5.5)
POTASSIUM SERPL-SCNC: 3.9 MMOL/L (ref 3.6–5.5)
PROPOXYPH UR QL SCN: NEGATIVE
PROT SERPL-MCNC: 6.2 G/DL (ref 6–8.2)
PROTHROMBIN TIME: 22.7 SEC (ref 12–14.6)
RBC # BLD AUTO: 3.21 M/UL (ref 4.7–6.1)
RETICS # AUTO: 0.1 M/UL (ref 0.04–0.12)
RETICS/RBC NFR: 3.1 % (ref 0.8–2.6)
SODIUM SERPL-SCNC: 126 MMOL/L (ref 135–145)
SODIUM SERPL-SCNC: 127 MMOL/L (ref 135–145)
TIBC SERPL-MCNC: 239 UG/DL (ref 250–450)
TROPONIN T SERPL-MCNC: 27 NG/L (ref 6–19)
TROPONIN T SERPL-MCNC: 28 NG/L (ref 6–19)
UIBC SERPL-MCNC: 112 UG/DL (ref 110–370)
WBC # BLD AUTO: 8 K/UL (ref 4.8–10.8)

## 2024-02-18 PROCEDURE — 85610 PROTHROMBIN TIME: CPT

## 2024-02-18 PROCEDURE — 36415 COLL VENOUS BLD VENIPUNCTURE: CPT

## 2024-02-18 PROCEDURE — 82728 ASSAY OF FERRITIN: CPT

## 2024-02-18 PROCEDURE — 85025 COMPLETE CBC W/AUTO DIFF WBC: CPT

## 2024-02-18 PROCEDURE — 700111 HCHG RX REV CODE 636 W/ 250 OVERRIDE (IP): Performed by: EMERGENCY MEDICINE

## 2024-02-18 PROCEDURE — 84425 ASSAY OF VITAMIN B-1: CPT

## 2024-02-18 PROCEDURE — 99223 1ST HOSP IP/OBS HIGH 75: CPT | Performed by: STUDENT IN AN ORGANIZED HEALTH CARE EDUCATION/TRAINING PROGRAM

## 2024-02-18 PROCEDURE — 93005 ELECTROCARDIOGRAM TRACING: CPT

## 2024-02-18 PROCEDURE — HZ2ZZZZ DETOXIFICATION SERVICES FOR SUBSTANCE ABUSE TREATMENT: ICD-10-PCS | Performed by: EMERGENCY MEDICINE

## 2024-02-18 PROCEDURE — 83605 ASSAY OF LACTIC ACID: CPT

## 2024-02-18 PROCEDURE — 85730 THROMBOPLASTIN TIME PARTIAL: CPT

## 2024-02-18 PROCEDURE — 83540 ASSAY OF IRON: CPT

## 2024-02-18 PROCEDURE — 700111 HCHG RX REV CODE 636 W/ 250 OVERRIDE (IP): Mod: JZ | Performed by: STUDENT IN AN ORGANIZED HEALTH CARE EDUCATION/TRAINING PROGRAM

## 2024-02-18 PROCEDURE — 99285 EMERGENCY DEPT VISIT HI MDM: CPT

## 2024-02-18 PROCEDURE — 80053 COMPREHEN METABOLIC PANEL: CPT

## 2024-02-18 PROCEDURE — 74018 RADEX ABDOMEN 1 VIEW: CPT

## 2024-02-18 PROCEDURE — 96374 THER/PROPH/DIAG INJ IV PUSH: CPT

## 2024-02-18 PROCEDURE — 93005 ELECTROCARDIOGRAM TRACING: CPT | Performed by: EMERGENCY MEDICINE

## 2024-02-18 PROCEDURE — 700105 HCHG RX REV CODE 258: Performed by: EMERGENCY MEDICINE

## 2024-02-18 PROCEDURE — 83735 ASSAY OF MAGNESIUM: CPT

## 2024-02-18 PROCEDURE — 770020 HCHG ROOM/CARE - TELE (206)

## 2024-02-18 PROCEDURE — 83550 IRON BINDING TEST: CPT

## 2024-02-18 PROCEDURE — 700105 HCHG RX REV CODE 258: Performed by: STUDENT IN AN ORGANIZED HEALTH CARE EDUCATION/TRAINING PROGRAM

## 2024-02-18 PROCEDURE — 84484 ASSAY OF TROPONIN QUANT: CPT

## 2024-02-18 PROCEDURE — 700101 HCHG RX REV CODE 250: Performed by: STUDENT IN AN ORGANIZED HEALTH CARE EDUCATION/TRAINING PROGRAM

## 2024-02-18 PROCEDURE — 700102 HCHG RX REV CODE 250 W/ 637 OVERRIDE(OP): Performed by: STUDENT IN AN ORGANIZED HEALTH CARE EDUCATION/TRAINING PROGRAM

## 2024-02-18 PROCEDURE — 73030 X-RAY EXAM OF SHOULDER: CPT | Mod: RT

## 2024-02-18 PROCEDURE — 71045 X-RAY EXAM CHEST 1 VIEW: CPT

## 2024-02-18 PROCEDURE — 80307 DRUG TEST PRSMV CHEM ANLYZR: CPT

## 2024-02-18 PROCEDURE — 82010 KETONE BODYS QUAN: CPT

## 2024-02-18 PROCEDURE — 85046 RETICYTE/HGB CONCENTRATE: CPT

## 2024-02-18 PROCEDURE — A9270 NON-COVERED ITEM OR SERVICE: HCPCS | Performed by: STUDENT IN AN ORGANIZED HEALTH CARE EDUCATION/TRAINING PROGRAM

## 2024-02-18 PROCEDURE — 80048 BASIC METABOLIC PNL TOTAL CA: CPT

## 2024-02-18 PROCEDURE — 94760 N-INVAS EAR/PLS OXIMETRY 1: CPT

## 2024-02-18 RX ORDER — ATORVASTATIN CALCIUM 10 MG/1
10 TABLET, FILM COATED ORAL NIGHTLY
Status: DISCONTINUED | OUTPATIENT
Start: 2024-02-18 | End: 2024-02-20 | Stop reason: HOSPADM

## 2024-02-18 RX ORDER — PROCHLORPERAZINE EDISYLATE 5 MG/ML
5-10 INJECTION INTRAMUSCULAR; INTRAVENOUS EVERY 4 HOURS PRN
Status: DISCONTINUED | OUTPATIENT
Start: 2024-02-18 | End: 2024-02-20 | Stop reason: HOSPADM

## 2024-02-18 RX ORDER — LORAZEPAM 2 MG/1
4 TABLET ORAL
Status: DISCONTINUED | OUTPATIENT
Start: 2024-02-18 | End: 2024-02-20 | Stop reason: HOSPADM

## 2024-02-18 RX ORDER — DIAZEPAM 5 MG/ML
10 INJECTION, SOLUTION INTRAMUSCULAR; INTRAVENOUS
Status: DISCONTINUED | OUTPATIENT
Start: 2024-02-18 | End: 2024-02-20 | Stop reason: HOSPADM

## 2024-02-18 RX ORDER — PROMETHAZINE HYDROCHLORIDE 25 MG/1
12.5-25 SUPPOSITORY RECTAL EVERY 4 HOURS PRN
Status: DISCONTINUED | OUTPATIENT
Start: 2024-02-18 | End: 2024-02-20 | Stop reason: HOSPADM

## 2024-02-18 RX ORDER — METOPROLOL TARTRATE 50 MG/1
50 TABLET, FILM COATED ORAL 2 TIMES DAILY
Status: DISCONTINUED | OUTPATIENT
Start: 2024-02-18 | End: 2024-02-20 | Stop reason: HOSPADM

## 2024-02-18 RX ORDER — LORAZEPAM 0.5 MG/1
0.5 TABLET ORAL EVERY 4 HOURS PRN
Status: DISCONTINUED | OUTPATIENT
Start: 2024-02-18 | End: 2024-02-20 | Stop reason: HOSPADM

## 2024-02-18 RX ORDER — OXYCODONE HYDROCHLORIDE 5 MG/1
5 TABLET ORAL EVERY 6 HOURS PRN
Status: DISCONTINUED | OUTPATIENT
Start: 2024-02-18 | End: 2024-02-20 | Stop reason: HOSPADM

## 2024-02-18 RX ORDER — LORAZEPAM 1 MG/1
1 TABLET ORAL EVERY 4 HOURS PRN
Status: DISCONTINUED | OUTPATIENT
Start: 2024-02-18 | End: 2024-02-20 | Stop reason: HOSPADM

## 2024-02-18 RX ORDER — ONDANSETRON 4 MG/1
4 TABLET, ORALLY DISINTEGRATING ORAL EVERY 4 HOURS PRN
Status: DISCONTINUED | OUTPATIENT
Start: 2024-02-18 | End: 2024-02-20 | Stop reason: HOSPADM

## 2024-02-18 RX ORDER — PROMETHAZINE HYDROCHLORIDE 25 MG/1
12.5-25 TABLET ORAL EVERY 4 HOURS PRN
Status: DISCONTINUED | OUTPATIENT
Start: 2024-02-18 | End: 2024-02-20 | Stop reason: HOSPADM

## 2024-02-18 RX ORDER — GUAIFENESIN/DEXTROMETHORPHAN 100-10MG/5
10 SYRUP ORAL EVERY 6 HOURS PRN
Status: DISCONTINUED | OUTPATIENT
Start: 2024-02-18 | End: 2024-02-20 | Stop reason: HOSPADM

## 2024-02-18 RX ORDER — GAUZE BANDAGE 2" X 2"
100 BANDAGE TOPICAL DAILY
Status: DISCONTINUED | OUTPATIENT
Start: 2024-02-19 | End: 2024-02-20 | Stop reason: HOSPADM

## 2024-02-18 RX ORDER — HYDROMORPHONE HYDROCHLORIDE 1 MG/ML
1 INJECTION, SOLUTION INTRAMUSCULAR; INTRAVENOUS; SUBCUTANEOUS ONCE
Status: COMPLETED | OUTPATIENT
Start: 2024-02-18 | End: 2024-02-18

## 2024-02-18 RX ORDER — OMEPRAZOLE 20 MG/1
40 CAPSULE, DELAYED RELEASE ORAL 2 TIMES DAILY
Status: DISCONTINUED | OUTPATIENT
Start: 2024-02-18 | End: 2024-02-20 | Stop reason: HOSPADM

## 2024-02-18 RX ORDER — LORAZEPAM 2 MG/1
2 TABLET ORAL
Status: DISCONTINUED | OUTPATIENT
Start: 2024-02-18 | End: 2024-02-20 | Stop reason: HOSPADM

## 2024-02-18 RX ORDER — SODIUM CHLORIDE, SODIUM LACTATE, POTASSIUM CHLORIDE, CALCIUM CHLORIDE 600; 310; 30; 20 MG/100ML; MG/100ML; MG/100ML; MG/100ML
1000 INJECTION, SOLUTION INTRAVENOUS ONCE
Status: COMPLETED | OUTPATIENT
Start: 2024-02-18 | End: 2024-02-18

## 2024-02-18 RX ORDER — ONDANSETRON 2 MG/ML
4 INJECTION INTRAMUSCULAR; INTRAVENOUS EVERY 4 HOURS PRN
Status: DISCONTINUED | OUTPATIENT
Start: 2024-02-18 | End: 2024-02-20 | Stop reason: HOSPADM

## 2024-02-18 RX ORDER — SODIUM CHLORIDE, SODIUM LACTATE, POTASSIUM CHLORIDE, CALCIUM CHLORIDE 600; 310; 30; 20 MG/100ML; MG/100ML; MG/100ML; MG/100ML
INJECTION, SOLUTION INTRAVENOUS CONTINUOUS
Status: DISCONTINUED | OUTPATIENT
Start: 2024-02-18 | End: 2024-02-19

## 2024-02-18 RX ORDER — ACETAMINOPHEN 500 MG
1000 TABLET ORAL 3 TIMES DAILY
Status: DISCONTINUED | OUTPATIENT
Start: 2024-02-18 | End: 2024-02-20 | Stop reason: HOSPADM

## 2024-02-18 RX ORDER — FOLIC ACID 1 MG/1
1 TABLET ORAL DAILY
Status: DISCONTINUED | OUTPATIENT
Start: 2024-02-19 | End: 2024-02-20 | Stop reason: HOSPADM

## 2024-02-18 RX ORDER — HYDRALAZINE HYDROCHLORIDE 20 MG/ML
10 INJECTION INTRAMUSCULAR; INTRAVENOUS EVERY 4 HOURS PRN
Status: DISCONTINUED | OUTPATIENT
Start: 2024-02-18 | End: 2024-02-20 | Stop reason: HOSPADM

## 2024-02-18 RX ORDER — POLYETHYLENE GLYCOL 3350 17 G/17G
1 POWDER, FOR SOLUTION ORAL
Status: DISCONTINUED | OUTPATIENT
Start: 2024-02-18 | End: 2024-02-20 | Stop reason: HOSPADM

## 2024-02-18 RX ORDER — AMOXICILLIN 250 MG
2 CAPSULE ORAL 2 TIMES DAILY
Status: DISCONTINUED | OUTPATIENT
Start: 2024-02-18 | End: 2024-02-20 | Stop reason: HOSPADM

## 2024-02-18 RX ADMIN — ACETAMINOPHEN 1000 MG: 500 TABLET ORAL at 17:14

## 2024-02-18 RX ADMIN — OXYCODONE 5 MG: 5 TABLET ORAL at 22:14

## 2024-02-18 RX ADMIN — METOPROLOL TARTRATE 50 MG: 50 TABLET, FILM COATED ORAL at 17:42

## 2024-02-18 RX ADMIN — DOCUSATE SODIUM 50 MG AND SENNOSIDES 8.6 MG 2 TABLET: 8.6; 5 TABLET, FILM COATED ORAL at 17:13

## 2024-02-18 RX ADMIN — SODIUM CHLORIDE, POTASSIUM CHLORIDE, SODIUM LACTATE AND CALCIUM CHLORIDE: 600; 310; 30; 20 INJECTION, SOLUTION INTRAVENOUS at 22:08

## 2024-02-18 RX ADMIN — OMEPRAZOLE 40 MG: 20 CAPSULE, DELAYED RELEASE ORAL at 17:14

## 2024-02-18 RX ADMIN — MAGNESIUM SULFATE HEPTAHYDRATE: 500 INJECTION, SOLUTION INTRAMUSCULAR; INTRAVENOUS at 18:03

## 2024-02-18 RX ADMIN — ATORVASTATIN CALCIUM 10 MG: 10 TABLET, FILM COATED ORAL at 20:37

## 2024-02-18 RX ADMIN — RIVAROXABAN 20 MG: 20 TABLET, FILM COATED ORAL at 17:42

## 2024-02-18 RX ADMIN — SODIUM CHLORIDE, POTASSIUM CHLORIDE, SODIUM LACTATE AND CALCIUM CHLORIDE 1000 ML: 600; 310; 30; 20 INJECTION, SOLUTION INTRAVENOUS at 14:41

## 2024-02-18 RX ADMIN — HYDROMORPHONE HYDROCHLORIDE 1 MG: 1 INJECTION, SOLUTION INTRAMUSCULAR; INTRAVENOUS; SUBCUTANEOUS at 14:21

## 2024-02-18 RX ADMIN — SODIUM CHLORIDE, POTASSIUM CHLORIDE, SODIUM LACTATE AND CALCIUM CHLORIDE: 600; 310; 30; 20 INJECTION, SOLUTION INTRAVENOUS at 17:26

## 2024-02-18 ASSESSMENT — ENCOUNTER SYMPTOMS
DIZZINESS: 1
CONSTIPATION: 1
NAUSEA: 1
EYES NEGATIVE: 1
PSYCHIATRIC NEGATIVE: 1
HEARTBURN: 0
CARDIOVASCULAR NEGATIVE: 1
WEAKNESS: 1
MUSCULOSKELETAL NEGATIVE: 1
VOMITING: 0
RESPIRATORY NEGATIVE: 1

## 2024-02-18 ASSESSMENT — LIFESTYLE VARIABLES
ALCOHOL_USE: YES
PAROXYSMAL SWEATS: NO SWEAT VISIBLE
PAROXYSMAL SWEATS: NO SWEAT VISIBLE
NAUSEA AND VOMITING: NO NAUSEA AND NO VOMITING
TOTAL SCORE: 0
AGITATION: NORMAL ACTIVITY
TOTAL SCORE: 3
TREMOR: *
EVER FELT BAD OR GUILTY ABOUT YOUR DRINKING: NO
AGITATION: NORMAL ACTIVITY
EVER HAD A DRINK FIRST THING IN THE MORNING TO STEADY YOUR NERVES TO GET RID OF A HANGOVER: NO
AUDITORY DISTURBANCES: NOT PRESENT
TREMOR: *
CONSUMPTION TOTAL: POSITIVE
HOW MANY TIMES IN THE PAST YEAR HAVE YOU HAD 5 OR MORE DRINKS IN A DAY: 5
TOTAL SCORE: 0
ANXIETY: NO ANXIETY (AT EASE)
AVERAGE NUMBER OF DAYS PER WEEK YOU HAVE A DRINK CONTAINING ALCOHOL: 4
ANXIETY: MILDLY ANXIOUS
VISUAL DISTURBANCES: NOT PRESENT
ON A TYPICAL DAY WHEN YOU DRINK ALCOHOL HOW MANY DRINKS DO YOU HAVE: 4
ORIENTATION AND CLOUDING OF SENSORIUM: ORIENTED AND CAN DO SERIAL ADDITIONS
NAUSEA AND VOMITING: NO NAUSEA AND NO VOMITING
HAVE PEOPLE ANNOYED YOU BY CRITICIZING YOUR DRINKING: NO
DOES PATIENT WANT TO STOP DRINKING: NO
TOTAL SCORE: 0
HEADACHE, FULLNESS IN HEAD: NOT PRESENT
DO YOU DRINK ALCOHOL: NO
TOTAL SCORE: 2
VISUAL DISTURBANCES: NOT PRESENT
AUDITORY DISTURBANCES: NOT PRESENT
HEADACHE, FULLNESS IN HEAD: NOT PRESENT
HAVE YOU EVER FELT YOU SHOULD CUT DOWN ON YOUR DRINKING: NO
ORIENTATION AND CLOUDING OF SENSORIUM: ORIENTED AND CAN DO SERIAL ADDITIONS

## 2024-02-18 ASSESSMENT — COGNITIVE AND FUNCTIONAL STATUS - GENERAL
DAILY ACTIVITIY SCORE: 24
MOBILITY SCORE: 24
SUGGESTED CMS G CODE MODIFIER DAILY ACTIVITY: CH
SUGGESTED CMS G CODE MODIFIER MOBILITY: CH

## 2024-02-18 ASSESSMENT — FIBROSIS 4 INDEX
FIB4 SCORE: 1.29
FIB4 SCORE: 2.09

## 2024-02-18 ASSESSMENT — PAIN DESCRIPTION - PAIN TYPE
TYPE: CHRONIC PAIN
TYPE: ACUTE PAIN
TYPE: ACUTE PAIN

## 2024-02-18 NOTE — ED NOTES
Med rec updated and complete. Allergies reviewed.  Confirmed name and date of birth.   Pt denies  outpatient antibiotic use in last 30 days.  Last rivaroxaban dose 02/17/24.  Pt denies antiplatelet medications.        Home pharmacy   [facility] = [phone]

## 2024-02-18 NOTE — ASSESSMENT & PLAN NOTE
ED chest x-ray showing healing right humeral neck fracture. Questionable mildly increased displacement versus positional differences from prior   Chronic history of  EDP discussed with Dr. Purcell (great baseline) who reported patient noncompliant with outpatient follow-up  Orthopedic surgery will evaluate  PT/OT

## 2024-02-18 NOTE — ASSESSMENT & PLAN NOTE
Chronic history of  Sodium at 126  Likely secondary dehydration  Pending serum and urine osmolality  Frequent BMP

## 2024-02-18 NOTE — H&P
Hospital Medicine History & Physical Note    Date of Service  2/18/2024    Primary Care Physician  Lori Villalobos D.N.P.    Consultants  None    Code Status  Full Code    Chief Complaint  Chief Complaint   Patient presents with    Syncope     Pt with near syncopal episode with EMS while standing, +orthos, 90/60 BP, EMS gave 324mg ASA PO, hx cardiac coronary artery bypass 2017, +thinners    Arm Pain     R upper arm pain d/t humerus fracture Jan 6th, increased pain, pain 6/10 after 100mcg fentanyl       History of Presenting Illness  63-year-old male with a past medical history of CAD s/p CABG 2017, DVT/PE on Xarelto, and hypertension presents emergency department on 2/18/2024 after near syncopal episode.  Patient reports that today in the morning when he woke up around 7 AM he went to the shower and started feeling lightheadedness as he was getting to the shower and was able to break his fall down.  He experienced this before.  Patient denies head trauma.  He took his metoprolol yesterday at night.  He to poor appetite but has been drinking around 5 glasses of water a day as well as having 2 drinks of alcohol daily.  He reports frequent urine output which is clear.  Patient reports nausea without emesis.  No hematochezia or melena.  Patient reports compliance with metoprolol and Xarelto.    In the emergency department, vital signs with heart rate in the 90s, otherwise stable and saturating well on room air.  CBC with normocytic anemia, no leukocytosis.  Chemistry with hyponatremia at 126, high anion gap metabolic acidosis with bicarbonate at 12, acute renal failure with creatinine at 1.6.  Troponin 28.  Chest x-ray with no acute cardiopulmonary process as per my read.  Patient received an LR bolus and a dose of Dilaudid.  Patient was admitted for presyncope secondary to orthostatic changes as well as hyponatremia requiring further workup and management.    I discussed the plan of care with patient and bedside  RN.    Review of Systems  Review of Systems   Constitutional:  Positive for malaise/fatigue.   HENT: Negative.     Eyes: Negative.    Respiratory: Negative.     Cardiovascular: Negative.    Gastrointestinal:  Positive for constipation and nausea. Negative for heartburn and vomiting.   Genitourinary: Negative.    Musculoskeletal: Negative.    Skin: Negative.    Neurological:  Positive for dizziness and weakness.   Endo/Heme/Allergies: Negative.    Psychiatric/Behavioral: Negative.         Past Medical History   has a past medical history of Breath shortness (10/2017), CAD (coronary artery disease), Dental disorder (10/2017), DVT (deep venous thrombosis) ([facility]), Grief reaction (2017), hernia repair, Hyperlipidemia, Hypertension, Psychiatric problem (10/2017), and Tobacco use.    Surgical History   has a past surgical history that includes inguinal hernia repair (Bilateral, 2000); orif, fracture, tibia (Right, 1980s); multiple coronary artery bypass endo vein harvest (6/13/2017); yarely (6/13/2017); and z cardiac cath.     Family History  family history includes Cancer in his father and mother; Heart Disease (age of onset: 89) in his maternal grandfather; No Known Problems in his sister and sister.     Social History   reports that he has been smoking cigarettes. He started smoking about 44 years ago. He has a 44.1 pack-year smoking history. He has never used smokeless tobacco. He reports current alcohol use. He reports current drug use.    Allergies  No Known Allergies    Medications  Prior to Admission Medications   Prescriptions Last Dose Informant Patient Reported? Taking?   acetaminophen (TYLENOL) 500 MG Tab  Patient No No   Sig: Take 2 Tablets by mouth every 8 hours.   ascorbic acid (VITAMIN C) 500 MG tablet  Patient No No   Sig: Take 1 Tablet by mouth every day.   atorvastatin (LIPITOR) 10 MG Tab  Patient No No   Sig: Take 1 Tablet by mouth every evening.   ferrous sulfate 325 (65 Fe) MG tablet  Patient No No    Sig: Take 1 Tablet by mouth every morning with breakfast.   folic acid (FOLVITE) 1 MG Tab  Patient No No   Sig: Take 1 Tablet by mouth every day.   lidocaine (ASPERFLEX) 4 % Patch  Patient No No   Sig: Place 1 Patch on the skin every 24 hours.   metoprolol tartrate (LOPRESSOR) 50 MG Tab   No No   Sig: Take 1 Tablet by mouth 2 times a day.   rivaroxaban (XARELTO) 20 MG Tab tablet  Patient No No   Sig: Take 1 Tablet by mouth with dinner.      Facility-Administered Medications: None       Physical Exam  Temp:  [36.5 °C (97.7 °F)] 36.5 °C (97.7 °F)  Pulse:  [92-96] 92  Resp:  [16-20] 20  BP: (105-116)/(62-72) 111/62  SpO2:  [96 %-98 %] 96 %  Blood Pressure: 111/62   Temperature: 36.5 °C (97.7 °F)   Pulse: 92   Respiration: 20   Pulse Oximetry: 96 %       Physical Exam  Constitutional:       General: He is not in acute distress.     Appearance: Normal appearance. He is ill-appearing.   HENT:      Head: Normocephalic and atraumatic.      Nose: Nose normal. No congestion.      Mouth/Throat:      Mouth: Mucous membranes are moist.   Eyes:      Extraocular Movements: Extraocular movements intact.      Pupils: Pupils are equal, round, and reactive to light.   Cardiovascular:      Rate and Rhythm: Normal rate and regular rhythm.      Pulses: Normal pulses.      Heart sounds: Normal heart sounds.   Pulmonary:      Effort: Pulmonary effort is normal.      Breath sounds: Normal breath sounds.   Abdominal:      General: Bowel sounds are normal.      Palpations: Abdomen is soft.      Tenderness: There is no abdominal tenderness.   Musculoskeletal:         General: No swelling. Normal range of motion.      Cervical back: Normal range of motion and neck supple.      Right lower leg: No edema.      Left lower leg: No edema.      Comments: Tremors   Skin:     General: Skin is warm.      Coloration: Skin is not jaundiced.   Neurological:      General: No focal deficit present.      Mental Status: He is alert and oriented to person,  "place, and time. Mental status is at baseline.      Cranial Nerves: No cranial nerve deficit.   Psychiatric:         Mood and Affect: Mood normal.         Behavior: Behavior normal.         Thought Content: Thought content normal.         Judgment: Judgment normal.         Laboratory:  Recent Labs     02/18/24  1341   WBC 8.0   RBC 3.21*   HEMOGLOBIN 11.4*   HEMATOCRIT 31.6*   MCV 98.4*   MCH 35.5*   MCHC 36.1   RDW 46.9   PLATELETCT 256   MPV 9.1     Recent Labs     02/18/24  1341   SODIUM 126*   POTASSIUM 3.8   CHLORIDE 95*   CO2 12*   GLUCOSE 87   BUN 22   CREATININE 1.64*   CALCIUM 8.1*     Recent Labs     02/18/24  1341   ALTSGPT 21   ASTSGOT 39   ALKPHOSPHAT 103*   TBILIRUBIN 0.6   GLUCOSE 87     Recent Labs     02/18/24  1341   APTT 38.5*   INR 1.97*     No results for input(s): \"NTPROBNP\" in the last 72 hours.      Recent Labs     02/18/24  1341   TROPONINT 28*       Imaging:  DX-SHOULDER 2+ RIGHT   Final Result      Healing right humeral neck fracture. Questionable mildly increased displacement versus positional differences from prior      DX-CHEST-PORTABLE (1 VIEW)   Final Result         1. No acute cardiopulmonary abnormalities are identified.      [id number] VIEW    (Results Pending)     Assessment/Plan:  Justification for Admission Status  I anticipate this patient will require at least two midnights for appropriate medical management, necessitating inpatient admission because of below    * Hyponatremia- (present on admission)  Assessment & Plan  Chronic history of  Sodium at 126  Likely secondary dehydration  Pending serum and urine osmolality  Frequent BMP    Closed fracture of neck of right humerus- (present on admission)  Assessment & Plan  ED chest x-ray showing healing right humeral neck fracture. Questionable mildly increased displacement versus positional differences from prior   Chronic history of  EDP discussed with Dr. Purcell (great baseline) who reported patient noncompliant with outpatient " follow-up  Orthopedic surgery will evaluate  PT/OT    MAXIMO (acute kidney injury) (HCC)- (present on admission)  Assessment & Plan  Secondary to hypotension, prerenal  IV hydration  Avoid nephrotoxin  Renal dose meds  Pending PVR  Labs on a.m.    Metabolic acidosis- (present on admission)  Assessment & Plan  Etiology unclear  IV hydration  Pending lactic acid  Pending hydroxybutyric acid    Acute deep vein thrombosis (DVT) of femoral vein of right lower extremity (HCC)- (present on admission)  Assessment & Plan  History of  Xarelto    Essential hypertension- (present on admission)  Assessment & Plan  As needed antihypertensives    S/P CABG x 2- (present on admission)  Assessment & Plan  History of  Cardiac monitoring    Dyslipidemia- (present on admission)  Assessment & Plan  Statin      VTE prophylaxis: Xarelto

## 2024-02-18 NOTE — ASSESSMENT & PLAN NOTE
ED chest x-ray showing healing right humeral neck fracture. Questionable mildly increased displacement versus positional differences from prior   Chronic history of  EDP discussed with Dr. Purcell (great baseline) who reported patient noncompliant with outpatient follow-up  Orthopedic surgery recommending outpatient follow-up  PT/OT

## 2024-02-18 NOTE — ED PROVIDER NOTES
ED Provider Note    CHIEF COMPLAINT  Chief Complaint   Patient presents with    Syncope     Pt with near syncopal episode with EMS while standing, +orthos, 90/60 BP, EMS gave 324mg ASA PO, hx cardiac coronary artery bypass 2017, +thinners    Arm Pain     R upper arm pain d/t humerus fracture Jan 6th, increased pain, pain 6/10 after 100mcg fentanyl       EXTERNAL RECORDS REVIEWED  Inpatient Notes the patient was admitted and I reviewed the discharge summary from January 24, 2024, he has a history of coronary disease status post CABG in 2017, DVT PE history on Xarelto, he at that time and following with a humerus fracture    HPI/ROS  LIMITATION TO HISTORY   Select: : None  OUTSIDE HISTORIAN(S):  EMS    Jose A Ponce is a 63 y.o. male who presents with past medical history significant for coronary disease, DVT on Xarelto, high cholesterol, hypertension, he reports that he fell and broke his right proximal humerus on January 6, 2024.  At that time he was assessed by the orthopedic surgeon Dr. Barry and would trial a course of nonoperative management.  The patient reports that he has had severe pain and is not able to tolerate the pain from his right humerus.  He today had an episode where he was standing and became hypotensive after he fell, when he fell he did not hit his head, he called 911 and when they arrived he was presyncopal with hypotension.  He was given IV fluids which improved this.  He is now complaining of severe right shoulder pain, he denies any chest pain.    PAST MEDICAL HISTORY   has a past medical history of Breath shortness (10/2017), CAD (coronary artery disease), Dental disorder (10/2017), DVT (deep venous thrombosis) (HCC), Grief reaction (2017), hernia repair, Hyperlipidemia, Hypertension, Psychiatric problem (10/2017), and Tobacco use.    SURGICAL HISTORY   has a past surgical history that includes inguinal hernia repair (Bilateral, 2000); orif, fracture, tibia (Right, 1980s);  "multiple coronary artery bypass endo vein harvest (6/13/2017); yarely (6/13/2017); and zzz cardiac cath.    FAMILY HISTORY  Family History   Problem Relation Age of Onset    Cancer Mother     Cancer Father     No Known Problems Sister     No Known Problems Sister     Heart Disease Maternal Grandfather 89        probable MI and sudden death       SOCIAL HISTORY  Social History     Tobacco Use    Smoking status: Every Day     Current packs/day: 1.00     Average packs/day: 1 pack/day for 44.1 years (44.1 ttl pk-yrs)     Types: Cigarettes     Start date: 1/1/1980    Smokeless tobacco: Never    Tobacco comments:     vape occ   Vaping Use    Vaping Use: Never used   Substance and Sexual Activity    Alcohol use: Yes     Comment: 2-3 drinks 5 days a week    Drug use: Yes     Comment: THC    Sexual activity: Not Currently       CURRENT MEDICATIONS  Home Medications       Reviewed by Leidy Newman (Pharmacy Tech) on 02/18/24 at 1446  Med List Status: Complete     Medication Last Dose Status   atorvastatin (LIPITOR) 10 MG Tab 2/17/2024 Active   metoprolol tartrate (LOPRESSOR) 50 MG Tab new script Active   rivaroxaban (XARELTO) 20 MG Tab tablet 2/17/2024 Active                    ALLERGIES  No Known Allergies    PHYSICAL EXAM  VITAL SIGNS: /62   Pulse 92   Temp 36.5 °C (97.7 °F) (Temporal)   Resp 20   Ht 1.854 m (6' 1\")   Wt 72.6 kg (160 lb)   SpO2 96%   BMI 21.11 kg/m²    Constitutional: Alert.  HENT: No signs of trauma, Bilateral external ears normal, Nose normal.   Eyes: Pupils are equal and reactive, Conjunctiva normal, Non-icteric.   Neck: Normal range of motion, No tenderness, Supple, No stridor.   Lymphatic: No lymphadenopathy noted.   Cardiovascular: Regular rate and rhythm, no murmurs.   Thorax & Lungs: Normal breath sounds, No respiratory distress, No wheezing, No chest tenderness.   Abdomen: Bowel sounds normal, Soft, No tenderness, No peritoneal signs, No masses, No pulsatile masses.   Skin: Warm, Dry, " No erythema, No rash.   Back: No bony tenderness, No CVA tenderness.   Extremities: Intact distal pulses, tenderness to the right proximal humerus.  Musculoskeletal: Good range of motion in all major joints. No tenderness to palpation or major deformities noted.   Neurologic: Alert , Normal motor function, Normal sensory function, No focal deficits noted.   Psychiatric: Affect normal, Judgment normal, Mood normal.       DIAGNOSTIC STUDIES / PROCEDURES  EKG  I have independently interpreted this EKG  Sinus rhythm rate 96  axis normal  intervals normal  T wave inversions with ST depression diffusely  Compared to ECG 01/21/2024 14:11:44  Nonspecific ST-T wave changes  My impression of this EKG, nonspecific ST-T wave changes, cannot rule out ischemia, does not meet STEMI criteria at this time    LABS  Labs Reviewed   CBC WITH DIFFERENTIAL - Abnormal; Notable for the following components:       Result Value    RBC 3.21 (*)     Hemoglobin 11.4 (*)     Hematocrit 31.6 (*)     MCV 98.4 (*)     MCH 35.5 (*)     All other components within normal limits    Narrative:     Biotin intake of greater than 5 mg per day may interfere with  troponin levels, causing false low values.  Indicate which anticoagulants the patient is on:->UNKNOWN   COMP METABOLIC PANEL - Abnormal; Notable for the following components:    Sodium 126 (*)     Chloride 95 (*)     Co2 12 (*)     Anion Gap 19.0 (*)     Creatinine 1.64 (*)     Calcium 8.1 (*)     Correct Calcium 8.4 (*)     Alkaline Phosphatase 103 (*)     All other components within normal limits    Narrative:     Biotin intake of greater than 5 mg per day may interfere with  troponin levels, causing false low values.  Indicate which anticoagulants the patient is on:->UNKNOWN   TROPONIN - Abnormal; Notable for the following components:    Troponin T 28 (*)     All other components within normal limits    Narrative:     Biotin intake of greater than 5 mg per day may interfere with  troponin  levels, causing false low values.  Indicate which anticoagulants the patient is on:->UNKNOWN   PROTHROMBIN TIME - Abnormal; Notable for the following components:    PT 22.7 (*)     INR 1.97 (*)     All other components within normal limits    Narrative:     Biotin intake of greater than 5 mg per day may interfere with  troponin levels, causing false low values.  Indicate which anticoagulants the patient is on:->UNKNOWN   APTT - Abnormal; Notable for the following components:    APTT 38.5 (*)     All other components within normal limits    Narrative:     Biotin intake of greater than 5 mg per day may interfere with  troponin levels, causing false low values.  Indicate which anticoagulants the patient is on:->UNKNOWN   ESTIMATED GFR - Abnormal; Notable for the following components:    GFR (CKD-EPI) 47 (*)     All other components within normal limits    Narrative:     Biotin intake of greater than 5 mg per day may interfere with  troponin levels, causing false low values.  Indicate which anticoagulants the patient is on:->UNKNOWN         RADIOLOGY  I have independently interpreted the diagnostic imaging associated with this visit and am waiting the final reading from the radiologist.   My preliminary interpretation is as follows: Negative chest x-ray for acute disease  Radiologist interpretation:     DX-SHOULDER 2+ RIGHT   Final Result      Healing right humeral neck fracture. Questionable mildly increased displacement versus positional differences from prior      DX-CHEST-PORTABLE (1 VIEW)   Final Result         1. No acute cardiopulmonary abnormalities are identified.            COURSE & MEDICAL DECISION MAKING    ED Observation Status? No; Patient does not meet criteria for ED Observation.     INITIAL ASSESSMENT, COURSE AND PLAN  Care Narrative:     The patient presents with severe right shoulder pain after fracture January 6.  Today he had a syncopal event and subsequently was found to be hypotensive which was  improved with IV fluids.    2:26 PM  The patient's EKG shows ST depressions of unknown significance.  He is hyponatremic, sodium 126.  He is acidotic with low CO2 of 12 and high anion gap of 19.  His creatinine is elevated 1.6.  The patient is given IV fluids for acidosis and acute kidney injury.      ADDITIONAL PROBLEM LIST  History of coronary disease, history of DVT and PE on Xarelto, history of recent right proximal humerus fracture  DISPOSITION AND DISCUSSIONS  I have discussed management of the patient with the following physicians and THIAGO's: I spoke with the [facility] hospitalist to assess the patient for hospitalization.  I spoke with Dr. Purcell, Dr. Barry partner from [facility], he said at this time do not hold the Xarelto, the patient likely will not have surgery but if so will need to have the surgery at a later date possibly Thursday or Friday.    Discussion of management with other [facility] or appropriate source(s): None         Barriers to care at this time, including but not limited to:  Patient has been noncompliant with following up in the orthopedic surgeons office .     Decision tools and prescription drugs considered including, but not limited to:  IV fluids for hypotension .    CRITICAL CARE  The very real possibilty of a deterioration of this patient's condition required the highest level of my preparedness for sudden, emergent intervention.  I provided critical care services, which included medication orders, frequent reevaluations of the patient's condition and response to treatment, ordering and reviewing test results, and discussing the case with various consultants.  The critical care time associated with the care of the patient was 40 minutes. Review chart for interventions. This time is exclusive of any other billable procedures.      FINAL DIAGNOSIS  1. Syncope, unspecified syncope type    2. Hypotension, unspecified hypotension type    3. Closed fracture of proximal end of right  humerus with delayed healing, subsequent encounter    4. Hyponatremia    5. Chronic anticoagulation    6. Elevated troponin    7. Abnormal EKG    8. Acute kidney injury (HCC)      Total care care time 40 minutes as outlined above    Electronically signed by: Tanner Morse M.D., 2/18/2024 1:45 PM

## 2024-02-18 NOTE — ED TRIAGE NOTES
"Chief Complaint   Patient presents with    Syncope     Pt with near syncopal episode with EMS while standing, +orthos, 90/60 BP, EMS gave 324mg ASA PO, hx cardiac coronary artery bypass 2017, +thinners    Arm Pain     R upper arm pain d/t humerus fracture Jan 6th, increased pain, pain 6/10 after 100mcg fentanyl        BIB [facility] for above complaint. Pt given 450 ml NS. Posterior EKG completed with EMS. EMS vitals 90/60 HR 95 98% RA gcs 15    EKG protocols ordered, completed. Labs drawn.      /72   Pulse 95   Temp 36.5 °C (97.7 °F) (Temporal)   Resp 20   Ht 1.854 m (6' 1\")   Wt 72.6 kg (160 lb)   SpO2 98%   BMI 21.11 kg/m²      "

## 2024-02-19 LAB
ANION GAP SERPL CALC-SCNC: 12 MMOL/L (ref 7–16)
ANION GAP SERPL CALC-SCNC: 12 MMOL/L (ref 7–16)
BASOPHILS # BLD AUTO: 1.1 % (ref 0–1.8)
BASOPHILS # BLD: 0.1 K/UL (ref 0–0.12)
BUN SERPL-MCNC: 23 MG/DL (ref 8–22)
BUN SERPL-MCNC: 23 MG/DL (ref 8–22)
CALCIUM SERPL-MCNC: 8.1 MG/DL (ref 8.5–10.5)
CALCIUM SERPL-MCNC: 8.3 MG/DL (ref 8.5–10.5)
CHLORIDE SERPL-SCNC: 101 MMOL/L (ref 96–112)
CHLORIDE SERPL-SCNC: 99 MMOL/L (ref 96–112)
CO2 SERPL-SCNC: 16 MMOL/L (ref 20–33)
CO2 SERPL-SCNC: 16 MMOL/L (ref 20–33)
CREAT SERPL-MCNC: 1.6 MG/DL (ref 0.5–1.4)
CREAT SERPL-MCNC: 1.66 MG/DL (ref 0.5–1.4)
EOSINOPHIL # BLD AUTO: 0.03 K/UL (ref 0–0.51)
EOSINOPHIL NFR BLD: 0.3 % (ref 0–6.9)
ERYTHROCYTE [DISTWIDTH] IN BLOOD BY AUTOMATED COUNT: 48.2 FL (ref 35.9–50)
GFR SERPLBLD CREATININE-BSD FMLA CKD-EPI: 46 ML/MIN/1.73 M 2
GFR SERPLBLD CREATININE-BSD FMLA CKD-EPI: 48 ML/MIN/1.73 M 2
GLUCOSE SERPL-MCNC: 118 MG/DL (ref 65–99)
GLUCOSE SERPL-MCNC: 168 MG/DL (ref 65–99)
HCT VFR BLD AUTO: 32.8 % (ref 42–52)
HGB BLD-MCNC: 11.6 G/DL (ref 14–18)
IMM GRANULOCYTES # BLD AUTO: 0.04 K/UL (ref 0–0.11)
IMM GRANULOCYTES NFR BLD AUTO: 0.4 % (ref 0–0.9)
LYMPHOCYTES # BLD AUTO: 1.9 K/UL (ref 1–4.8)
LYMPHOCYTES NFR BLD: 20.5 % (ref 22–41)
MCH RBC QN AUTO: 35 PG (ref 27–33)
MCHC RBC AUTO-ENTMCNC: 35.4 G/DL (ref 32.3–36.5)
MCV RBC AUTO: 99.1 FL (ref 81.4–97.8)
MONOCYTES # BLD AUTO: 0.96 K/UL (ref 0–0.85)
MONOCYTES NFR BLD AUTO: 10.3 % (ref 0–13.4)
NEUTROPHILS # BLD AUTO: 6.26 K/UL (ref 1.82–7.42)
NEUTROPHILS NFR BLD: 67.4 % (ref 44–72)
NRBC # BLD AUTO: 0 K/UL
NRBC BLD-RTO: 0 /100 WBC (ref 0–0.2)
PLATELET # BLD AUTO: 275 K/UL (ref 164–446)
PMV BLD AUTO: 9.5 FL (ref 9–12.9)
POTASSIUM SERPL-SCNC: 4.3 MMOL/L (ref 3.6–5.5)
POTASSIUM SERPL-SCNC: 4.5 MMOL/L (ref 3.6–5.5)
RBC # BLD AUTO: 3.31 M/UL (ref 4.7–6.1)
SODIUM SERPL-SCNC: 127 MMOL/L (ref 135–145)
SODIUM SERPL-SCNC: 129 MMOL/L (ref 135–145)
WBC # BLD AUTO: 9.3 K/UL (ref 4.8–10.8)

## 2024-02-19 PROCEDURE — 99232 SBSQ HOSP IP/OBS MODERATE 35: CPT | Performed by: STUDENT IN AN ORGANIZED HEALTH CARE EDUCATION/TRAINING PROGRAM

## 2024-02-19 PROCEDURE — A9270 NON-COVERED ITEM OR SERVICE: HCPCS | Performed by: STUDENT IN AN ORGANIZED HEALTH CARE EDUCATION/TRAINING PROGRAM

## 2024-02-19 PROCEDURE — 700105 HCHG RX REV CODE 258: Performed by: STUDENT IN AN ORGANIZED HEALTH CARE EDUCATION/TRAINING PROGRAM

## 2024-02-19 PROCEDURE — 80048 BASIC METABOLIC PNL TOTAL CA: CPT

## 2024-02-19 PROCEDURE — 85025 COMPLETE CBC W/AUTO DIFF WBC: CPT

## 2024-02-19 PROCEDURE — 770020 HCHG ROOM/CARE - TELE (206)

## 2024-02-19 PROCEDURE — 700102 HCHG RX REV CODE 250 W/ 637 OVERRIDE(OP): Performed by: STUDENT IN AN ORGANIZED HEALTH CARE EDUCATION/TRAINING PROGRAM

## 2024-02-19 RX ORDER — SODIUM CHLORIDE, SODIUM LACTATE, POTASSIUM CHLORIDE, CALCIUM CHLORIDE 600; 310; 30; 20 MG/100ML; MG/100ML; MG/100ML; MG/100ML
INJECTION, SOLUTION INTRAVENOUS CONTINUOUS
Status: DISCONTINUED | OUTPATIENT
Start: 2024-02-19 | End: 2024-02-20

## 2024-02-19 RX ADMIN — GUAIFENESIN SYRUP AND DEXTROMETHORPHAN 10 ML: 100; 10 SYRUP ORAL at 23:12

## 2024-02-19 RX ADMIN — METOPROLOL TARTRATE 50 MG: 50 TABLET, FILM COATED ORAL at 04:48

## 2024-02-19 RX ADMIN — THERA TABS 1 TABLET: TAB at 05:10

## 2024-02-19 RX ADMIN — OXYCODONE 5 MG: 5 TABLET ORAL at 11:07

## 2024-02-19 RX ADMIN — RIVAROXABAN 20 MG: 20 TABLET, FILM COATED ORAL at 17:06

## 2024-02-19 RX ADMIN — ACETAMINOPHEN 1000 MG: 500 TABLET ORAL at 04:47

## 2024-02-19 RX ADMIN — OMEPRAZOLE 40 MG: 20 CAPSULE, DELAYED RELEASE ORAL at 04:48

## 2024-02-19 RX ADMIN — SODIUM CHLORIDE, POTASSIUM CHLORIDE, SODIUM LACTATE AND CALCIUM CHLORIDE: 600; 310; 30; 20 INJECTION, SOLUTION INTRAVENOUS at 07:29

## 2024-02-19 RX ADMIN — OXYCODONE 5 MG: 5 TABLET ORAL at 04:48

## 2024-02-19 RX ADMIN — ATORVASTATIN CALCIUM 10 MG: 10 TABLET, FILM COATED ORAL at 20:57

## 2024-02-19 RX ADMIN — OXYCODONE 5 MG: 5 TABLET ORAL at 17:08

## 2024-02-19 RX ADMIN — Medication 100 MG: at 05:10

## 2024-02-19 RX ADMIN — ACETAMINOPHEN 1000 MG: 500 TABLET ORAL at 11:10

## 2024-02-19 RX ADMIN — SODIUM CHLORIDE, POTASSIUM CHLORIDE, SODIUM LACTATE AND CALCIUM CHLORIDE: 600; 310; 30; 20 INJECTION, SOLUTION INTRAVENOUS at 14:16

## 2024-02-19 RX ADMIN — OXYCODONE 5 MG: 5 TABLET ORAL at 23:12

## 2024-02-19 RX ADMIN — ACETAMINOPHEN 1000 MG: 500 TABLET ORAL at 17:06

## 2024-02-19 RX ADMIN — FOLIC ACID 1 MG: 1 TABLET ORAL at 05:10

## 2024-02-19 RX ADMIN — METOPROLOL TARTRATE 50 MG: 50 TABLET, FILM COATED ORAL at 17:06

## 2024-02-19 RX ADMIN — LORAZEPAM 0.5 MG: 0.5 TABLET ORAL at 23:12

## 2024-02-19 ASSESSMENT — LIFESTYLE VARIABLES
AGITATION: NORMAL ACTIVITY
VISUAL DISTURBANCES: NOT PRESENT
PAROXYSMAL SWEATS: NO SWEAT VISIBLE
PAROXYSMAL SWEATS: BARELY PERCEPTIBLE SWEATING. PALMS MOIST
HEADACHE, FULLNESS IN HEAD: NOT PRESENT
ANXIETY: *
ANXIETY: MILDLY ANXIOUS
ORIENTATION AND CLOUDING OF SENSORIUM: ORIENTED AND CAN DO SERIAL ADDITIONS
TREMOR: *
PAROXYSMAL SWEATS: BARELY PERCEPTIBLE SWEATING. PALMS MOIST
AUDITORY DISTURBANCES: NOT PRESENT
HEADACHE, FULLNESS IN HEAD: NOT PRESENT
PAROXYSMAL SWEATS: NO SWEAT VISIBLE
NAUSEA AND VOMITING: NO NAUSEA AND NO VOMITING
TOTAL SCORE: 5
AGITATION: NORMAL ACTIVITY
TOTAL SCORE: 4
ANXIETY: NO ANXIETY (AT EASE)
ORIENTATION AND CLOUDING OF SENSORIUM: ORIENTED AND CAN DO SERIAL ADDITIONS
TREMOR: TREMOR NOT VISIBLE BUT CAN BE FELT, FINGERTIP TO FINGERTIP
VISUAL DISTURBANCES: NOT PRESENT
ORIENTATION AND CLOUDING OF SENSORIUM: ORIENTED AND CAN DO SERIAL ADDITIONS
HEADACHE, FULLNESS IN HEAD: NOT PRESENT
ORIENTATION AND CLOUDING OF SENSORIUM: ORIENTED AND CAN DO SERIAL ADDITIONS
TREMOR: *
AUDITORY DISTURBANCES: NOT PRESENT
AGITATION: NORMAL ACTIVITY
TOTAL SCORE: 1
AGITATION: NORMAL ACTIVITY
VISUAL DISTURBANCES: NOT PRESENT
TOTAL SCORE: 1
AUDITORY DISTURBANCES: NOT PRESENT
HEADACHE, FULLNESS IN HEAD: NOT PRESENT
NAUSEA AND VOMITING: NO NAUSEA AND NO VOMITING
NAUSEA AND VOMITING: NO NAUSEA AND NO VOMITING
VISUAL DISTURBANCES: NOT PRESENT
AUDITORY DISTURBANCES: NOT PRESENT
NAUSEA AND VOMITING: NO NAUSEA AND NO VOMITING
TREMOR: TREMOR NOT VISIBLE BUT CAN BE FELT, FINGERTIP TO FINGERTIP
ANXIETY: NO ANXIETY (AT EASE)

## 2024-02-19 ASSESSMENT — FIBROSIS 4 INDEX: FIB4 SCORE: 1.95

## 2024-02-19 ASSESSMENT — ENCOUNTER SYMPTOMS
GASTROINTESTINAL NEGATIVE: 1
WEAKNESS: 1
PSYCHIATRIC NEGATIVE: 1
CARDIOVASCULAR NEGATIVE: 1
CONSTITUTIONAL NEGATIVE: 1
EYES NEGATIVE: 1
RESPIRATORY NEGATIVE: 1

## 2024-02-19 ASSESSMENT — PAIN DESCRIPTION - PAIN TYPE: TYPE: ACUTE PAIN;CHRONIC PAIN

## 2024-02-19 NOTE — PROGRESS NOTES
Patient arrived to unit with ACLS RN on zoll. Placed on tele. Pain 6/10 in right shoulder. Denied dizziness. Skin check completed. Oriented to room and call light system. Denied other needs at this time.

## 2024-02-19 NOTE — PROGRESS NOTES
Assumed care of pt.  Report received.  Pt A/ox4.  Pt with c/o pain to right shoulder, pt denies need for pain medication at this time.  Pt ambulated to bathroom with standby assist, pt denies dizziness or light headness while ambulating.  IV infusing without difficulty.  Pt updated to POC.  Pt aware to call before getting out of bed.  Bed alarm on, bed in low and locked position. Call light in reach.

## 2024-02-19 NOTE — PROGRESS NOTES
Bedside report received. Pt A&Ox4. POC discussed with pt. Pt verbalizes understanding. Call light and belongings within reach. Bed locked and in lowest position. Alarm and fall precautions in place. Fluids running.

## 2024-02-19 NOTE — CARE PLAN
Problem: Pain - Standard  Goal: Alleviation of pain or a reduction in pain to the patient’s comfort goal  Outcome: Progressing     Problem: Knowledge Deficit - Standard  Goal: Patient and family/care givers will demonstrate understanding of plan of care, disease process/condition, diagnostic tests and medications  Outcome: Progressing     Problem: Optimal Care for Alcohol Withdrawal  Goal: Optimal Care for the alcohol withdrawal patient  Outcome: Progressing     Problem: Seizure Precautions  Goal: Implementation of seizure precautions  Outcome: Progressing     Problem: Lifestyle Changes  Goal: Patient's ability to identify lifestyle changes and available resources to help reduce recurrence of condition will improve  Outcome: Progressing     Problem: Psychosocial  Goal: Patient's level of anxiety will decrease  Outcome: Progressing  Goal: Spiritual and cultural needs incorporated into hospitalization  Outcome: Progressing     Problem: Risk for Aspiration  Goal: Patient's risk for aspiration will be absent or decrease  Outcome: Progressing     Problem: Fall Risk  Goal: Patient will remain free from falls  Outcome: Progressing   The patient is Stable - Low risk of patient condition declining or worsening         Progress made toward(s) clinical / shift goals:    Pt cooperative with care    Patient is not progressing towards the following goals:

## 2024-02-19 NOTE — PROGRESS NOTES
Monitor Summary  Rhythm: SR/ST  Rate: 75-85  Ectopy: rare PVC, rare Couplet  Measurements: 0.14/0.08/0.38            ---12 hr Chart Review---

## 2024-02-19 NOTE — PROGRESS NOTES
4 Eyes Skin Assessment Completed by MITCH Adorno and MITCH Chiu.    Head WDL  Ears WDL  Nose WDL  Mouth WDL  Neck WDL  Breast/Chest WDL  Shoulder Blades WDL, scars  Spine WDL, scars  (R) Arm/Elbow/Hand WDL  (L) Arm/Elbow/Hand WDL  Abdomen WDL, scarring  Groin WDL  Scrotum/Coccyx/Buttocks WDL  (R) Leg WDL  (L) Leg WDL  (R) Heel/Foot/Toe WDL  (L) Heel/Foot/Toe WDL          Devices In Places Tele Box, Blood Pressure Cuff, and Pulse Ox      Interventions In Place Pressure Redistribution Mattress    Possible Skin Injury No    Pictures Uploaded Into Epic N/A  Wound Consult Placed N/A  RN Wound Prevention Protocol Ordered No

## 2024-02-19 NOTE — PROGRESS NOTES
Right proximal humerus fracture  - 6 weeks out   Patient failed to follow up outpatient after initial consult   Stable xrays   Admit for multiple medical issues   Ortho will follow up outpatient or in house if patient still admitted

## 2024-02-19 NOTE — PROGRESS NOTES
[facility] Medicine Daily Progress Note    Date of Service  2/19/2024    Chief Complaint  Jose A Ponce is a 63 y.o. male admitted 2/18/2024 with presyncope     Hospital Course  63-year-old male with a past medical history of CAD s/p CABG 2017, DVT/PE on Xarelto, and hypertension presents emergency department on 2/18/2024 after near syncopal episode.  Patient reports that today in the morning when he woke up around 7 AM he went to the shower and started feeling lightheadedness as he was getting to the shower and was able to break his fall down.  He experienced this before.  Patient denies head trauma.  He took his metoprolol yesterday at night.  He to poor appetite but has been drinking around 5 glasses of water a day as well as having 2 drinks of alcohol daily.  He reports frequent urine output which is clear.  Patient reports nausea without emesis.  No hematochezia or melena.  Patient reports compliance with metoprolol and Xarelto.     In the emergency department, vital signs with heart rate in the 90s, otherwise stable and saturating well on room air.  CBC with normocytic anemia, no leukocytosis.  Chemistry with hyponatremia at 126, high anion gap metabolic acidosis with bicarbonate at 12, acute renal failure with creatinine at 1.6.  Troponin 28.  Chest x-ray with no acute cardiopulmonary process as per my read.  Patient received an LR bolus and a dose of Dilaudid.  Patient was admitted for presyncope secondary to orthostatic changes as well as hyponatremia requiring further workup and management.    Interval Problem Update  Evaluate bedside  Still having right arm pain  Reporting good p.o. intake and urinary output  Stable vitals and on room air  Creatinine and metabolic acidosis improving slowly  Continue CIWA monitoring with Ativan  Adjust pain management  Orthopedic surgery evaluated  PT/OT eval  Lab on AM     I have discussed this patient's plan of care and discharge plan at IDT rounds today with Case  Management, Nursing, Nursing leadership, and other members of the IDT team.    Consultants/Specialty  Ortho    Code Status  Full Code    Disposition  The patient is not medically cleared for discharge to home or a post-acute facility.  Anticipate discharge to: home with close outpatient follow-up    I have placed the appropriate orders for post-discharge needs.    Review of Systems  Review of Systems   Constitutional: Negative.    HENT: Negative.     Eyes: Negative.    Respiratory: Negative.     Cardiovascular: Negative.    Gastrointestinal: Negative.    Genitourinary: Negative.    Musculoskeletal:  Positive for joint pain.   Skin: Negative.    Neurological:  Positive for weakness.   Endo/Heme/Allergies: Negative.    Psychiatric/Behavioral: Negative.          Physical Exam  Temp:  [36.1 °C (97 °F)-37.1 °C (98.8 °F)] 36.9 °C (98.4 °F)  Pulse:  [71-95] 83  Resp:  [13-18] 14  BP: (112-127)/(63-76) 120/71  SpO2:  [91 %-98 %] 96 %    Physical Exam  Constitutional:       General: He is not in acute distress.     Appearance: Normal appearance.   HENT:      Head: Normocephalic and atraumatic.      Nose: Nose normal. No congestion.      Mouth/Throat:      Mouth: Mucous membranes are moist.   Eyes:      Extraocular Movements: Extraocular movements intact.      Pupils: Pupils are equal, round, and reactive to light.   Cardiovascular:      Rate and Rhythm: Normal rate and regular rhythm.      Pulses: Normal pulses.      Heart sounds: Normal heart sounds.   Pulmonary:      Effort: Pulmonary effort is normal.      Breath sounds: Normal breath sounds.   Abdominal:      General: Bowel sounds are normal.      Palpations: Abdomen is soft.      Tenderness: There is no abdominal tenderness.   Musculoskeletal:         General: Swelling and tenderness present. Normal range of motion.      Cervical back: Normal range of motion and neck supple.   Skin:     General: Skin is warm.      Coloration: Skin is not jaundiced.   Neurological:       General: No focal deficit present.      Mental Status: He is alert and oriented to person, place, and time. Mental status is at baseline.      Cranial Nerves: No cranial nerve deficit.   Psychiatric:         Mood and Affect: Mood normal.         Behavior: Behavior normal.         Thought Content: Thought content normal.         Judgment: Judgment normal.         Fluids    Intake/Output Summary (Last 24 hours) at 2/19/2024 1407  Last data filed at 2/19/2024 1130  Gross per 24 hour   Intake 2365 ml   Output 1100 ml   Net 1265 ml       Laboratory  Recent Labs     02/18/24  1341 02/19/24  0040   WBC 8.0 9.3   RBC 3.21* 3.31*   HEMOGLOBIN 11.4* 11.6*   HEMATOCRIT 31.6* 32.8*   MCV 98.4* 99.1*   MCH 35.5* 35.0*   MCHC 36.1 35.4   RDW 46.9 48.2   PLATELETCT 256 275   MPV 9.1 9.5     Recent Labs     02/18/24  1954 02/19/24  0040 02/19/24  0627   SODIUM 127* 127* 129*   POTASSIUM 3.9 4.5 4.3   CHLORIDE 97 99 101   CO2 15* 16* 16*   GLUCOSE 193* 118* 168*   BUN 23* 23* 23*   CREATININE 1.77* 1.66* 1.60*   CALCIUM 8.1* 8.3* 8.1*     Recent Labs     02/18/24  1341   APTT 38.5*   INR 1.97*               Imaging  [id number] VIEW   Final Result         No specific finding to suggest small bowel obstruction.      DX-SHOULDER 2+ RIGHT   Final Result      Healing right humeral neck fracture. Questionable mildly increased displacement versus positional differences from prior      DX-CHEST-PORTABLE (1 VIEW)   Final Result         1. No acute cardiopulmonary abnormalities are identified.           Assessment/Plan  * Hyponatremia- (present on admission)  Assessment & Plan  Chronic history of  Sodium at 126  Likely secondary dehydration  Pending serum and urine osmolality  Frequent BMP    MAXIMO (acute kidney injury) (HCC)- (present on admission)  Assessment & Plan  Improving slowly  IV hydration  Avoid nephrotoxin  Renal dose meds  Labs on a.m.    Closed fracture of neck of right humerus- (present on admission)  Assessment & Plan  ED  chest x-ray showing healing right humeral neck fracture. Questionable mildly increased displacement versus positional differences from prior   Chronic history of  EDP discussed with Dr. Purcell (great baseline) who reported patient noncompliant with outpatient follow-up  Orthopedic surgery recommending outpatient follow-up  PT/OT    Metabolic acidosis- (present on admission)  Assessment & Plan  IV hydration  Improving slowly    Acute deep vein thrombosis (DVT) of femoral vein of right lower extremity (HCC)- (present on admission)  Assessment & Plan  History of  Xarelto    Essential hypertension- (present on admission)  Assessment & Plan  As needed antihypertensives    S/P CABG x 2- (present on admission)  Assessment & Plan  History of  Cardiac monitoring    Dyslipidemia- (present on admission)  Assessment & Plan  Statin       VTE prophylaxis: Xarelto     I have performed a physical exam and reviewed and updated ROS and Plan today (2/19/2024). In review of yesterday's note (2/18/2024), there are no changes except as documented above.

## 2024-02-19 NOTE — CARE PLAN
Problem: Pain - Standard  Goal: Alleviation of pain or a reduction in pain to the patient’s comfort goal  Outcome: Progressing     Problem: Knowledge Deficit - Standard  Goal: Patient and family/care givers will demonstrate understanding of plan of care, disease process/condition, diagnostic tests and medications  Outcome: Progressing     Problem: Fall Risk  Goal: Patient will remain free from falls  Outcome: Progressing   The patient is Stable - Low risk of patient condition declining or worsening    Shift Goals  Clinical Goals: monitor BPs, monitor labs  Patient Goals: rest  Family Goals: duncan    Progress made toward(s) clinical / shift goals:  yes    Patient is not progressing towards the following goals:

## 2024-02-20 VITALS
DIASTOLIC BLOOD PRESSURE: 81 MMHG | HEIGHT: 73 IN | TEMPERATURE: 98.6 F | BODY MASS INDEX: 21.91 KG/M2 | HEART RATE: 69 BPM | OXYGEN SATURATION: 97 % | WEIGHT: 165.34 LBS | SYSTOLIC BLOOD PRESSURE: 141 MMHG | RESPIRATION RATE: 16 BRPM

## 2024-02-20 PROBLEM — E87.1 HYPONATREMIA: Status: RESOLVED | Noted: 2024-01-13 | Resolved: 2024-02-20

## 2024-02-20 PROBLEM — N17.9 AKI (ACUTE KIDNEY INJURY) (HCC): Status: RESOLVED | Noted: 2020-03-02 | Resolved: 2024-02-20

## 2024-02-20 PROBLEM — E87.20 METABOLIC ACIDOSIS: Status: RESOLVED | Noted: 2024-01-06 | Resolved: 2024-02-20

## 2024-02-20 LAB
ALBUMIN SERPL BCP-MCNC: 3.1 G/DL (ref 3.2–4.9)
ALBUMIN/GLOB SERPL: 1.6 G/DL
ALP SERPL-CCNC: 80 U/L (ref 30–99)
ALT SERPL-CCNC: 14 U/L (ref 2–50)
ANION GAP SERPL CALC-SCNC: 11 MMOL/L (ref 7–16)
AST SERPL-CCNC: 19 U/L (ref 12–45)
BASOPHILS # BLD AUTO: 1.4 % (ref 0–1.8)
BASOPHILS # BLD: 0.09 K/UL (ref 0–0.12)
BILIRUB SERPL-MCNC: 0.4 MG/DL (ref 0.1–1.5)
BUN SERPL-MCNC: 20 MG/DL (ref 8–22)
CALCIUM ALBUM COR SERPL-MCNC: 8.8 MG/DL (ref 8.5–10.5)
CALCIUM SERPL-MCNC: 8.1 MG/DL (ref 8.5–10.5)
CHLORIDE SERPL-SCNC: 102 MMOL/L (ref 96–112)
CO2 SERPL-SCNC: 19 MMOL/L (ref 20–33)
CREAT SERPL-MCNC: 1.2 MG/DL (ref 0.5–1.4)
EOSINOPHIL # BLD AUTO: 0.12 K/UL (ref 0–0.51)
EOSINOPHIL NFR BLD: 1.9 % (ref 0–6.9)
ERYTHROCYTE [DISTWIDTH] IN BLOOD BY AUTOMATED COUNT: 52.7 FL (ref 35.9–50)
GFR SERPLBLD CREATININE-BSD FMLA CKD-EPI: 68 ML/MIN/1.73 M 2
GLOBULIN SER CALC-MCNC: 2 G/DL (ref 1.9–3.5)
GLUCOSE SERPL-MCNC: 96 MG/DL (ref 65–99)
HCT VFR BLD AUTO: 29.7 % (ref 42–52)
HGB BLD-MCNC: 9.7 G/DL (ref 14–18)
IMM GRANULOCYTES # BLD AUTO: 0.02 K/UL (ref 0–0.11)
IMM GRANULOCYTES NFR BLD AUTO: 0.3 % (ref 0–0.9)
LYMPHOCYTES # BLD AUTO: 1.86 K/UL (ref 1–4.8)
LYMPHOCYTES NFR BLD: 29.3 % (ref 22–41)
MAGNESIUM SERPL-MCNC: 1.5 MG/DL (ref 1.5–2.5)
MCH RBC QN AUTO: 34.4 PG (ref 27–33)
MCHC RBC AUTO-ENTMCNC: 32.7 G/DL (ref 32.3–36.5)
MCV RBC AUTO: 105.3 FL (ref 81.4–97.8)
MONOCYTES # BLD AUTO: 0.71 K/UL (ref 0–0.85)
MONOCYTES NFR BLD AUTO: 11.2 % (ref 0–13.4)
NEUTROPHILS # BLD AUTO: 3.54 K/UL (ref 1.82–7.42)
NEUTROPHILS NFR BLD: 55.9 % (ref 44–72)
NRBC # BLD AUTO: 0 K/UL
NRBC BLD-RTO: 0 /100 WBC (ref 0–0.2)
PHOSPHATE SERPL-MCNC: 2.3 MG/DL (ref 2.5–4.5)
PLATELET # BLD AUTO: 197 K/UL (ref 164–446)
PMV BLD AUTO: 9.8 FL (ref 9–12.9)
POTASSIUM SERPL-SCNC: 4.5 MMOL/L (ref 3.6–5.5)
PROT SERPL-MCNC: 5.1 G/DL (ref 6–8.2)
RBC # BLD AUTO: 2.82 M/UL (ref 4.7–6.1)
SODIUM SERPL-SCNC: 132 MMOL/L (ref 135–145)
WBC # BLD AUTO: 6.3 K/UL (ref 4.8–10.8)

## 2024-02-20 PROCEDURE — 700111 HCHG RX REV CODE 636 W/ 250 OVERRIDE (IP): Mod: JZ | Performed by: HOSPITALIST

## 2024-02-20 PROCEDURE — 99239 HOSP IP/OBS DSCHRG MGMT >30: CPT | Performed by: HOSPITALIST

## 2024-02-20 PROCEDURE — 700102 HCHG RX REV CODE 250 W/ 637 OVERRIDE(OP): Performed by: STUDENT IN AN ORGANIZED HEALTH CARE EDUCATION/TRAINING PROGRAM

## 2024-02-20 PROCEDURE — 97535 SELF CARE MNGMENT TRAINING: CPT

## 2024-02-20 PROCEDURE — 80053 COMPREHEN METABOLIC PANEL: CPT

## 2024-02-20 PROCEDURE — 97165 OT EVAL LOW COMPLEX 30 MIN: CPT

## 2024-02-20 PROCEDURE — 700102 HCHG RX REV CODE 250 W/ 637 OVERRIDE(OP): Performed by: HOSPITALIST

## 2024-02-20 PROCEDURE — A9270 NON-COVERED ITEM OR SERVICE: HCPCS | Performed by: HOSPITALIST

## 2024-02-20 PROCEDURE — 97161 PT EVAL LOW COMPLEX 20 MIN: CPT

## 2024-02-20 PROCEDURE — 84100 ASSAY OF PHOSPHORUS: CPT

## 2024-02-20 PROCEDURE — 85025 COMPLETE CBC W/AUTO DIFF WBC: CPT

## 2024-02-20 PROCEDURE — 83735 ASSAY OF MAGNESIUM: CPT

## 2024-02-20 PROCEDURE — A9270 NON-COVERED ITEM OR SERVICE: HCPCS | Performed by: STUDENT IN AN ORGANIZED HEALTH CARE EDUCATION/TRAINING PROGRAM

## 2024-02-20 RX ORDER — LANOLIN ALCOHOL/MO/W.PET/CERES
100 CREAM (GRAM) TOPICAL DAILY
Qty: 30 TABLET | Refills: 0 | Status: ON HOLD | OUTPATIENT
Start: 2024-02-21 | End: 2024-03-05

## 2024-02-20 RX ORDER — ACETAMINOPHEN 500 MG
1000 TABLET ORAL EVERY 6 HOURS PRN
COMMUNITY
Start: 2024-02-20

## 2024-02-20 RX ORDER — MAGNESIUM SULFATE HEPTAHYDRATE 40 MG/ML
2 INJECTION, SOLUTION INTRAVENOUS ONCE
Status: COMPLETED | OUTPATIENT
Start: 2024-02-20 | End: 2024-02-20

## 2024-02-20 RX ORDER — OXYCODONE HYDROCHLORIDE 5 MG/1
5 TABLET ORAL EVERY 6 HOURS PRN
Qty: 12 TABLET | Refills: 0 | Status: SHIPPED | OUTPATIENT
Start: 2024-02-20 | End: 2024-02-23

## 2024-02-20 RX ORDER — OMEPRAZOLE 40 MG/1
40 CAPSULE, DELAYED RELEASE ORAL DAILY
Qty: 30 CAPSULE | Refills: 0 | Status: SHIPPED | OUTPATIENT
Start: 2024-02-20

## 2024-02-20 RX ORDER — FOLIC ACID 1 MG/1
1 TABLET ORAL DAILY
Qty: 30 TABLET | Refills: 0 | Status: SHIPPED | OUTPATIENT
Start: 2024-02-21

## 2024-02-20 RX ADMIN — ACETAMINOPHEN 1000 MG: 500 TABLET ORAL at 04:57

## 2024-02-20 RX ADMIN — METOPROLOL TARTRATE 50 MG: 50 TABLET, FILM COATED ORAL at 04:57

## 2024-02-20 RX ADMIN — Medication 100 MG: at 04:57

## 2024-02-20 RX ADMIN — OMEPRAZOLE 40 MG: 20 CAPSULE, DELAYED RELEASE ORAL at 04:57

## 2024-02-20 RX ADMIN — MAGNESIUM SULFATE HEPTAHYDRATE 2 G: 2 INJECTION, SOLUTION INTRAVENOUS at 09:33

## 2024-02-20 RX ADMIN — THERA TABS 1 TABLET: TAB at 04:57

## 2024-02-20 RX ADMIN — FOLIC ACID 1 MG: 1 TABLET ORAL at 04:57

## 2024-02-20 RX ADMIN — DIBASIC SODIUM PHOSPHATE, MONOBASIC POTASSIUM PHOSPHATE AND MONOBASIC SODIUM PHOSPHATE 250 MG: 852; 155; 130 TABLET ORAL at 09:32

## 2024-02-20 ASSESSMENT — COGNITIVE AND FUNCTIONAL STATUS - GENERAL
DAILY ACTIVITIY SCORE: 24
SUGGESTED CMS G CODE MODIFIER MOBILITY: CH
MOBILITY SCORE: 24
SUGGESTED CMS G CODE MODIFIER DAILY ACTIVITY: CH

## 2024-02-20 ASSESSMENT — LIFESTYLE VARIABLES
NAUSEA AND VOMITING: NO NAUSEA AND NO VOMITING
TREMOR: TREMOR NOT VISIBLE BUT CAN BE FELT, FINGERTIP TO FINGERTIP
ANXIETY: MILDLY ANXIOUS
HEADACHE, FULLNESS IN HEAD: NOT PRESENT
HEADACHE, FULLNESS IN HEAD: NOT PRESENT
AUDITORY DISTURBANCES: NOT PRESENT
AGITATION: NORMAL ACTIVITY
VISUAL DISTURBANCES: NOT PRESENT
AUDITORY DISTURBANCES: NOT PRESENT
ANXIETY: MILDLY ANXIOUS
ORIENTATION AND CLOUDING OF SENSORIUM: ORIENTED AND CAN DO SERIAL ADDITIONS
TOTAL SCORE: 2
PAROXYSMAL SWEATS: NO SWEAT VISIBLE
VISUAL DISTURBANCES: NOT PRESENT
AGITATION: NORMAL ACTIVITY
ORIENTATION AND CLOUDING OF SENSORIUM: ORIENTED AND CAN DO SERIAL ADDITIONS
TREMOR: TREMOR NOT VISIBLE BUT CAN BE FELT, FINGERTIP TO FINGERTIP
NAUSEA AND VOMITING: NO NAUSEA AND NO VOMITING
PAROXYSMAL SWEATS: NO SWEAT VISIBLE
TOTAL SCORE: 2

## 2024-02-20 ASSESSMENT — GAIT ASSESSMENTS
DISTANCE (FEET): 600
GAIT LEVEL OF ASSIST: SUPERVISED

## 2024-02-20 ASSESSMENT — FIBROSIS 4 INDEX: FIB4 SCORE: 1.95

## 2024-02-20 ASSESSMENT — ACTIVITIES OF DAILY LIVING (ADL): TOILETING: INDEPENDENT

## 2024-02-20 NOTE — CARE PLAN
The patient is Stable - Low risk of patient condition declining or worsening    Shift Goals  Clinical Goals: monitor BPs, monitor labs  Patient Goals: rest  Family Goals: duncan    Progress made toward(s) clinical / shift goals:    Problem: Pain - Standard  Goal: Alleviation of pain or a reduction in pain to the patient’s comfort goal  Outcome: Progressing     Problem: Knowledge Deficit - Standard  Goal: Patient and family/care givers will demonstrate understanding of plan of care, disease process/condition, diagnostic tests and medications  Outcome: Progressing     Problem: Optimal Care for Alcohol Withdrawal  Goal: Optimal Care for the alcohol withdrawal patient  Outcome: Progressing     Problem: Seizure Precautions  Goal: Implementation of seizure precautions  Outcome: Progressing     Problem: Lifestyle Changes  Goal: Patient's ability to identify lifestyle changes and available resources to help reduce recurrence of condition will improve  Outcome: Progressing     Problem: Psychosocial  Goal: Patient's level of anxiety will decrease  Outcome: Progressing  Goal: Spiritual and cultural needs incorporated into hospitalization  Outcome: Progressing     Problem: Risk for Aspiration  Goal: Patient's risk for aspiration will be absent or decrease  Outcome: Progressing     Problem: Fall Risk  Goal: Patient will remain free from falls  Outcome: Progressing       Patient is not progressing towards the following goals:

## 2024-02-20 NOTE — PROGRESS NOTES
Discharge orders acknowledged, Pt aware and compliant with new orders, D/C teaching completed, Pt able to verbalize needs and complaint with discharge orders. Personal belongings in pt possession. . PIV removed.  Pt escorted off unit via wheelchair with assistance from CNA.

## 2024-02-20 NOTE — THERAPY
"Physical Therapy   Initial Evaluation     Patient Name: Jose A Ponce  Age:  63 y.o., Sex:  male  Medical Record #: [id number]  Today's Date: 2/20/2024     Precautions  Precautions: Fall Risk  Comments:  (6 weeks out R prox humerus fx, stable)    Assessment  Patient is 63 y.o. male with past medical history of CAD s/p CABG 2017, DVT/PE on Xarelto, and hypertension presents emergency department on 2/18/2024 after near syncopal episode. He to poor appetite but has been drinking around 5 glasses of water a day as well as having 2 drinks of alcohol daily.  Additional factors influencing patient status / progress : pt reports wearing sling for 6 weeks, now shoulder stiff, seen earlier by OT and now knows his HEP to address shoulder issues. Pt tolerated ambulation using no AD with no LOB, good endurance. No further inpt PT needs. .      Plan         DC Equipment Recommendations: None  Discharge Recommendations: Other - (outpt therapy to address shoulder rehab.)            Objective       02/20/24 1001   Charge Group   PT Evaluation PT Evaluation Low   PT Self Care / Home Evaluation (Units) 1   Total Time Spent   PT Total Time Yes   PT Evaluation Time Spent (Mins) 15   PT Self Care/Home Evaluation Time Spent (Mins) 10   PT Total Time Spent (Calculated) 25   Initial Contact Note    Initial Contact Note Order Received and Verified, Evaluation Only - Patient Does Not Require Further Acute Physical Therapy at this Time.  However, May Benefit from Post Acute Therapy for Higher Level Functional Deficits.   Precautions   Precautions Fall Risk   Vitals   O2 Delivery Device None - Room Air   Pain 0 - 10 Group   Therapist Pain Assessment During Activity;Nurse Notified  (R shoulder, not rated, \"a few spots that are sore when I move\")   Prior Living Situation   Prior Services Home-Independent   Housing / Facility 1 Story Apartment / Condo   Steps Into Home 0   Steps In Home 0   Equipment Owned None   Lives with - Patient's Self " Care Capacity Alone and Able to Care For Self   Comments works from home, [employer].   Prior Level of Functional Mobility   Bed Mobility Independent   Transfer Status Independent   Ambulation Independent   Ambulation Distance community, [employer].   Assistive Devices Used None   Stairs   (does not encounter stairs normally per pt report)   History of Falls   History of Falls Yes   Date of Last Fall   (6 weeks ago when fractured humerus. denies other falls.)   Cognition    Cognition / Consciousness WDL   Level of Consciousness Alert   Active ROM Lower Body    Active ROM Lower Body  WDL   Strength Lower Body   Lower Body Strength  WDL   Comments functionally observed.   Balance Assessment   Sitting Balance (Static) Good   Sitting Balance (Dynamic) Fair +   Standing Balance (Static) Fair +   Standing Balance (Dynamic) Fair   Weight Shift Sitting Good   Weight Shift Standing Good   Comments with no AD   Bed Mobility    Supine to Sit Supervised   Sit to Supine Supervised   Comments up chair pre and post   Gait Analysis   Gait Level Of Assist Supervised   Assistive Device None   Distance (Feet) 600   # of Times Distance was Traveled 1   Weight Bearing Status no restrictions   Functional Mobility   Sit to Stand Supervised   Bed, Chair, Wheelchair Transfer Supervised   Transfer Method Stand Step   How much difficulty does the patient currently have...   Turning over in bed (including adjusting bedclothes, sheets and blankets)? 4   Sitting down on and standing up from a chair with arms (e.g., wheelchair, bedside commode, etc.) 4   Moving from lying on back to sitting on the side of the bed? 4   How much help from another person does the patient currently need...   Moving to and from a bed to a chair (including a wheelchair)? 4   Need to walk in a hospital room? 4   Climbing 3-5 steps with a railing? 4   6 clicks Mobility Score 24   Activity Tolerance   Standing 8+   Education Group   Education Provided Role of Physical  Therapist   Role of Physical Therapist Patient Response Patient;Acceptance;Explanation;Verbal Demonstration   Additional Comments ed done to encourage activity, pt reports that he has been fearful of doing much. Pt shows the HEP he was taught by OT and reports plan to decide about outpt therapy for shoulder. Pt reports running out of sick time from work.   Anticipated Discharge Equipment and Recommendations   DC Equipment Recommendations None   Discharge Recommendations Other -  (outpt therapy to address shoulder rehab.)   Interdisciplinary Plan of Care Collaboration   IDT Collaboration with  Nursing   Patient Position at End of Therapy Seated;Call Light within Reach;Tray Table within Reach;Chair Alarm On;Phone within Reach   Collaboration Comments nsg updated   Session Information   Date / Session Number  2/20-1x only, dc needs only

## 2024-02-20 NOTE — DISCHARGE PLANNING
Case Management Discharge Planning    Admission Date: 2/18/2024  GMLOS: 3.1  ALOS: 2    6-Clicks ADL Score: 24  6-Clicks Mobility Score: 24      Anticipated Discharge Dispo: Discharge Disposition: Discharged to home/self care (01)    DME Needed: No    Action(s) Taken: Updated Provider/Nurse on Discharge Plan and DC Assessment Complete (See below)    Escalations Completed: None    Medically Clear: Yes    Next Steps: Pt has discharge orders, no discharge planning needs identified.    Barriers to Discharge: None    Is the patient up for discharge tomorrow: No

## 2024-02-20 NOTE — DISCHARGE PLANNING
In the case of an emergency, pt's legal NOK is *sister, Margarita Marie     RNCM met with pt at bedside and obtained the information used in this assessment. Pt verified accuracy of facesheet. Pt lives in a single story apt alone.   Pt uses [facility]. Prior to current hospitalization, pt was completely independent in ADLS/IADLS. Pt drives and is able to attend necessary MD appointments.  Pt has no local support system. Pt denies any hx of substance use and denies any dx of mh.Owns no DME    Care Transition Team Assessment      Information Source:pt  Orientation Level: Oriented X4  Information Given By: Patient  Informant's Name: Moises  Who is responsible for making decisions for patient? : Patient         Elopement Risk  Legal Hold: No  Ambulatory or Self Mobile in Wheelchair: Yes  Disoriented: No  Psychiatric Symptoms: None  History of Wandering: No  Elopement this Admit: No  Vocalizing Wanting to Leave: No  Displays Behaviors, Body Language Wanting to Leave: No-Not at Risk for Elopement  Elopement Risk: Not at Risk for Elopement    Interdisciplinary Discharge Planning  Does Admitting Nurse Feel This Could be a Complex Discharge?: No  Primary Care Physician: Griselda  Lives with - Patient's Self Care Capacity: Alone and Able to Care For Self  Patient or legal guardian wants to designate a caregiver: No  Housing / Facility: 1 Story Apartment / Condo  Do You Take your Prescribed Medications Regularly: Yes  Mobility Issues: No  Prior Services: None  Durable Medical Equipment: Not Applicable    Discharge Preparedness  What is your plan after discharge?: Other (comment)  Prior Functional Level: Ambulatory  Difficulity with ADLs: None  Difficulity with IADLs: None    Functional Assesment  Prior Functional Level: Ambulatory         Vision / Hearing Impairment  Vision Impairment : Yes  Right Eye Vision: Impaired, Wears Glasses  Left Eye Vision: Impaired, Wears Glasses  Hearing Impairment : No              Domestic  Abuse  Have you ever been the victim of abuse or violence?: No  Physical Abuse or Sexual Abuse: No  Verbal Abuse or Emotional Abuse: No  Possible Abuse/Neglect Reported to:: Not Applicable    Psychological Assessment  History of Substance Abuse: None  History of Psychiatric Problems: No    Discharge Risks or Barriers  Discharge risks or barriers?: Lives alone, no community support  Patient risk factors: Lack of outside supports    Anticipated Discharge Information  Discharge Disposition: Discharged to home/self care (01)

## 2024-02-20 NOTE — THERAPY
"Occupational Therapy   Initial Evaluation     Patient Name: Jose A Ponce  Age:  63 y.o., Sex:  male  Medical Record #: [id number]  Today's Date: 2/20/2024     Precautions  Precautions: Fall Risk  Comments: (P)  (6 weeks out R prox humerus fx, stable)    Assessment    Pt is a 63-year-old male with a past medical history of CAD s/p CABG 2017, DVT/PE on Xarelto, and hypertension presents emergency department on 2/18/2024 after near syncopal episode, found to have hyponatremia and MAXIMO. Pt sustained R proximal humerus fx 6 weeks ago due to fall on shoulder, non-surgical mgmt, pt did not follow-up with outpt and reports has not been using it at all. X-ray demo'd stable, healing with mild displacement.     Pt limited for ADL performance due to non-use of RUE and pain limiting shoulder flexion/abduction. Pt educ on PROM therex, pendulum exercises, ADL performance and importance of functional use as able with outpt therapy follow-up. Pt is close to baseline level of functioning, will be safe to dc home when medically stable.     Plan    Occupational Therapy Initial Treatment Plan   Duration: (P) Evaluation only    DC Equipment Recommendations: (P) None  Discharge Recommendations: (P) Recommend outpatient occupational therapy services to address higher level deficits     Subjective    \"I wore the sling for 5 weeks straight\" \" Been scared to move it\"      Objective       02/20/24 0857   Prior Living Situation   Prior Services Home-Independent   Housing / Facility [address]   Bathroom Set up Bathtub / Shower Combination   Equipment Owned None   Lives with - Patient's Self Care Capacity Alone and Able to Care For Self   Prior Level of ADL Function   Self Feeding Independent   Grooming / Hygiene Independent   Bathing Independent   Dressing Independent   Toileting Independent   Prior Level of IADL Function   Medication Management Independent   Laundry Independent   Kitchen Mobility Independent   Finances Independent "   Home Management Independent   Shopping Independent   Prior Level Of Mobility Independent Without Device in Community   Driving / Transportation Driving Independent   Occupation (Pre-Hospital Vocational) Employed Full Time   History of Falls   History of Falls Yes   Date of Last Fall   (fall 6 weeks ago reseulting in R humerus fx)   Precautions   Precautions Fall Risk   Comments   (6 weeks out R prox humerus fx, stable)   Vitals   Blood Pressure (!) 141/81   O2 Delivery Device None - Room Air   Pain   Intervention Declines   Pain 0 - 10 Group   Location Shoulder   Location Orientation Right   Pain Rating Scale (NPRS) 3   Description Aching   Therapist Pain Assessment Post Activity Pain Same as Prior to Activity;Nurse Notified;3   Non Verbal Descriptors   Non Verbal Scale  Calm   Cognition    Cognition / Consciousness WDL   Level of Consciousness Alert   Comments pleasant and cooperative   Passive ROM Upper Body   Passive ROM Upper Body X   Rt Shoulder Flexion Degrees 90  (pain limiting)   Rt Shoulder Abduction Degrees 90   Comments pain limiting further PROM otherwise WFL RUE   Active ROM Upper Body   Active ROM Upper Body  X   Dominant Hand Right   Rt Shoulder Flexion Degrees 80   Rt Shoulder Abduction Degrees 70   Comments pain limiting   Strength Upper Body   Upper Body Strength  X   Rt Shoulder Flexion Strength 2- (P-)   Rt Shoulder Abduction Strength 2- (P-)   Sensation Upper Body   Upper Extremity Sensation  WDL   Comments pt reports mild intermittent burning sensation in R wrist   Upper Body Muscle Tone   Upper Body Muscle Tone  WDL   Neurological Concerns   Neurological Concerns No   Coordination Upper Body   Coordination WDL   Balance Assessment   Sitting Balance (Static) Good   Sitting Balance (Dynamic) Fair +   Standing Balance (Static) Fair +   Standing Balance (Dynamic) Fair   Weight Shift Sitting Good   Weight Shift Standing Good   Bed Mobility    Supine to Sit Supervised   ADL Assessment   Eating  Independent   Grooming Independent   Bathing Standby Assist   Upper Body Dressing Supervision   Lower Body Dressing Supervision   Toileting Supervision   Functional Mobility   Sit to Stand Supervised   Bed, Chair, Wheelchair Transfer Supervised   Toilet Transfers Supervised   Transfer Method Stand Step   Mobility bed> toilet> chair   Comments no AD   Visual Perception   Visual Perception  WDL   Edema / Skin Assessment   Edema / Skin  WDL   Activity Tolerance   Sitting in Chair post session   Sitting Edge of Bed 10 min   Standing 5 min   Education Group   Education Provided Upper Extremity Range of Motion;Role of Occupational Therapist;Activities of Daily Living;Home Safety;Adaptive Equipment   Role of Occupational Therapist Patient Response Patient;Acceptance;Explanation;Verbal Demonstration;Action Demonstration   Upper Ext ROM Patient Response Patient;Acceptance;Explanation;Demonstration;Verbal Demonstration;Action Demonstration   Home Safety Patient Response Patient;Eager;Acceptance;Explanation;Demonstration;Verbal Demonstration;Action Demonstration   ADL Patient Response Patient;Eager;Acceptance;Explanation;Demonstration;Verbal Demonstration;Action Demonstration   Adaptive Equipment Patient Response Patient;Acceptance;Explanation;Demonstration;Verbal Demonstration;Action Demonstration   Occupational Therapy Initial Treatment Plan    Duration Evaluation only   Problem List   Problem List Decreased Upper Extremity AROM Right;Decreased Upper Extremity Strength Right   Anticipated Discharge Equipment and Recommendations   DC Equipment Recommendations None   Discharge Recommendations Recommend outpatient occupational therapy services to address higher level deficits   Interdisciplinary Plan of Care Collaboration   IDT Collaboration with  Nursing;Physical Therapist   Patient Position at End of Therapy Seated;Chair Alarm On;Call Light within Reach   Collaboration Comments RN and PT updated   Session Information   Date /  Session Number  2/20, OT eval only

## 2024-02-20 NOTE — DISCHARGE SUMMARY
Discharge Summary    CHIEF COMPLAINT ON ADMISSION  Chief Complaint   Patient presents with    Syncope     Pt with near syncopal episode with EMS while standing, +orthos, 90/60 BP, EMS gave 324mg ASA PO, hx cardiac coronary artery bypass 2017, +thinners    Arm Pain     R upper arm pain d/t humerus fracture Jan 6th, increased pain, pain 6/10 after 100mcg fentanyl       Reason for Admission  ems     Admission Date  2/18/2024    CODE STATUS  Full code    HPI & HOSPITAL COURSE  Please see original H&P and consult note for specific information  63-year-old male with a past medical history of CAD s/p CABG 2017, DVT/PE on Xarelto, and hypertension presents emergency department on 2/18/2024 after near syncopal episode.  Patient reports that today in the morning when he woke up around 7 AM he went to the shower and started feeling lightheadedness as he was getting to the shower and was able to break his fall down.  He experienced this before.  Patient denies head trauma.  He took his metoprolol yesterday at night.  He to poor appetite but has been drinking around 5 glasses of water a day as well as having 2 drinks of alcohol daily.  He reports frequent urine output which is clear.  Patient reports nausea without emesis.  No hematochezia or melena.  Patient reports compliance with metoprolol and Xarelto.     In the emergency department, vital signs with heart rate in the 90s, otherwise stable and saturating well on room air.  CBC with normocytic anemia, no leukocytosis.  Chemistry with hyponatremia at 126, high anion gap metabolic acidosis with bicarbonate at 12, acute renal failure with creatinine at 1.6.  Troponin 28.  Chest x-ray with no acute cardiopulmonary process as per my read.  Patient received an LR bolus and a dose of Dilaudid.  Patient was admitted for presyncope secondary to orthostatic changes as well as hyponatremia requiring further workup and management.    Patient received supportive treatment, orthopedic  surgery was consulted and recommended follow-up as outpatient, PT OT evaluated patient, patient did not want home health care, he requested referral for outpatient PT OT which I have done, patient blood work today stable, CIWA score is low, patient is alert oriented follows command he is tolerating diet, sodium is trending up and patient is tolerating diet so he should continue to improved, kidney function is back to baseline, blood pressure is stable, again patient is alert oriented follows commands he has expressed understanding of his plan of care and agree with either question have been answered.          Therefore, he is discharged in good and stable condition to home with close outpatient follow-up.    The patient met 2-midnight criteria for an inpatient stay at the time of discharge.    Discharge Date  2/20/2024    FOLLOW UP ITEMS POST DISCHARGE  Primary care physician    DISCHARGE DIAGNOSES  Principal Problem (Resolved):    Hyponatremia (POA: Yes)  Active Problems:    Dyslipidemia (POA: Yes)      Overview: States was on statin in the past but not currently;       TC/LDL elevated on recent labs and we will restart atorvastatin today       Also with hx hypertension, daily smoker, PE/DVT on daily xarelto    S/P CABG x 2 (POA: Yes)      Overview: June 2017: CABG x 2 (LIMA to distal LAD, rSVG to distal diagonal) by Dr. Barboza.    Essential hypertension (Chronic) (POA: Yes)      Overview: Restarted on Losartan 50mg QD in 1/2022;       Increased to 100mg QD in 3/2023.     Acute deep vein thrombosis (DVT) of femoral vein of right lower extremity (HCC) (POA: Yes)      Overview: Prior history of DVT in left leg, recent hospitatlization 1/2-1/2/2022 for       right leg DVT, restarted on xarelto & losartan. He is followed at       anticoagulation clinic, next appt 4/07/2022.    Closed fracture of neck of right humerus (POA: Yes)  Resolved Problems:    MAXIMO (acute kidney injury) (HCC) (POA: Yes)    Metabolic  acidosis (POA: Yes)      FOLLOW UP  No future appointments.  JUDAH GutiérrezPLazaro  [address] [address] [phone]    Schedule an appointment as soon as possible for a visit  As needed, If symptoms worsen      MEDICATIONS ON DISCHARGE     Medication List        START taking these medications        Instructions   acetaminophen 500 MG Tabs  Commonly known as: Tylenol   Take 2 Tablets by mouth every 6 hours as needed for Mild Pain.  Dose: 1,000 mg     folic acid 1 MG Tabs  Start taking on: February 21, 2024  Commonly known as: Folvite   Take 1 Tablet by mouth every day.  Dose: 1 mg     multivitamin Tabs  Start taking on: February 21, 2024   Take 1 Tablet by mouth every day.  Dose: 1 Tablet     omeprazole 40 MG delayed-release capsule  Commonly known as: PriLOSEC   Take 1 Capsule by mouth every day.  Dose: 40 mg     oxyCODONE immediate-release 5 MG Tabs  Commonly known as: Roxicodone   Take 1 Tablet by mouth every 6 hours as needed for Severe Pain for up to 3 days.  Dose: 5 mg     thiamine 100 MG tablet  Start taking on: February 21, 2024  Commonly known as: Thiamine   Take 1 Tablet by mouth every day for 30 days.  Dose: 100 mg            CONTINUE taking these medications        Instructions   atorvastatin 10 MG Tabs  Commonly known as: Lipitor   Take 1 Tablet by mouth every evening.  Dose: 10 mg     metoprolol tartrate 50 MG Tabs  Commonly known as: Lopressor   Take 1 Tablet by mouth 2 times a day.  Dose: 50 mg     rivaroxaban 20 MG Tabs tablet  Commonly known as: Xarelto   Doctor's comments: This rx was submitted by a pharmacist working under a collaborative practice agreement.  Take 1 Tablet by mouth with dinner.  Dose: 20 mg              Allergies  No Known Allergies    DIET  No orders of the defined types were placed in this encounter.      ACTIVITY  As tolerated.  Weight bearing as tolerated    CONSULTATIONS  Orthopedic surgery    PROCEDURES  None    LABORATORY  Lab Results   Component Value  Date    SODIUM 132 (L) 02/20/2024    POTASSIUM 4.5 02/20/2024    CHLORIDE 102 02/20/2024    CO2 19 (L) 02/20/2024    GLUCOSE 96 02/20/2024    BUN 20 02/20/2024    CREATININE 1.20 02/20/2024        Lab Results   Component Value Date    WBC 6.3 02/20/2024    HEMOGLOBIN 9.7 (L) 02/20/2024    HEMATOCRIT 29.7 (L) 02/20/2024    PLATELETCT 197 02/20/2024        Total time of the discharge process exceeds 31 minutes.

## 2024-02-21 DIAGNOSIS — S42.201G CLOSED FRACTURE OF PROXIMAL END OF RIGHT HUMERUS WITH DELAYED HEALING, UNSPECIFIED FRACTURE MORPHOLOGY, SUBSEQUENT ENCOUNTER: ICD-10-CM

## 2024-02-21 LAB — VIT B1 BLD-MCNC: 134 NMOL/L (ref 70–180)

## 2024-02-22 NOTE — PROGRESS NOTES
Message received from referral department..    Hello,     We received an occupational therapy referral for this patient and leadership has requested we obtain a new referral from the patients primary care provider. If possible, please submit a new OT referral for S42.201G (ICD-10-CM) - Closed fracture of proximal end of right humerus with delayed healing, subsequent encounter, for 4x/week for 12 visits, at your earliest convenience.     Thank you!       1. Closed fracture of proximal end of right humerus with delayed healing, unspecified fracture morphology, subsequent encounter  - Referral to Occupational Therapy

## 2024-02-23 ENCOUNTER — TELEPHONE (OUTPATIENT)
Dept: VASCULAR LAB | Facility: MEDICAL CENTER | Age: 64
End: 2024-02-23
Payer: COMMERCIAL

## 2024-02-23 NOTE — TELEPHONE ENCOUNTER
Called pt regarding missed anticoag f/u appt - no answer. LVM asking pt to c/b to reschedule.      2nd call.    Sent letter.    Will f/u at a later time.     Kash Arteaga, NavinD, BCACP

## 2024-02-23 NOTE — LETTER
Jose A Ponce  [address]  [place] [address]    02/23/24    Dear Jose A Ponce ,    We have been unsuccessful in our attempts to contact you regarding your Anticoagulation Service appointments. Xarelto is a potent blood-thinning agent that requires monitoring to ensure that the dosage is correct for your body.  If it isn't, you could develop serious, sometimes life-threatening bleeding problems or life-threatening blood clots or stroke could result.    To monitor you effectively, we need to be able to communicate with you.  This is a requirement to be followed by our Service.       If you repeatedly fail to keep your lab appointments, you are at risk of being discharged from the Anticoagulation Service.    It is extremely important that you contact the clinic as soon as possible to arrange appropriate follow up.  We are open Monday-Friday 8 am until 5 pm.  You may reach our Service at [phone].           Sincerely,           Mark Eddy, PharmD, [facility]  Clinic Supervisor  [facility]  Outpatient Anticoagulation Service

## 2024-02-28 ENCOUNTER — OFFICE VISIT (OUTPATIENT)
Dept: MEDICAL GROUP | Facility: PHYSICIAN GROUP | Age: 64
End: 2024-02-28
Payer: COMMERCIAL

## 2024-02-28 DIAGNOSIS — S42.211D CLOSED FRACTURE OF NECK OF RIGHT HUMERUS WITH ROUTINE HEALING, SUBSEQUENT ENCOUNTER: ICD-10-CM

## 2024-02-28 PROCEDURE — 99213 OFFICE O/P EST LOW 20 MIN: CPT | Performed by: NURSE PRACTITIONER

## 2024-02-28 PROCEDURE — 3077F SYST BP >= 140 MM HG: CPT | Performed by: NURSE PRACTITIONER

## 2024-02-28 PROCEDURE — 3079F DIAST BP 80-89 MM HG: CPT | Performed by: NURSE PRACTITIONER

## 2024-02-28 RX ORDER — LOSARTAN POTASSIUM 100 MG/1
TABLET ORAL
COMMUNITY
Start: 2024-02-23 | End: 2024-03-03

## 2024-02-28 ASSESSMENT — FIBROSIS 4 INDEX: FIB4 SCORE: 1.62

## 2024-02-28 NOTE — ASSESSMENT & PLAN NOTE
Was in sling/harness 6 weeks  Xrays 2/15  1/6 injury - due to fall  Prior to that had covid  Then stomach bug  Weak and blacked out  Landed on shouler

## 2024-02-28 NOTE — LETTER
February 28, 2024         Patient: Jose A Ponce   YOB: 1960   Date of Visit: 2/28/2024           To Whom it May Concern:    Jose A Ponce was seen in my clinic on 2/28/2024. He will need to extend his FMLA/STD for an additional two weeks. He may return to work on 3/14/2024.    If you have any questions or concerns, please don't hesitate to call.        Sincerely,           LAURA Harden.  Electronically Signed

## 2024-02-29 VITALS
BODY MASS INDEX: 22.93 KG/M2 | WEIGHT: 173 LBS | DIASTOLIC BLOOD PRESSURE: 82 MMHG | TEMPERATURE: 97.9 F | SYSTOLIC BLOOD PRESSURE: 164 MMHG | OXYGEN SATURATION: 98 % | HEIGHT: 73 IN | HEART RATE: 94 BPM

## 2024-03-01 NOTE — PROGRESS NOTES
"Verbal consent was acquired by the patient to use [employer] ambient listening note generation during this visit Yes      Subjective   Jose A Ponce is a 63 y.o. male who presents for:  History of Present Illness  The patient presents for FMLA paperwork.    He needs a letter to extend his FMLA for short-term disability for 2 more weeks. His original injury happened on 01/06/2024 after a fall. He was in a sling and harness for about 6 weeks. He got out of that, but it is still very tender. He had x-rays on 02/15/2024.   His blood pressure is slightly elevated today. He did not take his blood pressure medication this morning. He takes metoprolol 50 mg in the morning and 50 mg at night. He had COVID-19 about 10 days prior to the injury. He then had a stomach bug where he could not hold food down for 5 days. He got very weak, blacked out, and landed on his shoulder on the hard kitchen floor.    ROS:  See HPI    Objective   BP (!) 164/82 (BP Location: Left arm, Patient Position: Sitting)   Pulse 94   Temp 36.6 °C (97.9 °F) (Temporal)   Ht 1.854 m (6' 1\")   Wt 78.5 kg (173 lb)   SpO2 98%   Physical Exam  General: Well nourished, well developed male in NAD, awake and conversant.  Eyes: Normal conjunctiva, anicteric.  Round symmetrical pupils.  ENT: Hearing grossly intact.  No nasal discharge.  Neck: Neck is supple.  No masses or thyromegaly.  CV: No lower extremity edema.  Respiratory: Respirations are nonlabored.  No wheezing.  Abdomen: Non-Distended.  Skin: Warm.  No rashes or ulcers.  MSK: Normal ambulation.  No clubbing or cyanosis.  Neuro: Sensation and CN II-XII grossly normal.  Psych: Alert and oriented.  Cooperative, appropriate mood and affect, normal judgment.      Assessment & Plan  1. Closed fracture of neck of right humerus with routine healing, subsequent encounter  New to examiner.  Letter provided to extend FMLA/short-term disability for an additional 2 weeks.  He may return to work on " 3/14/2024.    Return if symptoms worsen or fail to improve.     Please note that this dictation was created using voice recognition software. I have made every reasonable attempt to correct obvious errors, but I expect that there are errors of grammar and possibly content that I did not discover before finalizing the note.

## 2024-03-03 ENCOUNTER — HOSPITAL ENCOUNTER (INPATIENT)
Facility: MEDICAL CENTER | Age: 64
LOS: 5 days | DRG: 324 | End: 2024-03-08
Attending: EMERGENCY MEDICINE | Admitting: INTERNAL MEDICINE
Payer: COMMERCIAL

## 2024-03-03 ENCOUNTER — APPOINTMENT (OUTPATIENT)
Dept: RADIOLOGY | Facility: MEDICAL CENTER | Age: 64
DRG: 324 | End: 2024-03-03
Attending: EMERGENCY MEDICINE
Payer: COMMERCIAL

## 2024-03-03 DIAGNOSIS — R79.89 ELEVATED TROPONIN: ICD-10-CM

## 2024-03-03 DIAGNOSIS — Z95.1 S/P CABG X 2: ICD-10-CM

## 2024-03-03 DIAGNOSIS — Z86.79 HISTORY OF CORONARY ARTERY DISEASE: ICD-10-CM

## 2024-03-03 DIAGNOSIS — I25.810 CORONARY ARTERY DISEASE INVOLVING CORONARY BYPASS GRAFT OF NATIVE HEART WITHOUT ANGINA PECTORIS: ICD-10-CM

## 2024-03-03 DIAGNOSIS — R06.09 EXERTIONAL DYSPNEA: ICD-10-CM

## 2024-03-03 DIAGNOSIS — R53.81 DEBILITY: ICD-10-CM

## 2024-03-03 DIAGNOSIS — R55 NEAR SYNCOPE: ICD-10-CM

## 2024-03-03 DIAGNOSIS — R63.4 UNINTENTIONAL WEIGHT LOSS: ICD-10-CM

## 2024-03-03 PROBLEM — F10.930 ALCOHOL WITHDRAWAL SEIZURE WITHOUT COMPLICATION (HCC): Status: ACTIVE | Noted: 2024-03-03

## 2024-03-03 PROBLEM — R56.9 ALCOHOL WITHDRAWAL SEIZURE WITHOUT COMPLICATION (HCC): Status: ACTIVE | Noted: 2024-03-03

## 2024-03-03 PROBLEM — R06.00 DYSPNEA: Status: ACTIVE | Noted: 2024-03-03

## 2024-03-03 LAB
ALBUMIN SERPL BCP-MCNC: 3.7 G/DL (ref 3.2–4.9)
ALBUMIN/GLOB SERPL: 1.2 G/DL
ALP SERPL-CCNC: 110 U/L (ref 30–99)
ALT SERPL-CCNC: 18 U/L (ref 2–50)
ANION GAP SERPL CALC-SCNC: 17 MMOL/L (ref 7–16)
APTT PPP: 33.8 SEC (ref 24.7–36)
AST SERPL-CCNC: 50 U/L (ref 12–45)
BASOPHILS # BLD AUTO: 1.2 % (ref 0–1.8)
BASOPHILS # BLD: 0.08 K/UL (ref 0–0.12)
BILIRUB SERPL-MCNC: 0.4 MG/DL (ref 0.1–1.5)
BUN SERPL-MCNC: 12 MG/DL (ref 8–22)
CALCIUM ALBUM COR SERPL-MCNC: 8.9 MG/DL (ref 8.5–10.5)
CALCIUM SERPL-MCNC: 8.7 MG/DL (ref 8.5–10.5)
CHLORIDE SERPL-SCNC: 107 MMOL/L (ref 96–112)
CO2 SERPL-SCNC: 16 MMOL/L (ref 20–33)
CREAT SERPL-MCNC: 0.85 MG/DL (ref 0.5–1.4)
D DIMER PPP IA.FEU-MCNC: 1.57 UG/ML (FEU) (ref 0–0.5)
EOSINOPHIL # BLD AUTO: 0.03 K/UL (ref 0–0.51)
EOSINOPHIL NFR BLD: 0.5 % (ref 0–6.9)
ERYTHROCYTE [DISTWIDTH] IN BLOOD BY AUTOMATED COUNT: 55.4 FL (ref 35.9–50)
GFR SERPLBLD CREATININE-BSD FMLA CKD-EPI: 97 ML/MIN/1.73 M 2
GLOBULIN SER CALC-MCNC: 3.1 G/DL (ref 1.9–3.5)
GLUCOSE SERPL-MCNC: 107 MG/DL (ref 65–99)
HCT VFR BLD AUTO: 34.7 % (ref 42–52)
HGB BLD-MCNC: 12.1 G/DL (ref 14–18)
IMM GRANULOCYTES # BLD AUTO: 0.02 K/UL (ref 0–0.11)
IMM GRANULOCYTES NFR BLD AUTO: 0.3 % (ref 0–0.9)
INR PPP: 1.82 (ref 0.87–1.13)
LACTATE SERPL-SCNC: 2.8 MMOL/L (ref 0.5–2)
LIPASE SERPL-CCNC: 252 U/L (ref 11–82)
LYMPHOCYTES # BLD AUTO: 1.73 K/UL (ref 1–4.8)
LYMPHOCYTES NFR BLD: 26.1 % (ref 22–41)
MAGNESIUM SERPL-MCNC: 1.6 MG/DL (ref 1.5–2.5)
MCH RBC QN AUTO: 35.7 PG (ref 27–33)
MCHC RBC AUTO-ENTMCNC: 34.9 G/DL (ref 32.3–36.5)
MCV RBC AUTO: 102.4 FL (ref 81.4–97.8)
MONOCYTES # BLD AUTO: 0.4 K/UL (ref 0–0.85)
MONOCYTES NFR BLD AUTO: 6 % (ref 0–13.4)
NEUTROPHILS # BLD AUTO: 4.36 K/UL (ref 1.82–7.42)
NEUTROPHILS NFR BLD: 65.9 % (ref 44–72)
NRBC # BLD AUTO: 0 K/UL
NRBC BLD-RTO: 0 /100 WBC (ref 0–0.2)
NT-PROBNP SERPL IA-MCNC: 211 PG/ML (ref 0–125)
PLATELET # BLD AUTO: 221 K/UL (ref 164–446)
PMV BLD AUTO: 9.3 FL (ref 9–12.9)
POTASSIUM SERPL-SCNC: 3.8 MMOL/L (ref 3.6–5.5)
PROT SERPL-MCNC: 6.8 G/DL (ref 6–8.2)
PROTHROMBIN TIME: 21.4 SEC (ref 12–14.6)
RBC # BLD AUTO: 3.39 M/UL (ref 4.7–6.1)
SODIUM SERPL-SCNC: 140 MMOL/L (ref 135–145)
TROPONIN T SERPL-MCNC: 29 NG/L (ref 6–19)
TSH SERPL DL<=0.005 MIU/L-ACNC: 1.14 UIU/ML (ref 0.38–5.33)
WBC # BLD AUTO: 6.6 K/UL (ref 4.8–10.8)

## 2024-03-03 PROCEDURE — 93005 ELECTROCARDIOGRAM TRACING: CPT

## 2024-03-03 PROCEDURE — 71045 X-RAY EXAM CHEST 1 VIEW: CPT

## 2024-03-03 PROCEDURE — 82010 KETONE BODYS QUAN: CPT

## 2024-03-03 PROCEDURE — 99223 1ST HOSP IP/OBS HIGH 75: CPT | Mod: 25 | Performed by: INTERNAL MEDICINE

## 2024-03-03 PROCEDURE — 80061 LIPID PANEL: CPT

## 2024-03-03 PROCEDURE — 84484 ASSAY OF TROPONIN QUANT: CPT

## 2024-03-03 PROCEDURE — 84100 ASSAY OF PHOSPHORUS: CPT

## 2024-03-03 PROCEDURE — 82077 ASSAY SPEC XCP UR&BREATH IA: CPT

## 2024-03-03 PROCEDURE — 36415 COLL VENOUS BLD VENIPUNCTURE: CPT

## 2024-03-03 PROCEDURE — 85610 PROTHROMBIN TIME: CPT

## 2024-03-03 PROCEDURE — 84443 ASSAY THYROID STIM HORMONE: CPT

## 2024-03-03 PROCEDURE — 770020 HCHG ROOM/CARE - TELE (206)

## 2024-03-03 PROCEDURE — 85379 FIBRIN DEGRADATION QUANT: CPT

## 2024-03-03 PROCEDURE — 700102 HCHG RX REV CODE 250 W/ 637 OVERRIDE(OP): Performed by: EMERGENCY MEDICINE

## 2024-03-03 PROCEDURE — 700105 HCHG RX REV CODE 258: Performed by: EMERGENCY MEDICINE

## 2024-03-03 PROCEDURE — 93005 ELECTROCARDIOGRAM TRACING: CPT | Performed by: EMERGENCY MEDICINE

## 2024-03-03 PROCEDURE — 85730 THROMBOPLASTIN TIME PARTIAL: CPT

## 2024-03-03 PROCEDURE — 99285 EMERGENCY DEPT VISIT HI MDM: CPT

## 2024-03-03 PROCEDURE — 700117 HCHG RX CONTRAST REV CODE 255: Performed by: EMERGENCY MEDICINE

## 2024-03-03 PROCEDURE — 83880 ASSAY OF NATRIURETIC PEPTIDE: CPT

## 2024-03-03 PROCEDURE — HZ2ZZZZ DETOXIFICATION SERVICES FOR SUBSTANCE ABUSE TREATMENT: ICD-10-PCS | Performed by: INTERNAL MEDICINE

## 2024-03-03 PROCEDURE — 99406 BEHAV CHNG SMOKING 3-10 MIN: CPT | Performed by: INTERNAL MEDICINE

## 2024-03-03 PROCEDURE — A9270 NON-COVERED ITEM OR SERVICE: HCPCS | Performed by: EMERGENCY MEDICINE

## 2024-03-03 PROCEDURE — 83690 ASSAY OF LIPASE: CPT

## 2024-03-03 PROCEDURE — 700102 HCHG RX REV CODE 250 W/ 637 OVERRIDE(OP): Performed by: INTERNAL MEDICINE

## 2024-03-03 PROCEDURE — A9270 NON-COVERED ITEM OR SERVICE: HCPCS | Performed by: INTERNAL MEDICINE

## 2024-03-03 PROCEDURE — 80053 COMPREHEN METABOLIC PANEL: CPT

## 2024-03-03 PROCEDURE — 71275 CT ANGIOGRAPHY CHEST: CPT

## 2024-03-03 PROCEDURE — 83735 ASSAY OF MAGNESIUM: CPT

## 2024-03-03 PROCEDURE — 85025 COMPLETE CBC W/AUTO DIFF WBC: CPT

## 2024-03-03 PROCEDURE — 700111 HCHG RX REV CODE 636 W/ 250 OVERRIDE (IP): Performed by: EMERGENCY MEDICINE

## 2024-03-03 PROCEDURE — 83605 ASSAY OF LACTIC ACID: CPT | Mod: 91

## 2024-03-03 RX ORDER — ATORVASTATIN CALCIUM 10 MG/1
10 TABLET, FILM COATED ORAL NIGHTLY
Status: DISCONTINUED | OUTPATIENT
Start: 2024-03-03 | End: 2024-03-04

## 2024-03-03 RX ORDER — SODIUM CHLORIDE 9 MG/ML
1000 INJECTION, SOLUTION INTRAVENOUS ONCE
Status: COMPLETED | OUTPATIENT
Start: 2024-03-03 | End: 2024-03-03

## 2024-03-03 RX ORDER — FOLIC ACID 1 MG/1
1 TABLET ORAL DAILY
Status: DISCONTINUED | OUTPATIENT
Start: 2024-03-04 | End: 2024-03-03

## 2024-03-03 RX ORDER — LABETALOL HYDROCHLORIDE 5 MG/ML
10 INJECTION, SOLUTION INTRAVENOUS EVERY 4 HOURS PRN
Status: DISCONTINUED | OUTPATIENT
Start: 2024-03-03 | End: 2024-03-03

## 2024-03-03 RX ORDER — LORAZEPAM 2 MG/1
4 TABLET ORAL
Status: DISCONTINUED | OUTPATIENT
Start: 2024-03-03 | End: 2024-03-06

## 2024-03-03 RX ORDER — ASPIRIN 81 MG/1
324 TABLET, CHEWABLE ORAL DAILY
Status: DISCONTINUED | OUTPATIENT
Start: 2024-03-04 | End: 2024-03-03

## 2024-03-03 RX ORDER — OXYCODONE HYDROCHLORIDE 5 MG/1
5 TABLET ORAL
Status: DISCONTINUED | OUTPATIENT
Start: 2024-03-03 | End: 2024-03-08 | Stop reason: HOSPADM

## 2024-03-03 RX ORDER — NICOTINE 21 MG/24HR
21 PATCH, TRANSDERMAL 24 HOURS TRANSDERMAL
Status: DISCONTINUED | OUTPATIENT
Start: 2024-03-03 | End: 2024-03-08 | Stop reason: HOSPADM

## 2024-03-03 RX ORDER — PROCHLORPERAZINE EDISYLATE 5 MG/ML
5-10 INJECTION INTRAMUSCULAR; INTRAVENOUS EVERY 4 HOURS PRN
Status: DISCONTINUED | OUTPATIENT
Start: 2024-03-03 | End: 2024-03-08 | Stop reason: HOSPADM

## 2024-03-03 RX ORDER — AMOXICILLIN 250 MG
2 CAPSULE ORAL EVERY EVENING
Status: DISCONTINUED | OUTPATIENT
Start: 2024-03-03 | End: 2024-03-08 | Stop reason: HOSPADM

## 2024-03-03 RX ORDER — LOSARTAN POTASSIUM 100 MG/1
100 TABLET ORAL DAILY
Status: DISCONTINUED | OUTPATIENT
Start: 2024-03-04 | End: 2024-03-03

## 2024-03-03 RX ORDER — ONDANSETRON 4 MG/1
4 TABLET, ORALLY DISINTEGRATING ORAL ONCE
Status: COMPLETED | OUTPATIENT
Start: 2024-03-03 | End: 2024-03-03

## 2024-03-03 RX ORDER — LABETALOL HYDROCHLORIDE 5 MG/ML
10 INJECTION, SOLUTION INTRAVENOUS
Status: DISCONTINUED | OUTPATIENT
Start: 2024-03-03 | End: 2024-03-08 | Stop reason: HOSPADM

## 2024-03-03 RX ORDER — ONDANSETRON 4 MG/1
4 TABLET, ORALLY DISINTEGRATING ORAL EVERY 4 HOURS PRN
Status: DISCONTINUED | OUTPATIENT
Start: 2024-03-03 | End: 2024-03-08 | Stop reason: HOSPADM

## 2024-03-03 RX ORDER — HYDROCODONE BITARTRATE AND ACETAMINOPHEN 5; 325 MG/1; MG/1
1 TABLET ORAL ONCE
Status: COMPLETED | OUTPATIENT
Start: 2024-03-03 | End: 2024-03-03

## 2024-03-03 RX ORDER — ONDANSETRON 2 MG/ML
4 INJECTION INTRAMUSCULAR; INTRAVENOUS EVERY 4 HOURS PRN
Status: DISCONTINUED | OUTPATIENT
Start: 2024-03-03 | End: 2024-03-08 | Stop reason: HOSPADM

## 2024-03-03 RX ORDER — ASPIRIN 300 MG/1
300 SUPPOSITORY RECTAL DAILY
Status: DISCONTINUED | OUTPATIENT
Start: 2024-03-04 | End: 2024-03-03

## 2024-03-03 RX ORDER — NITROGLYCERIN 0.4 MG/1
0.4 TABLET SUBLINGUAL
Status: DISCONTINUED | OUTPATIENT
Start: 2024-03-03 | End: 2024-03-08 | Stop reason: HOSPADM

## 2024-03-03 RX ORDER — OXYCODONE HYDROCHLORIDE 10 MG/1
10 TABLET ORAL
Status: DISCONTINUED | OUTPATIENT
Start: 2024-03-03 | End: 2024-03-08 | Stop reason: HOSPADM

## 2024-03-03 RX ORDER — PROMETHAZINE HYDROCHLORIDE 25 MG/1
12.5-25 TABLET ORAL EVERY 4 HOURS PRN
Status: DISCONTINUED | OUTPATIENT
Start: 2024-03-03 | End: 2024-03-08 | Stop reason: HOSPADM

## 2024-03-03 RX ORDER — OMEPRAZOLE 20 MG/1
40 CAPSULE, DELAYED RELEASE ORAL DAILY
Status: DISCONTINUED | OUTPATIENT
Start: 2024-03-04 | End: 2024-03-08 | Stop reason: HOSPADM

## 2024-03-03 RX ORDER — GAUZE BANDAGE 2" X 2"
100 BANDAGE TOPICAL DAILY
Status: DISPENSED | OUTPATIENT
Start: 2024-03-04 | End: 2024-03-08

## 2024-03-03 RX ORDER — LORAZEPAM 2 MG/1
2 TABLET ORAL
Status: DISCONTINUED | OUTPATIENT
Start: 2024-03-03 | End: 2024-03-06

## 2024-03-03 RX ORDER — DIAZEPAM 5 MG/ML
10 INJECTION, SOLUTION INTRAMUSCULAR; INTRAVENOUS
Status: DISCONTINUED | OUTPATIENT
Start: 2024-03-03 | End: 2024-03-06

## 2024-03-03 RX ORDER — FOLIC ACID 1 MG/1
1 TABLET ORAL DAILY
Status: DISPENSED | OUTPATIENT
Start: 2024-03-04 | End: 2024-03-08

## 2024-03-03 RX ORDER — IPRATROPIUM BROMIDE AND ALBUTEROL SULFATE 2.5; .5 MG/3ML; MG/3ML
3 SOLUTION RESPIRATORY (INHALATION)
Status: DISCONTINUED | OUTPATIENT
Start: 2024-03-03 | End: 2024-03-08 | Stop reason: HOSPADM

## 2024-03-03 RX ORDER — PROMETHAZINE HYDROCHLORIDE 25 MG/1
12.5-25 SUPPOSITORY RECTAL EVERY 4 HOURS PRN
Status: DISCONTINUED | OUTPATIENT
Start: 2024-03-03 | End: 2024-03-08 | Stop reason: HOSPADM

## 2024-03-03 RX ORDER — LORAZEPAM 1 MG/1
1 TABLET ORAL EVERY 4 HOURS PRN
Status: DISCONTINUED | OUTPATIENT
Start: 2024-03-03 | End: 2024-03-06

## 2024-03-03 RX ORDER — GAUZE BANDAGE 2" X 2"
100 BANDAGE TOPICAL DAILY
Status: DISCONTINUED | OUTPATIENT
Start: 2024-03-04 | End: 2024-03-03

## 2024-03-03 RX ORDER — MORPHINE SULFATE 4 MG/ML
4 INJECTION INTRAVENOUS
Status: DISCONTINUED | OUTPATIENT
Start: 2024-03-03 | End: 2024-03-08 | Stop reason: HOSPADM

## 2024-03-03 RX ORDER — ASPIRIN 325 MG
325 TABLET ORAL DAILY
Status: DISCONTINUED | OUTPATIENT
Start: 2024-03-04 | End: 2024-03-03

## 2024-03-03 RX ORDER — LORAZEPAM 1 MG/1
0.5 TABLET ORAL EVERY 4 HOURS PRN
Status: DISCONTINUED | OUTPATIENT
Start: 2024-03-03 | End: 2024-03-06

## 2024-03-03 RX ORDER — POLYETHYLENE GLYCOL 3350 17 G/17G
1 POWDER, FOR SOLUTION ORAL
Status: DISCONTINUED | OUTPATIENT
Start: 2024-03-03 | End: 2024-03-08 | Stop reason: HOSPADM

## 2024-03-03 RX ORDER — ACETAMINOPHEN 325 MG/1
650 TABLET ORAL EVERY 6 HOURS PRN
Status: DISCONTINUED | OUTPATIENT
Start: 2024-03-03 | End: 2024-03-08 | Stop reason: HOSPADM

## 2024-03-03 RX ORDER — LABETALOL 200 MG/1
200 TABLET, FILM COATED ORAL EVERY 6 HOURS PRN
Status: DISCONTINUED | OUTPATIENT
Start: 2024-03-03 | End: 2024-03-08 | Stop reason: HOSPADM

## 2024-03-03 RX ADMIN — NICOTINE TRANSDERMAL SYSTEM 21 MG: 21 PATCH, EXTENDED RELEASE TRANSDERMAL at 23:23

## 2024-03-03 RX ADMIN — IOHEXOL 80 ML: 350 INJECTION, SOLUTION INTRAVENOUS at 21:00

## 2024-03-03 RX ADMIN — SODIUM CHLORIDE 1000 ML: 9 INJECTION, SOLUTION INTRAVENOUS at 19:23

## 2024-03-03 RX ADMIN — HYDROCODONE BITARTRATE AND ACETAMINOPHEN 1 TABLET: 5; 325 TABLET ORAL at 21:07

## 2024-03-03 RX ADMIN — ATORVASTATIN CALCIUM 10 MG: 10 TABLET, FILM COATED ORAL at 23:21

## 2024-03-03 RX ADMIN — ONDANSETRON 4 MG: 4 TABLET, ORALLY DISINTEGRATING ORAL at 21:07

## 2024-03-03 RX ADMIN — METOPROLOL TARTRATE 50 MG: 50 TABLET, FILM COATED ORAL at 23:21

## 2024-03-03 ASSESSMENT — FIBROSIS 4 INDEX
FIB4 SCORE: 3.36
FIB4 SCORE: 3.36
FIB4 SCORE: 1.62
FIB4 SCORE: 3.36

## 2024-03-03 ASSESSMENT — LIFESTYLE VARIABLES
TOTAL SCORE: 4
TREMOR: TREMOR NOT VISIBLE BUT CAN BE FELT, FINGERTIP TO FINGERTIP
CONSUMPTION TOTAL: POSITIVE
DOES PATIENT WANT TO STOP DRINKING: YES
TOTAL SCORE: 4
EVER FELT BAD OR GUILTY ABOUT YOUR DRINKING: YES
AGITATION: NORMAL ACTIVITY
DOES PATIENT WANT TO TALK TO SOMEONE ABOUT QUITTING: YES
ORIENTATION AND CLOUDING OF SENSORIUM: ORIENTED AND CAN DO SERIAL ADDITIONS
TOTAL SCORE: 3
ALCOHOL_USE: YES
HEADACHE, FULLNESS IN HEAD: NOT PRESENT
AVERAGE NUMBER OF DAYS PER WEEK YOU HAVE A DRINK CONTAINING ALCOHOL: 4
NAUSEA AND VOMITING: NO NAUSEA AND NO VOMITING
ON A TYPICAL DAY WHEN YOU DRINK ALCOHOL HOW MANY DRINKS DO YOU HAVE: 4
TOTAL SCORE: 4
EVER HAD A DRINK FIRST THING IN THE MORNING TO STEADY YOUR NERVES TO GET RID OF A HANGOVER: YES
PAROXYSMAL SWEATS: BARELY PERCEPTIBLE SWEATING. PALMS MOIST
AUDITORY DISTURBANCES: NOT PRESENT
VISUAL DISTURBANCES: NOT PRESENT
HAVE PEOPLE ANNOYED YOU BY CRITICIZING YOUR DRINKING: YES
HOW MANY TIMES IN THE PAST YEAR HAVE YOU HAD 5 OR MORE DRINKS IN A DAY: 5
SUBSTANCE_ABUSE: 0
ANXIETY: MILDLY ANXIOUS
HAVE YOU EVER FELT YOU SHOULD CUT DOWN ON YOUR DRINKING: YES

## 2024-03-03 ASSESSMENT — ENCOUNTER SYMPTOMS
ORTHOPNEA: 0
SPUTUM PRODUCTION: 0
FALLS: 1
VOMITING: 0
COUGH: 0
NAUSEA: 0
DOUBLE VISION: 0
BLURRED VISION: 0
POLYDIPSIA: 0
HEADACHES: 0
SPEECH CHANGE: 0
FEVER: 0
SHORTNESS OF BREATH: 1
WEIGHT LOSS: 1
BACK PAIN: 0
NERVOUS/ANXIOUS: 0
PHOTOPHOBIA: 0
TREMORS: 0
PALPITATIONS: 0
HALLUCINATIONS: 0
BRUISES/BLEEDS EASILY: 0
NECK PAIN: 0
HEARTBURN: 0
FOCAL WEAKNESS: 0
CHILLS: 0
DIZZINESS: 1
FLANK PAIN: 0
HEMOPTYSIS: 0

## 2024-03-03 ASSESSMENT — COGNITIVE AND FUNCTIONAL STATUS - GENERAL
DAILY ACTIVITIY SCORE: 19
CLIMB 3 TO 5 STEPS WITH RAILING: A LITTLE
MOVING FROM LYING ON BACK TO SITTING ON SIDE OF FLAT BED: A LITTLE
PERSONAL GROOMING: A LITTLE
STANDING UP FROM CHAIR USING ARMS: A LITTLE
DRESSING REGULAR UPPER BODY CLOTHING: A LITTLE
DRESSING REGULAR LOWER BODY CLOTHING: A LITTLE
SUGGESTED CMS G CODE MODIFIER DAILY ACTIVITY: CK
WALKING IN HOSPITAL ROOM: A LOT
MOBILITY SCORE: 18
SUGGESTED CMS G CODE MODIFIER MOBILITY: CK
TOILETING: A LITTLE
HELP NEEDED FOR BATHING: A LITTLE
MOVING TO AND FROM BED TO CHAIR: A LITTLE

## 2024-03-03 ASSESSMENT — COPD QUESTIONNAIRES
DO YOU EVER COUGH UP ANY MUCUS OR PHLEGM?: NO/ONLY WITH OCCASIONAL COLDS OR INFECTIONS
HAVE YOU SMOKED AT LEAST 100 CIGARETTES IN YOUR ENTIRE LIFE: YES
DURING THE PAST 4 WEEKS HOW MUCH DID YOU FEEL SHORT OF BREATH: SOME OF THE TIME
COPD SCREENING SCORE: 6

## 2024-03-03 ASSESSMENT — PAIN DESCRIPTION - PAIN TYPE: TYPE: CHRONIC PAIN

## 2024-03-04 ENCOUNTER — APPOINTMENT (OUTPATIENT)
Dept: CARDIOLOGY | Facility: MEDICAL CENTER | Age: 64
DRG: 324 | End: 2024-03-04
Attending: INTERNAL MEDICINE
Payer: COMMERCIAL

## 2024-03-04 PROBLEM — D53.1 MEGALOBLASTIC ANEMIA: Status: ACTIVE | Noted: 2024-03-04

## 2024-03-04 LAB
ALBUMIN SERPL BCP-MCNC: 3.5 G/DL (ref 3.2–4.9)
ALBUMIN/GLOB SERPL: 1.3 G/DL
ALP SERPL-CCNC: 98 U/L (ref 30–99)
ALT SERPL-CCNC: 14 U/L (ref 2–50)
AMPHET UR QL SCN: NEGATIVE
ANION GAP SERPL CALC-SCNC: 13 MMOL/L (ref 7–16)
APTT PPP: 29.8 SEC (ref 24.7–36)
AST SERPL-CCNC: 42 U/L (ref 12–45)
B-OH-BUTYR SERPL-MCNC: 0.2 MMOL/L (ref 0.02–0.27)
BARBITURATES UR QL SCN: NEGATIVE
BASOPHILS # BLD AUTO: 1.1 % (ref 0–1.8)
BASOPHILS # BLD: 0.08 K/UL (ref 0–0.12)
BENZODIAZ UR QL SCN: NEGATIVE
BILIRUB SERPL-MCNC: 0.6 MG/DL (ref 0.1–1.5)
BUN SERPL-MCNC: 14 MG/DL (ref 8–22)
BZE UR QL SCN: NEGATIVE
CALCIUM ALBUM COR SERPL-MCNC: 8.7 MG/DL (ref 8.5–10.5)
CALCIUM SERPL-MCNC: 8.3 MG/DL (ref 8.5–10.5)
CANNABINOIDS UR QL SCN: NEGATIVE
CHLORIDE SERPL-SCNC: 107 MMOL/L (ref 96–112)
CHOLEST SERPL-MCNC: 161 MG/DL (ref 100–199)
CO2 SERPL-SCNC: 17 MMOL/L (ref 20–33)
CREAT SERPL-MCNC: 0.96 MG/DL (ref 0.5–1.4)
EKG IMPRESSION: NORMAL
EKG IMPRESSION: NORMAL
EOSINOPHIL # BLD AUTO: 0.02 K/UL (ref 0–0.51)
EOSINOPHIL NFR BLD: 0.3 % (ref 0–6.9)
ERYTHROCYTE [DISTWIDTH] IN BLOOD BY AUTOMATED COUNT: 54.2 FL (ref 35.9–50)
ETHANOL BLD-MCNC: <10.1 MG/DL
EXTRA TUBE BLU BLU: NORMAL
FENTANYL UR QL: NEGATIVE
GFR SERPLBLD CREATININE-BSD FMLA CKD-EPI: 89 ML/MIN/1.73 M 2
GLOBULIN SER CALC-MCNC: 2.7 G/DL (ref 1.9–3.5)
GLUCOSE SERPL-MCNC: 100 MG/DL (ref 65–99)
HCT VFR BLD AUTO: 31.2 % (ref 42–52)
HDLC SERPL-MCNC: 46 MG/DL
HGB BLD-MCNC: 11 G/DL (ref 14–18)
IMM GRANULOCYTES # BLD AUTO: 0.05 K/UL (ref 0–0.11)
IMM GRANULOCYTES NFR BLD AUTO: 0.7 % (ref 0–0.9)
INR PPP: 1.15 (ref 0.87–1.13)
LACTATE SERPL-SCNC: 1 MMOL/L (ref 0.5–2)
LACTATE SERPL-SCNC: 1.7 MMOL/L (ref 0.5–2)
LACTATE SERPL-SCNC: 1.7 MMOL/L (ref 0.5–2)
LDLC SERPL CALC-MCNC: 83 MG/DL
LV EJECT FRACT  99904: 70
LV EJECT FRACT MOD 2C 99903: 64.67
LV EJECT FRACT MOD 4C 99902: 68.07
LV EJECT FRACT MOD BP 99901: 67.48
LYMPHOCYTES # BLD AUTO: 1.62 K/UL (ref 1–4.8)
LYMPHOCYTES NFR BLD: 21.8 % (ref 22–41)
MAGNESIUM SERPL-MCNC: 1.2 MG/DL (ref 1.5–2.5)
MCH RBC QN AUTO: 35.9 PG (ref 27–33)
MCHC RBC AUTO-ENTMCNC: 35.3 G/DL (ref 32.3–36.5)
MCV RBC AUTO: 102 FL (ref 81.4–97.8)
METHADONE UR QL SCN: NEGATIVE
MONOCYTES # BLD AUTO: 0.57 K/UL (ref 0–0.85)
MONOCYTES NFR BLD AUTO: 7.7 % (ref 0–13.4)
NEUTROPHILS # BLD AUTO: 5.1 K/UL (ref 1.82–7.42)
NEUTROPHILS NFR BLD: 68.4 % (ref 44–72)
NRBC # BLD AUTO: 0 K/UL
NRBC BLD-RTO: 0 /100 WBC (ref 0–0.2)
OPIATES UR QL SCN: POSITIVE
OXYCODONE UR QL SCN: POSITIVE
PCP UR QL SCN: NEGATIVE
PHOSPHATE SERPL-MCNC: 2.9 MG/DL (ref 2.5–4.5)
PLATELET # BLD AUTO: 191 K/UL (ref 164–446)
PMV BLD AUTO: 10 FL (ref 9–12.9)
POTASSIUM SERPL-SCNC: 3.9 MMOL/L (ref 3.6–5.5)
PROPOXYPH UR QL SCN: NEGATIVE
PROT SERPL-MCNC: 6.2 G/DL (ref 6–8.2)
PROTHROMBIN TIME: 14.8 SEC (ref 12–14.6)
RBC # BLD AUTO: 3.06 M/UL (ref 4.7–6.1)
SODIUM SERPL-SCNC: 137 MMOL/L (ref 135–145)
TRIGL SERPL-MCNC: 159 MG/DL (ref 0–149)
TROPONIN T SERPL-MCNC: 35 NG/L (ref 6–19)
TROPONIN T SERPL-MCNC: 38 NG/L (ref 6–19)
TSH SERPL DL<=0.005 MIU/L-ACNC: 2.62 UIU/ML (ref 0.38–5.33)
UFH PPP CHRO-ACNC: 0.87 IU/ML
VIT B12 SERPL-MCNC: 305 PG/ML (ref 211–911)
WBC # BLD AUTO: 7.4 K/UL (ref 4.8–10.8)

## 2024-03-04 PROCEDURE — 700102 HCHG RX REV CODE 250 W/ 637 OVERRIDE(OP)

## 2024-03-04 PROCEDURE — 700102 HCHG RX REV CODE 250 W/ 637 OVERRIDE(OP): Performed by: INTERNAL MEDICINE

## 2024-03-04 PROCEDURE — 93306 TTE W/DOPPLER COMPLETE: CPT | Mod: 26 | Performed by: INTERNAL MEDICINE

## 2024-03-04 PROCEDURE — 700111 HCHG RX REV CODE 636 W/ 250 OVERRIDE (IP)

## 2024-03-04 PROCEDURE — 700102 HCHG RX REV CODE 250 W/ 637 OVERRIDE(OP): Performed by: HOSPITALIST

## 2024-03-04 PROCEDURE — 85730 THROMBOPLASTIN TIME PARTIAL: CPT

## 2024-03-04 PROCEDURE — A9270 NON-COVERED ITEM OR SERVICE: HCPCS | Performed by: HOSPITALIST

## 2024-03-04 PROCEDURE — 700111 HCHG RX REV CODE 636 W/ 250 OVERRIDE (IP): Performed by: INTERNAL MEDICINE

## 2024-03-04 PROCEDURE — 99233 SBSQ HOSP IP/OBS HIGH 50: CPT | Mod: GC,25 | Performed by: HOSPITALIST

## 2024-03-04 PROCEDURE — 84443 ASSAY THYROID STIM HORMONE: CPT

## 2024-03-04 PROCEDURE — 83605 ASSAY OF LACTIC ACID: CPT

## 2024-03-04 PROCEDURE — 36415 COLL VENOUS BLD VENIPUNCTURE: CPT

## 2024-03-04 PROCEDURE — 770020 HCHG ROOM/CARE - TELE (206)

## 2024-03-04 PROCEDURE — 93005 ELECTROCARDIOGRAM TRACING: CPT | Performed by: INTERNAL MEDICINE

## 2024-03-04 PROCEDURE — 84484 ASSAY OF TROPONIN QUANT: CPT

## 2024-03-04 PROCEDURE — 85520 HEPARIN ASSAY: CPT

## 2024-03-04 PROCEDURE — 93010 ELECTROCARDIOGRAM REPORT: CPT | Performed by: INTERNAL MEDICINE

## 2024-03-04 PROCEDURE — 99406 BEHAV CHNG SMOKING 3-10 MIN: CPT | Mod: GC | Performed by: HOSPITALIST

## 2024-03-04 PROCEDURE — A9270 NON-COVERED ITEM OR SERVICE: HCPCS | Performed by: INTERNAL MEDICINE

## 2024-03-04 PROCEDURE — 99223 1ST HOSP IP/OBS HIGH 75: CPT | Performed by: INTERNAL MEDICINE

## 2024-03-04 PROCEDURE — 82607 VITAMIN B-12: CPT

## 2024-03-04 PROCEDURE — 85610 PROTHROMBIN TIME: CPT

## 2024-03-04 PROCEDURE — A9270 NON-COVERED ITEM OR SERVICE: HCPCS

## 2024-03-04 PROCEDURE — 80307 DRUG TEST PRSMV CHEM ANLYZR: CPT

## 2024-03-04 PROCEDURE — 93306 TTE W/DOPPLER COMPLETE: CPT

## 2024-03-04 RX ORDER — ATORVASTATIN CALCIUM 40 MG/1
40 TABLET, FILM COATED ORAL NIGHTLY
Status: DISCONTINUED | OUTPATIENT
Start: 2024-03-04 | End: 2024-03-07

## 2024-03-04 RX ORDER — MAGNESIUM SULFATE HEPTAHYDRATE 40 MG/ML
4 INJECTION, SOLUTION INTRAVENOUS 2 TIMES DAILY
Status: COMPLETED | OUTPATIENT
Start: 2024-03-04 | End: 2024-03-04

## 2024-03-04 RX ORDER — HEPARIN SODIUM 5000 [USP'U]/100ML
0-30 INJECTION, SOLUTION INTRAVENOUS CONTINUOUS
Status: DISCONTINUED | OUTPATIENT
Start: 2024-03-04 | End: 2024-03-05

## 2024-03-04 RX ORDER — HEPARIN SODIUM 1000 [USP'U]/ML
40 INJECTION, SOLUTION INTRAVENOUS; SUBCUTANEOUS PRN
Status: DISCONTINUED | OUTPATIENT
Start: 2024-03-04 | End: 2024-03-05

## 2024-03-04 RX ORDER — ASPIRIN 81 MG/1
81 TABLET ORAL DAILY
Status: DISCONTINUED | OUTPATIENT
Start: 2024-03-04 | End: 2024-03-08 | Stop reason: HOSPADM

## 2024-03-04 RX ADMIN — LORAZEPAM 0.5 MG: 1 TABLET ORAL at 08:45

## 2024-03-04 RX ADMIN — OMEPRAZOLE 40 MG: 20 CAPSULE, DELAYED RELEASE ORAL at 05:47

## 2024-03-04 RX ADMIN — METOPROLOL TARTRATE 50 MG: 25 TABLET, FILM COATED ORAL at 13:50

## 2024-03-04 RX ADMIN — LORAZEPAM 1 MG: 1 TABLET ORAL at 22:04

## 2024-03-04 RX ADMIN — MAGNESIUM SULFATE IN WATER FOR 4 G: 40 INJECTION INTRAVENOUS at 08:17

## 2024-03-04 RX ADMIN — METOPROLOL TARTRATE 50 MG: 25 TABLET, FILM COATED ORAL at 22:04

## 2024-03-04 RX ADMIN — ASPIRIN 81 MG: 81 TABLET, COATED ORAL at 08:13

## 2024-03-04 RX ADMIN — OXYCODONE HYDROCHLORIDE 10 MG: 10 TABLET ORAL at 00:09

## 2024-03-04 RX ADMIN — THERA TABS 1 TABLET: TAB at 05:46

## 2024-03-04 RX ADMIN — FOLIC ACID 1 MG: 1 TABLET ORAL at 05:46

## 2024-03-04 RX ADMIN — LORAZEPAM 0.5 MG: 1 TABLET ORAL at 13:49

## 2024-03-04 RX ADMIN — LORAZEPAM 1 MG: 1 TABLET ORAL at 05:47

## 2024-03-04 RX ADMIN — OXYCODONE HYDROCHLORIDE 10 MG: 10 TABLET ORAL at 13:50

## 2024-03-04 RX ADMIN — HEPARIN SODIUM 18 UNITS/KG/HR: 5000 INJECTION, SOLUTION INTRAVENOUS at 18:00

## 2024-03-04 RX ADMIN — ACETAMINOPHEN 650 MG: 325 TABLET, FILM COATED ORAL at 01:20

## 2024-03-04 RX ADMIN — OXYCODONE 5 MG: 5 TABLET ORAL at 06:29

## 2024-03-04 RX ADMIN — LORAZEPAM 0.5 MG: 1 TABLET ORAL at 17:48

## 2024-03-04 RX ADMIN — ATORVASTATIN CALCIUM 40 MG: 40 TABLET, FILM COATED ORAL at 22:04

## 2024-03-04 RX ADMIN — OXYCODONE HYDROCHLORIDE 10 MG: 10 TABLET ORAL at 23:05

## 2024-03-04 RX ADMIN — Medication 100 MG: at 05:47

## 2024-03-04 RX ADMIN — DOCUSATE SODIUM 50 MG AND SENNOSIDES 8.6 MG 2 TABLET: 8.6; 5 TABLET, FILM COATED ORAL at 16:28

## 2024-03-04 RX ADMIN — LORAZEPAM 1 MG: 1 TABLET ORAL at 01:20

## 2024-03-04 RX ADMIN — MAGNESIUM SULFATE IN WATER FOR 4 G: 40 INJECTION INTRAVENOUS at 16:31

## 2024-03-04 ASSESSMENT — ENCOUNTER SYMPTOMS
MUSCULOSKELETAL NEGATIVE: 1
CARDIOVASCULAR NEGATIVE: 1
PSYCHIATRIC NEGATIVE: 1
NEUROLOGICAL NEGATIVE: 1
CONSTITUTIONAL NEGATIVE: 1
RESPIRATORY NEGATIVE: 1
EYES NEGATIVE: 1
GASTROINTESTINAL NEGATIVE: 1

## 2024-03-04 ASSESSMENT — LIFESTYLE VARIABLES
AGITATION: NORMAL ACTIVITY
PAROXYSMAL SWEATS: NO SWEAT VISIBLE
HEADACHE, FULLNESS IN HEAD: VERY MILD
HEADACHE, FULLNESS IN HEAD: NOT PRESENT
HEADACHE, FULLNESS IN HEAD: NOT PRESENT
PAROXYSMAL SWEATS: BARELY PERCEPTIBLE SWEATING. PALMS MOIST
ORIENTATION AND CLOUDING OF SENSORIUM: ORIENTED AND CAN DO SERIAL ADDITIONS
NAUSEA AND VOMITING: NO NAUSEA AND NO VOMITING
TREMOR: MODERATE TREMOR WITH ARMS EXTENDED
TREMOR: MODERATE TREMOR WITH ARMS EXTENDED
NAUSEA AND VOMITING: MILD NAUSEA WITH NO VOMITING
ORIENTATION AND CLOUDING OF SENSORIUM: ORIENTED AND CAN DO SERIAL ADDITIONS
VISUAL DISTURBANCES: NOT PRESENT
PAROXYSMAL SWEATS: BARELY PERCEPTIBLE SWEATING. PALMS MOIST
VISUAL DISTURBANCES: NOT PRESENT
NAUSEA AND VOMITING: MILD NAUSEA WITH NO VOMITING
TOTAL SCORE: 1
TREMOR: NO TREMOR
PAROXYSMAL SWEATS: BARELY PERCEPTIBLE SWEATING. PALMS MOIST
NAUSEA AND VOMITING: MILD NAUSEA WITH NO VOMITING
PAROXYSMAL SWEATS: BARELY PERCEPTIBLE SWEATING. PALMS MOIST
TREMOR: MODERATE TREMOR WITH ARMS EXTENDED
ANXIETY: MILDLY ANXIOUS
TREMOR: MODERATE TREMOR WITH ARMS EXTENDED
TOTAL SCORE: 6
ANXIETY: NO ANXIETY (AT EASE)
AGITATION: NORMAL ACTIVITY
PAROXYSMAL SWEATS: NO SWEAT VISIBLE
AUDITORY DISTURBANCES: NOT PRESENT
NAUSEA AND VOMITING: NO NAUSEA AND NO VOMITING
VISUAL DISTURBANCES: NOT PRESENT
TOTAL SCORE: 1
HEADACHE, FULLNESS IN HEAD: VERY MILD
AGITATION: SOMEWHAT MORE THAN NORMAL ACTIVITY
VISUAL DISTURBANCES: NOT PRESENT
ORIENTATION AND CLOUDING OF SENSORIUM: ORIENTED AND CAN DO SERIAL ADDITIONS
HEADACHE, FULLNESS IN HEAD: NOT PRESENT
TOTAL SCORE: 8
PAROXYSMAL SWEATS: NO SWEAT VISIBLE
ANXIETY: MILDLY ANXIOUS
ANXIETY: MILDLY ANXIOUS
TREMOR: TREMOR NOT VISIBLE BUT CAN BE FELT, FINGERTIP TO FINGERTIP
AUDITORY DISTURBANCES: NOT PRESENT
ANXIETY: NO ANXIETY (AT EASE)
HEADACHE, FULLNESS IN HEAD: VERY MILD
AUDITORY DISTURBANCES: NOT PRESENT
AUDITORY DISTURBANCES: NOT PRESENT
TOTAL SCORE: 7
AGITATION: NORMAL ACTIVITY
ORIENTATION AND CLOUDING OF SENSORIUM: ORIENTED AND CAN DO SERIAL ADDITIONS
HEADACHE, FULLNESS IN HEAD: NOT PRESENT
PAROXYSMAL SWEATS: BARELY PERCEPTIBLE SWEATING. PALMS MOIST
TOTAL SCORE: 1
ORIENTATION AND CLOUDING OF SENSORIUM: ORIENTED AND CAN DO SERIAL ADDITIONS
AGITATION: NORMAL ACTIVITY
AGITATION: NORMAL ACTIVITY
AGITATION: SOMEWHAT MORE THAN NORMAL ACTIVITY
ORIENTATION AND CLOUDING OF SENSORIUM: ORIENTED AND CAN DO SERIAL ADDITIONS
TOTAL SCORE: 10
AUDITORY DISTURBANCES: NOT PRESENT
TREMOR: *
ANXIETY: MILDLY ANXIOUS
AUDITORY DISTURBANCES: NOT PRESENT
HEADACHE, FULLNESS IN HEAD: NOT PRESENT
PAROXYSMAL SWEATS: BARELY PERCEPTIBLE SWEATING. PALMS MOIST
TOTAL SCORE: 6
TOTAL SCORE: 8
VISUAL DISTURBANCES: NOT PRESENT
ORIENTATION AND CLOUDING OF SENSORIUM: ORIENTED AND CAN DO SERIAL ADDITIONS
ORIENTATION AND CLOUDING OF SENSORIUM: ORIENTED AND CAN DO SERIAL ADDITIONS
NAUSEA AND VOMITING: MILD NAUSEA WITH NO VOMITING
AUDITORY DISTURBANCES: NOT PRESENT
ANXIETY: MODERATELY ANXIOUS OR GUARDED, SO ANXIETY IS INFERRED
ORIENTATION AND CLOUDING OF SENSORIUM: ORIENTED AND CAN DO SERIAL ADDITIONS
ANXIETY: NO ANXIETY (AT EASE)
VISUAL DISTURBANCES: NOT PRESENT
TREMOR: NO TREMOR
VISUAL DISTURBANCES: NOT PRESENT
NAUSEA AND VOMITING: NO NAUSEA AND NO VOMITING
AUDITORY DISTURBANCES: NOT PRESENT
AGITATION: NORMAL ACTIVITY
NAUSEA AND VOMITING: NO NAUSEA AND NO VOMITING
VISUAL DISTURBANCES: NOT PRESENT
ANXIETY: MILDLY ANXIOUS
NAUSEA AND VOMITING: MILD NAUSEA WITH NO VOMITING
HEADACHE, FULLNESS IN HEAD: VERY MILD
VISUAL DISTURBANCES: NOT PRESENT
AUDITORY DISTURBANCES: NOT PRESENT
AGITATION: NORMAL ACTIVITY
TREMOR: NO TREMOR

## 2024-03-04 ASSESSMENT — PATIENT HEALTH QUESTIONNAIRE - PHQ9
1. LITTLE INTEREST OR PLEASURE IN DOING THINGS: NOT AT ALL
SUM OF ALL RESPONSES TO PHQ9 QUESTIONS 1 AND 2: 0
2. FEELING DOWN, DEPRESSED, IRRITABLE, OR HOPELESS: NOT AT ALL
2. FEELING DOWN, DEPRESSED, IRRITABLE, OR HOPELESS: NOT AT ALL
1. LITTLE INTEREST OR PLEASURE IN DOING THINGS: NOT AT ALL
SUM OF ALL RESPONSES TO PHQ9 QUESTIONS 1 AND 2: 0

## 2024-03-04 ASSESSMENT — PAIN DESCRIPTION - PAIN TYPE
TYPE: CHRONIC PAIN

## 2024-03-04 NOTE — ASSESSMENT & PLAN NOTE
Continue losartan, metoprolol  IV and p.o. labetalol as needed for systolic blood pressure 160 and above

## 2024-03-04 NOTE — ED NOTES
Med rec updated and complete. Allergies reviewed. Confirmed name and date of birth.  Pt denies outpatient antibiotic use in last 30 days.  Last dose of rivaroxaban 03/02/24    Home pharmacy  [facility] = [phone]

## 2024-03-04 NOTE — PROGRESS NOTES
Telemetry Report  Rhythm Sinus Rhythm  Heart Rate 94  Ectopy     VA 0.12  QRS 0.08  QT 0.32            Per telemetry room monitor

## 2024-03-04 NOTE — ASSESSMENT & PLAN NOTE
No chest discomfort.  Back at room air.  Negative CT PE.  EKG precordial ST depressions.  Considering significant past cardiac history, cath was ordered but canceled due to scheduling problems.  Will reattempt today.  Held Xarelto for procedure, cardiology started heparin drip.  Strict I's and O's

## 2024-03-04 NOTE — CARE PLAN
The patient is Stable - Low risk of patient condition declining or worsening    Shift Goals  Clinical Goals: Pain Management, CIWA  Patient Goals: Rest, Adeuquate Nutrition    Progress made toward(s) clinical / shift goals:    Problem: Knowledge Deficit - Standard  Goal: Patient and family/care givers will demonstrate understanding of plan of care, disease process/condition, diagnostic tests and medications  Description: Target End Date:  1-3 days or as soon as patient condition allows    Document in Patient Education    1.  Patient and family/caregiver oriented to unit, equipment, visitation policy and means for communicating concern  2.  Complete/review Learning Assessment  3.  Assess knowledge level of disease process/condition, treatment plan, diagnostic tests and medications  4.  Explain disease process/condition, treatment plan, diagnostic tests and medications  Outcome: Progressing  Note: Pt is oriented to plan of care, pt's questions and concerns are addressed. Pt's barriers to discharge are addressed.      Problem: Fall Risk  Goal: Patient will remain free from falls  Description: Target End Date:  Prior to discharge or change in level of care    Document interventions on the [facility] [place] Fall Risk Assessment    1.  Assess for fall risk factors  2.  Implement fall precautions  Outcome: Progressing  Note: Pt is assessed for risk for falls, pt has fall precautions in place, pt is educated to call before getting out of bed.

## 2024-03-04 NOTE — ED PROVIDER NOTES
ED Provider Note    CHIEF COMPLAINT  Chief Complaint   Patient presents with    Weakness    Shortness of Breath    Arm Pain       EXTERNAL RECORDS REVIEWED  Inpatient Notes reviewed discharge summary dated February 18, 2024.  History of coronary artery bypass grafting in 2017.  Patient was admitted for a syncopal episode on February 18.  Anticoagulated on Xarelto.  Symptoms improved, feeling better with fluid resuscitation, able to be discharged on the 20th.    HPI/ROS  LIMITATION TO HISTORY   Select: : None  OUTSIDE HISTORIAN(S):  None available    Jose A Ponce is a 63 y.o. male who presents for evaluation of generalized weakness and exertional dyspnea.  Patient has history of pulmonary embolism and coronary artery disease status post CABG.  He is anticoagulated on Xarelto.  Patient relates increasing frequency of shortness of breath present at rest and exacerbated with exertion.  He notes when at work yesterday he was much more out of breath walking up 2 flights of stairs.  He notes no pain in the chest, no acute nausea nor vomiting but does describe a diarrheal illness last month.  He notes he has been eating less due to financial concerns and has noted unintentional weight loss for the last 2 months as well.  He notes no chest pain nor left arm pain but is having discomfort to the right shoulder.  Last month he suffered a syncopal episode and fell breaking his humerus.  He is still having pain from that.  Given his generalized weakness, worsening dyspnea he came to be assessed.    PAST MEDICAL HISTORY   has a past medical history of Breath shortness (10/2017), CAD (coronary artery disease), Dental disorder (10/2017), DVT (deep venous thrombosis) (HCC), Grief reaction (2017), hernia repair, Hyperlipidemia, Hypertension, Psychiatric problem (10/2017), and Tobacco use.    SURGICAL HISTORY   has a past surgical history that includes inguinal hernia repair (Bilateral, 2000); orif, fracture, tibia (Right,  "1980s); multiple coronary artery bypass endo vein harvest (6/13/2017); yarely (6/13/2017); and zzz cardiac cath.    FAMILY HISTORY  Family History   Problem Relation Age of Onset    Cancer Mother     Cancer Father     No Known Problems Sister     No Known Problems Sister     Heart Disease Maternal Grandfather 89        probable MI and sudden death       SOCIAL HISTORY  Social History     Tobacco Use    Smoking status: Every Day     Current packs/day: 1.00     Average packs/day: 1 pack/day for 44.2 years (44.2 ttl pk-yrs)     Types: Cigarettes     Start date: 1/1/1980    Smokeless tobacco: Never    Tobacco comments:     vape occ   Vaping Use    Vaping Use: Never used   Substance and Sexual Activity    Alcohol use: Yes     Comment: 2-3 drinks 5 days a week    Drug use: Yes     Comment: THC    Sexual activity: Not Currently       CURRENT MEDICATIONS  Home Medications       Reviewed by Leidy Newman (Pharmacy Tech) on 03/03/24 at 2231  Med List Status: Complete     Medication Last Dose Status   acetaminophen (TYLENOL) 500 MG Tab 3/3/2024 Active   atorvastatin (LIPITOR) 10 MG Tab 3/2/2024 Active   folic acid (FOLVITE) 1 MG Tab 3/3/2024 Active   metoprolol tartrate (LOPRESSOR) 50 MG Tab 3/3/2024 Active   omeprazole (PRILOSEC) 40 MG delayed-release capsule 3/3/2024 Active   rivaroxaban (XARELTO) 20 MG Tab tablet 3/2/2024 Active   thiamine (THIAMINE) 100 MG tablet 3/3/2024 Active                    ALLERGIES  No Known Allergies    PHYSICAL EXAM  VITAL SIGNS: BP (!) 158/90   Pulse 100   Temp 37.3 °C (99.1 °F) (Temporal)   Resp (!) 24   Ht 1.867 m (6' 1.5\")   Wt 76 kg (167 lb 8.8 oz)   SpO2 95%   BMI 21.80 kg/m²    General: Alert, no acute distress  Skin: Warm, dry, normal for ethnicity  Head: Normocephalic, atraumatic  Neck: Trachea midline,   Eye: PERRL, normal conjunctiva, extraocular movements intact without nystagmus  ENMT: Oral mucosa pink and dry, no pharyngeal erythema or exudate  Cardiovascular: Regular rate " and rhythm, No murmur, Normal peripheral perfusion  Respiratory: Lungs CTA, respirations are non-labored, breath sounds are equal  Gastrointestinal:  non distended  Musculoskeletal: No swelling, no deformity  Neurological: Alert and oriented to person, place, time, and situation.  Mildly tremulous but no convulsive activity.  Cranial nerves II 12 grossly intact.  Lower extremity strength and sensation 5 x 5 and symmetrical bilaterally.  Neuroexam right upper extremity is limited given his recent fracture, however  are 5 x 5 and symmetrical bilaterally and sensation light touch is grossly intact.  No pronator drift of the left upper extremity.  Lymphatics: No lymphadenopathy  Psychiatric: Cooperative, appropriate mood & affect     DIAGNOSTIC STUDIES / PROCEDURES  EKG  I have independently interpreted this EKG  EKG Interpretation    Interpreted by emergency department physician    Rhythm: normal sinus   Rate: 90  Axis: normal  Ectopy: none  Conduction: nonspecific interventricular conduction block  ST Segments: no acute change  T Waves: no acute change  Q Waves: none    Clinical Impression: no acute changes other than new interventricular conduction delay compared to previous EKG dated February 18, 2024    LABS  Results for orders placed or performed during the hospital encounter of 03/03/24   CBC with Differential   Result Value Ref Range    WBC 6.6 4.8 - 10.8 K/uL    RBC 3.39 (L) 4.70 - 6.10 M/uL    Hemoglobin 12.1 (L) 14.0 - 18.0 g/dL    Hematocrit 34.7 (L) 42.0 - 52.0 %    .4 (H) 81.4 - 97.8 fL    MCH 35.7 (H) 27.0 - 33.0 pg    MCHC 34.9 32.3 - 36.5 g/dL    RDW 55.4 (H) 35.9 - 50.0 fL    Platelet Count 221 164 - 446 K/uL    MPV 9.3 9.0 - 12.9 fL    Neutrophils-Polys 65.90 44.00 - 72.00 %    Lymphocytes 26.10 22.00 - 41.00 %    Monocytes 6.00 0.00 - 13.40 %    Eosinophils 0.50 0.00 - 6.90 %    Basophils 1.20 0.00 - 1.80 %    Immature Granulocytes 0.30 0.00 - 0.90 %    Nucleated RBC 0.00 0.00 - 0.20 /100  WBC    Neutrophils (Absolute) 4.36 1.82 - 7.42 K/uL    Lymphs (Absolute) 1.73 1.00 - 4.80 K/uL    Monos (Absolute) 0.40 0.00 - 0.85 K/uL    Eos (Absolute) 0.03 0.00 - 0.51 K/uL    Baso (Absolute) 0.08 0.00 - 0.12 K/uL    Immature Granulocytes (abs) 0.02 0.00 - 0.11 K/uL    NRBC (Absolute) 0.00 K/uL   Comp Metabolic Panel   Result Value Ref Range    Sodium 140 135 - 145 mmol/L    Potassium 3.8 3.6 - 5.5 mmol/L    Chloride 107 96 - 112 mmol/L    Co2 16 (L) 20 - 33 mmol/L    Anion Gap 17.0 (H) 7.0 - 16.0    Glucose 107 (H) 65 - 99 mg/dL    Bun 12 8 - 22 mg/dL    Creatinine 0.85 0.50 - 1.40 mg/dL    Calcium 8.7 8.5 - 10.5 mg/dL    Correct Calcium 8.9 8.5 - 10.5 mg/dL    AST(SGOT) 50 (H) 12 - 45 U/L    ALT(SGPT) 18 2 - 50 U/L    Alkaline Phosphatase 110 (H) 30 - 99 U/L    Total Bilirubin 0.4 0.1 - 1.5 mg/dL    Albumin 3.7 3.2 - 4.9 g/dL    Total Protein 6.8 6.0 - 8.2 g/dL    Globulin 3.1 1.9 - 3.5 g/dL    A-G Ratio 1.2 g/dL   ESTIMATED GFR   Result Value Ref Range    GFR (CKD-EPI) 97 >60 mL/min/1.73 m 2   Troponin - STAT Once   Result Value Ref Range    Troponin T 29 (H) 6 - 19 ng/L   LACTIC ACID   Result Value Ref Range    Lactic Acid 2.8 (H) 0.5 - 2.0 mmol/L   APTT   Result Value Ref Range    APTT 33.8 24.7 - 36.0 sec   PT/INR   Result Value Ref Range    PT 21.4 (H) 12.0 - 14.6 sec    INR 1.82 (H) 0.87 - 1.13   TSH (for screening thyroid dysfunction)   Result Value Ref Range    TSH 1.140 0.380 - 5.330 uIU/mL   Magnesium   Result Value Ref Range    Magnesium 1.6 1.5 - 2.5 mg/dL   proBrain Natriuretic Peptide, NT   Result Value Ref Range    NT-proBNP 211 (H) 0 - 125 pg/mL   D-Dimer (only helpful in low pre-test probability wells critieria. Do not order if patient ruled out by PERC criteria. See Weblinks at top of Labs section)   Result Value Ref Range    D-Dimer 1.57 (H) 0.00 - 0.50 ug/mL (FEU)   LIPASE   Result Value Ref Range    Lipase 252 (H) 11 - 82 U/L   EKG   Result Value Ref Range    Report       [facility]  [facility] Emergency Dept.    Test Date:  2024  Pt Name:    CESAR MASTERS                Department: ER  MRN:        [id number]                      Room:  Gender:     Male                         Technician: [id number]  :        1960                   Requested By:ER TRIAGE PROTOCOL  Order #:    [id number]                    Reading MD:    Measurements  Intervals                                Axis  Rate:       90                           P:          -29  LA:         120                          QRS:        -6  QRSD:       119                          T:          99  QT:         375  QTc:        459    Interpretive Statements  Sinus rhythm  Nonspecific intraventricular conduction delay  Repol abnrm suggests ischemia, anterolateral  Compared to ECG 2024 13:37:45  Intraventricular conduction delay now present  Early repolarization now present  Possible ischemia now present  ST (T wave) deviation no longer present          RADIOLOGY  I have independently interpreted the diagnostic imaging associated with this visit and am waiting the final reading from the radiologist.   My preliminary interpretation is as follows: No large pulmonary embolism noted on CTA.  Radiologist interpretation:   CT-CTA CHEST PULMONARY ARTERY W/ RECONS   Final Result      1.  No evidence of pulmonary embolism.   2.  Atherosclerosis with coronary artery disease.            DX-CHEST-PORTABLE (1 VIEW)   Final Result      1.  Minimal RIGHT midlung scar.   2.  No pneumonia or pulmonary edema.   3.  Prior open heart surgery.      EC-ECHOCARDIOGRAM COMPLETE W/O CONT    (Results Pending)        COURSE & MEDICAL DECISION MAKING    ED Observation Status? Yes; I am placing the patient in to an observation status due to a diagnostic uncertainty as well as therapeutic intensity. Patient placed in observation status at 7:13 PM, 3/3/2024.     Observation plan is as follows: Patient medicated with 1 L of normal saline, metabolic/cardiac workup  "will be obtained.  Differential diagnosis at this point includes was not restricted to acute coronary syndrome, pulmonary embolism, deconditioning, malnutrition, electrolyte derangement, dehydration, myocardial infarction    2128: I spoke with the hospitalist Dr. Arcos, he concurs with plan of care thus far accepts the patient to his service for admission in improved but guarded condition.      Patient Vitals for the past 24 hrs:   BP Temp Temp src Pulse Resp SpO2 Height Weight   03/03/24 2357 (!) 158/90 37.3 °C (99.1 °F) Temporal 100 (!) 24 95 % 1.867 m (6' 1.5\") 76 kg (167 lb 8.8 oz)   03/03/24 2354 -- -- -- -- -- -- -- 76 kg (167 lb 8.8 oz)   03/03/24 2349 (!) 158/90 37.3 °C (99.1 °F) Temporal 100 (!) 24 95 % -- 76 kg (167 lb 8.8 oz)   03/03/24 2222 (!) 167/90 -- -- (!) 116 -- 96 % -- --   03/03/24 2122 (!) 154/85 -- -- (!) 104 -- 96 % -- --   03/03/24 2056 (!) 164/86 -- -- (!) 101 -- 96 % -- --   03/03/24 1903 (!) 140/67 -- -- 97 -- 97 % -- --   03/03/24 1857 (!) 169/84 -- -- -- -- -- -- --   03/03/24 1856 -- -- -- 97 17 98 % -- --   03/03/24 1655 -- -- -- -- -- -- 1.867 m (6' 1.5\") 77.6 kg (171 lb)   03/03/24 1624 116/78 36.8 °C (98.3 °F) Temporal 89 16 99 % -- --        Upon Reevaluation, the patient's condition has: not improved; and will be escalated to hospitalization.    Patient discharged from ED Observation status at 2140 (Time) 3/3/24 (Date).     INITIAL ASSESSMENT, COURSE AND PLAN  Care Narrative: Very pleasant 63-year-old gentleman with known history of coronary artery disease and previous pulmonary embolism presents for evaluation of exertional dyspnea and weakness.  History and exam as above.  Troponin is elevated, EKG thankfully is nonischemic.  Given his known history of coronary artery disease and concerning presentation with weakness and exertional dyspnea certainly acute coronary syndrome must be considered.  Reassuringly he has no actual pleuritic chest pain but D-dimer is elevated.  CTA " thusly indicated, thankfully there is no evidence on this of pulmonary embolism.  Given my concern for acute coronary syndrome with abnormal cardiac enzymes and multiple risk factors plan is for admission.  HYDRATION: Based on the patient's presentation of Dehydration the patient was given IV fluids. IV Hydration was used because oral hydration was not as rapid as required. Upon recheck following hydration, the patient was doing somewhat better, skin tone improving with IV fluids.  HTN/IDDM FOLLOW UP:  The patient has known hypertension and is being followed by their primary care doctor      ADDITIONAL PROBLEM LIST  Exertional dyspnea, hypertension, tachycardia, near syncope  DISPOSITION AND DISCUSSIONS  I have discussed management of the patient with the following physicians and THIAGO's:  Consulted  hospitalist Dr. Arcos, he concurs with plan of care thus far accepts the patient to his service for admission in improved but guarded condition.      Discussion of management with other Q or appropriate source(s): None     Escalation of care considered, and ultimately not performed:NA    Barriers to care at this time, including but not limited to: Patient lacks financial resources.     Decision tools and prescription drugs considered including, but not limited to: HEART Score 6, high risk stratification .  D-dimer obtained given patient's age cannot use PERC criteria.  Given D-dimer elevated and history of thromboembolic disease clear indication for CTA of chest.    FINAL DIAGNOSIS  1. Exertional dyspnea    2. Near syncope    3. Elevated troponin    4. History of coronary artery disease    5. Unintentional weight loss           Electronically signed by: Christiano Keen M.D., 3/3/2024 7:03 PM

## 2024-03-04 NOTE — ASSESSMENT & PLAN NOTE
Dated from 2/16/2024.  Follow-up with orthopedic surgery as outpatient  Tylenol, oxycodone as needed

## 2024-03-04 NOTE — ASSESSMENT & PLAN NOTE
Lactic acid 2.8.  Probably secondary to thiamine deficiency and alcohol abuse.  No infection identified  Patient was given 1 L of saline in the ER  Thiamine supplementation ordered  Repeat lactic acid

## 2024-03-04 NOTE — H&P
Hospital Medicine History & Physical Note    Date of Service  3/3/2024    Primary Care Physician  Lori Villalobos D.N.P.        Code Status  Full Code    Chief Complaint  Chief Complaint   Patient presents with    Weakness    Shortness of Breath    Arm Pain       History of Presenting Illness  Jose A Ponce is a 63 y.o. male with past medical history of right-sided humerus fracture/dislocation on 1/16, CAD, CABG in 2017, DVT/PE   on Xarelto, GERD, nicotine dependence, alcohol abuse (drinks 3 drinks every other night)who presented 3/3/2024 with complaints of dyspnea on exertion.  According to him he noticed dyspnea when he is climbing up stairs and since he had right humerus fracture/dislocation.  Additionally, he reports that he fell 2 times recently, including 1 time earlier today after he took shower, went out and suddenly felt lightheadedness, so he lowered himself to the ground.  He denies head injury.  Denies palpitation or chest pain.  In ED he is rather mildly hypertensive and tachycardic, as well as tremulous.  Blood work showed lactic acid 2.8, troponin 29, .  EKG did not reveal acute changes, sinus rhythm heart rate 19, nonspecific T/ST repolarization abnormalities.  D-dimer was elevated, so he was evaluated with CTA of the chest that was negative for PE.    I discussed the plan of care with patient and ERP .    Review of Systems  Review of Systems   Constitutional:  Positive for malaise/fatigue and weight loss. Negative for chills and fever.   HENT:  Negative for ear pain, hearing loss and tinnitus.    Eyes:  Negative for blurred vision, double vision and photophobia.   Respiratory:  Positive for shortness of breath. Negative for cough, hemoptysis and sputum production.    Cardiovascular:  Negative for chest pain, palpitations and orthopnea.   Gastrointestinal:  Negative for heartburn, nausea and vomiting.   Genitourinary:  Negative for dysuria, flank pain, frequency and hematuria.    Musculoskeletal:  Positive for falls. Negative for back pain, joint pain and neck pain.   Skin:  Negative for itching and rash.   Neurological:  Positive for dizziness. Negative for tremors, speech change, focal weakness and headaches.   Endo/Heme/Allergies:  Negative for environmental allergies and polydipsia. Does not bruise/bleed easily.   Psychiatric/Behavioral:  Negative for hallucinations and substance abuse. The patient is not nervous/anxious.        Past Medical History   has a past medical history of Breath shortness (10/2017), CAD (coronary artery disease), Dental disorder (10/2017), DVT (deep venous thrombosis) ([facility]), Grief reaction (2017), hernia repair, Hyperlipidemia, Hypertension, Psychiatric problem (10/2017), and Tobacco use.    Surgical History   has a past surgical history that includes inguinal hernia repair (Bilateral, 2000); orif, fracture, tibia (Right, 1980s); multiple coronary artery bypass endo vein harvest (6/13/2017); yarely (6/13/2017); and [facility] cardiac cath.     Family History  family history includes Cancer in his father and mother; Heart Disease (age of onset: 89) in his maternal grandfather; No Known Problems in his sister and sister.   Family history reviewed with patient. There is no family history that is pertinent to the chief complaint.     Social History   reports that he has been smoking cigarettes. He started smoking about 44 years ago. He has a 44.2 pack-year smoking history. He has never used smokeless tobacco. He reports current alcohol use. He reports current drug use.    Allergies  No Known Allergies    Medications  Prior to Admission Medications   Prescriptions Last Dose Informant Patient Reported? Taking?   acetaminophen (TYLENOL) 500 MG Tab   No No   Sig: Take 2 Tablets by mouth every 6 hours as needed for Mild Pain.   atorvastatin (LIPITOR) 10 MG Tab  Patient No No   Sig: Take 1 Tablet by mouth every evening.   folic acid (FOLVITE) 1 MG Tab   No No   Sig: Take 1 Tablet  by mouth every day.   losartan (COZAAR) 100 MG Tab   Yes No   metoprolol tartrate (LOPRESSOR) 50 MG Tab  Patient No No   Sig: Take 1 Tablet by mouth 2 times a day.   multivitamin Tab   No No   Sig: Take 1 Tablet by mouth every day.   omeprazole (PRILOSEC) 40 MG delayed-release capsule   No No   Sig: Take 1 Capsule by mouth every day.   rivaroxaban (XARELTO) 20 MG Tab tablet  Patient No No   Sig: Take 1 Tablet by mouth with dinner.   thiamine (THIAMINE) 100 MG tablet   No No   Sig: Take 1 Tablet by mouth every day for 30 days.      Facility-Administered Medications: None       Physical Exam  Temp:  [36.8 °C (98.3 °F)] 36.8 °C (98.3 °F)  Pulse:  [] 101  Resp:  [16-17] 17  BP: (116-169)/(67-86) 164/86  SpO2:  [96 %-99 %] 96 %  Blood Pressure: (!) 164/86   Temperature: 36.8 °C (98.3 °F)   Pulse: (!) 101   Respiration: 17   Pulse Oximetry: 96 %       Physical Exam  Vitals and nursing note reviewed.   Constitutional:       General: He is not in acute distress.     Appearance: Normal appearance.   HENT:      Head: Normocephalic and atraumatic.      Nose: Nose normal.      Mouth/Throat:      Mouth: Mucous membranes are moist.   Eyes:      Extraocular Movements: Extraocular movements intact.      Pupils: Pupils are equal, round, and reactive to light.   Cardiovascular:      Rate and Rhythm: Regular rhythm. Tachycardia present.   Pulmonary:      Effort: Pulmonary effort is normal.      Breath sounds: Normal breath sounds.   Abdominal:      General: Abdomen is flat. There is no distension.      Tenderness: There is no abdominal tenderness.   Musculoskeletal:         General: No swelling or deformity. Normal range of motion.      Cervical back: Normal range of motion and neck supple.   Skin:     General: Skin is warm and dry.   Neurological:      General: No focal deficit present.      Mental Status: He is alert and oriented to person, place, and time.      Motor: Tremor present.   Psychiatric:         Mood and Affect:  Mood normal.         Behavior: Behavior normal.         Laboratory:  Recent Labs     03/03/24  1703   WBC 6.6   RBC 3.39*   HEMOGLOBIN 12.1*   HEMATOCRIT 34.7*   .4*   MCH 35.7*   MCHC 34.9   RDW 55.4*   PLATELETCT 221   MPV 9.3     Recent Labs     03/03/24  1703   SODIUM 140   POTASSIUM 3.8   CHLORIDE 107   CO2 16*   GLUCOSE 107*   BUN 12   CREATININE 0.85   CALCIUM 8.7     Recent Labs     03/03/24  1703   ALTSGPT 18   ASTSGOT 50*   ALKPHOSPHAT 110*   TBILIRUBIN 0.4   GLUCOSE 107*     Recent Labs     03/03/24  1703   APTT 33.8   INR 1.82*     Recent Labs     03/03/24  1703   NTPROBNP 211*         Recent Labs     03/03/24  1703   TROPONINT 29*       Imaging:  CT-CTA CHEST PULMONARY ARTERY W/ RECONS   Final Result      1.  No evidence of pulmonary embolism.   2.  Atherosclerosis with coronary artery disease.            DX-CHEST-PORTABLE (1 VIEW)   Final Result      1.  Minimal RIGHT midlung scar.   2.  No pneumonia or pulmonary edema.   3.  Prior open heart surgery.      EC-ECHOCARDIOGRAM COMPLETE W/O CONT    (Results Pending)       X-Ray:  I have personally reviewed the images and compared with prior images.    Assessment/Plan:  Justification for Admission Status  I anticipate this patient will require at least two midnights for appropriate medical management, necessitating inpatient admission because chest pain rule out, alcohol withdrawal    Patient will need a Telemetry bed on MEDICAL service .  The need is secondary to chest pain rule out, alcohol withdrawal.    * Dyspnea on exertion- (present on admission)  Assessment & Plan  CT of the chest with contrast negative for PE  Given history of CAD, CABG, mildly elevated troponin and BNP, will need to rule out cardiomyopathy with echo  Monitor input and output, consider diuresis    Alcohol withdrawal syndrome without complication (HCC)- (present on admission)  Assessment & Plan  Presented with tremor, with history of alcohol use 3 drinks every 2  nights.  Ordered CIWA protocol with thiamine supplementation  Aspiration, seizure, fall precautions  Electrolyte replacement as needed    History of pulmonary embolism- (present on admission)  Assessment & Plan  Continue Xarelto    Closed fracture of neck of right humerus- (present on admission)  Assessment & Plan  Follow-up with orthopedic surgery as outpatient  Tylenol, oxycodone as needed    Lactic acidosis- (present on admission)  Assessment & Plan  Lactic acid 2.8.  Probably secondary to thiamine deficiency and alcohol abuse.  No infection identified  Patient was given 1 L of saline in the ER  Thiamine supplementation ordered  Repeat lactic acid    Tobacco abuse- (present on admission)  Assessment & Plan  Spent approx 5 mins on Tobacco cessation education . Discussed options of nicotine patch, medical treatment with wellbutrin and chantix. Discussed the benefits of quitting smoking and risks of continued smoking including cardiovascular disease, cancer and COPD.   Code 75221      Essential hypertension- (present on admission)  Assessment & Plan  Continue losartan, metoprolol  IV and p.o. labetalol as needed for systolic blood pressure 160 and above        VTE prophylaxis: therapeutic anticoagulation with Xarelto

## 2024-03-04 NOTE — ASSESSMENT & PLAN NOTE
History of 3 drinks 5 nights a week.  No withdrawals, no seizures in the past.  Tremors on admission.  Low CIWA so far, 3 mg of Ativan 3//24.  Continue protocol.  Aspiration, seizure, fall precautions.  Electrolyte replacement as needed.  Watch for refeeding.

## 2024-03-04 NOTE — PROGRESS NOTES
DOS 3/4/24  I saw and examined the patient and reviewed the case by the resident physician Dr. Reyes. I reviewed the resident's note and agree with the documented findings and plan of care except as noted in my comments below.    Additional attending comments:  64 [place]. He had a two vessel CABG here in 2017 but has been noncompliant with his ASA and continues to smoke a pack a day of cigarettes.     He has lost weight (intentional) but the dyspnea has worsened. PE ruled out.  He has ST depressions in the anterior leads and has a hx of both of tobacco and alcohol abuse. Although PFTs have not been performed, clinically he appears to have obvious COPD.    I am quite concerned about progression of his CAD given no ASA use and ongoing smoking after a CABG in 2017. [facility] indicated. Appreciate Dr. Gee's assistance from cardiology. The patient is at high risk for deterioration.  5 minutes spent on tobacco cessation counseling. See orders.    T. Smith Godfrey MD, FACP, [facility]  Attending Physician  Professor of Medicine & Academic Hospitalist    Progress note    HPI: Jose A Ponce is a 63 y.o. male admitted 3/3/2024 for dyspnea that started after recent admission for right-sided humerus fracture/dislocation s/p medical management.  Negative CT PE,  No sepsis criteria.  Given history of alcohol use disorder with recent drink, placed on CIWA.  Other conditions include coronary artery disease status post CABG unprovoked pulmonary embolism on chronic Xarelto and hypertension.    INTERVAL HISTORY:   No acute distress on room air and speaking in full sentences.  No chest discomfort.  EKG shows ST depressions in V2 V3 V4 V5 and V6.  Troponins elevated but stable around 35.  Cardiac risk factors include heavy smoker (1 pack a day since 17 years old), hypertension slightly trending up, hypercholesterolemia.  Low concern for active DVT.  CIWA never above  10.  Normal echo, EF 70.  Dictation #1  MRN:[id number]  CSN:[id number]    Vitals:    03/04/24 0447 03/04/24 0653 03/04/24 1348 03/04/24 1609   BP: (!) 166/97 (!) 155/99 (!) 162/94 133/77   Pulse: 83 82 88 74   Resp: 20 17 18 18   Temp: 36.7 °C (98.1 °F) 36.7 °C (98.1 °F) 36.8 °C (98.2 °F) 37 °C (98.6 °F)   TempSrc: Temporal Temporal Temporal Temporal   SpO2: 98% 96% 96% 93%   Weight:       Height:         Body mass index is 21.8 kg/m².    Review of Systems   Constitutional: Negative.    HENT: Negative.     Eyes: Negative.    Respiratory: Negative.     Cardiovascular: Negative.    Gastrointestinal: Negative.    Genitourinary: Negative.    Musculoskeletal: Negative.    Skin: Negative.    Neurological: Negative.    Endo/Heme/Allergies: Negative.    Psychiatric/Behavioral: Negative.     All other systems reviewed and are negative.     Physical Exam  Vitals and nursing note reviewed.   Constitutional:       Appearance: He is normal weight.   Cardiovascular:      Rate and Rhythm: Normal rate and regular rhythm.      Pulses: Normal pulses.      Heart sounds: Normal heart sounds. No murmur heard.     No friction rub. No gallop.   Pulmonary:      Effort: Pulmonary effort is normal. No respiratory distress.      Breath sounds: Normal breath sounds. No stridor. No wheezing or rhonchi.   Abdominal:      General: Bowel sounds are normal.   Skin:     General: Skin is warm.      Capillary Refill: Capillary refill takes less than 2 seconds.   Neurological:      Mental Status: He is alert. Mental status is at baseline.   Psychiatric:         Mood and Affect: Mood normal.         Behavior: Behavior normal.         Thought Content: Thought content normal.         Judgment: Judgment normal.       LABS:   Recent Labs     03/03/24  1703 03/03/24  2359   WBC 6.6 7.4   RBC 3.39* 3.06*   HEMOGLOBIN 12.1* 11.0*   HEMATOCRIT 34.7* 31.2*   .4* 102.0*   MCH 35.7* 35.9*   MCHC 34.9 35.3   RDW 55.4* 54.2*   PLATELETCT 221 191   MPV 9.3 10.0       Recent Labs     24  1703 24  2359   SODIUM 140 137   POTASSIUM 3.8 3.9   CHLORIDE 107 107   CO2 16* 17*   GLUCOSE 107* 100*   BUN 12 14   CREATININE 0.85 0.96   CALCIUM 8.7 8.3*      Recent Labs     24  1703 24  2359   ALTSGPT 18 14   ASTSGOT 50* 42   ALKPHOSPHAT 110* 98   TBILIRUBIN 0.4 0.6   LIPASE 252*  --    GLUCOSE 107* 100*      RADIOLOGY:   EC-ECHOCARDIOGRAM COMPLETE W/O CONT   Final Result      CT-CTA CHEST PULMONARY ARTERY W/ RECONS   Final Result      1.  No evidence of pulmonary embolism.   2.  Atherosclerosis with coronary artery disease.            DX-CHEST-PORTABLE (1 VIEW)   Final Result      1.  Minimal RIGHT midlung scar.   2.  No pneumonia or pulmonary edema.   3.  Prior open heart surgery.      CL-LEFT HEART CATHETERIZATION WITH POSSIBLE INTERVENTION    (Results Pending)        MEDICAL DECISION MAKIN-year-old male admitted 3/3/2024 for dyspnea on exertion with history of coronary artery disease status post CABG.  EKG/MPI negative for acute ischemia.  Elevated D-dimer, pending CT PE.  Other active comorbidities include alcohol use disorder and possible withdrawal and recent right-sided humerus fracture status post medical management.  Anticipating discharge for tomorrow after CT PE if no acute changes.    Disposition: Unclear  Follow up outpatient with primary care physicians and other specialties already following.     ASSESSMENT AND PLAN:   * Dyspnea on exertion  Assessment & Plan  No chest discomfort.  Back at room air.  Negative CT PE.  Precordial ST depressions.  Considering significant past cardiac history, cath tomorrow per cards.  No fluid overload.  Strict I's and O's  N.p.o. at midnight.    Megaloblastic anemia  Assessment & Plan  Most likely secondary to chronic alcoholism, but will rule out B6/B12 deficiency and thyroid condition.    Alcohol withdrawal syndrome without complication (HCC)  Assessment & Plan  History of 3 drinks 5 nights a week.  No  withdrawals, no seizures in the past.  Tremors on admission.  Continue CIWA protocol.  Aspiration, seizure, fall precautions.  Electrolyte replacement as needed.  Watch for refeeding.      Closed fracture of neck of right humerus  Assessment & Plan  Dated from 2/16/2024.  Follow-up with orthopedic surgery as outpatient  Tylenol, oxycodone as needed    Tobacco abuse  Assessment & Plan  1 pack a day since 17 years old.  Currently on nicotine patch with no cravings.    Armando R. Reyes Yparraguirre, M.D.  Internal Medicine Resident   [facility]      This note was generated using voice recognition software which has a small chance of producing errors of grammar and possibly content. I have made every reasonable attempt to find and correct any obvious errors but expect that some may not be found prior to finalization of this note.

## 2024-03-04 NOTE — PROGRESS NOTES
4 Eyes Skin Assessment Completed by MITCH Dhillon and MITCH Brantley.    Head WDL  Ears WDL  Nose WDL  Mouth WDL  Neck WDL  Breast/Chest WDL  Shoulder Blades WDL  Spine WDL  (R) Arm/Elbow/Hand Redness and Blanching  (L) Arm/Elbow/Hand Redness and Blanching  Abdomen Scar  Groin Redness and Blanching  Scrotum/Coccyx/Buttocks Redness and Blanching  (R) Leg WDL  (L) Leg WDL  (R) Heel/Foot/Toe WDL  (L) Heel/Foot/Toe WDL          Devices In Places Tele Box      Interventions In Place Sacral Mepilex and Heels Loaded W/Pillows    Possible Skin Injury No    Pictures Uploaded Into Epic N/A  Wound Consult Placed N/A  RN Wound Prevention Protocol Ordered No

## 2024-03-04 NOTE — CONSULTS
Reason for Consult:  Asked by Dr JEREMÍAS Godfrey M.D. to see this patient with dyspnea with coronary disease  Patient's PCP: Lori Villalobos D.N.P.    CC:   Chief Complaint   Patient presents with    Weakness    Shortness of Breath    Arm Pain       HPI: This is a 68-year-old gentleman who has history of coronary disease status post CABG in 2017 for a chronic total occlusion of the LAD CABG x 2 to his LAD and moderate disease in the left circumflex and RCA presents with progressive dyspnea he does have a history of DVT and PEs on chronic anticoagulation and was ruled out for pulmonary embolism and has been frustrated by progressive dyspnea despite significant weight loss over the last 4 months somewhat intentional but he attributes this to eating far less.    He is previously seen cardiology but then was following closely with vascular medicine and had not realized he has not seen cardiology for a number of years now.    Medications / Drug list prior to admission:  No current facility-administered medications on file prior to encounter.     Current Outpatient Medications on File Prior to Encounter   Medication Sig Dispense Refill    omeprazole (PRILOSEC) 40 MG delayed-release capsule Take 1 Capsule by mouth every day. 30 Capsule 0    thiamine (THIAMINE) 100 MG tablet Take 1 Tablet by mouth every day for 30 days. 30 Tablet 0    folic acid (FOLVITE) 1 MG Tab Take 1 Tablet by mouth every day. 30 Tablet 0    acetaminophen (TYLENOL) 500 MG Tab Take 2 Tablets by mouth every 6 hours as needed for Mild Pain.      metoprolol tartrate (LOPRESSOR) 50 MG Tab Take 1 Tablet by mouth 2 times a day. 180 Tablet 3    rivaroxaban (XARELTO) 20 MG Tab tablet Take 1 Tablet by mouth with dinner. 90 Tablet 1    atorvastatin (LIPITOR) 10 MG Tab Take 1 Tablet by mouth every evening. 90 Tablet 3       Current list of administered Medications:    Current Facility-Administered Medications:     aspirin EC tablet 81 mg, 81 mg, Oral, DAILY,  Armando R Reyes Yparraguirre, M.D., 81 mg at 03/04/24 0813    magnesium sulfate IVPB premix 4 g, 4 g, Intravenous, BID, Armando R Reyes Yparraguirre, M.D., Last Rate: 25 mL/hr at 03/04/24 0817, 4 g at 03/04/24 0817    atorvastatin (Lipitor) tablet 40 mg, 40 mg, Oral, Nightly, Armando R Reyes Yparraguirre, M.D.    metoprolol tartrate (Lopressor) tablet 50 mg, 50 mg, Oral, BID, JEREMÍAS Godfrey M.D.    Respiratory Therapy Consult, , Nebulization, Continuous RT, Chris Bauman M.D.    acetaminophen (Tylenol) tablet 650 mg, 650 mg, Oral, Q6HRS PRN, Chris Bauman M.D., 650 mg at 03/04/24 0120    Notify provider if pain remains uncontrolled, , , CONTINUOUS **AND** Use the Numeric Rating Scale (NRS), Tolbert-Baker Faces (WBF), or FLACC on regular floors and Critical-Care Pain Observation Tool (CPOT) on ICUs/Trauma to assess pain, , , CONTINUOUS **AND** Pulse Ox, , , CONTINUOUS **AND** Pharmacy Consult Request ...Pain Management Review 1 Each, 1 Each, Other, PHARMACY TO DOSE **AND** If patient difficult to arouse and/or has respiratory depression (respiratory rate of 10 or less), stop any opiates that are currently infusing and call a Rapid Response., , , CONTINUOUS, Chris Bauman M.D.    oxyCODONE immediate-release (Roxicodone) tablet 5 mg, 5 mg, Oral, Q3HRS PRN, 5 mg at 03/04/24 0629 **OR** oxyCODONE immediate release (Roxicodone) tablet 10 mg, 10 mg, Oral, Q3HRS PRN, 10 mg at 03/04/24 0009 **OR** morphine 4 MG/ML injection 4 mg, 4 mg, Intravenous, Q3HRS PRN, Chris Bauman M.D.    senna-docusate (Pericolace Or Senokot S) 8.6-50 MG per tablet 2 Tablet, 2 Tablet, Oral, Q EVENING **AND** polyethylene glycol/lytes (Miralax) Packet 1 Packet, 1 Packet, Oral, QDAY PRN, Chris Bauman M.D.    ondansetron (Zofran) syringe/vial injection 4 mg, 4 mg, Intravenous, Q4HRS PRN, Chris Bauman M.D.    ondansetron (Zofran ODT) dispertab 4 mg, 4 mg, Oral, Q4HRS PRN, Chris Bauman M.D.    promethazine (Phenergan)  "tablet 12.5-25 mg, 12.5-25 mg, Oral, Q4HRS PRN, Chris Bauman M.D.    promethazine (Phenergan) suppository 12.5-25 mg, 12.5-25 mg, Rectal, Q4HRS PRN, Chris Bauman M.D.    prochlorperazine (Compazine) injection 5-10 mg, 5-10 mg, Intravenous, Q4HRS PRN, Chris Bauman M.D.    nitroglycerin (Nitrostat) tablet 0.4 mg, 0.4 mg, Sublingual, Q5 MIN PRN, Chris Bauman M.D.    nicotine (Nicoderm) 21 MG/24HR 21 mg, 21 mg, Transdermal, Daily-0600, Chris Bauman M.D., 21 mg at 03/03/24 2323    ipratropium-albuterol (DUONEB) nebulizer solution, 3 mL, Nebulization, Q4H PRN (RT), Chris Bauman M.D.    omeprazole (PriLOSEC) capsule 40 mg, 40 mg, Oral, DAILY, Chris Bauman M.D., 40 mg at 03/04/24 0547    rivaroxaban (Xarelto) tablet 20 mg, 20 mg, Oral, PM MEAL, Chris Bauman M.D.    LORazepam (Ativan) tablet 0.5 mg, 0.5 mg, Oral, Q4HRS PRN, Chris Bauman M.D., 0.5 mg at 03/04/24 0845    LORazepam (Ativan) tablet 1 mg, 1 mg, Oral, Q4HRS PRN, Chris Bauman M.D., 1 mg at 03/04/24 0547    LORazepam (Ativan) tablet 2 mg, 2 mg, Oral, Q2HRS PRN, Chris Bauman M.D.    LORazepam (Ativan) tablet 3 mg, 3 mg, Oral, Q HOUR PRN, Chris Bauman M.D.    LORazepam (Ativan) tablet 4 mg, 4 mg, Oral, Q15 MIN PRN **OR** diazePAM (Valium) injection 10 mg, 10 mg, Intravenous, Q15 MIN PRN, Chris Bauman M.D.    thiamine (Vitamin B-1) tablet 100 mg, 100 mg, Oral, DAILY, 100 mg at 03/04/24 0547 **AND** multivitamin tablet 1 Tablet, 1 Tablet, Oral, DAILY, 1 Tablet at 03/04/24 0546 **AND** folic acid (Folvite) tablet 1 mg, 1 mg, Oral, DAILY, Chris Bauman M.D., 1 mg at 03/04/24 0546    labetalol (Normodyne/Trandate) injection 10 mg, 10 mg, Intravenous, Q HOUR PRN **OR** labetalol (Normodyne) tablet 200 mg, 200 mg, Oral, Q6HRS PRN, Chris Bauman M.D.    Past Medical History:   Diagnosis Date    Breath shortness 10/2017    \"In the past, not an issue now\" sob before heart surgery    CAD (coronary artery " disease)     S/P CABG X2    Dental disorder 10/2017    Upper dentures    DVT (deep venous thrombosis) ([facility])     Grief reaction 2017    Girlfriend  this year.    Hx of hernia repair     Mesh    Hyperlipidemia     Hypertension     Psychiatric problem 10/2017    Depression    Tobacco use        Past Surgical History:   Procedure Laterality Date    MULTIPLE CORONARY ARTERY BYPASS ENDO VEIN HARVEST  2017    Procedure: MULTIPLE CORONARY ARTERY BYPASS x2 RIGHT LEG ENDO VEIN HARVEST;  Surgeon: Gray Barboza M.D.;  Location: [facility];  Service:     HUBERT  2017    Procedure: HUBERT - INTRAOP;  Surgeon: Gray Barboza M.D.;  Location: SURGERY [facility];  Service:     INGUINAL HERNIA REPAIR Bilateral     ORIF, FRACTURE, TIBIA Right     ZZZ CARDIAC CATH         Family History   Problem Relation Age of Onset    Cancer Mother     Cancer Father     No Known Problems Sister     No Known Problems Sister     Heart Disease Maternal Grandfather 89        probable MI and sudden death     Patient family history was personally reviewed, no pertinent family history to current presentation    Social History     Tobacco Use    Smoking status: Every Day     Current packs/day: 1.00     Average packs/day: 1 pack/day for 44.2 years (44.2 ttl pk-yrs)     Types: Cigarettes     Start date: 1980    Smokeless tobacco: Never    Tobacco comments:     vape occ   Vaping Use    Vaping Use: Never used   Substance Use Topics    Alcohol use: Yes     Comment: 2-3 drinks 5 days a week    Drug use: Yes     Comment: THC       ALLERGIES:  No Known Allergies    Review of systems:  A complete review of symptoms was reviewed with patient. This is reviewed in H&P and PMH. ALL OTHERS reviewed and negative    Physical exam:  Patient Vitals for the past 24 hrs:   BP Temp Temp src Pulse Resp SpO2 Height Weight   24 0653 (!) 155/99 36.7 °C (98.1 °F) Temporal 82 17 96 % -- --   24 0447 (!) 166/97 36.7 °C (98.1 °F)  "Temporal 83 20 98 % -- --   03/03/24 2357 (!) 158/90 37.3 °C (99.1 °F) Temporal 100 (!) 24 95 % 1.867 m (6' 1.5\") 76 kg (167 lb 8.8 oz)   03/03/24 2354 -- -- -- -- -- -- -- 76 kg (167 lb 8.8 oz)   03/03/24 2349 (!) 158/90 37.3 °C (99.1 °F) Temporal 100 (!) 24 95 % -- 76 kg (167 lb 8.8 oz)   03/03/24 2222 (!) 167/90 -- -- (!) 116 -- 96 % -- --   03/03/24 2122 (!) 154/85 -- -- (!) 104 -- 96 % -- --   03/03/24 2056 (!) 164/86 -- -- (!) 101 -- 96 % -- --   03/03/24 1903 (!) 140/67 -- -- 97 -- 97 % -- --   03/03/24 1857 (!) 169/84 -- -- -- -- -- -- --   03/03/24 1856 -- -- -- 97 17 98 % -- --   03/03/24 1655 -- -- -- -- -- -- 1.867 m (6' 1.5\") 77.6 kg (171 lb)   03/03/24 1624 116/78 36.8 °C (98.3 °F) Temporal 89 16 99 % -- --     General: No acute distress.   EYES: no jaundice  HEENT: OP clear   Neck:  No JVD.   CVS:  [  RRR.   Resp: Normal respiratory effort,   Abdomen: ND,  Skin: Grossly nothing acute no obvious rashes  Neurological: Alert, Moves all extremities  Extremities:   [   no edema. No cyanosis.       Data:  Laboratory studies personally reviewed by me:  Recent Results (from the past 24 hour(s))   CBC with Differential    Collection Time: 03/03/24  5:03 PM   Result Value Ref Range    WBC 6.6 4.8 - 10.8 K/uL    RBC 3.39 (L) 4.70 - 6.10 M/uL    Hemoglobin 12.1 (L) 14.0 - 18.0 g/dL    Hematocrit 34.7 (L) 42.0 - 52.0 %    .4 (H) 81.4 - 97.8 fL    MCH 35.7 (H) 27.0 - 33.0 pg    MCHC 34.9 32.3 - 36.5 g/dL    RDW 55.4 (H) 35.9 - 50.0 fL    Platelet Count 221 164 - 446 K/uL    MPV 9.3 9.0 - 12.9 fL    Neutrophils-Polys 65.90 44.00 - 72.00 %    Lymphocytes 26.10 22.00 - 41.00 %    Monocytes 6.00 0.00 - 13.40 %    Eosinophils 0.50 0.00 - 6.90 %    Basophils 1.20 0.00 - 1.80 %    Immature Granulocytes 0.30 0.00 - 0.90 %    Nucleated RBC 0.00 0.00 - 0.20 /100 WBC    Neutrophils (Absolute) 4.36 1.82 - 7.42 K/uL    Lymphs (Absolute) 1.73 1.00 - 4.80 K/uL    Monos (Absolute) 0.40 0.00 - 0.85 K/uL    Eos (Absolute) " 0.03 0.00 - 0.51 K/uL    Baso (Absolute) 0.08 0.00 - 0.12 K/uL    Immature Granulocytes (abs) 0.02 0.00 - 0.11 K/uL    NRBC (Absolute) 0.00 K/uL   Comp Metabolic Panel    Collection Time: 03/03/24  5:03 PM   Result Value Ref Range    Sodium 140 135 - 145 mmol/L    Potassium 3.8 3.6 - 5.5 mmol/L    Chloride 107 96 - 112 mmol/L    Co2 16 (L) 20 - 33 mmol/L    Anion Gap 17.0 (H) 7.0 - 16.0    Glucose 107 (H) 65 - 99 mg/dL    Bun 12 8 - 22 mg/dL    Creatinine 0.85 0.50 - 1.40 mg/dL    Calcium 8.7 8.5 - 10.5 mg/dL    Correct Calcium 8.9 8.5 - 10.5 mg/dL    AST(SGOT) 50 (H) 12 - 45 U/L    ALT(SGPT) 18 2 - 50 U/L    Alkaline Phosphatase 110 (H) 30 - 99 U/L    Total Bilirubin 0.4 0.1 - 1.5 mg/dL    Albumin 3.7 3.2 - 4.9 g/dL    Total Protein 6.8 6.0 - 8.2 g/dL    Globulin 3.1 1.9 - 3.5 g/dL    A-G Ratio 1.2 g/dL   ESTIMATED GFR    Collection Time: 03/03/24  5:03 PM   Result Value Ref Range    GFR (CKD-EPI) 97 >60 mL/min/1.73 m 2   Troponin - STAT Once    Collection Time: 03/03/24  5:03 PM   Result Value Ref Range    Troponin T 29 (H) 6 - 19 ng/L   APTT    Collection Time: 03/03/24  5:03 PM   Result Value Ref Range    APTT 33.8 24.7 - 36.0 sec   PT/INR    Collection Time: 03/03/24  5:03 PM   Result Value Ref Range    PT 21.4 (H) 12.0 - 14.6 sec    INR 1.82 (H) 0.87 - 1.13   TSH (for screening thyroid dysfunction)    Collection Time: 03/03/24  5:03 PM   Result Value Ref Range    TSH 1.140 0.380 - 5.330 uIU/mL   Magnesium    Collection Time: 03/03/24  5:03 PM   Result Value Ref Range    Magnesium 1.6 1.5 - 2.5 mg/dL   proBrain Natriuretic Peptide, NT    Collection Time: 03/03/24  5:03 PM   Result Value Ref Range    NT-proBNP 211 (H) 0 - 125 pg/mL   D-Dimer (only helpful in low pre-test probability wells critieria. Do not order if patient ruled out by PERC criteria. See Weblinks at top of Labs section)    Collection Time: 03/03/24  5:03 PM   Result Value Ref Range    D-Dimer 1.57 (H) 0.00 - 0.50 ug/mL (FEU)   LIPASE     Collection Time: 24  5:03 PM   Result Value Ref Range    Lipase 252 (H) 11 - 82 U/L   EKG    Collection Time: 24  5:08 PM   Result Value Ref Range    Report       [facility]    Test Date:  2024  Pt Name:    CESAR MASTERS                Department: ER  MRN:        [id number]                      Room:       [facility]  Gender:     Male                         Technician: [id number]  :        1960                   Requested By:ER TRIAGE PROTOCOL  Order #:    [id number]                    Reading MD: ZOHREH ROWE MD    Measurements  Intervals                                Axis  Rate:       90                           P:          -29  NM:         120                          QRS:        -6  QRSD:       119                          T:          99  QT:         375  QTc:        459    Interpretive Statements  Sinus rhythm  Nonspecific intraventricular conduction delay  Repol abnrm suggests ischemia, anterolateral  Compared to ECG 2024 13:37:45  Intraventricular conduction delay now present  Early repolarization now present  Possible ischemia now present  ST (T wave) deviation no longer present  El ectronically Signed On 2024 00:14:02 PST by ZOHREH ROWE MD     LACTIC ACID    Collection Time: 24  7:24 PM   Result Value Ref Range    Lactic Acid 2.8 (H) 0.5 - 2.0 mmol/L   LACTIC ACID    Collection Time: 24 11:59 PM   Result Value Ref Range    Lactic Acid 1.7 0.5 - 2.0 mmol/L   CBC with Differential    Collection Time: 24 11:59 PM   Result Value Ref Range    WBC 7.4 4.8 - 10.8 K/uL    RBC 3.06 (L) 4.70 - 6.10 M/uL    Hemoglobin 11.0 (L) 14.0 - 18.0 g/dL    Hematocrit 31.2 (L) 42.0 - 52.0 %    .0 (H) 81.4 - 97.8 fL    MCH 35.9 (H) 27.0 - 33.0 pg    MCHC 35.3 32.3 - 36.5 g/dL    RDW 54.2 (H) 35.9 - 50.0 fL    Platelet Count 191 164 - 446 K/uL    MPV 10.0 9.0 - 12.9 fL    Neutrophils-Polys 68.40 44.00 - 72.00 %     Lymphocytes 21.80 (L) 22.00 - 41.00 %    Monocytes 7.70 0.00 - 13.40 %    Eosinophils 0.30 0.00 - 6.90 %    Basophils 1.10 0.00 - 1.80 %    Immature Granulocytes 0.70 0.00 - 0.90 %    Nucleated RBC 0.00 0.00 - 0.20 /100 WBC    Neutrophils (Absolute) 5.10 1.82 - 7.42 K/uL    Lymphs (Absolute) 1.62 1.00 - 4.80 K/uL    Monos (Absolute) 0.57 0.00 - 0.85 K/uL    Eos (Absolute) 0.02 0.00 - 0.51 K/uL    Baso (Absolute) 0.08 0.00 - 0.12 K/uL    Immature Granulocytes (abs) 0.05 0.00 - 0.11 K/uL    NRBC (Absolute) 0.00 K/uL   Comp Metabolic Panel (CMP)    Collection Time: 03/03/24 11:59 PM   Result Value Ref Range    Sodium 137 135 - 145 mmol/L    Potassium 3.9 3.6 - 5.5 mmol/L    Chloride 107 96 - 112 mmol/L    Co2 17 (L) 20 - 33 mmol/L    Anion Gap 13.0 7.0 - 16.0    Glucose 100 (H) 65 - 99 mg/dL    Bun 14 8 - 22 mg/dL    Creatinine 0.96 0.50 - 1.40 mg/dL    Calcium 8.3 (L) 8.5 - 10.5 mg/dL    Correct Calcium 8.7 8.5 - 10.5 mg/dL    AST(SGOT) 42 12 - 45 U/L    ALT(SGPT) 14 2 - 50 U/L    Alkaline Phosphatase 98 30 - 99 U/L    Total Bilirubin 0.6 0.1 - 1.5 mg/dL    Albumin 3.5 3.2 - 4.9 g/dL    Total Protein 6.2 6.0 - 8.2 g/dL    Globulin 2.7 1.9 - 3.5 g/dL    A-G Ratio 1.3 g/dL   Lipid Profile    Collection Time: 03/03/24 11:59 PM   Result Value Ref Range    Cholesterol,Tot 161 100 - 199 mg/dL    Triglycerides 159 (H) 0 - 149 mg/dL    HDL 46 >=40 mg/dL    LDL 83 <100 mg/dL   Magnesium    Collection Time: 03/03/24 11:59 PM   Result Value Ref Range    Magnesium 1.2 (L) 1.5 - 2.5 mg/dL   Phosphorus    Collection Time: 03/03/24 11:59 PM   Result Value Ref Range    Phosphorus 2.9 2.5 - 4.5 mg/dL   BETA-HYDROXYBUTYRIC ACID    Collection Time: 03/03/24 11:59 PM   Result Value Ref Range    beta-Hydroxybutyric Acid 0.20 0.02 - 0.27 mmol/L   DIAGNOSTIC ALCOHOL    Collection Time: 03/03/24 11:59 PM   Result Value Ref Range    Diagnostic Alcohol <10.1 <10.1 mg/dL   EXTRA TUBE,JOSE    Collection Time: 03/03/24 11:59 PM   Result Value Ref  Range    Extra Tube, Blue SORTED    TROPONIN    Collection Time: 24 11:59 PM   Result Value Ref Range    Troponin T 35 (H) 6 - 19 ng/L   ESTIMATED GFR    Collection Time: 24 11:59 PM   Result Value Ref Range    GFR (CKD-EPI) 89 >60 mL/min/1.73 m 2   EKG in four (4) hours    Collection Time: 24  2:02 AM   Result Value Ref Range    Report       [facility]    Test Date:  2024  Pt Name:    CESAR MASTERS                Department: [facility]  MRN:        [id number]                      Room:       [facility]  Gender:     Male                         Technician: MONROE  :        1960                   Requested By:MYRON WHITE  Order #:    [id number]                    Reading MD: Arlin Cohn    Measurements  Intervals                                Axis  Rate:       82                           P:          56  IL:         138                          QRS:        12  QRSD:       109                          T:          61  QT:         393  QTc:        459    Interpretive Statements  Sinus rhythm  RSR' pattern with T wave changes, consider right ventricular hypertrophy  Nonspecific ST and T changes  Compared to ECG 2024 17:08:41  Similar in appearance  Electronically Signed On 2024 04:30:11 PST by Arlin Cohn     LACTIC ACID    Collection Time: 24  3:50 AM   Result Value Ref Range    Lactic Acid 1.7 0.5 - 2.0 mmol/L   Urine Drug Screen    Collection Time: 24  3:51 AM   Result Value Ref Range    Amphetamines Urine Negative Negative    Barbiturates Negative Negative    Benzodiazepines Negative Negative    Cocaine Metabolite Negative Negative    Fentanyl, Urine Negative Negative    Methadone Negative Negative    Opiates Positive (A) Negative    Oxycodone Positive (A) Negative    Phencyclidine -Pcp Negative Negative    Propoxyphene Negative Negative    Cannabinoid Metab Negative Negative   LACTIC ACID    Collection Time: 24  7:12 AM   Result Value Ref Range    Lactic Acid  1.0 0.5 - 2.0 mmol/L   TROPONIN    Collection Time: 03/04/24  7:12 AM   Result Value Ref Range    Troponin T 38 (H) 6 - 19 ng/L       Imaging:  CT-CTA CHEST PULMONARY ARTERY W/ RECONS   Final Result      1.  No evidence of pulmonary embolism.   2.  Atherosclerosis with coronary artery disease.            DX-CHEST-PORTABLE (1 VIEW)   Final Result      1.  Minimal RIGHT midlung scar.   2.  No pneumonia or pulmonary edema.   3.  Prior open heart surgery.      EC-ECHOCARDIOGRAM COMPLETE W/O CONT    (Results Pending)           EKG tracings personally reviewed by me          Echocardiogram images personally reviewed by me show Jan 22 normal      CT images reviewed by personally no PE has dense calcification of the LAD    All pertinent features of laboratory and imaging reviewed including primary images where applicable      Principal Problem:    Dyspnea on exertion (POA: Yes)  Active Problems:    Essential hypertension (Chronic) (POA: Yes)      Overview: Restarted on Losartan 50mg QD in 1/2022;       Increased to 100mg QD in 3/2023.     Tobacco abuse (POA: Yes)    Lactic acidosis (POA: Yes)    Closed fracture of neck of right humerus (POA: Yes)    History of pulmonary embolism (POA: Yes)    Alcohol withdrawal syndrome without complication (HCC) (POA: Yes)  Resolved Problems:    * No resolved hospital problems. *      Assessment / Plan:  Dyspnea  With known coronary disease at severe has been bypassed with moderate disease in his left circumflex and RCA he may have had progression of the disease we discussed the merits of doing an angiogram versus continued medical therapy he agrees to angiogram.    Given his history of pulmonary embolism off of Xarelto we will do a heparin bridging.    I reinforced with him the need for chronic anticoagulation complicates possible antiplatelet therapy if he does not fact need stenting    I personally discussed his case with  Dr Armando R Reyes Yparraguirre, M.D.     It is my pleasure to  participate in the care of Mr. Ponce.  Please do not hesitate to contact me with questions or concerns.    Jesus Gee MD PhD [facility]  Cardiologist [facility]    3/4/2024    Please note that this dictation was created using voice recognition software. There may be errors I did not discover before finalizing the note.      Mr. Ponce's care is highly complex due to high risk diagnosis with either severe exacerbation, progression, or side effects of treatment. We specifically discussed the need for high risk medication requiring at least quarterly testing and/or made a decision on elective/emergent major surgery with identified patient or procedure risk factors specific to Mr. Ponce. I have personally spent extra time in discussion about these facts with Mr. Ponce and reviewed and or ordered at least 3 tests, documents or other physician/THIAGO reports available including labs, imaging and EKGs as appropriate separate from today's encounter.  I have reviewed images with Mr. Ponce and personally interpreted on this encounter day the referenced EKG, echocardiogram, stress tests, CT scan, cardiac catheterization or other cardiac imaging and my personal interpretation is what is specifically stated in this note.

## 2024-03-04 NOTE — ED TRIAGE NOTES
BIB EMS to triage w/ c/o generalized weakness, exertional SOB, fatigue, 35lbs weight loss in 2 months.  Patient reports recent fall due to his legs giving out.  Last one this morning.  Denies hitting his head.  Patient also c/o continued RUE pain, hx of humerus fx and shoulder dislocation to that arm in 1/6.

## 2024-03-04 NOTE — ED NOTES
.Bedside report received from off going RN/tech: MITCH Bullard, assumed care of patient.  POC discussed with patient. Call light within reach, all needs addressed at this time.       Fall risk interventions in place: Move the patient closer to the nurse's station, Patient's personal possessions are with in their safe reach, Place socks on patient, Place fall risk sign on patient's door, Give patient urinal if applicable, Keep floor surfaces clean and dry, and Accompanied to restroom (all applicable per [place] Fall risk assessment)   Continuous monitoring: Cardiac Leads, Pulse Ox, or Blood Pressure  IVF/IV medications: Not Applicable   Oxygen: Room Air  Bedside sitter: Not Applicable   Isolation: Not Applicable

## 2024-03-05 ENCOUNTER — APPOINTMENT (OUTPATIENT)
Dept: CARDIOLOGY | Facility: MEDICAL CENTER | Age: 64
DRG: 324 | End: 2024-03-05
Attending: INTERNAL MEDICINE
Payer: COMMERCIAL

## 2024-03-05 LAB
ALBUMIN SERPL BCP-MCNC: 3.2 G/DL (ref 3.2–4.9)
ALBUMIN/GLOB SERPL: 1.1 G/DL
ALP SERPL-CCNC: 95 U/L (ref 30–99)
ALT SERPL-CCNC: 14 U/L (ref 2–50)
ANION GAP SERPL CALC-SCNC: 11 MMOL/L (ref 7–16)
AST SERPL-CCNC: 30 U/L (ref 12–45)
BASOPHILS # BLD AUTO: 0.9 % (ref 0–1.8)
BASOPHILS # BLD: 0.06 K/UL (ref 0–0.12)
BILIRUB SERPL-MCNC: 0.8 MG/DL (ref 0.1–1.5)
BUN SERPL-MCNC: 13 MG/DL (ref 8–22)
CALCIUM ALBUM COR SERPL-MCNC: 8.8 MG/DL (ref 8.5–10.5)
CALCIUM SERPL-MCNC: 8.2 MG/DL (ref 8.5–10.5)
CHLORIDE SERPL-SCNC: 101 MMOL/L (ref 96–112)
CO2 SERPL-SCNC: 20 MMOL/L (ref 20–33)
CREAT SERPL-MCNC: 0.59 MG/DL (ref 0.5–1.4)
EOSINOPHIL # BLD AUTO: 0.09 K/UL (ref 0–0.51)
EOSINOPHIL NFR BLD: 1.4 % (ref 0–6.9)
ERYTHROCYTE [DISTWIDTH] IN BLOOD BY AUTOMATED COUNT: 52.1 FL (ref 35.9–50)
GFR SERPLBLD CREATININE-BSD FMLA CKD-EPI: 109 ML/MIN/1.73 M 2
GLOBULIN SER CALC-MCNC: 2.9 G/DL (ref 1.9–3.5)
GLUCOSE SERPL-MCNC: 117 MG/DL (ref 65–99)
HCT VFR BLD AUTO: 30 % (ref 42–52)
HGB BLD-MCNC: 10.3 G/DL (ref 14–18)
IMM GRANULOCYTES # BLD AUTO: 0.02 K/UL (ref 0–0.11)
IMM GRANULOCYTES NFR BLD AUTO: 0.3 % (ref 0–0.9)
LYMPHOCYTES # BLD AUTO: 1.27 K/UL (ref 1–4.8)
LYMPHOCYTES NFR BLD: 19.7 % (ref 22–41)
MAGNESIUM SERPL-MCNC: 2.6 MG/DL (ref 1.5–2.5)
MCH RBC QN AUTO: 34.9 PG (ref 27–33)
MCHC RBC AUTO-ENTMCNC: 34.3 G/DL (ref 32.3–36.5)
MCV RBC AUTO: 101.7 FL (ref 81.4–97.8)
MONOCYTES # BLD AUTO: 0.46 K/UL (ref 0–0.85)
MONOCYTES NFR BLD AUTO: 7.1 % (ref 0–13.4)
NEUTROPHILS # BLD AUTO: 4.55 K/UL (ref 1.82–7.42)
NEUTROPHILS NFR BLD: 70.6 % (ref 44–72)
NRBC # BLD AUTO: 0 K/UL
NRBC BLD-RTO: 0 /100 WBC (ref 0–0.2)
PHOSPHATE SERPL-MCNC: 2.5 MG/DL (ref 2.5–4.5)
PLATELET # BLD AUTO: 160 K/UL (ref 164–446)
PMV BLD AUTO: 10.2 FL (ref 9–12.9)
POTASSIUM SERPL-SCNC: 3.7 MMOL/L (ref 3.6–5.5)
PROT SERPL-MCNC: 6.1 G/DL (ref 6–8.2)
RBC # BLD AUTO: 2.95 M/UL (ref 4.7–6.1)
SODIUM SERPL-SCNC: 132 MMOL/L (ref 135–145)
UFH PPP CHRO-ACNC: 0.77 IU/ML
UFH PPP CHRO-ACNC: 0.85 IU/ML
UFH PPP CHRO-ACNC: 0.86 IU/ML
WBC # BLD AUTO: 6.5 K/UL (ref 4.8–10.8)

## 2024-03-05 PROCEDURE — 700111 HCHG RX REV CODE 636 W/ 250 OVERRIDE (IP): Performed by: INTERNAL MEDICINE

## 2024-03-05 PROCEDURE — 84100 ASSAY OF PHOSPHORUS: CPT

## 2024-03-05 PROCEDURE — A9270 NON-COVERED ITEM OR SERVICE: HCPCS | Performed by: INTERNAL MEDICINE

## 2024-03-05 PROCEDURE — A9270 NON-COVERED ITEM OR SERVICE: HCPCS | Performed by: PHYSICIAN ASSISTANT

## 2024-03-05 PROCEDURE — 80053 COMPREHEN METABOLIC PANEL: CPT

## 2024-03-05 PROCEDURE — 700111 HCHG RX REV CODE 636 W/ 250 OVERRIDE (IP): Mod: JZ | Performed by: INTERNAL MEDICINE

## 2024-03-05 PROCEDURE — 770020 HCHG ROOM/CARE - TELE (206)

## 2024-03-05 PROCEDURE — 83735 ASSAY OF MAGNESIUM: CPT

## 2024-03-05 PROCEDURE — 700102 HCHG RX REV CODE 250 W/ 637 OVERRIDE(OP): Performed by: PHYSICIAN ASSISTANT

## 2024-03-05 PROCEDURE — 36415 COLL VENOUS BLD VENIPUNCTURE: CPT

## 2024-03-05 PROCEDURE — 700102 HCHG RX REV CODE 250 W/ 637 OVERRIDE(OP): Mod: JZ

## 2024-03-05 PROCEDURE — 85025 COMPLETE CBC W/AUTO DIFF WBC: CPT

## 2024-03-05 PROCEDURE — 700102 HCHG RX REV CODE 250 W/ 637 OVERRIDE(OP): Performed by: INTERNAL MEDICINE

## 2024-03-05 PROCEDURE — 700102 HCHG RX REV CODE 250 W/ 637 OVERRIDE(OP): Performed by: HOSPITALIST

## 2024-03-05 PROCEDURE — A9270 NON-COVERED ITEM OR SERVICE: HCPCS | Mod: JZ

## 2024-03-05 PROCEDURE — 99232 SBSQ HOSP IP/OBS MODERATE 35: CPT | Mod: GC | Performed by: INTERNAL MEDICINE

## 2024-03-05 PROCEDURE — A9270 NON-COVERED ITEM OR SERVICE: HCPCS | Performed by: HOSPITALIST

## 2024-03-05 PROCEDURE — 85520 HEPARIN ASSAY: CPT | Mod: 91

## 2024-03-05 PROCEDURE — 99232 SBSQ HOSP IP/OBS MODERATE 35: CPT | Mod: FS | Performed by: INTERNAL MEDICINE

## 2024-03-05 RX ORDER — POTASSIUM CHLORIDE 20 MEQ/1
40 TABLET, EXTENDED RELEASE ORAL ONCE
Status: COMPLETED | OUTPATIENT
Start: 2024-03-05 | End: 2024-03-05

## 2024-03-05 RX ORDER — LOSARTAN POTASSIUM 50 MG/1
25 TABLET ORAL
Status: DISCONTINUED | OUTPATIENT
Start: 2024-03-05 | End: 2024-03-07

## 2024-03-05 RX ORDER — ASPIRIN 81 MG/1
81 TABLET ORAL DAILY
Qty: 30 TABLET | Refills: 1 | Status: SHIPPED | OUTPATIENT
Start: 2024-03-06 | End: 2024-03-08

## 2024-03-05 RX ORDER — ATORVASTATIN CALCIUM 40 MG/1
40 TABLET, FILM COATED ORAL NIGHTLY
Qty: 30 TABLET | Refills: 1 | Status: SHIPPED | OUTPATIENT
Start: 2024-03-05 | End: 2024-03-08

## 2024-03-05 RX ORDER — LOSARTAN POTASSIUM 25 MG/1
25 TABLET ORAL DAILY
Qty: 30 TABLET | Refills: 1 | Status: SHIPPED | OUTPATIENT
Start: 2024-03-05 | End: 2024-03-08

## 2024-03-05 RX ADMIN — LOSARTAN POTASSIUM 25 MG: 50 TABLET, FILM COATED ORAL at 18:14

## 2024-03-05 RX ADMIN — HEPARIN SODIUM 16 UNITS/KG/HR: 5000 INJECTION, SOLUTION INTRAVENOUS at 02:40

## 2024-03-05 RX ADMIN — RIVAROXABAN 20 MG: 20 TABLET, FILM COATED ORAL at 18:14

## 2024-03-05 RX ADMIN — MORPHINE SULFATE 4 MG: 4 INJECTION, SOLUTION INTRAMUSCULAR; INTRAVENOUS at 05:03

## 2024-03-05 RX ADMIN — ONDANSETRON 4 MG: 2 INJECTION INTRAMUSCULAR; INTRAVENOUS at 08:36

## 2024-03-05 RX ADMIN — NICOTINE TRANSDERMAL SYSTEM 21 MG: 21 PATCH, EXTENDED RELEASE TRANSDERMAL at 05:04

## 2024-03-05 RX ADMIN — ATORVASTATIN CALCIUM 40 MG: 40 TABLET, FILM COATED ORAL at 21:18

## 2024-03-05 RX ADMIN — POTASSIUM CHLORIDE 40 MEQ: 1500 TABLET, EXTENDED RELEASE ORAL at 18:14

## 2024-03-05 RX ADMIN — OXYCODONE HYDROCHLORIDE 10 MG: 10 TABLET ORAL at 18:18

## 2024-03-05 RX ADMIN — DOCUSATE SODIUM 50 MG AND SENNOSIDES 8.6 MG 2 TABLET: 8.6; 5 TABLET, FILM COATED ORAL at 18:15

## 2024-03-05 RX ADMIN — HEPARIN SODIUM 14 UNITS/KG/HR: 5000 INJECTION, SOLUTION INTRAVENOUS at 12:33

## 2024-03-05 RX ADMIN — METOPROLOL TARTRATE 50 MG: 25 TABLET, FILM COATED ORAL at 18:14

## 2024-03-05 ASSESSMENT — LIFESTYLE VARIABLES
PAROXYSMAL SWEATS: NO SWEAT VISIBLE
NAUSEA AND VOMITING: NO NAUSEA AND NO VOMITING
ORIENTATION AND CLOUDING OF SENSORIUM: ORIENTED AND CAN DO SERIAL ADDITIONS
AUDITORY DISTURBANCES: NOT PRESENT
ORIENTATION AND CLOUDING OF SENSORIUM: ORIENTED AND CAN DO SERIAL ADDITIONS
NAUSEA AND VOMITING: NO NAUSEA AND NO VOMITING
PAROXYSMAL SWEATS: NO SWEAT VISIBLE
TOTAL SCORE: 1
HEADACHE, FULLNESS IN HEAD: NOT PRESENT
HEADACHE, FULLNESS IN HEAD: NOT PRESENT
ANXIETY: NO ANXIETY (AT EASE)
VISUAL DISTURBANCES: NOT PRESENT
PAROXYSMAL SWEATS: NO SWEAT VISIBLE
ORIENTATION AND CLOUDING OF SENSORIUM: ORIENTED AND CAN DO SERIAL ADDITIONS
PAROXYSMAL SWEATS: NO SWEAT VISIBLE
TREMOR: NO TREMOR
TREMOR: NO TREMOR
VISUAL DISTURBANCES: NOT PRESENT
AUDITORY DISTURBANCES: NOT PRESENT
TOTAL SCORE: 0
ANXIETY: NO ANXIETY (AT EASE)
AUDITORY DISTURBANCES: NOT PRESENT
AGITATION: NORMAL ACTIVITY
ANXIETY: NO ANXIETY (AT EASE)
TREMOR: NO TREMOR
TREMOR: NO TREMOR
TOTAL SCORE: 0
ORIENTATION AND CLOUDING OF SENSORIUM: ORIENTED AND CAN DO SERIAL ADDITIONS
VISUAL DISTURBANCES: NOT PRESENT
AUDITORY DISTURBANCES: NOT PRESENT
ORIENTATION AND CLOUDING OF SENSORIUM: ORIENTED AND CAN DO SERIAL ADDITIONS
ANXIETY: MILDLY ANXIOUS
AGITATION: NORMAL ACTIVITY
ANXIETY: NO ANXIETY (AT EASE)
HEADACHE, FULLNESS IN HEAD: NOT PRESENT
NAUSEA AND VOMITING: NO NAUSEA AND NO VOMITING
TOTAL SCORE: 0
HEADACHE, FULLNESS IN HEAD: NOT PRESENT
ORIENTATION AND CLOUDING OF SENSORIUM: ORIENTED AND CAN DO SERIAL ADDITIONS
PAROXYSMAL SWEATS: NO SWEAT VISIBLE
VISUAL DISTURBANCES: NOT PRESENT
AGITATION: NORMAL ACTIVITY
NAUSEA AND VOMITING: NO NAUSEA AND NO VOMITING
AGITATION: NORMAL ACTIVITY
AUDITORY DISTURBANCES: NOT PRESENT
PAROXYSMAL SWEATS: NO SWEAT VISIBLE
VISUAL DISTURBANCES: NOT PRESENT
HEADACHE, FULLNESS IN HEAD: NOT PRESENT
TOTAL SCORE: 1
AUDITORY DISTURBANCES: NOT PRESENT
VISUAL DISTURBANCES: NOT PRESENT
AGITATION: NORMAL ACTIVITY
TOTAL SCORE: 0
HEADACHE, FULLNESS IN HEAD: NOT PRESENT
ANXIETY: NO ANXIETY (AT EASE)
AGITATION: NORMAL ACTIVITY
NAUSEA AND VOMITING: NO NAUSEA AND NO VOMITING
TREMOR: NO TREMOR
TREMOR: NO TREMOR
NAUSEA AND VOMITING: MILD NAUSEA WITH NO VOMITING

## 2024-03-05 ASSESSMENT — PATIENT HEALTH QUESTIONNAIRE - PHQ9
2. FEELING DOWN, DEPRESSED, IRRITABLE, OR HOPELESS: NOT AT ALL
2. FEELING DOWN, DEPRESSED, IRRITABLE, OR HOPELESS: NOT AT ALL
SUM OF ALL RESPONSES TO PHQ9 QUESTIONS 1 AND 2: 0
1. LITTLE INTEREST OR PLEASURE IN DOING THINGS: NOT AT ALL
1. LITTLE INTEREST OR PLEASURE IN DOING THINGS: NOT AT ALL
SUM OF ALL RESPONSES TO PHQ9 QUESTIONS 1 AND 2: 0

## 2024-03-05 ASSESSMENT — ENCOUNTER SYMPTOMS
ABDOMINAL PAIN: 0
SPUTUM PRODUCTION: 0
DEPRESSION: 0
CHILLS: 0
FATIGUE: 0
HEMOPTYSIS: 0
SHORTNESS OF BREATH: 0
ABDOMINAL DISTENTION: 0
CARDIOVASCULAR NEGATIVE: 1
PALPITATIONS: 0
COLOR CHANGE: 0
DIAPHORESIS: 0
CONSTITUTIONAL NEGATIVE: 1
WEIGHT LOSS: 0
NAUSEA: 0
ARTHRALGIAS: 0
ORTHOPNEA: 0
LIGHT-HEADEDNESS: 0
EYES NEGATIVE: 1
COUGH: 0
VOMITING: 0
ENDOCRINE NEGATIVE: 1
GASTROINTESTINAL NEGATIVE: 1
RESPIRATORY NEGATIVE: 1
HALLUCINATIONS: 0
MYALGIAS: 0
PSYCHIATRIC NEGATIVE: 1
BRUISES/BLEEDS EASILY: 0
FEVER: 0
CHEST TIGHTNESS: 0
CONSTIPATION: 0
MUSCULOSKELETAL NEGATIVE: 1
HEADACHES: 0
DIZZINESS: 0
TINGLING: 0
DIARRHEA: 0
HEARTBURN: 0
NEUROLOGICAL NEGATIVE: 1

## 2024-03-05 ASSESSMENT — PAIN DESCRIPTION - PAIN TYPE
TYPE: CHRONIC PAIN
TYPE: CHRONIC PAIN

## 2024-03-05 ASSESSMENT — FIBROSIS 4 INDEX: FIB4 SCORE: 3.16

## 2024-03-05 NOTE — DISCHARGE PLANNING
Case Management Discharge Planning    Admission Date: 3/3/2024  GMLOS: 3.4  ALOS: 2    6-Clicks ADL Score: 19  6-Clicks Mobility Score: 18      Anticipated Discharge Dispo: Discharge Disposition: Discharged to home/self care (01)    DME Needed: No    Action(s) Taken: Updated Provider/Nurse on Discharge Plan, Patient discussed during IDT rounds with medical team.    Escalations Completed: None    Medically Clear: No    Next Steps: CM will continue to follow for discharge planning needs.    Barriers to Discharge: Medical clearance    Is the patient up for discharge tomorrow: No    RN CM met with patient at bedside and obtained the information used in this assessment. Patient verified accuracy of facesheet; patient lives in a single story home alone.  Prior to current hospitalization, patient was independent with ADLS/IADLS. Patient drives and is able to attend necessary MD appointments. Patient's PCP is Lori Villalobos and prefers to use [facility] on [address]. Patient has no financial concerns. Patient has support from friends. Denies any history of substance use and denies any diagnosis of mental illness.      Care Transition Team Assessment    Information Source: Patient  Orientation Level: Oriented X4  Information Given By: Patient  Who is responsible for making decisions for patient? : Patient    Readmission Evaluation  Is this a readmission?: Yes - unplanned readmission    Elopement Risk  Legal Hold: No  Ambulatory or Self Mobile in Wheelchair: No-Not an Elopement Risk  Disoriented: No  Psychiatric Symptoms: None  History of Wandering: No  Elopement this Admit: No  Vocalizing Wanting to Leave: No  Displays Behaviors, Body Language Wanting to Leave: No-Not at Risk for Elopement    Interdisciplinary Discharge Planning  Does Admitting Nurse Feel This Could be a Complex Discharge?: No  Primary Care Physician: Lori Villalobos  Lives with - Patient's Self Care Capacity: Alone and Able to Care For Self  Patient or legal  guardian wants to designate a caregiver: Yes  Caregiver name: Laura  Caregiver contact info: [phone]  ([facility]) Authorization for Release of Health Information has been completed: Patient refused to sign  Support Systems: None  Housing / Facility: 1 Story Apartment / Condo  Able to Return to Previous ADL's: Yes  Mobility Issues: No  Patient Prefers to be Discharged to:: Home  Assistance Needed: No  Durable Medical Equipment: Not Applicable    Discharge Preparedness  What is your plan after discharge?: Home with help  What are your discharge supports?:  (Friends, neighbors.)  Prior Functional Level: Ambulatory, Independent with Activities of Daily Living, Independent with Medication Management  Difficulity with ADLs: None  Difficulity with IADLs: None    Functional Assesment  Prior Functional Level: Ambulatory, Independent with Activities of Daily Living, Independent with Medication Management    Finances  Financial Barriers to Discharge: No  Prescription Coverage: Yes    Vision / Hearing Impairment  Vision Impairment : Yes  Right Eye Vision: Impaired, Wears Glasses  Left Eye Vision: Impaired, Wears Glasses  Hearing Impairment : No         Advance Directive  Advance Directive?: DPOA for Health Care    Domestic Abuse  Have you ever been the victim of abuse or violence?: No  Physical Abuse or Sexual Abuse: No  Verbal Abuse or Emotional Abuse: No  Possible Abuse/Neglect Reported to:: Not Applicable    Psychological Assessment  History of Substance Abuse: None  History of Psychiatric Problems: No    Discharge Risks or Barriers  Discharge risks or barriers?: No    Anticipated Discharge Information  Discharge Disposition: Discharged to home/self care (01)

## 2024-03-05 NOTE — DISCHARGE PLANNING
Offered patient a letter to show his place of employment that his is currently in the hospital, refused, states he has done that twice and it won't help.

## 2024-03-05 NOTE — THERAPY
Physical Therapy Contact Note    Patient Name: Jose A Ponce  Age:  63 y.o., Sex:  male  Medical Record #: [id number]  Today's Date: 3/5/2024       03/05/24 0829   Initial Contact Note    Initial Contact Note Order Received and Verified, Physical Therapy Evaluation in Progress with Full Report to Follow.   Interdisciplinary Plan of Care Collaboration   IDT Collaboration with  Nursing   Collaboration Comments PT Consult receieved. Per review of medical records, patient is scheduled for possible C today. PT to follow up post-procedure at a later date as able & appropriate.   Session Information   Date / Session Number  3/5-Hold (EVAL)   Priority   (C 3/5)

## 2024-03-05 NOTE — CARE PLAN
The patient is Stable - Low risk of patient condition declining or worsening    Shift Goals  Clinical Goals: CIWA, Hep gtt  Patient Goals: rest, pain control    Progress made toward(s) clinical / shift goals:    Problem: Seizure Precautions  Goal: Implementation of seizure precautions  Description: Target End Date:  Prior to discharge or change in level of care    1.  Padded side rails up at all times  2.  Suction equipment and oxygen delivery system at bedside  3.  Continuous pulse oximeter in use  4.  Implement fall precautions, bed alarm on, bed in lowest position  5.  IV access (per order)  6.  Provide low stimulus environment, avoid exposure to triggers  7.  Instruct patient to use call light/seizure button if having warning signs of impending seizure  Outcome: Progressing  Note: Pt is monitored for changes in mentation, pt has seizure precautions in place.      Problem: Knowledge Deficit - Standard  Goal: Patient and family/care givers will demonstrate understanding of plan of care, disease process/condition, diagnostic tests and medications  Description: Target End Date:  1-3 days or as soon as patient condition allows    Document in Patient Education    1.  Patient and family/caregiver oriented to unit, equipment, visitation policy and means for communicating concern  2.  Complete/review Learning Assessment  3.  Assess knowledge level of disease process/condition, treatment plan, diagnostic tests and medications  4.  Explain disease process/condition, treatment plan, diagnostic tests and medications  Note: Pt is updated of plan of care at bedside shift report, pt's questions and concerns are addressed. Pt's barriers to discharge are addressed.      Problem: Fall Risk  Goal: Patient will remain free from falls  Description: Target End Date:  Prior to discharge or change in level of care    Document interventions on the [facility] [place] Fall Risk Assessment    1.  Assess for fall risk factors  2.  Implement fall  precautions  Outcome: Progressing  Note: Pt is assessed for risk for falls, pt has fall precautions in place, pt has order for pt/ot eval and treat.

## 2024-03-05 NOTE — PROGRESS NOTES
Cardiology Follow Up Progress Note    Date of Service  3/5/2024    Attending Physician  Rony Walker M.D.    Chief Complaint   Shortness of breath    HPI  Jose A Ponce is a 63 y.o. male with a history of CAD s/p CABG x2 in 2017 to his LAD with residual moderate disease in the left circumflex and RCA, DVTs and PEs on chronic anticoagulation admitted 3/3/2024 with dyspnea on exertion concerning for anginal equivalent.     Interim Events  3/5/2024: No cardiac events overnight. Sinus rhythm on telemetry. Hypertensive, but vitals otherwise stable. SPO2 93-96% on room air. He reports he is doing well and does not any any cardiac symptoms. He is frustrated about waiting for [facility] and may leave. No chest pain or palpitations. No shortness of breath, dyspnea on exertion, orthopnea or PND. No lower extremity edema. No dizziness or lightheadedness. No syncope or presyncope.      Review of Systems  Review of Systems   Constitutional:  Negative for chills, diaphoresis, fatigue and fever.   HENT: Negative.     Eyes: Negative.    Respiratory:  Negative for cough, chest tightness and shortness of breath.    Cardiovascular:  Negative for chest pain, palpitations and leg swelling.   Gastrointestinal:  Negative for abdominal distention, abdominal pain, constipation, diarrhea, nausea and vomiting.   Endocrine: Negative.    Genitourinary:  Negative for decreased urine volume, difficulty urinating, dysuria, frequency and urgency.   Musculoskeletal:  Negative for arthralgias and myalgias.   Skin:  Negative for color change.   Neurological:  Negative for dizziness, syncope and light-headedness.   Hematological:  Does not bruise/bleed easily.   Psychiatric/Behavioral: Negative.         Vital signs in last 24 hours  Temp:  [36.5 °C (97.7 °F)-37.1 °C (98.8 °F)] 36.5 °C (97.7 °F)  Pulse:  [73-82] 82  Resp:  [17-18] 17  BP: (133-158)/() 158/100  SpO2:  [93 %-96 %] 95 %    Physical Exam  Physical Exam  Vitals and nursing note  reviewed.   Constitutional:       General: He is not in acute distress.     Appearance: Normal appearance. He is not toxic-appearing.   HENT:      Head: Normocephalic and atraumatic.      Right Ear: External ear normal.      Left Ear: External ear normal.      Nose: Nose normal.      Mouth/Throat:      Mouth: Mucous membranes are moist.      Pharynx: Oropharynx is clear.   Eyes:      General: No scleral icterus.     Extraocular Movements: Extraocular movements intact.      Conjunctiva/sclera: Conjunctivae normal.      Pupils: Pupils are equal, round, and reactive to light.   Neck:      Vascular: No JVD.   Cardiovascular:      Rate and Rhythm: Normal rate and regular rhythm.      Pulses: Normal pulses.      Heart sounds: Normal heart sounds. No murmur heard.     No friction rub. No gallop.   Pulmonary:      Effort: Pulmonary effort is normal.      Breath sounds: Normal breath sounds.   Abdominal:      General: Abdomen is flat. Bowel sounds are normal. There is no distension.      Palpations: Abdomen is soft.      Tenderness: There is no abdominal tenderness.   Musculoskeletal:         General: Normal range of motion.      Cervical back: Normal range of motion and neck supple.      Right lower leg: No edema.      Left lower leg: No edema.   Skin:     General: Skin is warm and dry.      Capillary Refill: Capillary refill takes less than 2 seconds.      Coloration: Skin is not jaundiced.   Neurological:      General: No focal deficit present.      Mental Status: He is alert and oriented to person, place, and time. Mental status is at baseline.   Psychiatric:         Mood and Affect: Mood normal.         Behavior: Behavior normal.         Judgment: Judgment normal.         Lab Review  Lab Results   Component Value Date/Time    WBC 6.5 03/05/2024 01:04 AM    RBC 2.95 (L) 03/05/2024 01:04 AM    HEMOGLOBIN 10.3 (L) 03/05/2024 01:04 AM    HEMATOCRIT 30.0 (L) 03/05/2024 01:04 AM    .7 (H) 03/05/2024 01:04 AM    MCH  34.9 (H) 03/05/2024 01:04 AM    MCHC 34.3 03/05/2024 01:04 AM    MPV 10.2 03/05/2024 01:04 AM      Lab Results   Component Value Date/Time    SODIUM 132 (L) 03/05/2024 01:04 AM    POTASSIUM 3.7 03/05/2024 01:04 AM    CHLORIDE 101 03/05/2024 01:04 AM    CO2 20 03/05/2024 01:04 AM    GLUCOSE 117 (H) 03/05/2024 01:04 AM    BUN 13 03/05/2024 01:04 AM    CREATININE 0.59 03/05/2024 01:04 AM      Lab Results   Component Value Date/Time    ASTSGOT 30 03/05/2024 01:04 AM    ALTSGPT 14 03/05/2024 01:04 AM     Lab Results   Component Value Date/Time    CHOLSTRLTOT 161 03/03/2024 11:59 PM    LDL 83 03/03/2024 11:59 PM    HDL 46 03/03/2024 11:59 PM    TRIGLYCERIDE 159 (H) 03/03/2024 11:59 PM    TROPONINT 38 (H) 03/04/2024 07:12 AM       Recent Labs     03/03/24  1703   NTPROBNP 211*       Cardiac Imaging and Procedures Review    Echocardiogram:  3/4/2024  CONCLUSIONS  Prior study 01/01/22, compared to the report of the prior study, there   has been no significant change.   Normal left ventricular systolic function.  The left ventricular ejection fraction is visually estimated to be 70%.  No significant valvular abnormalities.     Cardiac Catheterization:  Pending    Imaging  Chest X-Ray:  3/3/2024   FINDINGS:  Cardiac mediastinal contour is unchanged.  Postoperative change from prior open heart surgery.  Linear opacity in the RIGHT midlung.  No focal consolidation.  No pleural fluid collection or pneumothorax.  IMPRESSION:  1.  Minimal RIGHT midlung scar.  2.  No pneumonia or pulmonary edema.  3.  Prior open heart surgery.      Assessment/Plan  #Dyspnea on exertion  #History of CABG  #Hypertension  #Alcohol withdrawal  #History of PE    Recommendations:  - No further cardiac symptoms today.  - Continue heparin gtt. Previously had recurrent DVT while off xarelto.   - Continue aspirin and atorvastatin.  - Continue metoprolol 50mg BID  - Start losartan and uptitrate as needed for BP goal of <130/80.  -EF preserved on  echo.  -Discussed CV risk factor modification including cholesterol management, blood pressure management, smoking cessation, diet and lifestyle changes.   - Keep NPO. Plan for LHC later today.     Cardiology will continue to follow.    Thank you for allowing me to participate in the care of Jose A Ponce .    Nanci Avelar PA-C, Cardiology  [facility], [facility]  [address] [place] [address]  Phone: [phone]  Fax: [phone]    PLEASE NOTE: This note was created using voice recognition software. I have made every reasonable attempt to correct obvious errors, but I expect that there are errors of grammar and possibly content that I did not discover before finalizing the note.     I personally spent a total of 15 minutes which includes face-to-face time and non-face-to-face time spent on preparing to see the patient, reviewing hospital notes and tests, obtaining history from the patient, performing a medically appropriate exam, counseling and educating the patient, ordering medications/tests/procedures/referrals as clinically indicated, and documenting information in the electronic medical record.

## 2024-03-05 NOTE — PROGRESS NOTES
Progress note    HPI: Jose A Ponce is a 63 y.o. male admitted 3/3/2024 for dyspnea that started after recent admission for right-sided humerus fracture/dislocation s/p medical management.  Negative CT PE,  No sepsis criteria.  Given history of alcohol use disorder with recent drink, placed on CIWA.  Other conditions include coronary artery disease status post CABG unprovoked pulmonary embolism on chronic Xarelto and hypertension.     INTERVAL HISTORY:   No acute distress on room air and speaking in full sentences.  No chest discomfort. EKG shows ST depressions in V2 V3 V4 V5 and V6.  Troponins elevated but stable around 35. Cardiac risk factors include heavy smoker (1 pack a day since 17 years old), hypertension slightly trending up, hypercholesterolemia.  Low concern for active DVT.  CIWA never above 10. Normal echo, EF 70.  Evaluated by cardiology who recommend cath.      Vitals:    03/04/24 1609 03/04/24 1912 03/05/24 0002 03/05/24 0426   BP: 133/77 (!) 146/90 (!) 143/85 (!) 158/89   Pulse: 74 73 73 81   Resp: 18 18 18 18   Temp: 37 °C (98.6 °F) 36.8 °C (98.2 °F) 36.8 °C (98.2 °F) 36.8 °C (98.2 °F)   TempSrc: Temporal Temporal Temporal Temporal   SpO2: 93% 93% 96% 95%   Weight:    76.7 kg (169 lb 1.5 oz)   Height:         Body mass index is 22 kg/m².    Review of Systems   Constitutional: Negative.  Negative for chills, fever and weight loss.   HENT: Negative.     Eyes: Negative.    Respiratory: Negative.  Negative for cough, hemoptysis and sputum production.    Cardiovascular: Negative.  Negative for chest pain, palpitations and orthopnea.   Gastrointestinal: Negative.  Negative for abdominal pain and heartburn.   Genitourinary: Negative.  Negative for dysuria.   Musculoskeletal: Negative.  Negative for myalgias.   Skin: Negative.  Negative for rash.   Neurological: Negative.  Negative for dizziness, tingling and headaches.   Endo/Heme/Allergies: Negative.    Psychiatric/Behavioral: Negative.   Negative for depression, hallucinations and suicidal ideas.    All other systems reviewed and are negative.     Physical Exam  Vitals and nursing note reviewed.   Constitutional:       General: He is not in acute distress.     Appearance: Normal appearance. He is not ill-appearing, toxic-appearing or diaphoretic.   Cardiovascular:      Rate and Rhythm: Normal rate and regular rhythm.      Pulses: Normal pulses.      Heart sounds: Normal heart sounds. No murmur heard.  Pulmonary:      Effort: Pulmonary effort is normal. No respiratory distress.      Breath sounds: Normal breath sounds. No stridor. No wheezing or rales.   Abdominal:      General: Bowel sounds are normal. There is no distension.      Palpations: Abdomen is soft.      Tenderness: There is no abdominal tenderness. There is no guarding.   Musculoskeletal:         General: No swelling or tenderness. Normal range of motion.      Right lower leg: No edema.      Left lower leg: No edema.   Skin:     General: Skin is warm.      Capillary Refill: Capillary refill takes less than 2 seconds.      Coloration: Skin is not jaundiced or pale.      Findings: No bruising.   Neurological:      General: No focal deficit present.      Mental Status: He is alert. Mental status is at baseline.      Cranial Nerves: No cranial nerve deficit.      Sensory: No sensory deficit.      Coordination: Coordination normal.      Gait: Gait normal.      Deep Tendon Reflexes: Reflexes normal.   Psychiatric:         Mood and Affect: Mood normal.         Behavior: Behavior normal.         Thought Content: Thought content normal.         Judgment: Judgment normal.       LABS:   Recent Labs     03/03/24  1703 03/03/24  2359 03/05/24  0104   WBC 6.6 7.4 6.5   RBC 3.39* 3.06* 2.95*   HEMOGLOBIN 12.1* 11.0* 10.3*   HEMATOCRIT 34.7* 31.2* 30.0*   .4* 102.0* 101.7*   MCH 35.7* 35.9* 34.9*   MCHC 34.9 35.3 34.3   RDW 55.4* 54.2* 52.1*   PLATELETCT 221 191 160*   MPV 9.3 10.0 10.2       Recent Labs     24  1703 24  2359 24  0104   SODIUM 140 137 132*   POTASSIUM 3.8 3.9 3.7   CHLORIDE 107 107 101   CO2 16* 17* 20   GLUCOSE 107* 100* 117*   BUN 12 14 13   CREATININE 0.85 0.96 0.59   CALCIUM 8.7 8.3* 8.2*      Recent Labs     24  1703 24  2359 24  0104   ALTSGPT 18 14 14   ASTSGOT 50* 42 30   ALKPHOSPHAT 110* 98 95   TBILIRUBIN 0.4 0.6 0.8   LIPASE 252*  --   --    GLUCOSE 107* 100* 117*      RADIOLOGY:   EC-ECHOCARDIOGRAM COMPLETE W/O CONT   Final Result      CT-CTA CHEST PULMONARY ARTERY W/ RECONS   Final Result      1.  No evidence of pulmonary embolism.   2.  Atherosclerosis with coronary artery disease.            DX-CHEST-PORTABLE (1 VIEW)   Final Result      1.  Minimal RIGHT midlung scar.   2.  No pneumonia or pulmonary edema.   3.  Prior open heart surgery.      CL-LEFT HEART CATHETERIZATION WITH POSSIBLE INTERVENTION    (Results Pending)        MEDICAL DECISION MAKIN-year-old male admitted 3/3/2024 for dyspnea on exertion with history of coronary artery disease status post CABG left circumflex and RCA, concerning for progression of disease.  EKG nonspecific ST and T changes.  Negative CT PE.  Other active comorbidities include withdrawal from alcohol use disorder with close he was so far, and recent right-sided humerus fracture status post medical management.  Cath today per cardiology recommendation.  Anticipating discharge tomorrow if no acute complications.     Disposition: Home/self-care  Follow up outpatient with primary care physicians and other specialties already following.     ASSESSMENT AND PLAN:   * Dyspnea on exertion  Assessment & Plan  No chest discomfort.  Back at room air.  Negative CT PE.  EKG precordial ST depressions.  Considering significant past cardiac history, cath today.  Bridging Xarelto to heparin, held for procedure.  No fluid overload.  Strict I's and O's    Megaloblastic anemia  Assessment & Plan  Most likely secondary  to chronic alcoholism.  Negative for B6/B12 deficiency and TSH within reference levels.  Follow-up outpatient    Alcohol withdrawal syndrome without complication (HCC)  Assessment & Plan  History of 3 drinks 5 nights a week.  No withdrawals, no seizures in the past.  Tremors on admission.  Low CIWA so far, 3 mg of Ativan 3//24.  Continue protocol.  Aspiration, seizure, fall precautions.  Electrolyte replacement as needed.  Watch for refeeding.      Closed fracture of neck of right humerus  Assessment & Plan  Dated from 2/16/2024.  Follow-up with orthopedic surgery as outpatient  Tylenol, oxycodone as needed    Tobacco abuse  Assessment & Plan  1 pack a day since 17 years old.  Currently on nicotine patch with no cravings.       Armando R. Reyes Yparraguirre, M.D.  Internal Medicine Resident   [facility]      This note was generated using voice recognition software which has a small chance of producing errors of grammar and possibly content. I have made every reasonable attempt to find and correct any obvious errors but expect that some may not be found prior to finalization of this note.

## 2024-03-05 NOTE — DIETARY
"Nutrition services: Day 2 of admit.  Jose A Ponce is a 63 y.o. male with admitting DX of dyspnea, alcohol withdrawal seizure without complication.    Consult received for wt loss (14-23 lbs in 3 months), poor PO per admit screen. Met with pt at bedside. Pt appeared thin with moderate muscle loss evidenced by slight hollowness to orbitals, sunken buccal region, some prominence to zygomatic arches; moderate fat loss evidenced by slight depression to temporals, some prominence to brow bone, some protrusion to clavicles and some shoulder angling. Pt reports a good appetite PTA, stated he was very hungry as he has been NPO pending procedure. Pt shared that his UBW was ~ 205 lbs, and that he has lost ~30 lbs after falling in January. Pt reported he fell in January on his right arm (dominant hand) which has impacted his use. Pt agreeable to oral nutrition supplements with meals to bolster intake and optimize nutrition. RD will add supplement order.     Assessment:  Height: 186.7 cm (6' 1.5\")  Weight: 76.7 kg (169 lb 1.5 oz)  Body mass index is 22 kg/m²., BMI classification: normal  Diet/Intake: NPO (previously Regular); PO % for one documented snack.    Evaluation:   [facility] Admitted 2/18 with Syncope and arm pain. Admitted 3/3 with generalized weakness, exertional SOB and fatigue.   PMH: Breath shortness, CAD (coronary artery disease), Dental disorder, DVT (deep venous thrombosis), hernia repair, Hyperlipidemia, Hypertension, and Tobacco use.  Wt hx per chart review: 160 lbs 2/18, 192 lbs 1/6, 182 lbs 1/6, 189 lbs 1/21; wt loss of 11% in two months is severe.     Malnutrition Risk: Pt with severe, acute malnutrition related to arm injury from recent fall, AEB physical markers for moderate fat and moderate muscle loss noted above and severe wt loss of 11% in two months.    Recommendations/Plan:  Ensure Plus with meals in chocolate.  Encourage intake of meals and supplements.  Document " intake of all meals and supplements as % taken in ADLs to provide interdisciplinary communication across all shifts.   Monitor weight.  Nutrition rep will continue to see patient for ongoing meal and snack preferences.     RD will follow.

## 2024-03-05 NOTE — ASSESSMENT & PLAN NOTE
Most likely secondary to chronic alcoholism.  Negative for B6/B12 deficiency and TSH within reference levels.  Follow-up outpatient

## 2024-03-06 LAB
ALBUMIN SERPL BCP-MCNC: 3.8 G/DL (ref 3.2–4.9)
ALBUMIN/GLOB SERPL: 1.4 G/DL
ALP SERPL-CCNC: 102 U/L (ref 30–99)
ALT SERPL-CCNC: 15 U/L (ref 2–50)
ANION GAP SERPL CALC-SCNC: 13 MMOL/L (ref 7–16)
APTT PPP: 33.7 SEC (ref 24.7–36)
AST SERPL-CCNC: 42 U/L (ref 12–45)
BASOPHILS # BLD AUTO: 0.6 % (ref 0–1.8)
BASOPHILS # BLD: 0.04 K/UL (ref 0–0.12)
BILIRUB SERPL-MCNC: 1 MG/DL (ref 0.1–1.5)
BUN SERPL-MCNC: 19 MG/DL (ref 8–22)
CALCIUM ALBUM COR SERPL-MCNC: 9 MG/DL (ref 8.5–10.5)
CALCIUM SERPL-MCNC: 8.8 MG/DL (ref 8.5–10.5)
CHLORIDE SERPL-SCNC: 102 MMOL/L (ref 96–112)
CO2 SERPL-SCNC: 19 MMOL/L (ref 20–33)
CREAT SERPL-MCNC: 0.92 MG/DL (ref 0.5–1.4)
EOSINOPHIL # BLD AUTO: 0.05 K/UL (ref 0–0.51)
EOSINOPHIL NFR BLD: 0.8 % (ref 0–6.9)
ERYTHROCYTE [DISTWIDTH] IN BLOOD BY AUTOMATED COUNT: 53.4 FL (ref 35.9–50)
GFR SERPLBLD CREATININE-BSD FMLA CKD-EPI: 93 ML/MIN/1.73 M 2
GLOBULIN SER CALC-MCNC: 2.8 G/DL (ref 1.9–3.5)
GLUCOSE SERPL-MCNC: 109 MG/DL (ref 65–99)
HCT VFR BLD AUTO: 34.4 % (ref 42–52)
HGB BLD-MCNC: 11.8 G/DL (ref 14–18)
IMM GRANULOCYTES # BLD AUTO: 0.03 K/UL (ref 0–0.11)
IMM GRANULOCYTES NFR BLD AUTO: 0.5 % (ref 0–0.9)
INR PPP: 1.06 (ref 0.87–1.13)
LYMPHOCYTES # BLD AUTO: 1.22 K/UL (ref 1–4.8)
LYMPHOCYTES NFR BLD: 19 % (ref 22–41)
MAGNESIUM SERPL-MCNC: 1.9 MG/DL (ref 1.5–2.5)
MCH RBC QN AUTO: 35.2 PG (ref 27–33)
MCHC RBC AUTO-ENTMCNC: 34.3 G/DL (ref 32.3–36.5)
MCV RBC AUTO: 102.7 FL (ref 81.4–97.8)
MONOCYTES # BLD AUTO: 0.69 K/UL (ref 0–0.85)
MONOCYTES NFR BLD AUTO: 10.8 % (ref 0–13.4)
NEUTROPHILS # BLD AUTO: 4.38 K/UL (ref 1.82–7.42)
NEUTROPHILS NFR BLD: 68.3 % (ref 44–72)
NRBC # BLD AUTO: 0 K/UL
NRBC BLD-RTO: 0 /100 WBC (ref 0–0.2)
PLATELET # BLD AUTO: 161 K/UL (ref 164–446)
PMV BLD AUTO: 9.9 FL (ref 9–12.9)
POTASSIUM SERPL-SCNC: 4.2 MMOL/L (ref 3.6–5.5)
PROT SERPL-MCNC: 6.6 G/DL (ref 6–8.2)
PROTHROMBIN TIME: 13.9 SEC (ref 12–14.6)
RBC # BLD AUTO: 3.35 M/UL (ref 4.7–6.1)
SODIUM SERPL-SCNC: 134 MMOL/L (ref 135–145)
UFH PPP CHRO-ACNC: 0.49 IU/ML
WBC # BLD AUTO: 6.4 K/UL (ref 4.8–10.8)

## 2024-03-06 PROCEDURE — A9270 NON-COVERED ITEM OR SERVICE: HCPCS | Performed by: INTERNAL MEDICINE

## 2024-03-06 PROCEDURE — 85610 PROTHROMBIN TIME: CPT

## 2024-03-06 PROCEDURE — 700102 HCHG RX REV CODE 250 W/ 637 OVERRIDE(OP)

## 2024-03-06 PROCEDURE — 700102 HCHG RX REV CODE 250 W/ 637 OVERRIDE(OP): Performed by: INTERNAL MEDICINE

## 2024-03-06 PROCEDURE — 83735 ASSAY OF MAGNESIUM: CPT

## 2024-03-06 PROCEDURE — 99232 SBSQ HOSP IP/OBS MODERATE 35: CPT | Mod: GC | Performed by: INTERNAL MEDICINE

## 2024-03-06 PROCEDURE — 80053 COMPREHEN METABOLIC PANEL: CPT

## 2024-03-06 PROCEDURE — 700111 HCHG RX REV CODE 636 W/ 250 OVERRIDE (IP): Performed by: INTERNAL MEDICINE

## 2024-03-06 PROCEDURE — 36415 COLL VENOUS BLD VENIPUNCTURE: CPT

## 2024-03-06 PROCEDURE — A9270 NON-COVERED ITEM OR SERVICE: HCPCS

## 2024-03-06 PROCEDURE — 85730 THROMBOPLASTIN TIME PARTIAL: CPT

## 2024-03-06 PROCEDURE — 700102 HCHG RX REV CODE 250 W/ 637 OVERRIDE(OP): Performed by: HOSPITALIST

## 2024-03-06 PROCEDURE — 97162 PT EVAL MOD COMPLEX 30 MIN: CPT

## 2024-03-06 PROCEDURE — A9270 NON-COVERED ITEM OR SERVICE: HCPCS | Performed by: HOSPITALIST

## 2024-03-06 PROCEDURE — 770020 HCHG ROOM/CARE - TELE (206)

## 2024-03-06 PROCEDURE — 99232 SBSQ HOSP IP/OBS MODERATE 35: CPT | Performed by: INTERNAL MEDICINE

## 2024-03-06 PROCEDURE — 700102 HCHG RX REV CODE 250 W/ 637 OVERRIDE(OP): Performed by: PHYSICIAN ASSISTANT

## 2024-03-06 PROCEDURE — 85025 COMPLETE CBC W/AUTO DIFF WBC: CPT

## 2024-03-06 PROCEDURE — A9270 NON-COVERED ITEM OR SERVICE: HCPCS | Performed by: PHYSICIAN ASSISTANT

## 2024-03-06 PROCEDURE — 85520 HEPARIN ASSAY: CPT

## 2024-03-06 PROCEDURE — 97530 THERAPEUTIC ACTIVITIES: CPT

## 2024-03-06 RX ORDER — HEPARIN SODIUM 5000 [USP'U]/ML
5000 INJECTION, SOLUTION INTRAVENOUS; SUBCUTANEOUS EVERY 8 HOURS
Status: DISCONTINUED | OUTPATIENT
Start: 2024-03-06 | End: 2024-03-06

## 2024-03-06 RX ORDER — HEPARIN SODIUM 5000 [USP'U]/100ML
0-30 INJECTION, SOLUTION INTRAVENOUS CONTINUOUS
Status: DISCONTINUED | OUTPATIENT
Start: 2024-03-06 | End: 2024-03-07

## 2024-03-06 RX ADMIN — NICOTINE TRANSDERMAL SYSTEM 21 MG: 21 PATCH, EXTENDED RELEASE TRANSDERMAL at 05:06

## 2024-03-06 RX ADMIN — ATORVASTATIN CALCIUM 40 MG: 40 TABLET, FILM COATED ORAL at 21:47

## 2024-03-06 RX ADMIN — ASPIRIN 81 MG: 81 TABLET, COATED ORAL at 05:07

## 2024-03-06 RX ADMIN — LOSARTAN POTASSIUM 25 MG: 50 TABLET, FILM COATED ORAL at 05:07

## 2024-03-06 RX ADMIN — HEPARIN SODIUM 12 UNITS/KG/HR: 5000 INJECTION, SOLUTION INTRAVENOUS at 19:53

## 2024-03-06 RX ADMIN — FOLIC ACID 1 MG: 1 TABLET ORAL at 05:08

## 2024-03-06 RX ADMIN — METOPROLOL TARTRATE 50 MG: 25 TABLET, FILM COATED ORAL at 17:26

## 2024-03-06 RX ADMIN — THERA TABS 1 TABLET: TAB at 05:08

## 2024-03-06 RX ADMIN — OXYCODONE 5 MG: 5 TABLET ORAL at 05:07

## 2024-03-06 RX ADMIN — Medication 100 MG: at 05:08

## 2024-03-06 RX ADMIN — OMEPRAZOLE 40 MG: 20 CAPSULE, DELAYED RELEASE ORAL at 05:07

## 2024-03-06 RX ADMIN — METOPROLOL TARTRATE 50 MG: 25 TABLET, FILM COATED ORAL at 05:08

## 2024-03-06 RX ADMIN — DOCUSATE SODIUM 50 MG AND SENNOSIDES 8.6 MG 2 TABLET: 8.6; 5 TABLET, FILM COATED ORAL at 17:26

## 2024-03-06 ASSESSMENT — LIFESTYLE VARIABLES
TREMOR: NO TREMOR
HEADACHE, FULLNESS IN HEAD: NOT PRESENT
VISUAL DISTURBANCES: NOT PRESENT
NAUSEA AND VOMITING: NO NAUSEA AND NO VOMITING
ORIENTATION AND CLOUDING OF SENSORIUM: ORIENTED AND CAN DO SERIAL ADDITIONS
TREMOR: NO TREMOR
AGITATION: NORMAL ACTIVITY
TREMOR: NO TREMOR
TREMOR: NO TREMOR
VISUAL DISTURBANCES: NOT PRESENT
ANXIETY: MILDLY ANXIOUS
TREMOR: NO TREMOR
AUDITORY DISTURBANCES: NOT PRESENT
VISUAL DISTURBANCES: NOT PRESENT
TOTAL SCORE: 0
ANXIETY: NO ANXIETY (AT EASE)
AGITATION: NORMAL ACTIVITY
HEADACHE, FULLNESS IN HEAD: NOT PRESENT
TOTAL SCORE: 0
ANXIETY: MILDLY ANXIOUS
TOTAL SCORE: 1
AUDITORY DISTURBANCES: NOT PRESENT
ORIENTATION AND CLOUDING OF SENSORIUM: ORIENTED AND CAN DO SERIAL ADDITIONS
ANXIETY: NO ANXIETY (AT EASE)
AUDITORY DISTURBANCES: NOT PRESENT
HEADACHE, FULLNESS IN HEAD: NOT PRESENT
AGITATION: NORMAL ACTIVITY
NAUSEA AND VOMITING: NO NAUSEA AND NO VOMITING
VISUAL DISTURBANCES: NOT PRESENT
TOTAL SCORE: 1
PAROXYSMAL SWEATS: NO SWEAT VISIBLE
HEADACHE, FULLNESS IN HEAD: NOT PRESENT
ORIENTATION AND CLOUDING OF SENSORIUM: ORIENTED AND CAN DO SERIAL ADDITIONS
PAROXYSMAL SWEATS: NO SWEAT VISIBLE
TOTAL SCORE: 0
PAROXYSMAL SWEATS: NO SWEAT VISIBLE
AUDITORY DISTURBANCES: NOT PRESENT
AGITATION: NORMAL ACTIVITY
ORIENTATION AND CLOUDING OF SENSORIUM: ORIENTED AND CAN DO SERIAL ADDITIONS
VISUAL DISTURBANCES: NOT PRESENT
ORIENTATION AND CLOUDING OF SENSORIUM: ORIENTED AND CAN DO SERIAL ADDITIONS
NAUSEA AND VOMITING: NO NAUSEA AND NO VOMITING
PAROXYSMAL SWEATS: NO SWEAT VISIBLE
AUDITORY DISTURBANCES: NOT PRESENT
HEADACHE, FULLNESS IN HEAD: NOT PRESENT
AGITATION: NORMAL ACTIVITY
PAROXYSMAL SWEATS: NO SWEAT VISIBLE
ANXIETY: NO ANXIETY (AT EASE)

## 2024-03-06 ASSESSMENT — GAIT ASSESSMENTS
ASSISTIVE DEVICE: NONE;SINGLE POINT CANE;FRONT WHEEL WALKER
DEVIATION: DECREASED BASE OF SUPPORT;BRADYKINETIC;SHUFFLED GAIT;OTHER (COMMENT)
GAIT LEVEL OF ASSIST: CONTACT GUARD ASSIST
DISTANCE (FEET): 150

## 2024-03-06 ASSESSMENT — COGNITIVE AND FUNCTIONAL STATUS - GENERAL
WALKING IN HOSPITAL ROOM: A LITTLE
STANDING UP FROM CHAIR USING ARMS: A LITTLE
CLIMB 3 TO 5 STEPS WITH RAILING: A LOT
MOBILITY SCORE: 19
MOVING TO AND FROM BED TO CHAIR: A LITTLE
SUGGESTED CMS G CODE MODIFIER MOBILITY: CK

## 2024-03-06 ASSESSMENT — PAIN DESCRIPTION - PAIN TYPE: TYPE: CHRONIC PAIN

## 2024-03-06 ASSESSMENT — PATIENT HEALTH QUESTIONNAIRE - PHQ9
SUM OF ALL RESPONSES TO PHQ9 QUESTIONS 1 AND 2: 0
1. LITTLE INTEREST OR PLEASURE IN DOING THINGS: NOT AT ALL
1. LITTLE INTEREST OR PLEASURE IN DOING THINGS: NOT AT ALL
2. FEELING DOWN, DEPRESSED, IRRITABLE, OR HOPELESS: NOT AT ALL
2. FEELING DOWN, DEPRESSED, IRRITABLE, OR HOPELESS: NOT AT ALL
SUM OF ALL RESPONSES TO PHQ9 QUESTIONS 1 AND 2: 0

## 2024-03-06 ASSESSMENT — FIBROSIS 4 INDEX: FIB4 SCORE: 4.24

## 2024-03-06 NOTE — PROGRESS NOTES
Telemetry Report  Rhythm Sinus Tach  Heart Rate 102  Ectopy R PAC R PVC    NE 0.13  QRS 0.09  QT 0.37              Per telemetry room monitor

## 2024-03-06 NOTE — THERAPY
Physical Therapy   Initial Evaluation     Patient Name: Jose A Ponce  Age:  63 y.o., Sex:  male  Medical Record #: [id number]  Today's Date: 3/6/2024     Precautions  Precautions: Fall Risk  Comments: Previous R prox humerus fx, stable    Assessment  Patient is 63 y.o. male with a diagnosis of  dyspnea on exertion.  Additional factors influencing patient status / progress including megaloblastic anemia, alcohol withdrawal syndrome, R humerus neck closed fracture, and tobacco abuse.    Pt was agreeable to the evaluation session detailed below. Pt currently works a desk job in sales and expressed concern about being away from work. Pt demonstrated increased steadiness and safety during ambulation w/ FWW compared to ambulation w/ SPC and w/o an AD. Pt was fatigued following session and requires additional skilled physical therapy to improve mobility in order to access job. Pt would benefit from outpatient physical therapy services to improve safety during high level functional mobility tasks. Will continue to follow during admit.     Plan    Physical Therapy Initial Treatment Plan   Treatment Plan : Equipment, Gait Training, Therapeutic Activities, Therapeutic Exercise  Treatment Frequency: 3 Times per Week  Duration: Until Therapy Goals Met    DC Equipment Recommendations: Front-Wheel Walker (pt owns a SPC, but requires increased stability provided by FWW)  Discharge Recommendations: Recommend outpatient physical therapy services to address higher level deficits      Objective       03/06/24 0904   Initial Contact Note    Initial Contact Note Order Received and Verified, Physical Therapy Evaluation in Progress with Full Report to Follow.   Precautions   Precautions Fall Risk;Other (See Comments)   Comments Previous R prox humerus fx-stable; seizure precautions   Vitals   Pulse (!) 102   Patient BP Position Sitting   Blood Pressure (!) 136/93   Pulse Oximetry 95 %   O2 Delivery Device None - Room Air   Pain    Pain Scales 0 to 10 Scale    Intervention Declines   Pain 0 - 10 Group   Therapist Pain Assessment Post Activity Pain Same as Prior to Activity;0   Prior Living Situation   Prior Services Home-Independent   Housing / Facility 1 Story Apartment / Condo   Steps Into Home 0   Steps In Home 0   Equipment Owned Single Point Cane   Lives with - Patient's Self Care Capacity Alone and Able to Care For Self   Comments pt works a desk job in sales that requires ~150 foot walk from parking to his desk w/ elevators; pt states he has no family or friends in the area   Prior Level of Functional Mobility   Bed Mobility Independent   Transfer Status Independent   Ambulation Independent   Ambulation Distance community   Assistive Devices Used None   Stairs   (pt states he never has to negotiate stairs)   Comments pt currently drives to get to and from his job   History of Falls   History of Falls Yes   Date of Last Fall   (pt states he has experienced 4 falls in the past few months due to lightheadedness; pt states he often kneels down on carpet when he's starting to feel lightheaded and is able to get up from the floor on his own)   Cognition    Cognition / Consciousness X   Level of Consciousness Alert   Safety Awareness Impaired   Comments pt pleasant and cooperative; presents w/ some safety awareness impairments due to possible misperception of capabilities   Passive ROM Lower Body   Passive ROM Lower Body WDL   Active ROM Lower Body    Active ROM Lower Body  WDL   Strength Lower Body   Lower Body Strength  WDL   Comments Grossly BLE: 4+/5   Sensation Lower Body   Lower Extremity Sensation   WDL   Comments no c/o of numbness or tingling   Coordination Lower Body    Coordination Lower Body  WDL   Other Treatments   Other Treatments Provided Discussion about the importance of OOB activity and daily mobility; reinforced sitting down in a chair or kneeling down as he has been when feeling lightheaded in case of a fall; cardiac  rehab discussion concerning importance of activity pacing, warm-up and cool down following potential future cardiac procedure   Balance Assessment   Sitting Balance (Static) Good   Sitting Balance (Dynamic) Fair +   Standing Balance (Static) Fair   Standing Balance (Dynamic) Fair -   Weight Shift Sitting Good   Weight Shift Standing Fair   Comments seated at EOB, standing w/o an AD and w/ a SPC and FWW; increased stability w/ SPC and FWW   Bed Mobility    Supine to Sit Supervised   Sit to Supine Supervised   Scooting Supervised   Comments HOB flat w/o railings   Gait Analysis   Gait Level Of Assist Contact Guard Assist   Assistive Device None;Single Point Cane;Front Wheel Walker   Distance (Feet) 150  (20 feet w/o AD, 30 feet w/ SPC, 100 feet w/ FWW)   # of Times Distance was Traveled 1  (2 sitting rest breaks)   Deviation Decreased Base Of Support;Bradykinetic;Shuffled Gait;Other (Comment)  (tremulous)   Comments pt moderately unsteady during ambulation w/o an AD w/ pt stating he was fearful of falling, increased steadiness w/ SPC but continued tremulous ambulation, significantly improved steadiness w/ FWW and increased pt confidence   Functional Mobility   Sit to Stand Standby Assist   Bed, Chair, Wheelchair Transfer Standby Assist   Toilet Transfers Minimal Assist   Transfer Method Stand Step   Mobility bed>toilet>bed>room ambuation>bed>hallway ambulation>bed>chair   Comments STS x1 time at EOB w/o AD, STS x1 time at EOB w/ SPC, STS x2 times w/ FWW; v/c for hand placement during STS task; increased assistance required for toilet transfer due to low seat height and limited strength in RUE abduction to use grab bars   6 Clicks Assessment - How much HELP from from another person do you currently need... (If the patient hasn't done an activity recently, how much help from another person do you think he/she would need if he/she tried?)   Turning from your back to your side while in a flat bed without using bedrails? 4    Moving from lying on your back to sitting on the side of a flat bed without using bedrails? 4   Moving to and from a bed to a chair (including a wheelchair)? 3   Standing up from a chair using your arms (e.g., wheelchair, or bedside chair)? 3   Walking in hospital room? 3   Climbing 3-5 steps with a railing? 2   6 clicks Mobility Score 19   Activity Tolerance   Sitting in Chair post session   Sitting Edge of Bed 15 minutes   Standing 10 minutes   Patient / Family Goals    Patient / Family Goal #1 return home   Short Term Goals    Short Term Goal # 1 pt will ambulate 150 feet w/ FWW and supervision w/o requiring sitting rest breaks in 6 visits for safe entry/exit of job   Short Term Goal # 2 pt will independently complete supine<>sit w/ flat HOB in 6 visits for safe return home   Short Term Goal # 3 pt will independently complete bed>chair transfer w/ LRAD and supervision in 6 visits for safe return home   Education Group   Education Provided Role of Physical Therapist;Use of Assistive Device   Role of Physical Therapist Patient Response Patient;Acceptance;Explanation;Verbal Demonstration   Use of Assistive Device Patient Response Patient;Acceptance;Explanation;Demonstration;Verbal Demonstration;Action Demonstration   Physical Therapy Initial Treatment Plan    Treatment Plan  Equipment;Gait Training;Therapeutic Activities;Therapeutic Exercise;Neuro Re-Education / Balance   Treatment Frequency 4 Times per Week   Duration Until Therapy Goals Met   Problem List    Problems Impaired Transfers;Impaired Ambulation;Decreased Activity Tolerance;Impaired Balance   Anticipated Discharge Equipment and Recommendations   DC Equipment Recommendations Front-Wheel Walker  (pt owns a SPC, but requires increased stability provided by FWW)   Discharge Recommendations Recommend outpatient physical therapy services to address higher level deficits  (Patient refusing HH services at this time)   Interdisciplinary Plan of Care  Collaboration   IDT Collaboration with  Nursing   Patient Position at End of Therapy Seated;Chair Alarm On;Call Light within Reach;Tray Table within Reach;Phone within Reach   Collaboration Comments RN notified   Session Information   Date / Session Number  3/6- 1(1/4, 3/12)     Present throughout session and agree with below note.    Lin Morton, PT, DPT

## 2024-03-06 NOTE — PROGRESS NOTES
Progress note    HPI: Jose A Ponce is a 63 y.o. male admitted 3/3/2024 for dyspnea that started after recent admission for right-sided humerus fracture/dislocation s/p medical management.  Negative CT PE,  No sepsis criteria.  Given history of alcohol use disorder with recent drink, placed on CIWA.  Other conditions include coronary artery disease status post CABG unprovoked pulmonary embolism on chronic Xarelto and hypertension.     INTERVAL HISTORY:   No acute distress on room air and speaking in full sentences.  No chest discomfort. EKG shows ST depressions in V2 V3 V4 V5 and V6.  Troponins elevated but stable around 35. Cardiac risk factors include heavy smoker (1 pack a day since 17 years old), hypertension slightly trending up, hypercholesterolemia.  Low concern for active DVT.  CIWA never above 10. Normal echo, EF 70.  Evaluated by cardiology who recommend cath but no schedule available.  Cardiology cleared him for discharge, but dizziness when standing up from bed.  No change in vitals.  Discharge was canceled.  Cardiology will attempt to schedule cath as soon as possible.     Vitals:    03/06/24 0351 03/06/24 0707 03/06/24 1100 03/06/24 1110   BP: (!) 157/93 (!) 152/88  131/76   Pulse: 95 94  95   Resp: 19 18  16   Temp: 37.2 °C (99 °F) 36.9 °C (98.4 °F)  36.7 °C (98.1 °F)   TempSrc: Temporal Temporal  Temporal   SpO2: 96% 96%  99%   Weight:   76.8 kg (169 lb 5 oz)    Height:         Body mass index is 22.03 kg/m².    Review of Systems   Constitutional: Negative.  Negative for chills, fever and weight loss.   HENT: Negative.     Eyes: Negative.    Respiratory: Negative.  Negative for cough, hemoptysis and sputum production.    Cardiovascular: Negative.  Negative for chest pain, palpitations and orthopnea.   Gastrointestinal: Negative.  Negative for abdominal pain and heartburn.   Genitourinary: Negative.  Negative for dysuria.   Musculoskeletal: Negative.  Negative for myalgias.   Skin: Negative.   Negative for rash.   Neurological: Negative.  Negative for dizziness, tingling and headaches.   Endo/Heme/Allergies: Negative.    Psychiatric/Behavioral: Negative.  Negative for depression, hallucinations and suicidal ideas.    All other systems reviewed and are negative.     Physical Exam  Vitals and nursing note reviewed.   Constitutional:       General: He is not in acute distress.     Appearance: Normal appearance. He is not ill-appearing, toxic-appearing or diaphoretic.   Cardiovascular:      Rate and Rhythm: Normal rate and regular rhythm.      Pulses: Normal pulses.      Heart sounds: Normal heart sounds. No murmur heard.  Pulmonary:      Effort: Pulmonary effort is normal. No respiratory distress.      Breath sounds: Normal breath sounds. No stridor. No wheezing or rales.   Abdominal:      General: Bowel sounds are normal. There is no distension.      Palpations: Abdomen is soft.      Tenderness: There is no abdominal tenderness. There is no guarding.   Musculoskeletal:         General: No swelling or tenderness. Normal range of motion.      Right lower leg: No edema.      Left lower leg: No edema.   Skin:     General: Skin is warm.      Capillary Refill: Capillary refill takes less than 2 seconds.      Coloration: Skin is not jaundiced or pale.      Findings: No bruising.   Neurological:      General: No focal deficit present.      Mental Status: He is alert. Mental status is at baseline.      Cranial Nerves: No cranial nerve deficit.      Sensory: No sensory deficit.      Coordination: Coordination normal.      Gait: Gait normal.      Deep Tendon Reflexes: Reflexes normal.   Psychiatric:         Mood and Affect: Mood normal.         Behavior: Behavior normal.         Thought Content: Thought content normal.         Judgment: Judgment normal.      LABS:   Recent Labs     03/03/24  2359 03/05/24  0104 03/06/24  0723   WBC 7.4 6.5 6.4   RBC 3.06* 2.95* 3.35*   HEMOGLOBIN 11.0* 10.3* 11.8*   HEMATOCRIT 31.2*  30.0* 34.4*   .0* 101.7* 102.7*   MCH 35.9* 34.9* 35.2*   MCHC 35.3 34.3 34.3   RDW 54.2* 52.1* 53.4*   PLATELETCT 191 160* 161*   MPV 10.0 10.2 9.9      Recent Labs     24  2359 24  0104 24  0723   SODIUM 137 132* 134*   POTASSIUM 3.9 3.7 4.2   CHLORIDE 107 101 102   CO2 17* 20 19*   GLUCOSE 100* 117* 109*   BUN 14 13 19   CREATININE 0.96 0.59 0.92   CALCIUM 8.3* 8.2* 8.8      Recent Labs     24  1703 24  0104 24  0723   ALTSGPT 18 14 14 15   ASTSGOT 50* 42 30 42   ALKPHOSPHAT 110* 98 95 102*   TBILIRUBIN 0.4 0.6 0.8 1.0   LIPASE 252*  --   --   --    GLUCOSE 107* 100* 117* 109*      RADIOLOGY:   EC-ECHOCARDIOGRAM COMPLETE W/O CONT   Final Result      CT-CTA CHEST PULMONARY ARTERY W/ RECONS   Final Result      1.  No evidence of pulmonary embolism.   2.  Atherosclerosis with coronary artery disease.            DX-CHEST-PORTABLE (1 VIEW)   Final Result      1.  Minimal RIGHT midlung scar.   2.  No pneumonia or pulmonary edema.   3.  Prior open heart surgery.           MEDICAL DECISION MAKIN-year-old male admitted 3/3/2024 for dyspnea on exertion with history of coronary artery disease status post CABG left circumflex and RCA, concerning for progression of disease.  EKG nonspecific ST and T changes.  Negative CT PE.  Other active comorbidities include probable withdrawal from alcohol use disorder, and recent right-sided humerus fracture status post medical management.  Discharge was attempted after cardiology could not cath due to scheduling issues, but canceled when patient got dizzy on standing up.  Will remain inpatient for possible catheter tomorrow.  Anticipating discharge pending results from cath.     Disposition: Home/self-care  Follow up outpatient with primary care physicians and other specialties already following.     ASSESSMENT AND PLAN:   * Dyspnea on exertion  Assessment & Plan  No chest discomfort.  Back at room air.  Negative CT PE.  EKG  precordial ST depressions.  Considering significant past cardiac history, cath was ordered but canceled due to scheduling problems.  Will reattempt tomorrow.  Held Xarelto for procedure.  Last dose 3/5/2024 1800  N.p.o. at midnight, for now cardiac diet.  Strict I's and O's    Megaloblastic anemia  Assessment & Plan  Most likely secondary to chronic alcoholism.  Negative for B6/B12 deficiency and TSH within reference levels.  Follow-up outpatient    Alcohol withdrawal syndrome without complication (HCC)  Assessment & Plan  History of 3 drinks 5 nights a week.  No withdrawals, no seizures in the past.  Tremors on admission.  Low CIWA so far, 3 mg of Ativan 3//24.  Continue protocol.  Aspiration, seizure, fall precautions.  Electrolyte replacement as needed.  Watch for refeeding.      Closed fracture of neck of right humerus  Assessment & Plan  Dated from 2/16/2024.  Follow-up with orthopedic surgery as outpatient  Tylenol, oxycodone as needed    Tobacco abuse  Assessment & Plan  1 pack a day since 17 years old.  Currently on nicotine patch with no cravings.       Armando R. Reyes Yparraguirre, M.D.  Internal Medicine Resident   [facility]      This note was generated using voice recognition software which has a small chance of producing errors of grammar and possibly content. I have made every reasonable attempt to find and correct any obvious errors but expect that some may not be found prior to finalization of this note.

## 2024-03-06 NOTE — CARE PLAN
The patient is Stable - Low risk of patient condition declining or worsening    Shift Goals  Clinical Goals: monitor for syncope, monitor for arrhythmias  Patient Goals: adequate nutrition, pain control    Progress made toward(s) clinical / shift goals:    Problem: Pain - Standard  Goal: Alleviation of pain or a reduction in pain to the patient’s comfort goal  Description: Target End Date:  Prior to discharge or change in level of care    Document on Vitals flowsheet    1.  Document pain using the appropriate pain scale per order or unit policy  2.  Educate and implement non-pharmacologic comfort measures (i.e. relaxation, distraction, massage, cold/heat therapy, etc.)  3.  Pain management medications as ordered  4.  Reassess pain after pain med administration per policy  5.  If opiods administered assess patient's response to pain medication is appropriate per POSS sedation scale  6.  Follow pain management plan developed in collaboration with patient and interdisciplinary team (including palliative care or pain specialists if applicable)  Outcome: Progressing  Note: Pt is assessed for pain, pt has pain interventions in place, pt is reassessed for pain      Problem: Knowledge Deficit - Standard  Goal: Patient and family/care givers will demonstrate understanding of plan of care, disease process/condition, diagnostic tests and medications  Description: Target End Date:  1-3 days or as soon as patient condition allows    Document in Patient Education    1.  Patient and family/caregiver oriented to unit, equipment, visitation policy and means for communicating concern  2.  Complete/review Learning Assessment  3.  Assess knowledge level of disease process/condition, treatment plan, diagnostic tests and medications  4.  Explain disease process/condition, treatment plan, diagnostic tests and medications  Outcome: Progressing  Note: Plan of care is reiview with pt, pt's barriers to discharge are addressed.

## 2024-03-07 ENCOUNTER — APPOINTMENT (OUTPATIENT)
Dept: CARDIOLOGY | Facility: MEDICAL CENTER | Age: 64
DRG: 324 | End: 2024-03-07
Attending: INTERNAL MEDICINE
Payer: COMMERCIAL

## 2024-03-07 LAB
ACT BLD: 255 SEC (ref 74–137)
ACT BLD: 255 SEC (ref 74–137)
ACT BLD: 277 SEC (ref 74–137)
ACT BLD: 288 SEC (ref 74–137)
ANION GAP SERPL CALC-SCNC: 11 MMOL/L (ref 7–16)
BASOPHILS # BLD AUTO: 0.7 % (ref 0–1.8)
BASOPHILS # BLD: 0.04 K/UL (ref 0–0.12)
BUN SERPL-MCNC: 19 MG/DL (ref 8–22)
CALCIUM SERPL-MCNC: 8.9 MG/DL (ref 8.5–10.5)
CHLORIDE SERPL-SCNC: 102 MMOL/L (ref 96–112)
CO2 SERPL-SCNC: 21 MMOL/L (ref 20–33)
CREAT SERPL-MCNC: 0.89 MG/DL (ref 0.5–1.4)
EOSINOPHIL # BLD AUTO: 0.08 K/UL (ref 0–0.51)
EOSINOPHIL NFR BLD: 1.5 % (ref 0–6.9)
ERYTHROCYTE [DISTWIDTH] IN BLOOD BY AUTOMATED COUNT: 54.4 FL (ref 35.9–50)
GFR SERPLBLD CREATININE-BSD FMLA CKD-EPI: 96 ML/MIN/1.73 M 2
GLUCOSE SERPL-MCNC: 112 MG/DL (ref 65–99)
HCT VFR BLD AUTO: 32.9 % (ref 42–52)
HGB BLD-MCNC: 11.2 G/DL (ref 14–18)
IMM GRANULOCYTES # BLD AUTO: 0.02 K/UL (ref 0–0.11)
IMM GRANULOCYTES NFR BLD AUTO: 0.4 % (ref 0–0.9)
LYMPHOCYTES # BLD AUTO: 1.42 K/UL (ref 1–4.8)
LYMPHOCYTES NFR BLD: 26 % (ref 22–41)
MAGNESIUM SERPL-MCNC: 1.7 MG/DL (ref 1.5–2.5)
MCH RBC QN AUTO: 35.2 PG (ref 27–33)
MCHC RBC AUTO-ENTMCNC: 34 G/DL (ref 32.3–36.5)
MCV RBC AUTO: 103.5 FL (ref 81.4–97.8)
MONOCYTES # BLD AUTO: 0.75 K/UL (ref 0–0.85)
MONOCYTES NFR BLD AUTO: 13.7 % (ref 0–13.4)
NEUTROPHILS # BLD AUTO: 3.15 K/UL (ref 1.82–7.42)
NEUTROPHILS NFR BLD: 57.7 % (ref 44–72)
NRBC # BLD AUTO: 0 K/UL
NRBC BLD-RTO: 0 /100 WBC (ref 0–0.2)
PLATELET # BLD AUTO: 162 K/UL (ref 164–446)
PMV BLD AUTO: 10.2 FL (ref 9–12.9)
POTASSIUM SERPL-SCNC: 4.1 MMOL/L (ref 3.6–5.5)
RBC # BLD AUTO: 3.18 M/UL (ref 4.7–6.1)
SODIUM SERPL-SCNC: 134 MMOL/L (ref 135–145)
UFH PPP CHRO-ACNC: 0.22 IU/ML
UFH PPP CHRO-ACNC: 0.29 IU/ML
UFH PPP CHRO-ACNC: 0.4 IU/ML
WBC # BLD AUTO: 5.5 K/UL (ref 4.8–10.8)

## 2024-03-07 PROCEDURE — 97116 GAIT TRAINING THERAPY: CPT

## 2024-03-07 PROCEDURE — 92979 ENDOLUMINL IVUS OCT C EA: CPT | Mod: 26,RC | Performed by: INTERNAL MEDICINE

## 2024-03-07 PROCEDURE — 92978 ENDOLUMINL IVUS OCT C 1ST: CPT | Mod: 26,LC | Performed by: INTERNAL MEDICINE

## 2024-03-07 PROCEDURE — 99232 SBSQ HOSP IP/OBS MODERATE 35: CPT | Mod: GC | Performed by: INTERNAL MEDICINE

## 2024-03-07 PROCEDURE — 85025 COMPLETE CBC W/AUTO DIFF WBC: CPT

## 2024-03-07 PROCEDURE — 99152 MOD SED SAME PHYS/QHP 5/>YRS: CPT | Performed by: INTERNAL MEDICINE

## 2024-03-07 PROCEDURE — 85347 COAGULATION TIME ACTIVATED: CPT

## 2024-03-07 PROCEDURE — 700111 HCHG RX REV CODE 636 W/ 250 OVERRIDE (IP): Performed by: INTERNAL MEDICINE

## 2024-03-07 PROCEDURE — 700102 HCHG RX REV CODE 250 W/ 637 OVERRIDE(OP): Performed by: PHYSICIAN ASSISTANT

## 2024-03-07 PROCEDURE — 80048 BASIC METABOLIC PNL TOTAL CA: CPT

## 2024-03-07 PROCEDURE — 770020 HCHG ROOM/CARE - TELE (206)

## 2024-03-07 PROCEDURE — 700102 HCHG RX REV CODE 250 W/ 637 OVERRIDE(OP): Performed by: HOSPITALIST

## 2024-03-07 PROCEDURE — 93571 IV DOP VEL&/PRESS C FLO 1ST: CPT | Mod: 26,52,RC | Performed by: INTERNAL MEDICINE

## 2024-03-07 PROCEDURE — 02F03ZZ FRAGMENTATION IN CORONARY ARTERY, ONE ARTERY, PERCUTANEOUS APPROACH: ICD-10-PCS | Performed by: INTERNAL MEDICINE

## 2024-03-07 PROCEDURE — A9270 NON-COVERED ITEM OR SERVICE: HCPCS

## 2024-03-07 PROCEDURE — B2121ZZ FLUOROSCOPY OF SINGLE CORONARY ARTERY BYPASS GRAFT USING LOW OSMOLAR CONTRAST: ICD-10-PCS | Performed by: INTERNAL MEDICINE

## 2024-03-07 PROCEDURE — A9270 NON-COVERED ITEM OR SERVICE: HCPCS | Performed by: PHYSICIAN ASSISTANT

## 2024-03-07 PROCEDURE — 99153 MOD SED SAME PHYS/QHP EA: CPT

## 2024-03-07 PROCEDURE — 700102 HCHG RX REV CODE 250 W/ 637 OVERRIDE(OP): Performed by: INTERNAL MEDICINE

## 2024-03-07 PROCEDURE — 700101 HCHG RX REV CODE 250

## 2024-03-07 PROCEDURE — 85520 HEPARIN ASSAY: CPT | Mod: 91

## 2024-03-07 PROCEDURE — 92928 PRQ TCAT PLMT NTRAC ST 1 LES: CPT | Mod: 59,RC | Performed by: INTERNAL MEDICINE

## 2024-03-07 PROCEDURE — 93005 ELECTROCARDIOGRAM TRACING: CPT | Performed by: INTERNAL MEDICINE

## 2024-03-07 PROCEDURE — 93459 L HRT ART/GRFT ANGIO: CPT | Mod: 26,59 | Performed by: INTERNAL MEDICINE

## 2024-03-07 PROCEDURE — 99232 SBSQ HOSP IP/OBS MODERATE 35: CPT | Mod: 25,FS | Performed by: INTERNAL MEDICINE

## 2024-03-07 PROCEDURE — 700111 HCHG RX REV CODE 636 W/ 250 OVERRIDE (IP)

## 2024-03-07 PROCEDURE — 027135Z DILATION OF CORONARY ARTERY, TWO ARTERIES WITH TWO DRUG-ELUTING INTRALUMINAL DEVICES, PERCUTANEOUS APPROACH: ICD-10-PCS | Performed by: INTERNAL MEDICINE

## 2024-03-07 PROCEDURE — 4A023N7 MEASUREMENT OF CARDIAC SAMPLING AND PRESSURE, LEFT HEART, PERCUTANEOUS APPROACH: ICD-10-PCS | Performed by: INTERNAL MEDICINE

## 2024-03-07 PROCEDURE — A9270 NON-COVERED ITEM OR SERVICE: HCPCS | Performed by: INTERNAL MEDICINE

## 2024-03-07 PROCEDURE — 83735 ASSAY OF MAGNESIUM: CPT

## 2024-03-07 PROCEDURE — 700105 HCHG RX REV CODE 258: Performed by: INTERNAL MEDICINE

## 2024-03-07 PROCEDURE — 700102 HCHG RX REV CODE 250 W/ 637 OVERRIDE(OP)

## 2024-03-07 PROCEDURE — A9270 NON-COVERED ITEM OR SERVICE: HCPCS | Performed by: HOSPITALIST

## 2024-03-07 PROCEDURE — 97530 THERAPEUTIC ACTIVITIES: CPT

## 2024-03-07 PROCEDURE — 700117 HCHG RX CONTRAST REV CODE 255: Performed by: INTERNAL MEDICINE

## 2024-03-07 PROCEDURE — 92972 PERQ TRLUML CORONRY LITHOTRP: CPT | Mod: RC | Performed by: INTERNAL MEDICINE

## 2024-03-07 PROCEDURE — 92979 ENDOLUMINL IVUS OCT C EA: CPT

## 2024-03-07 PROCEDURE — B2111ZZ FLUOROSCOPY OF MULTIPLE CORONARY ARTERIES USING LOW OSMOLAR CONTRAST: ICD-10-PCS | Performed by: INTERNAL MEDICINE

## 2024-03-07 PROCEDURE — 4A033BC MEASUREMENT OF ARTERIAL PRESSURE, CORONARY, PERCUTANEOUS APPROACH: ICD-10-PCS | Performed by: INTERNAL MEDICINE

## 2024-03-07 PROCEDURE — B241ZZ3 ULTRASONOGRAPHY OF MULTIPLE CORONARY ARTERIES, INTRAVASCULAR: ICD-10-PCS | Performed by: INTERNAL MEDICINE

## 2024-03-07 RX ORDER — CLOPIDOGREL BISULFATE 75 MG/1
75 TABLET ORAL DAILY
Status: DISCONTINUED | OUTPATIENT
Start: 2024-03-08 | End: 2024-03-08 | Stop reason: HOSPADM

## 2024-03-07 RX ORDER — HEPARIN SODIUM 1000 [USP'U]/ML
INJECTION, SOLUTION INTRAVENOUS; SUBCUTANEOUS
Status: COMPLETED
Start: 2024-03-07 | End: 2024-03-07

## 2024-03-07 RX ORDER — SODIUM CHLORIDE 9 MG/ML
3 INJECTION, SOLUTION INTRAVENOUS CONTINUOUS
Status: ACTIVE | OUTPATIENT
Start: 2024-03-07 | End: 2024-03-07

## 2024-03-07 RX ORDER — HEPARIN SODIUM 200 [USP'U]/100ML
INJECTION, SOLUTION INTRAVENOUS
Status: COMPLETED
Start: 2024-03-07 | End: 2024-03-07

## 2024-03-07 RX ORDER — LOSARTAN POTASSIUM 50 MG/1
50 TABLET ORAL
Status: DISCONTINUED | OUTPATIENT
Start: 2024-03-08 | End: 2024-03-08 | Stop reason: HOSPADM

## 2024-03-07 RX ORDER — MIDAZOLAM HYDROCHLORIDE 1 MG/ML
INJECTION INTRAMUSCULAR; INTRAVENOUS
Status: COMPLETED
Start: 2024-03-07 | End: 2024-03-07

## 2024-03-07 RX ORDER — CLOPIDOGREL BISULFATE 75 MG/1
300 TABLET ORAL ONCE
Status: DISCONTINUED | OUTPATIENT
Start: 2024-03-07 | End: 2024-03-07

## 2024-03-07 RX ORDER — LOSARTAN POTASSIUM 50 MG/1
25 TABLET ORAL ONCE
Status: COMPLETED | OUTPATIENT
Start: 2024-03-07 | End: 2024-03-07

## 2024-03-07 RX ORDER — LIDOCAINE HYDROCHLORIDE 20 MG/ML
INJECTION, SOLUTION INFILTRATION; PERINEURAL
Status: COMPLETED
Start: 2024-03-07 | End: 2024-03-07

## 2024-03-07 RX ORDER — ASPIRIN 81 MG/1
81 TABLET ORAL DAILY
Status: DISCONTINUED | OUTPATIENT
Start: 2024-03-07 | End: 2024-03-07

## 2024-03-07 RX ORDER — ATORVASTATIN CALCIUM 80 MG/1
80 TABLET, FILM COATED ORAL NIGHTLY
Status: DISCONTINUED | OUTPATIENT
Start: 2024-03-07 | End: 2024-03-08 | Stop reason: HOSPADM

## 2024-03-07 RX ORDER — CLOPIDOGREL 300 MG/1
TABLET, FILM COATED ORAL
Status: COMPLETED
Start: 2024-03-07 | End: 2024-03-07

## 2024-03-07 RX ADMIN — Medication 100 MG: at 05:21

## 2024-03-07 RX ADMIN — ATORVASTATIN CALCIUM 80 MG: 80 TABLET, FILM COATED ORAL at 21:42

## 2024-03-07 RX ADMIN — HEPARIN SODIUM 14 UNITS/KG/HR: 5000 INJECTION, SOLUTION INTRAVENOUS at 08:57

## 2024-03-07 RX ADMIN — NITROGLYCERIN 10 ML: 20 INJECTION INTRAVENOUS at 16:18

## 2024-03-07 RX ADMIN — ASPIRIN 81 MG: 81 TABLET, COATED ORAL at 05:21

## 2024-03-07 RX ADMIN — FOLIC ACID 1 MG: 1 TABLET ORAL at 05:21

## 2024-03-07 RX ADMIN — RIVAROXABAN 20 MG: 20 TABLET, FILM COATED ORAL at 18:52

## 2024-03-07 RX ADMIN — HEPARIN SODIUM: 1000 INJECTION, SOLUTION INTRAVENOUS; SUBCUTANEOUS at 16:18

## 2024-03-07 RX ADMIN — HEPARIN SODIUM: 1000 INJECTION, SOLUTION INTRAVENOUS; SUBCUTANEOUS at 16:17

## 2024-03-07 RX ADMIN — THERA TABS 1 TABLET: TAB at 05:21

## 2024-03-07 RX ADMIN — FENTANYL CITRATE 100 MCG: 50 INJECTION, SOLUTION INTRAMUSCULAR; INTRAVENOUS at 16:19

## 2024-03-07 RX ADMIN — HEPARIN SODIUM 2000 UNITS: 200 INJECTION, SOLUTION INTRAVENOUS at 16:21

## 2024-03-07 RX ADMIN — MIDAZOLAM HYDROCHLORIDE 2 MG: 2 INJECTION, SOLUTION INTRAMUSCULAR; INTRAVENOUS at 16:20

## 2024-03-07 RX ADMIN — MIDAZOLAM HYDROCHLORIDE 2 MG: 2 INJECTION, SOLUTION INTRAMUSCULAR; INTRAVENOUS at 16:52

## 2024-03-07 RX ADMIN — NICOTINE TRANSDERMAL SYSTEM 21 MG: 21 PATCH, EXTENDED RELEASE TRANSDERMAL at 05:20

## 2024-03-07 RX ADMIN — IOHEXOL 150 ML: 350 INJECTION, SOLUTION INTRAVENOUS at 16:45

## 2024-03-07 RX ADMIN — OMEPRAZOLE 40 MG: 20 CAPSULE, DELAYED RELEASE ORAL at 05:20

## 2024-03-07 RX ADMIN — FENTANYL CITRATE 50 MCG: 50 INJECTION, SOLUTION INTRAMUSCULAR; INTRAVENOUS at 16:52

## 2024-03-07 RX ADMIN — LIDOCAINE HYDROCHLORIDE: 20 INJECTION, SOLUTION INFILTRATION; PERINEURAL at 16:18

## 2024-03-07 RX ADMIN — CLOPIDOGREL BISULFATE 600 MG: 300 TABLET, FILM COATED ORAL at 16:54

## 2024-03-07 RX ADMIN — OXYCODONE HYDROCHLORIDE 10 MG: 10 TABLET ORAL at 23:16

## 2024-03-07 RX ADMIN — METOPROLOL TARTRATE 50 MG: 25 TABLET, FILM COATED ORAL at 18:52

## 2024-03-07 RX ADMIN — LOSARTAN POTASSIUM 25 MG: 50 TABLET, FILM COATED ORAL at 12:04

## 2024-03-07 RX ADMIN — METOPROLOL TARTRATE 50 MG: 25 TABLET, FILM COATED ORAL at 05:21

## 2024-03-07 RX ADMIN — SODIUM CHLORIDE 3 ML/KG/HR: 9 INJECTION, SOLUTION INTRAVENOUS at 18:48

## 2024-03-07 RX ADMIN — FENTANYL CITRATE 100 MCG: 50 INJECTION, SOLUTION INTRAMUSCULAR; INTRAVENOUS at 16:51

## 2024-03-07 RX ADMIN — LOSARTAN POTASSIUM 25 MG: 50 TABLET, FILM COATED ORAL at 05:21

## 2024-03-07 ASSESSMENT — ENCOUNTER SYMPTOMS
LIGHT-HEADEDNESS: 0
FATIGUE: 0
ABDOMINAL DISTENTION: 0
CONSTIPATION: 0
COLOR CHANGE: 0
DIAPHORESIS: 0
MYALGIAS: 0
ABDOMINAL PAIN: 0
NAUSEA: 0
PSYCHIATRIC NEGATIVE: 1
DIZZINESS: 0
CHILLS: 0
ENDOCRINE NEGATIVE: 1
BRUISES/BLEEDS EASILY: 0
SHORTNESS OF BREATH: 0
CHEST TIGHTNESS: 0
VOMITING: 0
COUGH: 0
DIARRHEA: 0
BACK PAIN: 1
EYES NEGATIVE: 1
FEVER: 0
PALPITATIONS: 0

## 2024-03-07 ASSESSMENT — LIFESTYLE VARIABLES
AGITATION: SOMEWHAT MORE THAN NORMAL ACTIVITY
AGITATION: NORMAL ACTIVITY
HEADACHE, FULLNESS IN HEAD: NOT PRESENT
AGITATION: SOMEWHAT MORE THAN NORMAL ACTIVITY
VISUAL DISTURBANCES: NOT PRESENT
VISUAL DISTURBANCES: NOT PRESENT
ORIENTATION AND CLOUDING OF SENSORIUM: ORIENTED AND CAN DO SERIAL ADDITIONS
HEADACHE, FULLNESS IN HEAD: NOT PRESENT
TREMOR: NO TREMOR
NAUSEA AND VOMITING: NO NAUSEA AND NO VOMITING
TREMOR: NO TREMOR
HEADACHE, FULLNESS IN HEAD: NOT PRESENT
NAUSEA AND VOMITING: NO NAUSEA AND NO VOMITING
HEADACHE, FULLNESS IN HEAD: NOT PRESENT
TREMOR: NO TREMOR
PAROXYSMAL SWEATS: NO SWEAT VISIBLE
PAROXYSMAL SWEATS: NO SWEAT VISIBLE
NAUSEA AND VOMITING: NO NAUSEA AND NO VOMITING
AUDITORY DISTURBANCES: NOT PRESENT
TOTAL SCORE: 2
TOTAL SCORE: 0
TOTAL SCORE: 1
AUDITORY DISTURBANCES: NOT PRESENT
VISUAL DISTURBANCES: NOT PRESENT
ANXIETY: MILDLY ANXIOUS
ORIENTATION AND CLOUDING OF SENSORIUM: ORIENTED AND CAN DO SERIAL ADDITIONS
ANXIETY: MILDLY ANXIOUS
PAROXYSMAL SWEATS: NO SWEAT VISIBLE
AUDITORY DISTURBANCES: NOT PRESENT
ANXIETY: NO ANXIETY (AT EASE)
TOTAL SCORE: 1
AUDITORY DISTURBANCES: NOT PRESENT
HEADACHE, FULLNESS IN HEAD: NOT PRESENT
AUDITORY DISTURBANCES: NOT PRESENT
VISUAL DISTURBANCES: NOT PRESENT
TREMOR: NO TREMOR
TOTAL SCORE: 1
HEADACHE, FULLNESS IN HEAD: NOT PRESENT
PAROXYSMAL SWEATS: NO SWEAT VISIBLE
VISUAL DISTURBANCES: NOT PRESENT
PAROXYSMAL SWEATS: NO SWEAT VISIBLE
ANXIETY: MILDLY ANXIOUS
ORIENTATION AND CLOUDING OF SENSORIUM: ORIENTED AND CAN DO SERIAL ADDITIONS
NAUSEA AND VOMITING: NO NAUSEA AND NO VOMITING
TREMOR: NO TREMOR
PAROXYSMAL SWEATS: NO SWEAT VISIBLE
VISUAL DISTURBANCES: NOT PRESENT
AGITATION: NORMAL ACTIVITY
ORIENTATION AND CLOUDING OF SENSORIUM: ORIENTED AND CAN DO SERIAL ADDITIONS
ORIENTATION AND CLOUDING OF SENSORIUM: ORIENTED AND CAN DO SERIAL ADDITIONS
TREMOR: NO TREMOR
ORIENTATION AND CLOUDING OF SENSORIUM: ORIENTED AND CAN DO SERIAL ADDITIONS
ANXIETY: MILDLY ANXIOUS
AGITATION: NORMAL ACTIVITY
NAUSEA AND VOMITING: NO NAUSEA AND NO VOMITING
ANXIETY: MILDLY ANXIOUS
AGITATION: NORMAL ACTIVITY
NAUSEA AND VOMITING: NO NAUSEA AND NO VOMITING
AUDITORY DISTURBANCES: NOT PRESENT
TOTAL SCORE: 2

## 2024-03-07 ASSESSMENT — PATIENT HEALTH QUESTIONNAIRE - PHQ9
SUM OF ALL RESPONSES TO PHQ9 QUESTIONS 1 AND 2: 0
SUM OF ALL RESPONSES TO PHQ9 QUESTIONS 1 AND 2: 0
2. FEELING DOWN, DEPRESSED, IRRITABLE, OR HOPELESS: NOT AT ALL
1. LITTLE INTEREST OR PLEASURE IN DOING THINGS: NOT AT ALL
1. LITTLE INTEREST OR PLEASURE IN DOING THINGS: NOT AT ALL
2. FEELING DOWN, DEPRESSED, IRRITABLE, OR HOPELESS: NOT AT ALL

## 2024-03-07 ASSESSMENT — COGNITIVE AND FUNCTIONAL STATUS - GENERAL
SUGGESTED CMS G CODE MODIFIER MOBILITY: CJ
CLIMB 3 TO 5 STEPS WITH RAILING: A LITTLE
MOBILITY SCORE: 22
WALKING IN HOSPITAL ROOM: A LITTLE

## 2024-03-07 ASSESSMENT — PAIN DESCRIPTION - PAIN TYPE
TYPE: ACUTE PAIN
TYPE: CHRONIC PAIN

## 2024-03-07 ASSESSMENT — GAIT ASSESSMENTS
DEVIATION: BRADYKINETIC
DISTANCE (FEET): 200
ASSISTIVE DEVICE: NONE;FRONT WHEEL WALKER
GAIT LEVEL OF ASSIST: STANDBY ASSIST

## 2024-03-07 ASSESSMENT — FIBROSIS 4 INDEX: FIB4 SCORE: 4.22

## 2024-03-07 NOTE — PROGRESS NOTES
[facility] Cardiology Follow-up Consult Note  Date of note:    3/6/2024          Patient ID:  Name:   Jose A Ponce   YOB: 1960  Age:   63 y.o.  male   MRN:   [id number]    Chief Complaint   Patient presents with    Weakness    Shortness of Breath    Arm Pain       Interim Events:  Patient elected to stay he has been using a walker and has had some weakness          ROS  No NV, No Bleeding, No dizziness   All other review of systems reviewed and negative.    Past medical, surgical, social, and family history reviewed and unchanged from admission except as noted in assessment and plan.    Medications: Reviewed in MAR  Current Facility-Administered Medications   Medication Dose Frequency Provider Last Rate Last Admin    heparin infusion 25,000 units in 500 mL 0.45% NACL  0-30 Units/kg/hr Continuous Jesus Gee M.D.        losartan (Cozaar) tablet 25 mg  25 mg Q DAY Nanci Avelar P.A.-C.   25 mg at 03/06/24 0507    aspirin EC tablet 81 mg  81 mg DAILY Armando R Reyes Yparraguirre, M.D.   81 mg at 03/06/24 0507    atorvastatin (Lipitor) tablet 40 mg  40 mg Nightly Armando R Reyes Yparraguirre, M.D.   40 mg at 03/05/24 2118    metoprolol tartrate (Lopressor) tablet 50 mg  50 mg BID JEREMÍAS Godfrey M.D.   50 mg at 03/06/24 1726    Respiratory Therapy Consult   Continuous RT Chris Bauman M.D.        acetaminophen (Tylenol) tablet 650 mg  650 mg Q6HRS PRN Chris Bauman M.D.   650 mg at 03/04/24 0120    Pharmacy Consult Request ...Pain Management Review 1 Each  1 Each PHARMACY TO DOSE Chris Bauman M.D.        oxyCODONE immediate-release (Roxicodone) tablet 5 mg  5 mg Q3HRS PRN Chris Bauman M.D.   5 mg at 03/06/24 0507    Or    oxyCODONE immediate release (Roxicodone) tablet 10 mg  10 mg Q3HRS PRN Chris Bauman M.D.   10 mg at 03/05/24 1818    Or    morphine 4 MG/ML injection 4 mg  4 mg Q3HRS PRN Chris Bauman M.D.   4 mg at 03/05/24 3527    senna-docusate  (Pericolace Or Senokot S) 8.6-50 MG per tablet 2 Tablet  2 Tablet Q EVENING Chris Bauman M.D.   2 Tablet at 03/06/24 1726    And    polyethylene glycol/lytes (Miralax) Packet 1 Packet  1 Packet QDAY PRN Chris Bauman M.D.        ondansetron (Zofran) syringe/vial injection 4 mg  4 mg Q4HRS PRN Chris Bauman M.D.   4 mg at 03/05/24 0836    ondansetron (Zofran ODT) dispertab 4 mg  4 mg Q4HRS PRN Chris Bauman M.D.        promethazine (Phenergan) tablet 12.5-25 mg  12.5-25 mg Q4HRS PRN Chris Bauman M.D.        promethazine (Phenergan) suppository 12.5-25 mg  12.5-25 mg Q4HRS PRN Chris Bauman M.D.        prochlorperazine (Compazine) injection 5-10 mg  5-10 mg Q4HRS PRN Chris Bauman M.D.        nitroglycerin (Nitrostat) tablet 0.4 mg  0.4 mg Q5 MIN PRN Chris Bauman M.D.        nicotine (Nicoderm) 21 MG/24HR 21 mg  21 mg Daily-0600 Chris Bauman M.D.   21 mg at 03/06/24 0506    ipratropium-albuterol (DUONEB) nebulizer solution  3 mL Q4H PRN (RT) Chris Bauman M.D.        omeprazole (PriLOSEC) capsule 40 mg  40 mg DAILY Chris Bauman M.D.   40 mg at 03/06/24 0507    [Held by provider] rivaroxaban (Xarelto) tablet 20 mg  20 mg PM MEAL Jesus Gee M.D.   20 mg at 03/05/24 1814    thiamine (Vitamin B-1) tablet 100 mg  100 mg DAILY Chris Bauman M.D.   100 mg at 03/06/24 0508    And    multivitamin tablet 1 Tablet  1 Tablet DAILY Chris Bauman M.D.   1 Tablet at 03/06/24 0508    And    folic acid (Folvite) tablet 1 mg  1 mg DAILY Chris Bauman M.D.   1 mg at 03/06/24 0508    labetalol (Normodyne/Trandate) injection 10 mg  10 mg Q HOUR PRN Chris Bauman M.D.        Or    labetalol (Normodyne) tablet 200 mg  200 mg Q6HRS PRN Chris Bauman M.D.       Last reviewed on 3/3/2024 10:31 PM by Leidy Newman   No Known Allergies    Physical Exam  Body mass index is 22.03 kg/m². BP (!) 139/90   Pulse (!) 107   Temp 37.3 °C (99.1 °F) (Temporal)   Resp 16   " Ht 1.867 m (6' 1.5\")   Wt 76.8 kg (169 lb 5 oz)   SpO2 97%    Vitals:    03/06/24 1100 03/06/24 1110 03/06/24 1533 03/06/24 1725   BP:  131/76 (!) 150/93 (!) 139/90   Pulse:  95 (!) 103 (!) 107   Resp:  16 16    Temp:  36.7 °C (98.1 °F) 37.3 °C (99.1 °F)    TempSrc:  Temporal Temporal    SpO2:  99% 96% 97%   Weight: 76.8 kg (169 lb 5 oz)      Height:        Oxygen Therapy:  Pulse Oximetry: 97 %, O2 (LPM): 0, O2 Delivery Device: None - Room Air    General: no apparent distress  Eyes: nl conjunctiva  ENT: OP clear  Neck: no JVD   Lungs: normal respiratory effort  Heart: RRR  EXT [no edema bilateral lower extremities.  no cyanosis  Abdomen: soft, non tender, non distended,  Neurological: No focal deficits  Psychiatric: Appropriate affect,   Skin: Warm extremities    Labs (personally reviewed and notable for):   Recent Results (from the past 24 hour(s))   CBC WITH DIFFERENTIAL    Collection Time: 03/06/24  7:23 AM   Result Value Ref Range    WBC 6.4 4.8 - 10.8 K/uL    RBC 3.35 (L) 4.70 - 6.10 M/uL    Hemoglobin 11.8 (L) 14.0 - 18.0 g/dL    Hematocrit 34.4 (L) 42.0 - 52.0 %    .7 (H) 81.4 - 97.8 fL    MCH 35.2 (H) 27.0 - 33.0 pg    MCHC 34.3 32.3 - 36.5 g/dL    RDW 53.4 (H) 35.9 - 50.0 fL    Platelet Count 161 (L) 164 - 446 K/uL    MPV 9.9 9.0 - 12.9 fL    Neutrophils-Polys 68.30 44.00 - 72.00 %    Lymphocytes 19.00 (L) 22.00 - 41.00 %    Monocytes 10.80 0.00 - 13.40 %    Eosinophils 0.80 0.00 - 6.90 %    Basophils 0.60 0.00 - 1.80 %    Immature Granulocytes 0.50 0.00 - 0.90 %    Nucleated RBC 0.00 0.00 - 0.20 /100 WBC    Neutrophils (Absolute) 4.38 1.82 - 7.42 K/uL    Lymphs (Absolute) 1.22 1.00 - 4.80 K/uL    Monos (Absolute) 0.69 0.00 - 0.85 K/uL    Eos (Absolute) 0.05 0.00 - 0.51 K/uL    Baso (Absolute) 0.04 0.00 - 0.12 K/uL    Immature Granulocytes (abs) 0.03 0.00 - 0.11 K/uL    NRBC (Absolute) 0.00 K/uL   MAGNESIUM    Collection Time: 03/06/24  7:23 AM   Result Value Ref Range    Magnesium 1.9 1.5 - 2.5 " mg/dL   Comp Metabolic Panel    Collection Time: 03/06/24  7:23 AM   Result Value Ref Range    Sodium 134 (L) 135 - 145 mmol/L    Potassium 4.2 3.6 - 5.5 mmol/L    Chloride 102 96 - 112 mmol/L    Co2 19 (L) 20 - 33 mmol/L    Anion Gap 13.0 7.0 - 16.0    Glucose 109 (H) 65 - 99 mg/dL    Bun 19 8 - 22 mg/dL    Creatinine 0.92 0.50 - 1.40 mg/dL    Calcium 8.8 8.5 - 10.5 mg/dL    Correct Calcium 9.0 8.5 - 10.5 mg/dL    AST(SGOT) 42 12 - 45 U/L    ALT(SGPT) 15 2 - 50 U/L    Alkaline Phosphatase 102 (H) 30 - 99 U/L    Total Bilirubin 1.0 0.1 - 1.5 mg/dL    Albumin 3.8 3.2 - 4.9 g/dL    Total Protein 6.6 6.0 - 8.2 g/dL    Globulin 2.8 1.9 - 3.5 g/dL    A-G Ratio 1.4 g/dL   ESTIMATED GFR    Collection Time: 03/06/24  7:23 AM   Result Value Ref Range    GFR (CKD-EPI) 93 >60 mL/min/1.73 m 2       Cardiac Imaging and Procedures Review:    EKG and telemetry tracings personally reviewed    Impression and Medical Decision Making:  Principal Problem:    Dyspnea on exertion (POA: Yes)  Active Problems:    Essential hypertension (Chronic) (POA: Yes)      Overview: Restarted on Losartan 50mg QD in 1/2022;       Increased to 100mg QD in 3/2023.     Tobacco abuse (POA: Yes)    Lactic acidosis (POA: Yes)    Closed fracture of neck of right humerus (POA: Yes)    History of pulmonary embolism on chronic Xarelto (POA: Yes)    Alcohol withdrawal syndrome without complication (HCC) (POA: Yes)    Megaloblastic anemia (POA: Unknown)  Resolved Problems:    * No resolved hospital problems. *    Coronary disease with dyspnea and prior CABG prior LAD   He agreed to stay yesterday given his weakness  He understands for an angiogram he needs to avoid heavy lifting for a week and is in agreement with that  He understands if he needs stenting he would need additional antiplatelet    We will hold his Xarelto and place him back on heparin drip and plan for angiogram tomorrow    I personally discussed his case with  Dr Armando R Reyes Yparraguirre,  KARINE Wells Appointments   Date Time Provider Department Center   3/13/2024  8:15 AM LES Narvaez None       It is my pleasure to participate in the care of Mr. Ponce.  Please do not hesitate to contact me with questions or concerns.    Jesus Gee MD PhD [facility]  Cardiologist [facility]    3/6/2024    Please note that this dictation was created using voice recognition software. There may be errors of grammar and possibly content I did not discover before finalizing the note.

## 2024-03-07 NOTE — PROGRESS NOTES
Cardiology Follow Up Progress Note    Date of Service  3/7/2024    Attending Physician  Rony Walker M.D.    Chief Complaint   Shortness of breath    HPI  Jose A Ponce is a 63 y.o. male with a history of CAD s/p CABG x2 in 2017 to his LAD with residual moderate disease in the left circumflex and RCA, DVTs and PEs on chronic anticoagulation admitted 3/3/2024 with dyspnea on exertion concerning for anginal equivalent.     Interim Events  3/7/2024: No cardiac events overnight. Sinus rhythm on telemetry. Hypertensive, but vitals otherwise stable. SPO2 95-99% on room air. He does not have any active cardiac symptoms.  He does have significant tailbone pain which he is trying to alleviate by laying on his side.  No chest pain or palpitations. No shortness of breath, dyspnea on exertion, orthopnea or PND. No lower extremity edema. No dizziness or lightheadedness. No syncope or presyncope.          Review of Systems  Review of Systems   Constitutional:  Negative for chills, diaphoresis, fatigue and fever.   HENT: Negative.     Eyes: Negative.    Respiratory:  Negative for cough, chest tightness and shortness of breath.    Cardiovascular:  Negative for chest pain, palpitations and leg swelling.   Gastrointestinal:  Negative for abdominal distention, abdominal pain, constipation, diarrhea, nausea and vomiting.   Endocrine: Negative.    Genitourinary:  Negative for decreased urine volume, difficulty urinating, dysuria, frequency and urgency.   Musculoskeletal:  Positive for back pain (coxyx). Negative for myalgias.   Skin:  Negative for color change.   Neurological:  Negative for dizziness, syncope and light-headedness.   Hematological:  Does not bruise/bleed easily.   Psychiatric/Behavioral: Negative.         Vital signs in last 24 hours  Temp:  [36.1 °C (97 °F)-37.6 °C (99.7 °F)] 36.1 °C (97 °F)  Pulse:  [] 72  Resp:  [16-18] 18  BP: (139-160)/(90-99) 150/92  SpO2:  [95 %-99 %] 95 %    Physical  Exam  Physical Exam  Vitals and nursing note reviewed.   Constitutional:       General: He is not in acute distress.     Appearance: Normal appearance. He is not toxic-appearing.   HENT:      Head: Normocephalic and atraumatic.      Right Ear: External ear normal.      Left Ear: External ear normal.      Nose: Nose normal.      Mouth/Throat:      Mouth: Mucous membranes are moist.      Pharynx: Oropharynx is clear.   Eyes:      General: No scleral icterus.     Extraocular Movements: Extraocular movements intact.      Conjunctiva/sclera: Conjunctivae normal.      Pupils: Pupils are equal, round, and reactive to light.   Neck:      Vascular: No JVD.   Cardiovascular:      Rate and Rhythm: Normal rate and regular rhythm.      Pulses: Normal pulses.      Heart sounds: Normal heart sounds. No murmur heard.     No friction rub. No gallop.   Pulmonary:      Effort: Pulmonary effort is normal.      Breath sounds: Normal breath sounds.   Abdominal:      General: Abdomen is flat. Bowel sounds are normal. There is no distension.      Palpations: Abdomen is soft.      Tenderness: There is no abdominal tenderness.   Musculoskeletal:         General: Normal range of motion.      Cervical back: Normal range of motion and neck supple.      Right lower leg: No edema.      Left lower leg: No edema.   Skin:     General: Skin is warm and dry.      Capillary Refill: Capillary refill takes less than 2 seconds.      Coloration: Skin is not jaundiced.   Neurological:      General: No focal deficit present.      Mental Status: He is alert and oriented to person, place, and time. Mental status is at baseline.   Psychiatric:         Mood and Affect: Mood normal.         Behavior: Behavior normal.         Judgment: Judgment normal.         Lab Review  Lab Results   Component Value Date/Time    WBC 5.5 03/07/2024 01:38 AM    RBC 3.18 (L) 03/07/2024 01:38 AM    HEMOGLOBIN 11.2 (L) 03/07/2024 01:38 AM    HEMATOCRIT 32.9 (L) 03/07/2024 01:38 AM     ".5 (H) 03/07/2024 01:38 AM    MCH 35.2 (H) 03/07/2024 01:38 AM    MCHC 34.0 03/07/2024 01:38 AM    MPV 10.2 03/07/2024 01:38 AM      Lab Results   Component Value Date/Time    SODIUM 134 (L) 03/07/2024 01:38 AM    POTASSIUM 4.1 03/07/2024 01:38 AM    CHLORIDE 102 03/07/2024 01:38 AM    CO2 21 03/07/2024 01:38 AM    GLUCOSE 112 (H) 03/07/2024 01:38 AM    BUN 19 03/07/2024 01:38 AM    CREATININE 0.89 03/07/2024 01:38 AM      Lab Results   Component Value Date/Time    ASTSGOT 42 03/06/2024 07:23 AM    ALTSGPT 15 03/06/2024 07:23 AM     Lab Results   Component Value Date/Time    CHOLSTRLTOT 161 03/03/2024 11:59 PM    LDL 83 03/03/2024 11:59 PM    HDL 46 03/03/2024 11:59 PM    TRIGLYCERIDE 159 (H) 03/03/2024 11:59 PM    TROPONINT 38 (H) 03/04/2024 07:12 AM       No results for input(s): \"NTPROBNP\" in the last 72 hours.      Cardiac Imaging and Procedures Review    Echocardiogram:  3/4/2024  CONCLUSIONS  Prior study 01/01/22, compared to the report of the prior study, there   has been no significant change.   Normal left ventricular systolic function.  The left ventricular ejection fraction is visually estimated to be 70%.  No significant valvular abnormalities.     Cardiac Catheterization:  Pending    Imaging  Chest X-Ray:  3/3/2024   FINDINGS:  Cardiac mediastinal contour is unchanged.  Postoperative change from prior open heart surgery.  Linear opacity in the RIGHT midlung.  No focal consolidation.  No pleural fluid collection or pneumothorax.  IMPRESSION:  1.  Minimal RIGHT midlung scar.  2.  No pneumonia or pulmonary edema.  3.  Prior open heart surgery.      Assessment/Plan  #Dyspnea on exertion  #History of CABG  #Hypertension  #Alcohol withdrawal  #History of PE    Recommendations:  - No cardiac symptoms today.  - Continue heparin gtt. Previously had recurrent DVT while off xarelto.   - Continue aspirin and atorvastatin.  - Continue metoprolol 50mg BID  - Increase losartan to 50mg daily.   -EF preserved on " echo.  -Discussed CV risk factor modification including cholesterol management, blood pressure management, smoking cessation, diet and lifestyle changes.   - Keep NPO. Plan for LHC later today.     Cardiology will continue to follow.    Thank you for allowing me to participate in the care of Jose A Ponce .    Nanci Avelar PA-C, Cardiology  [facility] and Vascular [facility]  [address] [place] [address]  Phone: [phone]  Fax: [phone]    PLEASE NOTE: This note was created using voice recognition software. I have made every reasonable attempt to correct obvious errors, but I expect that there are errors of grammar and possibly content that I did not discover before finalizing the note.     I personally spent a total of 15 minutes which includes face-to-face time and non-face-to-face time spent on preparing to see the patient, reviewing hospital notes and tests, obtaining history from the patient, performing a medically appropriate exam, counseling and educating the patient, ordering medications/tests/procedures/referrals as clinically indicated, and documenting information in the electronic medical record.

## 2024-03-07 NOTE — THERAPY
Physical Therapy   Daily Treatment     Patient Name: Jose A Ponce  Age:  63 y.o., Sex:  male  Medical Record #: [id number]  Today's Date: 3/7/2024     Precautions  Precautions: Fall Risk;Other (See Comments)  Comments: Seizure precautions; Displaced comminuted proximal right humeral neck fracture 1/6/2024-Non operated    Assessment    Patient seen for PT treatment session. Patient in bed, agreeable for the session. Able to participate with bed mobility, sit-stand, ambulation as detailed below. Currently requiring SBA/S. Will continue to benefit from PT services to help facilitate return to prior level of function. Recommend out-patient PT for R shoulder and improving activity tolerance/endurance and balance as needed.      Plan    Treatment Plan Status: Continue Current Treatment Plan  Type of Treatment: Equipment, Gait Training, Therapeutic Activities, Therapeutic Exercise, Neuro Re-Education / Balance  Treatment Frequency: 4 Times per Week  Treatment Duration: Until Therapy Goals Met    DC Equipment Recommendations: Front-Wheel Walker  Discharge Recommendations: Recommend outpatient physical therapy services to address higher level deficits    Objective       03/07/24 1425   Precautions   Precautions Fall Risk;Other (See Comments)   Comments Seizure precautions; Displaced comminuted proximal right humeral neck fracture 1/6/2024-Non operated   Vitals   Pulse (!) 102   Patient BP Position Sitting  (EOB)   Blood Pressure (!) 162/98   Pulse Oximetry 97 %   O2 Delivery Device None - Room Air   Vitals Comments Post ambulation: , SpO2 99   Pain   Pain Scales 0 to 10 Scale    Intervention Declines   Cognition    Level of Consciousness Alert   Comments Patient emotional during the session due to the whole hospital situation and need to return back to work.   Other Treatments   Other Treatments Provided Patient educated about activity pacing during mobility, maintaining each position prior to changing position,  use of shower chair for safety. Reinforced knowing the side effects of his medications to watch out for them and reporting to his doctor sooner than later. Discussed about DC concerns or needs, patient woulld like to DC home soon, so he can return back to work. He is afaird to lose his job. Reinforced follow up with out-patient PT for his R shoulder and also to build up his activity tolerance/ endurance.   Balance   Sitting Balance (Static) Good   Sitting Balance (Dynamic) Fair +   Standing Balance (Static) Fair   Standing Balance (Dynamic) Fair   Weight Shift Sitting Good   Weight Shift Standing Good   Skilled Intervention Verbal Cuing   Comments Seated EOB; Standing W FWW; Standing W/O AD   Bed Mobility    Supine to Sit Independent   Sit to Supine Independent   Scooting Independent   Rolling Independent   Skilled Intervention Verbal Cuing   Comments HOB flat, W/O use of rails, towards L side   Gait Analysis   Gait Level Of Assist Standby Assist   Assistive Device None;Front Wheel Walker   Distance (Feet) 200   # of Times Distance was Traveled 2   Deviation Bradykinetic   Skilled Intervention Verbal Cuing   Comments Patient ambulated initially with FWW and then without AD. Slow steady pace, no loss of balance with and without AD this session. Cues for pacing. Cautious with direction changes.   Functional Mobility   Sit to Stand Supervised   Bed, Chair, Wheelchair Transfer Refused   Mobility Bed-EOB-ambulate in the room-ambulate in the hallway-EOB-bed   Skilled Intervention Verbal Cuing   6 Clicks Assessment - How much HELP from from another person do you currently need... (If the patient hasn't done an activity recently, how much help from another person do you think he/she would need if he/she tried?)   Turning from your back to your side while in a flat bed without using bedrails? 4   Moving from lying on your back to sitting on the side of a flat bed without using bedrails? 4   Moving to and from a bed to a  chair (including a wheelchair)? 4   Standing up from a chair using your arms (e.g., wheelchair, or bedside chair)? 4   Walking in hospital room? 3   Climbing 3-5 steps with a railing? 3   6 clicks Mobility Score 22   Patient / Family Goals    Patient / Family Goal #1 To return home   Goal #1 Outcome Progressing as expected   Short Term Goals    Short Term Goal # 1 pt will ambulate 500 feet with LRAD and supervision w/o requiring sitting rest breaks in 6 visits for safe community ambulation   Goal Outcome # 1 Progressing as expected   Short Term Goal # 2 pt will independently complete supine<>sit w/ flat HOB in 6 visits for safe return home   Goal Outcome # 2 Goal met   Short Term Goal # 3 pt will independently complete bed>chair transfer w/ LRAD and supervision in 6 visits for safe return home   Goal Outcome # 3 Goal not met   Physical Therapy Treatment Plan   Physical Therapy Treatment Plan Continue Current Treatment Plan   Anticipated Discharge Equipment and Recommendations   DC Equipment Recommendations Front-Wheel Walker   Discharge Recommendations Recommend outpatient physical therapy services to address higher level deficits   Interdisciplinary Plan of Care Collaboration   IDT Collaboration with  Nursing;Occupational Therapist   Patient Position at End of Therapy Seated;In Bed;Bed Alarm On;Call Light within Reach;Tray Table within Reach   Session Information   Date / Session Number  3/7-2(2/4, 3/12)

## 2024-03-07 NOTE — PROGRESS NOTES
Progress note    HPI: Jose A Ponce is a 63 y.o. male admitted 3/3/2024 for dyspnea that started after recent admission for right-sided humerus fracture/dislocation s/p medical management.  Negative CT PE,  No sepsis criteria.  Given history of alcohol use disorder with recent drink, placed on CIWA.  Other conditions include coronary artery disease status post CABG unprovoked pulmonary embolism on chronic Xarelto and hypertension.     INTERVAL HISTORY:   No acute distress on room air and speaking in full sentences.  No chest discomfort. EKG shows ST depressions in V2 V3 V4 V5 and V6.  Troponins elevated but stable around 35. Cardiac risk factors include heavy smoker (1 pack a day since 17 years old), hypertension slightly trending up, hypercholesterolemia.  Low concern for active DVT.  CIWA never above 10. Normal echo, EF 70.  Evaluated by cardiology who recommend cath but no schedule available.  Cardiology cleared him for discharge 3/5/2024, but dizziness when standing up from bed.  No change in vitals.  Discharge was canceled.  Cardiology will cath today.     Vitals:    03/07/24 0029 03/07/24 0537 03/07/24 0715 03/07/24 1102   BP: (!) 141/94 (!) 158/98 (!) 146/99 (!) 150/92   Pulse: 94 90 78 72   Resp: 18 17 18 18   Temp: 37.6 °C (99.7 °F) 36.9 °C (98.4 °F) 36.8 °C (98.2 °F) 36.1 °C (97 °F)   TempSrc: Temporal Temporal Temporal Temporal   SpO2: 97% 99% 95% 95%   Weight:  78.8 kg (173 lb 11.6 oz)     Height:         Body mass index is 22.61 kg/m².    Review of Systems   Constitutional: Negative.  Negative for chills, fever and weight loss.   HENT: Negative.     Eyes: Negative.    Respiratory: Negative.  Negative for cough, hemoptysis and sputum production.    Cardiovascular: Negative.  Negative for chest pain, palpitations and orthopnea.   Gastrointestinal: Negative.  Negative for abdominal pain and heartburn.   Genitourinary: Negative.  Negative for dysuria.   Musculoskeletal: Negative.  Negative for  myalgias.   Skin: Negative.  Negative for rash.   Neurological: Negative.  Negative for dizziness, tingling and headaches.   Endo/Heme/Allergies: Negative.    Psychiatric/Behavioral: Negative.  Negative for depression, hallucinations and suicidal ideas.    All other systems reviewed and are negative.     Physical Exam  Vitals and nursing note reviewed.   Constitutional:       General: He is not in acute distress.     Appearance: Normal appearance. He is not ill-appearing, toxic-appearing or diaphoretic.   Cardiovascular:      Rate and Rhythm: Normal rate and regular rhythm.      Pulses: Normal pulses.      Heart sounds: Normal heart sounds. No murmur heard.  Pulmonary:      Effort: Pulmonary effort is normal. No respiratory distress.      Breath sounds: Normal breath sounds. No stridor. No wheezing or rales.   Abdominal:      General: Bowel sounds are normal. There is no distension.      Palpations: Abdomen is soft.      Tenderness: There is no abdominal tenderness. There is no guarding.   Musculoskeletal:         General: No swelling or tenderness. Normal range of motion.      Right lower leg: No edema.      Left lower leg: No edema.   Skin:     General: Skin is warm.      Capillary Refill: Capillary refill takes less than 2 seconds.      Coloration: Skin is not jaundiced or pale.      Findings: No bruising.   Neurological:      General: No focal deficit present.      Mental Status: He is alert. Mental status is at baseline.      Cranial Nerves: No cranial nerve deficit.      Sensory: No sensory deficit.      Coordination: Coordination normal.      Gait: Gait normal.      Deep Tendon Reflexes: Reflexes normal.   Psychiatric:         Mood and Affect: Mood normal.         Behavior: Behavior normal.         Thought Content: Thought content normal.         Judgment: Judgment normal.       LABS:   Recent Labs     03/05/24  0104 03/06/24  0723 03/07/24  0138   WBC 6.5 6.4 5.5   RBC 2.95* 3.35* 3.18*   HEMOGLOBIN 10.3*  11.8* 11.2*   HEMATOCRIT 30.0* 34.4* 32.9*   .7* 102.7* 103.5*   MCH 34.9* 35.2* 35.2*   MCHC 34.3 34.3 34.0   RDW 52.1* 53.4* 54.4*   PLATELETCT 160* 161* 162*   MPV 10.2 9.9 10.2      Recent Labs     24  0138   SODIUM 132* 134* 134*   POTASSIUM 3.7 4.2 4.1   CHLORIDE 101 102 102   CO2 20 19* 21   GLUCOSE 117* 109* 112*   BUN 13 19 19   CREATININE 0.59 0.92 0.89   CALCIUM 8.2* 8.8 8.9      Recent Labs     24   ALTSGPT 14 15  --    ASTSGOT 30 42  --    ALKPHOSPHAT 95 102*  --    TBILIRUBIN 0.8 1.0  --    GLUCOSE 117* 109* 112*      RADIOLOGY:   EC-ECHOCARDIOGRAM COMPLETE W/O CONT   Final Result      CT-CTA CHEST PULMONARY ARTERY W/ RECONS   Final Result      1.  No evidence of pulmonary embolism.   2.  Atherosclerosis with coronary artery disease.            DX-CHEST-PORTABLE (1 VIEW)   Final Result      1.  Minimal RIGHT midlung scar.   2.  No pneumonia or pulmonary edema.   3.  Prior open heart surgery.      CL-LEFT HEART CATHETERIZATION WITH POSSIBLE INTERVENTION    (Results Pending)        MEDICAL DECISION MAKIN-year-old male admitted 3/3/2024 for dyspnea on exertion with history of coronary artery disease status post CABG left circumflex and RCA, concerning for progression of disease.  EKG nonspecific ST and T changes.  Negative CT PE.  Other active comorbidities include probable withdrawal from alcohol use disorder, and recent right-sided humerus fracture status post medical management.  Discharge was attempted after cardiology could not cath due to scheduling issues, but canceled when patient got dizzy on standing up.  Cath today.  Anticipating discharge when the results.     Disposition: Home/self-care  Follow up outpatient with primary care physicians and other specialties already following.     ASSESSMENT AND PLAN:   * Dyspnea on exertion  Assessment & Plan  No chest discomfort.  Back at room air.  Negative CT PE.   EKG precordial ST depressions.  Considering significant past cardiac history, cath was ordered but canceled due to scheduling problems.  Will reattempt today.  Held Xarelto for procedure, cardiology started heparin drip.  Strict I's and O's    Megaloblastic anemia  Assessment & Plan  Most likely secondary to chronic alcoholism.  Negative for B6/B12 deficiency and TSH within reference levels.  Follow-up outpatient    Alcohol withdrawal syndrome without complication (HCC)  Assessment & Plan  History of 3 drinks 5 nights a week.  No withdrawals, no seizures in the past.  Tremors on admission.  Low CIWA so far, 3 mg of Ativan 3//24.  Continue protocol.  Aspiration, seizure, fall precautions.  Electrolyte replacement as needed.  Watch for refeeding.      Closed fracture of neck of right humerus  Assessment & Plan  Dated from 2/16/2024.  Follow-up with orthopedic surgery as outpatient  Tylenol, oxycodone as needed    Tobacco abuse  Assessment & Plan  1 pack a day since 17 years old.  Currently on nicotine patch with no cravings.    Armando R. Reyes Yparraguirre, M.D.  Internal Medicine Resident   [facility]      This note was generated using voice recognition software which has a small chance of producing errors of grammar and possibly content. I have made every reasonable attempt to find and correct any obvious errors but expect that some may not be found prior to finalization of this note.

## 2024-03-07 NOTE — DISCHARGE PLANNING
@ 09:23 AM No DME Order yet , Choice form placed in  .     @11:47 DME Order placed , DPA sent referral to PT first choice.

## 2024-03-07 NOTE — DISCHARGE PLANNING
Case Management Discharge Planning    Admission Date: 3/3/2024  GMLOS: 3.4  ALOS: 4    6-Clicks ADL Score: 19  6-Clicks Mobility Score: 19      Anticipated Discharge Dispo: Discharge Disposition: Discharged to home/self care (01)    DME Needed: Yes    DME Ordered: Yes    Action(s) Taken: Choice obtained and Referral(s) sent, Front wheeled walker provided via HQ plus, Patient discussed during IDT rounds with medical team.    Escalations Completed: None    Medically Clear: No    Next Steps: CM will continue to follow for discharge planning needs.    Barriers to Discharge: Medical clearance    Is the patient up for discharge tomorrow: Yes    Is transport arranged for discharge disposition: No

## 2024-03-07 NOTE — CARE PLAN
The patient is Stable - Low risk of patient condition declining or worsening    Shift Goals  Clinical Goals: Ambulation, monitor vitals/labs, safety, NPO at midnight  Patient Goals: Sit in chair for all meals  Family Goals: ARELIS    Progress made toward(s) clinical / shift goals:      Problem: Pain - Standard  Goal: Alleviation of pain or a reduction in pain to the patient’s comfort goal  Description: Target End Date:  Prior to discharge or change in level of care    Document on Vitals flowsheet    1.  Document pain using the appropriate pain scale per order or unit policy  2.  Educate and implement non-pharmacologic comfort measures (i.e. relaxation, distraction, massage, cold/heat therapy, etc.)  3.  Pain management medications as ordered  4.  Reassess pain after pain med administration per policy  5.  If opiods administered assess patient's response to pain medication is appropriate per POSS sedation scale  6.  Follow pain management plan developed in collaboration with patient and interdisciplinary team (including palliative care or pain specialists if applicable)  Outcome: Progressing     Problem: Seizure Precautions  Goal: Implementation of seizure precautions  Description: Target End Date:  Prior to discharge or change in level of care    1.  Padded side rails up at all times  2.  Suction equipment and oxygen delivery system at bedside  3.  Continuous pulse oximeter in use  4.  Implement fall precautions, bed alarm on, bed in lowest position  5.  IV access (per order)  6.  Provide low stimulus environment, avoid exposure to triggers  7.  Instruct patient to use call light/seizure button if having warning signs of impending seizure  Outcome: Progressing     Problem: Psychosocial  Goal: Patient's level of anxiety will decrease  Description: Target End Date:  1-3 days or as soon as patient condition allows    1.  Collaborate with patient and family/caregiver to identify triggers and develop strategies to cope with  anxiety  2.  Implement stimuli reduction, calming techniques  3.  Pharmacologic management per provider order  4.  Encourage patient/family/care giver participation  5.  Collaborate with interdisciplinary team including Psychologist or Behavioral Health Team as needed  Outcome: Progressing     Problem: Risk for Aspiration  Goal: Patient's risk for aspiration will be absent or decrease  Description: Target End Date:  Prior to discharge or change in level of care    1.   Complete dysphagia screening on admission  2.   NPO until dysphagia screening complete or medically cleared  3.   Collaborate with Speech Therapy, Clinical Dietitian and interdisciplinary team  4.   Implement aspiration precautions  5.   Assist patient up to chair for meals  6.   Elevate head of bed 90 degrees if patient is unable to get out of bed  7.   Encourage small bites  8.   Ensure foods/liquids are of appropriate consistency  9.   Assess for any signs/symptoms of aspiration  10. Assess breath sounds and vital signs after oral intake  Outcome: Progressing     Problem: Fall Risk  Goal: Patient will remain free from falls  Description: Target End Date:  Prior to discharge or change in level of care    Document interventions on the [place] Fall Risk Assessment    1.  Assess for fall risk factors  2.  Implement fall precautions  Outcome: Progressing

## 2024-03-07 NOTE — PROGRESS NOTES
Telemetry Report  Rhythm Sinus  Heart Rate 83  Ectopy PAC, PVC    LA 0.12  QRS 0.08  QT 0.32            Per telemetry room monitor

## 2024-03-07 NOTE — DOCUMENTATION QUERY
[facility]                                                                       Query Response Note      PATIENT:               CESAR MASTERS  ACCT #:                  [id number]  MRN:                     [id number]  :                      1960  ADMIT DATE:       3/3/2024 6:13 PM  DISCH DATE:          RESPONDING  PROVIDER #:        [id number]           QUERY TEXT:    Per Registered Dietician assessment/evaluation, patient meets ASPEN criteria for malnutrition.     Based on your medical judgement, can you please confirm the nutritional status of this patient    The patient's Clinical Indicators include:  - Findings:  Registered Dietician assessment/evaluation 3/5/24:   Malnutrition Risk: Pt with severe, acute malnutrition related to arm injury from recent fall, AEB physical markers for moderate fat and moderate muscle loss noted above and severe wt loss of 11% in two months.  - Body mass index is 22 kg/m²., BMI classification: normal  - appeared thin with moderate muscle loss evidenced by slight hollowness to orbitals, sunken buccal region, some prominence to zygomatic arches; moderate  fat loss evidenced by slight depression to temporals, some prominence to brow bone, some protrusion to clavicles and some shoulder angling.  - Pt shared that  his UBW was ~ 205 lbs, and that he has lost ~30 lbs after falling in January.    - ED provider note:  He notes he has been eating less due to financial concerns and has noted unintentional weight loss for the last 2 months as well    - Treatments:  Registered Dietician assessment/evaluation, ensure plus, monitor weight, Document intake of all meals and supplements as % taken in ADLs to provide interdisciplinary communication across all shifts.  Regular diet when not NPO for procedure     - Risk factors:  recent humerus fracture, alcohol dependence, thiamine deficiency, nicotine dependence, heart  disease, abnormal weight loss, anemia     Thank You,  Emelia Tamayo, RN  Clinical Documentation   [email or web address]  Connect via Vo[employer] Messenger  Options provided:   -- Mild protein calorie malnutrition   -- Moderate protein calorie malnutrition   -- Severe protein calorie malnutrition   -- Other explanation, (please specify other explanation)   -- Unable to determine      Query created by: Emelia Tamayo on 3/6/2024 4:42 PM    RESPONSE TEXT:    Moderate protein calorie malnutrition          Electronically signed by:  KIMBERLY PERRY MD 3/7/2024 2:13 PM

## 2024-03-07 NOTE — CARE PLAN
The patient is Stable - Low risk of patient condition declining or worsening    Shift Goals  Clinical Goals: monitor for changes in cardiac rhythms, Hep gtt  Patient Goals: rest, pain control  Family Goals: ARELIS    Progress made toward(s) clinical / shift goals:    Problem: Pain - Standard  Goal: Alleviation of pain or a reduction in pain to the patient’s comfort goal  Description: Target End Date:  Prior to discharge or change in level of care    Document on Vitals flowsheet    1.  Document pain using the appropriate pain scale per order or unit policy  2.  Educate and implement non-pharmacologic comfort measures (i.e. relaxation, distraction, massage, cold/heat therapy, etc.)  3.  Pain management medications as ordered  4.  Reassess pain after pain med administration per policy  5.  If opiods administered assess patient's response to pain medication is appropriate per POSS sedation scale  6.  Follow pain management plan developed in collaboration with patient and interdisciplinary team (including palliative care or pain specialists if applicable)  Outcome: Progressing  Flowsheets (Taken 3/7/2024 8468)  Pain Rating Scale (NPRS): 3  Note: Pt is assessed for pain on a scale of 1-10. Pt's pain is addressed with interventions. Pt is reassessed for efficacy      Problem: Knowledge Deficit - Standard  Goal: Patient and family/care givers will demonstrate understanding of plan of care, disease process/condition, diagnostic tests and medications  Description: Target End Date:  1-3 days or as soon as patient condition allows    Document in Patient Education    1.  Patient and family/caregiver oriented to unit, equipment, visitation policy and means for communicating concern  2.  Complete/review Learning Assessment  3.  Assess knowledge level of disease process/condition, treatment plan, diagnostic tests and medications  4.  Explain disease process/condition, treatment plan, diagnostic tests and medications  Outcome:  Progressing  Note: Pt Is updated of plan of care at bedside shift report, pts questions and concerns are addressed.

## 2024-03-08 ENCOUNTER — PHARMACY VISIT (OUTPATIENT)
Dept: PHARMACY | Facility: MEDICAL CENTER | Age: 64
End: 2024-03-08
Payer: COMMERCIAL

## 2024-03-08 VITALS
WEIGHT: 182.1 LBS | RESPIRATION RATE: 18 BRPM | HEIGHT: 74 IN | SYSTOLIC BLOOD PRESSURE: 99 MMHG | TEMPERATURE: 98 F | BODY MASS INDEX: 23.37 KG/M2 | OXYGEN SATURATION: 96 % | DIASTOLIC BLOOD PRESSURE: 56 MMHG | HEART RATE: 98 BPM

## 2024-03-08 LAB
ALBUMIN SERPL BCP-MCNC: 3.4 G/DL (ref 3.2–4.9)
ALBUMIN/GLOB SERPL: 1.1 G/DL
ALP SERPL-CCNC: 95 U/L (ref 30–99)
ALT SERPL-CCNC: 26 U/L (ref 2–50)
ANION GAP SERPL CALC-SCNC: 15 MMOL/L (ref 7–16)
AST SERPL-CCNC: 72 U/L (ref 12–45)
BASOPHILS # BLD AUTO: 1.1 % (ref 0–1.8)
BASOPHILS # BLD: 0.06 K/UL (ref 0–0.12)
BILIRUB SERPL-MCNC: 0.4 MG/DL (ref 0.1–1.5)
BUN SERPL-MCNC: 14 MG/DL (ref 8–22)
CALCIUM ALBUM COR SERPL-MCNC: 9.3 MG/DL (ref 8.5–10.5)
CALCIUM SERPL-MCNC: 8.8 MG/DL (ref 8.5–10.5)
CHLORIDE SERPL-SCNC: 104 MMOL/L (ref 96–112)
CO2 SERPL-SCNC: 18 MMOL/L (ref 20–33)
CREAT SERPL-MCNC: 0.72 MG/DL (ref 0.5–1.4)
EKG IMPRESSION: NORMAL
EOSINOPHIL # BLD AUTO: 0.07 K/UL (ref 0–0.51)
EOSINOPHIL NFR BLD: 1.3 % (ref 0–6.9)
ERYTHROCYTE [DISTWIDTH] IN BLOOD BY AUTOMATED COUNT: 54.4 FL (ref 35.9–50)
GFR SERPLBLD CREATININE-BSD FMLA CKD-EPI: 102 ML/MIN/1.73 M 2
GLOBULIN SER CALC-MCNC: 3.1 G/DL (ref 1.9–3.5)
GLUCOSE SERPL-MCNC: 121 MG/DL (ref 65–99)
HCT VFR BLD AUTO: 32.4 % (ref 42–52)
HGB BLD-MCNC: 11.2 G/DL (ref 14–18)
IMM GRANULOCYTES # BLD AUTO: 0.02 K/UL (ref 0–0.11)
IMM GRANULOCYTES NFR BLD AUTO: 0.4 % (ref 0–0.9)
LYMPHOCYTES # BLD AUTO: 0.82 K/UL (ref 1–4.8)
LYMPHOCYTES NFR BLD: 15.6 % (ref 22–41)
MAGNESIUM SERPL-MCNC: 1.6 MG/DL (ref 1.5–2.5)
MCH RBC QN AUTO: 35.3 PG (ref 27–33)
MCHC RBC AUTO-ENTMCNC: 34.6 G/DL (ref 32.3–36.5)
MCV RBC AUTO: 102.2 FL (ref 81.4–97.8)
MONOCYTES # BLD AUTO: 0.77 K/UL (ref 0–0.85)
MONOCYTES NFR BLD AUTO: 14.7 % (ref 0–13.4)
NEUTROPHILS # BLD AUTO: 3.51 K/UL (ref 1.82–7.42)
NEUTROPHILS NFR BLD: 66.9 % (ref 44–72)
NRBC # BLD AUTO: 0 K/UL
NRBC BLD-RTO: 0 /100 WBC (ref 0–0.2)
PLATELET # BLD AUTO: 152 K/UL (ref 164–446)
PMV BLD AUTO: 10.1 FL (ref 9–12.9)
POTASSIUM SERPL-SCNC: 3.8 MMOL/L (ref 3.6–5.5)
PROT SERPL-MCNC: 6.5 G/DL (ref 6–8.2)
RBC # BLD AUTO: 3.17 M/UL (ref 4.7–6.1)
SODIUM SERPL-SCNC: 137 MMOL/L (ref 135–145)
WBC # BLD AUTO: 5.3 K/UL (ref 4.8–10.8)

## 2024-03-08 PROCEDURE — A9270 NON-COVERED ITEM OR SERVICE: HCPCS | Performed by: INTERNAL MEDICINE

## 2024-03-08 PROCEDURE — 700102 HCHG RX REV CODE 250 W/ 637 OVERRIDE(OP): Performed by: PHYSICIAN ASSISTANT

## 2024-03-08 PROCEDURE — A9270 NON-COVERED ITEM OR SERVICE: HCPCS | Performed by: HOSPITALIST

## 2024-03-08 PROCEDURE — 700111 HCHG RX REV CODE 636 W/ 250 OVERRIDE (IP): Performed by: STUDENT IN AN ORGANIZED HEALTH CARE EDUCATION/TRAINING PROGRAM

## 2024-03-08 PROCEDURE — 80053 COMPREHEN METABOLIC PANEL: CPT

## 2024-03-08 PROCEDURE — 83735 ASSAY OF MAGNESIUM: CPT

## 2024-03-08 PROCEDURE — 700102 HCHG RX REV CODE 250 W/ 637 OVERRIDE(OP): Performed by: INTERNAL MEDICINE

## 2024-03-08 PROCEDURE — 99232 SBSQ HOSP IP/OBS MODERATE 35: CPT | Mod: FS | Performed by: INTERNAL MEDICINE

## 2024-03-08 PROCEDURE — A9270 NON-COVERED ITEM OR SERVICE: HCPCS | Mod: JZ | Performed by: STUDENT IN AN ORGANIZED HEALTH CARE EDUCATION/TRAINING PROGRAM

## 2024-03-08 PROCEDURE — 85025 COMPLETE CBC W/AUTO DIFF WBC: CPT

## 2024-03-08 PROCEDURE — 700102 HCHG RX REV CODE 250 W/ 637 OVERRIDE(OP)

## 2024-03-08 PROCEDURE — 97535 SELF CARE MNGMENT TRAINING: CPT

## 2024-03-08 PROCEDURE — 99239 HOSP IP/OBS DSCHRG MGMT >30: CPT | Mod: GC | Performed by: INTERNAL MEDICINE

## 2024-03-08 PROCEDURE — A9270 NON-COVERED ITEM OR SERVICE: HCPCS | Performed by: PHYSICIAN ASSISTANT

## 2024-03-08 PROCEDURE — A9270 NON-COVERED ITEM OR SERVICE: HCPCS

## 2024-03-08 PROCEDURE — 93010 ELECTROCARDIOGRAM REPORT: CPT | Performed by: INTERNAL MEDICINE

## 2024-03-08 PROCEDURE — RXMED WILLOW AMBULATORY MEDICATION CHARGE: Performed by: STUDENT IN AN ORGANIZED HEALTH CARE EDUCATION/TRAINING PROGRAM

## 2024-03-08 PROCEDURE — 700102 HCHG RX REV CODE 250 W/ 637 OVERRIDE(OP): Performed by: HOSPITALIST

## 2024-03-08 PROCEDURE — 700102 HCHG RX REV CODE 250 W/ 637 OVERRIDE(OP): Mod: JZ | Performed by: STUDENT IN AN ORGANIZED HEALTH CARE EDUCATION/TRAINING PROGRAM

## 2024-03-08 RX ORDER — LOSARTAN POTASSIUM 25 MG/1
25 TABLET ORAL DAILY
Qty: 30 TABLET | Refills: 1 | Status: SHIPPED | OUTPATIENT
Start: 2024-03-08 | End: 2024-05-07

## 2024-03-08 RX ORDER — ASPIRIN 81 MG/1
81 TABLET ORAL DAILY
Qty: 30 TABLET | Refills: 0 | Status: SHIPPED | OUTPATIENT
Start: 2024-03-08 | End: 2024-03-08

## 2024-03-08 RX ORDER — ASPIRIN 81 MG/1
81 TABLET ORAL DAILY
Qty: 30 TABLET | Refills: 0 | Status: SHIPPED | OUTPATIENT
Start: 2024-03-08

## 2024-03-08 RX ORDER — POTASSIUM CHLORIDE 20 MEQ/1
20 TABLET, EXTENDED RELEASE ORAL ONCE
Status: COMPLETED | OUTPATIENT
Start: 2024-03-08 | End: 2024-03-08

## 2024-03-08 RX ORDER — MAGNESIUM SULFATE HEPTAHYDRATE 40 MG/ML
4 INJECTION, SOLUTION INTRAVENOUS ONCE
Status: COMPLETED | OUTPATIENT
Start: 2024-03-08 | End: 2024-03-08

## 2024-03-08 RX ORDER — LOSARTAN POTASSIUM 50 MG/1
50 TABLET ORAL DAILY
Qty: 30 TABLET | Refills: 0 | Status: SHIPPED | OUTPATIENT
Start: 2024-03-09 | End: 2024-03-08

## 2024-03-08 RX ORDER — CLOPIDOGREL BISULFATE 75 MG/1
75 TABLET ORAL DAILY
Qty: 30 TABLET | Refills: 0 | Status: SHIPPED | OUTPATIENT
Start: 2024-03-09

## 2024-03-08 RX ORDER — ATORVASTATIN CALCIUM 40 MG/1
40 TABLET, FILM COATED ORAL NIGHTLY
Qty: 30 TABLET | Refills: 1 | Status: SHIPPED | OUTPATIENT
Start: 2024-03-08 | End: 2024-05-07

## 2024-03-08 RX ORDER — CLOPIDOGREL BISULFATE 75 MG/1
75 TABLET ORAL DAILY
Qty: 30 TABLET | Refills: 0 | Status: SHIPPED | OUTPATIENT
Start: 2024-03-09 | End: 2024-03-08

## 2024-03-08 RX ADMIN — LOSARTAN POTASSIUM 50 MG: 50 TABLET, FILM COATED ORAL at 05:11

## 2024-03-08 RX ADMIN — CLOPIDOGREL BISULFATE 75 MG: 75 TABLET ORAL at 05:11

## 2024-03-08 RX ADMIN — MAGNESIUM SULFATE IN WATER FOR 4 G: 40 INJECTION INTRAVENOUS at 08:35

## 2024-03-08 RX ADMIN — POTASSIUM CHLORIDE 20 MEQ: 1500 TABLET, EXTENDED RELEASE ORAL at 08:28

## 2024-03-08 RX ADMIN — OMEPRAZOLE 40 MG: 20 CAPSULE, DELAYED RELEASE ORAL at 05:12

## 2024-03-08 RX ADMIN — NICOTINE TRANSDERMAL SYSTEM 21 MG: 21 PATCH, EXTENDED RELEASE TRANSDERMAL at 05:11

## 2024-03-08 RX ADMIN — ASPIRIN 81 MG: 81 TABLET, COATED ORAL at 05:12

## 2024-03-08 RX ADMIN — METOPROLOL TARTRATE 50 MG: 25 TABLET, FILM COATED ORAL at 05:11

## 2024-03-08 ASSESSMENT — ENCOUNTER SYMPTOMS
COUGH: 0
APNEA: 0
STRIDOR: 0
SHORTNESS OF BREATH: 0
CHOKING: 0
WHEEZING: 0
CHEST TIGHTNESS: 0

## 2024-03-08 ASSESSMENT — LIFESTYLE VARIABLES
TOTAL SCORE: 0
ORIENTATION AND CLOUDING OF SENSORIUM: ORIENTED AND CAN DO SERIAL ADDITIONS
AGITATION: NORMAL ACTIVITY
AGITATION: NORMAL ACTIVITY
NAUSEA AND VOMITING: NO NAUSEA AND NO VOMITING
TREMOR: NO TREMOR
ANXIETY: NO ANXIETY (AT EASE)
TOTAL SCORE: 0
PAROXYSMAL SWEATS: NO SWEAT VISIBLE
ANXIETY: NO ANXIETY (AT EASE)
HEADACHE, FULLNESS IN HEAD: NOT PRESENT
PAROXYSMAL SWEATS: NO SWEAT VISIBLE
AUDITORY DISTURBANCES: NOT PRESENT
NAUSEA AND VOMITING: NO NAUSEA AND NO VOMITING
VISUAL DISTURBANCES: NOT PRESENT
TREMOR: NO TREMOR
HEADACHE, FULLNESS IN HEAD: NOT PRESENT
VISUAL DISTURBANCES: NOT PRESENT
ORIENTATION AND CLOUDING OF SENSORIUM: ORIENTED AND CAN DO SERIAL ADDITIONS
AUDITORY DISTURBANCES: NOT PRESENT

## 2024-03-08 ASSESSMENT — FIBROSIS 4 INDEX: FIB4 SCORE: 4.22

## 2024-03-08 ASSESSMENT — PAIN DESCRIPTION - PAIN TYPE: TYPE: ACUTE PAIN

## 2024-03-08 NOTE — PROGRESS NOTES
D/c instructions given, educated on worsening s/s. Pt understands and questions answered. D/c home by self.

## 2024-03-08 NOTE — PROGRESS NOTES
Telemetry Report  Rhythm Sinus Rhythm  Heart Rate 77  Ectopy PVC, 8 beats of vtach    MS 0.15  QRS 0.04  QT 0.36                          Per telemetry room monitor

## 2024-03-08 NOTE — THERAPY
Distant supervision and agree with below note.    Lin Morton, PT, DPT    Physical Therapy   Daily Treatment     Patient Name: Jose A Ponce  Age:  63 y.o., Sex:  male  Medical Record #: [id number]  Today's Date: 3/8/2024     Precautions  Precautions: Fall Risk;Other (See Comments)  Comments: seizure precautions; displaced comminuted proximal R humeral neck fracture 1/6/24-Non operated    Assessment  Pt was agreeable to the session detailed below. Education provided to pt concerning cardiac precautions following his procedure. Discussion about appropriate warm-up and cool down as well as activity pacing in relation to walking into his job was well received. Pt would benefit from outpatient physical therapy services to improve safety during high level functional mobility tasks and and cardiac rehab. Will continue to follow during admit.     Underwent LHC & PCI on 3/7/2024    Plan    Treatment Plan Status: Continue Current Treatment Plan  Type of Treatment: Equipment, Gait Training, Therapeutic Activities, Therapeutic Exercise, Neuro Re-Education / Balance  Treatment Frequency: 4 Times per Week  Treatment Duration: Until Therapy Goals Met    DC Equipment Recommendations: Front-Wheel Walker  Discharge Recommendations: Recommend outpatient physical therapy services to address higher level deficits     Objective       03/08/24 0905   Precautions   Precautions Fall Risk;Other (See Comments)   Comments seizure precautions; displaced comminuted proximal R humeral neck fracture 1/6/24-Non operated; bleeding precautions   Vitals   O2 Delivery Device None - Room Air   Pain   Pain Scales 0 to 10 Scale    Intervention Declines   Pain 0 - 10 Group   Therapist Pain Assessment Post Activity Pain Same as Prior to Activity;0   Cognition    Cognition / Consciousness WDL   Level of Consciousness Alert   Comments pt pleasant and cooperative; pt was receptive to education   Other Treatments   Other Treatments Provided Pt educated  about precautions following cardiac procedure. Discussion w/ pt regarding the importance of activity warm-up and cool down as well as appropriate activity pacing, pt educated on talk test; Pt was encouraged to leave for work earlier than normal to allow extra rest time if needed while walking into the building   Bed Mobility    Comments Refused   Gait Analysis   Comments Refused   Functional Mobility   Comments Refused   Patient / Family Goals    Patient / Family Goal #1 To return home   Goal #1 Outcome Progressing as expected   Short Term Goals    Short Term Goal # 1 pt will ambulate 500 feet with LRAD and supervision w/o requiring sitting rest breaks in 6 visits for safe community ambulation   Goal Outcome # 1 goal not met   Short Term Goal # 2 pt will independently complete supine<>sit w/ flat HOB in 6 visits for safe return home   Goal Outcome # 2 Goal not met   Short Term Goal # 3 pt will independently complete bed>chair transfer w/ LRAD and supervision in 6 visits for safe return home   Goal Outcome # 3 Goal not met   Education Group   Education Provided Cardiac Precautions;Role of Physical Therapist   Cardiac Precautions Patient Response Patient;Acceptance;Explanation;Handout;Verbal Demonstration   Role of Physical Therapist Patient Response Patient;Eager;Explanation;Verbal Demonstration   Physical Therapy Treatment Plan   Physical Therapy Treatment Plan Continue Current Treatment Plan   Anticipated Discharge Equipment and Recommendations   DC Equipment Recommendations Front-Wheel Walker   Discharge Recommendations Recommend outpatient physical therapy services to address higher level deficits  (Cardiac Rehab along with out-patient PT for his R shoulder)   Interdisciplinary Plan of Care Collaboration   IDT Collaboration with  Nursing   Patient Position at End of Therapy In Bed;Bed Alarm On;Call Light within Reach;Tray Table within Reach;Phone within Reach   Session Information   Date / Session Number  3/8-  3(3/4, 3/12)

## 2024-03-08 NOTE — PROGRESS NOTES
Pt sent to cath lab where 3 stents were placed. Cath site clean, dry, and intact. Pt with good peripheral circulation and pulses and sensation. Post-op vitals and bedrest per order.

## 2024-03-08 NOTE — PROGRESS NOTES
Telemetry Report:    Rhythm: SR/ST  Ectopy: PVC    Rate: 87-98    NJ: 0.12  QRS: 0.08  QT: 0.32

## 2024-03-08 NOTE — PROCEDURES
"CARDIAC CATHETERIZATION REPORT    Referring Provider: Nico Gee MD    PROCEDURE PHYSICIAN: Jasper Mcintosh MD, [facility], [facility]  ASSISTANT: None    IMPRESSIONS:  1. Unstable angina with obstructive CAD in the RCA and Cx  2. Successful PCI of the Cx with GIFTY, IVUS  3. Successful PCI of the RCA using GIFTY, IVUS, IVL, iFR  4. Patent VG-Diagonal supplying the LAD, LIMA-LAD functionally occluded  5. Normal LVEDP    Recommendations:  Plavix, aspirin x 12 months    Pre-procedure diagnosis: Unstable angina  Post-procedure diagnosis: Same    Procedure performed  Selective coronary angiography  Left heart catheterization  Bypass graft angiography  Percutaneous coronary intervention - Stent Placement (circumflex, RCA)  Intravascular Ultrasound (circumflex, RCA)  Instantaneous wave free ratio (iFR) (RCA)  Intravascular lithotripsy (RCA)  Perclose    Conscious sedation was supervised by myself and administered by trained personnel using fentanyl and versed between 1533 and 1642. The patient tolerated sedation without complication.     Procedure Description  1. Access: 5/6 Irish right femoral artery Micropuncture technique was utilized following local anesthesia with lidocaine.  Fluoroscopic guidance was utilized for femoral access Dynamic ultrasound was utilized to gain access    2. Diagnostic description: The catheter was passed to the central circulation with the aide of J tipped 0.35\" wire. A 6F JR4, 6F JL4, and 6F FRANKIE were used to inject the coronary circulation , bypass grafts  and enter the left ventricle during invasive hemodynamic monitoring.     3. Description of Intervention: After confirming therapeutic anticoagulation intervention proceeded. A 6F EBU 4.0 guiding catheter was used. The lesion in the circumflex was crossed with a 0.014\" Prowater wire.  IVUS was performed which demonstrated severe stenosis, distal reference vessel size 3.5-4.0, approximately 3.5-4.0.  I performed angioplasty with a 3.5 NC balloon.  I " then deployed a 3.5 x 38 mm Synergy XD GIFTY at 16 romelia.  All but the distal edge was postdilated with a 4.0 NC balloon at 16 romelia.  Subsequent angiogram showed excellent result.  I then removed to the left coronary equipment and used a JR4 catheter and a Aragon Omni wire to assess the RCA using IFR.  There was some unsteadiness in the reading, initially at 0.92 but then after administration of nitroglycerin the value dipped down to 0.74 and then reached a stable level at 0.86 after several minutes.  I then performed IVUS which demonstrated multilevel obstructive disease both proximally and distally with diffuse concentric but shallow calcification.  The distal reference vessel size and proximally were 4.0 approximately.  I predilated the vessel with a 4.0 NC balloon and was able to get full expansion throughout in all but the proximal lesion which had persistent waist on the balloon.  I then used a 4.0 shockwave intravascular lithotripsy device to deliver 4 cycles of 10 pulses each which resulted in full yielding of the proximal lesion.  I then deployed a 4.0 x 38 Synergy XD GIFTY distally at 16 romelia and then proximally a 4.0 x 28 mm Synergy Megatron GIFTY at 16 romelia.  I postdilated the stent length with a 4.0 NC balloon at 20 romelia.  Subsequent angiogram showed excellent result.    4. Closing: At completion of the procedure the relevant equipment was removed from the body and hemostasis achieved by Perclose    Findings   Hemodynamics:   Aorta: 135/73 mmHg  LVEDP: 6 mmHg  No significant pullback gradient across the aortic valve    Coronary Anatomy   Left Main: Distal 30% stenosis   LAD: Occluded proximally just after the origin of the small first diagonal vessel.  Distally the vessel is supplied by patent vein graft to the diagonal   LCx: 80% stenosis in the midportion extending into the large second OM vessel.   RCA: Dominant, 80% proximal and distal stenosis.  The PDA and PLB are normal.     Graft Anatomy   LIMA-LAD:  Atretic, minimal competitive flow prior to the coronary anastomosis   VG-Diagonal: Widely patent     Results of intervention to the circumflex:  Pre: 80% stenosis and STEPHENIE III flow  Post: 0% residual stenosis and STEPHENIE III flow. No dissection or distal embolization.    Results of intervention to the RCA:  Pre: 70% stenosis and STEPHENIE III flow  Post: 0% residual stenosis and STEPHENIE III flow. No dissection or distal embolization.    Technical Factors  1. Complications: None  2. Estimated Blood Loss: <50 cc  3. Specimens: None  4. Contrast Volume: 150 ml  5. Medications: Heparin to maintain ACT >250 Clopidogrel 600 mg Intracoronary nitroglycerin  6. Radiation (Air Kerma): 278 mGy

## 2024-03-08 NOTE — CARE PLAN
The patient is Stable - Low risk of patient condition declining or worsening    Shift Goals  Clinical Goals: Monitor for complications from caridac cath, hemostasis,  Patient Goals: rest, pain control  Family Goals: duncan    Progress made toward(s) clinical / shift goals:    Problem: Pain - Standard  Goal: Alleviation of pain or a reduction in pain to the patient’s comfort goal  Description: Target End Date:  Prior to discharge or change in level of care    Document on Vitals flowsheet    1.  Document pain using the appropriate pain scale per order or unit policy  2.  Educate and implement non-pharmacologic comfort measures (i.e. relaxation, distraction, massage, cold/heat therapy, etc.)  3.  Pain management medications as ordered  4.  Reassess pain after pain med administration per policy  5.  If opiods administered assess patient's response to pain medication is appropriate per POSS sedation scale  6.  Follow pain management plan developed in collaboration with patient and interdisciplinary team (including palliative care or pain specialists if applicable)  Outcome: Progressing  Flowsheets (Taken 3/8/2024 0408)  Non Verbal Scale: Calm  OB Pain Intervention:   Rest   Food   Emotional support   Distraction   Education   Environmental changes   Relaxation technique   [place]  Pain Rating Scale (NPRS): 5  Note: Pt is assessed for pain on a scale of 1-10, pt has pain interventions in place. Pt is reassessed for efficacy        Problem: Fall Risk  Goal: Patient will remain free from falls  Description: Target End Date:  Prior to discharge or change in level of care    Document interventions on the [place] Fall Risk Assessment    1.  Assess for fall risk factors  2.  Implement fall precautions  Outcome: Progressing  Note: Pt is assessed for risk for falls, pt has fall precautions in place, pt is educated on fall precautions

## 2024-03-08 NOTE — CARE PLAN
The patient is Stable - Low risk of patient condition declining or worsening    Shift Goals  Clinical Goals: monitor labs and signs of chest discomfort  Patient Goals: rest; get procedure  Family Goals: duncan    Progress made toward(s) clinical / shift goals:        Problem: Pain - Standard  Goal: Alleviation of pain or a reduction in pain to the patient’s comfort goal  Description: Target End Date:  Prior to discharge or change in level of care    Document on Vitals flowsheet    1.  Document pain using the appropriate pain scale per order or unit policy  2.  Educate and implement non-pharmacologic comfort measures (i.e. relaxation, distraction, massage, cold/heat therapy, etc.)  3.  Pain management medications as ordered  4.  Reassess pain after pain med administration per policy  5.  If opiods administered assess patient's response to pain medication is appropriate per POSS sedation scale  6.  Follow pain management plan developed in collaboration with patient and interdisciplinary team (including palliative care or pain specialists if applicable)  Outcome: Progressing     Problem: Optimal Care for Alcohol Withdrawal  Goal: Optimal Care for the alcohol withdrawal patient  Description: Target End Date:  1 to 3 days    1.  Alcohol history screening done on admission  2.  CIWA-AR score assessment (includes assessment of nausea/vomiting, tremor, sweats, anxiety, agitation, tactile, visual and auditory disturbances, headache and orientation/sensorium).  Document on CIWA flowsheet.  3.  Follow CIWA-AR score protocol  4.  Frequent reorientation  5.  Monitor for fluid and electrolyte imbalance.  6.  Assess for respiratory depression due to sedation (pulse ox)  7.  Consider thiamine, multivitamins, folic acid and magnesium sulfate per provider order  8.  Collaborate with Social Workers/Case Management  9.  Collaborate with mental health  Outcome: Progressing       Patient is not progressing towards the following goals:

## 2024-03-08 NOTE — PROGRESS NOTES
Cardiology Follow Up Progress Note    Date of Service  3/8/2024    Attending Physician  Rony Walker M.D.    Chief Complaint     Unstable angina    HPI  Jose A Ponce is a 63 y.o. male admitted 3/3/2024 with dyspnea on exertion concerning for angina equivalent.    PMH: CABG X 2  2017 with residual disease in LCx and RCA, recurrent DVT and PEs on Xarelto, nicotine dependence, alcohol use disorder      Interim Events    Telemetry-SR nonsustained VT yesterday evening no recurrence.  SBP `140's  Underwent successful PCI of the LCx and the RCA yesterday.  Denies SOB with exertion this morning.    Review of Systems  Review of Systems   Respiratory:  Negative for apnea, cough, choking, chest tightness, shortness of breath, wheezing and stridor.        Vital signs in last 24 hours  Temp:  [36.1 °C (97 °F)-37.1 °C (98.8 °F)] 36.7 °C (98.1 °F)  Pulse:  [] 77  Resp:  [16-18] 18  BP: (126-167)/() 144/84  SpO2:  [94 %-99 %] 96 %    Physical Exam  Physical Exam  Cardiovascular:      Rate and Rhythm: Normal rate and regular rhythm.      Pulses: Normal pulses.   Pulmonary:      Effort: Pulmonary effort is normal.   Skin:     General: Skin is warm.      Comments: Right groin cath site with mild bruising, stable.   Neurological:      Mental Status: He is alert. Mental status is at baseline.   Psychiatric:         Mood and Affect: Mood normal.         Lab Review  Lab Results   Component Value Date/Time    WBC 5.3 03/08/2024 03:59 AM    RBC 3.17 (L) 03/08/2024 03:59 AM    HEMOGLOBIN 11.2 (L) 03/08/2024 03:59 AM    HEMATOCRIT 32.4 (L) 03/08/2024 03:59 AM    .2 (H) 03/08/2024 03:59 AM    MCH 35.3 (H) 03/08/2024 03:59 AM    MCHC 34.6 03/08/2024 03:59 AM    MPV 10.1 03/08/2024 03:59 AM      Lab Results   Component Value Date/Time    SODIUM 137 03/08/2024 03:59 AM    POTASSIUM 3.8 03/08/2024 03:59 AM    CHLORIDE 104 03/08/2024 03:59 AM    CO2 18 (L) 03/08/2024 03:59 AM    GLUCOSE 121 (H) 03/08/2024 03:59 AM     "BUN 14 03/08/2024 03:59 AM    CREATININE 0.72 03/08/2024 03:59 AM      Lab Results   Component Value Date/Time    ASTSGOT 72 (H) 03/08/2024 03:59 AM    ALTSGPT 26 03/08/2024 03:59 AM     Lab Results   Component Value Date/Time    CHOLSTRLTOT 161 03/03/2024 11:59 PM    LDL 83 03/03/2024 11:59 PM    HDL 46 03/03/2024 11:59 PM    TRIGLYCERIDE 159 (H) 03/03/2024 11:59 PM    TROPONINT 38 (H) 03/04/2024 07:12 AM       No results for input(s): \"NTPROBNP\" in the last 72 hours.    Cardiac Imaging and Procedures Review  EKG:  SR    Echocardiogram:    3/4/24  Prior study 01/01/22, compared to the report of the prior study, there   has been no significant change.   Normal left ventricular systolic function.  The left ventricular ejection fraction is visually estimated to be 70%.  No significant valvular abnormalities    Cardiac Catheterization:    3/7/24  IMPRESSIONS:  1. Unstable angina with obstructive CAD in the RCA and Cx  2. Successful PCI of the Cx with GIFTY, IVUS  3. Successful PCI of the RCA using GIFTY, IVUS, IVL, iFR  4. Patent VG-Diagonal supplying the LAD, LIMA-LAD functionally occluded  5. Normal LVEDP     Recommendations:  Plavix, aspirin x 12 months    Imaging  Chest X-Ray:       1.  Minimal RIGHT midlung scar.  2.  No pneumonia or pulmonary edema.  3.  Prior open heart surgery.          Assessment/Plan    # UA due to obstructive CAD in the RCA & LCx.  # Successful PCI of the LCx  RCA 3/7/2024.  # LIMA to LAD functionally occluded.  # Patent VG to diagonal supplying the LAD.  # LVEDP normal  # Recurrent DVT/PE on Xarelto.  # LVEF 70%  # Tobacco use  # Alcohol use disorder    -Resume Xarelto.  -DAPT with aspirin & Plavix x1 month then DC ASA & continue with Plavix and Xarelto.  -Continue atorvastatin 80 mg daily.  -Continue losartan, metoprolol.  -Replete magnesium.  -Referral to cardiac rehab.  -Complete abstinence from alcohol and tobacco, discussed.    Cardiology will sign off.    Close follow-up in cardiology " office, appointment scheduled for 3/13/2024 at 8:15 AM      Thank you for allowing me to participate in the care of this patient.      Please contact me with any questions.    YUMIKO Davila.   Cardiologist, [facility]  [phone]

## 2024-03-08 NOTE — DISCHARGE SUMMARY
[facility] Internal Medicine Discharge Summary    Attending: Rony Walker M.d.  Senior Resident: Ajay Morfin DO  Intern: Armando Reyes, MD  Contact Number: [phone]    CHIEF COMPLAINT ON ADMISSION  Chief Complaint   Patient presents with    Weakness    Shortness of Breath    Arm Pain       Reason for Admission  Weakness     Admission Date  3/3/2024    CODE STATUS  Full Code    HPI & HOSPITAL COURSE    Mr. Ponce is a 63-year-old male with past medical history of right-sided humeral fracture and dislocation on 1/16 s/p conservative management, CAD with CABG X2 in 2017, chronic anticoagulation with Xarelto due to previous PE and DVT, GERD, tobacco abuse, and alcohol abuse who initially presented on 3/3 with worsening complaints of exertional dyspnea.    Initial troponin was noted to be significantly elevated, EKG findings were grossly unremarkable.  The patient was admitted for evaluation and management of possible ACS.  Given his significant cardiovascular history and elevated troponins with new worsening exertional dyspnea, cardiology was consulted for left heart cath and possible PCI.  This occurred on 3/7/2024 in which the patient received 2 GIFTY in the RCA and 1 GIFTY in the LCx.  The patient was started on appropriate anticoagulation and antiplatelet therapy with continuation of Xarelto and initiation of DAPT therapy.  He was also started on high-dose statin with resumption of his cardiac medications from home.  Of note, after PCI, the patient had complete resolution of his symptoms and was able to walk around without any significant dyspnea.    He will need close follow-up with cardiology for post catheter management.  Per cardiology he will be on DAPT for at least 28 days and will need close evaluation thereafter.    Therefore, he is discharged in good and stable condition to home with close outpatient follow-up.    The patient met 2-midnight criteria for an inpatient stay at the time of  discharge.    Discharge Date  3/8/2024    Physical Exam on Day of Discharge  Physical Exam  Vitals and nursing note reviewed.   Constitutional:       Appearance: He is normal weight.   HENT:      Head: Normocephalic and atraumatic.      Right Ear: External ear normal.      Left Ear: External ear normal.      Nose: Nose normal.      Mouth/Throat:      Mouth: Mucous membranes are moist.      Pharynx: Oropharynx is clear.   Eyes:      General: No scleral icterus.     Pupils: Pupils are equal, round, and reactive to light.   Neck:      Vascular: No carotid bruit.   Cardiovascular:      Rate and Rhythm: Regular rhythm. Bradycardia present.      Pulses: Normal pulses.      Heart sounds: Normal heart sounds. No murmur heard.  Pulmonary:      Effort: Pulmonary effort is normal. No respiratory distress.   Chest:      Chest wall: No tenderness.   Abdominal:      General: Abdomen is flat. Bowel sounds are normal. There is no distension.      Tenderness: There is no right CVA tenderness or left CVA tenderness.   Musculoskeletal:         General: Tenderness, deformity and signs of injury present. No swelling. Normal range of motion.      Cervical back: Normal range of motion. No rigidity.      Right lower leg: No edema.      Left lower leg: No edema.   Skin:     General: Skin is warm and dry.      Capillary Refill: Capillary refill takes less than 2 seconds.      Coloration: Skin is not jaundiced.      Findings: No rash.   Neurological:      General: No focal deficit present.      Mental Status: He is alert and oriented to person, place, and time. Mental status is at baseline.      Cranial Nerves: No cranial nerve deficit.      Deep Tendon Reflexes: Reflexes normal.   Psychiatric:         Mood and Affect: Mood normal.         Behavior: Behavior normal.     FOLLOW UP ITEMS POST DISCHARGE  - The patient will be discharged on dual antiplatelet therapy as well as ongoing anticoagulation with Xarelto.  Given precautions for excessive  bleeding risk and need for close monitoring with cardiology and primary care.  - You need to follow-up with primary care in 1 to 4 weeks to discuss recent hospitalization and to monitor his chronic conditions.  - He continues home medications as prescribed.    DISCHARGE DIAGNOSES  Principal Problem:    Dyspnea on exertion (POA: Yes)  Active Problems:    Essential hypertension (Chronic) (POA: Yes)      Overview: Restarted on Losartan 50mg QD in 1/2022;       Increased to 100mg QD in 3/2023.     Tobacco abuse (POA: Yes)    Lactic acidosis (POA: Yes)    Closed fracture of neck of right humerus (POA: Yes)    History of pulmonary embolism on chronic Xarelto (POA: Yes)    Alcohol withdrawal syndrome without complication (HCC) (POA: Yes)    Megaloblastic anemia (POA: Unknown)  Resolved Problems:    * No resolved hospital problems. *      FOLLOW UP  Future Appointments   Date Time Provider Department Center   3/13/2024  8:15 AM LES Narvaez None     JUDAH GutiérrezPLazaro  [address] [address]  [phone]    Follow up        MEDICATIONS ON DISCHARGE     Medication List        START taking these medications        Instructions   aspirin 81 MG EC tablet   Doctor's comments: Stop aspirin in 4 weeks.  Take 1 Tablet by mouth every day.  Dose: 81 mg     clopidogrel 75 MG Tabs  Start taking on: March 9, 2024  Commonly known as: Plavix   Take 1 Tablet by mouth every day.  Dose: 75 mg     * losartan 25 MG Tabs  Commonly known as: Cozaar   Take 1 Tablet by mouth every day for 60 days.  Dose: 25 mg     * losartan 50 MG Tabs  Start taking on: March 9, 2024  Commonly known as: Cozaar   Take 1 Tablet by mouth every day.  Dose: 50 mg           * This list has 2 medication(s) that are the same as other medications prescribed for you. Read the directions carefully, and ask your doctor or other care provider to review them with you.                CHANGE how you take these medications        Instructions    atorvastatin 40 MG Tabs  What changed:   medication strength  how much to take  Commonly known as: Lipitor   Take 1 Tablet by mouth every evening for 60 days.  Dose: 40 mg            CONTINUE taking these medications        Instructions   acetaminophen 500 MG Tabs  Commonly known as: Tylenol   Take 2 Tablets by mouth every 6 hours as needed for Mild Pain.  Dose: 1,000 mg     folic acid 1 MG Tabs  Commonly known as: Folvite   Take 1 Tablet by mouth every day.  Dose: 1 mg     metoprolol tartrate 50 MG Tabs  Commonly known as: Lopressor   Take 1 Tablet by mouth 2 times a day.  Dose: 50 mg     omeprazole 40 MG delayed-release capsule  Commonly known as: PriLOSEC   Take 1 Capsule by mouth every day.  Dose: 40 mg     rivaroxaban 20 MG Tabs tablet  Commonly known as: Xarelto   Doctor's comments: This rx was submitted by a pharmacist working under a collaborative practice agreement.  Take 1 Tablet by mouth with dinner.  Dose: 20 mg            STOP taking these medications      thiamine 100 MG tablet  Commonly known as: Thiamine              Allergies  No Known Allergies    DIET  Orders Placed This Encounter   Procedures    Diet Order Diet: Cardiac     Standing Status:   Standing     Number of Occurrences:   1     Order Specific Question:   Diet:     Answer:   Cardiac [6]       ACTIVITY  As tolerated.  Weight bearing as tolerated    CONSULTATIONS  Cardiology    PROCEDURES  3/7/2024, left heart cath with PCI    LABORATORY  Lab Results   Component Value Date    SODIUM 137 03/08/2024    POTASSIUM 3.8 03/08/2024    CHLORIDE 104 03/08/2024    CO2 18 (L) 03/08/2024    GLUCOSE 121 (H) 03/08/2024    BUN 14 03/08/2024    CREATININE 0.72 03/08/2024        Lab Results   Component Value Date    WBC 5.3 03/08/2024    HEMOGLOBIN 11.2 (L) 03/08/2024    HEMATOCRIT 32.4 (L) 03/08/2024    PLATELETCT 152 (L) 03/08/2024        Total time of the discharge process exceeds 32 minutes.

## 2024-03-08 NOTE — DIETARY
Nutrition Services: Cardiac Education Consult   Day 5 of admit.  Jose A Ponce is a 63 y.o. male with admitting DX of Dyspnea [R06.00]  Alcohol withdrawal seizure without complication (HCC) [F10.930, R56.9]    RD able to visit pt at bedside to provide cardiac education. Pt declined verbal education though was amenable to the handouts. RD reviewed triglycerides levels and quickly reviewed ways to improve triglycerides. RD encouraged pt to request a follow up if questions arise after reviewing education materials.     No other education needs identified at this time. Consider referral to outpatient nutrition services for continuation of education as indicated or per pt preferences.     Please re-consult RD as indicated.

## 2024-03-13 ENCOUNTER — APPOINTMENT (OUTPATIENT)
Dept: CARDIOLOGY | Facility: MEDICAL CENTER | Age: 64
End: 2024-03-13
Attending: PHYSICIAN ASSISTANT
Payer: COMMERCIAL

## 2024-03-13 ENCOUNTER — TELEPHONE (OUTPATIENT)
Dept: HEALTH INFORMATION MANAGEMENT | Facility: OTHER | Age: 64
End: 2024-03-13
Payer: COMMERCIAL

## 2024-05-12 ENCOUNTER — TELEPHONE (OUTPATIENT)
Dept: MEDICAL GROUP | Facility: PHYSICIAN GROUP | Age: 64
End: 2024-05-12
Payer: COMMERCIAL

## 2024-05-12 NOTE — LETTER
5/12/2024            Jose A Ariel Ponce  [address]  [place] [address]              Dear Jose A,    Your care is very important to us, and we have noticed that on 05/08/2024, you missed your appointment with Neva THOMPSON at [facility]       We’re committed to providing you with the best care possible. Your appointment time is reserved for you and your provider to discuss any current or new health concerns and, together, determine the best plan of care for you. Please call [phone] to reschedule at your earliest convenience.        In some cases, [facility] offers additional resources to make your healthcare more accessible, including transportation assistance, financial assistance and virtual visits. To learn more about these resources, please call [phone].       In order to keep you as informed as possible, below is a brief summary of our policy regarding missed appointments:        If a patient “No Shows”??three (3) or more appointments within a rolling 12-month           period, they may be dismissed from the practice for failure to follow clinician      recommendations.     If you have any concerns regarding the care you are receiving, please talk with your provider or call the office at [phone] and request to speak with the Practice . We’re committed to providing excellent care, and your feedback is invaluable.          Sincerely,    Neva SANCHEZ.

## 2024-05-16 ENCOUNTER — DOCUMENTATION (OUTPATIENT)
Dept: HEALTH INFORMATION MANAGEMENT | Facility: OTHER | Age: 64
End: 2024-05-16

## 2024-10-16 ENCOUNTER — TELEPHONE (OUTPATIENT)
Dept: HEALTH INFORMATION MANAGEMENT | Facility: OTHER | Age: 64
End: 2024-10-16
Payer: COMMERCIAL

## 2025-03-20 DIAGNOSIS — I25.810 CORONARY ARTERY DISEASE INVOLVING CORONARY BYPASS GRAFT OF NATIVE HEART WITHOUT ANGINA PECTORIS: ICD-10-CM

## 2025-08-18 DIAGNOSIS — I25.810 CORONARY ARTERY DISEASE INVOLVING CORONARY BYPASS GRAFT OF NATIVE HEART WITHOUT ANGINA PECTORIS: ICD-10-CM

## (undated) DEVICE — CATHETER THERMALDILUTION SWAN - (5EA/CA)

## (undated) DEVICE — DRAIN CHEST ADULT (6EA/CA) DELETED ITEM  ORDER #15909

## (undated) DEVICE — PUNCH DISP VASCULAR 4.4 - 6/BX

## (undated) DEVICE — GOWN SURGEONS LARGE - (32/CA)

## (undated) DEVICE — SOD. CHL. INJ. 0.9% 1000 ML - (14EA/CA 60CA/PF)

## (undated) DEVICE — FIBRILLAR SURGICEL 4X4 - 10/CA

## (undated) DEVICE — TRANSDUCER BIFURCATED MONITORING KIT (10EA/CA)

## (undated) DEVICE — SODIUM CHL IRRIGATION 0.9% 1000ML (12EA/CA)

## (undated) DEVICE — SODIUM CHL. INJ. 0.9% 500ML (24EA/CA 50CA/PF)

## (undated) DEVICE — DRAPE MAYO STAND - (30/CA)

## (undated) DEVICE — TRAY MULTI-LUMEN 7FR PRESSURE W/MAX BARRIER AND BIOPATCH - (5/CA)

## (undated) DEVICE — ELECTRODE 850 FOAM ADHESIVE - HYDROGEL RADIOTRNSPRNT (50/PK)

## (undated) DEVICE — BLADE SURGICAL CLIPPER - (50EA/CA)

## (undated) DEVICE — SPRING BULLDOG 1/2 FORCE BLUE - (10/BX)

## (undated) DEVICE — BANDAGE ELASTIC 4 IN X 5 YDS - LATEX FREE(10/BX 5BX/CA)

## (undated) DEVICE — KIT RADIAL ARTERY 20GA W/MAX BARRIER AND BIOPATCH  (5EA/CA) #10740 IS FOR THE SET RADIAL ARTERIAL

## (undated) DEVICE — STOPCOCK MALE 4-WAY - (50/CA)

## (undated) DEVICE — RETRACTOR OFF PUMP OCTO ONLY - 10/BX

## (undated) DEVICE — PACK VEIN - (19/CA)

## (undated) DEVICE — SUTURE 4-0 MONOCRYL PLUS PS-2 - 27 INCH (36/BX)

## (undated) DEVICE — SUTURE 8-0 PROLENE BV175-8 EVERPONT

## (undated) DEVICE — Device

## (undated) DEVICE — SUTURE OHS

## (undated) DEVICE — GLOVE BIOGEL PI ORTHO SZ 6 1/2 SURGICAL PF LF (40PR/BX)

## (undated) DEVICE — SYRINGE 30 ML LL (56/BX)

## (undated) DEVICE — PACK CV DRAPING/BASIN 2PART - (1/CA)

## (undated) DEVICE — PACK E SUTURE USED FOR - OPEN HEART  (5/BX)

## (undated) DEVICE — DRESSING TRANSPARENT FILM TEGADERM 2.375 X 2.75"  (100EA/BX)"

## (undated) DEVICE — GLOVE BIOGEL INDICATOR SZ 7SURGICAL PF LTX - (50/BX 4BX/CA)

## (undated) DEVICE — SYSTEM PREVENA INCISION MNGM - (1/EA)

## (undated) DEVICE — NEPTUNE 4 PORT MANIFOLD - (20/PK)

## (undated) DEVICE — GLOVE BIOGEL ECLIPSE  PF LATEX SIZE 6.5 (50PR/BX)

## (undated) DEVICE — STAPLER SKIN DISP - (6/BX 10BX/CA) VISISTAT

## (undated) DEVICE — SYS DLV COST CLS RM TEMP - INJECTATE (CO-SET II) (10EA/CA)

## (undated) DEVICE — WIRE STEEL 5-0 B&S 20 OHS - 5/PK 12PK/BX ITEM. D5329 OR D6625 CAN BE USED AS A SUB

## (undated) DEVICE — MASK ANESTHESIA ADULT  - (100/CA)

## (undated) DEVICE — BLADE STERNUM SAW SURGICAL 32.0 X 6.4 MM STERILE (1/EA)

## (undated) DEVICE — LEAD SET 6 DISP. EKG NIHON KOHDEN (100EA/CA) [9859].

## (undated) DEVICE — BLANKET WARMING CARDIAC STRL - (5/CA)

## (undated) DEVICE — KIT ANESTHESIA W/CIRCUIT & 3/LT BAG W/FILTER (20EA/CA)

## (undated) DEVICE — BLADE BEAVER 6900 MINI SHARP ALL AROUND (20/CA)

## (undated) DEVICE — INSERT STEALTH #1 - 10/BX

## (undated) DEVICE — SET BIFURCATED BLOOD - (48EA/CS)

## (undated) DEVICE — BAG RESUSCITATION DISPOSABLE - WITH MASK (10 EA/CA)

## (undated) DEVICE — GOWN SURGEONS X-LARGE - DISP. (30/CA)

## (undated) DEVICE — GLOVE BIOGEL SZ 6.5 SURGICAL PF LTX (50PR/BX 4BX/CA)

## (undated) DEVICE — SUTURE 5-0 PROLENE C-1 D/A 24 (36PK/BX)"

## (undated) DEVICE — LEAD PACING TEMP MYO - (12/BX)

## (undated) DEVICE — SUTURE 0 ETHIBOND MO6 C/R - (12/BX) 8-18 INCH ETHICON

## (undated) DEVICE — KIT TOURNIQUET DLP (40EA/PK)

## (undated) DEVICE — SYRINGE SAFETY 3 ML 18 GA X 1 1/2 BLUNT LL (100/BX 8BX/CA)

## (undated) DEVICE — TUBING INSUFFLATION - (10/BX)

## (undated) DEVICE — DRESSING TRANSPARENT FILM TEGADERM 4 X 4.75" (50EA/BX)"

## (undated) DEVICE — SUCTION INSTRUMENT YANKAUER BULBOUS TIP W/O VENT (50EA/CA)

## (undated) DEVICE — CANISTER SUCTION 3000ML MECHANICAL FILTER AUTO SHUTOFF MEDI-VAC NONSTERILE LF DISP  (40EA/CA)

## (undated) DEVICE — TUBING CLEARLINK DUO-VENT - C-FLO (48EA/CA)

## (undated) DEVICE — SENSOR CEREBRAL AND SOMATIC MONITORING (20/CA)

## (undated) DEVICE — SOLUTION NORMOSOL-4 INJ 1000ML

## (undated) DEVICE — SET LEADWIRE 5 LEAD BEDSIDE DISPOSABLE ECG (1SET OF 5/EA)

## (undated) DEVICE — KIT ROOM DECONTAMINATION

## (undated) DEVICE — HEAD HOLDER JUNIOR/ADULT

## (undated) DEVICE — ARMBOARD  SMALL IV 9 INLONG - (25EA/CA)

## (undated) DEVICE — BLOWER/MISTER (5EA/PK)

## (undated) DEVICE — TUBE CHEST 36FR. STRAIGHT - (10EA/CA)

## (undated) DEVICE — DILATORS PARSONNET 1.0MM - (5EA/CA)

## (undated) DEVICE — SPONGE XRAY 8X4 STERL. 12PL - (10EA/TY 80TY/CA)

## (undated) DEVICE — SUTURE 4-0 30CM STRATAFIX SPIRAL PS-2 (12EA/BX)

## (undated) DEVICE — TRAY SURESTEP FOLEY TEMP SENSING 16FR (10EA/CA) ORDER  #18764 FOR TEMP FOLEY ONLY

## (undated) DEVICE — KIT INTROPERCUTANEOUS SHEATH - 8.5 FR W/MAX BARRIER AND BIOPATCH  (5/CA)

## (undated) DEVICE — ELECTRODE DUAL RETURN W/ CORD - (50/PK)

## (undated) DEVICE — SET FLUID WARMING STANDARD FLOW - (10/CA)

## (undated) DEVICE — KIT ENDOHARVEST SYSTEM 8 - MUST ORDER 5 AT A TIME

## (undated) DEVICE — MICRODRIP PRIMARY VENTED 60 (48EA/CA) THIS WAS PART #2C8428 WHICH WAS DISCONTINUED

## (undated) DEVICE — GOWN WARMING STANDARD FLEX - (30/CA)

## (undated) DEVICE — BAG DECANTER (50EA/CS)

## (undated) DEVICE — INSERT STEALTH #5 - (10/BX)

## (undated) DEVICE — SUTURE 6-0 PROLENE RB-2 D/A 30 (36PK/BX)"

## (undated) DEVICE — LACTATED RINGERS INJ 1000 ML - (14EA/CA 60CA/PF)

## (undated) DEVICE — SET EXTENSION WITH 2 PORTS (48EA/CA) ***PART #2C8610 IS A SUBSTITUTE*****

## (undated) DEVICE — TUBE TRACHEAL ORAL/NASAL E-T HI-LO CUFF 9.0MM (10EA/PK)

## (undated) DEVICE — PROTECTOR ULNA NERVE - (36PR/CA)

## (undated) DEVICE — NEEDLE SAFETY 18 GA X 1 1/2 IN (100EA/BX)

## (undated) DEVICE — SENSOR SPO2 NEO LNCS ADHESIVE (20/BX) SEE USER NOTES